# Patient Record
Sex: FEMALE | Race: WHITE | NOT HISPANIC OR LATINO | Employment: UNEMPLOYED | URBAN - METROPOLITAN AREA
[De-identification: names, ages, dates, MRNs, and addresses within clinical notes are randomized per-mention and may not be internally consistent; named-entity substitution may affect disease eponyms.]

---

## 2017-01-13 ENCOUNTER — TELEPHONE (OUTPATIENT)
Dept: FAMILY MEDICINE | Facility: CLINIC | Age: 57
End: 2017-01-13

## 2017-01-13 NOTE — TELEPHONE ENCOUNTER
Patient is due for diabetes visit and labs.    A1C     10.2   9/20/2016  A1C     11.4   8/9/2016  A1C     10.6   4/9/2015    Labs have been ordered.    Call patient with above and assist in scheduling if needed.  MAUREEN Kimbrough

## 2017-01-23 NOTE — TELEPHONE ENCOUNTER
Pt returned call and made appts for lab and to see Paulette. The labs aren't in so we need those put in and she said she only has 3 pills left of her metformin and is wondering if we can get her enough until her appt on Wednesday February 1st.    Delores Fairburn  Clinic Station

## 2017-01-24 ENCOUNTER — ALLIED HEALTH/NURSE VISIT (OUTPATIENT)
Dept: FAMILY MEDICINE | Facility: CLINIC | Age: 57
End: 2017-01-24
Payer: COMMERCIAL

## 2017-01-24 DIAGNOSIS — E11.29 POORLY CONTROLLED TYPE II DIABETES MELLITUS WITH RENAL COMPLICATION (H): ICD-10-CM

## 2017-01-24 DIAGNOSIS — E11.65 POORLY CONTROLLED TYPE 2 DIABETES MELLITUS WITH RENAL COMPLICATION (H): Primary | ICD-10-CM

## 2017-01-24 DIAGNOSIS — E78.5 HYPERLIPIDEMIA LDL GOAL <70: ICD-10-CM

## 2017-01-24 DIAGNOSIS — E11.65 POORLY CONTROLLED TYPE 2 DIABETES MELLITUS WITH RENAL COMPLICATION (H): ICD-10-CM

## 2017-01-24 DIAGNOSIS — E11.65 TYPE 2 DIABETES MELLITUS WITH HYPERGLYCEMIA (H): ICD-10-CM

## 2017-01-24 DIAGNOSIS — E11.29 POORLY CONTROLLED TYPE 2 DIABETES MELLITUS WITH RENAL COMPLICATION (H): ICD-10-CM

## 2017-01-24 DIAGNOSIS — E11.29 POORLY CONTROLLED TYPE 2 DIABETES MELLITUS WITH RENAL COMPLICATION (H): Primary | ICD-10-CM

## 2017-01-24 DIAGNOSIS — E11.65 POORLY CONTROLLED TYPE II DIABETES MELLITUS WITH RENAL COMPLICATION (H): ICD-10-CM

## 2017-01-24 LAB
ALBUMIN SERPL-MCNC: 3.9 G/DL (ref 3.4–5)
ALP SERPL-CCNC: 98 U/L (ref 40–150)
ALT SERPL W P-5'-P-CCNC: 56 U/L (ref 0–50)
ANION GAP SERPL CALCULATED.3IONS-SCNC: 11 MMOL/L (ref 3–14)
AST SERPL W P-5'-P-CCNC: 39 U/L (ref 0–45)
BILIRUB SERPL-MCNC: 0.4 MG/DL (ref 0.2–1.3)
BUN SERPL-MCNC: 14 MG/DL (ref 7–30)
CALCIUM SERPL-MCNC: 8.9 MG/DL (ref 8.5–10.1)
CHLORIDE SERPL-SCNC: 103 MMOL/L (ref 94–109)
CHOLEST SERPL-MCNC: 181 MG/DL
CO2 SERPL-SCNC: 23 MMOL/L (ref 20–32)
CREAT SERPL-MCNC: 0.63 MG/DL (ref 0.52–1.04)
CREAT UR-MCNC: 67 MG/DL
GFR SERPL CREATININE-BSD FRML MDRD: ABNORMAL ML/MIN/1.7M2
GLUCOSE SERPL-MCNC: 104 MG/DL (ref 70–99)
HDLC SERPL-MCNC: 28 MG/DL
LDLC SERPL CALC-MCNC: 118 MG/DL
MICROALBUMIN UR-MCNC: 10 MG/L
MICROALBUMIN/CREAT UR: 14.41 MG/G CR (ref 0–25)
NONHDLC SERPL-MCNC: 153 MG/DL
POTASSIUM SERPL-SCNC: 4.1 MMOL/L (ref 3.4–5.3)
PROT SERPL-MCNC: 7.5 G/DL (ref 6.8–8.8)
SODIUM SERPL-SCNC: 137 MMOL/L (ref 133–144)
TRIGL SERPL-MCNC: 173 MG/DL

## 2017-01-24 PROCEDURE — 82043 UR ALBUMIN QUANTITATIVE: CPT | Performed by: NURSE PRACTITIONER

## 2017-01-24 PROCEDURE — 36415 COLL VENOUS BLD VENIPUNCTURE: CPT | Performed by: NURSE PRACTITIONER

## 2017-01-24 PROCEDURE — 99207 ZZC NO CHARGE NURSE ONLY: CPT

## 2017-01-24 PROCEDURE — 80053 COMPREHEN METABOLIC PANEL: CPT | Performed by: NURSE PRACTITIONER

## 2017-01-24 PROCEDURE — 80061 LIPID PANEL: CPT | Performed by: NURSE PRACTITIONER

## 2017-01-24 RX ORDER — METFORMIN HCL 500 MG
1000 TABLET, EXTENDED RELEASE 24 HR ORAL 2 TIMES DAILY WITH MEALS
Qty: 120 TABLET | Refills: 0 | Status: SHIPPED
Start: 2017-01-24 | End: 2017-02-01

## 2017-01-24 NOTE — NURSING NOTE
Patient here for refill of metformin.    Needs appt with diabetic ed for further refills    Antonella Sierra RN

## 2017-01-25 ENCOUNTER — ALLIED HEALTH/NURSE VISIT (OUTPATIENT)
Dept: FAMILY MEDICINE | Facility: CLINIC | Age: 57
End: 2017-01-25
Payer: COMMERCIAL

## 2017-01-25 ENCOUNTER — TRANSFERRED RECORDS (OUTPATIENT)
Dept: HEALTH INFORMATION MANAGEMENT | Facility: CLINIC | Age: 57
End: 2017-01-25

## 2017-01-25 DIAGNOSIS — E78.5 HYPERLIPIDEMIA LDL GOAL <70: Primary | Chronic | ICD-10-CM

## 2017-01-25 DIAGNOSIS — I10 ESSENTIAL HYPERTENSION WITH GOAL BLOOD PRESSURE LESS THAN 140/90: Chronic | ICD-10-CM

## 2017-01-25 PROCEDURE — 99207 ZZC NO CHARGE NURSE ONLY: CPT

## 2017-02-01 ENCOUNTER — ALLIED HEALTH/NURSE VISIT (OUTPATIENT)
Dept: EDUCATION SERVICES | Facility: CLINIC | Age: 57
End: 2017-02-01
Payer: COMMERCIAL

## 2017-02-01 ENCOUNTER — OFFICE VISIT (OUTPATIENT)
Dept: FAMILY MEDICINE | Facility: CLINIC | Age: 57
End: 2017-02-01
Payer: COMMERCIAL

## 2017-02-01 VITALS
WEIGHT: 126 LBS | HEIGHT: 60 IN | DIASTOLIC BLOOD PRESSURE: 88 MMHG | BODY MASS INDEX: 24.74 KG/M2 | SYSTOLIC BLOOD PRESSURE: 138 MMHG | TEMPERATURE: 97.6 F | HEART RATE: 72 BPM

## 2017-02-01 VITALS — WEIGHT: 126 LBS | BODY MASS INDEX: 25.44 KG/M2

## 2017-02-01 DIAGNOSIS — I10 ESSENTIAL HYPERTENSION WITH GOAL BLOOD PRESSURE LESS THAN 140/90: Chronic | ICD-10-CM

## 2017-02-01 DIAGNOSIS — E11.29 POORLY CONTROLLED TYPE 2 DIABETES MELLITUS WITH RENAL COMPLICATION (H): Primary | Chronic | ICD-10-CM

## 2017-02-01 DIAGNOSIS — E11.65 POORLY CONTROLLED TYPE 2 DIABETES MELLITUS WITH RENAL COMPLICATION (H): Primary | Chronic | ICD-10-CM

## 2017-02-01 DIAGNOSIS — E78.5 HYPERLIPIDEMIA LDL GOAL <70: Chronic | ICD-10-CM

## 2017-02-01 DIAGNOSIS — E11.65 TYPE 2 DIABETES MELLITUS WITH HYPERGLYCEMIA, WITHOUT LONG-TERM CURRENT USE OF INSULIN (H): Primary | ICD-10-CM

## 2017-02-01 DIAGNOSIS — E11.65 TYPE 2 DIABETES MELLITUS WITH HYPERGLYCEMIA, WITHOUT LONG-TERM CURRENT USE OF INSULIN (H): ICD-10-CM

## 2017-02-01 DIAGNOSIS — N20.0 NEPHROLITHIASIS: Chronic | ICD-10-CM

## 2017-02-01 LAB — HBA1C MFR BLD: 7 % (ref 4.3–6)

## 2017-02-01 PROCEDURE — 99214 OFFICE O/P EST MOD 30 MIN: CPT | Performed by: NURSE PRACTITIONER

## 2017-02-01 PROCEDURE — G0108 DIAB MANAGE TRN  PER INDIV: HCPCS

## 2017-02-01 PROCEDURE — 83036 HEMOGLOBIN GLYCOSYLATED A1C: CPT | Performed by: NURSE PRACTITIONER

## 2017-02-01 PROCEDURE — 36415 COLL VENOUS BLD VENIPUNCTURE: CPT | Performed by: NURSE PRACTITIONER

## 2017-02-01 RX ORDER — METFORMIN HCL 500 MG
1000 TABLET, EXTENDED RELEASE 24 HR ORAL 2 TIMES DAILY WITH MEALS
Qty: 360 TABLET | Refills: 3 | Status: SHIPPED | OUTPATIENT
Start: 2017-02-01 | End: 2017-09-06

## 2017-02-01 RX ORDER — PRAVASTATIN SODIUM 20 MG
20 TABLET ORAL DAILY
Qty: 90 TABLET | Refills: 3 | Status: SHIPPED | OUTPATIENT
Start: 2017-02-01 | End: 2017-09-06

## 2017-02-01 RX ORDER — GLIPIZIDE 5 MG/1
5 TABLET, FILM COATED, EXTENDED RELEASE ORAL DAILY
Qty: 90 TABLET | Refills: 3 | Status: SHIPPED | OUTPATIENT
Start: 2017-02-01 | End: 2017-09-06

## 2017-02-01 RX ORDER — PRAVASTATIN SODIUM 10 MG
10 TABLET ORAL DAILY
Qty: 90 TABLET | Refills: 1 | Status: CANCELLED | OUTPATIENT
Start: 2017-02-01

## 2017-02-01 NOTE — PROGRESS NOTES
SUBJECTIVE:                                                    Bradford Prado is a 56 year old female who presents to clinic today for the following health issues:        Diabetes Follow-up    Patient is checking blood sugars: twice daily.    Blood sugar testing frequency justification: Patient modifying lifestyle changes (diet, exercise) with blood sugars  Results are as follows:         am - 76         suppertime - 148    Diabetic concerns: None     Symptoms of hypoglycemia (low blood sugar): shaky, dizzy, weak, lethargy     Paresthesias (numbness or burning in feet) or sores: No     Date of last diabetic eye exam: 1 week ago     Hyperlipidemia Follow-Up      Rate your low fat/cholesterol diet?: poor    Taking statin?  Yes, no muscle aches from statin    Other lipid medications/supplements?:  none     Hypertension Follow-up      Outpatient blood pressures are not being checked.    Low Salt Diet: not monitoring salt         Amount of exercise or physical activity: None    Problems taking medications regularly: No    Medication side effects: none    Diet: diabetic and carbohydrate counting    Eating well and has lost weight.  Stays very active with her job.    Problem list and histories reviewed & adjusted, as indicated.  Additional history: as documented    Patient Active Problem List   Diagnosis     Migraine     Hand arthritis     Stenosing tenosynovitis     Poorly controlled type 2 diabetes mellitus with renal complication (H)     Hyperlipidemia LDL goal <70     Essential hypertension with goal blood pressure less than 140/90     Nephrolithiasis     Type 2 diabetes mellitus with hyperglycemia, without long-term current use of insulin (H)     Past Surgical History   Procedure Laterality Date     Tubal ligation       Laparoscopic evacuation ectopic pregnancy       Hysterectomy total abdominal  1995     due to fibroids       Social History   Substance Use Topics     Smoking status: Former Smoker -- 0.50 packs/day for  20 years     Types: Cigarettes     Quit date: 03/09/2010     Smokeless tobacco: Never Used     Alcohol Use: No     Family History   Problem Relation Age of Onset     Breast Cancer No family hx of      Cancer - colorectal No family hx of      Emphysema Father      Cirrhosis Mother      w/o alcohol history     Hypertension Sister      Heart Defect Niece      [facility]         Current Outpatient Prescriptions   Medication Sig Dispense Refill     metFORMIN (GLUCOPHAGE-XR) 500 MG 24 hr tablet Take 2 tablets (1,000 mg) by mouth 2 times daily (with meals) 360 tablet 3     blood glucose monitoring (ACCU-CHEK MASON PLUS) test strip Use to test blood sugar 2 times daily or as directed.  Ok to substitute alternative if insurance prefers. 100 each 11     glipiZIDE (GLIPIZIDE XL) 5 MG 24 hr tablet Take 1 tablet (5 mg) by mouth daily 90 tablet 3     pravastatin (PRAVACHOL) 20 MG tablet Take 1 tablet (20 mg) by mouth daily 90 tablet 3     blood glucose monitoring (ACCU-CHEK FASTCLIX) lancets Use to test blood sugar 2 times daily or as directed.  Ok to substitute alternative if insurance prefers. 1 Box 11     glucose 4 G CHEW Take 1 tablet by mouth every hour as needed for low blood sugar 10 tablet 1     losartan (COZAAR) 50 MG tablet Take 1 tablet (50 mg) by mouth daily (Patient taking differently: Take 50 mg by mouth every evening ) 90 tablet 3     pravastatin (PRAVACHOL) 10 MG tablet Take 1 tablet (10 mg) by mouth daily (Patient taking differently: Take 10 mg by mouth every evening ) 90 tablet 1     IBUPROFEN PO        naproxen sodium (ALEVE) 220 MG tablet Take 220 mg by mouth once as needed        [DISCONTINUED] metFORMIN (GLUCOPHAGE-XR) 500 MG 24 hr tablet Take 2 tablets (1,000 mg) by mouth 2 times daily (with meals) May start with 1 tablet twice daily with meals, then increase after 3-5 days to 2 tablets twice daily with meals 120 tablet 0     [DISCONTINUED] glipiZIDE (GLIPIZIDE XL) 5 MG 24 hr tablet Take 1 tablet  (5 mg) by mouth daily 90 tablet 1     No Known Allergies  Recent Labs   Lab Test  02/01/17   1046  01/24/17   0937  09/27/16   1110  09/22/16   2215   09/20/16   0630   08/09/16   1217  04/09/15   1051   08/23/12   0837   A1C  7.0*   --    --    --    --   10.2*   --   11.4*  10.6*   < >   --    LDL   --   118*   --    --    --    --    --   127*  137*   --   127   HDL   --   28*   --    --    --    --    --   25*   --    --   32*   TRIG   --   173*   --    --    --    --    --   259*   --    --   241*   ALT   --   56*  56*  95*   --    --    < >  Canceled, Test credited   Unsatisfactory specimen - hemolyzed  uControl OP LAB 8/9/16 AT 1512 AS    61*   --   33   CR   --   0.63   --   0.65   < >   --    < >  Canceled, Test credited   Unsatisfactory specimen - hemolyzed  uControl OP LAB 8/9/16 AT 1512 AS     --    < >  0.62   GFRESTIMATED   --   >90  Non  GFR Calc     --   >90  Non  GFR Calc     < >   --    < >  Canceled, Test credited   Unsatisfactory specimen - hemolyzed  uControl OP LAB 8/9/16 AT 1512 AS     --    < >  >90   GFRESTBLACK   --   >90   GFR Calc     --   >90   GFR Calc     < >   --    < >  Canceled, Test credited   Unsatisfactory specimen - hemolyzed  uControl OP LAB 8/9/16 AT 1512 AS     --    < >  >90   POTASSIUM   --   4.1   --   3.7   < >   --    < >  Canceled, Test credited   Unsatisfactory specimen - hemolyzed  uControl OP LAB 8/9/16 AT 1512 AS     --    < >  3.9   TSH   --    --    --    --    --    --    --    --   2.73   --    --     < > = values in this interval not displayed.      BP Readings from Last 3 Encounters:   02/01/17 138/88   10/03/16 139/89   09/27/16 138/88    Wt Readings from Last 3 Encounters:   02/01/17 126 lb (57.153 kg)   02/01/17 126 lb (57.153 kg)   10/03/16 128 lb (58.06 kg)                  Labs reviewed in EPIC  Problem list, Medication list, Allergies, and Medical/Social/Surgical histories  "reviewed in [facility] and updated as appropriate.    ROS:  Constitutional, HEENT, cardiovascular, pulmonary, GI, , musculoskeletal, neuro, skin, endocrine and psych systems are negative, except as otherwise noted.    OBJECTIVE:                                                    /88 mmHg  Pulse 72  Temp(Src) 97.6  F (36.4  C) (Tympanic)  Ht 4' 11.75\" (1.518 m)  Wt 126 lb (57.153 kg)  BMI 24.80 kg/m2  Body mass index is 24.8 kg/(m^2).  GENERAL: healthy, alert and no distress  NECK: no adenopathy, no asymmetry, masses, or scars and thyroid normal to palpation  RESP: lungs clear to auscultation - no rales, rhonchi or wheezes  CV: regular rate and rhythm, normal S1 S2, no S3 or S4, no murmur, click or rub, no peripheral edema and peripheral pulses strong  ABDOMEN: soft, nontender, no hepatosplenomegaly, no masses and bowel sounds normal  MS: no gross musculoskeletal defects noted, no edema    Diagnostic Test Results:  Results for orders placed or performed in visit on 02/01/17 (from the past 24 hour(s))   Hemoglobin A1c   Result Value Ref Range    Hemoglobin A1C 7.0 (H) 4.3 - 6.0 %        ASSESSMENT/PLAN:                                                      1. Poorly controlled type 2 diabetes mellitus with renal complication (H)     - metFORMIN (GLUCOPHAGE-XR) 500 MG 24 hr tablet; Take 2 tablets (1,000 mg) by mouth 2 times daily (with meals)  Dispense: 360 tablet; Refill: 3  - blood glucose monitoring (ACCU-CHEK MASON PLUS) test strip; Use to test blood sugar 2 times daily or as directed.  Ok to substitute alternative if insurance prefers.  Dispense: 100 each; Refill: 11  - glipiZIDE (GLIPIZIDE XL) 5 MG 24 hr tablet; Take 1 tablet (5 mg) by mouth daily  Dispense: 90 tablet; Refill: 3  - Hemoglobin A1c  - Basic metabolic panel; Future  - Lipid panel reflex to direct LDL; Future  - Hemoglobin A1c; Future  - pravastatin (PRAVACHOL) 20 MG tablet; Take 1 tablet (20 mg) by mouth daily  Dispense: 90 tablet; Refill: " 3    2. Nephrolithiasis     - glipiZIDE (GLIPIZIDE XL) 5 MG 24 hr tablet; Take 1 tablet (5 mg) by mouth daily  Dispense: 90 tablet; Refill: 3  - Hemoglobin A1c  - Basic metabolic panel; Future  - Lipid panel reflex to direct LDL; Future  - Hemoglobin A1c; Future  - pravastatin (PRAVACHOL) 20 MG tablet; Take 1 tablet (20 mg) by mouth daily  Dispense: 90 tablet; Refill: 3    3. Essential hypertension with goal blood pressure less than 140/90     - glipiZIDE (GLIPIZIDE XL) 5 MG 24 hr tablet; Take 1 tablet (5 mg) by mouth daily  Dispense: 90 tablet; Refill: 3  - Hemoglobin A1c  - Basic metabolic panel; Future  - Lipid panel reflex to direct LDL; Future  - Hemoglobin A1c; Future  - pravastatin (PRAVACHOL) 20 MG tablet; Take 1 tablet (20 mg) by mouth daily  Dispense: 90 tablet; Refill: 3    4. Hyperlipidemia LDL goal <70   See AVS.    - glipiZIDE (GLIPIZIDE XL) 5 MG 24 hr tablet; Take 1 tablet (5 mg) by mouth daily  Dispense: 90 tablet; Refill: 3  - Hemoglobin A1c  - Basic metabolic panel; Future  - Lipid panel reflex to direct LDL; Future  - Hemoglobin A1c; Future  - pravastatin (PRAVACHOL) 20 MG tablet; Take 1 tablet (20 mg) by mouth daily  Dispense: 90 tablet; Refill: 3    5. Type 2 diabetes mellitus with hyperglycemia, without long-term current use of insulin (H)         Patient Instructions   Naprosyn (Aleve) 220 mg once daily as needed for arthritis and hand pain.    Diabetes Plan  1. Hemoglobin A1c goal is between 7% and 8%  Your Hemoglobin A1c is not at goal  Plan is:Continue medications at current doses and Next Hemoglobin A1c in TODAY and if at goal (< 8%) then recheck in 6 months.  2. LDL (bad cholesterol) goal is less than 100  Your LDL is not at goal  Plan is: Increase medication - Pravastatin 20 mg once daily, Diet and exercise and Next cholesterol panel in  6 months  3. Blood pressure goal is less than 140/90.  Your blood pressure is at goal.  Plan is: Continue medications at current doses, Diet and  exercise and Low sodium diet. Less than 2 grams of sodium per day  4. Microalbumin checks for protein in your urine which is an indicator of kidney damage.  Your microalbumin is normal.  Plan is: Recheck in one year  5. Other: Daily aspirin is recommended to reduce cardiovascular risk  6. TSH (thyroid test)  is normal  7. Recommend an annual flu shot    MAUREEN Kimbrough                Thank you for choosing [facility].  You may be receiving a survey in the mail from [employer] regarding your visit today.  Please take a few minutes to complete and return the survey to let us know how we are doing.      If you have questions or concerns, please contact us via HelioVolt or you can contact your care team at [phone].    Our Clinic hours are:  Monday 6:40 am  to 7:00 pm  Tuesday -Friday 6:40 am to 5:00 pm    The [place] outpatient lab hours are:  Monday - Friday 6:10 am to 4:45 pm  Saturdays 7:00 am to 11:00 am  Appointments are required, call [phone]    If you have clinical questions after hours or would like to schedule an appointment,  call the clinic at [phone].          Paulette Travis NP  [facility]

## 2017-02-01 NOTE — NURSING NOTE
"Chief Complaint   Patient presents with     Diabetes     Refill Request       Initial /84 mmHg  Pulse 72  Temp(Src) 97.6  F (36.4  C) (Tympanic)  Ht 4' 11.75\" (1.518 m)  Wt 126 lb (57.153 kg)  BMI 24.80 kg/m2 Estimated body mass index is 24.8 kg/(m^2) as calculated from the following:    Height as of this encounter: 4' 11.75\" (1.518 m).    Weight as of this encounter: 126 lb (57.153 kg).  BP completed using cuff size: regular    "

## 2017-02-01 NOTE — PATIENT INSTRUCTIONS
My Diabetes Care Goals:  Healthy Eating: Eat three small meals and up to three snacks per day.  Add at least one carb serving to each meal.  Being Active: Keep walking at work.  When the weather gets nice start walking outside.  Monitoring: You are doing a great job checking your blood sugar.  Keep going!  Problem Solving: Carry glucose tablets with you.  If you have any blood sugars under 70 take 3 glucose tablets.    Follow up:  Follow-up diabetes education appointment scheduled on April 3 at 9:30 a.m.      Bring blood glucose meter and logbook with you to all doctor and follow-up appointments.     [facility] for the [facility]:  For Your Diabetes Education and Nutrition Appointments Call:  [phone]   For Diabetes Education or Nutrition Related Questions:   Phone: [phone]  E-mail: [email or web address]  Fax: [phone]   If you need a medication refill please contact your pharmacy. Please allow 3 business days for your refills to be completed.    Instructions for emailing the Diabetes Educators    If you need to communicate a non-urgent message to a Diabetes Educator via email, please send to [email or web address].    Please follow the following email guidelines:    Subject line: Secure: your clinic name (example: Secure: [place])  In the email please include: First name, middle initial, last name and date of birth.    We will be in touch with you within one (1) business day.

## 2017-02-01 NOTE — MR AVS SNAPSHOT
After Visit Summary   2/1/2017    Bradford Prado    MRN: [id number]           Patient Information     Date Of Birth          1960        Visit Information        Provider Department      2/1/2017 8:30 AM [place] [facility] RESOURCE [facility]        Care Instructions    My Diabetes Care Goals:  Healthy Eating: Eat three small meals and up to three snacks per day.  Add at least one carb serving to each meal.  Being Active: Keep walking at work.  When the weather gets nice start walking outside.  Monitoring: You are doing a great job checking your blood sugar.  Keep going!  Problem Solving: Carry glucose tablets with you.  If you have any blood sugars under 70 take 3 glucose tablets.    Follow up:  Follow-up diabetes education appointment scheduled on April 3 at 9:30 a.m.      Bring blood glucose meter and logbook with you to all doctor and follow-up appointments.     [facility] for the [facility]:  For Your Diabetes Education and Nutrition Appointments Call:  [phone]   For Diabetes Education or Nutrition Related Questions:   Phone: [phone]  E-mail: [email or web address]  Fax: [phone]   If you need a medication refill please contact your pharmacy. Please allow 3 business days for your refills to be completed.    Instructions for emailing the Diabetes Educators    If you need to communicate a non-urgent message to a Diabetes Educator via email, please send to [email or web address].    Please follow the following email guidelines:    Subject line: Secure: your clinic name (example: Secure: [place])  In the email please include: First name, middle initial, last name and date of birth.    We will be in touch with you within one (1) business day.           Follow-ups after your visit        Your next 10 appointments already scheduled     Feb 01, 2017 10:00 AM   SHORT with Paulette Travis NP   [facility]    [address]  [facility] [address]   [phone]            2017  9:30 AM   Diabetic Education with [place] [facility] RESOURCE   [facility] ([facility]    [address]  [facility] [address]   [phone]              Who to contact     If you have questions or need follow up information about today's clinic visit or your schedule please contact [facility] directly at [phone].  Normal or non-critical lab and imaging results will be communicated to you by CapLinkedhart, letter or phone within 4 business days after the clinic has received the results. If you do not hear from us within 7 days, please contact the clinic through Airizut or phone. If you have a critical or abnormal lab result, we will notify you by phone as soon as possible.  Submit refill requests through Envoimoinscher or call your pharmacy and they will forward the refill request to us. Please allow 3 business days for your refill to be completed.          Additional Information About Your Visit        En[employer]cher Information     [employer] lets you send messages to your doctor, view your test results, renew your prescriptions, schedule appointments and more. To sign up, go to [email or web address] . Click on \"Log in\" on the left side of the screen, which will take you to the Welcome page. Then click on \"Sign up Now\" on the right side of the page.     You will be asked to enter the access code listed below, as well as some personal information. Please follow the directions to create your username and password.     Your access code is: [email or web address]  Expires: 2017  9:27 AM     Your access code will  in 90 days. If you need help or a new code, please call your [facility] clinic or [phone].        Care EveryWhere ID     This is your Care EveryWhere ID. This could be used by other organizations to access your [facility] medical records  [id number]         Blood Pressure " from Last 3 Encounters:   10/03/16 139/89   09/27/16 138/88   09/23/16 169/84    Weight from Last 3 Encounters:   02/01/17 57.153 kg (126 lb)   10/03/16 58.06 kg (128 lb)   10/03/16 58.06 kg (128 lb)              Today, you had the following     No orders found for display         Today's Medication Changes          These changes are accurate as of: 2/1/17  9:27 AM.  If you have any questions, ask your nurse or doctor.               These medicines have changed or have updated prescriptions.        Dose/Directions    losartan 50 MG tablet   Commonly known as:  COZAAR   This may have changed:  when to take this   Used for:  Essential hypertension with goal blood pressure less than 140/90        Dose:  50 mg   Take 1 tablet (50 mg) by mouth daily   Quantity:  90 tablet   Refills:  3       pravastatin 10 MG tablet   Commonly known as:  PRAVACHOL   This may have changed:  when to take this   Used for:  Hyperlipidemia LDL goal <70, Poorly controlled type 2 diabetes mellitus with renal complication (H), Type 2 diabetes mellitus with hyperglycemia (H)        Dose:  10 mg   Take 1 tablet (10 mg) by mouth daily   Quantity:  90 tablet   Refills:  1                Primary Care Provider Office Phone # Fax #    Paulette Travis [phone]       [facility] [address] [place] [id number]        Thank you!     Thank you for choosing [facility]  for your care. Our goal is always to provide you with excellent care. Hearing back from our patients is one way we can continue to improve our services. Please take a few minutes to complete the written survey that you may receive in the mail after your visit with us. Thank you!             Your Updated Medication List - Protect others around you: Learn how to safely use, store and throw away your medicines at www.disposemymeds.org.          This list is accurate as of: 2/1/17  9:27 AM.  Always use your most recent med list.                    Brand Name Dispense Instructions for use    ALEVE 220 MG tablet   Generic drug:  naproxen sodium      Take 220 mg by mouth once as needed       blood glucose monitoring lancets     1 Box    Use to test blood sugar 2 times daily or as directed.  Ok to substitute alternative if insurance prefers.       blood glucose monitoring test strip    ACCU-CHEK MASON PLUS    100 each    Use to test blood sugar 2 times daily or as directed.  Ok to substitute alternative if insurance prefers.       glipiZIDE 5 MG 24 hr tablet    glipiZIDE XL    90 tablet    Take 1 tablet (5 mg) by mouth daily       glucose 4 G Chew chewable tablet     10 tablet    Take 1 tablet by mouth every hour as needed for low blood sugar       IBUPROFEN PO          losartan 50 MG tablet    COZAAR    90 tablet    Take 1 tablet (50 mg) by mouth daily       metFORMIN 500 MG 24 hr tablet    GLUCOPHAGE-XR    120 tablet    Take 2 tablets (1,000 mg) by mouth 2 times daily (with meals) May start with 1 tablet twice daily with meals, then increase after 3-5 days to 2 tablets twice daily with meals       oxyCODONE-acetaminophen 5-325 MG per tablet    PERCOCET    20 tablet    Take 1-2 tablets by mouth every 4 hours as needed for pain       pravastatin 10 MG tablet    PRAVACHOL    90 tablet    Take 1 tablet (10 mg) by mouth daily

## 2017-02-01 NOTE — LETTER
[facility]  [address] [address]  [phone]        January 6, 2018    Bradford Prado  [address] [facility] [address]              Dear Bradford Prado    This is to remind you that your Fasting Lipid profile and Diabetes labs are due.    You may call our office at [phone] to schedule an appointment.    Please disregard this notice if you have already had your labs drawn or made an appointment.        Sincerely,        Paulette Travis CNP

## 2017-02-01 NOTE — PROGRESS NOTES
"Diabetes Self Management Training: Follow-up Visit    Bradford Prado presents today for education related to Type 2 diabetes.    She is accompanied by self    Patient's diabetes management related comments/concerns: \"the whole thing is concerning.  I don't want to lose my limbs.\"    Patient would like this visit to be focused around the following diabetes-related behaviors and goals: Diabetes pathophysiology, Assistance with making lifestyle changes and Problem Solving    ASSESSMENT:  Patient Problem List reviewed for relevant medical history and current medical status.    Current Diabetes Management per Patient:  Taking diabetes medications?   yes:     Diabetes Medication(s)     Biguanides Sig    metFORMIN (GLUCOPHAGE-XR) 500 MG 24 hr tablet Take 2 tablets (1,000 mg) by mouth 2 times daily (with meals) May start with 1 tablet twice daily with meals, then increase after 3-5 days to 2 tablets twice daily with meals    Diabetic Other Sig    glucose 4 G CHEW Take 1 tablet by mouth every hour as needed for low blood sugar    Sulfonylureas Sig    glipiZIDE (GLIPIZIDE XL) 5 MG 24 hr tablet Take 1 tablet (5 mg) by mouth daily      , Oral Medications: See above    *Abbreviated insulin dose documentation key: Insulin Trade Name (hxzysexkk-nzmbv-gielgf-bedtime) - i.e. Humalog 5-5-5-0 (Humalog 5 units at breakfast, 5 units at lunch, and 5 units at dinner).    Patient glucose self monitoring as follows: two times daily, three times daily.   BG meter: Accu-Chek Gina Connect meter  BG results: 7 day average is: 138 30 day average is: 147    BG values are: In goal  Patient's most recent A1C     10.2   9/20/2016 is meeting goal of <7.0    Nutrition:  Patient skips lunch regularly but does have a snack, many meals with no carb    Breakfast -(10-11a) 2 scrambled eggs sometimes with veggies, occasional toast with butter coffee with creamer  Lunch - (2p) nuts or piece of fruit or veggies   Dinner - (9-10p) salad or hamburger mallika with " "mushroom and swiss or quesadilla  Snacks -skip    Beverages: Tea  0-2/day, Coffee 2/day and Water 8/day    Cultural/Oriental orthodox diet restrictions: No     Biggest Challenge to Healthy Eating: lack of time    Physical Activity:    Type: walking at work 40 hours a week  Barriers: time, weather  Limitations: none    Diabetes Complications:  Acute Complications: At risk for hypoglycemia? yes  Symptoms of low blood sugar? Feels hungry and shaky  Frequency of hypoglycemia: rare  Patient carries a carbohydrate source with them regularly: Yes   Type of carbohydrate: glucose tablets  Symptoms of hyperglycemia? increased thirst, frequent urination and blurred vision    Vitals:    Estimated body mass index is 25.84 kg/(m^2) as calculated from the following:    Height as of 10/3/16: 1.499 m (4' 11\").    Weight as of 10/3/16: 58.06 kg (128 lb).   Last 3 BP:   BP Readings from Last 3 Encounters:   10/03/16 139/89   09/27/16 138/88   09/23/16 169/84       History   Smoking status     Current Every Day Smoker -- 0.50 packs/day for 20 years     Types: Cigarettes     Last Attempt to Quit: 03/09/2010   Smokeless tobacco     Never Used       Labs:  A1C     10.2   9/20/2016  GLC      104   1/24/2017  LDL      118   1/24/2017  LDL      137   4/9/2015  HDL CHOLESTEROL   Date Value Ref Range Status   01/24/2017 28* >49 mg/dL Final   ]  GFR ESTIMATE   Date Value Ref Range Status   01/24/2017 >90  Non  GFR Calc   >60 mL/min/1.7m2 Final     GFR ESTIMATE IF BLACK   Date Value Ref Range Status   01/24/2017 >90   GFR Calc   >60 mL/min/1.7m2 Final     CR     0.63   1/24/2017  No results found for this basename: microalbumin    Health Beliefs and Attitudes:   Patient Activation Measure Survey Score:  No flowsheet data found.    Stage of Change: ACTION (Actively working towards change)    Progress toward meeting diabetes-related behavioral goals:    GOALS % Met Goal   Healthy Eating  75%   Physical Activity  75% "   Monitoring  100%   Medication Taking  100%   Problem Solving     Healthy Coping     Risk Reduction           Diabetes knowledge and skills assessment:     Patient is knowledgeable in diabetes management concepts related to: Being Active, Monitoring and Taking Medication    Patient needs further education on the following diabetes management concepts: Problem Solving, Reducing Risks and Healthy Coping    Barriers to Learning Assessment: No Barriers identified    Based on learning assessment above, most appropriate setting for further diabetes education would be: Individual setting.    INTERVENTION:  We covered the Standards of Care for people with diabetes today.  Bradford seemed to understand the information.    Education provided today on:  AADE Self-Care Behaviors:  Healthy Eating: consistency in amount, composition, and timing of food intake, heart healthy diet and portion control  Monitoring: log and interpret results, individual blood glucose targets and frequency of monitoring  Problem Solving: high blood glucose - causes, signs/symptoms, treatment and prevention, low blood glucose - causes, signs/symptoms, treatment and prevention, carrying a carbohydrate source at all times, when to call health care provider and medical identification    Opportunities for ongoing education and support in diabetes-self management were discussed.    Pt verbalized understanding of concepts discussed and recommendations provided today.       Education Materials Provided:  BG Log Sheet    PLAN:  See Patient Instructions for co-developed, patient-stated behavior change goals.  AVS printed and provided to patient today.    FOLLOW-UP:  Follow-up yearly for diabetes education review.  Chart routed to referring provider.    Ongoing plan for education and support: Support group(s)      Time Spent: 60 minutes  Encounter Type: Individual    Any diabetes medication dose changes were made via the CDE Protocol and Collaborative Practice  Agreement with the patient's referring provider. A copy of this encounter was shared with the provider.

## 2017-02-01 NOTE — PATIENT INSTRUCTIONS
Naprosyn (Aleve) 220 mg once daily as needed for arthritis and hand pain.    Diabetes Plan  1. Hemoglobin A1c goal is between 7% and 8%  Your Hemoglobin A1c is not at goal  Plan is:Continue medications at current doses and Next Hemoglobin A1c in TODAY and if at goal (< 8%) then recheck in 6 months.  2. LDL (bad cholesterol) goal is less than 100  Your LDL is not at goal  Plan is: Increase medication - Pravastatin 20 mg once daily, Diet and exercise and Next cholesterol panel in  6 months  3. Blood pressure goal is less than 140/90.  Your blood pressure is at goal.  Plan is: Continue medications at current doses, Diet and exercise and Low sodium diet. Less than 2 grams of sodium per day  4. Microalbumin checks for protein in your urine which is an indicator of kidney damage.  Your microalbumin is normal.  Plan is: Recheck in one year  5. Other: Daily aspirin is recommended to reduce cardiovascular risk  6. TSH (thyroid test)  is normal  7. Recommend an annual flu shot    MAUREEN Kimbrough                Thank you for choosing [facility].  You may be receiving a survey in the mail from aCrrie Armas regarding your visit today.  Please take a few minutes to complete and return the survey to let us know how we are doing.      If you have questions or concerns, please contact us via Dynamics Expert or you can contact your care team at [phone].    Our Clinic hours are:  Monday 6:40 am  to 7:00 pm  Tuesday -Friday 6:40 am to 5:00 pm    The [place] outpatient lab hours are:  Monday - Friday 6:10 am to 4:45 pm  Saturdays 7:00 am to 11:00 am  Appointments are required, call [phone]    If you have clinical questions after hours or would like to schedule an appointment,  call the clinic at [phone].

## 2017-02-01 NOTE — MR AVS SNAPSHOT
After Visit Summary   2/1/2017    Bradford Prado    MRN: [id number]           Patient Information     Date Of Birth          1960        Visit Information        Provider Department      2/1/2017 10:00 AM Paulette Travis NP [facility]        Today's Diagnoses     Poorly controlled type 2 diabetes mellitus with renal complication (H)    -  1     Nephrolithiasis         Essential hypertension with goal blood pressure less than 140/90         Hyperlipidemia LDL goal <70         Type 2 diabetes mellitus with hyperglycemia, without long-term current use of insulin (H)           Care Instructions    Naprosyn (Aleve) 220 mg once daily as needed for arthritis and hand pain.    Diabetes Plan  1. Hemoglobin A1c goal is between 7% and 8%  Your Hemoglobin A1c is not at goal  Plan is:Continue medications at current doses and Next Hemoglobin A1c in TODAY and if at goal (< 8%) then recheck in 6 months.  2. LDL (bad cholesterol) goal is less than 100  Your LDL is not at goal  Plan is: Increase medication - Pravastatin 20 mg once daily, Diet and exercise and Next cholesterol panel in  6 months  3. Blood pressure goal is less than 140/90.  Your blood pressure is at goal.  Plan is: Continue medications at current doses, Diet and exercise and Low sodium diet. Less than 2 grams of sodium per day  4. Microalbumin checks for protein in your urine which is an indicator of kidney damage.  Your microalbumin is normal.  Plan is: Recheck in one year  5. Other: Daily aspirin is recommended to reduce cardiovascular risk  6. TSH (thyroid test)  is normal  7. Recommend an annual flu shot    MAUREEN Kimbrough                Thank you for choosing [facility].  You may be receiving a survey in the mail from Carrie Armas regarding your visit today.  Please take a few minutes to complete and return the survey to let us know how we are doing.      If you have questions or concerns, please contact us via  Mobil[employer]c or you can contact your care team at [phone].    Our Clinic hours are:  Monday 6:40 am  to 7:00 pm  Tuesday -Friday 6:40 am to 5:00 pm    The [place] outpatient lab hours are:  Monday - Friday 6:10 am to 4:45 pm  Saturdays 7:00 am to 11:00 am  Appointments are required, call [phone]    If you have clinical questions after hours or would like to schedule an appointment,  call the clinic at [phone].          Follow-ups after your visit        Your next 10 appointments already scheduled     Apr 05, 2017  9:30 AM   Diabetic Education with [place] [facility] ED RESOURCE   [facility] ([facility])    [address] [address] [phone]              Future tests that were ordered for you today     Open Future Orders        Priority Expected Expires Ordered    Basic metabolic panel Routine  2/1/2018 2/1/2017    Lipid panel reflex to direct LDL Routine  2/1/2018 2/1/2017    Hemoglobin A1c Routine  2/1/2018 2/1/2017            Who to contact     If you have questions or need follow up information about today's clinic visit or your schedule please contact [facility] directly at [phone].  Normal or non-critical lab and imaging results will be communicated to you by Mobvoihart, letter or phone within 4 business days after the clinic has received the results. If you do not hear from us within 7 days, please contact the clinic through Mobvoihart or phone. If you have a critical or abnormal lab result, we will notify you by phone as soon as possible.  Submit refill requests through Mobilitec or call your pharmacy and they will forward the refill request to us. Please allow 3 business days for your refill to be completed.          Additional Information About Your Visit        Mobilitec Information     Mobilitec lets you send messages to your doctor, view your test results, renew your prescriptions, schedule appointments and more. To sign up, go to  [email or web address] . Click on \"Log in\" on the left side of the screen, which will take you to the Welcome page. Then click on \"Sign up Now\" on the right side of the page.     You will be asked to enter the access code listed below, as well as some personal information. Please follow the directions to create your username and password.     Your access code is: [email or web address]  Expires: 2017  9:27 AM     Your access code will  in 90 days. If you need help or a new code, please call your [facility] clinic or [phone].        Care EveryWhere ID     This is your Care EveryWhere ID. This could be used by other organizations to access your [facility] medical records  [id number]        Your Vitals Were     Pulse Temperature Height BMI (Body Mass Index)          72 97.6  F (36.4  C) (Tympanic) 4' 11.75\" (1.518 m) 24.80 kg/m2         Blood Pressure from Last 3 Encounters:   17 138/88   10/03/16 139/89   16 138/88    Weight from Last 3 Encounters:   17 126 lb (57.153 kg)   17 126 lb (57.153 kg)   10/03/16 128 lb (58.06 kg)              We Performed the Following     Hemoglobin A1c          Today's Medication Changes          These changes are accurate as of: 17 10:45 AM.  If you have any questions, ask your nurse or doctor.               These medicines have changed or have updated prescriptions.        Dose/Directions    losartan 50 MG tablet   Commonly known as:  COZAAR   This may have changed:  when to take this   Used for:  Essential hypertension with goal blood pressure less than 140/90        Dose:  50 mg   Take 1 tablet (50 mg) by mouth daily   Quantity:  90 tablet   Refills:  3       metFORMIN 500 MG 24 hr tablet   Commonly known as:  GLUCOPHAGE-XR   This may have changed:  additional instructions   Used for:  Poorly controlled type 2 diabetes mellitus with renal complication (H)   Changed by:  Paulette Travis, RUSS        Dose:  1000 mg   Take 2 tablets (1,000 mg) by mouth " 2 times daily (with meals)   Quantity:  360 tablet   Refills:  3       * pravastatin 10 MG tablet   Commonly known as:  PRAVACHOL   This may have changed:  when to take this   Used for:  Hyperlipidemia LDL goal <70, Poorly controlled type 2 diabetes mellitus with renal complication (H), Type 2 diabetes mellitus with hyperglycemia (H)   Changed by:  Paulette Travis NP        Dose:  10 mg   Take 1 tablet (10 mg) by mouth daily   Quantity:  90 tablet   Refills:  1       * pravastatin 20 MG tablet   Commonly known as:  PRAVACHOL   This may have changed:  You were already taking a medication with the same name, and this prescription was added. Make sure you understand how and when to take each.   Used for:  Poorly controlled type 2 diabetes mellitus with renal complication (H), Nephrolithiasis, Essential hypertension with goal blood pressure less than 140/90, Hyperlipidemia LDL goal <70   Changed by:  Paulette Travis NP        Dose:  20 mg   Take 1 tablet (20 mg) by mouth daily   Quantity:  90 tablet   Refills:  3       * Notice:  This list has 2 medication(s) that are the same as other medications prescribed for you. Read the directions carefully, and ask your doctor or other care provider to review them with you.         Where to get your medicines      These medications were sent to [facility] [address]  [address] [place] [id number]     Phone:  [phone]    - blood glucose monitoring test strip  - glipiZIDE 5 MG 24 hr tablet  - metFORMIN 500 MG 24 hr tablet  - pravastatin 20 MG tablet             Primary Care Provider Office Phone # Fax #    Paulette Travis [phone]       [address] [address]        Thank you!     Thank you for choosing [facility]  for your care. Our goal is always to provide you with excellent care. Hearing back from our patients is one way we can continue  to improve our services. Please take a few minutes to complete the written survey that you may receive in the mail after your visit with us. Thank you!             Your Updated Medication List - Protect others around you: Learn how to safely use, store and throw away your medicines at www.disposemymeds.org.          This list is accurate as of: 2/1/17 10:45 AM.  Always use your most recent med list.                   Brand Name Dispense Instructions for use    ALEVE 220 MG tablet   Generic drug:  naproxen sodium      Take 220 mg by mouth once as needed       blood glucose monitoring lancets     1 Box    Use to test blood sugar 2 times daily or as directed.  Ok to substitute alternative if insurance prefers.       blood glucose monitoring test strip    ACCU-CHEK MASON PLUS    100 each    Use to test blood sugar 2 times daily or as directed.  Ok to substitute alternative if insurance prefers.       glipiZIDE 5 MG 24 hr tablet    glipiZIDE XL    90 tablet    Take 1 tablet (5 mg) by mouth daily       glucose 4 G Chew chewable tablet     10 tablet    Take 1 tablet by mouth every hour as needed for low blood sugar       IBUPROFEN PO          losartan 50 MG tablet    COZAAR    90 tablet    Take 1 tablet (50 mg) by mouth daily       metFORMIN 500 MG 24 hr tablet    GLUCOPHAGE-XR    360 tablet    Take 2 tablets (1,000 mg) by mouth 2 times daily (with meals)       * pravastatin 10 MG tablet    PRAVACHOL    90 tablet    Take 1 tablet (10 mg) by mouth daily       * pravastatin 20 MG tablet    PRAVACHOL    90 tablet    Take 1 tablet (20 mg) by mouth daily       * Notice:  This list has 2 medication(s) that are the same as other medications prescribed for you. Read the directions carefully, and ask your doctor or other care provider to review them with you.

## 2017-02-01 NOTE — Clinical Note
[facility]  [address] [address]  Phone: [phone]    February 2, 2017    Bradford Prado  [address] [address]          Dear Ms. Prado,    The results of your recent lab tests were:   Hgb 7.0% - this is GREAT news.    I would recommend rechecking this along with cholesterol in 6 months - given we increased her Pravastatin to 20 mg once daily.    See me and do labs in 6 months.  Component      Latest Ref Rng 2/1/2017   Hemoglobin A1C      4.3 - 6.0 % 7.0 (H)     If you have any further questions or problems, please contact our office.    Sincerely,      MAUREEN Kimbrough / raina

## 2017-05-03 ENCOUNTER — TELEPHONE (OUTPATIENT)
Dept: FAMILY MEDICINE | Facility: CLINIC | Age: 57
End: 2017-05-03

## 2017-05-03 NOTE — TELEPHONE ENCOUNTER
5/3/2017    Call Regarding Preventive Health Screening Colonoscopy and Mammogram    Attempt 1    Message on voicemail     Comments:       Outreach   KV

## 2017-05-09 NOTE — TELEPHONE ENCOUNTER
5/9/2017    Call Regarding Preventive Health Screening Colonoscopy and Mammogram    Attempt 2    Message on voicemail     Comments:         Outreach   LUIS DANIEL

## 2017-06-19 NOTE — TELEPHONE ENCOUNTER
6/19/2017    Call Regarding Preventive Health Screening Colonoscopy and Mammogram    Attempt 3    Message on voicemail     Comments:         Outreach   LUIS DANIEL

## 2017-08-24 ENCOUNTER — TELEPHONE (OUTPATIENT)
Dept: FAMILY MEDICINE | Facility: CLINIC | Age: 57
End: 2017-08-24

## 2017-08-24 NOTE — TELEPHONE ENCOUNTER
Panel Management Review      Patient has the following on her problem list:     Diabetes    ASA: Not Required     Last A1C  Lab Results   Component Value Date    A1C 7.0 02/01/2017    A1C 10.2 09/20/2016    A1C 11.4 08/09/2016    A1C 10.6 04/09/2015     A1C tested: Passed    Last LDL:    Lab Results   Component Value Date    CHOL 181 01/24/2017     Lab Results   Component Value Date    HDL 28 01/24/2017     Lab Results   Component Value Date     01/24/2017     Lab Results   Component Value Date    TRIG 173 01/24/2017     Lab Results   Component Value Date    CHOLHDLRATIO 6.5 08/23/2012     Lab Results   Component Value Date    NHDL 153 01/24/2017       Is the patient on a Statin? YES             Is the patient on Aspirin? NO    Medications     HMG CoA Reductase Inhibitors    pravastatin (PRAVACHOL) 20 MG tablet    pravastatin (PRAVACHOL) 10 MG tablet          Last three blood pressure readings:  BP Readings from Last 3 Encounters:   02/01/17 138/88   10/03/16 139/89   09/27/16 138/88       Date of last diabetes office visit: 2/1/17     Tobacco History:     History   Smoking Status     Former Smoker     Packs/day: 0.50     Years: 20.00     Types: Cigarettes     Quit date: 3/9/2010   Smokeless Tobacco     Never Used             Composite cancer screening  Chart review shows that this patient is due/due soon for the following Mammogram and Colonoscopy  Summary:    Patient is due/failing the following:   A1C, COLONOSCOPY and MAMMOGRAM    Action needed:   Patient needs office visit for a diabetic check with labs. She is also due for a colonoscopy/ FIT test and a mammogram.     Type of outreach:    Sent letter.    Questions for provider review:    None                                                                                                                                    Vivi Engel CMA ([facility])       Chart routed to None

## 2017-08-24 NOTE — LETTER
August 24, 2017      Bradford Prado  [address] [address]        Paulette Travis NP has been reviewing your chart.  It appears that there are aspects of your care that could be improved.  At this time you are due for an office visit regarding your diabetes. You are also due for a mammogram and colon cancer screening. If you have done either of these tests elsewhere it would be helpful to have the results sent to us.     .  If you could plan to do that in the near future it would be very helpful.  We are trying to help our patients achieve their health care goals.      If you have any questions please feel free to contact the clinic at [phone].     Thank you,          Sincerely,        Paulette Travis NP

## 2017-09-01 ENCOUNTER — TELEPHONE (OUTPATIENT)
Dept: FAMILY MEDICINE | Facility: CLINIC | Age: 57
End: 2017-09-01

## 2017-09-01 DIAGNOSIS — E11.65 POORLY CONTROLLED TYPE 2 DIABETES MELLITUS WITH RENAL COMPLICATION (H): Chronic | ICD-10-CM

## 2017-09-01 DIAGNOSIS — E11.29 POORLY CONTROLLED TYPE 2 DIABETES MELLITUS WITH RENAL COMPLICATION (H): Chronic | ICD-10-CM

## 2017-09-01 DIAGNOSIS — E78.5 HYPERLIPIDEMIA LDL GOAL <70: Primary | Chronic | ICD-10-CM

## 2017-09-01 NOTE — TELEPHONE ENCOUNTER
I attempted to call patient. No Answer. Left message for patient to call back.   Vivi Engel CMA ([facility])

## 2017-09-01 NOTE — LETTER
[facility]  [address] [address]  [phone]        February 13, 2018    Bradford Prado  [address]  [place] [address]              Dear Bradford Prado    This is to remind you that your Fasting labs are due.    You may call our office at [phone] to schedule an appointment.    Please disregard this notice if you have already had your labs drawn or made an appointment.        Sincerely,        Paulette Travis CNP

## 2017-09-01 NOTE — TELEPHONE ENCOUNTER
Paulette Angy would like her to come in and have her labs drawn before her appointment. Future labs have been ordered.     Vivi Engel CMA ([facility])

## 2017-09-05 NOTE — TELEPHONE ENCOUNTER
Left detailed message on identified VM that pt should fast prior to appointment tomorrow morning at 10 am.  Too late at this point to have pt come in fasting prior to appointment for labs to be drawn.      Renita BOOGIE RN

## 2017-09-06 ENCOUNTER — OFFICE VISIT (OUTPATIENT)
Dept: FAMILY MEDICINE | Facility: CLINIC | Age: 57
End: 2017-09-06
Payer: COMMERCIAL

## 2017-09-06 VITALS
BODY MASS INDEX: 24.54 KG/M2 | HEIGHT: 60 IN | DIASTOLIC BLOOD PRESSURE: 86 MMHG | SYSTOLIC BLOOD PRESSURE: 134 MMHG | TEMPERATURE: 97.1 F | WEIGHT: 125 LBS | HEART RATE: 74 BPM

## 2017-09-06 DIAGNOSIS — E78.5 HYPERLIPIDEMIA LDL GOAL <70: Chronic | ICD-10-CM

## 2017-09-06 DIAGNOSIS — N20.0 NEPHROLITHIASIS: Chronic | ICD-10-CM

## 2017-09-06 DIAGNOSIS — I10 ESSENTIAL HYPERTENSION WITH GOAL BLOOD PRESSURE LESS THAN 140/90: Chronic | ICD-10-CM

## 2017-09-06 DIAGNOSIS — E11.65 TYPE 2 DIABETES MELLITUS WITH HYPERGLYCEMIA, WITHOUT LONG-TERM CURRENT USE OF INSULIN (H): Primary | ICD-10-CM

## 2017-09-06 DIAGNOSIS — M19.049 HAND ARTHRITIS: Chronic | ICD-10-CM

## 2017-09-06 DIAGNOSIS — E11.29 POORLY CONTROLLED TYPE 2 DIABETES MELLITUS WITH RENAL COMPLICATION (H): Chronic | ICD-10-CM

## 2017-09-06 DIAGNOSIS — E11.65 POORLY CONTROLLED TYPE 2 DIABETES MELLITUS WITH RENAL COMPLICATION (H): Chronic | ICD-10-CM

## 2017-09-06 PROCEDURE — 99214 OFFICE O/P EST MOD 30 MIN: CPT | Performed by: NURSE PRACTITIONER

## 2017-09-06 RX ORDER — LOSARTAN POTASSIUM 50 MG/1
50 TABLET ORAL DAILY
Qty: 90 TABLET | Refills: 3 | Status: SHIPPED | OUTPATIENT
Start: 2017-09-06 | End: 2019-01-18

## 2017-09-06 RX ORDER — PRAVASTATIN SODIUM 20 MG
20 TABLET ORAL DAILY
Qty: 90 TABLET | Refills: 3 | Status: SHIPPED | OUTPATIENT
Start: 2017-09-06 | End: 2019-01-18

## 2017-09-06 RX ORDER — METFORMIN HCL 500 MG
1000 TABLET, EXTENDED RELEASE 24 HR ORAL 2 TIMES DAILY WITH MEALS
Qty: 360 TABLET | Refills: 1 | Status: SHIPPED | OUTPATIENT
Start: 2017-09-06 | End: 2019-01-18

## 2017-09-06 RX ORDER — GLIPIZIDE 5 MG/1
5 TABLET, FILM COATED, EXTENDED RELEASE ORAL DAILY
Qty: 90 TABLET | Refills: 3 | Status: SHIPPED | OUTPATIENT
Start: 2017-09-06 | End: 2019-01-18

## 2017-09-06 NOTE — NURSING NOTE
"Initial BP (!) 151/104  Pulse 74  Temp 97.1  F (36.2  C) (Tympanic)  Ht 4' 11.75\" (1.518 m)  Wt 125 lb (56.7 kg)  BMI 24.62 kg/m2 Estimated body mass index is 24.62 kg/(m^2) as calculated from the following:    Height as of this encounter: 4' 11.75\" (1.518 m).    Weight as of this encounter: 125 lb (56.7 kg). .    Vivi Engel, JOHNATHON ([facility])  "

## 2017-09-06 NOTE — MR AVS SNAPSHOT
After Visit Summary   9/6/2017    Bradford Prado    MRN: [id number]           Patient Information     Date Of Birth          1960        Visit Information        Provider Department      9/6/2017 10:00 AM Paulette Travis, NP [facility]        Today's Diagnoses     Type 2 diabetes mellitus with hyperglycemia, without long-term current use of insulin (H)    -  1    Essential hypertension with goal blood pressure less than 140/90        Hyperlipidemia LDL goal <70        Nephrolithiasis        Hand arthritis        Poorly controlled type 2 diabetes mellitus with renal complication (H)          Care Instructions    Refilled medications for 3 months.    Follow up with me in December - do labs prior to visit - come fasting (no food, pop, juice, only black coffee and water for 12 hours prior to visit).    Restart medications.    Check blood pressure in community bring readings to next clinic visit.  Bring meter to next clinic visit.    MAUREEN Kimbrough      Diet: Diabetes  Food is an important tool that you can use to control diabetes and stay healthy. Eating well-balanced meals in the correct amounts will help you control your blood glucose levels and prevent low blood sugar reactions. It will also help you reduce the health risks of diabetes. There is no one specific diet that is right for everyone with diabetes. But there are general guidelines to follow. A registered dietitian (RD) will create a tailored diet approach that s just right for you. He or she will also help you plan healthy meals and snacks. If you have any questions, call your dietitian for advice.     Guidelines for success  Talk with your healthcare provider before starting a diabetes diet or weight loss program. If you haven't talked with a dietitian yet, ask your provider for a referral. The following guidelines can help you succeed:    Select foods from the 6 food groups below. Your dietitian will help you find  food choices within each group. He or she will also show you serving sizes and how many servings you can have at each meal.    Grains, beans, and starchy vegetables    Vegetables    Fruit    Milk or yogurt    Meat, poultry, fish, or tofu    Healthy fats    Check your blood sugar levels as directed by your provider. Take any medicine as prescribed by your provider.    Learn to read food labels and pick the right portion sizes.    Eat only the amount of food in your meal plan. Eat about the same amount of food at regular times each day. Don t skip meals. Eat meals 4 to 5 hours apart, with snacks in between.    Limit alcohol. It raises blood sugar levels. Drink water or calorie-free diet drinks that use safe sweeteners.    Eat less fat to help lower your risk of heart disease. Use nonfat or low-fat dairy products and lean meats. Avoid fried foods. Use cooking oils that are unsaturated, such as olive, canola, or peanut oil.    Talk with your dietitian about safe sugar substitutes.    Avoid added salt. It can contribute to high blood pressure, which can cause heart disease. People with diabetes already have a risk of high blood pressure and heart disease.    Stay at a healthy weight. If you need to lose weight, cut down on your portion sizes. But don t skip meals. Exercise is an important part of any weight management program. Talk with your provider about an exercise program that s right for you.    For more information about the best diet plan for you, talk with a registered dietitian (RD). To find an RD in your area, contact:    Academy of Nutrition and Dietetics www.eatright.org    The American Diabetes Association [phone] www.diabetes.org  Date Last Reviewed: 8/1/2016 2000-2017 [employer]. [address] [place] [address], [place] [address]. All rights reserved. This information is not intended as a substitute for professional medical care. Always follow your healthcare professional's  "instructions.                Follow-ups after your visit        Who to contact     If you have questions or need follow up information about today's clinic visit or your schedule please contact [facility] directly at [phone].  Normal or non-critical lab and imaging results will be communicated to you by MyChart, letter or phone within 4 business days after the clinic has received the results. If you do not hear from us within 7 days, please contact the clinic through MyChart or phone. If you have a critical or abnormal lab result, we will notify you by phone as soon as possible.  Submit refill requests through [employer] or call your pharmacy and they will forward the refill request to us. Please allow 3 business days for your refill to be completed.          Additional Information About Your Visit        [employer] Information     [employer] lets you send messages to your doctor, view your test results, renew your prescriptions, schedule appointments and more. To sign up, go to [email or web address] . Click on \"Log in\" on the left side of the screen, which will take you to the Welcome page. Then click on \"Sign up Now\" on the right side of the page.     You will be asked to enter the access code listed below, as well as some personal information. Please follow the directions to create your username and password.     Your access code is: [email or web address]  Expires: 2017 10:33 AM     Your access code will  in 90 days. If you need help or a new code, please call your [place] clinic or [phone].        Care EveryWhere ID     This is your Care EveryWhere ID. This could be used by other organizations to access your [place] medical records  [id number]        Your Vitals Were     Pulse Temperature Height BMI (Body Mass Index)          74 97.1  F (36.2  C) (Tympanic) 4' 11.75\" (1.518 m) 24.62 kg/m2         Blood Pressure from Last 3 Encounters:   17 (!) 151/104   17 138/88   10/03/16 " 139/89    Weight from Last 3 Encounters:   09/06/17 125 lb (56.7 kg)   02/01/17 126 lb (57.2 kg)   02/01/17 126 lb (57.2 kg)              Today, you had the following     No orders found for display         Today's Medication Changes          These changes are accurate as of: 9/6/17 10:33 AM.  If you have any questions, ask your nurse or doctor.               These medicines have changed or have updated prescriptions.        Dose/Directions    losartan 50 MG tablet   Commonly known as:  COZAAR   This may have changed:  when to take this   Used for:  Essential hypertension with goal blood pressure less than 140/90        Dose:  50 mg   Take 1 tablet (50 mg) by mouth daily   Quantity:  90 tablet   Refills:  3       pravastatin 20 MG tablet   Commonly known as:  PRAVACHOL   This may have changed:  Another medication with the same name was removed. Continue taking this medication, and follow the directions you see here.   Used for:  Nephrolithiasis, Essential hypertension with goal blood pressure less than 140/90, Hyperlipidemia LDL goal <70   Changed by:  Paulette Travis NP        Dose:  20 mg   Take 1 tablet (20 mg) by mouth daily   Quantity:  90 tablet   Refills:  3            Where to get your medicines      These medications were sent to [facility] [address] [place] [address]  [address] [facility] [address]     Phone:  [phone]     blood glucose monitoring test strip    glipiZIDE 5 MG 24 hr tablet    losartan 50 MG tablet    metFORMIN 500 MG 24 hr tablet    pravastatin 20 MG tablet                Primary Care Provider Office Phone # Fax #    Paulette Travis [phone] [address] [facility] [address]        Equal Access to Services     [facility] RANDA : Julito knight Solaurie, waaxda luqadaha, qaybta kaaljeison torres, raphael sherman. So [facility] [phone].    ATENCIÓN: Si mateo medrano jacobsen  disposición servicios gratuitos de asistencia lingüística. Pineda dorsey [phone].    We comply with applicable federal civil rights laws and [place] laws. We do not discriminate on the basis of race, color, national origin, age, disability sex, sexual orientation or gender identity.            Thank you!     Thank you for choosing [facility]  for your care. Our goal is always to provide you with excellent care. Hearing back from our patients is one way we can continue to improve our services. Please take a few minutes to complete the written survey that you may receive in the mail after your visit with us. Thank you!             Your Updated Medication List - Protect others around you: Learn how to safely use, store and throw away your medicines at www.disposemymeds.org.          This list is accurate as of: 9/6/17 10:33 AM.  Always use your most recent med list.                   Brand Name Dispense Instructions for use Diagnosis    blood glucose monitoring lancets     1 Box    Use to test blood sugar 2 times daily or as directed.  Ok to substitute alternative if insurance prefers.    Poorly controlled type 2 diabetes mellitus with renal complication (H)       blood glucose monitoring test strip    ACCU-CHEK MASON PLUS    100 each    Use to test blood sugar 2 times daily or as directed.  Ok to substitute alternative if insurance prefers.    Poorly controlled type 2 diabetes mellitus with renal complication (H)       glipiZIDE 5 MG 24 hr tablet    glipiZIDE XL    90 tablet    Take 1 tablet (5 mg) by mouth daily    Nephrolithiasis, Essential hypertension with goal blood pressure less than 140/90, Hyperlipidemia LDL goal <70       glucose 4 G Chew chewable tablet     10 tablet    Take 1 tablet by mouth every hour as needed for low blood sugar    Nephrolithiasis, Right flank pain, Poorly controlled type 2 diabetes mellitus with renal complication (H), Essential hypertension with goal blood pressure less  than 140/90, Hyperlipidemia LDL goal <70, Type 2 diabetes mellitus with hyperglycemia (H)       IBUPROFEN PO           losartan 50 MG tablet    COZAAR    90 tablet    Take 1 tablet (50 mg) by mouth daily    Essential hypertension with goal blood pressure less than 140/90       metFORMIN 500 MG 24 hr tablet    GLUCOPHAGE-XR    360 tablet    Take 2 tablets (1,000 mg) by mouth 2 times daily (with meals)    Type 2 diabetes mellitus with hyperglycemia, without long-term current use of insulin (H)       pravastatin 20 MG tablet    PRAVACHOL    90 tablet    Take 1 tablet (20 mg) by mouth daily    Nephrolithiasis, Essential hypertension with goal blood pressure less than 140/90, Hyperlipidemia LDL goal <70

## 2017-09-06 NOTE — PROGRESS NOTES
SUBJECTIVE:   Bradford Prado is a 56 year old female who presents to clinic today for the following health issues:    Diabetes Follow-up      Patient is checking blood sugars: She will check it every once in a great while and it is very high.     Diabetic concerns: blood sugar frequently over 200     Symptoms of hypoglycemia (low blood sugar): none     Paresthesias (numbness or burning in feet) or sores: No     Date of last diabetic eye exam: January 2017  Was in [place] and stopped all prescriptions due to being out of town.    Lab Results   Component Value Date    A1C 7.0 02/01/2017    A1C 10.2 09/20/2016    A1C 11.4 08/09/2016    A1C 10.6 04/09/2015       Hyperlipidemia Follow-Up      Rate your low fat/cholesterol diet?: fair    Taking statin?  No    Other lipid medications/supplements?:  none    Hypertension Follow-up      Outpatient blood pressures are not being checked.    Low Salt Diet: low salt    BP Readings from Last 3 Encounters:   09/06/17 (!) 151/104   02/01/17 138/88   10/03/16 139/89             Amount of exercise or physical activity: None    Problems taking medications regularly: No    Medication side effects: none  Diet: regular (no restrictions)      Problem list and histories reviewed & adjusted, as indicated.  Additional history: as documented    Patient Active Problem List   Diagnosis     Migraine     Hand arthritis     Stenosing tenosynovitis     Poorly controlled type 2 diabetes mellitus with renal complication (H)     Hyperlipidemia LDL goal <70     Essential hypertension with goal blood pressure less than 140/90     Nephrolithiasis     Type 2 diabetes mellitus with hyperglycemia, without long-term current use of insulin (H)     Past Surgical History:   Procedure Laterality Date     HYSTERECTOMY TOTAL ABDOMINAL  1995    due to fibroids     LAPAROSCOPIC EVACUATION ECTOPIC PREGNANCY       TUBAL LIGATION         Social History   Substance Use Topics     Smoking status: Former Smoker      Packs/day: 0.50     Years: 20.00     Types: Cigarettes     Quit date: 3/9/2010     Smokeless tobacco: Never Used     Alcohol use No     Family History   Problem Relation Age of Onset     Cirrhosis Mother      w/o alcohol history     Emphysema Father      Hypertension Sister      Heart Defect Niece      Tetrology of Fallot     Breast Cancer No family hx of      Cancer - colorectal No family hx of          Current Outpatient Prescriptions   Medication Sig Dispense Refill     metFORMIN (GLUCOPHAGE-XR) 500 MG 24 hr tablet Take 2 tablets (1,000 mg) by mouth 2 times daily (with meals) 360 tablet 1     glipiZIDE (GLIPIZIDE XL) 5 MG 24 hr tablet Take 1 tablet (5 mg) by mouth daily 90 tablet 3     pravastatin (PRAVACHOL) 20 MG tablet Take 1 tablet (20 mg) by mouth daily 90 tablet 3     losartan (COZAAR) 50 MG tablet Take 1 tablet (50 mg) by mouth daily 90 tablet 3     blood glucose monitoring (ACCU-CHEK MASON PLUS) test strip Use to test blood sugar 2 times daily or as directed.  Ok to substitute alternative if insurance prefers. 100 each 11     blood glucose monitoring (ACCU-CHEK FASTCLIX) lancets Use to test blood sugar 2 times daily or as directed.  Ok to substitute alternative if insurance prefers. 1 Box 11     glucose 4 G CHEW Take 1 tablet by mouth every hour as needed for low blood sugar 10 tablet 1     [DISCONTINUED] metFORMIN (GLUCOPHAGE-XR) 500 MG 24 hr tablet Take 2 tablets (1,000 mg) by mouth 2 times daily (with meals) 360 tablet 3     [DISCONTINUED] glipiZIDE (GLIPIZIDE XL) 5 MG 24 hr tablet Take 1 tablet (5 mg) by mouth daily 90 tablet 3     [DISCONTINUED] pravastatin (PRAVACHOL) 20 MG tablet Take 1 tablet (20 mg) by mouth daily 90 tablet 3     [DISCONTINUED] losartan (COZAAR) 50 MG tablet Take 1 tablet (50 mg) by mouth daily (Patient taking differently: Take 50 mg by mouth every evening ) 90 tablet 3     [DISCONTINUED] pravastatin (PRAVACHOL) 10 MG tablet Take 1 tablet (10 mg) by mouth daily (Patient taking  differently: Take 10 mg by mouth every evening ) 90 tablet 1     IBUPROFEN PO        No Known Allergies  Recent Labs   Lab Test  02/01/17   1046  01/24/17   0937  09/27/16   1110  09/22/16   2215   09/20/16   0630   08/09/16   1217  04/09/15   1051   08/23/12   0837   A1C  7.0*   --    --    --    --   10.2*   --   11.4*  10.6*   < >   --    LDL   --   118*   --    --    --    --    --   127*  137*   --   127   HDL   --   28*   --    --    --    --    --   25*   --    --   32*   TRIG   --   173*   --    --    --    --    --   259*   --    --   241*   ALT   --   56*  56*  95*   --    --    < >  Canceled, Test credited   Unsatisfactory specimen - hemolyzed  [facility] 8/9/16 AT 1512 AS    61*   --   33   CR   --   0.63   --   0.65   < >   --    < >  Canceled, Test credited   Unsatisfactory specimen - hemolyzed  [facility] 8/9/16 AT 1512 AS     --    < >  0.62   GFRESTIMATED   --   >90  Non  GFR Calc     --   >90  Non  GFR Calc     < >   --    < >  Canceled, Test credited   Unsatisfactory specimen - hemolyzed  [facility] 8/9/16 AT 1512 AS     --    < >  >90   GFRESTBLACK   --   >90   GFR Calc     --   >90   GFR Calc     < >   --    < >  Canceled, Test credited   Unsatisfactory specimen - hemolyzed  [facility] 8/9/16 AT 1512 AS     --    < >  >90   POTASSIUM   --   4.1   --   3.7   < >   --    < >  Canceled, Test credited   Unsatisfactory specimen - hemolyzed  [facility] 8/9/16 AT 1512 AS     --    < >  3.9   TSH   --    --    --    --    --    --    --    --   2.73   --    --     < > = values in this interval not displayed.      BP Readings from Last 3 Encounters:   09/06/17 (!) 151/104   02/01/17 138/88   10/03/16 139/89    Wt Readings from Last 3 Encounters:   09/06/17 125 lb (56.7 kg)   02/01/17 126 lb (57.2 kg)   02/01/17 126 lb (57.2 kg)                  Labs reviewed in EPIC          Reviewed and updated as  "needed this visit by clinical staffTobacco  Allergies  Med Hx  Surg Hx  Fam Hx  Soc Hx      Reviewed and updated as needed this visit by Provider         ROS:  Constitutional, HEENT, cardiovascular, pulmonary, GI, , musculoskeletal, neuro, skin, endocrine and psych systems are negative, except as otherwise noted.      OBJECTIVE:   BP (!) 151/104  Pulse 74  Temp 97.1  F (36.2  C) (Tympanic)  Ht 4' 11.75\" (1.518 m)  Wt 125 lb (56.7 kg)  BMI 24.62 kg/m2  Body mass index is 24.62 kg/(m^2).  GENERAL: healthy, alert and no distress  NECK: no adenopathy, no asymmetry, masses, or scars and thyroid normal to palpation  RESP: lungs clear to auscultation - no rales, rhonchi or wheezes  CV: regular rate and rhythm, normal S1 S2, no S3 or S4, no murmur, click or rub, no peripheral edema and peripheral pulses strong  ABDOMEN: soft, nontender, no hepatosplenomegaly, no masses and bowel sounds normal  MS: no gross musculoskeletal defects noted, no edema  PSYCH: mentation appears normal, affect normal/bright  Diabetic foot exam: normal DP and PT pulses, no trophic changes or ulcerative lesions and normal sensory exam    Diagnostic Test Results:  Results for orders placed or performed in visit on 02/01/17   Hemoglobin A1c   Result Value Ref Range    Hemoglobin A1C 7.0 (H) 4.3 - 6.0 %       ASSESSMENT/PLAN:     1. Type 2 diabetes mellitus with hyperglycemia, without long-term current use of insulin (H)   Stopped all medications as she was out of town for the past 3 months.  Will not check A1C now as it will be abnormal and will not affect treatment plan.  Restart all medications.  Discussed compliance and follow up in Dec.  Given 3 months prescriptions today.    - metFORMIN (GLUCOPHAGE-XR) 500 MG 24 hr tablet; Take 2 tablets (1,000 mg) by mouth 2 times daily (with meals)  Dispense: 360 tablet; Refill: 1    2. Essential hypertension with goal blood pressure less than 140/90     - glipiZIDE (GLIPIZIDE XL) 5 MG 24 hr tablet; " Take 1 tablet (5 mg) by mouth daily  Dispense: 90 tablet; Refill: 3  - pravastatin (PRAVACHOL) 20 MG tablet; Take 1 tablet (20 mg) by mouth daily  Dispense: 90 tablet; Refill: 3  - losartan (COZAAR) 50 MG tablet; Take 1 tablet (50 mg) by mouth daily  Dispense: 90 tablet; Refill: 3    3. Hyperlipidemia LDL goal <70     - glipiZIDE (GLIPIZIDE XL) 5 MG 24 hr tablet; Take 1 tablet (5 mg) by mouth daily  Dispense: 90 tablet; Refill: 3  - pravastatin (PRAVACHOL) 20 MG tablet; Take 1 tablet (20 mg) by mouth daily  Dispense: 90 tablet; Refill: 3    4. Nephrolithiasis     - glipiZIDE (GLIPIZIDE XL) 5 MG 24 hr tablet; Take 1 tablet (5 mg) by mouth daily  Dispense: 90 tablet; Refill: 3  - pravastatin (PRAVACHOL) 20 MG tablet; Take 1 tablet (20 mg) by mouth daily  Dispense: 90 tablet; Refill: 3    5. Hand arthritis   Getting cortisone injections - found to be helpful.  Advised limiting use of NSAIDs given DIABETES and CAD risks.    6. Poorly controlled type 2 diabetes mellitus with renal complication (H)     - blood glucose monitoring (ACCU-CHEK MASON PLUS) test strip; Use to test blood sugar 2 times daily or as directed.  Ok to substitute alternative if insurance prefers.  Dispense: 100 each; Refill: 11    Work on weight loss  Regular exercise  Patient Instructions   Refilled medications for 3 months.    Follow up with me in December - do labs prior to visit - come fasting (no food, pop, juice, only black coffee and water for 12 hours prior to visit).    Restart medications.    Check blood pressure in community bring readings to next clinic visit.  Bring meter to next clinic visit.    MAUREEN Kimbrough      Diet: Diabetes  Food is an important tool that you can use to control diabetes and stay healthy. Eating well-balanced meals in the correct amounts will help you control your blood glucose levels and prevent low blood sugar reactions. It will also help you reduce the health risks of diabetes. There is no one specific diet  that is right for everyone with diabetes. But there are general guidelines to follow. A registered dietitian (RD) will create a tailored diet approach that s just right for you. He or she will also help you plan healthy meals and snacks. If you have any questions, call your dietitian for advice.     Guidelines for success  Talk with your healthcare provider before starting a diabetes diet or weight loss program. If you haven't talked with a dietitian yet, ask your provider for a referral. The following guidelines can help you succeed:    Select foods from the 6 food groups below. Your dietitian will help you find food choices within each group. He or she will also show you serving sizes and how many servings you can have at each meal.    Grains, beans, and starchy vegetables    Vegetables    Fruit    Milk or yogurt    Meat, poultry, fish, or tofu    Healthy fats    Check your blood sugar levels as directed by your provider. Take any medicine as prescribed by your provider.    Learn to read food labels and pick the right portion sizes.    Eat only the amount of food in your meal plan. Eat about the same amount of food at regular times each day. Don t skip meals. Eat meals 4 to 5 hours apart, with snacks in between.    Limit alcohol. It raises blood sugar levels. Drink water or calorie-free diet drinks that use safe sweeteners.    Eat less fat to help lower your risk of heart disease. Use nonfat or low-fat dairy products and lean meats. Avoid fried foods. Use cooking oils that are unsaturated, such as olive, canola, or peanut oil.    Talk with your dietitian about safe sugar substitutes.    Avoid added salt. It can contribute to high blood pressure, which can cause heart disease. People with diabetes already have a risk of high blood pressure and heart disease.    Stay at a healthy weight. If you need to lose weight, cut down on your portion sizes. But don t skip meals. Exercise is an important part of any weight  management program. Talk with your provider about an exercise program that s right for you.    For more information about the best diet plan for you, talk with a registered dietitian (RD). To find an RD in your area, contact:    Academy of Nutrition and Dietetics www.eatright.org    The American Diabetes Association [phone] www.diabetes.org  Date Last Reviewed: 8/1/2016 2000-2017 [employer]. [address] [place] [address], [place] [address]. All rights reserved. This information is not intended as a substitute for professional medical care. Always follow your healthcare professional's instructions.            Paulette Travis NP  [facility]

## 2017-09-06 NOTE — PATIENT INSTRUCTIONS
Refilled medications for 3 months.    Follow up with me in December - do labs prior to visit - come fasting (no food, pop, juice, only black coffee and water for 12 hours prior to visit).    Restart medications.    Check blood pressure in community bring readings to next clinic visit.  Bring meter to next clinic visit.    MAUREEN Kimbrough      Diet: Diabetes  Food is an important tool that you can use to control diabetes and stay healthy. Eating well-balanced meals in the correct amounts will help you control your blood glucose levels and prevent low blood sugar reactions. It will also help you reduce the health risks of diabetes. There is no one specific diet that is right for everyone with diabetes. But there are general guidelines to follow. A registered dietitian (RD) will create a tailored diet approach that s just right for you. He or she will also help you plan healthy meals and snacks. If you have any questions, call your dietitian for advice.     Guidelines for success  Talk with your healthcare provider before starting a diabetes diet or weight loss program. If you haven't talked with a dietitian yet, ask your provider for a referral. The following guidelines can help you succeed:    Select foods from the 6 food groups below. Your dietitian will help you find food choices within each group. He or she will also show you serving sizes and how many servings you can have at each meal.    Grains, beans, and starchy vegetables    Vegetables    Fruit    Milk or yogurt    Meat, poultry, fish, or tofu    Healthy fats    Check your blood sugar levels as directed by your provider. Take any medicine as prescribed by your provider.    Learn to read food labels and pick the right portion sizes.    Eat only the amount of food in your meal plan. Eat about the same amount of food at regular times each day. Don t skip meals. Eat meals 4 to 5 hours apart, with snacks in between.    Limit alcohol. It raises blood sugar  levels. Drink water or calorie-free diet drinks that use safe sweeteners.    Eat less fat to help lower your risk of heart disease. Use nonfat or low-fat dairy products and lean meats. Avoid fried foods. Use cooking oils that are unsaturated, such as olive, canola, or peanut oil.    Talk with your dietitian about safe sugar substitutes.    Avoid added salt. It can contribute to high blood pressure, which can cause heart disease. People with diabetes already have a risk of high blood pressure and heart disease.    Stay at a healthy weight. If you need to lose weight, cut down on your portion sizes. But don t skip meals. Exercise is an important part of any weight management program. Talk with your provider about an exercise program that s right for you.    For more information about the best diet plan for you, talk with a registered dietitian (RD). To find an RD in your area, contact:    Academy of Nutrition and Dietetics www.eatright.org    The American Diabetes Association [phone] www.diabetes.org  Date Last Reviewed: 8/1/2016 2000-2017 [employer]. [address] [place] [address]. All rights reserved. This information is not intended as a substitute for professional medical care. Always follow your healthcare professional's instructions.

## 2017-09-07 ENCOUNTER — TELEPHONE (OUTPATIENT)
Dept: FAMILY MEDICINE | Facility: CLINIC | Age: 57
End: 2017-09-07

## 2017-09-07 DIAGNOSIS — E11.29 POORLY CONTROLLED TYPE 2 DIABETES MELLITUS WITH RENAL COMPLICATION (H): Primary | Chronic | ICD-10-CM

## 2017-09-07 DIAGNOSIS — E11.65 POORLY CONTROLLED TYPE 2 DIABETES MELLITUS WITH RENAL COMPLICATION (H): Primary | Chronic | ICD-10-CM

## 2017-09-07 NOTE — TELEPHONE ENCOUNTER
Insurance covers [employer].  Does pt have meter/lancets?  Please send new rx for meter/lancets, and strips.

## 2017-09-07 NOTE — TELEPHONE ENCOUNTER
I have left a message for the pt to return a call to the clinic. Does she need the meter, lancets and strips? Sent to [facility] in [place]?   I have orders cued up for all of these.   Angi Bautista RN

## 2017-09-08 NOTE — TELEPHONE ENCOUNTER
"# listed as home/cell states \"the access code you have entered is incorrect, please enter your access code.\"  Will send the supplies as patient may need them.  Deepika LENTZ RN    "

## 2017-11-12 ENCOUNTER — HEALTH MAINTENANCE LETTER (OUTPATIENT)
Age: 57
End: 2017-11-12

## 2017-12-05 ENCOUNTER — TELEPHONE (OUTPATIENT)
Dept: FAMILY MEDICINE | Facility: CLINIC | Age: 57
End: 2017-12-05

## 2017-12-05 DIAGNOSIS — E11.65 POORLY CONTROLLED TYPE 2 DIABETES MELLITUS WITH RENAL COMPLICATION (H): Primary | Chronic | ICD-10-CM

## 2017-12-05 DIAGNOSIS — E11.29 POORLY CONTROLLED TYPE 2 DIABETES MELLITUS WITH RENAL COMPLICATION (H): Primary | Chronic | ICD-10-CM

## 2017-12-05 DIAGNOSIS — E11.65 TYPE 2 DIABETES MELLITUS WITH HYPERGLYCEMIA, WITHOUT LONG-TERM CURRENT USE OF INSULIN (H): ICD-10-CM

## 2017-12-06 NOTE — TELEPHONE ENCOUNTER
As of 1/1/18 [facility] will only cover OneTouch products for diabetic supply    OneTouch Verio Flex  OneTouch Verio  OneTouch Verio IQ  One Touch Ultra 2  One Touch Ultra Mini

## 2017-12-12 RX ORDER — BLOOD-GLUCOSE METER
EACH MISCELLANEOUS
Qty: 1 KIT | Refills: 0 | Status: SHIPPED | OUTPATIENT
Start: 2017-12-12 | End: 2021-03-17

## 2018-01-02 ENCOUNTER — TELEPHONE (OUTPATIENT)
Dept: FAMILY MEDICINE | Facility: CLINIC | Age: 58
End: 2018-01-02

## 2018-01-02 NOTE — LETTER
January 16, 2018      Bradford Prado  [address] [address] [address]        Dear Bradford,     Your [facility] Care Team has been reviewing your chart.  It appears that there are aspects of your care that could be improved.  At this time you are due for a office visit to follow up on Diabetes. Also  Per your chart it appears you are due for a  mammogram .  If you could plan to do that in the near future it would be very helpful.  We are trying to help our patients achieve their health care goals.  Diabetes office visit can be scheduled by calling [phone]. Mammograms can be scheduled by calling diagnostic imaging at [phone].    At this time you are due for a Colon Cancer Screening. Here is some information regarding this testing.     Recommended every 5-10 years, depending on your history, in order to prevent and detect colon cancer at its earliest stages.  Colon cancer is now the second leading cause of death in the [place] for both men and women and there are over 130,000 new cases and 50,000 deaths per year from colon cancer.  Colonoscopies can prevent 90-95% of these deaths.  Problem lesions can be removed before they ever become cancer. This test is not only looking for cancer, but also getting rid of precancerious lesions. You are usually given some sedation which makes the test very comfortable for most people.      If you do not wish to do a colonoscopy or cannot afford to do one, at this time, there is another option. It is called a FIT test or Fecal Immunochemical Occult Blood Test (take home stool sample kit).  It does not replace the colonoscopy for colorectal cancer screening, but it can detect hidden bleeding in the lower colon.  It does need to be repeated every year and if a positive result is obtained, you would be referred for a colonoscopy. The FIT test is really easy to do and does not require any  diet or medication restrictions and involves only one collection sample.      If  you have completed either one of these tests or had a flexible sigmoidoscopy in the past five years at another facility, please have the records sent to our clinic so that we can best coordinate your care and update your chart.  Please call us if you have questions or would like arrange either to do a colonoscopy or obtain the necessary test kit for the Fecal Occult Test.    If you have any questions please feel free to contact the clinic   Thank you,  Sincerely, Your [facility] Provider/cb

## 2018-01-02 NOTE — TELEPHONE ENCOUNTER
Pt is due for a colon cancer screening and a mammogram. Left a message for patient to return call.  Rochelle Gagnon, CMA

## 2018-01-09 NOTE — TELEPHONE ENCOUNTER
Left message for patient to return call.    Due for colon cancer screening and mammogram.  Patient is also due for diabetic check, last seen 9/6/17, recheck in 3 months.

## 2018-01-16 NOTE — TELEPHONE ENCOUNTER
Panel Management Review      Patient has the following on her problem list:     Diabetes    ASA: Failed    Last A1C  Lab Results   Component Value Date    A1C 7.0 02/01/2017    A1C 10.2 09/20/2016    A1C 11.4 08/09/2016    A1C 10.6 04/09/2015     A1C tested: FAILED    Last LDL:    Lab Results   Component Value Date    CHOL 181 01/24/2017     Lab Results   Component Value Date    HDL 28 01/24/2017     Lab Results   Component Value Date     01/24/2017     Lab Results   Component Value Date    TRIG 173 01/24/2017     Lab Results   Component Value Date    CHOLHDLRATIO 6.5 08/23/2012     Lab Results   Component Value Date    NHDL 153 01/24/2017       Is the patient on a Statin? YES             Is the patient on Aspirin? No    Medications     HMG CoA Reductase Inhibitors    pravastatin (PRAVACHOL) 20 MG tablet          Last three blood pressure readings:  BP Readings from Last 3 Encounters:   09/06/17 134/86   02/01/17 138/88   10/03/16 139/89       Date of last diabetes office visit: 9/6/17     Tobacco History:     History   Smoking Status     Former Smoker     Packs/day: 0.50     Years: 20.00     Types: Cigarettes     Quit date: 3/9/2010   Smokeless Tobacco     Never Used             Composite cancer screening  Chart review shows that this patient is due/due soon for the following Mammogram and Colonoscopy  Summary:    Patient is due/failing the following:   Diabetes follow up, colon cancer screen, mammogram     Action needed:   Patient needs office visit for diabetes. Due for mammogram/ colon cancer screen .    Type of outreach:    Sent letter.    Questions for provider review:    None                                                                                                                                    Chandu LENTZ CMA

## 2018-03-23 ENCOUNTER — TELEPHONE (OUTPATIENT)
Dept: FAMILY MEDICINE | Facility: CLINIC | Age: 58
End: 2018-03-23

## 2018-03-23 DIAGNOSIS — E11.29 POORLY CONTROLLED TYPE 2 DIABETES MELLITUS WITH RENAL COMPLICATION (H): Primary | Chronic | ICD-10-CM

## 2018-03-23 DIAGNOSIS — E11.65 POORLY CONTROLLED TYPE 2 DIABETES MELLITUS WITH RENAL COMPLICATION (H): Primary | Chronic | ICD-10-CM

## 2018-03-23 NOTE — TELEPHONE ENCOUNTER
We sent this patient an overdue lab letter on 2/13/18. If they need these results please contact the patient.  Thank you  Op lab

## 2018-03-23 NOTE — TELEPHONE ENCOUNTER
Patient due for labs and diabetes visit in clinic.    Do labs prior to visit if able and do fasting.  Notify patient by phone and make appointment (s) for her.  Remind her to send in FIT testing as well.    MAUREEN Kimbrough

## 2018-03-23 NOTE — TELEPHONE ENCOUNTER
Left non detailed message for pt on cell phones.  Needs appt with PCP as well as fasting labs prior to visit (no food, pop, juice, only black coffee and water for 12 hours prior to visit).    Needs reminder to send in her FIT test as well.    CSS please assist pt when call is returned.     Violeta WHITFIELD RN

## 2018-05-16 ENCOUNTER — TELEPHONE (OUTPATIENT)
Dept: FAMILY MEDICINE | Facility: CLINIC | Age: 58
End: 2018-05-16

## 2018-05-16 NOTE — TELEPHONE ENCOUNTER
5/16/2018    Call Regarding Preventive Health Screening Colonoscopy and Mammogram    Attempt 2    Message on voicemail     Comments:           Outreach   AT

## 2018-05-22 NOTE — TELEPHONE ENCOUNTER
5/22/2018      Call Regarding Preventive Health Screening Colonoscopy and Mammogram     Attempt 3     Message on voicemail      Comments:       Outreach ,  Theresa Menchaca

## 2018-06-12 ENCOUNTER — TELEPHONE (OUTPATIENT)
Dept: FAMILY MEDICINE | Facility: CLINIC | Age: 58
End: 2018-06-12

## 2018-06-12 NOTE — TELEPHONE ENCOUNTER
6/12/2018    Attempt 1    Contacted patient in regards to scheduling VIP mammogram  Message on voicemail     Patient is also due for - Preventive Health Screening Colonoscopy    Comments:       Outreach   Theresa VARMA

## 2018-08-08 ENCOUNTER — TELEPHONE (OUTPATIENT)
Dept: FAMILY MEDICINE | Facility: CLINIC | Age: 58
End: 2018-08-08

## 2018-08-08 NOTE — TELEPHONE ENCOUNTER
Panel Management Review      Patient has the following on her problem list:     Diabetes    ASA: Not Required     Last A1C  Lab Results   Component Value Date    A1C 7.0 02/01/2017    A1C 10.2 09/20/2016    A1C 11.4 08/09/2016    A1C 10.6 04/09/2015     A1C tested: MONITOR    Last LDL:    Lab Results   Component Value Date    CHOL 181 01/24/2017     Lab Results   Component Value Date    HDL 28 01/24/2017     Lab Results   Component Value Date     01/24/2017     Lab Results   Component Value Date    TRIG 173 01/24/2017     Lab Results   Component Value Date    CHOLHDLRATIO 6.5 08/23/2012     Lab Results   Component Value Date    NHDL 153 01/24/2017       Is the patient on a Statin? YES             Is the patient on Aspirin? NO    Medications     HMG CoA Reductase Inhibitors    pravastatin (PRAVACHOL) 20 MG tablet          Last three blood pressure readings:  BP Readings from Last 3 Encounters:   09/06/17 134/86   02/01/17 138/88   10/03/16 139/89       Date of last diabetes office visit: 9/6/17     Tobacco History:     History   Smoking Status     Former Smoker     Packs/day: 0.50     Years: 20.00     Types: Cigarettes     Quit date: 3/9/2010   Smokeless Tobacco     Never Used         Hypertension   Last three blood pressure readings:  BP Readings from Last 3 Encounters:   09/06/17 134/86   02/01/17 138/88   10/03/16 139/89     Blood pressure: Passed    HTN Guidelines:  Age 18-59 BP range:  Less than 140/90  Age 60-85 with Diabetes:  Less than 140/90  Age 60-85 without Diabetes:  less than 150/90      Composite cancer screening  Chart review shows that this patient is due/due soon for the following Mammogram and Colonoscopy  Summary:    Patient is due/failing the following:   A1C, COLONOSCOPY and MAMMOGRAM    Action needed:   Patient needs non-fasting lab only appointment    Type of outreach:    Phone, left message for patient to call back.     Questions for provider review:    None                                                                                                                                     Vivi Engel CMA ([facility])       Chart routed to Care Team .

## 2018-08-08 NOTE — LETTER
August 22, 2018      Bradford Prado  [address]  [facility] [address]        Dear Bradford,     We have been unsuccessful at reaching you by phone.  You are due for a non-fasting lab (A1C), colonoscopy and a mammogram.  To schedule these appointments, please call [phone].      Sincerely,          [facility] Care Team          deepak

## 2018-08-15 NOTE — TELEPHONE ENCOUNTER
CSS-ok to deliver message below  Left message for patient to return call to clinic.   Lainey England RN

## 2018-10-24 ENCOUNTER — OFFICE VISIT (OUTPATIENT)
Dept: URGENT CARE | Facility: URGENT CARE | Age: 58
End: 2018-10-24
Payer: COMMERCIAL

## 2018-10-24 VITALS
HEART RATE: 83 BPM | DIASTOLIC BLOOD PRESSURE: 80 MMHG | OXYGEN SATURATION: 98 % | SYSTOLIC BLOOD PRESSURE: 136 MMHG | RESPIRATION RATE: 20 BRPM

## 2018-10-24 DIAGNOSIS — M62.838 SPASM OF MUSCLE: Primary | ICD-10-CM

## 2018-10-24 PROCEDURE — 99214 OFFICE O/P EST MOD 30 MIN: CPT | Performed by: PHYSICIAN ASSISTANT

## 2018-10-24 RX ORDER — PREDNISONE 20 MG/1
40 TABLET ORAL DAILY
Qty: 10 TABLET | Refills: 0 | Status: SHIPPED | OUTPATIENT
Start: 2018-10-24 | End: 2018-10-29

## 2018-10-24 RX ORDER — METHOCARBAMOL 750 MG/1
750 TABLET, FILM COATED ORAL 4 TIMES DAILY PRN
Qty: 40 TABLET | Refills: 0 | Status: SHIPPED | OUTPATIENT
Start: 2018-10-24 | End: 2021-02-23

## 2018-10-24 NOTE — PROGRESS NOTES
SUBJECTIVE:  Chief Complaint   Patient presents with     Urgent Care     Musculoskeletal Problem     Pt states left leg, upper thight injury sxs 1x week      Bradford Prado is a 58 year old female presents with a chief complaint of left posterior thigh and buttock pain since yesterday when she was lowering a heavy soda case to the ground.  She felt a pull and immediate pain in her left buttock and left posterior thigh as the heavy case then fell to the ground, pulling her with it.  She did not fall.     She complains of some shooting pain in her left leg, occasionally into her left lower leg.    She denies any low back pain.     She denies any bowel or bladder dysfunction.  Denies any numbness or tingling in the extremity.       She is otherwise in her usual state of health.   She has taken tylenol, tumeric and ibuprofen, aleve, icy hot, all without relief.       No past medical history on file.  Patient Active Problem List   Diagnosis     Migraine     Hand arthritis     Stenosing tenosynovitis     Poorly controlled type 2 diabetes mellitus with renal complication (H)     Hyperlipidemia LDL goal <70     Essential hypertension with goal blood pressure less than 140/90     Nephrolithiasis     Type 2 diabetes mellitus with hyperglycemia, without long-term current use of insulin (H)     Social History   Substance Use Topics     Smoking status: Former Smoker     Packs/day: 0.50     Years: 20.00     Types: Cigarettes     Quit date: 3/9/2010     Smokeless tobacco: Never Used     Alcohol use No       ROS:  CONSTITUTIONAL:NEGATIVE for fever, chills, change in weight  INTEGUMENTARY/SKIN: NEGATIVE for worrisome rashes, moles or lesions  ENT/MOUTH: NEGATIVE for ear, mouth and throat problems  RESP:NEGATIVE for significant cough or SOB  CV: NEGATIVE for chest pain, palpitations or peripheral edema  GI: NEGATIVE for nausea, abdominal pain, heartburn, or change in bowel habits  MUSCULOSKELETAL: as per HPI  NEURO: NEGATIVE for  weakness, dizziness or paresthesias  Review of systems negative except as stated above.    EXAM:   /80  Pulse 83  Resp 20  SpO2 98%  Gen: healthy, alert and moderate distress secondary to pain in left leg and buttock  Extremity: left gluteus: palpable spasm in lower gluteus and proximal hamstring.  Tender to palpation.  PROM is full, active ROM is limited secondary to pain.    There is not compromise to the distal circulation.  SKIN: no suspicious lesions or rashes  NEURO: Normal strength and tone, sensory exam grossly normal, mentation intact and speech normal    (M62.838) Spasm of muscle  (primary encounter diagnosis)  Comment:   Plan: predniSONE (DELTASONE) 20 MG tablet,         methocarbamol (ROBAXIN) 750 MG tablet,         acetaminophen-codeine (TYLENOL #3) 300-30 MG         per tablet            Gentle moist heat to area for 10-15 minutes, followed by stretches, then ice to the area for 20 minutes, 3-4 times a day    Follow up with primary clinic should symptoms persist or worsen.      Use Tylenol #3 with caution, causes drowsiness, has addiction potential.       Patient expresses understanding and agreement with the assessment and plan as above.

## 2018-10-24 NOTE — MR AVS SNAPSHOT
After Visit Summary   10/24/2018    Bradford Prado    MRN: [id number]           Patient Information     Date Of Birth          1960        Visit Information        Provider Department      10/24/2018 9:20 AM Sinai Flowers PA-C [facility]        Today's Diagnoses     Spasm of muscle    -  1      Care Instructions    (M62.838) Spasm of muscle  (primary encounter diagnosis)  Comment:   Plan: predniSONE (DELTASONE) 20 MG tablet,         methocarbamol (ROBAXIN) 750 MG tablet,         acetaminophen-codeine (TYLENOL #3) 300-30 MG         per tablet            Gentle moist heat to area for 10-15 minutes, followed by stretches, then ice to the area for 20 minutes, 3-4 times a day    Follow up with primary clinic should symptoms persist or worsen.                Follow-ups after your visit        Who to contact     If you have questions or need follow up information about today's clinic visit or your schedule please contact [facility] directly at [phone].  Normal or non-critical lab and imaging results will be communicated to you by Yesmailhart, letter or phone within 4 business days after the clinic has received the results. If you do not hear from us within 7 days, please contact the clinic through Yesmailhart or phone. If you have a critical or abnormal lab result, we will notify you by phone as soon as possible.  Submit refill requests through Rentabilities or call your pharmacy and they will forward the refill request to us. Please allow 3 business days for your refill to be completed.          Additional Information About Your Visit        Yesmailhart Information     [employer]abilities gives you secure access to your electronic health record. If you see a primary care provider, you can also send messages to your care team and make appointments. If you have questions, please call your primary care clinic.  If you do not have a primary care provider, please  call [phone] and they will assist you.        Care EveryWhere ID     This is your Care EveryWhere ID. This could be used by other organizations to access your [place] medical records  [id number]        Your Vitals Were     Pulse Respirations Pulse Oximetry             83 20 98%          Blood Pressure from Last 3 Encounters:   10/24/18 136/80   09/06/17 134/86   02/01/17 138/88    Weight from Last 3 Encounters:   09/06/17 125 lb (56.7 kg)   02/01/17 126 lb (57.2 kg)   02/01/17 126 lb (57.2 kg)              Today, you had the following     No orders found for display         Today's Medication Changes          These changes are accurate as of 10/24/18 10:25 AM.  If you have any questions, ask your nurse or doctor.               Start taking these medicines.        Dose/Directions    acetaminophen-codeine 300-30 MG per tablet   Commonly known as:  TYLENOL #3   Used for:  Spasm of muscle   Started by:  Sinai Flowers PA-C        Dose:  1 tablet   Take 1 tablet by mouth every 6 hours as needed for severe pain   Quantity:  10 tablet   Refills:  0       methocarbamol 750 MG tablet   Commonly known as:  ROBAXIN   Used for:  Spasm of muscle   Started by:  Sinai Flowers PA-C        Dose:  750 mg   Take 1 tablet (750 mg) by mouth 4 times daily as needed for muscle spasms   Quantity:  40 tablet   Refills:  0       predniSONE 20 MG tablet   Commonly known as:  DELTASONE   Used for:  Spasm of muscle   Started by:  Sinai Flowers PA-C        Dose:  40 mg   Take 2 tablets (40 mg) by mouth daily for 5 days   Quantity:  10 tablet   Refills:  0            Where to get your medicines      These medications were sent to [facility] [address] [address]     Phone:  [phone]     methocarbamol 750 MG tablet    predniSONE 20 MG tablet         Some of these will need a paper prescription and others can be bought over the  counter.  Ask your nurse if you have questions.     Bring a paper prescription for each of these medications     acetaminophen-codeine 300-30 MG per tablet               Information about OPIOIDS     PRESCRIPTION OPIOIDS: WHAT YOU NEED TO KNOW   We gave you an opioid (narcotic) pain medicine. It is important to manage your pain, but opioids are not always the best choice. You should first try all the other options your care team gave you. Take this medicine for as short a time (and as few doses) as possible.    Some activities can increase your pain, such as bandage changes or therapy sessions. It may help to take your pain medicine 30 to 60 minutes before these activities. Reduce your stress by getting enough sleep, working on hobbies you enjoy and practicing relaxation or meditation. Talk to your care team about ways to manage your pain beyond prescription opioids.    These medicines have risks:    DO NOT drive when on new or higher doses of pain medicine. These medicines can affect your alertness and reaction times, and you could be arrested for driving under the influence (DUI). If you need to use opioids long-term, talk to your care team about driving.    DO NOT operate heavy machinery    DO NOT do any other dangerous activities while taking these medicines.    DO NOT drink any alcohol while taking these medicines.     If the opioid prescribed includes acetaminophen, DO NOT take with any other medicines that contain acetaminophen. Read all labels carefully. Look for the word  acetaminophen  or  Tylenol.  Ask your pharmacist if you have questions or are unsure.    You can get addicted to pain medicines, especially if you have a history of addiction (chemical, alcohol or substance dependence). Talk to your care team about ways to reduce this risk.    All opioids tend to cause constipation. Drink plenty of water and eat foods that have a lot of fiber, such as fruits, vegetables, prune juice, apple juice and  high-fiber cereal. Take a laxative (Miralax, milk of magnesia, Colace, Senna) if you don t move your bowels at least every other day. Other side effects include upset stomach, sleepiness, dizziness, throwing up, tolerance (needing more of the medicine to have the same effect), physical dependence and slowed breathing.    Store your pills in a secure place, locked if possible. We will not replace any lost or stolen medicine. If you don t finish your medicine, please throw away (dispose) as directed by your pharmacist. The [place] [employer] has more information about safe disposal: [email or web address]://[email or web address]-medications         Primary Care Provider Office Phone # Fax #    Paulette Nithya Travis [phone] [address] [facility] [address]        Equal Access to Services     WILLIAN TOLENTINO : Hadii radha siddiqui hadasho Soomaali, waaxda luqadaha, qaybta kaalmada adeegyada, raphael zepeda . So [facility] [phone].    ATENCIÓN: Si habla español, tiene a jacobsen disposición servicios gratuitos de asistencia lingüística. Pineda al [phone].    We comply with applicable federal civil rights laws and [place] laws. We do not discriminate on the basis of race, color, national origin, age, disability, sex, sexual orientation, or gender identity.            Thank you!     Thank you for choosing [facility]  for your care. Our goal is always to provide you with excellent care. Hearing back from our patients is one way we can continue to improve our services. Please take a few minutes to complete the written survey that you may receive in the mail after your visit with us. Thank you!             Your Updated Medication List - Protect others around you: Learn how to safely use, store and throw away your medicines at www.disposemymeds.org.          This list is accurate as of 10/24/18 10:25 AM.  Always use your most  recent med list.                   Brand Name Dispense Instructions for use Diagnosis    acetaminophen-codeine 300-30 MG per tablet    TYLENOL #3    10 tablet    Take 1 tablet by mouth every 6 hours as needed for severe pain    Spasm of muscle       * blood glucose monitoring lancets     1 Box    Use to test blood sugar 2 times daily or as directed.  Ok to substitute alternative if insurance prefers.    Poorly controlled type 2 diabetes mellitus with renal complication (H)       * blood glucose monitoring lancets     100 each    Use to test blood sugars 2 times daily or as directed.    Poorly controlled type 2 diabetes mellitus with renal complication (H), Type 2 diabetes mellitus with hyperglycemia, without long-term current use of insulin (H)       blood glucose monitoring meter device kit     1 kit    Use to test blood sugars 2 times daily or as directed.    Poorly controlled type 2 diabetes mellitus with renal complication (H), Type 2 diabetes mellitus with hyperglycemia, without long-term current use of insulin (H)       * blood glucose monitoring test strip    ACCU-CHEK MASON PLUS    100 each    Use to test blood sugar 2 times daily or as directed.  Ok to substitute alternative if insurance prefers.    Poorly controlled type 2 diabetes mellitus with renal complication (H)       * blood glucose monitoring test strip    no brand specified    100 strip    Use to test blood sugars 2 times daily or as directed    Poorly controlled type 2 diabetes mellitus with renal complication (H), Type 2 diabetes mellitus with hyperglycemia, without long-term current use of insulin (H)       glipiZIDE 5 MG 24 hr tablet    glipiZIDE XL    90 tablet    Take 1 tablet (5 mg) by mouth daily    Nephrolithiasis, Essential hypertension with goal blood pressure less than 140/90, Hyperlipidemia LDL goal <70       glucose 4 g Chew chewable tablet     10 tablet    Take 1 tablet by mouth every hour as needed for low blood sugar     Nephrolithiasis, Right flank pain, Poorly controlled type 2 diabetes mellitus with renal complication (H), Essential hypertension with goal blood pressure less than 140/90, Hyperlipidemia LDL goal <70, Type 2 diabetes mellitus with hyperglycemia (H)       IBUPROFEN PO           losartan 50 MG tablet    COZAAR    90 tablet    Take 1 tablet (50 mg) by mouth daily    Essential hypertension with goal blood pressure less than 140/90       metFORMIN 500 MG 24 hr tablet    GLUCOPHAGE-XR    360 tablet    Take 2 tablets (1,000 mg) by mouth 2 times daily (with meals)    Type 2 diabetes mellitus with hyperglycemia, without long-term current use of insulin (H)       methocarbamol 750 MG tablet    ROBAXIN    40 tablet    Take 1 tablet (750 mg) by mouth 4 times daily as needed for muscle spasms    Spasm of muscle       pravastatin 20 MG tablet    PRAVACHOL    90 tablet    Take 1 tablet (20 mg) by mouth daily    Nephrolithiasis, Essential hypertension with goal blood pressure less than 140/90, Hyperlipidemia LDL goal <70       predniSONE 20 MG tablet    DELTASONE    10 tablet    Take 2 tablets (40 mg) by mouth daily for 5 days    Spasm of muscle       * Notice:  This list has 4 medication(s) that are the same as other medications prescribed for you. Read the directions carefully, and ask your doctor or other care provider to review them with you.

## 2018-10-24 NOTE — PATIENT INSTRUCTIONS
(M62.838) Spasm of muscle  (primary encounter diagnosis)  Comment:   Plan: predniSONE (DELTASONE) 20 MG tablet,         methocarbamol (ROBAXIN) 750 MG tablet,         acetaminophen-codeine (TYLENOL #3) 300-30 MG         per tablet            Gentle moist heat to area for 10-15 minutes, followed by stretches, then ice to the area for 20 minutes, 3-4 times a day    Follow up with primary clinic should symptoms persist or worsen.

## 2018-11-13 ENCOUNTER — HEALTH MAINTENANCE LETTER (OUTPATIENT)
Age: 58
End: 2018-11-13

## 2019-01-09 ENCOUNTER — TRANSFERRED RECORDS (OUTPATIENT)
Dept: HEALTH INFORMATION MANAGEMENT | Facility: CLINIC | Age: 59
End: 2019-01-09

## 2019-01-18 ENCOUNTER — OFFICE VISIT (OUTPATIENT)
Dept: FAMILY MEDICINE | Facility: CLINIC | Age: 59
End: 2019-01-18
Payer: COMMERCIAL

## 2019-01-18 VITALS
HEART RATE: 91 BPM | SYSTOLIC BLOOD PRESSURE: 158 MMHG | TEMPERATURE: 97.4 F | DIASTOLIC BLOOD PRESSURE: 92 MMHG | RESPIRATION RATE: 16 BRPM | WEIGHT: 124.9 LBS | OXYGEN SATURATION: 98 % | BODY MASS INDEX: 24.52 KG/M2 | HEIGHT: 60 IN

## 2019-01-18 DIAGNOSIS — R11.0 NAUSEA: ICD-10-CM

## 2019-01-18 DIAGNOSIS — E11.65 TYPE 2 DIABETES MELLITUS WITH HYPERGLYCEMIA, WITHOUT LONG-TERM CURRENT USE OF INSULIN (H): ICD-10-CM

## 2019-01-18 DIAGNOSIS — Z12.11 SPECIAL SCREENING FOR MALIGNANT NEOPLASMS, COLON: ICD-10-CM

## 2019-01-18 DIAGNOSIS — N20.0 NEPHROLITHIASIS: Chronic | ICD-10-CM

## 2019-01-18 DIAGNOSIS — E11.29 POORLY CONTROLLED TYPE 2 DIABETES MELLITUS WITH RENAL COMPLICATION (H): Chronic | ICD-10-CM

## 2019-01-18 DIAGNOSIS — E11.29 MICROALBUMINURIA DUE TO TYPE 2 DIABETES MELLITUS (H): ICD-10-CM

## 2019-01-18 DIAGNOSIS — I10 ESSENTIAL HYPERTENSION WITH GOAL BLOOD PRESSURE LESS THAN 140/90: Chronic | ICD-10-CM

## 2019-01-18 DIAGNOSIS — R80.9 MICROALBUMINURIA DUE TO TYPE 2 DIABETES MELLITUS (H): ICD-10-CM

## 2019-01-18 DIAGNOSIS — E11.65 POORLY CONTROLLED TYPE 2 DIABETES MELLITUS WITH RENAL COMPLICATION (H): Chronic | ICD-10-CM

## 2019-01-18 DIAGNOSIS — E78.5 HYPERLIPIDEMIA LDL GOAL <70: Chronic | ICD-10-CM

## 2019-01-18 DIAGNOSIS — Z12.31 ENCOUNTER FOR SCREENING MAMMOGRAM FOR BREAST CANCER: Primary | ICD-10-CM

## 2019-01-18 LAB
ALBUMIN SERPL-MCNC: 4.3 G/DL (ref 3.4–5)
ALBUMIN UR-MCNC: ABNORMAL MG/DL
ALP SERPL-CCNC: 122 U/L (ref 40–150)
ALT SERPL W P-5'-P-CCNC: 80 U/L (ref 0–50)
ANION GAP SERPL CALCULATED.3IONS-SCNC: 5 MMOL/L (ref 3–14)
APPEARANCE UR: CLEAR
AST SERPL W P-5'-P-CCNC: 49 U/L (ref 0–45)
BACTERIA #/AREA URNS HPF: ABNORMAL /HPF
BASOPHILS # BLD AUTO: 0.1 10E9/L (ref 0–0.2)
BASOPHILS NFR BLD AUTO: 0.4 %
BILIRUB SERPL-MCNC: 0.7 MG/DL (ref 0.2–1.3)
BILIRUB UR QL STRIP: NEGATIVE
BUN SERPL-MCNC: 16 MG/DL (ref 7–30)
CALCIUM SERPL-MCNC: 9.1 MG/DL (ref 8.5–10.1)
CHLORIDE SERPL-SCNC: 99 MMOL/L (ref 94–109)
CHOLEST SERPL-MCNC: 187 MG/DL
CO2 SERPL-SCNC: 29 MMOL/L (ref 20–32)
COLOR UR AUTO: YELLOW
CREAT SERPL-MCNC: 0.54 MG/DL (ref 0.52–1.04)
CREAT UR-MCNC: 85 MG/DL
DIFFERENTIAL METHOD BLD: ABNORMAL
EOSINOPHIL # BLD AUTO: 0.3 10E9/L (ref 0–0.7)
EOSINOPHIL NFR BLD AUTO: 2.4 %
ERYTHROCYTE [DISTWIDTH] IN BLOOD BY AUTOMATED COUNT: 11.7 % (ref 10–15)
GFR SERPL CREATININE-BSD FRML MDRD: >90 ML/MIN/{1.73_M2}
GLUCOSE SERPL-MCNC: 208 MG/DL (ref 70–99)
GLUCOSE UR STRIP-MCNC: 100 MG/DL
HBA1C MFR BLD: 10.8 % (ref 0–5.6)
HCT VFR BLD AUTO: 48 % (ref 35–47)
HDLC SERPL-MCNC: 29 MG/DL
HGB BLD-MCNC: 17 G/DL (ref 11.7–15.7)
HGB UR QL STRIP: ABNORMAL
KETONES UR STRIP-MCNC: 40 MG/DL
LDLC SERPL CALC-MCNC: 103 MG/DL
LEUKOCYTE ESTERASE UR QL STRIP: NEGATIVE
LYMPHOCYTES # BLD AUTO: 3.4 10E9/L (ref 0.8–5.3)
LYMPHOCYTES NFR BLD AUTO: 27.8 %
MCH RBC QN AUTO: 33 PG (ref 26.5–33)
MCHC RBC AUTO-ENTMCNC: 35.4 G/DL (ref 31.5–36.5)
MCV RBC AUTO: 93 FL (ref 78–100)
MICROALBUMIN UR-MCNC: 105 MG/L
MICROALBUMIN/CREAT UR: 123.53 MG/G CR (ref 0–25)
MONOCYTES # BLD AUTO: 1.2 10E9/L (ref 0–1.3)
MONOCYTES NFR BLD AUTO: 9.6 %
NEUTROPHILS # BLD AUTO: 7.3 10E9/L (ref 1.6–8.3)
NEUTROPHILS NFR BLD AUTO: 59.8 %
NITRATE UR QL: NEGATIVE
NON-SQ EPI CELLS #/AREA URNS LPF: ABNORMAL /LPF
NONHDLC SERPL-MCNC: 158 MG/DL
PH UR STRIP: 5.5 PH (ref 5–7)
PLATELET # BLD AUTO: 178 10E9/L (ref 150–450)
POTASSIUM SERPL-SCNC: 4.3 MMOL/L (ref 3.4–5.3)
PROT SERPL-MCNC: 8.4 G/DL (ref 6.8–8.8)
RBC # BLD AUTO: 5.15 10E12/L (ref 3.8–5.2)
RBC #/AREA URNS AUTO: ABNORMAL /HPF
SODIUM SERPL-SCNC: 133 MMOL/L (ref 133–144)
SOURCE: ABNORMAL
SP GR UR STRIP: >1.03 (ref 1–1.03)
T4 FREE SERPL-MCNC: 1.26 NG/DL (ref 0.76–1.46)
TRIGL SERPL-MCNC: 277 MG/DL
TSH SERPL DL<=0.005 MIU/L-ACNC: 4.51 MU/L (ref 0.4–4)
UROBILINOGEN UR STRIP-ACNC: 0.2 EU/DL (ref 0.2–1)
WBC # BLD AUTO: 12.2 10E9/L (ref 4–11)
WBC #/AREA URNS AUTO: ABNORMAL /HPF

## 2019-01-18 PROCEDURE — 99207 C FOOT EXAM  NO CHARGE: CPT | Performed by: NURSE PRACTITIONER

## 2019-01-18 PROCEDURE — 83036 HEMOGLOBIN GLYCOSYLATED A1C: CPT | Performed by: NURSE PRACTITIONER

## 2019-01-18 PROCEDURE — 99215 OFFICE O/P EST HI 40 MIN: CPT | Performed by: NURSE PRACTITIONER

## 2019-01-18 PROCEDURE — 84443 ASSAY THYROID STIM HORMONE: CPT | Performed by: NURSE PRACTITIONER

## 2019-01-18 PROCEDURE — 82043 UR ALBUMIN QUANTITATIVE: CPT | Performed by: NURSE PRACTITIONER

## 2019-01-18 PROCEDURE — 81001 URINALYSIS AUTO W/SCOPE: CPT | Performed by: NURSE PRACTITIONER

## 2019-01-18 PROCEDURE — 36415 COLL VENOUS BLD VENIPUNCTURE: CPT | Performed by: NURSE PRACTITIONER

## 2019-01-18 PROCEDURE — 80061 LIPID PANEL: CPT | Performed by: NURSE PRACTITIONER

## 2019-01-18 PROCEDURE — 80053 COMPREHEN METABOLIC PANEL: CPT | Performed by: NURSE PRACTITIONER

## 2019-01-18 PROCEDURE — 84439 ASSAY OF FREE THYROXINE: CPT | Performed by: NURSE PRACTITIONER

## 2019-01-18 PROCEDURE — 85025 COMPLETE CBC W/AUTO DIFF WBC: CPT | Performed by: NURSE PRACTITIONER

## 2019-01-18 RX ORDER — LOSARTAN POTASSIUM 50 MG/1
50 TABLET ORAL DAILY
Qty: 90 TABLET | Refills: 3 | Status: SHIPPED | OUTPATIENT
Start: 2019-01-18 | End: 2021-02-23

## 2019-01-18 RX ORDER — GLIPIZIDE 5 MG/1
10 TABLET, FILM COATED, EXTENDED RELEASE ORAL DAILY
Qty: 60 TABLET | Refills: 4 | Status: SHIPPED | OUTPATIENT
Start: 2019-01-18 | End: 2019-04-30

## 2019-01-18 RX ORDER — PRAVASTATIN SODIUM 20 MG
20 TABLET ORAL DAILY
Qty: 90 TABLET | Refills: 3 | Status: SHIPPED | OUTPATIENT
Start: 2019-01-18 | End: 2021-02-23

## 2019-01-18 RX ORDER — METFORMIN HCL 500 MG
1000 TABLET, EXTENDED RELEASE 24 HR ORAL 2 TIMES DAILY WITH MEALS
Qty: 360 TABLET | Refills: 1 | Status: SHIPPED | OUTPATIENT
Start: 2019-01-18 | End: 2019-04-30

## 2019-01-18 ASSESSMENT — MIFFLIN-ST. JEOR: SCORE: 1074.07

## 2019-01-18 NOTE — NURSING NOTE
"Initial BP (!) 166/100 (BP Location: Right arm, Patient Position: Sitting, Cuff Size: Adult Regular)   Pulse 91   Temp 97.4  F (36.3  C) (Tympanic)   Resp 16   Ht 1.518 m (4' 11.75\")   Wt 56.7 kg (124 lb 14.4 oz)   SpO2 98%   BMI 24.60 kg/m   Estimated body mass index is 24.6 kg/m  as calculated from the following:    Height as of this encounter: 1.518 m (4' 11.75\").    Weight as of this encounter: 56.7 kg (124 lb 14.4 oz). .    Vivi Engel CMA ([facility])  "

## 2019-01-18 NOTE — PROGRESS NOTES
a1c    SUBJECTIVE:   Bradford Prado is a 58 year old female who presents to clinic today for the following health issues:    Diabetes Follow-up    Patient is checking blood sugars: once daily.  Results are as follows:         am - over 200s          suppertime - over 200s.     Diabetic concerns: blood sugar frequently over 200, she is not taking her medications.      Symptoms of hypoglycemia (low blood sugar): none     Paresthesias (numbness or burning in feet) or sores: No     Date of last diabetic eye exam: January 2017, she needs to schedule.                 Diabetes Management Resources    Hyperlipidemia Follow-Up      Rate your low fat/cholesterol diet?: fair    Taking statin?  No    Other lipid medications/supplements?:  none    Hypertension Follow-up      Outpatient blood pressures are not being checked.    Low Salt Diet: low salt    BP Readings from Last 2 Encounters:   10/24/18 136/80   09/06/17 134/86     Hemoglobin A1C (%)   Date Value   02/01/2017 7.0 (H)   09/20/2016 10.2 (H)     LDL Cholesterol Calculated (mg/dL)   Date Value   01/24/2017 118 (H)   08/09/2016 127 (H)       Amount of exercise or physical activity: None    Problems taking medications regularly: No    Medication side effects: none    Diet: regular (no restrictions)        Reports urinary frequency and flank pain -history of kidney stones.  Reports some nausea - no vomiting.  No fever/chills   Reports just overall not feeling well  Not on medications currently - stopped all of them over 6 months ago and BS readings have been on average 300's.    Had a lot of life changes - and was in [place] with her mother caring for her.  Lost her mother recently.    Problem list and histories reviewed & adjusted, as indicated.  Additional history: as documented    Patient Active Problem List   Diagnosis     Migraine     Hand arthritis     Stenosing tenosynovitis     Poorly controlled type 2 diabetes mellitus with renal complication (H)     Hyperlipidemia  LDL goal <70     Essential hypertension with goal blood pressure less than 140/90     Nephrolithiasis     Type 2 diabetes mellitus with hyperglycemia, without long-term current use of insulin (H)     Past Surgical History:   Procedure Laterality Date     HYSTERECTOMY TOTAL ABDOMINAL      due to fibroids     LAPAROSCOPIC EVACUATION ECTOPIC PREGNANCY       TUBAL LIGATION         Social History     Tobacco Use     Smoking status: Former Smoker     Packs/day: 0.50     Years: 20.00     Pack years: 10.00     Types: Cigarettes     Last attempt to quit: 3/9/2010     Years since quittin.8     Smokeless tobacco: Never Used   Substance Use Topics     Alcohol use: No     Alcohol/week: 0.0 oz     Family History   Problem Relation Age of Onset     Cirrhosis Mother         w/o alcohol history     Emphysema Father      Hypertension Sister      Heart Defect Niece         Tetrology of Fallot     Breast Cancer No family hx of      Cancer - colorectal No family hx of          Current Outpatient Medications   Medication Sig Dispense Refill     blood glucose monitoring (ACCU-CHEK MASON PLUS) test strip Use to test blood sugar 2 times daily or as directed.  Ok to substitute alternative if insurance prefers. 100 each 11     blood glucose monitoring (ACCU-CHEK FASTCLIX) lancets Use to test blood sugar 2 times daily or as directed.  Ok to substitute alternative if insurance prefers. 1 Box 11     blood glucose monitoring (NO BRAND SPECIFIED) test strip Use to test blood sugars 2 times daily or as directed 100 strip 1     blood glucose monitoring (ONE TOUCH DELICA) lancets Use to test blood sugars 2 times daily or as directed. 100 each 11     blood glucose monitoring (ONETOUCH VERIO) meter device kit Use to test blood sugars 2 times daily or as directed. 1 kit 0     metFORMIN (GLUCOPHAGE-XR) 500 MG 24 hr tablet Take 2 tablets (1,000 mg) by mouth 2 times daily (with meals) 360 tablet 1     pravastatin (PRAVACHOL) 20 MG tablet Take 1  tablet (20 mg) by mouth daily 90 tablet 3     acetaminophen-codeine (TYLENOL #3) 300-30 MG per tablet Take 1 tablet by mouth every 6 hours as needed for severe pain 10 tablet 0     glipiZIDE (GLIPIZIDE XL) 5 MG 24 hr tablet Take 1 tablet (5 mg) by mouth daily 90 tablet 3     glucose 4 G CHEW Take 1 tablet by mouth every hour as needed for low blood sugar 10 tablet 1     IBUPROFEN PO        methocarbamol (ROBAXIN) 750 MG tablet Take 1 tablet (750 mg) by mouth 4 times daily as needed for muscle spasms 40 tablet 0     No Known Allergies  Recent Labs   Lab Test 02/01/17  1046 01/24/17  0937 09/27/16  1110 09/22/16  2215  09/20/16  0630  08/09/16  1217 04/09/15  1051  08/23/12  0837   A1C 7.0*  --   --   --   --  10.2*  --  11.4* 10.6*   < >  --    LDL  --  118*  --   --   --   --   --  127* 137*  --  127   HDL  --  28*  --   --   --   --   --  25*  --   --  32*   TRIG  --  173*  --   --   --   --   --  259*  --   --  241*   ALT  --  56* 56* 95*  --   --    < > Canceled, Test credited   Unsatisfactory specimen - hemolyzed  [facility] 8/9/16 AT 1512 AS   61*  --  33   CR  --  0.63  --  0.65   < >  --    < > Canceled, Test credited   Unsatisfactory specimen - hemolyzed  [facility] 8/9/16 AT 1512 AS    --    < > 0.62   GFRESTIMATED  --  >90  Non  GFR Calc    --  >90  Non  GFR Calc     < >  --    < > Canceled, Test credited   Unsatisfactory specimen - hemolyzed  [facility] 8/9/16 AT 1512 AS    --    < > >90   GFRESTBLACK  --  >90   GFR Calc    --  >90   GFR Calc     < >  --    < > Canceled, Test credited   Unsatisfactory specimen - hemolyzed  [facility] 8/9/16 AT 1512 AS    --    < > >90   POTASSIUM  --  4.1  --  3.7   < >  --    < > Canceled, Test credited   Unsatisfactory specimen - hemolyzed  [facility] 8/9/16 AT 1512 AS    --    < > 3.9   TSH  --   --   --   --   --   --   --   --  2.73  --   --     < > = values in  "this interval not displayed.      BP Readings from Last 3 Encounters:   01/18/19 (!) 166/100   10/24/18 136/80   09/06/17 134/86    Wt Readings from Last 3 Encounters:   01/18/19 56.7 kg (124 lb 14.4 oz)   09/06/17 56.7 kg (125 lb)   02/01/17 57.2 kg (126 lb)                  Labs reviewed in EPIC    Reviewed and updated as needed this visit by clinical staff       Reviewed and updated as needed this visit by Provider         ROS:  Constitutional, HEENT, cardiovascular, pulmonary, GI, , musculoskeletal, neuro, skin, endocrine and psych systems are negative, except as otherwise noted.    OBJECTIVE:     BP (!) 166/100 (BP Location: Right arm, Patient Position: Sitting, Cuff Size: Adult Regular)   Pulse 91   Temp 97.4  F (36.3  C) (Tympanic)   Resp 16   Ht 1.518 m (4' 11.75\")   Wt 56.7 kg (124 lb 14.4 oz)   SpO2 98%   BMI 24.60 kg/m    Body mass index is 24.6 kg/m .  GENERAL: healthy, alert and no distress  NECK: no adenopathy, no asymmetry, masses, or scars and thyroid normal to palpation  RESP: lungs clear to auscultation - no rales, rhonchi or wheezes  CV: regular rate and rhythm, normal S1 S2, no S3 or S4, no murmur, click or rub, no peripheral edema and peripheral pulses strong  ABDOMEN: soft, nontender, no hepatosplenomegaly, no masses and bowel sounds normal  MS: no gross musculoskeletal defects noted, no edema  SKIN: no suspicious lesions or rashes  NEURO: Normal strength and tone, mentation intact and speech normal  Diabetic foot exam: normal DP and PT pulses, no trophic changes or ulcerative lesions and normal sensory exam    Diagnostic Test Results:  Results for orders placed or performed in visit on 01/18/19   Comprehensive metabolic panel   Result Value Ref Range    Sodium 133 133 - 144 mmol/L    Potassium 4.3 3.4 - 5.3 mmol/L    Chloride 99 94 - 109 mmol/L    Carbon Dioxide 29 20 - 32 mmol/L    Anion Gap 5 3 - 14 mmol/L    Glucose 208 (H) 70 - 99 mg/dL    Urea Nitrogen 16 7 - 30 mg/dL    " Creatinine 0.54 0.52 - 1.04 mg/dL    GFR Estimate >90 >60 mL/min/[1.73_m2]    GFR Estimate If Black >90 >60 mL/min/[1.73_m2]    Calcium 9.1 8.5 - 10.1 mg/dL    Bilirubin Total 0.7 0.2 - 1.3 mg/dL    Albumin 4.3 3.4 - 5.0 g/dL    Protein Total 8.4 6.8 - 8.8 g/dL    Alkaline Phosphatase 122 40 - 150 U/L    ALT 80 (H) 0 - 50 U/L    AST 49 (H) 0 - 45 U/L   T4 FREE   Result Value Ref Range    T4 Free 1.26 0.76 - 1.46 ng/dL   Albumin Random Urine Quantitative with Creat Ratio   Result Value Ref Range    Creatinine Urine 85 mg/dL    Albumin Urine mg/L 105 mg/L    Albumin Urine mg/g Cr 123.53 (H) 0 - 25 mg/g Cr   Lipid panel reflex to direct LDL Fasting   Result Value Ref Range    Cholesterol 187 <200 mg/dL    Triglycerides 277 (H) <150 mg/dL    HDL Cholesterol 29 (L) >49 mg/dL    LDL Cholesterol Calculated 103 (H) <100 mg/dL    Non HDL Cholesterol 158 (H) <130 mg/dL   TSH   Result Value Ref Range    TSH 4.51 (H) 0.40 - 4.00 mU/L   *UA reflex to Microscopic and Culture ([facility] (except [place] and [place])   Result Value Ref Range    Color Urine Yellow     Appearance Urine Clear     Glucose Urine 100 (A) NEG^Negative mg/dL    Bilirubin Urine Negative NEG^Negative    Ketones Urine 40 (A) NEG^Negative mg/dL    Specific Gravity Urine >1.030 1.003 - 1.035    Blood Urine Trace (A) NEG^Negative    pH Urine 5.5 5.0 - 7.0 pH    Protein Albumin Urine Trace (A) NEG^Negative mg/dL    Urobilinogen Urine 0.2 0.2 - 1.0 EU/dL    Nitrite Urine Negative NEG^Negative    Leukocyte Esterase Urine Negative NEG^Negative    Source Midstream Urine    CBC with platelets differential   Result Value Ref Range    WBC 12.2 (H) 4.0 - 11.0 10e9/L    RBC Count 5.15 3.8 - 5.2 10e12/L    Hemoglobin 17.0 (H) 11.7 - 15.7 g/dL    Hematocrit 48.0 (H) 35.0 - 47.0 %    MCV 93 78 - 100 fl    MCH 33.0 26.5 - 33.0 pg    MCHC 35.4 31.5 - 36.5 g/dL    RDW 11.7 10.0 - 15.0 %    Platelet Count 178 150 - 450 10e9/L    % Neutrophils 59.8 %    %  Lymphocytes 27.8 %    % Monocytes 9.6 %    % Eosinophils 2.4 %    % Basophils 0.4 %    Absolute Neutrophil 7.3 1.6 - 8.3 10e9/L    Absolute Lymphocytes 3.4 0.8 - 5.3 10e9/L    Absolute Monocytes 1.2 0.0 - 1.3 10e9/L    Absolute Eosinophils 0.3 0.0 - 0.7 10e9/L    Absolute Basophils 0.1 0.0 - 0.2 10e9/L    Diff Method Automated Method    Urine Microscopic   Result Value Ref Range    WBC Urine 0 - 5 OTO5^0 - 5 /HPF    RBC Urine O - 2 OTO2^O - 2 /HPF    Squamous Epithelial /LPF Urine Few FEW^Few /LPF    Bacteria Urine Few (A) NEG^Negative /HPF       ASSESSMENT/PLAN:     1. Type 2 diabetes mellitus with hyperglycemia, without long-term current use of insulin (H)   Not controlled.  Stopped all medications - home readings have been variable but high.  Discussed compliance, diet and exercise.  May benefit from continuous meter     - metFORMIN (GLUCOPHAGE-XR) 500 MG 24 hr tablet; Take 2 tablets (1,000 mg) by mouth 2 times daily (with meals)  Dispense: 360 tablet; Refill: 1  - blood glucose (NO BRAND SPECIFIED) test strip; Use to test blood sugars 2 times daily or as directed  Dispense: 100 strip; Refill: 1  - losartan (COZAAR) 50 MG tablet; Take 1 tablet (50 mg) by mouth daily  Dispense: 90 tablet; Refill: 3  - *UA reflex to Microscopic and Culture ([facility] (except [place] and [place])  - FOOT EXAM  - CBC with platelets differential  - DIABETES EDUCATOR REFERRAL  - Hemoglobin A1c; Future    2. Nephrolithiasis   UA today   - glipiZIDE (GLIPIZIDE XL) 5 MG 24 hr tablet; Take 2 tablets (10 mg) by mouth daily  Dispense: 60 tablet; Refill: 4  - pravastatin (PRAVACHOL) 20 MG tablet; Take 1 tablet (20 mg) by mouth daily  Dispense: 90 tablet; Refill: 3  - *UA reflex to Microscopic and Culture ([facility] (except [place] and [place])  - CBC with platelets differential    3. Essential hypertension with goal blood pressure less than 140/90   Not controlled - off medications for 6 months.    -  glipiZIDE (GLIPIZIDE XL) 5 MG 24 hr tablet; Take 2 tablets (10 mg) by mouth daily  Dispense: 60 tablet; Refill: 4  - pravastatin (PRAVACHOL) 20 MG tablet; Take 1 tablet (20 mg) by mouth daily  Dispense: 90 tablet; Refill: 3  - losartan (COZAAR) 50 MG tablet; Take 1 tablet (50 mg) by mouth daily  Dispense: 90 tablet; Refill: 3  - CBC with platelets differential    4. Hyperlipidemia LDL goal <70  NOt on statin - ran out of medications.    - glipiZIDE (GLIPIZIDE XL) 5 MG 24 hr tablet; Take 2 tablets (10 mg) by mouth daily  Dispense: 60 tablet; Refill: 4  - pravastatin (PRAVACHOL) 20 MG tablet; Take 1 tablet (20 mg) by mouth daily  Dispense: 90 tablet; Refill: 3    5. Microalbuminuria due to type 2 diabetes mellitus (H)       6. Poorly controlled type 2 diabetes mellitus with renal complication (H)     - Comprehensive metabolic panel  - T4 FREE  - Albumin Random Urine Quantitative with Creat Ratio  - Lipid panel reflex to direct LDL Fasting  - TSH  - blood glucose (NO BRAND SPECIFIED) test strip; Use to test blood sugars 2 times daily or as directed  Dispense: 100 strip; Refill: 1  - *UA reflex to Microscopic and Culture ([facility] (except [place] and [place])  - CBC with platelets differential  - DIABETES EDUCATOR REFERRAL    7. Encounter for screening mammogram for breast cancer     - *MA Screening Digital Bilateral; Future  - Urine Microscopic    8. Special screening for malignant neoplasms, colon   FIT discussed - declined colonoscopy.    - Fecal colorectal cancer screen (FIT); Future    9. Nausea   Uncertain etiology - but most likely due to poor health and hyperglycemia.    - Comprehensive metabolic panel  - CBC with platelets differential    Work on weight loss  Regular exercise    Patient Instructions                    Diabetes: Importance of Exercise  Why is exercise important?  Exercise helps keep your child's blood sugar under control. You should make a special effort to plan daily  exercise for your child. Young people with diabetes can participate in almost every sport. Many professional athletes have diabetes. Exercise helps people with diabetes (both type 1 and type 2) in many ways:  Exercise helps the body burn more sugar. Insulin is more effective during exercise. More sugar and insulin flows in the blood to the muscles during exercise. This causes more sugar to be burned. Exercise usually helps lower the blood sugar.   Exercise makes you feel better. Children who exercise tend not to tire as easily and feel happier and healthier.   Exercise helps keep the body in good shape. Lack of activity leads to health problems such as obesity and heart trouble. Exercise helps burn extra calories and helps your child keep a normal weight.   Exercise helps keep the heart rate and blood pressure lower. People who exercise have healthy hearts and the heart doesn't have to pump as hard. Low blood pressure helps prevent heart problems as well as other complications of diabetes such as eye and kidney problems.   Exercise helps keep blood fat levels normal. Many children with diabetes have high levels of the blood fats (cholesterol and triglycerides). High blood fat levels can lead to early aging of blood vessels. Exercise and good blood sugar control are the best ways to reduce blood fat levels.   Exercise helps the body become more sensitive to insulin. Research has shown that after 1 hour of afternoon exercise, blood sugars will stay lower until the next morning. Exercise makes the body more sensitive to insulin, the insulin can work more efficiently, and usually a lower daily dose is needed.   Exercise helps normal blood circulation to the feet. Exercise can help blood circulation to the feet and prevent foot problems.  Exercise is very important for children who have type 2 diabetes or are at risk for type 2 diabetes. If your child is overweight, losing weight can reduce the risk for developing type 2  diabetes by more than half. This is best done by eating less and exercising more.  Which kinds of exercise are best?  The best exercise is one your child likes. It is easier to make exercise a habit if your child enjoys the activity. Your child needs to choose an aerobic exercise. Only aerobic exercises help the heart. Aerobic exercises include jogging, walking, swimming, or bicycling. They should be done for 30 minutes or longer. Activities such as weight lifting that are done in short bursts with rests in between are strength-building exercises, not aerobic exercise.  Boxing is not a good exercise for children with diabetes. Eye injuries are common in boxing and eye problems are a possible complication of diabetes. Also, the high risk for brain damage makes boxing dangerous for people with or without diabetes.  Preventing low blood sugar levels is important in all sports. It is very important for sports such as scuba diving that could be dangerous. Fortunately, dangerous activities are not generally used for daily aerobic activity.  How do I help my child get started?  When starting a new exercise program, it is always best to start slowly. Gradually extend the time and amount of exercise. This will result in fewer sore muscles and a better chance to continue the program. The best way to make exercise a part of everyday living is to start early in life. Older children may not be as willing to start a regular exercise program. Make exercise part of the normal routine. You need to be a good example by exercising regularly. Many children prefer TV or computer games to exercise. You may have to encourage a change in attitude. Make exercise activities such as skating and swimming a reward for your child. It helps if you can have fun with your child in the activity. Jogging, walking, or jumping rope is good for parents too! A child of any age will soon  your good attitude toward exercise.   When should my child  exercise?  The best time to exercise will vary with your schedule. Children like to play after school, and most organized sports activities take place then. This is the time that intermediate-acting insulin (such as NPH) has its main effect. Exercise often causes the body's blood sugar level to drop. Extra care to prevent low blood sugar is important. When possible, pick an exercise time, preferably the same time each day, and adjust the snacks and insulin dose to fit the exercise. Remember, you can adjust your child's diabetes management to suit your child's lifestyle. Your child's lifestyle does not have to be adjusted to fit diabetes.  When should my child not exercise?  If your child's urine ketone level is large or moderate, exercise can raise the sugar or ketone level even higher. So, do not let your child exercise when urine ketones are moderate or large. Remember to check urine ketones before exercising if your child is not feeling well.  How often should my child exercise?  To improve the health of the heart, your child should have at least 30 minutes of aerobic exercise 5 or more times per week. The more exercise a person gets, the more fat is burned. Some people burn more calories with their exercise than others. This is partly related to how hard the person exercises. If weight loss is one of the goals for your child, it may be necessary to exercise harder or for a longer period to reach the desired goals.  How can I help my child prevent low blood sugar (hypoglycemic) reactions during exercise?   You can plan the exercise after a meal, reduce the insulin dosage, or take extra snacks to help prevent low blood sugar during exercise. This will take some experimenting with dosages and record keeping. Your child should always carry a source of sugar at all times and have a longer lasting snack nearby. Remember, it is wise to THINK AHEAD about the day's schedule and plan accordingly.    Published by  "[employer].  This content is reviewed periodically and is subject to change as new health information becomes available. The information is intended to inform and educate and is not a replacement for medical evaluation, advice, diagnosis or treatment by a healthcare professional.  Abstracted from the book, \"Understanding Diabetes,\" 11th Edition, by KIARA Saenz MD (available by calling 1-979.779.9523).    2011 [employer] and/or its affiliates. All rights reserved.                   Paulette Travis NP  [facility]    Total times spent with patient 45 minutes of which > 50% of the time was spent counseling and coordination of care discussion of above chronic health conditions, medications, compliance, side effects, diabetes, diet, treatment plan, follow up and recommendations.      "

## 2019-01-18 NOTE — PATIENT INSTRUCTIONS
Diabetes: Importance of Exercise  Why is exercise important?  Exercise helps keep your child's blood sugar under control. You should make a special effort to plan daily exercise for your child. Young people with diabetes can participate in almost every sport. Many professional athletes have diabetes. Exercise helps people with diabetes (both type 1 and type 2) in many ways:  Exercise helps the body burn more sugar. Insulin is more effective during exercise. More sugar and insulin flows in the blood to the muscles during exercise. This causes more sugar to be burned. Exercise usually helps lower the blood sugar.   Exercise makes you feel better. Children who exercise tend not to tire as easily and feel happier and healthier.   Exercise helps keep the body in good shape. Lack of activity leads to health problems such as obesity and heart trouble. Exercise helps burn extra calories and helps your child keep a normal weight.   Exercise helps keep the heart rate and blood pressure lower. People who exercise have healthy hearts and the heart doesn't have to pump as hard. Low blood pressure helps prevent heart problems as well as other complications of diabetes such as eye and kidney problems.   Exercise helps keep blood fat levels normal. Many children with diabetes have high levels of the blood fats (cholesterol and triglycerides). High blood fat levels can lead to early aging of blood vessels. Exercise and good blood sugar control are the best ways to reduce blood fat levels.   Exercise helps the body become more sensitive to insulin. Research has shown that after 1 hour of afternoon exercise, blood sugars will stay lower until the next morning. Exercise makes the body more sensitive to insulin, the insulin can work more efficiently, and usually a lower daily dose is needed.   Exercise helps normal blood circulation to the feet. Exercise can help blood circulation to the feet and prevent foot problems.   Exercise is very important for children who have type 2 diabetes or are at risk for type 2 diabetes. If your child is overweight, losing weight can reduce the risk for developing type 2 diabetes by more than half. This is best done by eating less and exercising more.  Which kinds of exercise are best?  The best exercise is one your child likes. It is easier to make exercise a habit if your child enjoys the activity. Your child needs to choose an aerobic exercise. Only aerobic exercises help the heart. Aerobic exercises include jogging, walking, swimming, or bicycling. They should be done for 30 minutes or longer. Activities such as weight lifting that are done in short bursts with rests in between are strength-building exercises, not aerobic exercise.  Boxing is not a good exercise for children with diabetes. Eye injuries are common in boxing and eye problems are a possible complication of diabetes. Also, the high risk for brain damage makes boxing dangerous for people with or without diabetes.  Preventing low blood sugar levels is important in all sports. It is very important for sports such as scuba diving that could be dangerous. Fortunately, dangerous activities are not generally used for daily aerobic activity.  How do I help my child get started?  When starting a new exercise program, it is always best to start slowly. Gradually extend the time and amount of exercise. This will result in fewer sore muscles and a better chance to continue the program. The best way to make exercise a part of everyday living is to start early in life. Older children may not be as willing to start a regular exercise program. Make exercise part of the normal routine. You need to be a good example by exercising regularly. Many children prefer TV or computer games to exercise. You may have to encourage a change in attitude. Make exercise activities such as skating and swimming a reward for your child. It helps if you can have fun  with your child in the activity. Jogging, walking, or jumping rope is good for parents too! A child of any age will soon  your good attitude toward exercise.   When should my child exercise?  The best time to exercise will vary with your schedule. Children like to play after school, and most organized sports activities take place then. This is the time that intermediate-acting insulin (such as NPH) has its main effect. Exercise often causes the body's blood sugar level to drop. Extra care to prevent low blood sugar is important. When possible, pick an exercise time, preferably the same time each day, and adjust the snacks and insulin dose to fit the exercise. Remember, you can adjust your child's diabetes management to suit your child's lifestyle. Your child's lifestyle does not have to be adjusted to fit diabetes.  When should my child not exercise?  If your child's urine ketone level is large or moderate, exercise can raise the sugar or ketone level even higher. So, do not let your child exercise when urine ketones are moderate or large. Remember to check urine ketones before exercising if your child is not feeling well.  How often should my child exercise?  To improve the health of the heart, your child should have at least 30 minutes of aerobic exercise 5 or more times per week. The more exercise a person gets, the more fat is burned. Some people burn more calories with their exercise than others. This is partly related to how hard the person exercises. If weight loss is one of the goals for your child, it may be necessary to exercise harder or for a longer period to reach the desired goals.  How can I help my child prevent low blood sugar (hypoglycemic) reactions during exercise?   You can plan the exercise after a meal, reduce the insulin dosage, or take extra snacks to help prevent low blood sugar during exercise. This will take some experimenting with dosages and record keeping. Your child should  "always carry a source of sugar at all times and have a longer lasting snack nearby. Remember, it is wise to THINK AHEAD about the day's schedule and plan accordingly.    Published by [facility].  This content is reviewed periodically and is subject to change as new health information becomes available. The information is intended to inform and educate and is not a replacement for medical evaluation, advice, diagnosis or treatment by a healthcare professional.  Abstracted from the book, \"Understanding Diabetes,\" 11th Edition, by KIARA Saenz MD (available by calling [phone]).    2011 [facility] and/or its affiliates. All rights reserved.               "

## 2019-04-30 ENCOUNTER — APPOINTMENT (OUTPATIENT)
Dept: LAB | Facility: CLINIC | Age: 59
End: 2019-04-30
Payer: COMMERCIAL

## 2019-04-30 ENCOUNTER — OFFICE VISIT (OUTPATIENT)
Dept: FAMILY MEDICINE | Facility: CLINIC | Age: 59
End: 2019-04-30
Payer: COMMERCIAL

## 2019-04-30 VITALS
HEIGHT: 60 IN | BODY MASS INDEX: 24.54 KG/M2 | DIASTOLIC BLOOD PRESSURE: 86 MMHG | HEART RATE: 72 BPM | OXYGEN SATURATION: 99 % | TEMPERATURE: 96.5 F | WEIGHT: 125 LBS | RESPIRATION RATE: 14 BRPM | SYSTOLIC BLOOD PRESSURE: 136 MMHG

## 2019-04-30 DIAGNOSIS — E78.5 HYPERLIPIDEMIA LDL GOAL <70: Chronic | ICD-10-CM

## 2019-04-30 DIAGNOSIS — N20.0 NEPHROLITHIASIS: Chronic | ICD-10-CM

## 2019-04-30 DIAGNOSIS — I10 ESSENTIAL HYPERTENSION WITH GOAL BLOOD PRESSURE LESS THAN 140/90: Chronic | ICD-10-CM

## 2019-04-30 DIAGNOSIS — E11.65 TYPE 2 DIABETES MELLITUS WITH HYPERGLYCEMIA, WITHOUT LONG-TERM CURRENT USE OF INSULIN (H): Primary | ICD-10-CM

## 2019-04-30 DIAGNOSIS — G43.909 MIGRAINE WITHOUT STATUS MIGRAINOSUS, NOT INTRACTABLE, UNSPECIFIED MIGRAINE TYPE: ICD-10-CM

## 2019-04-30 LAB
ANION GAP SERPL CALCULATED.3IONS-SCNC: 5 MMOL/L (ref 3–14)
BUN SERPL-MCNC: 8 MG/DL (ref 7–30)
CALCIUM SERPL-MCNC: 8.7 MG/DL (ref 8.5–10.1)
CHLORIDE SERPL-SCNC: 103 MMOL/L (ref 94–109)
CHOLEST SERPL-MCNC: 145 MG/DL
CO2 SERPL-SCNC: 26 MMOL/L (ref 20–32)
CREAT SERPL-MCNC: 0.48 MG/DL (ref 0.52–1.04)
GFR SERPL CREATININE-BSD FRML MDRD: >90 ML/MIN/{1.73_M2}
GLUCOSE SERPL-MCNC: 174 MG/DL (ref 70–99)
HBA1C MFR BLD: 7.1 % (ref 0–5.6)
HDLC SERPL-MCNC: 25 MG/DL
LDLC SERPL CALC-MCNC: 76 MG/DL
NONHDLC SERPL-MCNC: 120 MG/DL
POTASSIUM SERPL-SCNC: 3.8 MMOL/L (ref 3.4–5.3)
SODIUM SERPL-SCNC: 134 MMOL/L (ref 133–144)
TRIGL SERPL-MCNC: 218 MG/DL

## 2019-04-30 PROCEDURE — 80061 LIPID PANEL: CPT | Performed by: NURSE PRACTITIONER

## 2019-04-30 PROCEDURE — 99214 OFFICE O/P EST MOD 30 MIN: CPT | Performed by: NURSE PRACTITIONER

## 2019-04-30 PROCEDURE — 83036 HEMOGLOBIN GLYCOSYLATED A1C: CPT | Performed by: NURSE PRACTITIONER

## 2019-04-30 PROCEDURE — 80048 BASIC METABOLIC PNL TOTAL CA: CPT | Performed by: NURSE PRACTITIONER

## 2019-04-30 PROCEDURE — 36415 COLL VENOUS BLD VENIPUNCTURE: CPT | Performed by: NURSE PRACTITIONER

## 2019-04-30 RX ORDER — GLIPIZIDE 5 MG/1
10 TABLET, FILM COATED, EXTENDED RELEASE ORAL DAILY
Qty: 180 TABLET | Refills: 1 | Status: SHIPPED | OUTPATIENT
Start: 2019-04-30 | End: 2021-02-23

## 2019-04-30 RX ORDER — METFORMIN HCL 500 MG
1000 TABLET, EXTENDED RELEASE 24 HR ORAL 2 TIMES DAILY WITH MEALS
Qty: 360 TABLET | Refills: 1 | Status: SHIPPED | OUTPATIENT
Start: 2019-04-30 | End: 2021-02-23

## 2019-04-30 ASSESSMENT — MIFFLIN-ST. JEOR: SCORE: 1074.53

## 2019-04-30 ASSESSMENT — PAIN SCALES - GENERAL: PAINLEVEL: MILD PAIN (2)

## 2019-04-30 NOTE — PROGRESS NOTES
SUBJECTIVE:   Bradford Prado is a 58 year old female who presents to clinic today for the following   health issues:    Chief Complaint   Patient presents with     Diabetes     recheck      Diabetes Follow-up  Patient is checking blood sugars: twice daily.    Blood sugar testing frequency justification: uncontrolled blood sugars -wide ranges  Results are as follows:         am - 200s         suppertime - 100s-200s    Diabetic concerns: None and blood sugar frequently over 200     Symptoms of hypoglycemia (low blood sugar): nauseated      Paresthesias (numbness or burning in feet) or sores: No     Date of last diabetic eye exam: last year -no new concerns     Diabetes Management Resources    Lab Results   Component Value Date    A1C 7.1 04/30/2019    A1C 10.8 01/18/2019    A1C 7.0 02/01/2017    A1C 10.2 09/20/2016    A1C 11.4 08/09/2016     Taking Metformin and Glipizide    Hyperlipidemia Follow-Up    Rate your low fat/cholesterol diet?: fair    Taking statin?  Yes, no muscle aches from statin    Other lipid medications/supplements?:  None    Hypertension Follow-up    Outpatient blood pressures are not being checked.    Low Salt Diet: no added salt    BP Readings from Last 2 Encounters:   04/30/19 136/86   01/18/19 (!) 158/92     Hemoglobin A1C (%)   Date Value   04/30/2019 7.1 (H)   01/18/2019 10.8 (H)     LDL Cholesterol Calculated (mg/dL)   Date Value   01/18/2019 103 (H)   01/24/2017 118 (H)       Amount of exercise or physical activity: 2-3 days/week for an average of 15-30 minutes    Problems taking medications regularly: No    Medication side effects: none    Diet: regular (no restrictions)          Additional history: as documented    Reviewed  and updated as needed this visit by clinical staff  Tobacco  Allergies  Meds  Problems  Med Hx  Surg Hx  Fam Hx  Soc Hx          Reviewed and updated as needed this visit by Provider  Tobacco  Allergies  Meds  Problems  Med Hx  Surg Hx  Fam Hx          Patient Active Problem List   Diagnosis     Migraine     Hand arthritis     Stenosing tenosynovitis     Poorly controlled type 2 diabetes mellitus with renal complication (H)     Hyperlipidemia LDL goal <70     Essential hypertension with goal blood pressure less than 140/90     Nephrolithiasis     Type 2 diabetes mellitus with hyperglycemia, without long-term current use of insulin (H)     Past Surgical History:   Procedure Laterality Date     HYSTERECTOMY TOTAL ABDOMINAL      due to fibroids     LAPAROSCOPIC EVACUATION ECTOPIC PREGNANCY       TUBAL LIGATION         Social History     Tobacco Use     Smoking status: Former Smoker     Packs/day: 0.50     Years: 20.00     Pack years: 10.00     Types: Cigarettes     Last attempt to quit: 3/9/2010     Years since quittin.1     Smokeless tobacco: Never Used   Substance Use Topics     Alcohol use: No     Alcohol/week: 0.0 oz     Family History   Problem Relation Age of Onset     Cirrhosis Mother         w/o alcohol history     Emphysema Father      Hypertension Sister      Heart Defect Niece         Tetrology of Fallot     Breast Cancer No family hx of      Cancer - colorectal No family hx of          Current Outpatient Medications   Medication Sig Dispense Refill     acetaminophen-codeine (TYLENOL #3) 300-30 MG per tablet Take 1 tablet by mouth every 6 hours as needed for severe pain 10 tablet 0     blood glucose (NO BRAND SPECIFIED) test strip Use to test blood sugars 2 times daily or as directed 100 strip 1     blood glucose monitoring (ACCU-CHEK MASON PLUS) test strip Use to test blood sugar 2 times daily or as directed.  Ok to substitute alternative if insurance prefers. 100 each 11     blood glucose monitoring (ACCU-CHEK FASTCLIX) lancets Use to test blood sugar 2 times daily or as directed.  Ok to substitute alternative if insurance prefers. 1 Box 11     blood glucose monitoring (ONE TOUCH DELICA) lancets Use to test blood sugars 2 times daily or as  directed. 100 each 11     blood glucose monitoring (ONETOUCH VERIO) meter device kit Use to test blood sugars 2 times daily or as directed. 1 kit 0     glipiZIDE (GLIPIZIDE XL) 5 MG 24 hr tablet Take 2 tablets (10 mg) by mouth daily 60 tablet 4     glucose 4 G CHEW Take 1 tablet by mouth every hour as needed for low blood sugar 10 tablet 1     IBUPROFEN PO        losartan (COZAAR) 50 MG tablet Take 1 tablet (50 mg) by mouth daily 90 tablet 3     metFORMIN (GLUCOPHAGE-XR) 500 MG 24 hr tablet Take 2 tablets (1,000 mg) by mouth 2 times daily (with meals) 360 tablet 1     methocarbamol (ROBAXIN) 750 MG tablet Take 1 tablet (750 mg) by mouth 4 times daily as needed for muscle spasms 40 tablet 0     pravastatin (PRAVACHOL) 20 MG tablet Take 1 tablet (20 mg) by mouth daily 90 tablet 3     No Known Allergies  Recent Labs   Lab Test 04/30/19  0910 01/18/19  0900 02/01/17  1046 01/24/17  0937 09/27/16  1110  08/09/16  1217 04/09/15  1051   A1C 7.1* 10.8* 7.0*  --   --    < > 11.4* 10.6*   LDL  --  103*  --  118*  --   --  127* 137*   HDL  --  29*  --  28*  --   --  25*  --    TRIG  --  277*  --  173*  --   --  259*  --    ALT  --  80*  --  56* 56*   < > Canceled, Test credited   Unsatisfactory specimen - hemolyzed  [facility] 8/9/16 AT 1512 AS   61*   CR  --  0.54  --  0.63  --    < > Canceled, Test credited   Unsatisfactory specimen - hemolyzed  [facility] 8/9/16 AT 1512 AS    --    GFRESTIMATED  --  >90  --  >90  Non  GFR Calc    --    < > Canceled, Test credited   Unsatisfactory specimen - hemolyzed  [facility] 8/9/16 AT 1512 AS    --    GFRESTBLACK  --  >90  --  >90  African American GFR Calc    --    < > Canceled, Test credited   Unsatisfactory specimen - hemolyzed  [facility] 8/9/16 AT 1512 AS    --    POTASSIUM  --  4.3  --  4.1  --    < > Canceled, Test credited   Unsatisfactory specimen - hemolyzed  [facility] 8/9/16 AT 1512 AS    --    TSH  --  4.51*  --   --    "--   --   --  2.73    < > = values in this interval not displayed.      BP Readings from Last 3 Encounters:   04/30/19 136/86   01/18/19 (!) 158/92   10/24/18 136/80    Wt Readings from Last 3 Encounters:   04/30/19 56.7 kg (125 lb)   01/18/19 56.7 kg (124 lb 14.4 oz)   09/06/17 56.7 kg (125 lb)                  Labs reviewed in EPIC    ROS:  Constitutional, HEENT, cardiovascular, pulmonary, GI, , musculoskeletal, neuro, skin, endocrine and psych systems are negative, except as otherwise noted.    OBJECTIVE:     /86   Pulse 72   Temp 96.5  F (35.8  C) (Tympanic)   Resp 14   Ht 1.518 m (4' 11.75\")   Wt 56.7 kg (125 lb)   SpO2 99%   BMI 24.62 kg/m    Body mass index is 24.62 kg/m .  GENERAL: healthy, alert and no distress  NECK: no adenopathy, no asymmetry, masses, or scars and thyroid normal to palpation  RESP: lungs clear to auscultation - no rales, rhonchi or wheezes  CV: regular rate and rhythm, normal S1 S2, no S3 or S4, no murmur, click or rub, no peripheral edema and peripheral pulses strong  ABDOMEN: soft, nontender, no hepatosplenomegaly, no masses and bowel sounds normal  MS: no gross musculoskeletal defects noted, no edema  SKIN: no suspicious lesions or rashes  NEURO: Normal strength and tone, mentation intact and speech normal  PSYCH: mentation appears normal, affect normal/bright  Diabetic foot exam: normal DP and PT pulses, no trophic changes or ulcerative lesions and normal sensory exam    Diagnostic Test Results:  Results for orders placed or performed in visit on 04/30/19 (from the past 24 hour(s))   Hemoglobin A1c   Result Value Ref Range    Hemoglobin A1C 7.1 (H) 0 - 5.6 %       ASSESSMENT/PLAN:     1. Type 2 diabetes mellitus with hyperglycemia, without long-term current use of insulin (H)   Improved - Hgb A1C at goal.    - Lipid panel reflex to direct LDL Fasting  - metFORMIN (GLUCOPHAGE-XR) 500 MG 24 hr tablet; Take 2 tablets (1,000 mg) by mouth 2 times daily (with meals)  " Dispense: 360 tablet; Refill: 1  - glipiZIDE (GLIPIZIDE XL) 5 MG 24 hr tablet; Take 2 tablets (10 mg) by mouth daily  Dispense: 180 tablet; Refill: 1  - Hemoglobin A1c; Future    2. Hyperlipidemia LDL goal <70   Await Lipids - await results.  Continue statin.    - Hemoglobin A1c  - Basic metabolic panel  (Ca, Cl, CO2, Creat, Gluc, K, Na, BUN)  - metFORMIN (GLUCOPHAGE-XR) 500 MG 24 hr tablet; Take 2 tablets (1,000 mg) by mouth 2 times daily (with meals)  Dispense: 360 tablet; Refill: 1  - glipiZIDE (GLIPIZIDE XL) 5 MG 24 hr tablet; Take 2 tablets (10 mg) by mouth daily  Dispense: 180 tablet; Refill: 1    3. Essential hypertension with goal blood pressure less than 140/90  Continue medications.  Controlled.    - Basic metabolic panel; Future    4. Migraine without status migrainosus, not intractable, unspecified migraine type   Stable.    5. Nephrolithiasis         Work on weight loss  Regular exercise  Patient Instructions         Patient Education   Understanding Carbohydrates, Fats, and Protein  Food is a source of fuel and nourishment for your body. It s also a source of pleasure. Having diabetes doesn t mean you have to eat special foods or give up desserts. Instead, your dietitian can show you how to plan meals to suit your body. To start, learn how different foods affect blood sugar.  Carbohydrates  Carbohydrates are the main source of fuel for the body. Carbohydrates raise blood sugar. Many people think carbohydrates are only found in pasta or bread. But carbohydrates are actually in many kinds of foods:    Sugars occur naturally in foods such as fruit, milk, honey, and molasses. Sugars can also be added to many foods, from cereals and yogurt to candy and desserts. Sugars raise blood sugar.    Starches are found in bread, cereals, pasta, and dried beans. They re also found in corn, peas, potatoes, yam, acorn squash, and butternut squash. Starches also raise blood sugar.     Fiber is found in foods such as  vegetables, fruits, beans, and whole grains. Unlike other carbs, fiber isn t digested or absorbed. So it doesn t raise blood sugar. In fact, fiber can help keep blood sugar from rising too fast. It also helps keep blood cholesterol at a healthy level.  Did you know?  Even though carbohydrates raise blood sugar, it s best to have some in every meal. They are an important part of a healthy diet.   Fat  Fat is an energy source that can be stored until needed. Fat does not raise blood sugar. However, it can raise blood cholesterol, increasing the risk of heart disease. Fat is also high in calories, which can cause weight gain. Not all types of fat are the same.  More Healthy:    Monounsaturated fats are mostly found in vegetable oils, such as olive, canola, and peanut oils. They are also found in avocados and some nuts. Monounsaturated fats are healthy for your heart. That s because they lower LDL (unhealthy) cholesterol.    Polyunsaturated fats are mostly found in vegetable oils, such as corn, safflower, and soybean oils. They are also found in some seeds, nuts, and fish. Polyunsaturated fats lower LDL (unhealthy) cholesterol. So, choosing them instead of saturated fats is healthy for your heart. Certain unsaturated fats can help lower triglycerides.   Less Healthy:    Saturated fats are found in animal products, such as meat, poultry, whole milk, lard, and butter. Saturated fats raise LDL cholesterol and are not healthy for your heart.    Hydrogenated oils and trans fats are formed when vegetable oils are processed into solid fats. They are found in many processed foods. Hydrogenated oils and trans fats raise LDL cholesterol and lower HDL (healthy) cholesterol. They are not healthy for your heart.  Protein  Protein helps the body build and repair muscle and other tissue. Protein has little or no effect on blood sugar. However, many foods that contain protein also contain saturated fat. By choosing low-fat protein  sources, you can get the benefits of protein without the extra fat:    Plant protein is found in dry beans and peas, nuts, and soy products, such as tofu and soymilk. These sources tend to be cholesterol-free and low in saturated fat.    Animal protein is found in fish, poultry, meat, cheese, milk, and eggs. These contain cholesterol and can be high in saturated fat. Aim for lean, lower-fat choices.  Date Last Reviewed: 3/1/2016    [id number] [employer]. [address] [place] [address], [place] [id number]. All rights reserved. This information is not intended as a substitute for professional medical care. Always follow your healthcare professional's instructions.               Paulette Travis NP  [facility] - FAMILY PRACT[facility]

## 2019-04-30 NOTE — LETTER
May 2, 2019      Bradford Prado  [address]  [place] [address]        Dear ,    We are writing to inform you of your test results.    Your A1C is so much better and now at goal.   This is great news.     Your cholesterol has improved a ton too!   Continue medications as discussed in clinic.   Follow-up with me in 6 months.   Place follow up reminder message for patient to see RN and download pump readings in 3 months.     Resulted Orders   Hemoglobin A1c   Result Value Ref Range    Hemoglobin A1C 7.1 (H) 0 - 5.6 %      Comment:      Normal <5.7% Prediabetes 5.7-6.4%  Diabetes 6.5% or higher - adopted from ADA   consensus guidelines.     Basic metabolic panel  (Ca, Cl, CO2, Creat, Gluc, K, Na, BUN)   Result Value Ref Range    Sodium 134 133 - 144 mmol/L    Potassium 3.8 3.4 - 5.3 mmol/L    Chloride 103 94 - 109 mmol/L    Carbon Dioxide 26 20 - 32 mmol/L    Anion Gap 5 3 - 14 mmol/L    Glucose 174 (H) 70 - 99 mg/dL      Comment:      Fasting specimen    Urea Nitrogen 8 7 - 30 mg/dL    Creatinine 0.48 (L) 0.52 - 1.04 mg/dL    GFR Estimate >90 >60 mL/min/[1.73_m2]      Comment:      Non  GFR Calc  Starting 12/18/2018, serum creatinine based estimated GFR (eGFR) will be   calculated using the Chronic Kidney Disease Epidemiology Collaboration   (CKD-EPI) equation.      GFR Estimate If Black >90 >60 mL/min/[1.73_m2]      Comment:       GFR Calc  Starting 12/18/2018, serum creatinine based estimated GFR (eGFR) will be   calculated using the Chronic Kidney Disease Epidemiology Collaboration   (CKD-EPI) equation.      Calcium 8.7 8.5 - 10.1 mg/dL   Lipid panel reflex to direct LDL Fasting   Result Value Ref Range    Cholesterol 145 <200 mg/dL    Triglycerides 218 (H) <150 mg/dL      Comment:      Borderline high:  150-199 mg/dl  High:             200-499 mg/dl  Very high:       >499 mg/dl  Fasting specimen      HDL Cholesterol 25 (L) >49 mg/dL    LDL Cholesterol Calculated 76 <100  mg/dL      Comment:      Desirable:       <100 mg/dl    Non HDL Cholesterol 120 <130 mg/dL       If you have any questions or concerns, please call the clinic at the number listed above.       Sincerely,        Paulette Travis NP

## 2019-04-30 NOTE — PATIENT INSTRUCTIONS
Patient Education   Understanding Carbohydrates, Fats, and Protein  Food is a source of fuel and nourishment for your body. It s also a source of pleasure. Having diabetes doesn t mean you have to eat special foods or give up desserts. Instead, your dietitian can show you how to plan meals to suit your body. To start, learn how different foods affect blood sugar.  Carbohydrates  Carbohydrates are the main source of fuel for the body. Carbohydrates raise blood sugar. Many people think carbohydrates are only found in pasta or bread. But carbohydrates are actually in many kinds of foods:    Sugars occur naturally in foods such as fruit, milk, honey, and molasses. Sugars can also be added to many foods, from cereals and yogurt to candy and desserts. Sugars raise blood sugar.    Starches are found in bread, cereals, pasta, and dried beans. They re also found in corn, peas, potatoes, yam, acorn squash, and butternut squash. Starches also raise blood sugar.     Fiber is found in foods such as vegetables, fruits, beans, and whole grains. Unlike other carbs, fiber isn t digested or absorbed. So it doesn t raise blood sugar. In fact, fiber can help keep blood sugar from rising too fast. It also helps keep blood cholesterol at a healthy level.  Did you know?  Even though carbohydrates raise blood sugar, it s best to have some in every meal. They are an important part of a healthy diet.   Fat  Fat is an energy source that can be stored until needed. Fat does not raise blood sugar. However, it can raise blood cholesterol, increasing the risk of heart disease. Fat is also high in calories, which can cause weight gain. Not all types of fat are the same.  More Healthy:    Monounsaturated fats are mostly found in vegetable oils, such as olive, canola, and peanut oils. They are also found in avocados and some nuts. Monounsaturated fats are healthy for your heart. That s because they lower LDL (unhealthy) cholesterol.     Polyunsaturated fats are mostly found in vegetable oils, such as corn, safflower, and soybean oils. They are also found in some seeds, nuts, and fish. Polyunsaturated fats lower LDL (unhealthy) cholesterol. So, choosing them instead of saturated fats is healthy for your heart. Certain unsaturated fats can help lower triglycerides.   Less Healthy:    Saturated fats are found in animal products, such as meat, poultry, whole milk, lard, and butter. Saturated fats raise LDL cholesterol and are not healthy for your heart.    Hydrogenated oils and trans fats are formed when vegetable oils are processed into solid fats. They are found in many processed foods. Hydrogenated oils and trans fats raise LDL cholesterol and lower HDL (healthy) cholesterol. They are not healthy for your heart.  Protein  Protein helps the body build and repair muscle and other tissue. Protein has little or no effect on blood sugar. However, many foods that contain protein also contain saturated fat. By choosing low-fat protein sources, you can get the benefits of protein without the extra fat:    Plant protein is found in dry beans and peas, nuts, and soy products, such as tofu and soymilk. These sources tend to be cholesterol-free and low in saturated fat.    Animal protein is found in fish, poultry, meat, cheese, milk, and eggs. These contain cholesterol and can be high in saturated fat. Aim for lean, lower-fat choices.  Date Last Reviewed: 3/1/2016    [id number] [employer]. [address] [place] [address], [place] [address]. All rights reserved. This information is not intended as a substitute for professional medical care. Always follow your healthcare professional's instructions.

## 2019-04-30 NOTE — NURSING NOTE
"Chief Complaint   Patient presents with     Diabetes     recheck        Initial BP (!) 142/94 (BP Location: Right arm, Patient Position: Chair, Cuff Size: Adult Regular)   Pulse 72   Temp 96.5  F (35.8  C) (Tympanic)   Resp 14   Ht 1.518 m (4' 11.75\")   Wt 56.7 kg (125 lb)   SpO2 99%   BMI 24.62 kg/m   Estimated body mass index is 24.62 kg/m  as calculated from the following:    Height as of this encounter: 1.518 m (4' 11.75\").    Weight as of this encounter: 56.7 kg (125 lb).    Medication Reconciliation: complete    Cristal Pope MA  "

## 2019-06-10 ENCOUNTER — TELEPHONE (OUTPATIENT)
Dept: FAMILY MEDICINE | Facility: CLINIC | Age: 59
End: 2019-06-10

## 2019-06-10 DIAGNOSIS — E11.65 TYPE 2 DIABETES MELLITUS WITH HYPERGLYCEMIA, WITHOUT LONG-TERM CURRENT USE OF INSULIN (H): ICD-10-CM

## 2019-06-10 DIAGNOSIS — E11.65 POORLY CONTROLLED TYPE 2 DIABETES MELLITUS WITH RENAL COMPLICATION (H): Chronic | ICD-10-CM

## 2019-06-10 DIAGNOSIS — E11.29 POORLY CONTROLLED TYPE 2 DIABETES MELLITUS WITH RENAL COMPLICATION (H): Chronic | ICD-10-CM

## 2019-06-10 NOTE — TELEPHONE ENCOUNTER
One touch test strips      Last Written Prescription Date:  1/18/19  Last Fill Quantity: 100,   # refills: 1  Last Office Visit: 4/30/19  Future Office visit:       Routing refill request to provider for review/approval because:

## 2019-07-24 ENCOUNTER — TELEPHONE (OUTPATIENT)
Dept: FAMILY MEDICINE | Facility: CLINIC | Age: 59
End: 2019-07-24

## 2019-07-24 DIAGNOSIS — E11.65 TYPE 2 DIABETES MELLITUS WITH HYPERGLYCEMIA, WITHOUT LONG-TERM CURRENT USE OF INSULIN (H): Primary | ICD-10-CM

## 2019-07-24 NOTE — TELEPHONE ENCOUNTER
Prescription approved per [facility] Refill Protocol.    Nithya SHARPE RN

## 2019-07-24 NOTE — TELEPHONE ENCOUNTER
Due to a change in formulary, One Touch Ultra is no longer preferred for testing. Can you please send new RX orders for Accu-Chek Gina plus? Patient will need new meter,strips,lancets,soln, etc.      Thank you,  Sb Pires, [facility]

## 2019-07-30 ENCOUNTER — TELEPHONE (OUTPATIENT)
Dept: FAMILY MEDICINE | Facility: CLINIC | Age: 59
End: 2019-07-30

## 2019-07-30 NOTE — TELEPHONE ENCOUNTER
"CSS: Please let patient know PCP sent message to [facility] which said \" Please call patient and remind her to bring glucometer in for CMA visit to have glucose readings  downloaded. Per Paulette Travis NP \"    Left message for patient to return call to clinic.   NICOLE BergN, RN    "

## 2019-07-30 NOTE — TELEPHONE ENCOUNTER
Chandu Elaine, [facility]  P Wy Fp/Im Provider Careteam Pool             Please call patient and remind her to bring glucometer in for CMA visit to have glucose readings  downloaded. Per Paulette TravisNP

## 2019-07-31 ENCOUNTER — MYC MEDICAL ADVICE (OUTPATIENT)
Dept: FAMILY MEDICINE | Facility: CLINIC | Age: 59
End: 2019-07-31

## 2019-08-01 ENCOUNTER — TELEPHONE (OUTPATIENT)
Dept: FAMILY MEDICINE | Facility: CLINIC | Age: 59
End: 2019-08-01

## 2019-08-01 NOTE — TELEPHONE ENCOUNTER
Panel Management Review      Patient has the following on her problem list:   Patient Active Problem List   Diagnosis     Migraine     Hand arthritis     Stenosing tenosynovitis     Poorly controlled type 2 diabetes mellitus with renal complication (H)     Hyperlipidemia LDL goal <70     Essential hypertension with goal blood pressure less than 140/90     Nephrolithiasis     Type 2 diabetes mellitus with hyperglycemia, without long-term current use of insulin (H)         Composite cancer screening  Chart review shows that this patient is due/due soon for the following Mammogram and Colonoscopy  Summary:    Patient is due/failing the following:   COLONOSCOPY and MAMMOGRAM    Action needed:   Patient needs office visit for Mammogram and Colonoscopy.    Type of outreach:    Sent letter.    Questions for provider review:    None                                                                                                                                    Velma Roldan on 8/1/2019 at 1:13 PM

## 2019-08-01 NOTE — LETTER
August 1, 2019      Bradford Prado  [address]  [facility] [address]      August 1, 2019      Bradford Prado  [address]  [facility] [address]          Dear Bradford Prado, [id number]    At [facility] we care about your health and are committed to providing quality patient care, which includes staying current on preventative cancer screenings.  You can increase your chances of finding and treating cancers through regular screenings.      Our records show that you are due for the following screening(s):      Colonoscopy for colon cancer - Call [phone] to schedule   Recommended every ten years for everyone age 50 and older  Please take a moment to read over the enclosed information packet about colon cancer screening.   We strongly urge our patient's to consider having a colonoscopy done, which is the best screening test available and only needs to be done every 10 years if normal.      Mammogram for breast cancer - [phone] to schedule  Recommended every 1-2 years for women age 50 and older  Mammograms help detect breast cancer, which is the most common cancer among women in the [place].  You may need to start having mammograms earlier and more often if you have had breast cancer, breast problems, or a family history of breast cancer.     If you have a My-Chart Account, you also can schedule this appointment through there.    If you have already had one or all of the above screening tests at another facility, please call us so that we may update your chart.      Your partners in health,      Quality Committee   [facility]            Sincerely,        Paulette Travis NP

## 2019-09-07 ENCOUNTER — OFFICE VISIT (OUTPATIENT)
Dept: URGENT CARE | Facility: URGENT CARE | Age: 59
End: 2019-09-07
Payer: COMMERCIAL

## 2019-09-07 VITALS
BODY MASS INDEX: 25.21 KG/M2 | HEART RATE: 85 BPM | TEMPERATURE: 98.7 F | SYSTOLIC BLOOD PRESSURE: 140 MMHG | DIASTOLIC BLOOD PRESSURE: 80 MMHG | RESPIRATION RATE: 20 BRPM | WEIGHT: 128 LBS

## 2019-09-07 DIAGNOSIS — K04.7 DENTAL ABSCESS: Primary | ICD-10-CM

## 2019-09-07 PROCEDURE — 99214 OFFICE O/P EST MOD 30 MIN: CPT | Performed by: FAMILY MEDICINE

## 2019-09-07 RX ORDER — HYDROCODONE BITARTRATE AND ACETAMINOPHEN 5; 325 MG/1; MG/1
1 TABLET ORAL EVERY 6 HOURS PRN
Qty: 8 TABLET | Refills: 0 | Status: SHIPPED | OUTPATIENT
Start: 2019-09-07 | End: 2019-09-09

## 2019-09-07 NOTE — PROGRESS NOTES
SUBJECTIVE: Bradford Prado is a 58 year old female presenting with a chief complaint of rt lower dental pain.  Onset of symptoms was day(s) ago.  Course of illness is worsening.    Severity moderate  Current and Associated symptoms: none  Treatment measures tried include OTC.  Predisposing factors include dental carries.    No past medical history on file.  No Known Allergies  Social History     Tobacco Use     Smoking status: Former Smoker     Packs/day: 0.50     Years: 20.00     Pack years: 10.00     Types: Cigarettes     Last attempt to quit: 3/9/2010     Years since quittin.5     Smokeless tobacco: Never Used   Substance Use Topics     Alcohol use: No     Alcohol/week: 0.0 oz       ROS:  SKIN: no rash  GI: no vomiting    OBJECTIVE:  BP (!) 140/80 (Cuff Size: Adult Regular)   Pulse 85   Temp 98.7  F (37.1  C) (Oral)   Resp 20   Wt 58.1 kg (128 lb)   BMI 25.21 kg/m  GENERAL APPEARANCE: healthy, alert and no distress  EYES: EOMI,  PERRL, conjunctiva clear  HENT: throat clear, rt lower dental carries and tooth pain with gum swelling  NECK: supple, nontender, no lymphadenopathy  SKIN: no suspicious lesions or rashes      ICD-10-CM    1. Dental abscess K04.7 amoxicillin-clavulanate (AUGMENTIN) 875-125 MG tablet     HYDROcodone-acetaminophen (NORCO) 5-325 MG tablet     Keep f/u with Dentist Monday  Fluids/Rest, f/u if worse/not any better

## 2019-09-07 NOTE — PATIENT INSTRUCTIONS
"  Patient Education     Dental Abscess    An abscess is a pocket of pus at the tip of a tooth root in your jaw bone. It is caused by an infection at the root of the tooth. It can cause pain and swelling of the gum, cheek, or jaw. Pain may spread from the tooth to your ear or the area of your jaw on the same side. If the abscess isn t treated, it appears as a bubble or swelling on the gum near the tooth. The pressure that builds in this swelling is the source of the pain. More serious infections cause your face to swell.  An abscess can be caused by a crack in the tooth, a cavity, a gum infection, or a combination of these. Once the pulp of the tooth is exposed, bacteria can spread down the roots to the tip. If the bacteria are not stopped, they can damage the bone and soft tissue, and an abscess can form.  Home care  Follow these guidelines when caring for yourself at home:    Don't have hot and cold foods and drinks. Your tooth may be sensitive to changes in temperature. Don t chew on the side of the infected tooth.    If your tooth is chipped or cracked, or if there is a large open cavity, put oil of cloves directly on the tooth to relieve pain. You can buy oil of cloves at drugstores. Some pharmacies carry an over-the-counter \"toothache kit.\" This contains a paste that you can put on the exposed tooth to make it less sensitive.    Put a cold pack on your jaw over the sore area to help reduce pain.    You may use over-the-counter medicine to ease pain, unless another medicine was prescribed. If you have chronic liver or kidney disease, talk with your healthcare provider before using acetaminophen or ibuprofen. Also talk with your provider if you ve had a stomach ulcer or GI bleeding.    An antibiotic will be prescribed. Take it until finished, even if you are feeling better after a few days.  Follow-up care  Follow up with your dentist or an oral surgeon, or as advised. Once an infection occurs in a tooth, it will " continue to be a problem until the infection is drained. This is done through surgery or a root canal. Or you may need to have your tooth pulled.  Call 911  Call 911 if any of these occur:    Unusual drowsiness    Headache or stiff neck    Weakness or fainting    Difficulty swallowing, breathing, or opening your mouth    Swollen eyelids  When to seek medical advice  Call your healthcare provider right away if any of these occur:    Your face becomes more swollen or red    Pain gets worse or spreads to your neck    Fever of 100.4  F (38.0  C) or higher, or as directed by your healthcare provider    Pus drains from the tooth  Date Last Reviewed: 10/1/2016    5550-3004 [employer]. [address] [place] [address], [place] [address]. All rights reserved. This information is not intended as a substitute for professional medical care. Always follow your healthcare professional's instructions.

## 2019-12-12 ENCOUNTER — TELEPHONE (OUTPATIENT)
Dept: FAMILY MEDICINE | Facility: CLINIC | Age: 59
End: 2019-12-12

## 2019-12-12 DIAGNOSIS — E11.65 POORLY CONTROLLED TYPE 2 DIABETES MELLITUS WITH RENAL COMPLICATION (H): Primary | Chronic | ICD-10-CM

## 2019-12-12 DIAGNOSIS — E11.29 POORLY CONTROLLED TYPE 2 DIABETES MELLITUS WITH RENAL COMPLICATION (H): Primary | Chronic | ICD-10-CM

## 2019-12-12 DIAGNOSIS — I10 ESSENTIAL HYPERTENSION WITH GOAL BLOOD PRESSURE LESS THAN 140/90: Chronic | ICD-10-CM

## 2019-12-12 NOTE — LETTER
December 26, 2019      Bradford Prado  [address]  [facility] [address]        Dear Bradford,     In order to ensure we are providing the best quality care, we have reviewed your chart and see that you are due for:  1.  A 6 month diabetic and blood pressure office visit with fasting lab work done prior.      2.  Health maintenance including a mammogram and colonoscopy.  These can be discussed and ordered at your clinic visit.    Please call the clinic at your earliest convenience to schedule a lab and clinic appointment.  [phone]    Thank you for trusting us with your health care.  Sincerely,    Your [facility] Team/lw

## 2019-12-12 NOTE — TELEPHONE ENCOUNTER
Patient due for labs, 6 month diabetes check and blood pressure recheck.    Labs have been ordered.  MAUREEN Kimbrough

## 2019-12-12 NOTE — TELEPHONE ENCOUNTER
Panel Management Review      Patient has the following on her problem list:     Diabetes        Composite cancer screening  Chart review shows that this patient is due/due soon for the following Mammogram and Colonoscopy  Summary:    Patient is due/failing the following:   Mammogram,Colonoscopy and Patient due for labs, 6 month diabetes check and blood pressure recheck.     Labs have been ordered.  MAUREEN Kimbrough    Type of outreach:    Phone, left message for patient to call back.       Chandu LENTZ CMA

## 2020-03-02 ENCOUNTER — HEALTH MAINTENANCE LETTER (OUTPATIENT)
Age: 60
End: 2020-03-02

## 2020-03-04 ENCOUNTER — TRANSFERRED RECORDS (OUTPATIENT)
Dept: HEALTH INFORMATION MANAGEMENT | Facility: CLINIC | Age: 60
End: 2020-03-04

## 2020-08-31 ENCOUNTER — TRANSFERRED RECORDS (OUTPATIENT)
Dept: HEALTH INFORMATION MANAGEMENT | Facility: CLINIC | Age: 60
End: 2020-08-31

## 2021-01-11 ENCOUNTER — TRANSFERRED RECORDS (OUTPATIENT)
Dept: HEALTH INFORMATION MANAGEMENT | Facility: CLINIC | Age: 61
End: 2021-01-11

## 2021-02-23 ENCOUNTER — APPOINTMENT (OUTPATIENT)
Dept: GENERAL RADIOLOGY | Facility: CLINIC | Age: 61
End: 2021-02-23
Attending: NURSE PRACTITIONER
Payer: COMMERCIAL

## 2021-02-23 ENCOUNTER — HOSPITAL ENCOUNTER (INPATIENT)
Facility: CLINIC | Age: 61
LOS: 4 days | Discharge: HOME | End: 2021-02-27
Attending: NURSE PRACTITIONER | Admitting: INTERNAL MEDICINE
Payer: COMMERCIAL

## 2021-02-23 ENCOUNTER — OFFICE VISIT (OUTPATIENT)
Dept: URGENT CARE | Facility: URGENT CARE | Age: 61
End: 2021-02-23
Payer: COMMERCIAL

## 2021-02-23 VITALS
OXYGEN SATURATION: 98 % | TEMPERATURE: 96.6 F | WEIGHT: 120 LBS | HEART RATE: 102 BPM | RESPIRATION RATE: 16 BRPM | SYSTOLIC BLOOD PRESSURE: 209 MMHG | DIASTOLIC BLOOD PRESSURE: 108 MMHG | BODY MASS INDEX: 23.63 KG/M2

## 2021-02-23 DIAGNOSIS — R73.9 HYPERGLYCEMIA: ICD-10-CM

## 2021-02-23 DIAGNOSIS — E78.5 HYPERLIPIDEMIA LDL GOAL <70: ICD-10-CM

## 2021-02-23 DIAGNOSIS — F43.22 ADJUSTMENT DISORDER WITH ANXIOUS MOOD: ICD-10-CM

## 2021-02-23 DIAGNOSIS — R74.01 TRANSAMINITIS: ICD-10-CM

## 2021-02-23 DIAGNOSIS — I16.0 HYPERTENSIVE URGENCY: ICD-10-CM

## 2021-02-23 DIAGNOSIS — E11.65 POORLY CONTROLLED TYPE 2 DIABETES MELLITUS WITH RENAL COMPLICATION (H): Primary | Chronic | ICD-10-CM

## 2021-02-23 DIAGNOSIS — E11.65 TYPE 2 DIABETES MELLITUS WITH HYPERGLYCEMIA, WITHOUT LONG-TERM CURRENT USE OF INSULIN (H): ICD-10-CM

## 2021-02-23 DIAGNOSIS — R07.9 CHEST PAIN: ICD-10-CM

## 2021-02-23 DIAGNOSIS — E11.29 POORLY CONTROLLED TYPE 2 DIABETES MELLITUS WITH RENAL COMPLICATION (H): Primary | Chronic | ICD-10-CM

## 2021-02-23 DIAGNOSIS — R07.9 CHEST PAIN, UNSPECIFIED TYPE: Primary | ICD-10-CM

## 2021-02-23 DIAGNOSIS — I10 ESSENTIAL HYPERTENSION WITH GOAL BLOOD PRESSURE LESS THAN 140/90: ICD-10-CM

## 2021-02-23 LAB
ALBUMIN SERPL-MCNC: 4.5 G/DL (ref 3.4–5)
ALP SERPL-CCNC: 139 U/L (ref 40–150)
ALT SERPL W P-5'-P-CCNC: 103 U/L (ref 0–50)
ANION GAP SERPL CALCULATED.3IONS-SCNC: 8 MMOL/L (ref 3–14)
AST SERPL W P-5'-P-CCNC: 72 U/L (ref 0–45)
BASOPHILS # BLD AUTO: 0 10E9/L (ref 0–0.2)
BASOPHILS NFR BLD AUTO: 0.3 %
BILIRUB DIRECT SERPL-MCNC: 0.2 MG/DL (ref 0–0.2)
BILIRUB SERPL-MCNC: 0.6 MG/DL (ref 0.2–1.3)
BUN SERPL-MCNC: 9 MG/DL (ref 7–30)
CALCIUM SERPL-MCNC: 10.1 MG/DL (ref 8.5–10.1)
CHLORIDE SERPL-SCNC: 99 MMOL/L (ref 94–109)
CO2 SERPL-SCNC: 26 MMOL/L (ref 20–32)
CREAT SERPL-MCNC: 0.5 MG/DL (ref 0.52–1.04)
DIFFERENTIAL METHOD BLD: ABNORMAL
EOSINOPHIL # BLD AUTO: 0.2 10E9/L (ref 0–0.7)
EOSINOPHIL NFR BLD AUTO: 1.8 %
ERYTHROCYTE [DISTWIDTH] IN BLOOD BY AUTOMATED COUNT: 11.9 % (ref 10–15)
GFR SERPL CREATININE-BSD FRML MDRD: >90 ML/MIN/{1.73_M2}
GLUCOSE BLDC GLUCOMTR-MCNC: 268 MG/DL (ref 70–99)
GLUCOSE BLDC GLUCOMTR-MCNC: 283 MG/DL (ref 70–99)
GLUCOSE BLDC GLUCOMTR-MCNC: 350 MG/DL (ref 70–99)
GLUCOSE SERPL-MCNC: 385 MG/DL (ref 70–99)
HBA1C MFR BLD: 11.1 % (ref 0–5.6)
HCT VFR BLD AUTO: 48.9 % (ref 35–47)
HGB BLD-MCNC: 17.5 G/DL (ref 11.7–15.7)
INTERPRETATION ECG - MUSE: NORMAL
LABORATORY COMMENT REPORT: NORMAL
LYMPHOCYTES # BLD AUTO: 3.1 10E9/L (ref 0.8–5.3)
LYMPHOCYTES NFR BLD AUTO: 33.3 %
MCH RBC QN AUTO: 32.5 PG (ref 26.5–33)
MCHC RBC AUTO-ENTMCNC: 35.8 G/DL (ref 31.5–36.5)
MCV RBC AUTO: 91 FL (ref 78–100)
MONOCYTES # BLD AUTO: 0.7 10E9/L (ref 0–1.3)
MONOCYTES NFR BLD AUTO: 7.7 %
NEUTROPHILS # BLD AUTO: 5.2 10E9/L (ref 1.6–8.3)
NEUTROPHILS NFR BLD AUTO: 56.9 %
PLATELET # BLD AUTO: 165 10E9/L (ref 150–450)
POTASSIUM SERPL-SCNC: 3.7 MMOL/L (ref 3.4–5.3)
PROT SERPL-MCNC: 8.5 G/DL (ref 6.8–8.8)
RBC # BLD AUTO: 5.38 10E12/L (ref 3.8–5.2)
SARS-COV-2 RNA RESP QL NAA+PROBE: NEGATIVE
SODIUM SERPL-SCNC: 133 MMOL/L (ref 133–144)
SPECIMEN SOURCE: NORMAL
TROPONIN I SERPL-MCNC: <0.015 UG/L (ref 0–0.04)
TROPONIN I SERPL-MCNC: <0.015 UG/L (ref 0–0.04)
WBC # BLD AUTO: 9.2 10E9/L (ref 4–11)

## 2021-02-23 PROCEDURE — C9803 HOPD COVID-19 SPEC COLLECT: HCPCS

## 2021-02-23 PROCEDURE — 84484 ASSAY OF TROPONIN QUANT: CPT | Performed by: INTERNAL MEDICINE

## 2021-02-23 PROCEDURE — 250N000011 HC RX IP 250 OP 636: Performed by: NURSE PRACTITIONER

## 2021-02-23 PROCEDURE — 71046 X-RAY EXAM CHEST 2 VIEWS: CPT

## 2021-02-23 PROCEDURE — 250N000011 HC RX IP 250 OP 636

## 2021-02-23 PROCEDURE — 93005 ELECTROCARDIOGRAM TRACING: CPT

## 2021-02-23 PROCEDURE — 258N000003 HC RX IP 258 OP 636: Performed by: NURSE PRACTITIONER

## 2021-02-23 PROCEDURE — 99207 PR INPT ADMISSION FROM CLINIC: CPT | Performed by: FAMILY MEDICINE

## 2021-02-23 PROCEDURE — 999N001017 HC STATISTIC GLUCOSE BY METER IP

## 2021-02-23 PROCEDURE — 99285 EMERGENCY DEPT VISIT HI MDM: CPT | Mod: 25

## 2021-02-23 PROCEDURE — 250N000013 HC RX MED GY IP 250 OP 250 PS 637: Performed by: NURSE PRACTITIONER

## 2021-02-23 PROCEDURE — 99223 1ST HOSP IP/OBS HIGH 75: CPT | Mod: AI | Performed by: INTERNAL MEDICINE

## 2021-02-23 PROCEDURE — 85025 COMPLETE CBC W/AUTO DIFF WBC: CPT | Performed by: FAMILY MEDICINE

## 2021-02-23 PROCEDURE — 93000 ELECTROCARDIOGRAM COMPLETE: CPT | Performed by: FAMILY MEDICINE

## 2021-02-23 PROCEDURE — 96374 THER/PROPH/DIAG INJ IV PUSH: CPT

## 2021-02-23 PROCEDURE — 80048 BASIC METABOLIC PNL TOTAL CA: CPT | Performed by: FAMILY MEDICINE

## 2021-02-23 PROCEDURE — 120N000001 HC R&B MED SURG/OB

## 2021-02-23 PROCEDURE — 83036 HEMOGLOBIN GLYCOSYLATED A1C: CPT | Performed by: NURSE PRACTITIONER

## 2021-02-23 PROCEDURE — 36415 COLL VENOUS BLD VENIPUNCTURE: CPT | Performed by: INTERNAL MEDICINE

## 2021-02-23 PROCEDURE — 96375 TX/PRO/DX INJ NEW DRUG ADDON: CPT

## 2021-02-23 PROCEDURE — 36415 COLL VENOUS BLD VENIPUNCTURE: CPT | Performed by: FAMILY MEDICINE

## 2021-02-23 PROCEDURE — 250N000012 HC RX MED GY IP 250 OP 636 PS 637: Performed by: NURSE PRACTITIONER

## 2021-02-23 PROCEDURE — 84484 ASSAY OF TROPONIN QUANT: CPT | Performed by: FAMILY MEDICINE

## 2021-02-23 PROCEDURE — 80076 HEPATIC FUNCTION PANEL: CPT | Performed by: NURSE PRACTITIONER

## 2021-02-23 PROCEDURE — 87635 SARS-COV-2 COVID-19 AMP PRB: CPT | Performed by: NURSE PRACTITIONER

## 2021-02-23 RX ORDER — HYDRALAZINE HYDROCHLORIDE 20 MG/ML
10 INJECTION INTRAMUSCULAR; INTRAVENOUS EVERY 4 HOURS PRN
Status: DISCONTINUED | OUTPATIENT
Start: 2021-02-23 | End: 2021-02-27 | Stop reason: HOSPADM

## 2021-02-23 RX ORDER — ASPIRIN 325 MG
325 TABLET ORAL ONCE
Status: DISCONTINUED | OUTPATIENT
Start: 2021-02-23 | End: 2021-02-23

## 2021-02-23 RX ORDER — NICOTINE POLACRILEX 4 MG
15-30 LOZENGE BUCCAL
Status: DISCONTINUED | OUTPATIENT
Start: 2021-02-23 | End: 2021-02-27 | Stop reason: HOSPADM

## 2021-02-23 RX ORDER — METFORMIN HCL 500 MG
1000 TABLET, EXTENDED RELEASE 24 HR ORAL 2 TIMES DAILY WITH MEALS
Status: ON HOLD | COMMUNITY
End: 2021-02-25

## 2021-02-23 RX ORDER — LABETALOL HYDROCHLORIDE 5 MG/ML
10 INJECTION, SOLUTION INTRAVENOUS ONCE
Status: DISCONTINUED | OUTPATIENT
Start: 2021-02-23 | End: 2021-02-23

## 2021-02-23 RX ORDER — LOSARTAN POTASSIUM 25 MG/1
25 TABLET ORAL ONCE
Status: COMPLETED | OUTPATIENT
Start: 2021-02-23 | End: 2021-02-23

## 2021-02-23 RX ORDER — ONDANSETRON 2 MG/ML
4 INJECTION INTRAMUSCULAR; INTRAVENOUS
Status: COMPLETED | OUTPATIENT
Start: 2021-02-23 | End: 2021-02-23

## 2021-02-23 RX ORDER — METFORMIN HCL 500 MG
1000 TABLET, EXTENDED RELEASE 24 HR ORAL 2 TIMES DAILY WITH MEALS
Status: DISCONTINUED | OUTPATIENT
Start: 2021-02-24 | End: 2021-02-24

## 2021-02-23 RX ORDER — PRAVASTATIN SODIUM 10 MG
10 TABLET ORAL ONCE
Status: COMPLETED | OUTPATIENT
Start: 2021-02-23 | End: 2021-02-23

## 2021-02-23 RX ORDER — ACETAMINOPHEN 325 MG/1
650 TABLET ORAL EVERY 4 HOURS PRN
Status: DISCONTINUED | OUTPATIENT
Start: 2021-02-23 | End: 2021-02-27 | Stop reason: HOSPADM

## 2021-02-23 RX ORDER — ONDANSETRON 4 MG/1
4 TABLET, ORALLY DISINTEGRATING ORAL EVERY 6 HOURS PRN
Status: DISCONTINUED | OUTPATIENT
Start: 2021-02-23 | End: 2021-02-27 | Stop reason: HOSPADM

## 2021-02-23 RX ORDER — ACETAMINOPHEN 650 MG/1
650 SUPPOSITORY RECTAL EVERY 4 HOURS PRN
Status: DISCONTINUED | OUTPATIENT
Start: 2021-02-23 | End: 2021-02-27 | Stop reason: HOSPADM

## 2021-02-23 RX ORDER — GLIPIZIDE 10 MG/1
10 TABLET, FILM COATED, EXTENDED RELEASE ORAL DAILY
Status: DISCONTINUED | OUTPATIENT
Start: 2021-02-24 | End: 2021-02-24

## 2021-02-23 RX ORDER — ACETAMINOPHEN 325 MG/1
325-650 TABLET ORAL EVERY 6 HOURS PRN
COMMUNITY
End: 2023-12-28 | Stop reason: SINTOL

## 2021-02-23 RX ORDER — LOSARTAN POTASSIUM 50 MG/1
50 TABLET ORAL DAILY
Status: DISCONTINUED | OUTPATIENT
Start: 2021-02-24 | End: 2021-02-27 | Stop reason: HOSPADM

## 2021-02-23 RX ORDER — ONDANSETRON 2 MG/ML
INJECTION INTRAMUSCULAR; INTRAVENOUS
Status: COMPLETED
Start: 2021-02-23 | End: 2021-02-23

## 2021-02-23 RX ORDER — GLIPIZIDE 5 MG/1
10 TABLET, FILM COATED, EXTENDED RELEASE ORAL DAILY
Status: ON HOLD | COMMUNITY
End: 2021-02-25

## 2021-02-23 RX ORDER — PRAVASTATIN SODIUM 20 MG
20 TABLET ORAL DAILY
COMMUNITY
End: 2021-03-02

## 2021-02-23 RX ORDER — ONDANSETRON 2 MG/ML
4 INJECTION INTRAMUSCULAR; INTRAVENOUS EVERY 6 HOURS PRN
Status: DISCONTINUED | OUTPATIENT
Start: 2021-02-23 | End: 2021-02-27 | Stop reason: HOSPADM

## 2021-02-23 RX ORDER — PRAVASTATIN SODIUM 20 MG
20 TABLET ORAL DAILY
Status: DISCONTINUED | OUTPATIENT
Start: 2021-02-24 | End: 2021-02-26

## 2021-02-23 RX ORDER — ACETAMINOPHEN 325 MG/1
325-650 TABLET ORAL EVERY 6 HOURS PRN
Status: DISCONTINUED | OUTPATIENT
Start: 2021-02-23 | End: 2021-02-23

## 2021-02-23 RX ORDER — DEXTROSE MONOHYDRATE 25 G/50ML
25-50 INJECTION, SOLUTION INTRAVENOUS
Status: DISCONTINUED | OUTPATIENT
Start: 2021-02-23 | End: 2021-02-27 | Stop reason: HOSPADM

## 2021-02-23 RX ORDER — HYDRALAZINE HYDROCHLORIDE 20 MG/ML
2 INJECTION INTRAMUSCULAR; INTRAVENOUS ONCE
Status: COMPLETED | OUTPATIENT
Start: 2021-02-23 | End: 2021-02-23

## 2021-02-23 RX ORDER — LOSARTAN POTASSIUM 50 MG/1
50 TABLET ORAL DAILY
COMMUNITY
End: 2021-03-02

## 2021-02-23 RX ADMIN — Medication 325 MG: at 14:59

## 2021-02-23 RX ADMIN — ONDANSETRON 4 MG: 2 INJECTION INTRAMUSCULAR; INTRAVENOUS at 21:08

## 2021-02-23 RX ADMIN — PRAVASTATIN SODIUM 10 MG: 10 TABLET ORAL at 19:49

## 2021-02-23 RX ADMIN — SODIUM CHLORIDE 5 UNITS: 9 INJECTION, SOLUTION INTRAVENOUS at 21:12

## 2021-02-23 RX ADMIN — HYDRALAZINE HYDROCHLORIDE 2 MG: 20 INJECTION INTRAMUSCULAR; INTRAVENOUS at 21:15

## 2021-02-23 RX ADMIN — LOSARTAN POTASSIUM 25 MG: 25 TABLET, FILM COATED ORAL at 19:49

## 2021-02-23 RX ADMIN — METFORMIN HYDROCHLORIDE 500 MG: 500 TABLET, FILM COATED ORAL at 19:49

## 2021-02-23 NOTE — PROGRESS NOTES
SUBJECTIVE: Bradford Prado is a 60 year old female presenting with a chief complaint of Chest Pain.  Onset of symptoms was 1 month(s) ago.  Course of illness is waxing and waning.    Severity moderate  Current and Associated symptoms: none  Treatment measures tried include None tried.  Predisposing factors include Diabetes. HTN and hypercholesterolemia that she is not treating bc she has been afraid to go into clinic for the last year bc of COVID.    History reviewed. No pertinent past medical history.  No Known Allergies  Social History     Tobacco Use     Smoking status: Former Smoker     Packs/day: 0.50     Years: 20.00     Pack years: 10.00     Types: Cigarettes     Quit date: 3/9/2010     Years since quitting: 10.9     Smokeless tobacco: Never Used   Substance Use Topics     Alcohol use: No     Alcohol/week: 0.0 standard drinks       ROS:  SKIN: no rash  GI: no vomiting    OBJECTIVE:  BP (!) 209/108   Pulse 102   Temp 96.6  F (35.9  C) (Temporal)   Resp 16   Wt 54.4 kg (120 lb)   SpO2 98%   BMI 23.63 kg/m  GENERAL APPEARANCE: healthy, alert and no distress  EYES: EOMI,  PERRL, conjunctiva clear  RESP: lungs clear to auscultation - no rales, rhonchi or wheezes  CV: regular rates and rhythm, normal S1 S2, no murmur noted  NEURO: Normal strength and tone, sensory exam grossly normal,  normal speech and mentation  SKIN: no suspicious lesions or rashes    EKG  Sinus  Rhythm  -Short [place] syndrome   Aracelis = 118  -Nonspecific ST depression   +   Nonspecific T-abnormality  -Nondiagnostic.     ABNORMAL       ICD-10-CM    1. Chest pain, unspecified type  R07.9 CBC with platelets differential     Basic metabolic panel     Troponin I     aspirin (ASA) tablet 325 mg   2. Type 2 diabetes mellitus with hyperglycemia, without long-term current use of insulin (H)  E11.65 CBC with platelets differential     Basic metabolic panel     Troponin I     aspirin (ASA) tablet 325 mg   3. Hyperlipidemia LDL goal <70  E78.5 CBC with  platelets differential     Basic metabolic panel     Troponin I     aspirin (ASA) tablet 325 mg   4. Essential hypertension with goal blood pressure less than 140/90  I10 CBC with platelets differential     Basic metabolic panel     Troponin I     aspirin (ASA) tablet 325 mg     Pt will go through ED for cont w/u, declines Paramedic transport

## 2021-02-23 NOTE — ED TRIAGE NOTES
Chest pain on and off for a month. She had blood drawn at urgent care, results in Epic. She has been feeling anxious lately as she has not been able to exercise. She has not taken her medications in over a year.

## 2021-02-24 ENCOUNTER — APPOINTMENT (OUTPATIENT)
Dept: CARDIOLOGY | Facility: CLINIC | Age: 61
End: 2021-02-24
Attending: INTERNAL MEDICINE
Payer: COMMERCIAL

## 2021-02-24 LAB
GLUCOSE BLDC GLUCOMTR-MCNC: 224 MG/DL (ref 70–99)
GLUCOSE BLDC GLUCOMTR-MCNC: 229 MG/DL (ref 70–99)
GLUCOSE BLDC GLUCOMTR-MCNC: 364 MG/DL (ref 70–99)
GLUCOSE BLDC GLUCOMTR-MCNC: 380 MG/DL (ref 70–99)
GLUCOSE BLDC GLUCOMTR-MCNC: 381 MG/DL (ref 70–99)
GLUCOSE BLDC GLUCOMTR-MCNC: 98 MG/DL (ref 70–99)
OSMOLALITY SERPL: 304 MMOL/KG (ref 275–295)
TROPONIN I SERPL-MCNC: <0.015 UG/L (ref 0–0.04)
TROPONIN I SERPL-MCNC: <0.015 UG/L (ref 0–0.04)

## 2021-02-24 PROCEDURE — 250N000013 HC RX MED GY IP 250 OP 250 PS 637: Performed by: INTERNAL MEDICINE

## 2021-02-24 PROCEDURE — 250N000011 HC RX IP 250 OP 636: Performed by: INTERNAL MEDICINE

## 2021-02-24 PROCEDURE — 999N001017 HC STATISTIC GLUCOSE BY METER IP

## 2021-02-24 PROCEDURE — 258N000003 HC RX IP 258 OP 636: Performed by: INTERNAL MEDICINE

## 2021-02-24 PROCEDURE — 75574 CT ANGIO HRT W/3D IMAGE: CPT | Mod: 26 | Performed by: INTERNAL MEDICINE

## 2021-02-24 PROCEDURE — 75574 CT ANGIO HRT W/3D IMAGE: CPT

## 2021-02-24 PROCEDURE — 36415 COLL VENOUS BLD VENIPUNCTURE: CPT | Performed by: INTERNAL MEDICINE

## 2021-02-24 PROCEDURE — 83930 ASSAY OF BLOOD OSMOLALITY: CPT | Performed by: INTERNAL MEDICINE

## 2021-02-24 PROCEDURE — 250N000012 HC RX MED GY IP 250 OP 636 PS 637: Performed by: INTERNAL MEDICINE

## 2021-02-24 PROCEDURE — 99233 SBSQ HOSP IP/OBS HIGH 50: CPT | Performed by: INTERNAL MEDICINE

## 2021-02-24 PROCEDURE — 84484 ASSAY OF TROPONIN QUANT: CPT | Performed by: INTERNAL MEDICINE

## 2021-02-24 PROCEDURE — 250N000009 HC RX 250: Performed by: INTERNAL MEDICINE

## 2021-02-24 PROCEDURE — 120N000001 HC R&B MED SURG/OB

## 2021-02-24 RX ORDER — KETOROLAC TROMETHAMINE 30 MG/ML
30 INJECTION, SOLUTION INTRAMUSCULAR; INTRAVENOUS EVERY 6 HOURS PRN
Status: DISCONTINUED | OUTPATIENT
Start: 2021-02-24 | End: 2021-02-24

## 2021-02-24 RX ORDER — LORAZEPAM 2 MG/ML
0.5 INJECTION INTRAMUSCULAR EVERY 4 HOURS PRN
Status: DISCONTINUED | OUTPATIENT
Start: 2021-02-24 | End: 2021-02-27 | Stop reason: HOSPADM

## 2021-02-24 RX ORDER — IVABRADINE 5 MG/1
5-15 TABLET, FILM COATED ORAL
Status: DISCONTINUED | OUTPATIENT
Start: 2021-02-24 | End: 2021-02-24

## 2021-02-24 RX ORDER — NALOXONE HYDROCHLORIDE 0.4 MG/ML
0.2 INJECTION, SOLUTION INTRAMUSCULAR; INTRAVENOUS; SUBCUTANEOUS
Status: DISCONTINUED | OUTPATIENT
Start: 2021-02-24 | End: 2021-02-27 | Stop reason: HOSPADM

## 2021-02-24 RX ORDER — METOPROLOL TARTRATE 50 MG
50-100 TABLET ORAL
Status: CANCELLED | OUTPATIENT
Start: 2021-02-24

## 2021-02-24 RX ORDER — METOPROLOL TARTRATE 1 MG/ML
5-15 INJECTION, SOLUTION INTRAVENOUS
Status: DISCONTINUED | OUTPATIENT
Start: 2021-02-24 | End: 2021-02-24

## 2021-02-24 RX ORDER — PROCHLORPERAZINE MALEATE 5 MG
10 TABLET ORAL EVERY 6 HOURS PRN
Status: DISCONTINUED | OUTPATIENT
Start: 2021-02-24 | End: 2021-02-27 | Stop reason: HOSPADM

## 2021-02-24 RX ORDER — METOPROLOL TARTRATE 25 MG/1
25-100 TABLET, FILM COATED ORAL
Status: DISCONTINUED | OUTPATIENT
Start: 2021-02-24 | End: 2021-02-24

## 2021-02-24 RX ORDER — METOPROLOL TARTRATE 25 MG/1
25-100 TABLET, FILM COATED ORAL
Status: COMPLETED | OUTPATIENT
Start: 2021-02-24 | End: 2021-02-24

## 2021-02-24 RX ORDER — METHYLPREDNISOLONE SODIUM SUCCINATE 125 MG/2ML
125 INJECTION, POWDER, LYOPHILIZED, FOR SOLUTION INTRAMUSCULAR; INTRAVENOUS
Status: DISCONTINUED | OUTPATIENT
Start: 2021-02-24 | End: 2021-02-24

## 2021-02-24 RX ORDER — AMLODIPINE BESYLATE 2.5 MG/1
2.5 TABLET ORAL DAILY
Status: DISCONTINUED | OUTPATIENT
Start: 2021-02-24 | End: 2021-02-24

## 2021-02-24 RX ORDER — NALOXONE HYDROCHLORIDE 0.4 MG/ML
0.4 INJECTION, SOLUTION INTRAMUSCULAR; INTRAVENOUS; SUBCUTANEOUS
Status: DISCONTINUED | OUTPATIENT
Start: 2021-02-24 | End: 2021-02-27 | Stop reason: HOSPADM

## 2021-02-24 RX ORDER — NITROGLYCERIN 0.4 MG/1
0.4 TABLET SUBLINGUAL
Status: DISCONTINUED | OUTPATIENT
Start: 2021-02-24 | End: 2021-02-24

## 2021-02-24 RX ORDER — ONDANSETRON 2 MG/ML
4 INJECTION INTRAMUSCULAR; INTRAVENOUS
Status: DISCONTINUED | OUTPATIENT
Start: 2021-02-24 | End: 2021-02-24

## 2021-02-24 RX ORDER — DILTIAZEM HYDROCHLORIDE 5 MG/ML
10-15 INJECTION INTRAVENOUS
Status: DISCONTINUED | OUTPATIENT
Start: 2021-02-24 | End: 2021-02-24

## 2021-02-24 RX ORDER — PROCHLORPERAZINE 25 MG
25 SUPPOSITORY, RECTAL RECTAL EVERY 12 HOURS PRN
Status: DISCONTINUED | OUTPATIENT
Start: 2021-02-24 | End: 2021-02-27 | Stop reason: HOSPADM

## 2021-02-24 RX ORDER — PROCHLORPERAZINE 25 MG
25 SUPPOSITORY, RECTAL RECTAL EVERY 12 HOURS PRN
Status: DISCONTINUED | OUTPATIENT
Start: 2021-02-24 | End: 2021-02-24

## 2021-02-24 RX ORDER — IOPAMIDOL 755 MG/ML
200 INJECTION, SOLUTION INTRAVASCULAR ONCE
Status: COMPLETED | OUTPATIENT
Start: 2021-02-24 | End: 2021-02-24

## 2021-02-24 RX ORDER — DILTIAZEM HYDROCHLORIDE 120 MG/1
120 TABLET, FILM COATED ORAL
Status: DISCONTINUED | OUTPATIENT
Start: 2021-02-24 | End: 2021-02-24

## 2021-02-24 RX ORDER — HYDROMORPHONE HYDROCHLORIDE 1 MG/ML
0.2 INJECTION, SOLUTION INTRAMUSCULAR; INTRAVENOUS; SUBCUTANEOUS
Status: DISCONTINUED | OUTPATIENT
Start: 2021-02-24 | End: 2021-02-24

## 2021-02-24 RX ORDER — DIPHENHYDRAMINE HCL 25 MG
25 CAPSULE ORAL
Status: DISCONTINUED | OUTPATIENT
Start: 2021-02-24 | End: 2021-02-24

## 2021-02-24 RX ORDER — PROCHLORPERAZINE MALEATE 5 MG
10 TABLET ORAL EVERY 6 HOURS PRN
Status: DISCONTINUED | OUTPATIENT
Start: 2021-02-24 | End: 2021-02-24

## 2021-02-24 RX ORDER — DIPHENHYDRAMINE HYDROCHLORIDE 50 MG/ML
25-50 INJECTION INTRAMUSCULAR; INTRAVENOUS
Status: DISCONTINUED | OUTPATIENT
Start: 2021-02-24 | End: 2021-02-24

## 2021-02-24 RX ORDER — HYDROCODONE BITARTRATE AND ACETAMINOPHEN 5; 325 MG/1; MG/1
1 TABLET ORAL EVERY 6 HOURS PRN
Status: DISCONTINUED | OUTPATIENT
Start: 2021-02-24 | End: 2021-02-27 | Stop reason: HOSPADM

## 2021-02-24 RX ORDER — SODIUM CHLORIDE 9 MG/ML
INJECTION, SOLUTION INTRAVENOUS CONTINUOUS
Status: DISCONTINUED | OUTPATIENT
Start: 2021-02-24 | End: 2021-02-25

## 2021-02-24 RX ORDER — ACYCLOVIR 200 MG/1
0-1 CAPSULE ORAL
Status: DISCONTINUED | OUTPATIENT
Start: 2021-02-24 | End: 2021-02-24

## 2021-02-24 RX ORDER — AMLODIPINE BESYLATE 2.5 MG/1
2.5 TABLET ORAL DAILY
Status: DISCONTINUED | OUTPATIENT
Start: 2021-02-25 | End: 2021-02-25

## 2021-02-24 RX ADMIN — PROCHLORPERAZINE EDISYLATE 10 MG: 5 INJECTION INTRAMUSCULAR; INTRAVENOUS at 00:12

## 2021-02-24 RX ADMIN — SODIUM CHLORIDE: 9 INJECTION, SOLUTION INTRAVENOUS at 16:26

## 2021-02-24 RX ADMIN — GLIPIZIDE 10 MG: 10 TABLET, FILM COATED, EXTENDED RELEASE ORAL at 09:52

## 2021-02-24 RX ADMIN — METOPROLOL TARTRATE 10 MG: 1 INJECTION, SOLUTION INTRAVENOUS at 14:00

## 2021-02-24 RX ADMIN — IOPAMIDOL 120 ML: 755 INJECTION, SOLUTION INTRAVENOUS at 14:31

## 2021-02-24 RX ADMIN — PRAVASTATIN SODIUM 20 MG: 20 TABLET ORAL at 09:52

## 2021-02-24 RX ADMIN — SODIUM CHLORIDE 100 ML: 9 INJECTION, SOLUTION INTRAVENOUS at 14:31

## 2021-02-24 RX ADMIN — HYDROMORPHONE HYDROCHLORIDE 0.2 MG: 1 INJECTION, SOLUTION INTRAMUSCULAR; INTRAVENOUS; SUBCUTANEOUS at 00:12

## 2021-02-24 RX ADMIN — LOSARTAN POTASSIUM 50 MG: 50 TABLET, FILM COATED ORAL at 09:52

## 2021-02-24 RX ADMIN — SODIUM CHLORIDE 500 ML: 9 INJECTION, SOLUTION INTRAVENOUS at 10:41

## 2021-02-24 RX ADMIN — NITROGLYCERIN 0.4 MG: 0.4 TABLET SUBLINGUAL at 14:20

## 2021-02-24 RX ADMIN — HYDRALAZINE HYDROCHLORIDE 10 MG: 20 INJECTION INTRAMUSCULAR; INTRAVENOUS at 02:31

## 2021-02-24 RX ADMIN — LORAZEPAM 0.5 MG: 2 INJECTION INTRAMUSCULAR; INTRAVENOUS at 02:31

## 2021-02-24 RX ADMIN — INSULIN GLARGINE 15 UNITS: 100 INJECTION, SOLUTION SUBCUTANEOUS at 12:06

## 2021-02-24 RX ADMIN — INSULIN ASPART 4 UNITS: 100 INJECTION, SOLUTION INTRAVENOUS; SUBCUTANEOUS at 00:16

## 2021-02-24 RX ADMIN — METOPROLOL TARTRATE 10 MG: 1 INJECTION, SOLUTION INTRAVENOUS at 14:05

## 2021-02-24 RX ADMIN — SODIUM CHLORIDE: 9 INJECTION, SOLUTION INTRAVENOUS at 10:42

## 2021-02-24 RX ADMIN — METOPROLOL TARTRATE 10 MG: 1 INJECTION, SOLUTION INTRAVENOUS at 14:10

## 2021-02-24 RX ADMIN — METOPROLOL TARTRATE 50 MG: 25 TABLET, FILM COATED ORAL at 12:05

## 2021-02-24 RX ADMIN — AMLODIPINE BESYLATE 2.5 MG: 2.5 TABLET ORAL at 10:32

## 2021-02-24 RX ADMIN — METOPROLOL TARTRATE 50 MG: 25 TABLET, FILM COATED ORAL at 12:25

## 2021-02-24 RX ADMIN — METOPROLOL TARTRATE 10 MG: 1 INJECTION, SOLUTION INTRAVENOUS at 14:25

## 2021-02-24 RX ADMIN — METFORMIN HYDROCHLORIDE 1000 MG: 500 TABLET, EXTENDED RELEASE ORAL at 09:51

## 2021-02-24 ASSESSMENT — ACTIVITIES OF DAILY LIVING (ADL)
ADLS_ACUITY_SCORE: 15

## 2021-02-24 ASSESSMENT — ENCOUNTER SYMPTOMS: NERVOUS/ANXIOUS: 1

## 2021-02-24 NOTE — PROGRESS NOTES
[facility]    Hospitalist Progress Note    Date of Service (when I saw the patient): 02/24/2021    Assessment & Plan   Bradford Prado is a 60 year old female who was admitted on 2/23/2021.  ASSESSMENT:  Bradford Prado is 60, with history of hypertension, diabetes, hypercholesterolemia, medically noncompliant, off all her medications in the last year due to COVID concerns about her going into clinic, now admitted hypertensive with elevated blood glucose and is being admitted for hypertensive emergency with chest pain significantly hypertensive urgency.  And her blood sugars have been running high in the 380s with high osmolality      Hyperosmolar hyperglycemia with type 2 diabetes mellitus uncontrolled with hemoglobin A1c of 11.1% on admission;  Blood sugars running in the 380s  Her osmolality is high at 304  Patient is at high risk of dehydration with these high blood sugars  Given IV normal saline 500cc bolus and started on IV fluids at 100 mL/h  Start her on Lantus 15 units every morning and NovoLog 6 units 3 times daily with meals  Follow blood sugars closely and adjust insulin as needed  She is also at high risk of DKA with uncontrolled blood sugars so needs close monitoring    Hypertensive emergency with blood blood pressure as high as 227/125;  Chest pain in the setting of hypertensive emergency;  Patient was continued on losartan 50 mg p.o. daily PTA medication on admission  As needed IV hydralazine added  Patient needed multiple doses of IV hydralazine and 1 dose of IV Ativan to bring the blood pressure down overnight  Start her on Norvasc 2.5 mg p.o. daily today for better blood pressure control  Monitor blood pressure closely and adjust medications as needed  Chest pain in the setting of hypertensive emergency;  Patient is at high risk for ischemic heart disease in the setting of uncontrolled hypertension and diabetes with hemoglobin A1c of 11.1 over the last 1 year  EKG on admission  showed showed nonspecific ST-T wave changes  Serial troponins remain negative  CTA coronary angiogram ordered for evaluation of ischemic heart disease today to be done this afternoon    Erythrocytosis;  With hemoglobin of 17.5 most likely secondary to dehydration in the setting of hyperglycemia contributing  Hydrate her IV fluids and follow hemoglobin closely    3.  Hyperlipidemia:  We will resume the patient's Pravachol.  Her AST and ALT are slightly elevated.  These will need to be monitored on an outpatient basis as well.  She has no abdominal pain or discomfort.   4.  History of kidney stones:  Currently, no hematuria or symptoms suggestive of kidney stones.      DEEP VENOUS THROMBOSIS PROPHYLAXIS:  The patient will receive compression boots.      CODE STATUS:  FULL.     Disposition: Expected discharge in the next 2 to 3 days once blood sugars are better controlled and hydrated well with IV fluids, and blood pressure control and completion of chest pain evaluation    Discussed with bedside RN, patient today    Greater than 30 minutes were spent in reviewing the chart, counseling the patient in collaboration of care today    Swati Rodarte MD  [phone] (P)      Interval History   Patient denied any chest pain this morning.  Complains of chest pains on and off for the last 1 month.  Blood pressure was running really high overnight needing multiple doses of IV hydralazine and IV Ativan.  She was nauseated overnight and was throwing up and needing IV Compazine and Ativan as well.  Blood sugars are running in the high in the 380s.  Started her on Lantus and NovoLog insulin and IV fluids to treat dehydration today    -Data reviewed today: I reviewed all new labs and imaging results over the last 24 hours. I personally reviewed no images or EKG's today.    Physical Exam   Temp: 99.4  F (37.4  C) Temp src: Axillary BP: (!) 142/80 Pulse: 88   Resp: 17 SpO2: 95 % O2 Device: None (Room air)    Vitals:    02/23/21 1646    Weight: 54.4 kg (120 lb)     Vital Signs with Ranges  Temp:  [96.6  F (35.9  C)-99.4  F (37.4  C)] 99.4  F (37.4  C)  Pulse:  [] 88  Resp:  [9-25] 17  BP: (115-227)/() 142/80  SpO2:  [95 %-98 %] 95 %  No intake/output data recorded.    Constitutional: Awake, alert, cooperative, no apparent distress  Respiratory: Clear to auscultation bilaterally, no crackles or wheezing  Cardiovascular: Regular rate and rhythm, normal S1 and S2, and no murmur noted  GI: Normal bowel sounds, soft, non-distended, non-tender  Skin/Integumen: No rashes, no cyanosis, no edema  Other:     Medications     sodium chloride 100 mL/hr at 02/24/21 1042       [START ON 2/25/2021] amLODIPine  2.5 mg Oral Daily     insulin aspart  6 Units Subcutaneous TID w/meals     insulin aspart  1-7 Units Subcutaneous TID AC     insulin aspart  1-5 Units Subcutaneous At Bedtime     insulin glargine  15 Units Subcutaneous QAM AC     losartan  50 mg Oral Daily     pravastatin  20 mg Oral Daily       Data   Recent Labs   Lab 02/24/21  0544 02/24/21  0258 02/23/21  2312 02/23/21  1812 02/23/21  1454   WBC  --   --   --   --  9.2   HGB  --   --   --   --  17.5*   MCV  --   --   --   --  91   PLT  --   --   --   --  165   NA  --   --   --   --  133   POTASSIUM  --   --   --   --  3.7   CHLORIDE  --   --   --   --  99   CO2  --   --   --   --  26   BUN  --   --   --   --  9   CR  --   --   --   --  0.50*   ANIONGAP  --   --   --   --  8   ESTELLE  --   --   --   --  10.1   GLC  --   --   --   --  385*   ALBUMIN  --   --   --  4.5  --    PROTTOTAL  --   --   --  8.5  --    BILITOTAL  --   --   --  0.6  --    ALKPHOS  --   --   --  139  --    ALT  --   --   --  103*  --    AST  --   --   --  72*  --    TROPI <0.015 <0.015 <0.015  --  <0.015       Recent Results (from the past 24 hour(s))   XR Chest 2 Views    Narrative    CHEST TWO VIEW   2/23/2021 6:25 PM     HISTORY: Chest pain.    COMPARISON: None.      Impression    IMPRESSION: PA and lateral views of the  chest. Lungs are clear. Heart  is normal in size. No effusions are evident. No pneumothorax.    SVETA BAPTISTE MD

## 2021-02-24 NOTE — PROGRESS NOTES
RECEIVING UNIT ED HANDOFF REVIEW    ED Nurse Handoff Report was reviewed by: Adamaris Moore RN on February 23, 2021 at 10:09 PM

## 2021-02-24 NOTE — PROGRESS NOTES
MD Notification    Notified Person: MD    Notified Person Name: Aster    Notification Date/Time: 2/24/21 1234    Notification Interaction: Paged    Purpose of Notification: Fyi  prior to lunch.     Orders Received: Recheck in 2 hours. Give Novolog.    Comments:

## 2021-02-24 NOTE — PROVIDER NOTIFICATION
MD Notification    Notified Person: MD    Notified Person Name: Dr. Boothe    Notification Date/Time: 2/24/21 0215    Notification Interaction: Paged    Purpose of Notification: Patient continues having emesis, can we try Ativan? Also  at 0200 check, additional insulin?    Orders Received: Ativan and insulin    Comments:

## 2021-02-24 NOTE — PLAN OF CARE
Summary:   Chest pain, N/V  DATE & TIME: 02/24/21, 2300-4833     Cognitive Concerns/ Orientation : A&Ox4  BEHAVIOR & AGGRESSION TOOL COLOR: Green  CIWA SCORE: N/A  ABNL VS/O2: BP elevated, Tachycardic. On RA.  MOBILITY: Independent  PAIN MANAGMENT: Denied  DIET: Mod CHO, good appetite   BOWEL/BLADDER: Continent  ABNL LAB/BG: A1c 11.1; elevated LFT's; , 381. Trops negative.   DRAIN/DEVICES: PIV running NS  TELEMETRY RHYTHM: NSR  SKIN: Bruised  TESTS/PROCEDURES: Angiogram completed this afternoon.  D/C DAY/GOALS/PLACE: Discharge pending, likely tomorrow.  OTHER IMPORTANT INFO: Denied nausea.

## 2021-02-24 NOTE — H&P
Admitted:     02/23/2021      CHIEF COMPLAINT:  Chest discomfort.      HISTORY OF PRESENT ILLNESS:  Bradford Prado is 60 with history of diabetes, hypertension, who, due to COVID, has not seen her primary care provider in the last year and has been off all of her medications as well as a result of this.  She has history of type 2 diabetes, hypertension and hyperlipidemia.  She presents with chest pain that has been going on and off for the last 1 month.  This pain waxes and wanes and has gotten more significant over the course of the last month and much more severe in the last 2 days and has been much more persistent.  She went to urgent care, where she was given aspirin with some resolution of her pain.  ECG shows some nonspecific ST segment changes.  She is also anxious as well.  She was sent from urgent care to [facility] Emergency Department for further assessment.      In the Emergency Department, the patient was seen by nurse practitioner, Christa Power.  Urgent care lab results showed a white count 9.2, hemoglobin 17.5, platelets of 165, glucose 385.  Troponin was negative.  Creatinine 0.5.  In the Emergency Department, the patient was afebrile.  Her blood pressures initially were 227/125.  She was afebrile, borderline tachycardic, normal oxygen saturations.  Blood work revealed a borderline low sodium of 133, potassium 3.7, normal kidney function.  CBC showed white count 9.2, hemoglobin 17.5, platelets 165,000.  Troponin negative.  Glucose of 85.  A1c is 11.1.  LFTs are significant for ALT of 103 and AST of 72.  Other LFTs are within normal limits.  Chest x-ray showed no pneumothorax, no effusions.  Lungs are clear.  Heart normal size.  A 12-lead ECG shows a borderline sinus tachycardia with nonspecific ST segment T-wave abnormalities.  Here in the Emergency Department, the patient is chest pain-free.  COVID test was negative.  The patient did receive some hydralazine and 5 units of insulin with  improvement, and she is being admitted under observation status.      PAST MEDICAL HISTORY:   1.  Type 2 diabetes.   2.  History of kidney stones.   3.  Hyperlipidemia.   4.  Hypertension.   5.  Stenosing tenosynovitis and arthritis.   6.  Migraines.      PAST SURGICAL HISTORY:  Hysterectomy, laparoscopic evacuation of ectopic pregnancy and tubal ligation.      FAMILY HISTORY:  Positive for cirrhosis, emphysema, hypertension.      SOCIAL HISTORY:  She is single, quit tobacco in 2010.  No alcohol.  She is currently not employed.      ALLERGIES:  NO KNOWN DRUG ALLERGIES.      CURRENT MEDICATIONS:  Outpatient medication list that she has not been taking at all includes:   1.  Glipizide 10 mg daily.   2.  Losartan 50 mg daily.   3.  Metformin 1000 mg twice a day.   4.  Pravachol 20 mg once a day.   5.  Tylenol every 6 hours.      REVIEW OF SYSTEMS:  Ten points reviewed and as dictated in history of present illness.  Positive for chest pain, anxiety.  Chest pain is intermittent.  No shortness of breath.  No fevers or chills.  No headache.  No syncope or presyncope, no tachycardia.  No GI or  complaints.  Denies any black or bloody stools.  Otherwise, 10-point systems reviewed and otherwise negative.      PHYSICAL EXAMINATION:   VITAL SIGNS:  Temperature 98.2, heart rate 98, respirations 16, blood pressure 227/125, currently 166/105, and saturations are 98% on room air.   GENERAL:  The patient is a 60-year-old female who is resting comfortably, in no distress.   HEENT:  Unremarkable.   NECK:  JVP is not elevated.   CHEST:  Lungs clear to auscultation.   CARDIOVASCULAR:  S1, S2, regular rate and rhythm.   GASTROINTESTINAL:  Soft, nontender.  Normoactive bowel sounds.   MUSCULOSKELETAL:  No edema.   NEUROLOGIC:  She is grossly nonfocal.  Cranial nerves grossly intact.   SKIN:  Warm, dry, well perfused.   PSYCHIATRIC:  Mood and affect normal and stable.      LABORATORY DATA:  As dictated in the history of present illness.       ASSESSMENT:  Bradford Prado is 60, with history of hypertension, diabetes, hypercholesterolemia, medically noncompliant, off all her medications in the last year due to COVID concerns about her going into clinic, now admitted hypertensive with elevated blood glucose and is being admitted for hypertensive urgency.      PLAN:   1.  Hypertensive urgency with chest pain and elevated blood pressure:  The patient is on Cozaar 50 mg a day, which she has not been taking for the last year.  ECG shows no acute ischemic changes.  Troponin is negative.  We will do serial troponins x 3.  The patient has been started on Cozaar in the Emergency Department.  We will make IV hydralazine available.  The goal is to get her systolic blood pressure less than 160 and diastolic blood pressure less than 90 for the next week or so.  We will make IV hydralazine available to get her blood pressure under better control.  We will restart the patient on Cozaar 50 mg in the morning.  The patient should follow-up with her primary care physician in the next 1 week to reestablish care.  The patient had intermittent chest pain.  ECG did not show any acute ischemic changes.  We will do serial troponins x 3.  If these are negative, then we can consider stress testing as an outpatient in the future once her blood pressure is under better control.  The patient will be monitored on telemetry bed.   2.  Diabetes:  The patient's A1c is 11.1.  She has not been on her metformin or glipizide for the last year.  The patient did receive insulin in the Emergency Department.  We will restart her on the glipizide, metformin and have her follow-up with her primary and to have further dosing adjustment as an outpatient.   3.  Hyperlipidemia:  We will resume the patient's Pravachol.  Her AST and ALT are slightly elevated.  These will need to be monitored on an outpatient basis as well.  She has no abdominal pain or discomfort.   4.  History of kidney stones:   Currently, no hematuria or symptoms suggestive of kidney stones.      DEEP VENOUS THROMBOSIS PROPHYLAXIS:  The patient will receive compression boots.      CODE STATUS:  FULL.      Observation status.         JORGE LUIS BECKETT MD             D: 2021   T: 2021   MT: CARYL      Name:     ERICK OTERO   MRN:      [id number]        Account:      [id number]   :      1960        Admitted:     2021                   Document: [id number]       cc: Paulette JOSEPH, CNP

## 2021-02-24 NOTE — ED PROVIDER NOTES
History   Chief Complaint:  Chest Pain       HPI   Bradford Prado is a 60 year old female with history of type II diabetes, hypertension, and hyperlipidemia who presents with chest pain. The patient's pain began about one month ago and waxes and wanes in severity, though it is generally moderately painful.  She notes that her chest pain has worsened over the last 2 days and is now persistent.  She was seen at Urgent Care prior to coming to the emergency department and had labs done while there (results below).  She was given 325 mg aspirin and had resolution of her pain.  She was referred to the ED with concern for ST changes on her EKG.  Patient notes that she has been feeling anxious lately due to lack of exercise and that she has not taken her medications in over a year due to fear of going to a clinic during the COVID-19 pandemic.  She denies headache, fever, change from her chronic shortness of breath, GI symptoms.    Laboratory Results, Urgent Care 2/23/2021  CBC: HGB 17.5 (H), WNL (WBC 9.2, )  BMP: Glucose 385 (H), Creatinine 0.50 (L), o/w WNL    (Collected 1454) Troponin I: <0.015     Review of Systems   Cardiovascular: Positive for chest pain.   Psychiatric/Behavioral: The patient is nervous/anxious.    All other systems reviewed and are negative.    Allergies:  The patient has no known allergies.     Medications:  Glipizide XL  Losartan  Metformin  Robaxin  Pravastatin    Past Medical History:    Type II diabetes  Nephrolithiasis  Hyperlipidemia  Hypertension  Stenosing Tenosynovitis  Arthritis  Migraine     Past Surgical History:    Hysterectomy  Laparoscopic evacuation ectopic pregnancy  Tubal ligation     Family History:    Cirrhosis  Emphysema  Hypertension    Social History:  The patient is not a smoker  The patient presents to the ED alone    Physical Exam     Patient Vitals for the past 24 hrs:  02/23/21 2115 (!) 170/106 -- -- 95 9 97 % -- --   02/23/21 2030 (!) 210/113 -- -- 96 11 96 % -- --  "  02/23/21 2000 (!) 192/108 -- -- 90 9 97 % -- --   02/23/21 1930 (!) 190/113 -- -- 96 25 96 % -- --   02/23/21 1900 (!) 197/107 -- -- 96 25 95 % 1.518 m (4' 11.75\") --   02/23/21 1830 (!) 197/108 -- -- 95 -- 96 % -- --   02/23/21 1800 (!) 227/125 -- -- 106 11 97 % -- --   02/23/21 1646 (!) 225/103 98.2  F (36.8  C) Oral 107 18 97 % -- 54.4 kg (120 lb)       Physical Exam  Nursing notes reviewed. Vitals reviewed.  General: Alert. Well kept.  Eyes:  Conjunctiva non-injected, non-icteric.  Neck/Throat: Moist mucous membranes.  Normal voice.  Cardiac: Regular rhythm. Normal heart sounds with no murmur/rubs/click. No peripheral edema  Pulmonary: Clear and equal breath sounds bilaterally. No crackles/rales. No wheezing  Abdomen: Soft. Non-distended. Non-tender to palpation. No masses. No guarding or rebound.  Musculoskeletal: Normal gross range of motion of all 4 extremities.    Neurological: Alert and oriented x4.   Skin: Warm and dry without rashes or petechiae. Normal appearance of visualized exposed skin.  Psych: Affect normal. Good eye contact.     Emergency Department Course   ECG  ECG taken at 1651, ECG read at 1812  Sinus tachycardia. Nonspecific ST and T wave abnormality.   Rate 102 bpm. NC interval 120 ms. QRS duration 66 ms. QT/QTc 332/432 ms. P-R-T axes 60 31 108.     Imaging:  XR Chest 2 Views  IMPRESSION: PA and lateral views of the chest. Lungs are clear. Heart is normal in size. No effusions are evident. No pneumothorax.  As read by Radiology.    Laboratory:  Hepatic Panel:  (H), AST 72 (H), o/w WNL    Hemoglobin A1c: 11.1    Asymptomatic COVID-19 Virus PCR: pending        Emergency Department Course:  Reviewed:  I reviewed the patient's nursing notes, vitals, past medical records, Care Everywhere.     Assessments:  1758 I performed an exam of the patient and obtained history, as documented above.     Consults:   1950 I consulted with Dr. Grey, of hospitalist service, regarding " admission.    Interventions:  Medications   insulin regular 1 unit/mL injection 5 Units (has no administration in time range)   hydrALAZINE (APRESOLINE) injection 2 mg (has no administration in time range)   losartan (COZAAR) tablet 25 mg (25 mg Oral Given 2/23/21 1949)   metFORMIN (GLUCOPHAGE) tablet 500 mg (500 mg Oral Given 2/23/21 1949)   pravastatin (PRAVACHOL) tablet 10 mg (10 mg Oral Given 2/23/21 1949)     Disposition:  The patient was admitted to the hospital under the care of Dr. Grey.     Impression & Plan   Covid-19  Bradford Prado was evaluated during a global COVID-19 pandemic, which necessitated consideration that the patient might be at risk for infection with the SARS-CoV-2 virus that causes COVID-19.   Applicable protocols for evaluation were followed during the patient's care.   COVID-19 was considered as part of the patient's evaluation. The plan for testing is: a test was obtained during this visit.    Medical Decision Making:  Bradford Prado is a 60 year old female with history of type II diabetes, hypertension, and hyperlipidemia who presents with chest pain.  On exam she has had no peripheral edema and normal heart sounds.  Her EKG shows nonspecific ST changes with flattening of the T waves in the precordial leads.  She is hypertensive at 225/125.  Troponin does return negative.  Her glucose is 385 with a normal anion gap and bicarb.  She has had no previous cardiac work-up.  She has been off her medications for greater than a year.  The patient is not a smoker.  Her heart score is 5.  She will also need management of her blood pressure and her hyperglycemia.  The patient was treated with hydralazine and IV insulin as requested by the hospitalist with improvement in blood pressure to 170/106 and blood glucose to 268.  The patient is in agreement this plan.  I spoke with , Hospitalist, who graciously excepted for admission.      HEART Score  Background  Calculates the overall risk of  adverse event in patient's presenting with chest pain.  Based on 5 criteria (each assigned 0-2 points) including suspiciousness of history, EKG, age, risk factors and troponin.    Data  60 year old female  has Migraine; Hand arthritis; Stenosing tenosynovitis; Poorly controlled type 2 diabetes mellitus with renal complication (H); Hyperlipidemia LDL goal <70; Essential hypertension with goal blood pressure less than 140/90; Nephrolithiasis; and Type 2 diabetes mellitus with hyperglycemia, without long-term current use of insulin (H) on their problem list.   reports that she quit smoking about 10 years ago. Her smoking use included cigarettes. She has a 10.00 pack-year smoking history. She has never used smokeless tobacco.  family history includes Cirrhosis in her mother; Emphysema in her father; Heart Defect in her niece; Hypertension in her sister.  Lab Results   Component Value Date    TROPI <0.015 02/23/2021     Criteria   0-2 points for each of 5 items (maximum of 10 points):  Score 1- History moderately suspicious for coronary syndrome  Score 1- EKG with Non-specific repolarization disturbance  Score 1- Age 45 to 65 years old  Score 2- Three or more risk factors for or history of atherosclerotic disease  Score 0- Within normal limits for troponin levels  Interpretation  Risk of adverse outcome  Heart Score: 5  Total Score 4-6- Adverse Outcome Risk 20.3% - Supports admission with standard rule-out management -serial troponins and stress testing    Diagnosis:    ICD-10-CM    1. Chest pain  R07.9    2. HTN (hypertension)  I10    3. Hyperglycemia  R73.9        Scribe Disclosure:  I, Rajni Sher, am serving as a scribe at 6:04 PM on 2/23/2021 to document services personally performed by Christa Power DNP based on my observations and the provider's statements to me.     February 23, 2021   [facility] Emergency Department         Christa Power, CNP  02/24/21 0159

## 2021-02-24 NOTE — PLAN OF CARE
Admission    Patient arrives to room 613 via cart from ED.    Summary:   Chest pain, N/V  DATE & TIME: 2/24/2021 New admission-0730  Cognitive Concerns/ Orientation : A&Ox4  BEHAVIOR & AGGRESSION TOOL COLOR: Green  CIWA SCORE: N/A  ABNL VS/O2: BP elevated 166/105, 164/89, 177/96 PRN Hydralazine given x1, all other VSS.  MOBILITY: Independent  PAIN MANAGMENT: C/o headache 10/10, PRN IV dilaudid x1.  DIET: Mod CHO  BOWEL/BLADDER: Continent  ABNL LAB/BG: A1c 11.1; elevated LFT's;  and 364, additional 6 units given. Trops negative. Hgb 17.5  DRAIN/DEVICES: PIV S.L.  TELEMETRY RHYTHM: NSR  SKIN: Bruised  TESTS/PROCEDURES: N/A  D/C DAY/GOALS/PLACE: Discharge pending  OTHER IMPORTANT INFO: PRN Compazine given x1 and Ativan given x1 for N/V.  Multiple emesis this shift.    Inpatient nursing criteria listed below were met:    PCD's Documented: NA  Skin issues/needs documented :NA  Isolation education started/completed NA  Patient allergies verified with patient: Yes  Verified completion of [place] Risk Assessment Tool:  Yes  Verified completion of Guardianship screening tool: Yes  Fall Prevention: Care plan updated, Education given and documented Yes  Care Plan initiated: Yes  Home medications documented in belongings flowsheet: Yes  Patient belongings documented in belongings flowsheet: NA  Reminder note (belongings/ medications) placed in discharge instructions:NA  Admission profile/ required documentation complete: Yes  Bedside Report Letter given and explained to patient NA  Visitor Designated? No  If patient is a 72 hour hold/Commitment are belongings removed from room and locked up? NA

## 2021-02-24 NOTE — PHARMACY-ADMISSION MEDICATION HISTORY
Pharmacy Medication History  Admission medication history interview status for the 2/23/2021  admission is complete. See EPIC admission navigator for prior to admission medications     Location of Interview: Patient room  Medication history sources: Patient     Significant changes made to the medication list:      Patient has not taken any medications for months. I left her previous medications on pta med list      In the past week, patient estimated taking medication this percent of the time: less than 50% due to other    Additional medication history information:       Medication reconciliation completed by provider prior to medication history? No    Time spent in this activity: 15min     Prior to Admission medications    Medication Sig Last Dose Taking? Auth Provider   acetaminophen (TYLENOL) 325 MG tablet Take 325-650 mg by mouth every 6 hours as needed for mild pain Past Month at Unknown time Yes Unknown, Entered By History   blood glucose (NO BRAND SPECIFIED) lancets standard Use to test blood sugar twice times daily or as directed.  Patient not taking: Reported on 2/23/2021   Paulette Travis, NP   blood glucose (NO BRAND SPECIFIED) test strip Use to test blood sugar twice times daily or as directed.  Patient not taking: Reported on 2/23/2021   Paulette Travis, NP   blood glucose (NO BRAND SPECIFIED) test strip Use to test blood sugars 2 times daily or as directed  Patient not taking: Reported on 2/23/2021   Paulette Travis NP   blood glucose monitoring (ACCU-CHEK FASTCLIX) lancets Use to test blood sugar 2 times daily or as directed.  Ok to substitute alternative if insurance prefers.  Patient not taking: Reported on 2/23/2021   Paulette Travis NP   blood glucose monitoring (ONE TOUCH DELICA) lancets Use to test blood sugars 2 times daily or as directed.  Patient not taking: Reported on 2/23/2021   Paulette Travis NP   blood glucose monitoring (ONETOUCH VERIO) meter device kit  Use to test blood sugars 2 times daily or as directed.  Patient not taking: Reported on 2/23/2021   Paulette Travis, RUSS   glipiZIDE (GLUCOTROL XL) 5 MG 24 hr tablet Take 10 mg by mouth daily More than a month at Unknown time  Unknown, Entered By History   losartan (COZAAR) 50 MG tablet Take 50 mg by mouth daily More than a month at Unknown time  Unknown, Entered By History   metFORMIN (GLUCOPHAGE-XR) 500 MG 24 hr tablet Take 1,000 mg by mouth 2 times daily (with meals) More than a month at Unknown time  Unknown, Entered By History   pravastatin (PRAVACHOL) 20 MG tablet Take 20 mg by mouth daily More than a month at Unknown time  Unknown, Entered By History       The information provided in this note is only as accurate as the sources available at the time of update(s)   Bhavana CruzD

## 2021-02-24 NOTE — PROVIDER NOTIFICATION
MD Notification    Notified Person: MD    Notified Person Name: Dr. Grey     Notification Date/Time: 2/23/21 3813    Notification Interaction: [employer] paging    Purpose of Notification: New ED admit. No orders. Pt actively vomiting; need meds please.     Orders Received:    Comments: Into new room. Dry heaving and then started to have emesis. No PRN's noted. Writer and Primary RN at bedside.

## 2021-02-24 NOTE — PROVIDER NOTIFICATION
MD Notification    Notified Person: MD    Notified Person Name: Dr. Boothe and Dr. Grey    Notification Date/Time: 2/23/21 1176    Notification Interaction: Web paged- talked on the phone.    Purpose of Notification: Patient actively vomiting, need something other then Tylenol for pain and something more for nausea.    Orders Received: Compazine and Dilaudid ordered.    Comments:

## 2021-02-24 NOTE — ED NOTES
[facility]  ED Nurse Handoff Report    ED Chief complaint: Chest Pain      ED Diagnosis:   Final diagnoses:   None       Code Status: Confirm with hospitalist    Allergies: No Known Allergies    Patient Story: Pt from home where she reports chest pain off and on for the past month. Pt was seen at , who sent here d/t EKG changes.  administered aspirin prior to sending to ED.    Focused Assessment:  Pt DM type II, and hypertensive and reports not taking her medication for over a year. Pt reports eating avocado and taking cinnamon to try to treat it naturally.     Treatments and/or interventions provided: Labs, CXR, monitoring    Patient's response to treatments and/or interventions: Tolerated well     To be done/followed up on inpatient unit:  Continue plan of care    Does this patient have any cognitive concerns?: none noted    Activity level - Baseline/Home:  Independent  Activity Level - Current:   Stand with Assist    Patient's Preferred language: English   Needed?: No    Isolation: None  Infection: Not Applicable  Patient tested for COVID 19 prior to admission: YES  Bariatric?: No    Vital Signs:   Vitals:    02/23/21 1646 02/23/21 1800   BP: (!) 225/103 (!) 227/125   Pulse: 107 106   Resp: 18 11   Temp: 98.2  F (36.8  C)    TempSrc: Oral    SpO2: 97% 97%   Weight: 54.4 kg (120 lb)        Cardiac Rhythm:Cardiac Rhythm: Normal sinus rhythm(ST changes on EKG)    Was the PSS-3 completed:   Yes  What interventions are required if any?               Family Comments: Not present  OBS brochure/video discussed/provided to patient/family: No              Name of person given brochure if not patient: n/a              Relationship to patient: n/a    For the majority of the shift this patient's behavior was Green.   Behavioral interventions performed were Rounding.    ED NURSE PHONE NUMBER: [phone]

## 2021-02-25 LAB
ANION GAP SERPL CALCULATED.3IONS-SCNC: 6 MMOL/L (ref 3–14)
BUN SERPL-MCNC: 13 MG/DL (ref 7–30)
CALCIUM SERPL-MCNC: 8.2 MG/DL (ref 8.5–10.1)
CHLORIDE SERPL-SCNC: 105 MMOL/L (ref 94–109)
CO2 SERPL-SCNC: 25 MMOL/L (ref 20–32)
CREAT SERPL-MCNC: 0.44 MG/DL (ref 0.52–1.04)
ERYTHROCYTE [DISTWIDTH] IN BLOOD BY AUTOMATED COUNT: 11.8 % (ref 10–15)
GFR SERPL CREATININE-BSD FRML MDRD: >90 ML/MIN/{1.73_M2}
GLUCOSE BLDC GLUCOMTR-MCNC: 140 MG/DL (ref 70–99)
GLUCOSE BLDC GLUCOMTR-MCNC: 162 MG/DL (ref 70–99)
GLUCOSE BLDC GLUCOMTR-MCNC: 207 MG/DL (ref 70–99)
GLUCOSE BLDC GLUCOMTR-MCNC: 222 MG/DL (ref 70–99)
GLUCOSE BLDC GLUCOMTR-MCNC: 249 MG/DL (ref 70–99)
GLUCOSE SERPL-MCNC: 357 MG/DL (ref 70–99)
HCT VFR BLD AUTO: 42.8 % (ref 35–47)
HGB BLD-MCNC: 15 G/DL (ref 11.7–15.7)
MCH RBC QN AUTO: 31.7 PG (ref 26.5–33)
MCHC RBC AUTO-ENTMCNC: 35 G/DL (ref 31.5–36.5)
MCV RBC AUTO: 91 FL (ref 78–100)
PLATELET # BLD AUTO: 162 10E9/L (ref 150–450)
POTASSIUM SERPL-SCNC: 3.7 MMOL/L (ref 3.4–5.3)
RBC # BLD AUTO: 4.73 10E12/L (ref 3.8–5.2)
SODIUM SERPL-SCNC: 136 MMOL/L (ref 133–144)
WBC # BLD AUTO: 13.1 10E9/L (ref 4–11)

## 2021-02-25 PROCEDURE — 99233 SBSQ HOSP IP/OBS HIGH 50: CPT | Performed by: INTERNAL MEDICINE

## 2021-02-25 PROCEDURE — 250N000011 HC RX IP 250 OP 636: Performed by: INTERNAL MEDICINE

## 2021-02-25 PROCEDURE — 85027 COMPLETE CBC AUTOMATED: CPT | Performed by: INTERNAL MEDICINE

## 2021-02-25 PROCEDURE — 36415 COLL VENOUS BLD VENIPUNCTURE: CPT | Performed by: INTERNAL MEDICINE

## 2021-02-25 PROCEDURE — 80048 BASIC METABOLIC PNL TOTAL CA: CPT | Performed by: INTERNAL MEDICINE

## 2021-02-25 PROCEDURE — 250N000013 HC RX MED GY IP 250 OP 250 PS 637: Performed by: INTERNAL MEDICINE

## 2021-02-25 PROCEDURE — 999N001017 HC STATISTIC GLUCOSE BY METER IP

## 2021-02-25 PROCEDURE — 99221 1ST HOSP IP/OBS SF/LOW 40: CPT | Performed by: INTERNAL MEDICINE

## 2021-02-25 PROCEDURE — 250N000012 HC RX MED GY IP 250 OP 636 PS 637: Performed by: INTERNAL MEDICINE

## 2021-02-25 PROCEDURE — 210N000002 HC R&B HEART CARE

## 2021-02-25 PROCEDURE — 258N000003 HC RX IP 258 OP 636: Performed by: INTERNAL MEDICINE

## 2021-02-25 RX ORDER — AMLODIPINE BESYLATE 5 MG/1
5 TABLET ORAL 2 TIMES DAILY
Status: DISCONTINUED | OUTPATIENT
Start: 2021-02-25 | End: 2021-02-27 | Stop reason: HOSPADM

## 2021-02-25 RX ORDER — AMLODIPINE BESYLATE 5 MG/1
5 TABLET ORAL 2 TIMES DAILY
Qty: 60 TABLET | Refills: 0 | Status: SHIPPED | OUTPATIENT
Start: 2021-02-25 | End: 2021-02-27

## 2021-02-25 RX ORDER — SERTRALINE HYDROCHLORIDE 25 MG/1
25 TABLET, FILM COATED ORAL DAILY
Status: DISCONTINUED | OUTPATIENT
Start: 2021-02-25 | End: 2021-02-27 | Stop reason: HOSPADM

## 2021-02-25 RX ORDER — AMLODIPINE BESYLATE 2.5 MG/1
2.5 TABLET ORAL ONCE
Status: COMPLETED | OUTPATIENT
Start: 2021-02-25 | End: 2021-02-25

## 2021-02-25 RX ORDER — INSULIN ASPART 100 [IU]/ML
INJECTION, SOLUTION INTRAVENOUS; SUBCUTANEOUS
Qty: 15 ML | Refills: 0 | Status: SHIPPED | OUTPATIENT
Start: 2021-02-25 | End: 2021-06-17

## 2021-02-25 RX ORDER — CARVEDILOL 6.25 MG/1
6.25 TABLET ORAL 2 TIMES DAILY WITH MEALS
Status: DISCONTINUED | OUTPATIENT
Start: 2021-02-25 | End: 2021-02-26

## 2021-02-25 RX ORDER — NITROGLYCERIN 20 MG/100ML
10-200 INJECTION INTRAVENOUS CONTINUOUS
Status: DISCONTINUED | OUTPATIENT
Start: 2021-02-25 | End: 2021-02-26

## 2021-02-25 RX ORDER — AMLODIPINE BESYLATE 2.5 MG/1
2.5 TABLET ORAL DAILY
Qty: 30 TABLET | Refills: 0 | Status: SHIPPED | OUTPATIENT
Start: 2021-02-26 | End: 2021-02-25

## 2021-02-25 RX ADMIN — PRAVASTATIN SODIUM 20 MG: 20 TABLET ORAL at 08:29

## 2021-02-25 RX ADMIN — ACETAMINOPHEN 650 MG: 325 TABLET, FILM COATED ORAL at 23:37

## 2021-02-25 RX ADMIN — INSULIN GLARGINE 15 UNITS: 100 INJECTION, SOLUTION SUBCUTANEOUS at 08:29

## 2021-02-25 RX ADMIN — SERTRALINE HYDROCHLORIDE 25 MG: 25 TABLET ORAL at 11:19

## 2021-02-25 RX ADMIN — LOSARTAN POTASSIUM 50 MG: 50 TABLET, FILM COATED ORAL at 08:29

## 2021-02-25 RX ADMIN — CARVEDILOL 6.25 MG: 6.25 TABLET, FILM COATED ORAL at 17:38

## 2021-02-25 RX ADMIN — HYDRALAZINE HYDROCHLORIDE 10 MG: 20 INJECTION INTRAMUSCULAR; INTRAVENOUS at 16:41

## 2021-02-25 RX ADMIN — AMLODIPINE BESYLATE 2.5 MG: 2.5 TABLET ORAL at 08:29

## 2021-02-25 RX ADMIN — INSULIN ASPART 1 UNITS: 100 INJECTION, SOLUTION INTRAVENOUS; SUBCUTANEOUS at 21:16

## 2021-02-25 RX ADMIN — AMLODIPINE BESYLATE 2.5 MG: 2.5 TABLET ORAL at 15:37

## 2021-02-25 RX ADMIN — SODIUM CHLORIDE: 9 INJECTION, SOLUTION INTRAVENOUS at 05:35

## 2021-02-25 RX ADMIN — AMLODIPINE BESYLATE 5 MG: 5 TABLET ORAL at 21:03

## 2021-02-25 ASSESSMENT — ACTIVITIES OF DAILY LIVING (ADL)
ADLS_ACUITY_SCORE: 15

## 2021-02-25 NOTE — PLAN OF CARE
Summary:   Chest pain, N/V  DATE & TIME: 02/24/21, PM shift   Cognitive Concerns/ Orientation : A&Ox4  BEHAVIOR & AGGRESSION TOOL COLOR: Green  CIWA SCORE: N/A  ABNL VS/O2: VSS on RA  MOBILITY: Independent  PAIN MANAGMENT: Denied  DIET: Mod CHO, good appetite   BOWEL/BLADDER: Continent  ABNL LAB/BG: A1c 11.1; elevated LFT's; last BG of 98,Trops negative.   DRAIN/DEVICES: PIV running NS at 100mL/hr  TELEMETRY RHYTHM: SR  SKIN: Bruised  TESTS/PROCEDURES: CT coronary Angiogram without significant stenosis.   D/C DAY/GOALS/PLACE: Discharge likely tomorrow.  OTHER IMPORTANT INFO: Continue to monitor.

## 2021-02-25 NOTE — DISCHARGE SUMMARY
[facility]  Discharge Summary        Bradford Prado MRN# [id number]   YOB: 1960 Age: 60 year old     Date of Admission:  2/23/2021  Date of Discharge:  2/27/2021  Admitting Physician:  Clyde Grey MD  Discharge Physician: Swati Rodarte MD  Discharging Service: Hospitalist     Primary Provider: Paulette Travis  Primary Care Physician Phone Number: [phone]         Discharge Diagnoses/Problem Oriented Hospital Course (Providers):    Bradford Prado was admitted on 2/23/2021 by Clyde Grey MD and I would refer you to their history and physical.  The following problems were addressed during her hospitalization:  Assessment & Plan     Bradford Prado is a 60 year old female who was admitted on 2/23/2021.  ASSESSMENT:  Bradford Prado is 60, with history of hypertension, diabetes, hypercholesterolemia, medically noncompliant, off all her medications in the last year due to COVID concerns about her going into clinic, now admitted hypertensive with elevated blood glucose and is being admitted for hypertensive emergency with chest pain significantly hypertensive urgency.  And her blood sugars have been running high in the 380s with high osmolality      Hyperosmolar hyperglycemia with type 2 diabetes mellitus uncontrolled with hemoglobin A1c of 11.1% on admission;  Blood sugars running in the 380s on admission  Her osmolality is high at 304  She was noncompliant with medications due to none follow-up with her primary care provider due to COVID-19  Patient is at high risk of dehydration with these high blood sugars  Given IV normal saline 500cc bolus and started on IV fluids at 100 mL/h  Started her on Lantus 20 units every morning and NovoLog 8 units 3 times daily with meals and sliding scale insulin medium dose with NovoLog 3 times a day with meals and at bedtime  Stopped IV fluids now  We will discharge her with insulin regimen as noted above on medium dose sliding scale insulin  with NovoLog  Patient might be able to go off of insulin once her hemoglobin A1c is controlled in the long-term  Blood sugars in the 250s today will increase Lantus to 40 units every morning continue NovoLog 10 units 3 times a day  Monitor blood sugars closely and adjust insulin as needed     Hypertensive emergency with blood blood pressure as high as 227/125;  Chest pain in the setting of hypertensive emergency;  Patient was continued on losartan 50 mg p.o. daily PTA medication on admission  As needed IV hydralazine added  Patient needed multiple doses of IV hydralazine and 1 dose of IV Ativan to bring the blood pressure down overnight  Start her on Norvasc 5 mg p.o. BID  today for better blood pressure control  Monitor blood pressure closely and adjust medications as needed  Blood sugars are well controlled with the current regimen of Norvasc and losartan which will be prescribed for her to discharge  Blood pressure went up to 200s over 100s on 2/25 afternoon  Transfer to CICU for possible nitroglycerin drip  After as needed IV hydralazine blood pressure came down and nitroglycerin drip was not needed  Added Coreg 6.25 mg twice a day with that blood pressure is in the 150s today  increased Coreg to 12.5 mg twice daily.  Continue PTA losartan 50 mg p.o. daily  Continue Norvasc 5 mg p.o. twice daily  Blood pressure is better controlled today with systolics in the 130s     Chest pain in the setting of hypertensive emergency and anxiety;  EKG on admission showed showed nonspecific ST-T wave changes  Serial troponins remain negative  CTA coronary angiogram done showed mild nonobstructive coronary artery disease     Cardiology consulted.  Reviewed the CT coronary angiogram that was done the day before and it looked good no need for coronary angiogram  .Chest pains are secondary to hypertensive urgency.  Recommended good blood pressure control  Echo is ordered results showed   There is concentric remodeling present.    Left ventricular size, global systolic function, and wall motion are normal,   estimated LVEF 60-65%.   Left ventricular diastolic function is indeterminate.   Right ventricular global function is normal.   No significant valvular abnormalities.      There was 1.1 cm nonspecific enhancing focus in the central liver consider outpatient MRI for further evaluation  Lungs are clear of infiltrate or effusions     On further discussion with the patient today she feels anxious when she thinks anxiety might be contributing to her symptoms of chest fluttering sensation and chest pain so she was started on Zoloft  50 mg p.o. daily     Erythrocytosis;  With hemoglobin of 17.5 most likely secondary to dehydration in the setting of hyperglycemia contributing  With IV fluids hydration hemoglobin improved to 15 today     3.  Hyperlipidemia:  We  Resumed  the patient's Pravachol.  Her AST and ALT are slightly elevated.  These will need to be monitored on an outpatient basis as well.  She has no abdominal pain or discomfort.         4.  History of kidney stones:  Currently, no hematuria or symptoms suggestive of kidney stones.      DEEP VENOUS THROMBOSIS PROPHYLAXIS:  The patient will receive compression boots.      CODE STATUS:  FULL.      Disposition: home today    Discussed with bedside RN, patient today     Greater than 30 minutes were spent in reviewing the chart, counseling the patient in collaboration of care today     Swati Rodarte MD  [phone] (P)                   Code Status:      Full Code        Brief Hospital Stay Summary Sent Home With Patient in AVS:        Reason for your hospital stay      Hypertensive emergency with chest pain with negative CT coronary   angiogram and Diabetes uncontrolled started on insulin                 Important Results:      See below        Pending Results:        Unresulted Labs Ordered in the Past 30 Days of this Admission     No orders found from 1/24/2021 to 2/24/2021.             Discharge Instructions and Follow-Up:      Follow-up Appointments     Follow-up and recommended labs and tests       Follow up with primary care provider, Paulette Travis, within 7 days   for hospital follow- up.  The following labs/tests are recommended:   recheck BMP in 1week .               Discharge Disposition:      Discharged to home        Discharge Medications:        Current Discharge Medication List      START taking these medications    Details   amLODIPine (NORVASC) 2.5 MG tablet Take 1 tablet (5 mg) by mouth twice daily   Qty: 60 tablet, Refills: 0    Associated Diagnoses: Hypertensive urgency      !! insulin aspart (NOVOLOG FLEXPEN) 100 UNIT/ML pen Novolog Flexpen  Give before meals and before bed:  For Pre-Meal Glucose:  140-189 give 1 unit   190-239 give 2 units   240-289 give 3 units   290-339 give 4 units   = or >340 give 5 units     For Bedtime Glucose  200 - 239 give 1 units   240 - 289 give 2 units  290 - 339 give 3 units  = or >340 give 4 units  Qty: 15 mL, Refills: 0    Associated Diagnoses: Poorly controlled type 2 diabetes mellitus with renal complication (H)      !! insulin aspart (NOVOLOG PEN) 100 UNIT/ML pen Inject 10 Units Subcutaneous 3 times daily (with meals)  Qty: 30 mL, Refills: 0    Associated Diagnoses: Poorly controlled type 2 diabetes mellitus with renal complication (H)      insulin glargine (LANTUS PEN) 100 UNIT/ML pen Inject 40 Units Subcutaneous every morning (before breakfast)  Qty: 30 mL, Refills: 0    Comments: If Lantus is not covered by insurance, may substitute Basaglar at same dose and frequency.    Associated Diagnoses: Poorly controlled type 2 diabetes mellitus with renal complication (H)      sertraline (ZOLOFT) 50 MG tablet Take 1 tablet (50 mg) by mouth dailyQty: 35 tablet, Refills: 0    Associated Diagnoses: Adjustment disorder with anxious mood       !! - Potential duplicate medications found. Please discuss with provider.      CONTINUE these medications  which have NOT CHANGED    Details   acetaminophen (TYLENOL) 325 MG tablet Take 325-650 mg by mouth every 6 hours as needed for mild pain      blood glucose (NO BRAND SPECIFIED) lancets standard Use to test blood sugar twice times daily or as directed.  Qty: 100 each, Refills: 1    Associated Diagnoses: Type 2 diabetes mellitus with hyperglycemia, without long-term current use of insulin (H)      !! blood glucose (NO BRAND SPECIFIED) test strip Use to test blood sugar twice times daily or as directed.  Qty: 100 each, Refills: 1    Associated Diagnoses: Type 2 diabetes mellitus with hyperglycemia, without long-term current use of insulin (H)      !! blood glucose (NO BRAND SPECIFIED) test strip Use to test blood sugars 2 times daily or as directed  Qty: 100 strip, Refills: 11    Associated Diagnoses: Poorly controlled type 2 diabetes mellitus with renal complication (H); Type 2 diabetes mellitus with hyperglycemia, without long-term current use of insulin (H)      !! blood glucose monitoring (ACCU-CHEK FASTCLIX) lancets Use to test blood sugar 2 times daily or as directed.  Ok to substitute alternative if insurance prefers.  Qty: 1 Box, Refills: 11    Associated Diagnoses: Poorly controlled type 2 diabetes mellitus with renal complication (H)      !! blood glucose monitoring (ONE TOUCH DELICA) lancets Use to test blood sugars 2 times daily or as directed.  Qty: 100 each, Refills: 11    Comments: verio  Associated Diagnoses: Poorly controlled type 2 diabetes mellitus with renal complication (H); Type 2 diabetes mellitus with hyperglycemia, without long-term current use of insulin (H)      blood glucose monitoring (ONETOUCH VERIO) meter device kit Use to test blood sugars 2 times daily or as directed.  Qty: 1 kit, Refills: 0    Associated Diagnoses: Poorly controlled type 2 diabetes mellitus with renal complication (H); Type 2 diabetes mellitus with hyperglycemia, without long-term current use of insulin (H)     "  losartan (COZAAR) 50 MG tablet Take 50 mg by mouth daily      pravastatin (PRAVACHOL) 20 MG tablet Take 20 mg by mouth daily       !! - Potential duplicate medications found. Please discuss with provider.      STOP taking these medications       glipiZIDE (GLUCOTROL XL) 5 MG 24 hr tablet Comments:   Reason for Stopping:         metFORMIN (GLUCOPHAGE-XR) 500 MG 24 hr tablet Comments:   Reason for Stopping:                 Allergies:       No Known Allergies        Consultations This Hospital Stay:      No consultations were requested during this admission        Condition and Physical on Discharge:      Discharge condition: Stable   Vitals: Blood pressure (!) 167/84, pulse 93, temperature 97.9  F (36.6  C), temperature source Oral, resp. rate 16, height 1.518 m (4' 11.75\"), weight 54.4 kg (120 lb), SpO2 99 %, not currently breastfeeding.     Constitutional:  Alert awake, not in acute distress   Lungs:  Clear to auscultation, no rales rhonchi or wheezing, good air entry no respiratory distress   Cardiovascular:  Normal rate rhythm regular, no murmurs rubs or gallops   Abdomen:  Soft, nontender, nondistended, no concern for Pilar   Skin:  Warm and dry   Other:          Discharge Time:      Greater than 30 minutes.        Image Results From This Hospital Stay (For Non-EPIC Providers):        Results for orders placed or performed during the hospital encounter of 02/23/21   XR Chest 2 Views    Narrative    CHEST TWO VIEW   2/23/2021 6:25 PM     HISTORY: Chest pain.    COMPARISON: None.      Impression    IMPRESSION: PA and lateral views of the chest. Lungs are clear. Heart  is normal in size. No effusions are evident. No pneumothorax.    SVETA BAPTISTE MD   CT Angiogram coronary artery    Narrative    Procedure: CT ANGIO CORONARY ARTERY   Examination Date:     Indication:  Chest pain.     Clinical Information: chest pain, abnormal stress echo, 105cc optiray  350 ,(wasted  45cc)   Ordering Physician: Dr Aster Souza " quality of the study: Borderline adequate.     PROCEDURE:The patient was positioned in the scanner gantry and an IV  was started using an 18 gauge IV in the right antecubital fossa.   Utilizing 120 cc  Isovue 370, wasted 0 cc, multi-slice computed  tomography was performed with a Siemens Dual Source Flash scanner  without incident. Beta-blockers were required to optimize heart rate,  patient was given Metoprolol 100 mg Oral, Metoprolol 40 mg  IV. The  patient was given pre-medication of sublingual Nitrostat 0.4 mg prior  to scanning. Coronary artery calcium score was performed using the  Flash scanner protocol. CTA was performed in the spiral mode at a  heart rate of 78 bpm with 100 kVp. Images were reconstructed and  analyzed on a [employer] workstation. Scan protocol was optimized to  minimize radiation exposure. The total radiation exposure including  calcium score was calculated to be 180 DLP, and 2.9 mSv.      Impression    IMPRESSION:    1. Total Agatston score 0, placing the patient in the lowest  percentile when compared to age and gender matched control group.    2. Mild nonobstructive coronary artery disease.     3.  Please review Radiology report for incidental noncardiac findings  that  will follow separately.        FINDINGS:    CORONARY CALCIUM SCORE: The total Agatston calcium score is 0, Left  main: 0, left anterior descendin,  circumflex: 0, right coronary  artery: 0. This places the patient in the lowest percentile when  compared to age and gender matched control group.    CORONARY CT ANGIOGRAPHY    DOMINANCE: Right dominant system.     LEFT MAIN: The left main arises normally from the left coronary cusp  and is widely patent without any stenosis or plaque.    LEFT ANTERIOR DESCENDING: The left anterior descending and its major  diagonal branches are widely patent without any detectable stenosis or  plaque.      CIRCUMFLEX: Circumflex proper appears patent with mild luminal  irregularities.  There is a large first obtuse marginal artery with a  mild stenosis in its midportion. This is due to the presence of a soft  plaque. However the accuracy of this finding is reduced as there is a  step artifact.    RIGHT CORONARY ARTERY: The right coronary artery and its major  branches are widely patent without any detectable stenosis or plaque.    ADDITIONAL FINDINGS:     The proximal ascending aorta is normal in size.     Normal pulmonary venous anatomy with pulmonary veins draining into the  left atrium.      There is no left ventricular mass or thrombus.     Normal pericardial thickness. There is no pericardial effusion.    The proximal pulmonary arteries are well opacified.    Please review Radiology report for incidental noncardiac findings that  will follow separately.    IVY BENITES MD   Radiologist Consult For Cardiology    Narrative    RADIOLOGIST CONSULT FOR CARDIOLOGY  2/24/2021 2:50 PM     HISTORY: Chest pain, hypertensive emergency.    COMPARISON: None.    TECHNIQUE: Volumetric helical acquisition of CT images of the chest  from the clavicles to the kidneys were acquired without IV contrast.  Radiation dose for this scan was reduced using automated exposure  control, adjustment of the mA and/or kV according to patient size, or  iterative reconstruction technique.      Impression    IMPRESSION: Nonspecific enhancing focus in the central liver measuring  1.1 cm, image 54 series 11. Consider liver MRI for further evaluation.  Lungs are clear of infiltrate or effusions. Please see cardiology  report for cardiac and vascular findings.             Most Recent Lab Results In EPIC (For Non-EPIC Providers):    Most Recent 3 CBC's:  Recent Labs   Lab Test 02/25/21  0821 02/23/21  1454 01/18/19  0900   WBC 13.1* 9.2 12.2*   HGB 15.0 17.5* 17.0*   MCV 91 91 93    165 178      Most Recent 3 BMP's:  Recent Labs   Lab Test 02/25/21  0821 02/23/21  1454 04/30/19  0910    133 134   POTASSIUM 3.7 3.7 3.8    CHLORIDE 105 99 103   CO2 25 26 26   BUN 13 9 8   CR 0.44* 0.50* 0.48*   ANIONGAP 6 8 5   ESTELLE 8.2* 10.1 8.7   * 385* 174*     Most Recent 3 Troponin's:  Recent Labs   Lab Test 02/24/21  0544 02/24/21  0258 02/23/21  2312   TROPI <0.015 <0.015 <0.015     Most Recent 3 INR's:No lab results found.  Most Recent 2 LFT's:  Recent Labs   Lab Test 02/23/21  1812 01/18/19  0900   AST 72* 49*   * 80*   ALKPHOS 139 122   BILITOTAL 0.6 0.7     Most Recent Cholesterol Panel:  Recent Labs   Lab Test 04/30/19  0910   CHOL 145   LDL 76   HDL 25*   TRIG 218*     Most Recent 6 Bacteria Isolates From Any Culture (See EPIC Reports for Culture Details):  Recent Labs   Lab Test 09/22/16  2322 09/19/16  1130   CULT Canceled, Test credited Specimen improperly labeled  CALLED ER ASKED FOR RECOLLECT   >100,000 colonies/mL Klebsiella pneumoniae ssp pneumoniae*     Most Recent TSH, T4 and HgbA1c:   Recent Labs   Lab Test 02/23/21  1812 01/18/19  0900 01/18/19  0900   TSH  --   --  4.51*   T4  --   --  1.26   A1C 11.1*   < > 10.8*    < > = values in this interval not displayed.

## 2021-02-25 NOTE — PLAN OF CARE
DATE & TIME: 2/25/21 (0918-4016)          Cognitive Concerns/ Orientation : A&O x 4   BEHAVIOR & AGGRESSION TOOL COLOR: Green     ABNL VS/O2: VSS on room air (BP elevated at times, scheduled BP medications given, pt anxious about going home and with new medications and insulin, reassured pt and lengthy education provided with understanding.  MOBILITY: Independent, ambulating in room   PAIN MANAGMENT: Denies   DIET: Mod CHO (Tolerating diet), Instructed pt on insulin injections with understanding.  Pt demonstrated how to give insulin shots with out any problems and with understanding.   BOWEL/BLADDER: Continent  ABNL LAB/BG:  & 249  DRAIN/DEVICES: PIV discontinued at discharge   TELEMETRY RHYTHM: NSR  SKIN: Bruises  TESTS/PROCEDURES: N/a  D/C DAY/GOALS/PLACE: Discharge home today.  Reviewed discharge instructions with pt with understanding.  Prescriptions to be given prior to discharge.  Iv discontinued.  Follow up appt scheduled for next week with primary MD.  OTHER IMPORTANT INFO: Denied chest pain

## 2021-02-25 NOTE — CONSULTS
[facility]    Cardiology Consultation     Date of Admission:  2/23/2021    Assessment & Plan     1.  Hypertensive urgency  2.  Chest pain with normal coronary anatomy by CTA  3.  Calcium score coronary is 0  4.  Hyperosmolar hyperglycemia with type 2 diabetes mellitus, uncontrolled  5.  Hyperlipidemia    Recommendations    1.  We have reviewed her recent cardiac testing as well as her clinical presentation.  I do not feel this represents an ACS and would focus her care toward blood pressure control.  As she is on a multidrug regimen with still elevated blood pressure I would recommend transferring to CICU with initiation of intravenous nitroglycerin with up titration.  Hopefully over the next 12 to 24 hours we can get better blood pressure control    2.  We will order echocardiogram for tomorrow    3.  Diabetes mellitus: Defer to our hospitalist colleagues    Thank you kindly for consult            Guerline Ballesteros MD          HPI:    Patient is a 60-year-old female who the cardiology service is consulted on regarding her chest pain and difficulty control blood pressure.  Patient has been off of all her medications for 1 year due to financial reasons.  She also due to Covid has not seen her primary care physician during this timeframe.  She has a notable history of diabetes mellitus, hypertension and hyperlipidemia.  She also had noted some chest discomfort which is difficult for her to explain but she mentions a sharp ache substernal.  She eventually sought medical attention at an urgent care was given aspirin with some resolution of her pain nonspecific findings on her EKG was noted and she also had significant anxiety.  She was sent from urgent care to [facility] over further assessment.  She was found to have a significantly elevated glucose and she had treatment directed toward this.  She also had mentioned chest pain upon her evaluation and work-up included a coronary  CTA which demonstrates a calcium score of 0 and no significant obstructive coronary artery disease.  The ascending aorta appear to be normal in size.  She has been reintroduced on a regimen however despite this her blood pressure is inadequately controlled.  Intermittently she also reports of her chest pain that brought her into the hospital.  Fortunately her troponins have been negative.  Due to this cardiology services subsequently consulted      CTA:     FINDINGS:     CORONARY CALCIUM SCORE: The total Agatston calcium score is 0, Left  main: 0, left anterior descendin,  circumflex: 0, right coronary  artery: 0. This places the patient in the lowest percentile when  compared to age and gender matched control group.     CORONARY CT ANGIOGRAPHY     DOMINANCE: Right dominant system.      LEFT MAIN: The left main arises normally from the left coronary cusp  and is widely patent without any stenosis or plaque.     LEFT ANTERIOR DESCENDING: The left anterior descending and its major  diagonal branches are widely patent without any detectable stenosis or  plaque.      CIRCUMFLEX: Circumflex proper appears patent with mild luminal  irregularities. There is a large first obtuse marginal artery with a  mild stenosis in its midportion. This is due to the presence of a soft  plaque. However the accuracy of this finding is reduced as there is a  step artifact.     RIGHT CORONARY ARTERY: The right coronary artery and its major  branches are widely patent without any detectable stenosis or plaque.     ADDITIONAL FINDINGS:      The proximal ascending aorta is normal in size.      Normal pulmonary venous anatomy with pulmonary veins draining into the  left atrium.       There is no left ventricular mass or thrombus        Primary Care Physician   Paulette Travis      Patient Active Problem List   Diagnosis     Migraine     Hand arthritis     Stenosing tenosynovitis     Poorly controlled type 2 diabetes mellitus with renal  complication (H)     Hyperlipidemia LDL goal <70     Essential hypertension with goal blood pressure less than 140/90     Nephrolithiasis     Type 2 diabetes mellitus with hyperglycemia, without long-term current use of insulin (H)     Chest pain     Transaminitis     Hyperglycemia     Hypertensive urgency       Past Medical History   I have reviewed this patient's medical history and updated it with pertinent information if needed.   History reviewed. No pertinent past medical history.    Past Surgical History   I have reviewed this patient's surgical history and updated it with pertinent information if needed.  Past Surgical History:   Procedure Laterality Date     HYSTERECTOMY TOTAL ABDOMINAL  1995    due to fibroids     LAPAROSCOPIC EVACUATION ECTOPIC PREGNANCY       TUBAL LIGATION         Prior to Admission Medications   Prior to Admission Medications   Prescriptions Last Dose Informant Patient Reported? Taking?   acetaminophen (TYLENOL) 325 MG tablet Past Month at Unknown time Self Yes Yes   Sig: Take 325-650 mg by mouth every 6 hours as needed for mild pain   blood glucose (NO BRAND SPECIFIED) lancets standard  Self No No   Sig: Use to test blood sugar twice times daily or as directed.   Patient not taking: Reported on 2/23/2021   blood glucose (NO BRAND SPECIFIED) test strip  Self No No   Sig: Use to test blood sugars 2 times daily or as directed   Patient not taking: Reported on 2/23/2021   blood glucose (NO BRAND SPECIFIED) test strip  Self No No   Sig: Use to test blood sugar twice times daily or as directed.   Patient not taking: Reported on 2/23/2021   blood glucose monitoring (ACCU-CHEK FASTCLIX) lancets  Self No No   Sig: Use to test blood sugar 2 times daily or as directed.  Ok to substitute alternative if insurance prefers.   Patient not taking: Reported on 2/23/2021   blood glucose monitoring (ONE TOUCH DELICA) lancets  Self No No   Sig: Use to test blood sugars 2 times daily or as directed.    Patient not taking: Reported on 2/23/2021   blood glucose monitoring (ONETOUCH VERIO) meter device kit  Self No No   Sig: Use to test blood sugars 2 times daily or as directed.   Patient not taking: Reported on 2/23/2021   glipiZIDE (GLUCOTROL XL) 5 MG 24 hr tablet More than a month at Unknown time Self Yes No   Sig: Take 10 mg by mouth daily   losartan (COZAAR) 50 MG tablet More than a month at Unknown time Self Yes No   Sig: Take 50 mg by mouth daily   metFORMIN (GLUCOPHAGE-XR) 500 MG 24 hr tablet More than a month at Unknown time Self Yes No   Sig: Take 1,000 mg by mouth 2 times daily (with meals)   pravastatin (PRAVACHOL) 20 MG tablet More than a month at Unknown time Self Yes No   Sig: Take 20 mg by mouth daily      Facility-Administered Medications: None     Current Facility-Administered Medications   Medication Dose Route Frequency     amLODIPine  5 mg Oral BID     carvedilol  6.25 mg Oral BID w/meals     insulin aspart  10 Units Subcutaneous TID w/meals     insulin aspart  1-7 Units Subcutaneous TID AC     insulin aspart  1-5 Units Subcutaneous At Bedtime     [START ON 2/26/2021] insulin glargine  20 Units Subcutaneous QAM AC     losartan  50 mg Oral Daily     pravastatin  20 mg Oral Daily     sertraline  25 mg Oral Daily     Current Facility-Administered Medications   Medication Last Rate     Allergies   No Known Allergies    Social History    reports that she quit smoking about 10 years ago. Her smoking use included cigarettes. She has a 10.00 pack-year smoking history. She has never used smokeless tobacco. She reports that she does not drink alcohol or use drugs.    Family History   Family History   Problem Relation Age of Onset     Cirrhosis Mother         w/o alcohol history     Emphysema Father      Hypertension Sister      Heart Defect Niece         Tetrology of Fallot     Breast Cancer No family hx of      Cancer - colorectal No family hx of        Review of Systems   The comprehensive 10 point  "Review of Systems is negative other than noted in the HPI or here.     Physical Exam   Vital Signs with Ranges  Temp:  [97.9  F (36.6  C)-98.7  F (37.1  C)] 98.5  F (36.9  C)  Pulse:  [83-97] 97  Resp:  [16-18] 16  BP: (123-201)/() 201/100  SpO2:  [97 %-99 %] 98 %  Wt Readings from Last 4 Encounters:   02/23/21 54.4 kg (120 lb)   02/23/21 54.4 kg (120 lb)   09/07/19 58.1 kg (128 lb)   04/30/19 56.7 kg (125 lb)     I/O last 3 completed shifts:  In: 2181 [P.O.:240; I.V.:1941]  Out: -       Vitals: BP (!) 201/100 (BP Location: Right arm)   Pulse 97   Temp 98.5  F (36.9  C) (Oral)   Resp 16   Ht 1.518 m (4' 11.75\")   Wt 54.4 kg (120 lb)   SpO2 98%   BMI 23.63 kg/m      GENERAL: Healthy, alert and no distress  EYES: Eyes grossly normal to inspection.  No discharge or erythema, or obvious scleral/conjunctival abnormalities.  RESP: No audible wheeze, cough, or visible cyanosis.  No visible retractions or increased work of breathing.    SKIN: Visible skin clear. No significant rash, abnormal pigmentation or lesions.  NEURO: Cranial nerves grossly intact.  Mentation and speech appropriate for age.  PSYCH: Mentation appears normal, affect normal/bright, judgement and insight intact, normal speech and appearance well-groomed.    Recent Labs   Lab 02/24/21  0544 02/24/21  0258 02/23/21  2312 02/23/21  1454   TROPI <0.015 <0.015 <0.015 <0.015       Recent Labs   Lab 02/25/21  0821 02/24/21  0544 02/24/21  0258 02/23/21  2312 02/23/21  1812 02/23/21  1454   WBC 13.1*  --   --   --   --  9.2   HGB 15.0  --   --   --   --  17.5*   MCV 91  --   --   --   --  91     --   --   --   --  165     --   --   --   --  133   POTASSIUM 3.7  --   --   --   --  3.7   CHLORIDE 105  --   --   --   --  99   CO2 25  --   --   --   --  26   BUN 13  --   --   --   --  9   CR 0.44*  --   --   --   --  0.50*   GFRESTIMATED >90  --   --   --   --  >90   GFRESTBLACK >90  --   --   --   --  >90   ANIONGAP 6  --   --   --   --  8 "   ESTELLE 8.2*  --   --   --   --  10.1   *  --   --   --   --  385*   ALBUMIN  --   --   --   --  4.5  --    PROTTOTAL  --   --   --   --  8.5  --    BILITOTAL  --   --   --   --  0.6  --    ALKPHOS  --   --   --   --  139  --    ALT  --   --   --   --  103*  --    AST  --   --   --   --  72*  --    TROPI  --  <0.015 <0.015 <0.015  --  <0.015     Recent Labs   Lab Test 04/30/19  0910 01/18/19  0900   CHOL 145 187   HDL 25* 29*   LDL 76 103*   TRIG 218* 277*     Recent Labs   Lab 02/25/21  0821 02/23/21  1454   WBC 13.1* 9.2   HGB 15.0 17.5*   HCT 42.8 48.9*   MCV 91 91    165     No results for input(s): PH, PHV, PO2, PO2V, SAT, PCO2, PCO2V, HCO3, HCO3V in the last 168 hours.  No results for input(s): NTBNPI, NTBNP in the last 168 hours.  No results for input(s): DD in the last 168 hours.  No results for input(s): SED, CRP in the last 168 hours.  Recent Labs   Lab 02/25/21  0821 02/23/21  1454    165     No results for input(s): TSH in the last 168 hours.  No results for input(s): COLOR, APPEARANCE, URINEGLC, URINEBILI, URINEKETONE, SG, UBLD, URINEPH, PROTEIN, UROBILINOGEN, NITRITE, LEUKEST, RBCU, WBCU in the last 168 hours.    Imaging:  Recent Results (from the past 48 hour(s))   XR Chest 2 Views    Narrative    CHEST TWO VIEW   2/23/2021 6:25 PM     HISTORY: Chest pain.    COMPARISON: None.      Impression    IMPRESSION: PA and lateral views of the chest. Lungs are clear. Heart  is normal in size. No effusions are evident. No pneumothorax.    SVETA BAPTISTE MD   CT Angiogram coronary artery    Narrative    Procedure: CT ANGIO CORONARY ARTERY   Examination Date:     Indication:  Chest pain.     Clinical Information: chest pain, abnormal stress echo, 105cc optiray  350 ,(wasted  45cc)   Ordering Physician: Dr Rodarte     Overall quality of the study: Borderline adequate.     PROCEDURE:The patient was positioned in the scanner gantry and an IV  was started using an 18 gauge IV in the right antecubital  fossa.   Utilizing 120 cc  Isovue 370, wasted 0 cc, multi-slice computed  tomography was performed with a Siemens Dual Source Flash scanner  without incident. Beta-blockers were required to optimize heart rate,  patient was given Metoprolol 100 mg Oral, Metoprolol 40 mg  IV. The  patient was given pre-medication of sublingual Nitrostat 0.4 mg prior  to scanning. Coronary artery calcium score was performed using the  Flash scanner protocol. CTA was performed in the spiral mode at a  heart rate of 78 bpm with 100 kVp. Images were reconstructed and  analyzed on a [employer] workstation. Scan protocol was optimized to  minimize radiation exposure. The total radiation exposure including  calcium score was calculated to be 180 DLP, and 2.9 mSv.      Impression    IMPRESSION:    1. Total Agatston score 0, placing the patient in the lowest  percentile when compared to age and gender matched control group.    2. Mild nonobstructive coronary artery disease.     3.  Please review Radiology report for incidental noncardiac findings  that  will follow separately.        FINDINGS:    CORONARY CALCIUM SCORE: The total Agatston calcium score is 0, Left  main: 0, left anterior descendin,  circumflex: 0, right coronary  artery: 0. This places the patient in the lowest percentile when  compared to age and gender matched control group.    CORONARY CT ANGIOGRAPHY    DOMINANCE: Right dominant system.     LEFT MAIN: The left main arises normally from the left coronary cusp  and is widely patent without any stenosis or plaque.    LEFT ANTERIOR DESCENDING: The left anterior descending and its major  diagonal branches are widely patent without any detectable stenosis or  plaque.      CIRCUMFLEX: Circumflex proper appears patent with mild luminal  irregularities. There is a large first obtuse marginal artery with a  mild stenosis in its midportion. This is due to the presence of a soft  plaque. However the accuracy of this finding is  reduced as there is a  step artifact.    RIGHT CORONARY ARTERY: The right coronary artery and its major  branches are widely patent without any detectable stenosis or plaque.    ADDITIONAL FINDINGS:     The proximal ascending aorta is normal in size.     Normal pulmonary venous anatomy with pulmonary veins draining into the  left atrium.      There is no left ventricular mass or thrombus.     Normal pericardial thickness. There is no pericardial effusion.    The proximal pulmonary arteries are well opacified.    Please review Radiology report for incidental noncardiac findings that  will follow separately.    IVY BENITES MD   Radiologist Consult For Cardiology    Narrative    RADIOLOGIST CONSULT FOR CARDIOLOGY  2/24/2021 2:50 PM     HISTORY: Chest pain, hypertensive emergency.    COMPARISON: None.    TECHNIQUE: Volumetric helical acquisition of CT images of the chest  from the clavicles to the kidneys were acquired without IV contrast.  Radiation dose for this scan was reduced using automated exposure  control, adjustment of the mA and/or kV according to patient size, or  iterative reconstruction technique.      Impression    IMPRESSION: Nonspecific enhancing focus in the central liver measuring  1.1 cm, image 54 series 11. Consider liver MRI for further evaluation.  Lungs are clear of infiltrate or effusions. Please see cardiology  report for cardiac and vascular findings.       Echo:  No results found for this or any previous visit (from the past 4320 hour(s)).

## 2021-02-25 NOTE — PLAN OF CARE
DATE & TIME: 2/24/21, 5210 - 9219   Cognitive Concerns/ Orientation : A&O x 4   BEHAVIOR & AGGRESSION TOOL COLOR: Green   ABNL VS/O2: VSS on room air  MOBILITY: Independent  PAIN MANAGMENT: Mild headache rating 3/10. Declined interventions  DIET: Mod CHO  BOWEL/BLADDER: Continent  ABNL LAB/BG:   DRAIN/DEVICES: PIV infusing at 100 ml/hr  TELEMETRY RHYTHM: SR  SKIN: Bruises  TESTS/PROCEDURES: N/a  D/C DAY/GOALS/PLACE: Possible discharge today  OTHER IMPORTANT INFO: Denied chest pain

## 2021-02-25 NOTE — PROGRESS NOTES
Went into pt's room to give extra dose Norvasc that was ordered.  BP at that time was 187/111, extra dose Norvasc given.  Paged MD in regards to BP and pt C/o intermittent chest pains.  MD cancelled discharge and will order additional BP medications and cardiology consult.  Continue to monitor.

## 2021-02-25 NOTE — PROGRESS NOTES
[facility]    Hospitalist Progress Note    Date of Service (when I saw the patient): 02/25/2021    Assessment & Plan   Bradford Prado is a 60 year old female who was admitted on 2/23/2021.  Bradford Prado was admitted on 2/23/2021 by Clyde Grey MD and I would refer you to their history and physical.  The following problems were addressed during her hospitalization:  Assessment & Plan     Bradford Prado is a 60 year old female who was admitted on 2/23/2021.  ASSESSMENT:  Bradford Prado is 60, with history of hypertension, diabetes, hypercholesterolemia, medically noncompliant, off all her medications in the last year due to COVID concerns about her going into clinic, now admitted hypertensive with elevated blood glucose and is being admitted for hypertensive emergency with chest pain significantly hypertensive urgency.  And her blood sugars have been running high in the 380s with high osmolality        Hyperosmolar hyperglycemia with type 2 diabetes mellitus uncontrolled with hemoglobin A1c of 11.1% on admission;  Blood sugars running in the 380s  Her osmolality is high at 304  She was noncompliant with medications due to none follow-up with her primary care provider due to COVID-19  Patient is at high risk of dehydration with these high blood sugars  Given IV normal saline 500cc bolus and started on IV fluids at 100 mL/h  Started her on Lantus 20 units every morning and NovoLog 8 units 3 times daily with meals and sliding scale insulin medium dose with NovoLog 3 times a day with meals and at bedtime  Follow blood sugars closely and adjust insulin as needed  Blood sugars are well controlled with the current regimen  Stopped IV fluids today  We will discharge her with insulin regimen as noted above on medium dose sliding scale insulin with NovoLog  Patient might be able to go off of insulin once her hemoglobin A1c is controlled in the long-term     Hypertensive emergency with blood blood  pressure as high as 227/125;  Chest pain in the setting of hypertensive emergency;  Patient was continued on losartan 50 mg p.o. daily PTA medication on admission  As needed IV hydralazine added  Patient needed multiple doses of IV hydralazine and 1 dose of IV Ativan to bring the blood pressure down overnight  Start her on Norvasc 5 mg p.o. BID  today for better blood pressure control  Monitor blood pressure closely and adjust medications as needed  Blood sugars are well controlled with the current regimen of Norvasc and losartan which will be prescribed for her to discharge  Blood pressure still running high with systolics 180s over 110s so will add Coreg 6.25 mg p.o. twice daily today    Chest pain in the setting of hypertensive emergency and anxiety;  EKG on admission showed showed nonspecific ST-T wave changes  Serial troponins remain negative  CTA coronary angiogram done showed mild nonobstructive coronary artery disease    Patient complains of intermittent chest pains with the hypertension with a negative CT coronary angiogram will request a cardiology consultation for possible angiogram with ongoing intermittent chest pains    There was 1.1 cm nonspecific enhancing focus in the central liver consider outpatient MRI for further evaluation  Lungs are clear of infiltrate or effusions     On further discussion with the patient today she feels anxious when she thinks anxiety might be contributing to her symptoms of chest fluttering sensation and chest pain so she was started on Zoloft 25 mg p.o. daily for 3 days and increase to 50 mg p.o. daily     Erythrocytosis;  With hemoglobin of 17.5 most likely secondary to dehydration in the setting of hyperglycemia contributing  With IV fluids hydration hemoglobin improved to 15 today     3.  Hyperlipidemia:  We  Resumed  the patient's Pravachol.  Her AST and ALT are slightly elevated.  These will need to be monitored on an outpatient basis as well.  She has no abdominal  pain or discomfort.         4.  History of kidney stones:  Currently, no hematuria or symptoms suggestive of kidney stones.      DEEP VENOUS THROMBOSIS PROPHYLAXIS:  The patient will receive compression boots.      CODE STATUS:  FULL.      Disposition:  Home in the next 1-2 after blood pressure well controlled and chest pain evaluation with cardiology.  with close follow-up with her primary care provider within a week     Discussed with bedside RN, patient today     Greater than 30 minutes were spent in reviewing the chart, counseling the patient in collaboration of care today     Swati Rodarte MD  [phone] (P)        Swati Rodarte MD  [phone] (P)      Interval History   Patient's blood pressure is still running high.  Blood pressure is better controlled today.  Complains of ongoing intermittent chest pains in the middle of the chest.  Denies any shortness of breath.  No nausea vomiting    -Data reviewed today: I reviewed all new labs and imaging results over the last 24 hours. I personally reviewed no images or EKG's today.    Physical Exam   Temp: 98.4  F (36.9  C) Temp src: Oral BP: (!) 187/111 Pulse: 87   Resp: 18 SpO2: 97 % O2 Device: None (Room air)    Vitals:    02/23/21 1646   Weight: 54.4 kg (120 lb)     Vital Signs with Ranges  Temp:  [97.9  F (36.6  C)-98.7  F (37.1  C)] 98.4  F (36.9  C)  Pulse:  [83-93] 87  Resp:  [16-18] 18  BP: (123-187)/() 187/111  SpO2:  [97 %-99 %] 97 %  I/O last 3 completed shifts:  In: 2181 [P.O.:240; I.V.:1941]  Out: -     Constitutional: Awake, alert, cooperative, no apparent distress  Respiratory: Clear to auscultation bilaterally, no crackles or wheezing  Cardiovascular: Regular rate and rhythm, normal S1 and S2, and no murmur noted  GI: Normal bowel sounds, soft, non-distended, non-tender  Skin/Integumen: No rashes, no cyanosis, no edema  Other:     Medications       amLODIPine  5 mg Oral BID     carvedilol  6.25 mg Oral BID w/meals     insulin aspart  10  Units Subcutaneous TID w/meals     insulin aspart  1-7 Units Subcutaneous TID AC     insulin aspart  1-5 Units Subcutaneous At Bedtime     [START ON 2/26/2021] insulin glargine  20 Units Subcutaneous QAM AC     losartan  50 mg Oral Daily     pravastatin  20 mg Oral Daily     sertraline  25 mg Oral Daily       Data   Recent Labs   Lab 02/25/21  0821 02/24/21  0544 02/24/21  0258 02/23/21  2312 02/23/21  1812 02/23/21  1454   WBC 13.1*  --   --   --   --  9.2   HGB 15.0  --   --   --   --  17.5*   MCV 91  --   --   --   --  91     --   --   --   --  165     --   --   --   --  133   POTASSIUM 3.7  --   --   --   --  3.7   CHLORIDE 105  --   --   --   --  99   CO2 25  --   --   --   --  26   BUN 13  --   --   --   --  9   CR 0.44*  --   --   --   --  0.50*   ANIONGAP 6  --   --   --   --  8   ESTELLE 8.2*  --   --   --   --  10.1   *  --   --   --   --  385*   ALBUMIN  --   --   --   --  4.5  --    PROTTOTAL  --   --   --   --  8.5  --    BILITOTAL  --   --   --   --  0.6  --    ALKPHOS  --   --   --   --  139  --    ALT  --   --   --   --  103*  --    AST  --   --   --   --  72*  --    TROPI  --  <0.015 <0.015 <0.015  --  <0.015       No results found for this or any previous visit (from the past 24 hour(s)).

## 2021-02-26 ENCOUNTER — APPOINTMENT (OUTPATIENT)
Dept: CARDIOLOGY | Facility: CLINIC | Age: 61
End: 2021-02-26
Attending: INTERNAL MEDICINE
Payer: COMMERCIAL

## 2021-02-26 LAB
GLUCOSE BLDC GLUCOMTR-MCNC: 185 MG/DL (ref 70–99)
GLUCOSE BLDC GLUCOMTR-MCNC: 228 MG/DL (ref 70–99)
GLUCOSE BLDC GLUCOMTR-MCNC: 233 MG/DL (ref 70–99)
GLUCOSE BLDC GLUCOMTR-MCNC: 256 MG/DL (ref 70–99)
GLUCOSE BLDC GLUCOMTR-MCNC: 256 MG/DL (ref 70–99)

## 2021-02-26 PROCEDURE — 210N000002 HC R&B HEART CARE

## 2021-02-26 PROCEDURE — 999N000208 ECHOCARDIOGRAM COMPLETE

## 2021-02-26 PROCEDURE — 250N000012 HC RX MED GY IP 250 OP 636 PS 637: Performed by: INTERNAL MEDICINE

## 2021-02-26 PROCEDURE — 255N000002 HC RX 255 OP 636: Performed by: INTERNAL MEDICINE

## 2021-02-26 PROCEDURE — 99232 SBSQ HOSP IP/OBS MODERATE 35: CPT | Mod: 25 | Performed by: INTERNAL MEDICINE

## 2021-02-26 PROCEDURE — 250N000013 HC RX MED GY IP 250 OP 250 PS 637: Performed by: INTERNAL MEDICINE

## 2021-02-26 PROCEDURE — 99233 SBSQ HOSP IP/OBS HIGH 50: CPT | Performed by: INTERNAL MEDICINE

## 2021-02-26 PROCEDURE — 999N001017 HC STATISTIC GLUCOSE BY METER IP

## 2021-02-26 PROCEDURE — 93306 TTE W/DOPPLER COMPLETE: CPT | Mod: 26 | Performed by: INTERNAL MEDICINE

## 2021-02-26 RX ORDER — CARVEDILOL 12.5 MG/1
12.5 TABLET ORAL 2 TIMES DAILY WITH MEALS
Status: DISCONTINUED | OUTPATIENT
Start: 2021-02-26 | End: 2021-02-27 | Stop reason: HOSPADM

## 2021-02-26 RX ORDER — PRAVASTATIN SODIUM 20 MG
20 TABLET ORAL AT BEDTIME
Status: DISCONTINUED | OUTPATIENT
Start: 2021-02-27 | End: 2021-02-26

## 2021-02-26 RX ORDER — CARVEDILOL 6.25 MG/1
6.25 TABLET ORAL ONCE
Status: COMPLETED | OUTPATIENT
Start: 2021-02-26 | End: 2021-02-26

## 2021-02-26 RX ORDER — CARVEDILOL 12.5 MG/1
12.5 TABLET ORAL 2 TIMES DAILY WITH MEALS
Status: DISCONTINUED | OUTPATIENT
Start: 2021-02-26 | End: 2021-02-26

## 2021-02-26 RX ADMIN — AMLODIPINE BESYLATE 5 MG: 5 TABLET ORAL at 21:10

## 2021-02-26 RX ADMIN — SERTRALINE HYDROCHLORIDE 25 MG: 25 TABLET ORAL at 08:18

## 2021-02-26 RX ADMIN — INSULIN GLARGINE 20 UNITS: 100 INJECTION, SOLUTION SUBCUTANEOUS at 09:11

## 2021-02-26 RX ADMIN — PRAVASTATIN SODIUM 20 MG: 20 TABLET ORAL at 08:18

## 2021-02-26 RX ADMIN — CARVEDILOL 6.25 MG: 6.25 TABLET, FILM COATED ORAL at 08:26

## 2021-02-26 RX ADMIN — CARVEDILOL 6.25 MG: 6.25 TABLET, FILM COATED ORAL at 08:18

## 2021-02-26 RX ADMIN — CARVEDILOL 12.5 MG: 12.5 TABLET, FILM COATED ORAL at 17:57

## 2021-02-26 RX ADMIN — HUMAN ALBUMIN MICROSPHERES AND PERFLUTREN 9 ML: 10; .22 INJECTION, SOLUTION INTRAVENOUS at 11:47

## 2021-02-26 RX ADMIN — INSULIN ASPART 2 UNITS: 100 INJECTION, SOLUTION INTRAVENOUS; SUBCUTANEOUS at 21:10

## 2021-02-26 RX ADMIN — LOSARTAN POTASSIUM 50 MG: 50 TABLET, FILM COATED ORAL at 08:18

## 2021-02-26 RX ADMIN — AMLODIPINE BESYLATE 5 MG: 5 TABLET ORAL at 08:18

## 2021-02-26 RX ADMIN — INSULIN GLARGINE 10 UNITS: 100 INJECTION, SOLUTION SUBCUTANEOUS at 13:48

## 2021-02-26 ASSESSMENT — MIFFLIN-ST. JEOR: SCORE: 1021.43

## 2021-02-26 NOTE — PROGRESS NOTES
[facility]  Cardiology Progress Note    Date of Service (when I saw the patient): 02/26/2021     Assessment & Plan   Bradford Prado is a 60 year old female who was admitted on 2/23/2021.     1.  : Hypertensive urgency   - Well controlled with addition of Carvedilol and amlodipine, up titrated today  - PTA losartan   - Strict low sodium diet.  - Educated on medication compliance.  - Anticipate discharge today    2.  : Chest pain   - Now resolved  - Negative troponin x 4  - CTA showed a total calcium score of 0, mild nonobstructive CAD.   - Echocardiogram demonstrates normal EF 60-65%, normal RV size and function, and no significant valvular disease.     3.  : Diabetes  - A1C 11.1  - on Insulin   - Managed per general medicine    OPAL Willingham CNP  Text Page  (M-F, 7:30 am - 4:00 pm)    Interval History   No cardiac complaint, denies chest pain, shortness of breath, palpitations, PND, orthopnea, presyncope or LE edema. Blood pressures controlled on up titrated doses of carvedilol, amlodipine, and losartan. CTA neg. Echo results pending. Educated on medication compliance and risk modification. Anticipate discharge today.    Physical Exam   Temp: 98.3  F (36.8  C) Temp src: Oral BP: 137/85 Pulse: 87   Resp: 16 SpO2: 98 % O2 Device: None (Room air)    Vitals:    02/23/21 1646 02/26/21 0615   Weight: 54.4 kg (120 lb) 52.4 kg (115 lb 8 oz)     Vital Signs with Ranges  Temp:  [98  F (36.7  C)-98.5  F (36.9  C)] 98.3  F (36.8  C)  Pulse:  [] 87  Resp:  [10-25] 16  BP: (128-201)/() 137/85  SpO2:  [97 %-98 %] 98 %  I/O last 3 completed shifts:  In: 220 [P.O.:220]  Out: 1300 [Urine:1300]    GEN:  In general, this is a well nourished female in no acute distress. Patient ambulatory.  HEENT:  Pupils equal, round. Sclerae nonicteric. Clear oropharynx. Mucous membranes moist.  NECK: Supple, no masses appreciated. Trachea midline.   C/V:  Regular rate and rhythm, no murmur, rub or gallop.  No S3 or RV heave.   RESP: Respirations are unlabored. No use of accessory muscles. Clear to auscultation bilaterally without wheezing, rales, or rhonchi.  GI: Abdomen soft, nontender, nondistended. No HSM appreciated.   EXTREM: No LE edema. No cyanosis or clubbing.  NEURO: Alert and oriented, cooperative.  No obvious focal deficits.   PSYCH: Normal affect.  SKIN: Warm and dry. No rashes or petechiae appreciated.       Medications     nitroGLYcerin Stopped (02/25/21 1929)       amLODIPine  5 mg Oral BID     carvedilol  12.5 mg Oral BID w/meals     insulin aspart  10 Units Subcutaneous TID w/meals     insulin aspart  1-7 Units Subcutaneous TID AC     insulin aspart  1-5 Units Subcutaneous At Bedtime     insulin glargine  20 Units Subcutaneous QAM AC     losartan  50 mg Oral Daily     [START ON 2/27/2021] pravastatin  20 mg Oral At Bedtime     sertraline  25 mg Oral Daily       Data   Reviewed       Kavita Chery, APRN CNP 2/26/2021

## 2021-02-26 NOTE — PROGRESS NOTES
"SPIRITUAL HEALTH SERVICES Progress Note  [facility]    Length-of-Stay visit.    Ptnt Bradford shared her story of the past year being one of challenges and \"heartbreak,\" including: Covid and its social limitations; politics and society; broken family relationships because of political and moral differences; various health issues. She views these stresses and tensions as contributing to her current health issues.    Bradford told of some moments of despair but realizing she is now feeling renewed, as if she's \"starting a whole new life,\" committed to self-care and maintaining her sense of purpose in being strong and present for her family.    I offered reflective conversation and emotional support, validated experiences, encouraged self-care, and said a blessing.    SH continues to be available.    Yvonne Gregg  Chaplain Resident  "

## 2021-02-26 NOTE — PROGRESS NOTES
[facility]    Hospitalist Progress Note    Date of Service (when I saw the patient): 02/26/2021    Assessment & Plan   Bradford Prado is a 60 year old female who was admitted on 2/23/2021.  Bradford Prado was admitted on 2/23/2021 by Clyde Grey MD and I would refer you to their history and physical.  The following problems were addressed during her hospitalization:  Assessment & Plan     Bradford Prado is a 60 year old female who was admitted on 2/23/2021.  ASSESSMENT:  Bradford Prado is 60, with history of hypertension, diabetes, hypercholesterolemia, medically noncompliant, off all her medications in the last year due to COVID concerns about her going into clinic, now admitted hypertensive with elevated blood glucose and is being admitted for hypertensive emergency with chest pain significantly hypertensive urgency.  And her blood sugars have been running high in the 380s with high osmolality        Hyperosmolar hyperglycemia with type 2 diabetes mellitus uncontrolled with hemoglobin A1c of 11.1% on admission;  Blood sugars running in the 380s on admission  Her osmolality is high at 304  She was noncompliant with medications due to none follow-up with her primary care provider due to COVID-19  Patient is at high risk of dehydration with these high blood sugars  Given IV normal saline 500cc bolus and started on IV fluids at 100 mL/h  Started her on Lantus 20 units every morning and NovoLog 8 units 3 times daily with meals and sliding scale insulin medium dose with NovoLog 3 times a day with meals and at bedtime  Stopped IV fluids now  We will discharge her with insulin regimen as noted above on medium dose sliding scale insulin with NovoLog  Patient might be able to go off of insulin once her hemoglobin A1c is controlled in the long-term  Blood sugars in the 250s today will increase Lantus to 30 units every morning continue NovoLog 10 units 3 times a day  Monitor blood sugars closely and  adjust insulin as needed     Hypertensive emergency with blood blood pressure as high as 227/125;  Chest pain in the setting of hypertensive emergency;  Patient was continued on losartan 50 mg p.o. daily PTA medication on admission  As needed IV hydralazine added  Patient needed multiple doses of IV hydralazine and 1 dose of IV Ativan to bring the blood pressure down overnight  Start her on Norvasc 5 mg p.o. BID  today for better blood pressure control  Monitor blood pressure closely and adjust medications as needed  Blood sugars are well controlled with the current regimen of Norvasc and losartan which will be prescribed for her to discharge  Blood pressure went up to 200s over 100s on 2/25 afternoon  Transfer to CICU for possible nitroglycerin drip  After as needed IV hydralazine blood pressure came down and nitroglycerin drip was not needed  Added Coreg 6.25 mg twice a day with that blood pressure is in the 150s today  We will increase Coreg to 12.5 mg twice daily.  Continue PTA losartan 50 mg p.o. daily  Continue Norvasc 5 mg p.o. twice daily  Blood pressure is better controlled today with systolics in the 130s    Chest pain in the setting of hypertensive emergency and anxiety;  EKG on admission showed showed nonspecific ST-T wave changes  Serial troponins remain negative  CTA coronary angiogram done showed mild nonobstructive coronary artery disease    Cardiology consulted.  Reviewed the CT coronary angiogram that was done the day before and it looked good no need for coronary angiogram  .Chest pains are secondary to hypertensive urgency.  Recommended good blood pressure control  Echo is ordered results are currently pending    There was 1.1 cm nonspecific enhancing focus in the central liver consider outpatient MRI for further evaluation  Lungs are clear of infiltrate or effusions     On further discussion with the patient today she feels anxious when she thinks anxiety might be contributing to her symptoms of  chest fluttering sensation and chest pain so she was started on Zoloft 25 mg p.o. daily for 3 days and increase to 50 mg p.o. daily     Erythrocytosis;  With hemoglobin of 17.5 most likely secondary to dehydration in the setting of hyperglycemia contributing  With IV fluids hydration hemoglobin improved to 15 today     3.  Hyperlipidemia:  We  Resumed  the patient's Pravachol.  Her AST and ALT are slightly elevated.  These will need to be monitored on an outpatient basis as well.  She has no abdominal pain or discomfort.         4.  History of kidney stones:  Currently, no hematuria or symptoms suggestive of kidney stones.      DEEP VENOUS THROMBOSIS PROPHYLAXIS:  The patient will receive compression boots.      CODE STATUS:  FULL.      Disposition:  Home in the next 1-2 after blood pressure well controlled and blood sugars are well controlled.  with close follow-up with her primary care provider within a week     Discussed with bedside RN, patient today     Greater than 30 minutes were spent in reviewing the chart, counseling the patient in collaboration of care today     Swati Rodarte MD  [phone] (P)        Swati Rodarte MD  [phone] (P)      Interval History     Blood pressure is better controlled today.  Complains of occasional ongoing intermittent chest pains in the middle of the chest.  Denies any shortness of breath.  No nausea vomiting.  Blood sugars are running in the 250s today    -Data reviewed today: I reviewed all new labs and imaging results over the last 24 hours. I personally reviewed no images or EKG's today.    Physical Exam   Temp: 97.8  F (36.6  C) Temp src: Oral BP: 134/87 Pulse: 86   Resp: 16 SpO2: 97 % O2 Device: None (Room air)    Vitals:    02/23/21 1646 02/26/21 0615   Weight: 54.4 kg (120 lb) 52.4 kg (115 lb 8 oz)     Vital Signs with Ranges  Temp:  [97.8  F (36.6  C)-98.5  F (36.9  C)] 97.8  F (36.6  C)  Pulse:  [] 86  Resp:  [10-25] 16  BP: (128-201)/() 134/87  SpO2:   [97 %-98 %] 97 %  I/O last 3 completed shifts:  In: 220 [P.O.:220]  Out: 1300 [Urine:1300]    Constitutional: Awake, alert, cooperative, no apparent distress  Respiratory: Clear to auscultation bilaterally, no crackles or wheezing  Cardiovascular: Regular rate and rhythm, normal S1 and S2, and no murmur noted  GI: Normal bowel sounds, soft, non-distended, non-tender  Skin/Integumen: No rashes, no cyanosis, no edema  Other:     Medications       amLODIPine  5 mg Oral BID     carvedilol  12.5 mg Oral BID w/meals     insulin aspart  10 Units Subcutaneous TID w/meals     insulin aspart  1-7 Units Subcutaneous TID AC     insulin aspart  1-5 Units Subcutaneous At Bedtime     insulin glargine  10 Units Subcutaneous Once     insulin glargine  20 Units Subcutaneous QAM AC     losartan  50 mg Oral Daily     [START ON 2/27/2021] pravastatin  30 mg Oral At Bedtime     sertraline  25 mg Oral Daily       Data   Recent Labs   Lab 02/25/21  0821 02/24/21  0544 02/24/21  0258 02/23/21  2312 02/23/21  1812 02/23/21  1454   WBC 13.1*  --   --   --   --  9.2   HGB 15.0  --   --   --   --  17.5*   MCV 91  --   --   --   --  91     --   --   --   --  165     --   --   --   --  133   POTASSIUM 3.7  --   --   --   --  3.7   CHLORIDE 105  --   --   --   --  99   CO2 25  --   --   --   --  26   BUN 13  --   --   --   --  9   CR 0.44*  --   --   --   --  0.50*   ANIONGAP 6  --   --   --   --  8   ESTELLE 8.2*  --   --   --   --  10.1   *  --   --   --   --  385*   ALBUMIN  --   --   --   --  4.5  --    PROTTOTAL  --   --   --   --  8.5  --    BILITOTAL  --   --   --   --  0.6  --    ALKPHOS  --   --   --   --  139  --    ALT  --   --   --   --  103*  --    AST  --   --   --   --  72*  --    TROPI  --  <0.015 <0.015 <0.015  --  <0.015       No results found for this or any previous visit (from the past 24 hour(s)).

## 2021-02-26 NOTE — PROVIDER NOTIFICATION
MD Notification    Notified Person: MD    Notified Person Name:     Notification Date/Time: 2-, 1300    Notification Interaction: Text paged    Purpose of Notification: Pt. Is OK to discharge per Cardiology    Orders Received:    Comments:

## 2021-02-26 NOTE — PLAN OF CARE
A/ox4, anxious. Up with SBA. Mod CHO diet. Tele ST. Discharge cancelled this afternoon d/t hypertension and chest pain. Cardiology consulted, ECHO ordered. BP up to 201/100, PRN hydralazine administered, BP down to 170/90 after hydralazine. Dr. Rodarte paged, wondering about additional PRNs for BP prior to transfer to heart center, no new orders per MD. Pt denies chest pain this afternoon. Pt transferred to heart center for nitroglycerine gtt. Report called to RN, pt sent with belongings.

## 2021-02-26 NOTE — PLAN OF CARE
Monitor remains Sinus rhythm. Pt. Had episode of reported left chest pain -states approximately 5 minutes which resolved with rest. Continue to monitor. Plan for discharge tomorrow.

## 2021-02-26 NOTE — PLAN OF CARE
[id number]: A&Ox4. VSS on RA. Tele SR. Denies CP or shortness of breath. LS clear. BP improved without starting ntg gtt. BG checks. Up SBA/indep. Plan for cards consult and echo 2-26.

## 2021-02-27 VITALS
BODY MASS INDEX: 22.68 KG/M2 | HEART RATE: 72 BPM | DIASTOLIC BLOOD PRESSURE: 90 MMHG | SYSTOLIC BLOOD PRESSURE: 154 MMHG | RESPIRATION RATE: 18 BRPM | WEIGHT: 115.5 LBS | TEMPERATURE: 98 F | HEIGHT: 60 IN | OXYGEN SATURATION: 100 %

## 2021-02-27 LAB
GLUCOSE BLDC GLUCOMTR-MCNC: 170 MG/DL (ref 70–99)
GLUCOSE BLDC GLUCOMTR-MCNC: 218 MG/DL (ref 70–99)
GLUCOSE BLDC GLUCOMTR-MCNC: 300 MG/DL (ref 70–99)
GLUCOSE BLDC GLUCOMTR-MCNC: 357 MG/DL (ref 70–99)
GLUCOSE BLDC GLUCOMTR-MCNC: 367 MG/DL (ref 70–99)

## 2021-02-27 PROCEDURE — 250N000013 HC RX MED GY IP 250 OP 250 PS 637: Performed by: INTERNAL MEDICINE

## 2021-02-27 PROCEDURE — 999N001017 HC STATISTIC GLUCOSE BY METER IP

## 2021-02-27 PROCEDURE — 250N000012 HC RX MED GY IP 250 OP 636 PS 637: Performed by: INTERNAL MEDICINE

## 2021-02-27 PROCEDURE — 99239 HOSP IP/OBS DSCHRG MGMT >30: CPT | Performed by: INTERNAL MEDICINE

## 2021-02-27 RX ORDER — AMLODIPINE BESYLATE 5 MG/1
5 TABLET ORAL 2 TIMES DAILY
Qty: 60 TABLET | Refills: 0 | Status: SHIPPED | OUTPATIENT
Start: 2021-02-27 | End: 2021-03-02

## 2021-02-27 RX ORDER — CARVEDILOL 12.5 MG/1
12.5 TABLET ORAL 2 TIMES DAILY WITH MEALS
Qty: 60 TABLET | Refills: 1 | Status: SHIPPED | OUTPATIENT
Start: 2021-02-27 | End: 2021-03-02

## 2021-02-27 RX ADMIN — INSULIN GLARGINE 38 UNITS: 100 INJECTION, SOLUTION SUBCUTANEOUS at 08:59

## 2021-02-27 RX ADMIN — AMLODIPINE BESYLATE 5 MG: 5 TABLET ORAL at 08:59

## 2021-02-27 RX ADMIN — CARVEDILOL 12.5 MG: 12.5 TABLET, FILM COATED ORAL at 08:59

## 2021-02-27 RX ADMIN — INSULIN GLARGINE 10 UNITS: 100 INJECTION, SOLUTION SUBCUTANEOUS at 16:39

## 2021-02-27 RX ADMIN — SERTRALINE HYDROCHLORIDE 25 MG: 25 TABLET ORAL at 08:59

## 2021-02-27 RX ADMIN — LOSARTAN POTASSIUM 50 MG: 50 TABLET, FILM COATED ORAL at 08:59

## 2021-02-27 ASSESSMENT — MIFFLIN-ST. JEOR: SCORE: 1021.43

## 2021-02-28 NOTE — PLAN OF CARE
Alert and oriented x 4. VS stable, on RA and no complaints of pain.  Her blood glucose before discharge was 170. Patient was discharged this evening and drove herself home. 5 new medications were filled here and sent home with the patient. Patient was able to demonstrate back insulin administration and the instruction about how much to take. I reviewed all DC paperwork, new medications, orders and appointments with the patient and family, all were able to verbalize understanding of teaching. All questions answered. All IVs were removed and patient had all belongings at the time of DC.

## 2021-03-02 ENCOUNTER — OFFICE VISIT (OUTPATIENT)
Dept: FAMILY MEDICINE | Facility: CLINIC | Age: 61
End: 2021-03-02
Payer: COMMERCIAL

## 2021-03-02 VITALS
OXYGEN SATURATION: 98 % | TEMPERATURE: 96.2 F | WEIGHT: 120.9 LBS | DIASTOLIC BLOOD PRESSURE: 76 MMHG | BODY MASS INDEX: 24.37 KG/M2 | HEART RATE: 72 BPM | RESPIRATION RATE: 16 BRPM | HEIGHT: 59 IN | SYSTOLIC BLOOD PRESSURE: 122 MMHG

## 2021-03-02 DIAGNOSIS — F43.22 ADJUSTMENT DISORDER WITH ANXIOUS MOOD: ICD-10-CM

## 2021-03-02 DIAGNOSIS — I16.0 HYPERTENSIVE URGENCY: ICD-10-CM

## 2021-03-02 DIAGNOSIS — E11.65 TYPE 2 DIABETES MELLITUS WITH HYPERGLYCEMIA, WITHOUT LONG-TERM CURRENT USE OF INSULIN (H): ICD-10-CM

## 2021-03-02 DIAGNOSIS — R07.89 OTHER CHEST PAIN: ICD-10-CM

## 2021-03-02 DIAGNOSIS — E78.5 HYPERLIPIDEMIA LDL GOAL <70: Chronic | ICD-10-CM

## 2021-03-02 DIAGNOSIS — E11.65 POORLY CONTROLLED TYPE 2 DIABETES MELLITUS WITH RENAL COMPLICATION (H): Primary | Chronic | ICD-10-CM

## 2021-03-02 DIAGNOSIS — K76.9 LESION OF LIVER GREATER THAN 1 CM IN DIAMETER: ICD-10-CM

## 2021-03-02 DIAGNOSIS — E11.29 POORLY CONTROLLED TYPE 2 DIABETES MELLITUS WITH RENAL COMPLICATION (H): Primary | Chronic | ICD-10-CM

## 2021-03-02 DIAGNOSIS — I10 ESSENTIAL HYPERTENSION WITH GOAL BLOOD PRESSURE LESS THAN 140/90: Chronic | ICD-10-CM

## 2021-03-02 DIAGNOSIS — Z12.11 SPECIAL SCREENING FOR MALIGNANT NEOPLASMS, COLON: ICD-10-CM

## 2021-03-02 LAB
ANION GAP SERPL CALCULATED.3IONS-SCNC: 6 MMOL/L (ref 3–14)
BUN SERPL-MCNC: 14 MG/DL (ref 7–30)
CALCIUM SERPL-MCNC: 9.1 MG/DL (ref 8.5–10.1)
CHLORIDE SERPL-SCNC: 103 MMOL/L (ref 94–109)
CO2 SERPL-SCNC: 27 MMOL/L (ref 20–32)
CREAT SERPL-MCNC: 0.48 MG/DL (ref 0.52–1.04)
GFR SERPL CREATININE-BSD FRML MDRD: >90 ML/MIN/{1.73_M2}
GLUCOSE SERPL-MCNC: 255 MG/DL (ref 70–99)
POTASSIUM SERPL-SCNC: 4.1 MMOL/L (ref 3.4–5.3)
SODIUM SERPL-SCNC: 136 MMOL/L (ref 133–144)

## 2021-03-02 PROCEDURE — 80048 BASIC METABOLIC PNL TOTAL CA: CPT | Performed by: NURSE PRACTITIONER

## 2021-03-02 PROCEDURE — 99214 OFFICE O/P EST MOD 30 MIN: CPT | Performed by: NURSE PRACTITIONER

## 2021-03-02 PROCEDURE — 36415 COLL VENOUS BLD VENIPUNCTURE: CPT | Performed by: NURSE PRACTITIONER

## 2021-03-02 RX ORDER — LOSARTAN POTASSIUM 50 MG/1
50 TABLET ORAL DAILY
Qty: 90 TABLET | Refills: 1 | Status: SHIPPED | OUTPATIENT
Start: 2021-03-02 | End: 2021-09-10

## 2021-03-02 RX ORDER — PRAVASTATIN SODIUM 20 MG
20 TABLET ORAL DAILY
Qty: 90 TABLET | Refills: 1 | Status: SHIPPED | OUTPATIENT
Start: 2021-03-02 | End: 2021-09-10

## 2021-03-02 RX ORDER — CARVEDILOL 12.5 MG/1
12.5 TABLET ORAL 2 TIMES DAILY WITH MEALS
Qty: 180 TABLET | Refills: 1 | Status: SHIPPED | OUTPATIENT
Start: 2021-03-02 | End: 2021-09-10

## 2021-03-02 RX ORDER — AMLODIPINE BESYLATE 5 MG/1
5 TABLET ORAL 2 TIMES DAILY
Qty: 180 TABLET | Refills: 1 | Status: SHIPPED | OUTPATIENT
Start: 2021-03-02 | End: 2021-10-05

## 2021-03-02 ASSESSMENT — MIFFLIN-ST. JEOR: SCORE: 1034.03

## 2021-03-02 NOTE — LETTER
March 2, 2021      Bradford Prado  [address]  [place] [facility] [place] [address]        Dear ,    We are writing to inform you of your test results.  All of your lab results are normal.     Resulted Orders   Basic metabolic panel  (Ca, Cl, CO2, Creat, Gluc, K, Na, BUN)   Result Value Ref Range    Sodium 136 133 - 144 mmol/L    Potassium 4.1 3.4 - 5.3 mmol/L    Chloride 103 94 - 109 mmol/L    Carbon Dioxide 27 20 - 32 mmol/L    Anion Gap 6 3 - 14 mmol/L    Glucose 255 (H) 70 - 99 mg/dL    Urea Nitrogen 14 7 - 30 mg/dL    Creatinine 0.48 (L) 0.52 - 1.04 mg/dL    GFR Estimate >90 >60 mL/min/[1.73_m2]      Comment:      Non  GFR Calc  Starting 12/18/2018, serum creatinine based estimated GFR (eGFR) will be   calculated using the Chronic Kidney Disease Epidemiology Collaboration   (CKD-EPI) equation.      GFR Estimate If Black >90 >60 mL/min/[1.73_m2]      Comment:       GFR Calc  Starting 12/18/2018, serum creatinine based estimated GFR (eGFR) will be   calculated using the Chronic Kidney Disease Epidemiology Collaboration   (CKD-EPI) equation.      Calcium 9.1 8.5 - 10.1 mg/dL       If you have any questions or concerns, please call the clinic at the number listed above.       Sincerely,      Paulette Travis NP

## 2021-03-02 NOTE — PATIENT INSTRUCTIONS
Continue Lantus 40 units once daily in the morning.    Testing pre meal 3 X daily and then at bedtime (4 x daily).  Novolog 10 units with meals.    Novolog Flexpen  Give before meals and before bed:  For Pre-Meal Glucose:  140-189 give 1 unit   190-239 give 2 units   240-289 give 3 units   290-339 give 4 units   = or >340 give 5 units     For Bedtime Glucose  200 - 239 give 1 units   240 - 289 give 2 units  290 - 339 give 3 units  = or >340 give 4 units    Referral to Diabetic Educator - follow up in the next 3-4 weeks.    Hypoglycemia Treatment/Prevention:    Avoid refined foods heavy in sugars or refined (white) flour.  Examples would be baked goods, breads, white pastas, white rice, sugar beverages, alcohol,etc.    If you do eat meals with these ingredients, try to eat smaller amounts and combine them with lean meats or other proteins.  Eating some nuts like almonds or a small amount of non-fat cottage cheese between meals is often helpful as well.      Spread your calories out throughout the day more.  Instead of 2 or 3 meals, eat the same amount of calories spread out over 5 or 6 meals.      If you become symptomatic, have something to eat like an apple or milk.  This should help resolve the symptoms.    As a general rule, the healthier your lifestyle is and the closer you are to your ideal weight, the less likely you will be to have problems with this.  In some cases, these symptoms can indicate a predisposition to diabetes in the future so it is important to have a fasting blood sugar checked yearly.    Continue blood pressure medications as prescribed.  Recheck labs today.

## 2021-03-02 NOTE — PROGRESS NOTES
Assessment & Plan     Poorly controlled type 2 diabetes mellitus with renal complication (H)  Discussed BS and referral to CDE - for CGM.  Continue insulin   See AVS.    - insulin aspart (NOVOLOG PEN) 100 UNIT/ML pen; Inject 10 Units Subcutaneous 3 times daily (with meals)  - losartan (COZAAR) 50 MG tablet; Take 1 tablet (50 mg) by mouth daily  - pravastatin (PRAVACHOL) 20 MG tablet; Take 1 tablet (20 mg) by mouth daily  - AMBULATORY ADULT DIABETES EDUCATOR REFERRAL; Future    Type 2 diabetes mellitus with hyperglycemia, without long-term current use of insulin (H)     - Basic metabolic panel  (Ca, Cl, CO2, Creat, Gluc, K, Na, BUN)  - insulin glargine (LANTUS PEN) 100 UNIT/ML pen; Inject 40 Units Subcutaneous every morning (before breakfast)  - pravastatin (PRAVACHOL) 20 MG tablet; Take 1 tablet (20 mg) by mouth daily  - AMBULATORY ADULT DIABETES EDUCATOR REFERRAL; Future    Hypertensive urgency   Improved. Continue monitoring.    - amLODIPine (NORVASC) 5 MG tablet; Take 1 tablet (5 mg) by mouth 2 times daily  - carvedilol (COREG) 12.5 MG tablet; Take 1 tablet (12.5 mg) by mouth 2 times daily (with meals)    Adjustment disorder with anxious mood   Improved.    - sertraline (ZOLOFT) 50 MG tablet; Take 1 tablet (50 mg) by mouth daily Take 50mg PO daily    Hyperlipidemia LDL goal <70     - pravastatin (PRAVACHOL) 20 MG tablet; Take 1 tablet (20 mg) by mouth daily    Essential hypertension with goal blood pressure less than 140/90     - Basic metabolic panel  (Ca, Cl, CO2, Creat, Gluc, K, Na, BUN)  - losartan (COZAAR) 50 MG tablet; Take 1 tablet (50 mg) by mouth daily    Other chest pain   Resolved.    Special screening for malignant neoplasms, colon     - COLOGUARD(EXACT SCIENCES)    Lesion of liver greater than 1 cm in diameter  Incidental finding - most likely benign.  MRI ordered.    - MR Abdomen w/o & w Contrast; Future        Work on weight loss  Regular exercise  Patient Instructions   Continue Lantus 40  units once daily in the morning.    Testing pre meal 3 X daily and then at bedtime (4 x daily).  Novolog 10 units with meals.    Novolog Flexpen  Give before meals and before bed:  For Pre-Meal Glucose:  140-189 give 1 unit   190-239 give 2 units   240-289 give 3 units   290-339 give 4 units   = or >340 give 5 units     For Bedtime Glucose  200 - 239 give 1 units   240 - 289 give 2 units  290 - 339 give 3 units  = or >340 give 4 units    Referral to Diabetic Educator - follow up in the next 3-4 weeks.    Hypoglycemia Treatment/Prevention:    Avoid refined foods heavy in sugars or refined (white) flour.  Examples would be baked goods, breads, white pastas, white rice, sugar beverages, alcohol,etc.    If you do eat meals with these ingredients, try to eat smaller amounts and combine them with lean meats or other proteins.  Eating some nuts like almonds or a small amount of non-fat cottage cheese between meals is often helpful as well.      Spread your calories out throughout the day more.  Instead of 2 or 3 meals, eat the same amount of calories spread out over 5 or 6 meals.      If you become symptomatic, have something to eat like an apple or milk.  This should help resolve the symptoms.    As a general rule, the healthier your lifestyle is and the closer you are to your ideal weight, the less likely you will be to have problems with this.  In some cases, these symptoms can indicate a predisposition to diabetes in the future so it is important to have a fasting blood sugar checked yearly.    Continue blood pressure medications as prescribed.  Recheck labs today.      Return in about 4 weeks (around 3/30/2021) for Medication Follow up, Recheck symptoms.    Paulette Travis NP  [facility] HospitalRODRIGUEZ Felder is a 60 year old who presents for the following health issues     HPI   .rffv      Diabetes Follow-up    How often are you checking your blood sugar? Four or more times daily  Blood  sugar testing frequency justification:  Uncontrolled diabetes  What time of day are you checking your blood sugars (select all that apply)?  Before and after meals  Have you had any blood sugars above 200?  Yes : highest reading today 321 just before coming was 185 before breakfast this morning   Have you had any blood sugars below 70?  No    What symptoms do you notice when your blood sugar is low?  Dizzy and Lethargy    What concerns do you have today about your diabetes? Blood sugar is often over 200     Do you have any of these symptoms? (Select all that apply)  Excessive thirst and Weight loss    Have you had a diabetic eye exam in the last 12 months? No        Hyperlipidemia Follow-Up      Are you regularly taking any medication or supplement to lower your cholesterol?   No    Are you having muscle aches or other side effects that you think could be caused by your cholesterol lowering medication?  Yes- pravastin     Hypertension Follow-up      Do you check your blood pressure regularly outside of the clinic? No     Are you following a low salt diet? No    Are your blood pressures ever more than 140 on the top number (systolic) OR more   than 90 on the bottom number (diastolic), for example 140/90? No    BP Readings from Last 2 Encounters:   03/02/21 122/76   02/27/21 (!) 154/90     Hemoglobin A1C (%)   Date Value   02/23/2021 11.1 (H)   04/30/2019 7.1 (H)     LDL Cholesterol Calculated (mg/dL)   Date Value   04/30/2019 76   01/18/2019 103 (H)         How many servings of fruits and vegetables do you eat daily?  2-3    On average, how many sweetened beverages do you drink each day (Examples: soda, juice, sweet tea, etc.  Do NOT count diet or artificially sweetened beverages)?   0    How many days per week do you exercise enough to make your heart beat faster? 7 -3-5 mile walk    How many minutes a day do you exercise enough to make your heart beat faster? 60 or more    How many days per week do you miss taking  "your medication? None since hospital discharge       Hospital Follow-up Visit:    Hospital/Nursing Home/IP Rehab Facility[facility]  Date of Admission: 02/23/21  Date of Discharge: 02/27/21  Reason(s) for Admission: Uncontrolled Diabetes       Was your hospitalization related to COVID-19? No   Problems taking medications regularly:  None  Medication changes since discharge: None  Problems adhering to non-medication therapy:  None    Summary of hospitalization:  [facility] discharge summary reviewed  Diagnostic Tests/Treatments reviewed.  Follow up needed: with PCP for BMP and review insulin  Other Healthcare Providers Involved in Patient s Care:         None  Update since discharge: fluctuating course.   Post Discharge Medication Reconciliation: discharge medications reconciled and changed, per note/orders.  Plan of care communicated with patient              2/24/2021 - Cardiology imaging.    IMPRESSION: Nonspecific enhancing focus in the central liver measuring  1.1 cm, image 54 series 11. Consider liver MRI for further evaluation.  Lungs are clear of infiltrate or effusions. Please see cardiology  report for cardiac and vascular findings.    Review of Systems   Constitutional, HEENT, cardiovascular, pulmonary, GI, , musculoskeletal, neuro, skin, endocrine and psych systems are negative, except as otherwise noted.      Objective    /76 (BP Location: Right arm, Patient Position: Chair, Cuff Size: Adult Regular)   Pulse 72   Temp 96.2  F (35.7  C) (Tympanic)   Resp 16   Ht 1.499 m (4' 11\")   Wt 54.8 kg (120 lb 14.4 oz)   SpO2 98%   Breastfeeding No   BMI 24.42 kg/m    Body mass index is 24.42 kg/m .  Physical Exam   GENERAL: healthy, alert and no distress  NECK: no adenopathy, no asymmetry, masses, or scars and thyroid normal to palpation  RESP: lungs clear to auscultation - no rales, rhonchi or wheezes  CV: regular rate and rhythm, normal S1 S2, no S3 or S4, no murmur, " click or rub, no peripheral edema and peripheral pulses strong  ABDOMEN: soft, nontender, no hepatosplenomegaly, no masses and bowel sounds normal  MS: no gross musculoskeletal defects noted, no edema    Admission on 02/23/2021, Discharged on 02/27/2021   Component Date Value Ref Range Status     Interpretation ECG 02/23/2021 Click View Image link to view waveform and result   Final     Bilirubin Direct 02/23/2021 0.2  0.0 - 0.2 mg/dL Final     Bilirubin Total 02/23/2021 0.6  0.2 - 1.3 mg/dL Final     Albumin 02/23/2021 4.5  3.4 - 5.0 g/dL Final     Protein Total 02/23/2021 8.5  6.8 - 8.8 g/dL Final     Alkaline Phosphatase 02/23/2021 139  40 - 150 U/L Final     ALT 02/23/2021 103* 0 - 50 U/L Final     AST 02/23/2021 72* 0 - 45 U/L Final     SARS-CoV-2 Virus Specimen Source 02/23/2021 Nasopharyngeal   Final     SARS-CoV-2 PCR Result 02/23/2021 NEGATIVE   Final    SARS-CoV2 (COVID-19) RNA not detected, presumed negative.     SARS-CoV-2 PCR Comment 02/23/2021 (Note)   Final    Comment: Testing was performed using the clifford SARS-CoV-2 & Influenza A/B Assay on the   clifford Stacia System.  This test should be ordered for the detection of SARS-COV-2 in individuals who   meet SARS-CoV-2 clinical and/or epidemiological criteria. Test performance is   unknown in asymptomatic patients.  This test is for in vitro diagnostic use under the FDA EUA for laboratories   certified under CLIA to perform moderate and/or high complexity testing. This   test has not been FDA cleared or approved.  A negative test does not rule out the presence of PCR inhibitors in the   specimen or target RNA in concentration below the limit of detection for the   assay. The possibility of a false negative should be considered if the   patient's recent exposure or clinical presentation suggests COVID-19.  [facility] are certified under the Clinical Laboratory   Improvement Amendments of 1988 (CLIA-88) as qualified to perform moderate    and/or high complexity laboratory testing.       Hemoglobin A1C 02/23/2021 11.1* 0 - 5.6 % Final    Comment: Normal <5.7% Prediabetes 5.7-6.4%  Diabetes 6.5% or higher - adopted from ADA   consensus guidelines.       Glucose 02/23/2021 283* 70 - 99 mg/dL Final     Glucose 02/23/2021 268* 70 - 99 mg/dL Final     Troponin I ES 02/23/2021 <0.015  0.000 - 0.045 ug/L Final    Comment: The 99th percentile for upper reference range is 0.045 ug/L.  Troponin values   in the range of 0.045 - 0.120 ug/L may be associated with risks of adverse   clinical events.       Troponin I ES 02/24/2021 <0.015  0.000 - 0.045 ug/L Final    Comment: The 99th percentile for upper reference range is 0.045 ug/L.  Troponin values   in the range of 0.045 - 0.120 ug/L may be associated with risks of adverse   clinical events.       Glucose 02/23/2021 350* 70 - 99 mg/dL Final     Troponin I ES 02/24/2021 <0.015  0.000 - 0.045 ug/L Final    Comment: The 99th percentile for upper reference range is 0.045 ug/L.  Troponin values   in the range of 0.045 - 0.120 ug/L may be associated with risks of adverse   clinical events.       Glucose 02/24/2021 364* 70 - 99 mg/dL Final     Osmolality 02/24/2021 304* 275 - 295 mmol/kg Final     Glucose 02/24/2021 380* 70 - 99 mg/dL Final     Glucose 02/24/2021 381* 70 - 99 mg/dL Final     Glucose 02/24/2021 229* 70 - 99 mg/dL Final     Glucose 02/24/2021 224* 70 - 99 mg/dL Final     Glucose 02/24/2021 98  70 - 99 mg/dL Final     WBC 02/25/2021 13.1* 4.0 - 11.0 10e9/L Final     RBC Count 02/25/2021 4.73  3.8 - 5.2 10e12/L Final     Hemoglobin 02/25/2021 15.0  11.7 - 15.7 g/dL Final     Hematocrit 02/25/2021 42.8  35.0 - 47.0 % Final     MCV 02/25/2021 91  78 - 100 fl Final     MCH 02/25/2021 31.7  26.5 - 33.0 pg Final     MCHC 02/25/2021 35.0  31.5 - 36.5 g/dL Final     RDW 02/25/2021 11.8  10.0 - 15.0 % Final     Platelet Count 02/25/2021 162  150 - 450 10e9/L Final     Sodium 02/25/2021 136  133 - 144 mmol/L  Final     Potassium 02/25/2021 3.7  3.4 - 5.3 mmol/L Final     Chloride 02/25/2021 105  94 - 109 mmol/L Final     Carbon Dioxide 02/25/2021 25  20 - 32 mmol/L Final     Anion Gap 02/25/2021 6  3 - 14 mmol/L Final     Glucose 02/25/2021 357* 70 - 99 mg/dL Final     Urea Nitrogen 02/25/2021 13  7 - 30 mg/dL Final     Creatinine 02/25/2021 0.44* 0.52 - 1.04 mg/dL Final     GFR Estimate 02/25/2021 >90  >60 mL/min/[1.73_m2] Final    Comment: Non  GFR Calc  Starting 12/18/2018, serum creatinine based estimated GFR (eGFR) will be   calculated using the Chronic Kidney Disease Epidemiology Collaboration   (CKD-EPI) equation.       GFR Estimate If Black 02/25/2021 >90  >60 mL/min/[1.73_m2] Final    Comment:  GFR Calc  Starting 12/18/2018, serum creatinine based estimated GFR (eGFR) will be   calculated using the Chronic Kidney Disease Epidemiology Collaboration   (CKD-EPI) equation.       Calcium 02/25/2021 8.2* 8.5 - 10.1 mg/dL Final     Glucose 02/25/2021 140* 70 - 99 mg/dL Final     Glucose 02/25/2021 162* 70 - 99 mg/dL Final     Glucose 02/25/2021 249* 70 - 99 mg/dL Final     Glucose 02/25/2021 222* 70 - 99 mg/dL Final     Glucose 02/25/2021 207* 70 - 99 mg/dL Final     Glucose 02/26/2021 233* 70 - 99 mg/dL Final     Glucose 02/26/2021 256* 70 - 99 mg/dL Final     Glucose 02/26/2021 228* 70 - 99 mg/dL Final     Glucose 02/26/2021 185* 70 - 99 mg/dL Final     Glucose 02/26/2021 256* 70 - 99 mg/dL Final     Glucose 02/27/2021 218* 70 - 99 mg/dL Final     Glucose 02/27/2021 367* 70 - 99 mg/dL Final     Glucose 02/27/2021 357* 70 - 99 mg/dL Final     Glucose 02/27/2021 300* 70 - 99 mg/dL Final     Glucose 02/27/2021 170* 70 - 99 mg/dL Final

## 2021-03-03 ENCOUNTER — TELEPHONE (OUTPATIENT)
Dept: FAMILY MEDICINE | Facility: CLINIC | Age: 61
End: 2021-03-03

## 2021-03-03 NOTE — TELEPHONE ENCOUNTER
Diabetes Education Scheduling Outreach #1:    Call to patient to schedule. Left message with phone number to call to schedule.    Plan for 2nd outreach attempt within 1 week.    Erin Girard  [facility] [facility]  Diabetes and Nutrition Scheduling

## 2021-03-10 LAB — COLOGUARD-ABSTRACT: NEGATIVE

## 2021-03-17 ENCOUNTER — PATIENT OUTREACH (OUTPATIENT)
Dept: EDUCATION SERVICES | Facility: CLINIC | Age: 61
End: 2021-03-17
Payer: COMMERCIAL

## 2021-03-17 DIAGNOSIS — E11.29 POORLY CONTROLLED TYPE 2 DIABETES MELLITUS WITH RENAL COMPLICATION (H): Chronic | ICD-10-CM

## 2021-03-17 DIAGNOSIS — E11.29 POORLY CONTROLLED TYPE 2 DIABETES MELLITUS WITH RENAL COMPLICATION (H): Primary | Chronic | ICD-10-CM

## 2021-03-17 DIAGNOSIS — E11.65 POORLY CONTROLLED TYPE 2 DIABETES MELLITUS WITH RENAL COMPLICATION (H): Primary | Chronic | ICD-10-CM

## 2021-03-17 DIAGNOSIS — E11.65 POORLY CONTROLLED TYPE 2 DIABETES MELLITUS WITH RENAL COMPLICATION (H): Chronic | ICD-10-CM

## 2021-03-17 PROCEDURE — G0108 DIAB MANAGE TRN  PER INDIV: HCPCS | Mod: 95

## 2021-03-17 RX ORDER — FLASH GLUCOSE SENSOR
1 KIT MISCELLANEOUS
Qty: 2 EACH | Refills: 11 | Status: SHIPPED | OUTPATIENT
Start: 2021-03-17 | End: 2021-11-26 | Stop reason: ALTCHOICE

## 2021-03-17 RX ORDER — BLOOD SUGAR DIAGNOSTIC
STRIP MISCELLANEOUS
Qty: 200 STRIP | Refills: 11 | Status: SHIPPED | OUTPATIENT
Start: 2021-03-17

## 2021-03-17 RX ORDER — PEN NEEDLE, DIABETIC 30 GX3/16"
1 NEEDLE, DISPOSABLE MISCELLANEOUS 4 TIMES DAILY
Qty: 200 EACH | Refills: 11 | Status: SHIPPED | OUTPATIENT
Start: 2021-03-17 | End: 2022-07-29

## 2021-03-17 RX ORDER — FLASH GLUCOSE SCANNING READER
1 EACH MISCELLANEOUS ONCE
Qty: 1 EACH | Refills: 0 | Status: SHIPPED | OUTPATIENT
Start: 2021-03-17 | End: 2021-03-17

## 2021-03-17 RX ORDER — BLOOD-GLUCOSE METER
1 EACH MISCELLANEOUS
Qty: 1 KIT | Refills: 0 | Status: SHIPPED | OUTPATIENT
Start: 2021-03-17

## 2021-03-17 RX ORDER — LANCETS
EACH MISCELLANEOUS
Qty: 200 EACH | Refills: 11 | Status: SHIPPED | OUTPATIENT
Start: 2021-03-17

## 2021-03-17 NOTE — TELEPHONE ENCOUNTER
NOTE TO PROVIDER REQUESTING MEDICATION ORDERS:    [place] [facility] glucose are elevated    Current diabetes medications:  yes:     Diabetes Medication(s)     Insulin       insulin aspart (NOVOLOG FLEXPEN) 100 UNIT/ML pen    Novolog Flexpen  Give before meals and before bed:  For Pre-Meal Glucose:  140-189 give 1 unit   190-239 give 2 units   240-289 give 3 units   290-339 give 4 units   = or >340 give 5 units     For Bedtime Glucose  200 - 239 give 1 units   240 - 289 give 2 units  290 - 339 give 3 units  = or >340 give 4 units     insulin aspart (NOVOLOG PEN) 100 UNIT/ML pen    Inject 10 Units Subcutaneous 3 times daily (with meals)     insulin glargine (LANTUS PEN) 100 UNIT/ML pen    Inject 40 Units Subcutaneous every morning (before breakfast)      .       Recommend a continuous glucose monitor to test blood sugar.  Recommend freestyle magdy cgm 14 day system.      Patient follow up plan cde to call patient on 3/23/21 to confirm.      Orders pended. If in agreement, please note approval and sign pended orders, otherwise please indicate an alternate plan. Route back to writer to notify the patient.       Megan Hess MS, RD, LD, CDE

## 2021-03-17 NOTE — PROGRESS NOTES
"Diabetes Self-Management Education & Support  Type of Service: Telephone Visit    How would patient like to obtain AVS? Sue      Presents for: Individual review    SUBJECTIVE/OBJECTIVE:  Presents for: Individual review  Accompanied by: Self  Diabetes education in the past 24mo: No  Focus of Visit: Monitoring, Taking Medication, Healthy Eating  Diabetes type: Type 2  Disease course: Improving  Diabetes management related comments/concerns: I think my blood sugars are still high  Transportation concerns: No  Difficulty affording diabetes medication?: No  Difficulty affording diabetes testing supplies?: No  Other concerns:: None  Cultural Influences/Ethnic Background:  American    Diabetes Symptoms & Complications:  Patient Problem List and Family Medical History reviewed for relevant medical history, current medical status, and diabetes risk factors.    Vitals:  There were no vitals taken for this visit.  Estimated body mass index is 24.42 kg/m  as calculated from the following:    Height as of 3/2/21: 1.499 m (4' 11\").    Weight as of 3/2/21: 54.8 kg (120 lb 14.4 oz).   Last 3 BP:   BP Readings from Last 3 Encounters:   03/02/21 122/76   02/27/21 (!) 154/90   02/23/21 (!) 209/108       History   Smoking Status     Former Smoker     Packs/day: 0.50     Years: 20.00     Types: Cigarettes     Quit date: 3/9/2010   Smokeless Tobacco     Never Used       Labs:  Lab Results   Component Value Date    A1C 11.1 02/23/2021     Lab Results   Component Value Date     03/02/2021     Lab Results   Component Value Date    LDL 76 04/30/2019     HDL Cholesterol   Date Value Ref Range Status   04/30/2019 25 (L) >49 mg/dL Final   ]  GFR Estimate   Date Value Ref Range Status   03/02/2021 >90 >60 mL/min/[1.73_m2] Final     Comment:     Non  GFR Calc  Starting 12/18/2018, serum creatinine based estimated GFR (eGFR) will be   calculated using the Chronic Kidney Disease Epidemiology Collaboration   (CKD-EPI) " equation.       GFR Estimate If Black   Date Value Ref Range Status   03/02/2021 >90 >60 mL/min/[1.73_m2] Final     Comment:      GFR Calc  Starting 12/18/2018, serum creatinine based estimated GFR (eGFR) will be   calculated using the Chronic Kidney Disease Epidemiology Collaboration   (CKD-EPI) equation.       Lab Results   Component Value Date    CR 0.48 03/02/2021     No results found for: MICROALBUMIN    Healthy Eating:  Healthy Eating Assessed Today: Yes  Cultural/Latter day diet restrictions?: No  Meal planning/habits: Carb counting  How many times a week on average do you eat food made away from home (restaurant/take-out)?: 0  Meals include: Breakfast, Lunch, Dinner  Beverages: Water  Has patient met with a dietitian in the past?: Yes    Being Active:  Being Active Assessed Today: Yes  Exercise:: Yes  Days per week of moderate to strenuous exercise (like a brisk walk): 5  On average, minutes per day of exercise at this level: 40  Exercise Minutes per Week: 200  Barrier to exercise: None    Monitoring:  Monitoring Assessed Today: Yes  Did patient bring glucose meter to appointment? : Yes  Blood Glucose Meter: Other(using [facility] meter she does not have prescribed test strips)  Times checking blood sugar at home (number): 4  Times checking blood sugar at home (per): Day  Blood glucose trend: Fluctuating        Date Breakfast Lunch Dinner Bedtime    Before Before Before    3/11 248  2 hb eggs, 1 pc rye toast, orange 201  Tuna sandwich, black berries 211  Chicken and dumpling  218   3/12 231  2 pc rye toast, peanuit bitter 212  Ham and cheese roll up baked potato 229  Salad, hard boiled egg, ham 223   3/13 228  2 hb eggs, 1 pc rue toast 205  Tuna sandwich, 1/2 grape fruit 260  Chicken, cottage cheese 220   3/14 241  h b egg, rye toast, 1/2 grapefruit 226  Fish mallika, cottage cheese 244  2 taco, whole avocado 237   3/15 273  Oatmeal, hard boiled egg, 1/2 grapefruit Went for a walk  115- felt  "dizzy  Drank a boost and rye toast with peanut butter  282 259  Salad with ham and cheese Went to bed early   3/16 217  Hard boiled egg, 2 sausage links, rye toast 213  Salad, ham and cheese, nuts 177  hamburger mallika, blueberries, cottage cheese 223   3/17 267          Taking Medications:  Diabetes Medication(s)     Insulin       insulin aspart (NOVOLOG FLEXPEN) 100 UNIT/ML pen    Novolog Flexpen  Give before meals and before bed:  For Pre-Meal Glucose:  140-189 give 1 unit   190-239 give 2 units   240-289 give 3 units   290-339 give 4 units   = or >340 give 5 units     For Bedtime Glucose  200 - 239 give 1 units   240 - 289 give 2 units  290 - 339 give 3 units  = or >340 give 4 units     insulin aspart (NOVOLOG PEN) 100 UNIT/ML pen    Inject 10 Units Subcutaneous 3 times daily (with meals)     insulin glargine (LANTUS PEN) 100 UNIT/ML pen    Inject 40 Units Subcutaneous every morning (before breakfast)        Has been taking 4-0-0-0 Lantus because she thought that the u100 pen meant it was equal to 40 units at th \"4\" brittany.    10-13 units Novolog per meal- taking as instructed.    Taking Medication Assessed Today: Yes  Current Treatments: Insulin Injections  Dose schedule: Pre-breakfast, Pre-lunch, Pre-dinner  Given by: Patient  Injection/Infusion sites: Abdomen  Problems taking diabetes medications regularly?: No  Diabetes medication side effects?: No    Problem Solving:  Problem Solving Assessed Today: Yes  Is the patient at risk for hypoglycemia?: Yes  Hypoglycemia Frequency: Weekly    Hypoglycemia symptoms  Confusion: Yes  Dizziness or Light-Headedness: Yes    Reducing Risks:  Reducing Risks Assessed Today: No    Healthy Coping:  Stage of change: ACTION (Actively working towards change)  Support resources: None  Patient Activation Measure Survey Score:  No flowsheet data found.    Diabetes knowledge and skills assessment:   Patient is knowledgeable in diabetes management concepts related to: Healthy " Eating    Patient needs further education on the following diabetes management concepts: Monitoring and Taking Medication    Based on learning assessment above, most appropriate setting for further diabetes education would be: Individual setting.      INTERVENTIONS:    Education provided today on:  AADE Self-Care Behaviors:  Healthy Eating: Reviewed current intake and ultimate goal of eating more carbohydrates at meals, likely about 45g plus snacks.    Exercise: DIscussed low with walking, would recommend 1 pc fruit of 15-30g carbohydrate snack prior to walks.  Encouraged patient to keep a quick carbohydrate source in pockets.      Monitoring: log and interpret results and individual blood glucose targets.  Informed patient she needs to renew her testing supplies every year for them to be covered by insurance.  Resent meter, strips, lancets to the pharmacy for testing 6 times daily.  Discussed freestyle magdy cgm and routed orders to provider.     Taking Medication: action of prescribed medication, drawing up, administering and storing injectable diabetes medications, proper site selection and rotation for injections, side effects of prescribed medications and confirmed dose.  After repeated questions confirmed patient has been taking 4-0-0-0 units of Lantus and not 40-0-0-0.  Reviewed hospital discharge notes with starting dose of 20-0-0-0.  Recommended starting back at 20 units for 1 week then review and titrate from there.  Patient did verbalize she did inject [facility] RNs when admitted.  Informed patient if she has any after meal lows to decrease Novolog to 8 units with meals and will also re-assess next week.    Opportunities for ongoing education and support in diabetes-self management were discussed.    Pt verbalized understanding of concepts discussed and recommendations provided today.       Education Materials Provided:  No new materials provided today      ASSESSMENT:  Patient has not been  taking Lantus insulin correctly leading to elevated blood sugars.  Recommend patient go back to hospital discharge dose of 20 units then CDE to call patient within 1 week.  She would benefit from freestyle magdy, orders to be sent to PCP.        Patient's most recent   Lab Results   Component Value Date    A1C 11.1 02/23/2021    is not meeting goal of <7.0    PLAN  See Patient Instructions for co-developed, patient-stated behavior change goals.  AVS printed and provided to patient today. See Follow-Up section for recommended follow-up.    Freestyle magdy orders routed to PCP.    Instructed patient to take Lantus 20-0-0-0, if any post meal lows decrease Novolog 10-10-10-0  Plus correction to --> 8-8-8-0 plus correction.    CDE phone call 3/23/21    Megan Hess MS, RD, LD, CDE  Time Spent: 44 minutes  Encounter Type: Individual    Any diabetes medication dose changes were made via the CDE Protocol and Collaborative Practice Agreement with the patient's primary care provider. A copy of this encounter was shared with the provider.

## 2021-03-17 NOTE — TELEPHONE ENCOUNTER
Covering for primary/ordering provider:  Agree with orders, pended orders signed.  Josselyn Barrientos, CNP

## 2021-03-23 ENCOUNTER — VIRTUAL VISIT (OUTPATIENT)
Dept: EDUCATION SERVICES | Facility: CLINIC | Age: 61
End: 2021-03-23
Payer: COMMERCIAL

## 2021-03-23 DIAGNOSIS — E11.29 POORLY CONTROLLED TYPE 2 DIABETES MELLITUS WITH RENAL COMPLICATION (H): Primary | ICD-10-CM

## 2021-03-23 DIAGNOSIS — E11.65 POORLY CONTROLLED TYPE 2 DIABETES MELLITUS WITH RENAL COMPLICATION (H): Primary | ICD-10-CM

## 2021-03-23 NOTE — PROGRESS NOTES
Diabetes Follow-up  Type of Service: Telephone Visit    How would patient like to obtain AVS? Sue    Subjective/Objective:    Bradford Prado sent in blood glucose log for review. Last date of communication was: 3/17/21.    Diabetes is being managed with   Diabetes Medications   Diabetes Medication(s)     Insulin       insulin aspart (NOVOLOG FLEXPEN) 100 UNIT/ML pen    Novolog Flexpen  Give before meals and before bed:  For Pre-Meal Glucose:  140-189 give 1 unit   190-239 give 2 units   240-289 give 3 units   290-339 give 4 units   = or >340 give 5 units     For Bedtime Glucose  200 - 239 give 1 units   240 - 289 give 2 units  290 - 339 give 3 units  = or >340 give 4 units     insulin aspart (NOVOLOG PEN) 100 UNIT/ML pen    Inject 10 Units Subcutaneous 3 times daily (with meals)     insulin glargine (LANTUS PEN) 100 UNIT/ML pen    Inject 40 Units Subcutaneous every morning (before breakfast)          BG/Food Log:   Date Breakfast  Lunch  Dinner  Bedtime    Before After Before After Before After    3/18 238  311    257   3/19 231  291   Before walk 231  After 139 85  Hand full of sweet tarts    Then dinner  220   3/20 158  253  After walk 178  275  260 207    3/21 219  280    103 200 169   3/22 200  298  196 before walk  128 202 134   3/23 165  215-11am             Assessment:  Switched to half calf coffee. Now eats a banana before the walk to prevent a low.  Walks at 9-1cm-bjgjoaot (after lunch) and 12pm-weekends before lunch.  Recommend increasing Lantus dose further, but lowering her lunch base dose to prevent lows and needing to treat a low.  Patient is considering adding a walk after breakfast as well.  She was able to  the freestyle magdy and has self started it successfully.    Fasting blood glucose: 0% in target.  Before lunch glucose: 0% in target.  After lunch glucose: patient drops after lunch due to walk.  Before dinner glucose: 25% in target.  After dinner glucose: 0% in target.  Bedtime  glucose: 50% in target.    Plan/Response:  Further increase Lantus to 20-0-0-0 --> 30-0-0-0 if after 5 days that isn't work ok to going up to full prescribed dose of 40 units, consider a base dose 5 units Novolog at lunch, continue 10 units with other meals.     Patient will continue writing down pre and post meals, as well as before and after her daily walks.     CDE follow up call 3/30/21    Megan Hess MS, RD, LD, CDE  Total time: 17 minutes    Any diabetes medication dose changes were made via the CDE Protocol and Collaborative Practice Agreement with the patient's primary care provider. A copy of this encounter was shared with the provider.
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
## 2021-03-30 ENCOUNTER — OFFICE VISIT (OUTPATIENT)
Dept: FAMILY MEDICINE | Facility: CLINIC | Age: 61
End: 2021-03-30
Payer: COMMERCIAL

## 2021-03-30 ENCOUNTER — VIRTUAL VISIT (OUTPATIENT)
Dept: EDUCATION SERVICES | Facility: CLINIC | Age: 61
End: 2021-03-30
Payer: COMMERCIAL

## 2021-03-30 VITALS
WEIGHT: 125.3 LBS | DIASTOLIC BLOOD PRESSURE: 80 MMHG | BODY MASS INDEX: 25.31 KG/M2 | HEART RATE: 80 BPM | TEMPERATURE: 98.1 F | SYSTOLIC BLOOD PRESSURE: 126 MMHG | RESPIRATION RATE: 12 BRPM | OXYGEN SATURATION: 98 %

## 2021-03-30 DIAGNOSIS — E11.65 TYPE 2 DIABETES MELLITUS WITH HYPERGLYCEMIA, WITHOUT LONG-TERM CURRENT USE OF INSULIN (H): ICD-10-CM

## 2021-03-30 DIAGNOSIS — Z48.02 ENCOUNTER FOR STAPLE REMOVAL: Primary | ICD-10-CM

## 2021-03-30 DIAGNOSIS — E11.65 POORLY CONTROLLED TYPE 2 DIABETES MELLITUS WITH RENAL COMPLICATION (H): Chronic | ICD-10-CM

## 2021-03-30 DIAGNOSIS — F43.22 ADJUSTMENT DISORDER WITH ANXIOUS MOOD: ICD-10-CM

## 2021-03-30 DIAGNOSIS — R07.89 ATYPICAL CHEST PAIN: ICD-10-CM

## 2021-03-30 DIAGNOSIS — E11.29 POORLY CONTROLLED TYPE 2 DIABETES MELLITUS WITH RENAL COMPLICATION (H): Chronic | ICD-10-CM

## 2021-03-30 PROCEDURE — 99214 OFFICE O/P EST MOD 30 MIN: CPT | Performed by: NURSE PRACTITIONER

## 2021-03-30 PROCEDURE — 98966 PH1 ASSMT&MGMT NQHP 5-10: CPT | Mod: 95

## 2021-03-30 NOTE — NURSING NOTE
"Initial /80   Pulse 80   Temp 98.1  F (36.7  C) (Tympanic)   Resp 12   Wt 56.8 kg (125 lb 4.8 oz)   SpO2 98%   BMI 25.31 kg/m   Estimated body mass index is 25.31 kg/m  as calculated from the following:    Height as of 3/2/21: 1.499 m (4' 11\").    Weight as of this encounter: 56.8 kg (125 lb 4.8 oz). .      "

## 2021-03-30 NOTE — PROGRESS NOTES
Diabetes Follow-up  Type of Service: Telephone Visit    How would patient like to obtain AVS? Sue    Subjective/Objective:    Bradford Prado sent in blood glucose log for review. Last date of communication was: 3/23/21.    Diabetes is being managed with   Diabetes Medications   Diabetes Medication(s)     Insulin       insulin aspart (NOVOLOG FLEXPEN) 100 UNIT/ML pen    Novolog Flexpen  Give before meals and before bed:  For Pre-Meal Glucose:  140-189 give 1 unit   190-239 give 2 units   240-289 give 3 units   290-339 give 4 units   = or >340 give 5 units     For Bedtime Glucose  200 - 239 give 1 units   240 - 289 give 2 units  290 - 339 give 3 units  = or >340 give 4 units     insulin aspart (NOVOLOG PEN) 100 UNIT/ML pen    Inject 10 Units Subcutaneous 3 times daily (with meals)     insulin glargine (LANTUS PEN) 100 UNIT/ML pen    Inject 40 Units Subcutaneous every morning (before breakfast)          BG/Food Log:   Date Breakfast  Lunch  Dinner     Before After Before After Before After   3/25 155 Tuna sandwih  On rye  302 179 159    5 units  88 after walk 124 125   3/26 160 sausage, rye toast , 2 eggs    242 172 278    Walk- after 158 111 105   3/27 165 246 155 260  No walk 156 172   3/28 246 256 190 185    After walk 111 117 125   3/29 165 226 168 154 184 197   3/30 168 291  oatmeal 2 pc sausage             Assessment:  Patient has increase to Lantus 0-0-0-30, and adjusted Novolog to 10-5-10-0,  Blood sugars are improved with less of a drop after her afternoon walk.  Recommend fully increasing to the 40 units of Lantus as recommended, and increase Breakfast Novolog dose.     Fasting blood glucose: 0% in target.  After breakfast glucose: 0% in target.  Before lunch glucose: 0% in target.  After lunch glucose: 40% in target.  Before dinner glucose: 60% in target.  After dinner glucose: 80% in target.      Plan/Response:  Recommend increase to Lantus: 0-0-0-30 --> 0-0-0-40, Novolog 10-5-10-0 --> 12-5-10-0  CDE  follow up call 4/12/10    Megan Hess MS, RD, LD, CDE  Total Time: 7 minutes    Any diabetes medication dose changes were made via the CDE Protocol and Collaborative Practice Agreement with the patient's primary care provider. A copy of this encounter was shared with the provider.

## 2021-03-31 NOTE — PATIENT INSTRUCTIONS
Patient Education     Uncertain Causes of Chest Pain    Chest pain can happen for a number of reasons. Sometimes the cause can't be determined. If your condition does not seem serious, and your pain does not appear to be coming from your heart, your healthcare provider may recommend watching it closely. Sometimes the signs of a serious problem take more time to appear. Many problems not related to your heart can cause chest pain. These include:    Musculoskeletal. Costochondritis is an inflammation of the tissues around the ribs that can occur from trauma or overuse injuries, or a strain of the muscles of the chest wall    Respiratory. Pneumonia, collapsed lung (pneumothorax), or inflammation of the lining of the chest and lungs (pleurisy)    Gastrointestinal. Esophageal reflux, heartburn, ulcers, or gallbladder disease    Anxiety and panic disorders    Nerve compression and inflammation    Rare miscellaneous problems such as aortic aneurysm (a swelling of the large artery coming out of the heart) or pulmonary embolism (a blood clot in the lungs)  Home care  After your visit, follow these recommendations:    Rest today and avoid strenuous activity.    Take any prescribed medicine as directed.    Be aware of any recurrent chest pain and notice any changes  Follow-up care  Follow up with your healthcare provider if you do not start to feel better within 24 hours, or as advised.  Call 911  Call 911 if any of these occur:    A change in the type of pain: if it feels different, becomes more severe, lasts longer, or begins to spread into your shoulder, arm, neck, jaw or back    Shortness of breath or increased pain with breathing    Weakness, dizziness, or fainting    Rapid heart beat    Crushing sensation in your chest  When to seek medical advice  Call your healthcare provider right away if any of the following occur:    Cough with dark colored sputum (phlegm) or blood    Fever of 100.4 F (38 C) or higher, or as  directed by your healthcare provider    Swelling, pain or redness in one leg  Chanel last reviewed this educational content on 5/1/2018 2000-2020 [employer]. All rights reserved. This information is not intended as a substitute for professional medical care. Always follow your healthcare professional's instructions.

## 2021-04-02 ENCOUNTER — HOSPITAL ENCOUNTER (OUTPATIENT)
Dept: MRI IMAGING | Facility: CLINIC | Age: 61
Discharge: HOME | End: 2021-04-02
Attending: NURSE PRACTITIONER | Admitting: NURSE PRACTITIONER
Payer: COMMERCIAL

## 2021-04-02 PROCEDURE — A9585 GADOBUTROL INJECTION: HCPCS | Performed by: NURSE PRACTITIONER

## 2021-04-02 PROCEDURE — 255N000002 HC RX 255 OP 636: Performed by: NURSE PRACTITIONER

## 2021-04-02 PROCEDURE — 258N000003 HC RX IP 258 OP 636: Performed by: NURSE PRACTITIONER

## 2021-04-02 PROCEDURE — 74183 MRI ABD W/O CNTR FLWD CNTR: CPT

## 2021-04-02 RX ORDER — GADOBUTROL 604.72 MG/ML
6 INJECTION INTRAVENOUS ONCE
Status: COMPLETED | OUTPATIENT
Start: 2021-04-02 | End: 2021-04-02

## 2021-04-02 RX ORDER — SODIUM CHLORIDE 9 MG/ML
60 INJECTION, SOLUTION INTRAVENOUS ONCE
Status: COMPLETED | OUTPATIENT
Start: 2021-04-02 | End: 2021-04-02

## 2021-04-02 RX ADMIN — GADOBUTROL 6 ML: 604.72 INJECTION INTRAVENOUS at 15:25

## 2021-04-02 RX ADMIN — SODIUM CHLORIDE 50 ML: 9 INJECTION, SOLUTION INTRAVENOUS at 15:25

## 2021-04-12 ENCOUNTER — VIRTUAL VISIT (OUTPATIENT)
Dept: EDUCATION SERVICES | Facility: CLINIC | Age: 61
End: 2021-04-12
Payer: COMMERCIAL

## 2021-04-12 DIAGNOSIS — E11.29 POORLY CONTROLLED TYPE 2 DIABETES MELLITUS WITH RENAL COMPLICATION (H): Primary | ICD-10-CM

## 2021-04-12 DIAGNOSIS — E11.65 POORLY CONTROLLED TYPE 2 DIABETES MELLITUS WITH RENAL COMPLICATION (H): Primary | ICD-10-CM

## 2021-04-12 PROCEDURE — G0108 DIAB MANAGE TRN  PER INDIV: HCPCS | Mod: 95

## 2021-04-12 NOTE — PROGRESS NOTES
Diabetes Follow-up  Type of Service: Telephone Visit    How would patient like to obtain AVS? Sue    Subjective/Objective:    Bradford Prado sent in blood glucose log for review. Last date of communication was: 3/30/21.    Diabetes is being managed with   Diabetes Medications   Diabetes Medication(s)     Insulin       insulin aspart (NOVOLOG FLEXPEN) 100 UNIT/ML pen    Novolog Flexpen  Give before meals and before bed:  For Pre-Meal Glucose:  140-189 give 1 unit   190-239 give 2 units   240-289 give 3 units   290-339 give 4 units   = or >340 give 5 units     For Bedtime Glucose  200 - 239 give 1 units   240 - 289 give 2 units  290 - 339 give 3 units  = or >340 give 4 units     insulin aspart (NOVOLOG PEN) 100 UNIT/ML pen    Inject 10 Units Subcutaneous 3 times daily (with meals)     insulin glargine (LANTUS PEN) 100 UNIT/ML pen    Inject 40 Units Subcutaneous every morning (before breakfast)          BG/Food Log:   Date Breakfast  Lunch  Dinner  Bedtime    Before After Before After Before After    4/5 139 184 126  Ate walked 101 193 144      Date Breakfast  Lunch  Dinner     Before After Before After Before After   4/6 159 167 155  1/2 tuna sandwich, banana  Ate lunch, took insulin  Had 63 on walk   Between meal snack- raspberries  171 221   4/7 158 232 170  No walk 191 162 179   4/8 167 173 140 170 94 137   4/9 189 265 158 205 183 143   4/10 128 202 125 168 86 238   4/11 167 235 148 207 180 270  wondered if she needed to take a correction   4/12 201 301  Hard boiled egg, buttered rye toast, 2 sausage links       Novolog 12-5-10-0  Lantus 40 units at breakfast     Had another low on a walk-63, ate jelly beans and came into 83    Assessment:  Breakfast same daily OR oatmeal and sausage.  Blood sugars continue to be highly variable.  Fasting blood sugar typically elevated, recommend switching Lantus injection to bedtime. Patient ate an estimate 45g carbohydrate before walk which is appropriate and should be enough  to prevent a low blood sugar.       Fasting blood glucose: 13% in target.  After breakfast glucose: 25% in target.  Before lunch glucose: 29% in target.  After lunch glucose: 50% in target.  Before dinner glucose: 25% in target.  After dinner glucose: 57% in target.    Plan/Response:  Discussed causes of highs and lows.  Discussed adjusting novolog based on walk or no walk and benefits of moving Lantus insulin.     Novolog: no changes to bases doses but if not walking take 2 additional units at lunch.  IF having a salad at lunch take 3 units as a base dose.      Lantus dose adjustment- Tomorrow take 20-0-0-20 for one night, 4/14 switch to 0-0-0-40 if fasting blood sugar continues to be above 130 increase to 0-0-0-45    Follow up 4/26/21    Megan Hess MS, RD, LD, CDE  Total Time: 31    Any diabetes medication dose changes were made via the CDE Protocol and Collaborative Practice Agreement with the patient's primary care provider. A copy of this encounter was shared with the provider.

## 2021-04-14 ENCOUNTER — OFFICE VISIT (OUTPATIENT)
Dept: NURSING | Facility: CLINIC | Age: 61
End: 2021-04-14
Payer: COMMERCIAL

## 2021-04-14 PROCEDURE — 91300 PR COVID VAC PFIZER DIL RECON 30 MCG/0.3 ML IM: CPT

## 2021-04-14 PROCEDURE — 0001A PR COVID VAC PFIZER DIL RECON 30 MCG/0.3 ML IM: CPT

## 2021-04-15 DIAGNOSIS — R74.01 TRANSAMINITIS: Primary | ICD-10-CM

## 2021-04-15 DIAGNOSIS — K74.60 CIRRHOSIS OF LIVER WITHOUT ASCITES, UNSPECIFIED HEPATIC CIRRHOSIS TYPE (H): ICD-10-CM

## 2021-04-19 NOTE — TELEPHONE ENCOUNTER
RECORDS RECEIVED FROM: Internal   Appt Date: 04.26.2021   NOTES STATUS DETAILS   OFFICE NOTE from referring provider Internal 04.15.2021 Paulette Travis NP   OFFICE NOTES from other specialists N/A    DISCHARGE SUMMARY from hospital N/A    MEDICATION LIST Internal    LIVER BIOSPY (IF APPLICABLE)      PATHOLOGY REPORTS  N/A    IMAGING     ENDOSCOPY (IF AVAILABLE) N/A    COLONOSCOPY (IF AVAILABLE) N/A    ULTRASOUND LIVER N/A    CT OF ABDOMEN N/A    MRI OF LIVER N/A    FIBROSCAN, US ELASTOGRAPHY, FIBROSIS SCAN, MR ELASTOGRAPHY N/A    LABS     HEPATIC PANEL (LIVER PANEL) Internal 02.23.2021   BASIC METABOLIC PANEL Internal 03.02.2021   COMPLETE METABOLIC PANEL Internal 02.25.2021   COMPLETE BLOOD COUNT (CBC) Internal 02.25.2021   INTERNATIONAL NORMALIZED RATIO (INR) N/A    HEPATITIS C ANTIBODY Internal 09.27.2016   HEPATITIS C VIRAL LOAD/PCR N/A    HEPATITIS C GENOTYPE N/A    HEPATITIS B SURFACE ANTIGEN Internal 09.27.20216   HEPATITIS B SURFACE ANTIBODY Internal 09.27.2016   HEPATITIS B DNA QUANT LEVEL N/A    HEPATITIS B CORE ANTIBODY N/A

## 2021-04-26 ENCOUNTER — VIRTUAL VISIT (OUTPATIENT)
Dept: EDUCATION SERVICES | Facility: CLINIC | Age: 61
End: 2021-04-26
Payer: COMMERCIAL

## 2021-04-26 ENCOUNTER — PRE VISIT (OUTPATIENT)
Dept: GASTROENTEROLOGY | Facility: CLINIC | Age: 61
End: 2021-04-26

## 2021-04-26 ENCOUNTER — VIRTUAL VISIT (OUTPATIENT)
Dept: GASTROENTEROLOGY | Facility: CLINIC | Age: 61
End: 2021-04-26
Attending: NURSE PRACTITIONER
Payer: COMMERCIAL

## 2021-04-26 DIAGNOSIS — E78.5 DYSLIPIDEMIA: ICD-10-CM

## 2021-04-26 DIAGNOSIS — E11.65 POORLY CONTROLLED TYPE 2 DIABETES MELLITUS WITH RENAL COMPLICATION (H): Primary | Chronic | ICD-10-CM

## 2021-04-26 DIAGNOSIS — E11.29 POORLY CONTROLLED TYPE 2 DIABETES MELLITUS WITH RENAL COMPLICATION (H): Primary | Chronic | ICD-10-CM

## 2021-04-26 DIAGNOSIS — R79.0 ABNORMAL RESULT OF IRON PROFILE TESTING: ICD-10-CM

## 2021-04-26 DIAGNOSIS — R93.2 ABNORMAL MRI, LIVER: ICD-10-CM

## 2021-04-26 DIAGNOSIS — R79.89 ABNORMAL LIVER FUNCTION TESTS: Primary | ICD-10-CM

## 2021-04-26 DIAGNOSIS — E11.69 TYPE 2 DIABETES MELLITUS WITH OTHER SPECIFIED COMPLICATION, WITHOUT LONG-TERM CURRENT USE OF INSULIN (H): ICD-10-CM

## 2021-04-26 DIAGNOSIS — E11.65 TYPE 2 DIABETES MELLITUS WITH HYPERGLYCEMIA, WITHOUT LONG-TERM CURRENT USE OF INSULIN (H): ICD-10-CM

## 2021-04-26 DIAGNOSIS — R74.01 TRANSAMINITIS: ICD-10-CM

## 2021-04-26 PROCEDURE — 98967 PH1 ASSMT&MGMT NQHP 11-20: CPT | Mod: 95

## 2021-04-26 PROCEDURE — 99204 OFFICE O/P NEW MOD 45 MIN: CPT | Mod: 95 | Performed by: INTERNAL MEDICINE

## 2021-04-26 ASSESSMENT — PAIN SCALES - GENERAL: PAINLEVEL: NO PAIN (0)

## 2021-04-26 NOTE — LETTER
4/26/2021         RE: Bradford Prado  [address]  [facility] [address]        Dear Colleague,    Thank you for referring your patient, Bradford Prado, to the [facility] [place]. Please see a copy of my visit note below.    Bradford is a 60 year old who is being evaluated via a billable telephone visit.      What phone number would you like to be contacted at? [phone]  How would you like to obtain your AVS? Kikeharlinda  Phone call duration: 23 minutes    Date of Service: 4/26/2021     Referring Provider: Paulette Travis NP    Subjective:            Bradford Prado is a 60 year old female presenting for evaluation of abnormal liver tests    History of Present Illness   Bradford Prado is a 60 year old female with past medical history of mild overweight, hypertension, dyslipidemia, poorly controlled diabetes recently started on insulin therapy who presents with concerns of abnormal liver tests and historically abnormal liver imaging    Reports that she was first told that she had some sort of issue with her liver approximately 4 years ago.  At that time she was having significant pain and dysuria related to kidney stones.  An abdominal imaging study suggested a nodular appearing liver that raise concerns for chronic liver disease.  She denies ever having follow-up for this condition at that time.  Based on review of historical labs it was noted that around the time of this imaging she did have iron studies checked which demonstrated a serum ferritin of 1569 in September 2016, but again there has been no follow-up on this lab today.  Noted that she has had progressive worsening of her overall management of her diabetes, and reports that she started on insulin during a recent hospitalization.  Most recent hemoglobin A1c was 11.1%    She underwent an MRI of the abdomen on April 2 which demonstrated nodular appearing liver, diffusely decreased signal throughout the liver raising concerns for iron  deposition, and multiple arterial enhancing lesions throughout the liver.    Past Medical History:  Past Medical History:   Diagnosis Date     Diabetes mellitus (H)      Dyslipidemia      Hypertension        Surgical History:  Past Surgical History:   Procedure Laterality Date     HYSTERECTOMY TOTAL ABDOMINAL      due to fibroids     LAPAROSCOPIC EVACUATION ECTOPIC PREGNANCY       TUBAL LIGATION         Social History:  Social History     Tobacco Use     Smoking status: Former Smoker     Packs/day: 0.50     Years: 20.00     Pack years: 10.00     Types: Cigarettes     Quit date: 3/9/2010     Years since quittin.1     Smokeless tobacco: Never Used   Substance Use Topics     Alcohol use: No     Alcohol/week: 0.0 standard drinks     Drug use: No        Family History:  Family History   Problem Relation Age of Onset     Cirrhosis Mother         w/o alcohol history     Diabetes Mother      Emphysema Father      Hypertension Sister      Heart Defect Niece         Tetrology of Fallot     Breast Cancer No family hx of      Cancer - colorectal No family hx of        Medications:  Current Outpatient Medications   Medication     ACCU-CHEK GUIDE test strip     acetaminophen (TYLENOL) 325 MG tablet     amLODIPine (NORVASC) 5 MG tablet     blood glucose (NO BRAND SPECIFIED) lancets standard     blood glucose (NO BRAND SPECIFIED) test strip     blood glucose (NO BRAND SPECIFIED) test strip     blood glucose monitoring (SOFTCLIX) lancets     Blood Glucose Monitoring Suppl (ACCU-CHEK GUIDE ME) w/Device KIT     carvedilol (COREG) 12.5 MG tablet     Continuous Blood Gluc Sensor (FREESTYLE MEENA 14 DAY SENSOR) MISC     insulin aspart (NOVOLOG FLEXPEN) 100 UNIT/ML pen     insulin aspart (NOVOLOG PEN) 100 UNIT/ML pen     insulin glargine (LANTUS PEN) 100 UNIT/ML pen     Insulin Pen Needle (PEN NEEDLES) 32G X 4 MM MISC     losartan (COZAAR) 50 MG tablet     pravastatin (PRAVACHOL) 20 MG tablet     sertraline (ZOLOFT) 50 MG tablet      No current facility-administered medications for this visit.        Review of Systems  A complete 10 point review of systems was asked and answered in the negative unless specifically commented upon in the HPI    Objective:         There were no vitals filed for this visit.  There is no height or weight on file to calculate BMI.     Physical Exam    Labs and Diagnostic tests:  Lab Results   Component Value Date    BILITOTAL 0.6 02/23/2021     02/23/2021    AST 72 02/23/2021    ALKPHOS 139 02/23/2021     Lab Results   Component Value Date    ALBUMIN 4.5 02/23/2021    PROTTOTAL 8.5 02/23/2021          Imaging:  MRI Abdomen w/wo contrast 4/2/2021  FINDINGS:   LOWER CHEST: Unremarkable.     HEPATOBILIARY: Nodularity of the liver contour raises the possibility  of cirrhosis. Mild diffuse loss of signal on the in-phase images can  be seen in the setting of iron deposition. There are multiple  scattered small arterially enhancing lesions throughout the liver,  with the largest in the posterior segment of the right hepatic lobe  inferiorly (series 10 image 51) measuring 1.8 cm. This largest  arterially enhancing hepatic lesion demonstrates T1 isointensity, mild  T2 hyperintensity, and isoenhancement on the more delayed phase  postcontrast images. This lesion also demonstrates mild associated  restricted diffusion. There are innumerable tiny T2 and T1 hypointense  nonenhancing nodules throughout the liver. Small gallstone is noted  within the gallbladder. The gallbladder is mildly distended. No intra  or extrahepatic biliary dilatation.     PANCREAS: Unremarkable. No pancreatic ductal dilatation.     SPLEEN: Normal.     ADRENAL GLANDS: Normal.     KIDNEYS: Unremarkable. No hydronephrosis.     BOWEL: Visualized loops of small bowel and colon are of normal  caliber. A few small duodenal diverticula are noted.     LYMPH NODES: No enlarged lymph nodes are identified in the abdomen.     VASCULATURE: Atherosclerotic  aortoiliac changes are noted.     ADDITIONAL FINDINGS: None.     MUSCULOSKELETAL: Unremarkable.    Assessment and Plan:    Abnormal Liver Tests:    -Review of the medical record demonstrates that she was had abnormal liver tests since at least 2016.  Differential would include nonalcoholic fatty liver disease, iron overload syndrome, or some other unspecified deposition disease.  -The progression of her abnormal liver test certainly fits with worsening control of her insulin resistance and diabetes.  As insulin resistance is the hallmark for fatty liver disease, this certainly would correlate  -Would also raises concern is historical significant elevation in serum ferritin and percent iron saturation in the context of MRI suggesting significant iron deposition.  Given the clinical context of worsening diabetes, it is possible that the patient has an iron overload syndrome, like hemochromatosis, that could be causing many of these issues  -We will repeat basic meld labs  -We will evaluate for common autoimmune causes of liver disease  -We will check iron studies and HFE gene testing based on her history and clinical syndrome  -We will plan to repeat liver tests and iron studies in 3 months    Non-Alcoholic Fatty Liver Disease  - I had a long discussion with the patient about nonalcoholic fatty liver disease (NAFLD).  We discussed how fat deposits in the liver, how this leads to inflammation, and how chronic inflammation (CERDA) can ultimately lead to scarring and cirrhosis.  The long-term complications of fatty liver disease were discussed with the patient, including the increased risk of cardiovascular disease complications,the risk of developing diabetes (if not already), and variable progression to cirrhosis and end stage liver disease  Management of NAFLD/CERDA  - We spent time discussing an appropriate diet, exercise and weight loss plan.      - Recommend exercise regularly: 4+ times per week, with an average of  "about 45 minutes per day.      - It has shown that patients who exercise regularly can have improvement of insulin resistance and resolution of fatty liver disease, even if they are not able to lose weight.   - Recommend aggressive diabetes management: ideal goal Hgb A1c goal of =/< 7%. If possible addition of insulin sensitizing agents like metformin or liraglutide will be helpful.    - Management of cholesterol is also very important.    - The use of \"statins\" (HMG-CoA reductase medications) are an effective means of therapy and are not contra-indicated in those with abnormal liver tests OR those with cirrhosis.  The value of these medications in this population far outweigh the minor risks of abnormal liver tests.   - Goal LDL in those with CERDA are < 100 mg/dL      Abnormal liver imaging:  -She is noted to have a nodular appearing liver on CT scan from 2016 and again on MRI from April 2021.  -This is a nonspecific finding, and there is no other overt changes on her abdominal imaging to suggest significant portal hypertension  -We will review the imaging in our tumor board given the findings to assess for further plan    Follow Up:   3 months     Thank you very much for the opportunity to participate in the care of this patient.  If you have any further questions, please don't hesitate to contact our office.    Thomas M. Leventhal, M.D.   of Medicine  Advanced & Transplant Hepatology  [facility]    "

## 2021-04-26 NOTE — PROGRESS NOTES
Bradford is a 60 year old who is being evaluated via a billable telephone visit.      What phone number would you like to be contacted at? [phone]  How would you like to obtain your AVS? Jeffreylinda  Phone call duration: 23 minutes    Date of Service: 4/26/2021     Referring Provider: Paulette Travis NP    Subjective:            Bradford Prado is a 60 year old female presenting for evaluation of abnormal liver tests    History of Present Illness   Bradford Pardo is a 60 year old female with past medical history of mild overweight, hypertension, dyslipidemia, poorly controlled diabetes recently started on insulin therapy who presents with concerns of abnormal liver tests and historically abnormal liver imaging    Reports that she was first told that she had some sort of issue with her liver approximately 4 years ago.  At that time she was having significant pain and dysuria related to kidney stones.  An abdominal imaging study suggested a nodular appearing liver that raise concerns for chronic liver disease.  She denies ever having follow-up for this condition at that time.  Based on review of historical labs it was noted that around the time of this imaging she did have iron studies checked which demonstrated a serum ferritin of 1569 in September 2016, but again there has been no follow-up on this lab today.  Noted that she has had progressive worsening of her overall management of her diabetes, and reports that she started on insulin during a recent hospitalization.  Most recent hemoglobin A1c was 11.1%    She underwent an MRI of the abdomen on April 2 which demonstrated nodular appearing liver, diffusely decreased signal throughout the liver raising concerns for iron deposition, and multiple arterial enhancing lesions throughout the liver.    Past Medical History:  Past Medical History:   Diagnosis Date     Diabetes mellitus (H)      Dyslipidemia      Hypertension        Surgical History:  Past Surgical History:    Procedure Laterality Date     HYSTERECTOMY TOTAL ABDOMINAL      due to fibroids     LAPAROSCOPIC EVACUATION ECTOPIC PREGNANCY       TUBAL LIGATION         Social History:  Social History     Tobacco Use     Smoking status: Former Smoker     Packs/day: 0.50     Years: 20.00     Pack years: 10.00     Types: Cigarettes     Quit date: 3/9/2010     Years since quittin.1     Smokeless tobacco: Never Used   Substance Use Topics     Alcohol use: No     Alcohol/week: 0.0 standard drinks     Drug use: No        Family History:  Family History   Problem Relation Age of Onset     Cirrhosis Mother         w/o alcohol history     Diabetes Mother      Emphysema Father      Hypertension Sister      Heart Defect Niece         Tetrology of Fallot     Breast Cancer No family hx of      Cancer - colorectal No family hx of        Medications:  Current Outpatient Medications   Medication     ACCU-CHEK GUIDE test strip     acetaminophen (TYLENOL) 325 MG tablet     amLODIPine (NORVASC) 5 MG tablet     blood glucose (NO BRAND SPECIFIED) lancets standard     blood glucose (NO BRAND SPECIFIED) test strip     blood glucose (NO BRAND SPECIFIED) test strip     blood glucose monitoring (SOFTCLIX) lancets     Blood Glucose Monitoring Suppl (ACCU-CHEK GUIDE ME) w/Device KIT     carvedilol (COREG) 12.5 MG tablet     Continuous Blood Gluc Sensor (FREESTYLE MEENA 14 DAY SENSOR) MISC     insulin aspart (NOVOLOG FLEXPEN) 100 UNIT/ML pen     insulin aspart (NOVOLOG PEN) 100 UNIT/ML pen     insulin glargine (LANTUS PEN) 100 UNIT/ML pen     Insulin Pen Needle (PEN NEEDLES) 32G X 4 MM MISC     losartan (COZAAR) 50 MG tablet     pravastatin (PRAVACHOL) 20 MG tablet     sertraline (ZOLOFT) 50 MG tablet     No current facility-administered medications for this visit.        Review of Systems  A complete 10 point review of systems was asked and answered in the negative unless specifically commented upon in the HPI    Objective:         There were no  vitals filed for this visit.  There is no height or weight on file to calculate BMI.     Physical Exam    Labs and Diagnostic tests:  Lab Results   Component Value Date    BILITOTAL 0.6 02/23/2021     02/23/2021    AST 72 02/23/2021    ALKPHOS 139 02/23/2021     Lab Results   Component Value Date    ALBUMIN 4.5 02/23/2021    PROTTOTAL 8.5 02/23/2021          Imaging:  MRI Abdomen w/wo contrast 4/2/2021  FINDINGS:   LOWER CHEST: Unremarkable.     HEPATOBILIARY: Nodularity of the liver contour raises the possibility  of cirrhosis. Mild diffuse loss of signal on the in-phase images can  be seen in the setting of iron deposition. There are multiple  scattered small arterially enhancing lesions throughout the liver,  with the largest in the posterior segment of the right hepatic lobe  inferiorly (series 10 image 51) measuring 1.8 cm. This largest  arterially enhancing hepatic lesion demonstrates T1 isointensity, mild  T2 hyperintensity, and isoenhancement on the more delayed phase  postcontrast images. This lesion also demonstrates mild associated  restricted diffusion. There are innumerable tiny T2 and T1 hypointense  nonenhancing nodules throughout the liver. Small gallstone is noted  within the gallbladder. The gallbladder is mildly distended. No intra  or extrahepatic biliary dilatation.     PANCREAS: Unremarkable. No pancreatic ductal dilatation.     SPLEEN: Normal.     ADRENAL GLANDS: Normal.     KIDNEYS: Unremarkable. No hydronephrosis.     BOWEL: Visualized loops of small bowel and colon are of normal  caliber. A few small duodenal diverticula are noted.     LYMPH NODES: No enlarged lymph nodes are identified in the abdomen.     VASCULATURE: Atherosclerotic aortoiliac changes are noted.     ADDITIONAL FINDINGS: None.     MUSCULOSKELETAL: Unremarkable.    Assessment and Plan:    Abnormal Liver Tests:    -Review of the medical record demonstrates that she was had abnormal liver tests since at least 2016.   Differential would include nonalcoholic fatty liver disease, iron overload syndrome, or some other unspecified deposition disease.  -The progression of her abnormal liver test certainly fits with worsening control of her insulin resistance and diabetes.  As insulin resistance is the hallmark for fatty liver disease, this certainly would correlate  -Would also raises concern is historical significant elevation in serum ferritin and percent iron saturation in the context of MRI suggesting significant iron deposition.  Given the clinical context of worsening diabetes, it is possible that the patient has an iron overload syndrome, like hemochromatosis, that could be causing many of these issues  -We will repeat basic meld labs  -We will evaluate for common autoimmune causes of liver disease  -We will check iron studies and HFE gene testing based on her history and clinical syndrome  -We will plan to repeat liver tests and iron studies in 3 months    Non-Alcoholic Fatty Liver Disease  - I had a long discussion with the patient about nonalcoholic fatty liver disease (NAFLD).  We discussed how fat deposits in the liver, how this leads to inflammation, and how chronic inflammation (CERDA) can ultimately lead to scarring and cirrhosis.  The long-term complications of fatty liver disease were discussed with the patient, including the increased risk of cardiovascular disease complications,the risk of developing diabetes (if not already), and variable progression to cirrhosis and end stage liver disease  Management of NAFLD/CERDA  - We spent time discussing an appropriate diet, exercise and weight loss plan.      - Recommend exercise regularly: 4+ times per week, with an average of about 45 minutes per day.      - It has shown that patients who exercise regularly can have improvement of insulin resistance and resolution of fatty liver disease, even if they are not able to lose weight.   - Recommend aggressive diabetes management:  "ideal goal Hgb A1c goal of =/< 7%. If possible addition of insulin sensitizing agents like metformin or liraglutide will be helpful.    - Management of cholesterol is also very important.    - The use of \"statins\" (HMG-CoA reductase medications) are an effective means of therapy and are not contra-indicated in those with abnormal liver tests OR those with cirrhosis.  The value of these medications in this population far outweigh the minor risks of abnormal liver tests.   - Goal LDL in those with CERDA are < 100 mg/dL      Abnormal liver imaging:  -She is noted to have a nodular appearing liver on CT scan from 2016 and again on MRI from April 2021.  -This is a nonspecific finding, and there is no other overt changes on her abdominal imaging to suggest significant portal hypertension  -We will review the imaging in our tumor board given the findings to assess for further plan    Follow Up:   3 months     Thank you very much for the opportunity to participate in the care of this patient.  If you have any further questions, please don't hesitate to contact our office.    Thomas M. Leventhal, M.D.   of Medicine  Advanced & Transplant Hepatology  [facility]      "

## 2021-04-26 NOTE — PATIENT INSTRUCTIONS
- please get labs now    - I will be in touch with you regarding results    - Please get repeat labs in 3 month - late July 2021

## 2021-04-26 NOTE — PROGRESS NOTES
Diabetes Follow-up  Type of Service: Telephone Visit    How would patient like to obtain AVS? Sue    Subjective/Objective:    Bradford Prado sent in blood glucose log for review. Last date of communication was: 4/12/21.    Diabetes is being managed with   Diabetes Medications   Diabetes Medication(s)     Insulin       insulin aspart (NOVOLOG FLEXPEN) 100 UNIT/ML pen    Novolog Flexpen  Give before meals and before bed:  For Pre-Meal Glucose:  140-189 give 1 unit   190-239 give 2 units   240-289 give 3 units   290-339 give 4 units   = or >340 give 5 units     For Bedtime Glucose  200 - 239 give 1 units   240 - 289 give 2 units  290 - 339 give 3 units  = or >340 give 4 units     insulin aspart (NOVOLOG PEN) 100 UNIT/ML pen    Inject 10 Units Subcutaneous 3 times daily (with meals)     insulin glargine (LANTUS PEN) 100 UNIT/ML pen    Inject 40 Units Subcutaneous every morning (before breakfast)          BG/Food Log:     Date Breakfast  Lunch  Dinner  Bedtime    Before After Before After Before After    4/20 157 260  1 hr later 207 165    Walk 137 after   123 142    4/21 132 169 176  Walk  202  113 129    4/22 127 122 156  walked 157 144 161    4/23 126 152 111  Walked and no low 137 144 158    4/24 152 167 141  No walk 152 132 156    4/25 131 153 128 157 140 158    4/26 158               Last call moved Lantus to evening could be at 0-0-0-45    Assessment:  Low glucose events:  0 events in past 7 days    Past 7 days  Above-7%  Time in target-93%  Below: 0%    Blood sugars much improved since moving Lantus to the evening.  Blood sugars slightly elevated, but overall significantly improved.  Recommend slight increase to Lantus.    Fasting blood glucose: 23% in target.  After breakfast glucose: 83% in target.  Before lunch glucose: 33% in target.  After lunch glucose: 25% in target.  Before dinner glucose: 50% in target.  After dinner glucose: 100% in target.    Plan/Response:  See Patient Instructions for co-developed,  patient-stated behavior change goals.  Recommend increase to insulin - Lantus 0-0-0-40 --> 0-0-0-42, no change to Novolog.    Provided education on A1c and to expect it will not be fully lowered by 7/2021.    Patient to call team IF she has lows.    Megan Hess MS, RD, LD, CDE  Total Time: 12 minutes    Any diabetes medication dose changes were made via the CDE Protocol and Collaborative Practice Agreement with the patient's primary care provider. A copy of this encounter was shared with the provider.

## 2021-04-29 ENCOUNTER — TELEPHONE (OUTPATIENT)
Dept: GASTROENTEROLOGY | Facility: CLINIC | Age: 61
End: 2021-04-29

## 2021-05-05 ENCOUNTER — IMMUNIZATION (OUTPATIENT)
Dept: NURSING | Facility: CLINIC | Age: 61
End: 2021-05-05
Attending: INTERNAL MEDICINE
Payer: COMMERCIAL

## 2021-05-05 PROCEDURE — 91300 PR COVID VAC PFIZER DIL RECON 30 MCG/0.3 ML IM: CPT

## 2021-05-05 PROCEDURE — 0002A PR COVID VAC PFIZER DIL RECON 30 MCG/0.3 ML IM: CPT

## 2021-05-10 DIAGNOSIS — R79.0 ABNORMAL RESULT OF IRON PROFILE TESTING: ICD-10-CM

## 2021-05-10 DIAGNOSIS — R79.89 ABNORMAL LIVER FUNCTION TESTS: ICD-10-CM

## 2021-05-10 LAB
AFP SERPL-MCNC: 5.5 UG/L (ref 0–8)
ALBUMIN SERPL-MCNC: 3.7 G/DL (ref 3.4–5)
ALP SERPL-CCNC: 90 U/L (ref 40–150)
ALT SERPL W P-5'-P-CCNC: 45 U/L (ref 0–50)
ANION GAP SERPL CALCULATED.3IONS-SCNC: 7 MMOL/L (ref 3–14)
AST SERPL W P-5'-P-CCNC: 29 U/L (ref 0–45)
BILIRUB DIRECT SERPL-MCNC: 0.1 MG/DL (ref 0–0.2)
BILIRUB SERPL-MCNC: 0.6 MG/DL (ref 0.2–1.3)
BUN SERPL-MCNC: 17 MG/DL (ref 7–30)
CALCIUM SERPL-MCNC: 9.1 MG/DL (ref 8.5–10.1)
CHLORIDE SERPL-SCNC: 105 MMOL/L (ref 94–109)
CO2 SERPL-SCNC: 26 MMOL/L (ref 20–32)
CREAT SERPL-MCNC: 0.57 MG/DL (ref 0.52–1.04)
FERRITIN SERPL-MCNC: 1126 NG/ML (ref 8–252)
GFR SERPL CREATININE-BSD FRML MDRD: >90 ML/MIN/{1.73_M2}
GLUCOSE SERPL-MCNC: 207 MG/DL (ref 70–99)
INR PPP: 1.24 (ref 0.86–1.14)
IRON SATN MFR SERPL: 77 % (ref 15–46)
IRON SERPL-MCNC: 235 UG/DL (ref 35–180)
POTASSIUM SERPL-SCNC: 4.2 MMOL/L (ref 3.4–5.3)
PROT SERPL-MCNC: 7.6 G/DL (ref 6.8–8.8)
SODIUM SERPL-SCNC: 138 MMOL/L (ref 133–144)
TIBC SERPL-MCNC: 306 UG/DL (ref 240–430)

## 2021-05-10 PROCEDURE — 83540 ASSAY OF IRON: CPT | Performed by: PATHOLOGY

## 2021-05-10 PROCEDURE — 80048 BASIC METABOLIC PNL TOTAL CA: CPT | Performed by: PATHOLOGY

## 2021-05-10 PROCEDURE — 85610 PROTHROMBIN TIME: CPT | Performed by: PATHOLOGY

## 2021-05-10 PROCEDURE — 99000 SPECIMEN HANDLING OFFICE-LAB: CPT | Performed by: PATHOLOGY

## 2021-05-10 PROCEDURE — 82105 ALPHA-FETOPROTEIN SERUM: CPT | Mod: 90 | Performed by: PATHOLOGY

## 2021-05-10 PROCEDURE — 83550 IRON BINDING TEST: CPT | Performed by: PATHOLOGY

## 2021-05-10 PROCEDURE — 83516 IMMUNOASSAY NONANTIBODY: CPT | Mod: 90 | Performed by: PATHOLOGY

## 2021-05-10 PROCEDURE — G0452 MOLECULAR PATHOLOGY INTERPR: HCPCS | Mod: 59 | Performed by: PATHOLOGY

## 2021-05-10 PROCEDURE — 81256 HFE GENE: CPT | Mod: 90 | Performed by: PATHOLOGY

## 2021-05-10 PROCEDURE — 86038 ANTINUCLEAR ANTIBODIES: CPT | Mod: 90 | Performed by: PATHOLOGY

## 2021-05-10 PROCEDURE — 82103 ALPHA-1-ANTITRYPSIN TOTAL: CPT | Mod: 90 | Performed by: PATHOLOGY

## 2021-05-10 PROCEDURE — 82728 ASSAY OF FERRITIN: CPT | Performed by: PATHOLOGY

## 2021-05-10 PROCEDURE — 36415 COLL VENOUS BLD VENIPUNCTURE: CPT | Performed by: PATHOLOGY

## 2021-05-10 PROCEDURE — 80076 HEPATIC FUNCTION PANEL: CPT | Performed by: PATHOLOGY

## 2021-05-10 PROCEDURE — 81332 SERPINA1 GENE: CPT | Performed by: PATHOLOGY

## 2021-05-10 PROCEDURE — 82784 ASSAY IGA/IGD/IGG/IGM EACH: CPT | Mod: 90 | Performed by: PATHOLOGY

## 2021-05-11 LAB
ANA SER QL IF: NEGATIVE
TTG IGA SER-ACNC: 1 U/ML

## 2021-05-12 LAB — IGG SERPL-MCNC: 1076 MG/DL (ref 610–1616)

## 2021-05-13 LAB — COPATH REPORT: NORMAL

## 2021-05-17 LAB
A1AT PHENOTYP SERPL-IMP: NORMAL
A1AT SERPL-MCNC: 157 MG/DL (ref 90–200)
A1AT SS SERPL-MCNC: NEGATIVE G/L
A1AT SZ SERPL-MCNC: NORMAL G/L
A1AT ZZ SERPL-MCNC: NEGATIVE G/L
SPECIMEN SOURCE: NORMAL

## 2021-05-18 DIAGNOSIS — E83.110 HEREDITARY HEMOCHROMATOSIS (H): Primary | ICD-10-CM

## 2021-05-18 DIAGNOSIS — R16.0 LIVER MASS: ICD-10-CM

## 2021-05-18 NOTE — PROGRESS NOTES
Contacted the patient this morning to discuss review of MRI with the [facility] liver tumor board as well as her recent labs    Per review of the MRI, etiology of these liver masses is unclear, and recommendation was to repeat MRI in 3 months.  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  -We will refer the patient to hematology for further assessment and initiation of phlebotomy therapy    She already has a scheduled follow-up visit with this clinic in July 2021    Thomas M. Leventhal, M.D.   of Medicine  Advanced/Transplant Hepatology & Critical Care Medicine  [facility]

## 2021-05-26 NOTE — TELEPHONE ENCOUNTER
RECORDS STATUS - ALL OTHER DIAGNOSIS      RECORDS RECEIVED FROM: [facility]   DATE RECEIVED: 6/10/2021   NOTES STATUS DETAILS   OFFICE NOTE from referring provider     OFFICE NOTE from medical oncologist N/A    DISCHARGE SUMMARY from hospital N/A    DISCHARGE REPORT from the ER     OPERATIVE REPORT N/A    MEDICATION LIST Complete [facility]   CLINICAL TRIAL TREATMENTS TO DATE     LABS     PATHOLOGY REPORTS     ANYTHING RELATED TO DIAGNOSIS Complete Labs last updated on 5/10/2021   GENONOMIC TESTING     TYPE:     IMAGING (NEED IMAGES & REPORT)     CT SCANS     MRI Complete MRI Abdomen 4/2/2021   MAMMO     ULTRASOUND     PET

## 2021-06-07 ENCOUNTER — TELEPHONE (OUTPATIENT)
Dept: GASTROENTEROLOGY | Facility: CLINIC | Age: 61
End: 2021-06-07

## 2021-06-09 NOTE — PROGRESS NOTES
Heme/onc visit note  June 9, 2021  Oncology team:    Reason for visit: HFE gene C282Y homozygosity consistent with hereditary hemochromatosis.    Cancer history: Bradford Prado is a 60 year old female with past medical history of mild overweight, hypertension, dyslipidemia, poorly controlled diabetes recently started on insulin therapy who presents with concerns of abnormal liver tests and historically abnormal liver imaging. Reports that she was first told that she had some sort of issue with her liver approximately 4 years ago.  At that time she was having significant pain and dysuria related to kidney stones.  An abdominal imaging study suggested a nodular appearing liver that raise concerns for chronic liver disease.  She denies ever having follow-up for this condition at that time.  Based on review of historical labs it was noted that around the time of this imaging she did have iron studies checked which demonstrated a serum ferritin of 1569 in September 2016, but again there has been no follow-up on this lab today. Noted that she has had progressive worsening of her overall management of her diabetes, and reports that she started on insulin during a recent hospitalization.  Most recent hemoglobin A1c was 11.1%.She underwent an MRI of the abdomen on April 2 which demonstrated nodular appearing liver, diffusely decreased signal throughout the liver raising concerns for iron deposition, and multiple arterial enhancing lesions throughout the liver. Per review of the MRI, etiology of these liver masses is unclear, and recommendation was to repeat MRI in 3 months.  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  Therefore, patient was referred to hematology for further assessment and initiation of phlebotomy therapy      Interval history:  6/10/21  Reports that her chest pain still persistent. Otherwise, ROS as below. Patient stated that she is afraid of the diagnosis.    Past medical history:  Patient Active Problem List   Diagnosis     Hand arthritis     Stenosing tenosynovitis     Poorly controlled type 2 diabetes mellitus with renal complication (H)     Hyperlipidemia LDL goal <70     Essential hypertension with goal blood pressure less than 140/90     Nephrolithiasis     Type 2 diabetes mellitus with hyperglycemia, without long-term current use of insulin (H)     Chest pain     Transaminitis     Hyperglycemia     Hypertensive urgency     Diabetes mellitus (H)     Dyslipidemia       Medications:  ACCU-CHEK GUIDE test strip, Use to test blood sugar 4 times daily or as directed.  acetaminophen (TYLENOL) 325 MG tablet, Take 325-650 mg by mouth every 6 hours as needed for mild pain  amLODIPine (NORVASC) 5 MG tablet, Take 1 tablet (5 mg) by mouth 2 times daily  blood glucose (NO BRAND SPECIFIED) lancets standard, Use to test blood sugar twice times daily or as directed.  blood glucose (NO BRAND SPECIFIED) test strip, Use to test blood sugar twice times daily or as directed.  blood glucose (NO BRAND SPECIFIED) test strip, Use to test blood sugars 2 times daily or as directed  blood glucose monitoring (SOFTCLIX) lancets, Use to test blood sugar 6 times daily.  Blood Glucose Monitoring Suppl (ACCU-CHEK GUIDE ME) w/Device KIT, 1 each 6 times daily  carvedilol (COREG) 12.5 MG tablet, Take 1 tablet (12.5 mg) by mouth 2 times daily (with meals)  Continuous Blood Gluc Sensor (FREESTYLE MEENA 14 DAY SENSOR) MIS, 1 each every 14 days Change every 14 days.  insulin aspart (NOVOLOG FLEXPEN) 100 UNIT/ML pen, Novolog Flexpen  Give before meals and before bed:  For Pre-Meal Glucose:  140-189 give 1 unit   190-239 give 2 units   240-289 give 3 units   290-339 give 4 units   = or >340 give 5 units     For Bedtime Glucose  200 - 239 give 1 units   240 - 289 give 2  "units  290 - 339 give 3 units  = or >340 give 4 units  insulin aspart (NOVOLOG PEN) 100 UNIT/ML pen, Inject 10 Units Subcutaneous 3 times daily (with meals)  insulin glargine (LANTUS PEN) 100 UNIT/ML pen, Inject 42 Units Subcutaneous every morning (before breakfast)  Insulin Pen Needle (PEN NEEDLES) 32G X 4 MM MISC, 1 each 4 times daily  losartan (COZAAR) 50 MG tablet, Take 1 tablet (50 mg) by mouth daily  pravastatin (PRAVACHOL) 20 MG tablet, Take 1 tablet (20 mg) by mouth daily  sertraline (ZOLOFT) 50 MG tablet, Take 1 tablet (50 mg) by mouth daily Take 50mg PO daily    No current facility-administered medications on file prior to visit.       Allergy:   No Known Allergies    Social History:  Occupation:    Smoking cigarette; none  Alcohol use: none  Living arrangement: lives with daughter      Review of systems:    - CONSTITUTIONAL: Denies weight loss, fever and chills.    - HEENT: Denies changes in vision and hearing.    - ?RESPIRATORY: Denies SOB and cough.?    - CV: Denies palpitations and CP. ? persistent chest pain ++     - GI: Denies abdominal pain, nausea, vomiting and diarrhea.?    - : Denies dysuria and urinary frequency.?    - MSK: Denies myalgia and joint pain.?    - SKIN: Denies rash and pruritus.    - ?NEUROLOGICAL: Denies headache and syncope.?    - PSYCHIATRIC: Denies recent changes in mood. Denies anxiety and depression.    - LYMPHATIC: no palpable lumps in the neck, axilla or groin areas.       Physical exam  There were no vitals taken for this visit.  Wt Readings from Last 4 Encounters:   03/30/21 56.8 kg (125 lb 4.8 oz)   03/02/21 54.8 kg (120 lb 14.4 oz)   02/27/21 52.4 kg (115 lb 8 oz)   02/23/21 54.4 kg (120 lb)     /72 (BP Location: Left arm, Patient Position: Sitting, Cuff Size: Adult Regular)   Pulse 77   Resp 16   Ht 1.505 m (4' 11.25\")   Wt 61.1 kg (134 lb 12.8 oz)   SpO2 91%   BMI 27.00 kg/m      Constitutional:       General: He is not in acute distress.     " Appearance: Normal appearance. He is not toxic-appearing.   HENT:      Head: Normocephalic and atraumatic.      Right Ear: External ear normal.      Left Ear: External ear normal.   Eyes:      Extraocular Movements: Extraocular movements intact.      Conjunctiva/sclera: Conjunctivae normal.      Pupils: Pupils are equal, round, and reactive to light.   Neck:      Musculoskeletal: Normal range of motion and neck supple.   Cardiovascular:      Rate and Rhythm: Normal rate and regular rhythm.      Heart sounds: Normal heart sounds. No murmur. No gallop.    Pulmonary:      Effort: Pulmonary effort is normal.      Breath sounds: Normal breath sounds.   Abdominal:      General: Abdomen is flat. There is no distension.      Palpations: Abdomen is soft. There is no mass.      Tenderness: There is no abdominal tenderness. There is no guarding or rebound.      Hernia: No hernia is present.   Musculoskeletal: Normal range of motion.         General: No swelling or deformity.      Right lower leg: No edema.      Left lower leg: No edema.   Lymphadenopathy:      Cervical: No cervical adenopathy.   Skin:     General: Skin is warm and dry.         Labs:  Orders Only on 05/10/2021   Component Date Value Ref Range Status     A1A RONNIE 05/10/2021 Whole Blood     Final     Alpha-1-Antitrypsin 05/10/2021 157  90 - 200 mg/dL Final    Comment: (Note)  To convert to umol/L, multiply mg/dL by 0.185       V7Qgvxywut S [place]le 05/10/2021 Negative     Final     K1XdeqiouaX Allele 05/10/2021 Negative     Final     E4Zwzzbgbocha SZ Int 05/10/2021 SEE NOTE     Final    Comment: (Note)  Indication for testing: Carrier screening or diagnostic   testing for alpha-1-antitrypsin (AAT) deficiency.  Negative: This sample has a serum AAT protein concentration   in the normal range and is negative for the S and Z   deficiency alleles by genotyping. This individual is not   predicted to be affected with AAT deficiency; however, rare   deficiency alleles are  not detected by this genotyping   assay.   This result has been reviewed and approved by Ana Paula Yarbrough M.D., Ph.D.  BACKGROUND INFORMATION: A1A (SERPINA1) Enzyme Concentration   and 2                               Mutations with Reflex   to A1A Phenotype  CHARACTERISTICS of Alpha-1-Antitrypsin (AAT) Deficiency:   Coughing, wheezing, bronchiectasis, chronic obstructive   pulmonary disease, emphysema, and cirrhosis.  INCIDENCE: 1 in 3000 to 5000 [place] individuals.  INHERITANCE: Autosomal recessive.  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  CLINICAL SENSITIVI                           TY: 95 percent.  MUTATIONS TESTED: S allele (c.791A>T) and Z allele   (c.1024G>A).  METHODS: Genotyping performed by PCR followed by   fluorescent probe melting analysis; AAT protein   concentration measured using immunoturbidmetric assay;   phenotyping performed by isoelectric focusing   electrophoresis. Genotyping and AAT serum protein   concentration determination are performed on all specimens.   Protein phenotyping is only performed on specimens that   have AAT protein concentrations of less than 90 mg/dL and   are not homozygous or compound heterozygous for the S or Z   deficiency alleles by genotyping.  ANALYTICAL SENSITIVITY AND SPECIFICITY: 99 percent.  LIMITATIONS: SERPINA1 mutations, other than the S   (c.791A>T) and  Z (c.1024G>A) alleles, will not be detected. Diagnostic   errors can  occur due to rare sequence variations.  This test was developed and its performance characteristics   determined by [facility]. It has not been cleared or   approved by the [place] Food and                            Drug Administration. This test   was performed in a CLIA certified laboratory and is   intended for clinical purposes.   Counseling and informed consent are recommended for genetic   testing. Consent forms are available online.  Performed by [facility],  [address]  [place] [address] [phone]  [email or web address], Nora Trammell MD, Lab. Director       A2Fsuqxxvkbks Pheno 05/10/2021 Not Applicable     Final     Alpha Fetoprotein 05/10/2021 5.5  0 - 8 ug/L Final    Comment: Reference ranges apply to non-pregnant females only.  Assay Method:  Chemiluminescence using Siemens Centaur XP       INR 05/10/2021 1.24* 0.86 - 1.14 Final     Bilirubin Direct 05/10/2021 0.1  0.0 - 0.2 mg/dL Final     Bilirubin Total 05/10/2021 0.6  0.2 - 1.3 mg/dL Final     Albumin 05/10/2021 3.7  3.4 - 5.0 g/dL Final     Protein Total 05/10/2021 7.6  6.8 - 8.8 g/dL Final     Alkaline Phosphatase 05/10/2021 90  40 - 150 U/L Final     ALT 05/10/2021 45  0 - 50 U/L Final     AST 05/10/2021 29  0 - 45 U/L Final     Sodium 05/10/2021 138  133 - 144 mmol/L Final     Potassium 05/10/2021 4.2  3.4 - 5.3 mmol/L Final     Chloride 05/10/2021 105  94 - 109 mmol/L Final     Carbon Dioxide 05/10/2021 26  20 - 32 mmol/L Final     Anion Gap 05/10/2021 7  3 - 14 mmol/L Final     Glucose 05/10/2021 207* 70 - 99 mg/dL Final     Urea Nitrogen 05/10/2021 17  7 - 30 mg/dL Final     Creatinine 05/10/2021 0.57  0.52 - 1.04 mg/dL Final     GFR Estimate 05/10/2021 >90  >60 mL/min/[1.73_m2] Final    Comment: Non  GFR Calc  Starting 12/18/2018, serum creatinine based estimated GFR (eGFR) will be   calculated using the Chronic Kidney Disease Epidemiology Collaboration   (CKD-EPI) equation.       GFR Estimate If Black 05/10/2021 >90  >60 mL/min/[1.73_m2] Final    Comment:  GFR Calc  Starting 12/18/2018, serum creatinine based estimated GFR (eGFR) will be   calculated using the Chronic Kidney Disease Epidemiology Collaboration   (CKD-EPI) equation.       Calcium 05/10/2021 9.1  8.5 - 10.1 mg/dL Final     Copath Report 05/10/2021    Final                    Value:Patient Name: ERICK OTERO  MR#: [id number]  Specimen #: [id number]  Collected: 5/10/2021 10:55  Received: 5/11/2021 08:33  Reported:  5/13/2021 19:54  Ordering Phy(s): THOMAS MICHAEL LEVENTHAL  Additional Phy(s): NIALL WILSON    For improved result formatting, select 'View Enhanced Report Format' under   Linked Documents section.  _________________________________________    TEST(S) REQUESTED:  Hemochromatosis Mutation Analysis by PCR    SPECIMEN DESCRIPTION:  Blood    HEMOCHROMATOSIS RESULTS    HFE Gene C282Y (G845A) RESULTS:    C282Y Mutation Interpretation: HOMOZYGOTE    HFE Gene H63D (C187G) RESULTS:    H63D Mutation Interpretation: NORMAL    HFE Gene S65C (A193T) RESULTS:    S65C Mutation Interpretation: NORMAL    INTERPRETATION:  This patient does not carry the H63D or the S65C mutations, but is   homozygous for the C282Y mutation in the  HFE gene. Homozygosity of the C282Y mutation significantly predisposes   individuals to iron overload. However,  the penetrance of the d                          isease is uncertain, and thus all homozygotes for   the C282Y mutation may not develop  symptomatic hemochromatosis. Genetic counseling services are available   upon request.    COMMENTS:  If a patient is the recipient of an allogeneic bone marrow transplant,   this test must be done on a  pre-transplant sample or buccal swab.  A previous allogeneic bone marrow   transplant will interfere with test  results.  Call the [facility]([phone]) for   instructions on sample collection for these  patients.    METHODOLOGY: The regions of genomic DNA containing c.845 G>A(C282Y)   mutation, the c.187 C>G (H63D), and the  c.193 A>T(S65C) mutation in the HFE gene were simultaneously amplified   using the polymerase chain reaction.  The amplified products were digested with restriction endonuclease BbrPI   and Hinf1 respectively and products  were analyzed by gel electrophoresis.    This test was developed and its performance characteristics determined by   [facility].  It has not been cleared or approved by the FDA.   The laboratory is regulated under CLIA  as qualified to perform high-complexity testing. This test is used for   clinical purposes. It should not be  regarded as investigational or for research.    A resident/fellow in an accredited training program was involved in the   selection of testing, review of  laboratory data, and/or interpretation of this case.  I, as the senior   physician, attest that I: (i) confirmed  appropriate testing, (ii) examined the relevant raw data for the   specimen(s); and (iii) rendered or confirmed  the interpretation(s).    Electronically Signed Out By:  Ashish Barnes M.D., PhD  [facility]    CPT Codes:  A: 33641-BXEJV, -SMAJTS    TESTING LAB LOCATION:  [address] [id number] [phone]    COLLECTION SITE:  Client:  [facility]  Location:  [facility] (B                          )       Ferritin 05/10/2021 1,126* 8 - 252 ng/mL Final     Iron 05/10/2021 235* 35 - 180 ug/dL Final     Iron Binding Cap 05/10/2021 306  240 - 430 ug/dL Final     Iron Saturation Index 05/10/2021 77* 15 - 46 % Final     Tissue Transglutaminase Antibody I* 05/10/2021 1  <7 U/mL Final    Comment: Negative  The tTG-IgA assay has limited utility for patients with decreased levels of   IgA. Screening for celiac disease should include IgA testing to rule out   selective IgA deficiency and to guide selection and interpretation of   serological testing. tTG-IgG testing may be positive in celiac disease   patients with IgA deficiency.       IGG 05/10/2021 1,076  610 - 1,616 mg/dL Final     MANSI interpretation 05/10/2021 Negative  NEG^Negative Final    Comment:                                    Reference range:  <1:40  NEGATIVE  1:40 - 1:80  BORDERLINE POSITIVE  >1:80 POSITIVE             Imaging:    MR Abdomen w/o & w Contrast [[id number]] (Order  [id number])  Exam Information    Exam Date Exam Time Accession # Performing Department Results    4/2/21  3:51 PM [id number] [facility]    PACS Images     Show images for MR Abdomen w/o & w Contrast   Study Result    MR ABDOMEN WITHOUT AND WITH CONTRAST  4/2/2021 3:51 PM      HISTORY: Indeterminate liver lesion noted on coronary CT angiogram.     TECHNIQUE: Multisequence, multiplanar imaging of the abdomen was  performed without IV gadolinium contrast. A total of 6 mL Gadavist  gadolinium contrast was then administered intravenously. Additional  dynamic postcontrast T1 fat-sat sequences were performed.      COMPARISON: CT angiogram performed 2/24/2021.     FINDINGS:   LOWER CHEST: Unremarkable.     HEPATOBILIARY: Nodularity of the liver contour raises the possibility  of cirrhosis. Mild diffuse loss of signal on the in-phase images can  be seen in the setting of iron deposition. There are multiple  scattered small arterially enhancing lesions throughout the liver,  with the largest in the posterior segment of the right hepatic lobe  inferiorly (series 10 image 51) measuring 1.8 cm. This largest  arterially enhancing hepatic lesion demonstrates T1 isointensity, mild  T2 hyperintensity, and isoenhancement on the more delayed phase  postcontrast images. This lesion also demonstrates mild associated  restricted diffusion. There are innumerable tiny T2 and T1 hypointense  nonenhancing nodules throughout the liver. Small gallstone is noted  within the gallbladder. The gallbladder is mildly distended. No intra  or extrahepatic biliary dilatation.     PANCREAS: Unremarkable. No pancreatic ductal dilatation.     SPLEEN: Normal.     ADRENAL GLANDS: Normal.     KIDNEYS: Unremarkable. No hydronephrosis.     BOWEL: Visualized loops of small bowel and colon are of normal  caliber. A few small duodenal diverticula are noted.     LYMPH NODES: No enlarged lymph nodes are identified in the  abdomen.     VASCULATURE: Atherosclerotic aortoiliac changes are noted.     ADDITIONAL FINDINGS: None.     MUSCULOSKELETAL: Unremarkable.                                                                      IMPRESSION:   1. Nodularity of the liver contour raises the possibility of  cirrhosis.  2. Mild diffuse loss of signal on the in-phase images can be seen in  the setting of iron deposition.  3. Multiple small arterially enhancing hepatic lesions are  indeterminate, and malignancy such as hepatocellular carcinoma is  possible. The largest of these lesions is in the posterior segment of  the right hepatic lobe inferiorly, measuring 1.8 cm.  4. There are innumerable T1 and T2 hypointense nonenhancing nodules  throughout the liver. These findings are nonspecific, but could also  be related to regenerative nodules in the setting of cirrhosis.  5. Cholelithiasis.           Assessment and plan:  Bradford Prado is a 60 year old female with past medical history of mild overweight, hypertension, dyslipidemia, poorly controlled diabetes recently started on insulin therapy who presents with concerns of abnormal liver tests and historically abnormal liver imaging. Reports that she was first told that she had some sort of issue with her liver approximately 4 years ago.  At that time she was having significant pain and dysuria related to kidney stones.  An abdominal imaging study suggested a nodular appearing liver that raise concerns for chronic liver disease.  She denies ever having follow-up for this condition at that time.  Based on review of historical labs it was noted that around the time of this imaging she did have iron studies checked which demonstrated a serum ferritin of 1569 in September 2016, but again there has been no follow-up on this lab today. Noted that she has had progressive worsening of her overall management of her diabetes, and reports that she started on insulin during a recent hospitalization.  Most  recent hemoglobin A1c was 11.1%   She underwent an MRI of the abdomen on April 2 which demonstrated nodular appearing liver, diffusely decreased signal throughout the liver raising concerns for iron deposition, and multiple arterial enhancing lesions throughout the liver. Per review of the MRI, etiology of these liver masses is unclear, and recommendation was to repeat MRI in 3 months.  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  Therefore, patient was referred to hematology for further assessment and initiation of phlebotomy therapy    Patient instructions/Plan:    - Follow up with primary care for management of liver cirrhosis.    - Recommend referral to interventional for IR guided biopsy of the largest of these lesions is in the posterior segment of the right hepatic lobe.    - Patient requests conscious sedation for the IR procedure    - Follow up with primary care in regard to Cholelithiasis (gallstones)    - Recommend weekly phlebotomy ( blood removal) of one unit of blood until Ferritin < 100 to avoid organ damage due to iron deposition.    - Phlebotomy twice weekly until Ferritin is < 100 and to hold if hemoglobin is < 11    - After achieving Ferritin < 100, schedule lab check visit every 2 weeks with CBC and Ferritin level.    - Recommend 2 D echo to assess if there is cardiac organ damage    - counseled to avoid heavy alcohol use or acetaminophen use due to liver cirrhosis.    - Persistent chest pain, recommend to follow up with primary care for referral to cardiology    - recommend follow up with primary care for screening program for mammogram and need for colonoscopy      The total time of this encounter amounted to 55 minutes.This time included face to face time spent with the patient, prep work, ordering tests, and performing post  visit documentation.  The patient is ready to learn, no apparent learning barriers were identified.  Diagnosis and treatment plans were explained to the patient. The patient expressed understanding of the content. The patient asked appropriate questions. The patient questions were answered to his satisfaction.        WILD LOPEZ MD

## 2021-06-10 ENCOUNTER — ONCOLOGY VISIT (OUTPATIENT)
Dept: ONCOLOGY | Facility: CLINIC | Age: 61
End: 2021-06-10
Attending: INTERNAL MEDICINE
Payer: COMMERCIAL

## 2021-06-10 ENCOUNTER — PRE VISIT (OUTPATIENT)
Dept: ONCOLOGY | Facility: CLINIC | Age: 61
End: 2021-06-10

## 2021-06-10 VITALS
RESPIRATION RATE: 16 BRPM | OXYGEN SATURATION: 91 % | HEART RATE: 77 BPM | DIASTOLIC BLOOD PRESSURE: 72 MMHG | HEIGHT: 59 IN | BODY MASS INDEX: 27.17 KG/M2 | WEIGHT: 134.8 LBS | SYSTOLIC BLOOD PRESSURE: 117 MMHG

## 2021-06-10 DIAGNOSIS — E83.110 HEREDITARY HEMOCHROMATOSIS (H): ICD-10-CM

## 2021-06-10 DIAGNOSIS — K76.89 NODULE ON LIVER: Primary | ICD-10-CM

## 2021-06-10 PROCEDURE — 99204 OFFICE O/P NEW MOD 45 MIN: CPT | Performed by: INTERNAL MEDICINE

## 2021-06-10 PROCEDURE — G0463 HOSPITAL OUTPT CLINIC VISIT: HCPCS

## 2021-06-10 RX ORDER — BIOTIN 10000 MCG
1 CAPSULE ORAL DAILY
COMMUNITY
End: 2023-03-16

## 2021-06-10 ASSESSMENT — PAIN SCALES - GENERAL: PAINLEVEL: SEVERE PAIN (7)

## 2021-06-10 ASSESSMENT — MIFFLIN-ST. JEOR: SCORE: 1101.04

## 2021-06-10 NOTE — LETTER
6/10/2021         RE: Bradford Prado  [address]  [facility] [address]        Dear Colleague,    Thank you for referring your patient, Bradford Prado, to the [facility]. Please see a copy of my visit note below.      Heme/onc visit note  June 9, 2021  Oncology team:    Reason for visit: HFE gene C282Y homozygosity consistent with hereditary hemochromatosis.    Cancer history: Bradford Prado is a 60 year old female with past medical history of mild overweight, hypertension, dyslipidemia, poorly controlled diabetes recently started on insulin therapy who presents with concerns of abnormal liver tests and historically abnormal liver imaging. Reports that she was first told that she had some sort of issue with her liver approximately 4 years ago.  At that time she was having significant pain and dysuria related to kidney stones.  An abdominal imaging study suggested a nodular appearing liver that raise concerns for chronic liver disease.  She denies ever having follow-up for this condition at that time.  Based on review of historical labs it was noted that around the time of this imaging she did have iron studies checked which demonstrated a serum ferritin of 1569 in September 2016, but again there has been no follow-up on this lab today. Noted that she has had progressive worsening of her overall management of her diabetes, and reports that she started on insulin during a recent hospitalization.  Most recent hemoglobin A1c was 11.1%.She underwent an MRI of the abdomen on April 2 which demonstrated nodular appearing liver, diffusely decreased signal throughout the liver raising concerns for iron deposition, and multiple arterial enhancing lesions throughout the liver. Per review of the MRI, etiology of these liver masses is unclear, and recommendation was to repeat MRI in 3 months.  This has been ordered Reviewed labs and again demonstrated significantly elevated ferritin and percent  iron saturation, in the setting of noted HFE gene C282Y homozygosity consistent with hereditary hemochromatosis.  Therefore, patient was referred to hematology for further assessment and initiation of phlebotomy therapy      Interval history: 6/10/21  Reports that her chest pain still persistent. Otherwise, ROS as below. Patient stated that she is afraid of the diagnosis.    Past medical history:  Patient Active Problem List   Diagnosis     Hand arthritis     Stenosing tenosynovitis     Poorly controlled type 2 diabetes mellitus with renal complication (H)     Hyperlipidemia LDL goal <70     Essential hypertension with goal blood pressure less than 140/90     Nephrolithiasis     Type 2 diabetes mellitus with hyperglycemia, without long-term current use of insulin (H)     Chest pain     Transaminitis     Hyperglycemia     Hypertensive urgency     Diabetes mellitus (H)     Dyslipidemia       Medications:  ACCU-CHEK GUIDE test strip, Use to test blood sugar 4 times daily or as directed.  acetaminophen (TYLENOL) 325 MG tablet, Take 325-650 mg by mouth every 6 hours as needed for mild pain  amLODIPine (NORVASC) 5 MG tablet, Take 1 tablet (5 mg) by mouth 2 times daily  blood glucose (NO BRAND SPECIFIED) lancets standard, Use to test blood sugar twice times daily or as directed.  blood glucose (NO BRAND SPECIFIED) test strip, Use to test blood sugar twice times daily or as directed.  blood glucose (NO BRAND SPECIFIED) test strip, Use to test blood sugars 2 times daily or as directed  blood glucose monitoring (SOFTCLIX) lancets, Use to test blood sugar 6 times daily.  Blood Glucose Monitoring Suppl (ACCU-CHEK GUIDE ME) w/Device KIT, 1 each 6 times daily  carvedilol (COREG) 12.5 MG tablet, Take 1 tablet (12.5 mg) by mouth 2 times daily (with meals)  Continuous Blood Gluc Sensor (FREESTYLE MEENA 14 DAY SENSOR) [facility], 1 each every 14 days Change every 14 days.  insulin aspart (NOVOLOG FLEXPEN) 100 UNIT/ML pen, Novolog  Flexpen  Give before meals and before bed:  For Pre-Meal Glucose:  140-189 give 1 unit   190-239 give 2 units   240-289 give 3 units   290-339 give 4 units   = or >340 give 5 units     For Bedtime Glucose  200 - 239 give 1 units   240 - 289 give 2 units  290 - 339 give 3 units  = or >340 give 4 units  insulin aspart (NOVOLOG PEN) 100 UNIT/ML pen, Inject 10 Units Subcutaneous 3 times daily (with meals)  insulin glargine (LANTUS PEN) 100 UNIT/ML pen, Inject 42 Units Subcutaneous every morning (before breakfast)  Insulin Pen Needle (PEN NEEDLES) 32G X 4 MM MISC, 1 each 4 times daily  losartan (COZAAR) 50 MG tablet, Take 1 tablet (50 mg) by mouth daily  pravastatin (PRAVACHOL) 20 MG tablet, Take 1 tablet (20 mg) by mouth daily  sertraline (ZOLOFT) 50 MG tablet, Take 1 tablet (50 mg) by mouth daily Take 50mg PO daily    No current facility-administered medications on file prior to visit.       Allergy:   No Known Allergies    Social History:  Occupation:    Smoking cigarette; none  Alcohol use: none  Living arrangement: lives with daughter      Review of systems:    - CONSTITUTIONAL: Denies weight loss, fever and chills.    - HEENT: Denies changes in vision and hearing.    - ?RESPIRATORY: Denies SOB and cough.?    - CV: Denies palpitations and CP. ? persistent chest pain ++     - GI: Denies abdominal pain, nausea, vomiting and diarrhea.?    - : Denies dysuria and urinary frequency.?    - MSK: Denies myalgia and joint pain.?    - SKIN: Denies rash and pruritus.    - ?NEUROLOGICAL: Denies headache and syncope.?    - PSYCHIATRIC: Denies recent changes in mood. Denies anxiety and depression.    - LYMPHATIC: no palpable lumps in the neck, axilla or groin areas.       Physical exam  There were no vitals taken for this visit.  Wt Readings from Last 4 Encounters:   03/30/21 56.8 kg (125 lb 4.8 oz)   03/02/21 54.8 kg (120 lb 14.4 oz)   02/27/21 52.4 kg (115 lb 8 oz)   02/23/21 54.4 kg (120 lb)     /72 (BP  "Location: Left arm, Patient Position: Sitting, Cuff Size: Adult Regular)   Pulse 77   Resp 16   Ht 1.505 m (4' 11.25\")   Wt 61.1 kg (134 lb 12.8 oz)   SpO2 91%   BMI 27.00 kg/m      Constitutional:       General: He is not in acute distress.     Appearance: Normal appearance. He is not toxic-appearing.   HENT:      Head: Normocephalic and atraumatic.      Right Ear: External ear normal.      Left Ear: External ear normal.   Eyes:      Extraocular Movements: Extraocular movements intact.      Conjunctiva/sclera: Conjunctivae normal.      Pupils: Pupils are equal, round, and reactive to light.   Neck:      Musculoskeletal: Normal range of motion and neck supple.   Cardiovascular:      Rate and Rhythm: Normal rate and regular rhythm.      Heart sounds: Normal heart sounds. No murmur. No gallop.    Pulmonary:      Effort: Pulmonary effort is normal.      Breath sounds: Normal breath sounds.   Abdominal:      General: Abdomen is flat. There is no distension.      Palpations: Abdomen is soft. There is no mass.      Tenderness: There is no abdominal tenderness. There is no guarding or rebound.      Hernia: No hernia is present.   Musculoskeletal: Normal range of motion.         General: No swelling or deformity.      Right lower leg: No edema.      Left lower leg: No edema.   Lymphadenopathy:      Cervical: No cervical adenopathy.   Skin:     General: Skin is warm and dry.         Labs:  Orders Only on 05/10/2021   Component Date Value Ref Range Status     A1A RONNIE 05/10/2021 Whole Blood     Final     Alpha-1-Antitrypsin 05/10/2021 157  90 - 200 mg/dL Final    Comment: (Note)  To convert to umol/L, multiply mg/dL by 0.185       H0Bzzbwuav S Allele 05/10/2021 Negative     Final     B8SekbtdjjV Allele 05/10/2021 Negative     Final     G9Nkrkykdnfry SZ Int 05/10/2021 SEE NOTE     Final    Comment: (Note)  Indication for testing: Carrier screening or diagnostic   testing for alpha-1-antitrypsin (AAT) " deficiency.  Negative: This sample has a serum AAT protein concentration   in the normal range and is negative for the S and Z   deficiency alleles by genotyping. This individual is not   predicted to be affected with AAT deficiency; however, rare   deficiency alleles are not detected by this genotyping   assay.   This result has been reviewed and approved by Ana Paula Yarbrough M.D., Ph.D.  BACKGROUND INFORMATION: A1A (SERPINA1) Enzyme Concentration   and 2                               Mutations with Reflex   to A1A Phenotype  CHARACTERISTICS of Alpha-1-Antitrypsin (AAT) Deficiency:   Coughing, wheezing, bronchiectasis, chronic obstructive   pulmonary disease, emphysema, and cirrhosis.  INCIDENCE: 1 in 3000 to 5000 [place] individuals.  INHERITANCE: Autosomal recessive.  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  CLINICAL SENSITIVI                           TY: 95 percent.  MUTATIONS TESTED: S allele (c.791A>T) and Z allele   (c.1024G>A).  METHODS: Genotyping performed by PCR followed by   fluorescent probe melting analysis; AAT protein   concentration measured using immunoturbidmetric assay;   phenotyping performed by isoelectric focusing   electrophoresis. Genotyping and AAT serum protein   concentration determination are performed on all specimens.   Protein phenotyping is only performed on specimens that   have AAT protein concentrations of less than 90 mg/dL and   are not homozygous or compound heterozygous for the S or Z   deficiency alleles by genotyping.  ANALYTICAL SENSITIVITY AND SPECIFICITY: 99 percent.  LIMITATIONS: SERPINA1 mutations, other than the S   (c.791A>T) and  Z (c.1024G>A) alleles, will not be detected. Diagnostic   errors can  occur due to rare sequence variations.  This test was developed and its performance characteristics   determined by [facility]. It has not been cleared or   approved by the [place] Food and                             Drug Administration. This test   was performed in a CLIA certified laboratory and is   intended for clinical purposes.   Counseling and informed consent are recommended for genetic   testing. Consent forms are available online.  Performed by [facility],  [facility] [place] [address] [phone]  [email or web address], Nora Trammell MD, Lab. Director       K8Nxoznfynokh Pheno 05/10/2021 Not Applicable     Final     Alpha Fetoprotein 05/10/2021 5.5  0 - 8 ug/L Final    Comment: Reference ranges apply to non-pregnant females only.  Assay Method:  Chemiluminescence using Siemens Centaur XP       INR 05/10/2021 1.24* 0.86 - 1.14 Final     Bilirubin Direct 05/10/2021 0.1  0.0 - 0.2 mg/dL Final     Bilirubin Total 05/10/2021 0.6  0.2 - 1.3 mg/dL Final     Albumin 05/10/2021 3.7  3.4 - 5.0 g/dL Final     Protein Total 05/10/2021 7.6  6.8 - 8.8 g/dL Final     Alkaline Phosphatase 05/10/2021 90  40 - 150 U/L Final     ALT 05/10/2021 45  0 - 50 U/L Final     AST 05/10/2021 29  0 - 45 U/L Final     Sodium 05/10/2021 138  133 - 144 mmol/L Final     Potassium 05/10/2021 4.2  3.4 - 5.3 mmol/L Final     Chloride 05/10/2021 105  94 - 109 mmol/L Final     Carbon Dioxide 05/10/2021 26  20 - 32 mmol/L Final     Anion Gap 05/10/2021 7  3 - 14 mmol/L Final     Glucose 05/10/2021 207* 70 - 99 mg/dL Final     Urea Nitrogen 05/10/2021 17  7 - 30 mg/dL Final     Creatinine 05/10/2021 0.57  0.52 - 1.04 mg/dL Final     GFR Estimate 05/10/2021 >90  >60 mL/min/[1.73_m2] Final    Comment: Non  GFR Calc  Starting 12/18/2018, serum creatinine based estimated GFR (eGFR) will be   calculated using the Chronic Kidney Disease Epidemiology Collaboration   (CKD-EPI) equation.       GFR Estimate If Black 05/10/2021 >90  >60 mL/min/[1.73_m2] Final    Comment:  GFR Calc  Starting 12/18/2018, serum creatinine based estimated GFR (eGFR) will be   calculated using the Chronic Kidney Disease Epidemiology Collaboration    (CKD-EPI) equation.       Calcium 05/10/2021 9.1  8.5 - 10.1 mg/dL Final     Copath Report 05/10/2021    Final                    Value:Patient Name: ERICK OTERO  MR#: [id number]  Specimen #: [id number]  Collected: 5/10/2021 10:55  Received: 5/11/2021 08:33  Reported: 5/13/2021 19:54  Ordering Phy(s): THOMAS MICHAEL LEVENTHAL  Additional Phy(s): NIALL WILSON    For improved result formatting, select 'View Enhanced Report Format' under   Linked Documents section.  _________________________________________    TEST(S) REQUESTED:  Hemochromatosis Mutation Analysis by PCR    SPECIMEN DESCRIPTION:  Blood    HEMOCHROMATOSIS RESULTS    HFE Gene C282Y (G845A) RESULTS:    C282Y Mutation Interpretation: HOMOZYGOTE    HFE Gene H63D (C187G) RESULTS:    H63D Mutation Interpretation: NORMAL    HFE Gene S65C (A193T) RESULTS:    S65C Mutation Interpretation: NORMAL    INTERPRETATION:  This patient does not carry the H63D or the S65C mutations, but is   homozygous for the C282Y mutation in the  HFE gene. Homozygosity of the C282Y mutation significantly predisposes   individuals to iron overload. However,  the penetrance of the d                          isease is uncertain, and thus all homozygotes for   the C282Y mutation may not develop  symptomatic hemochromatosis. Genetic counseling services are available   upon request.    COMMENTS:  If a patient is the recipient of an allogeneic bone marrow transplant,   this test must be done on a  pre-transplant sample or buccal swab.  A previous allogeneic bone marrow   transplant will interfere with test  results.  Call the [facility]([phone]) for   instructions on sample collection for these  patients.    METHODOLOGY: The regions of genomic DNA containing c.845 G>A(C282Y)   mutation, the c.187 C>G (H63D), and the  c.193 A>T(S65C) mutation in the HFE gene were simultaneously amplified   using the polymerase chain reaction.  The amplified products were digested  with restriction endonuclease BbrPI   and Hinf1 respectively and products  were analyzed by gel electrophoresis.    This test was developed and its performance characteristics determined by   [facility]. It has not been cleared or approved by the FDA.   The laboratory is regulated under CLIA  as qualified to perform high-complexity testing. This test is used for   clinical purposes. It should not be  regarded as investigational or for research.    A resident/fellow in an accredited training program was involved in the   selection of testing, review of  laboratory data, and/or interpretation of this case.  I, as the senior   physician, attest that I: (i) confirmed  appropriate testing, (ii) examined the relevant raw data for the   specimen(s); and (iii) rendered or confirmed  the interpretation(s).    Electronically Signed Out By:  Ashish Barnes M.D., PhD  [facility]    CPT Codes:  A: 33080-NYNPU, -YSEEZP    TESTING LAB LOCATION:  [address] [address] [phone]    COLLECTION SITE:  Client:  General acute hospital  Location:  [facility] (B                          )       Ferritin 05/10/2021 1,126* 8 - 252 ng/mL Final     Iron 05/10/2021 235* 35 - 180 ug/dL Final     Iron Binding Cap 05/10/2021 306  240 - 430 ug/dL Final     Iron Saturation Index 05/10/2021 77* 15 - 46 % Final     Tissue Transglutaminase Antibody I* 05/10/2021 1  <7 U/mL Final    Comment: Negative  The tTG-IgA assay has limited utility for patients with decreased levels of   IgA. Screening for celiac disease should include IgA testing to rule out   selective IgA deficiency and to guide selection and interpretation of   serological testing. tTG-IgG testing may be positive in celiac disease   patients with IgA deficiency.       IGG 05/10/2021 1,076  610 - 1,616 mg/dL  Final     MANSI interpretation 05/10/2021 Negative  NEG^Negative Final    Comment:                                    Reference range:  <1:40  NEGATIVE  1:40 - 1:80  BORDERLINE POSITIVE  >1:80 POSITIVE             Imaging:    MR Abdomen w/o & w Contrast [[id number]] (Order [id number])  Exam Information    Exam Date Exam Time Accession # Performing Department Results    4/2/21  3:51 PM [id number] [facility]    PACS Images     Show images for MR Abdomen w/o & w Contrast   Study Result    MR ABDOMEN WITHOUT AND WITH CONTRAST  4/2/2021 3:51 PM      HISTORY: Indeterminate liver lesion noted on coronary CT angiogram.     TECHNIQUE: Multisequence, multiplanar imaging of the abdomen was  performed without IV gadolinium contrast. A total of 6 mL Gadavist  gadolinium contrast was then administered intravenously. Additional  dynamic postcontrast T1 fat-sat sequences were performed.      COMPARISON: CT angiogram performed 2/24/2021.     FINDINGS:   LOWER CHEST: Unremarkable.     HEPATOBILIARY: Nodularity of the liver contour raises the possibility  of cirrhosis. Mild diffuse loss of signal on the in-phase images can  be seen in the setting of iron deposition. There are multiple  scattered small arterially enhancing lesions throughout the liver,  with the largest in the posterior segment of the right hepatic lobe  inferiorly (series 10 image 51) measuring 1.8 cm. This largest  arterially enhancing hepatic lesion demonstrates T1 isointensity, mild  T2 hyperintensity, and isoenhancement on the more delayed phase  postcontrast images. This lesion also demonstrates mild associated  restricted diffusion. There are innumerable tiny T2 and T1 hypointense  nonenhancing nodules throughout the liver. Small gallstone is noted  within the gallbladder. The gallbladder is mildly distended. No intra  or extrahepatic biliary dilatation.     PANCREAS: Unremarkable. No pancreatic ductal dilatation.     SPLEEN:  Normal.     ADRENAL GLANDS: Normal.     KIDNEYS: Unremarkable. No hydronephrosis.     BOWEL: Visualized loops of small bowel and colon are of normal  caliber. A few small duodenal diverticula are noted.     LYMPH NODES: No enlarged lymph nodes are identified in the abdomen.     VASCULATURE: Atherosclerotic aortoiliac changes are noted.     ADDITIONAL FINDINGS: None.     MUSCULOSKELETAL: Unremarkable.                                                                      IMPRESSION:   1. Nodularity of the liver contour raises the possibility of  cirrhosis.  2. Mild diffuse loss of signal on the in-phase images can be seen in  the setting of iron deposition.  3. Multiple small arterially enhancing hepatic lesions are  indeterminate, and malignancy such as hepatocellular carcinoma is  possible. The largest of these lesions is in the posterior segment of  the right hepatic lobe inferiorly, measuring 1.8 cm.  4. There are innumerable T1 and T2 hypointense nonenhancing nodules  throughout the liver. These findings are nonspecific, but could also  be related to regenerative nodules in the setting of cirrhosis.  5. Cholelithiasis.           Assessment and plan:  Bradford Prado is a 60 year old female with past medical history of mild overweight, hypertension, dyslipidemia, poorly controlled diabetes recently started on insulin therapy who presents with concerns of abnormal liver tests and historically abnormal liver imaging. Reports that she was first told that she had some sort of issue with her liver approximately 4 years ago.  At that time she was having significant pain and dysuria related to kidney stones.  An abdominal imaging study suggested a nodular appearing liver that raise concerns for chronic liver disease.  She denies ever having follow-up for this condition at that time.  Based on review of historical labs it was noted that around the time of this imaging she did have iron studies checked which demonstrated a  serum ferritin of 1569 in September 2016, but again there has been no follow-up on this lab today. Noted that she has had progressive worsening of her overall management of her diabetes, and reports that she started on insulin during a recent hospitalization.  Most recent hemoglobin A1c was 11.1%   She underwent an MRI of the abdomen on April 2 which demonstrated nodular appearing liver, diffusely decreased signal throughout the liver raising concerns for iron deposition, and multiple arterial enhancing lesions throughout the liver. Per review of the MRI, etiology of these liver masses is unclear, and recommendation was to repeat MRI in 3 months.  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  Therefore, patient was referred to hematology for further assessment and initiation of phlebotomy therapy    Patient instructions/Plan:    - Follow up with primary care for management of liver cirrhosis.    - Recommend referral to interventional for IR guided biopsy of the largest of these lesions is in the posterior segment of the right hepatic lobe.    - Patient requests conscious sedation for the IR procedure    - Follow up with primary care in regard to Cholelithiasis (gallstones)    - Recommend weekly phlebotomy ( blood removal) of one unit of blood until Ferritin < 100 to avoid organ damage due to iron deposition.    - Phlebotomy twice weekly until Ferritin is < 100 and to hold if hemoglobin is < 11    - After achieving Ferritin < 100, schedule lab check visit every 2 weeks with CBC and Ferritin level.    - Recommend 2 D echo to assess if there is cardiac organ damage    - counseled to avoid heavy alcohol use or acetaminophen use due to liver cirrhosis.    - Persistent chest pain, recommend to follow up with primary care for referral to cardiology    -  "recommend follow up with primary care for screening program for mammogram and need for colonoscopy      The total time of this encounter amounted to 55 minutes.This time included face to face time spent with the patient, prep work, ordering tests, and performing post visit documentation.  The patient is ready to learn, no apparent learning barriers were identified.  Diagnosis and treatment plans were explained to the patient. The patient expressed understanding of the content. The patient asked appropriate questions. The patient questions were answered to his satisfaction.        WILD LOPEZ MD      Oncology Rooming Note    Rosie 10, 2021 1:30 PM   Bradford Prado is a 60 year old female who presents for:    Chief Complaint   Patient presents with     Hematology     New Patient - Hereditary hemochromatosis, Liver mass     Initial Vitals: /72 (BP Location: Left arm, Patient Position: Sitting, Cuff Size: Adult Regular)   Pulse 77   Resp 16   Ht 1.505 m (4' 11.25\")   Wt 61.1 kg (134 lb 12.8 oz)   SpO2 91%   BMI 27.00 kg/m   Estimated body mass index is 27 kg/m  as calculated from the following:    Height as of this encounter: 1.505 m (4' 11.25\").    Weight as of this encounter: 61.1 kg (134 lb 12.8 oz). Body surface area is 1.6 meters squared.  Severe Pain (7) Comment: Data Unavailable   No LMP recorded. Patient has had a hysterectomy.  Allergies reviewed: Yes  Medications reviewed: Yes    Medications: Medication refills not needed today.  Pharmacy name entered into [facility]:      [address]    Clinical concerns:New Patient - Hereditary hemochromatosis, Liver mass.       Antonella Carrillo CMA                Per Dr. Lopez, patient should follow up with Onc after biopsy results are back (week of June 28).     Kaur Cano RN on 6/11/2021 at 10:50 AM        Again, thank you for allowing me to participate in the care of your patient.        Sincerely,        Jp" MD Mercedes

## 2021-06-10 NOTE — PROGRESS NOTES
"Oncology Rooming Note    Rosie 10, 2021 1:30 PM   Bradford Prado is a 60 year old female who presents for:    Chief Complaint   Patient presents with     Hematology     New Patient - Hereditary hemochromatosis, Liver mass     Initial Vitals: /72 (BP Location: Left arm, Patient Position: Sitting, Cuff Size: Adult Regular)   Pulse 77   Resp 16   Ht 1.505 m (4' 11.25\")   Wt 61.1 kg (134 lb 12.8 oz)   SpO2 91%   BMI 27.00 kg/m   Estimated body mass index is 27 kg/m  as calculated from the following:    Height as of this encounter: 1.505 m (4' 11.25\").    Weight as of this encounter: 61.1 kg (134 lb 12.8 oz). Body surface area is 1.6 meters squared.  Severe Pain (7) Comment: Data Unavailable   No LMP recorded. Patient has had a hysterectomy.  Allergies reviewed: Yes  Medications reviewed: Yes    Medications: Medication refills not needed today.  Pharmacy name entered into [facility]:      [facility] [place], [place] - [address] [place] [address]    Clinical concerns:New Patient - Hereditary hemochromatosis, Liver mass.       Antonella Carrillo CMA              "

## 2021-06-10 NOTE — PATIENT INSTRUCTIONS
- Follow up with primary care Dr. Travis for management of liver cirrhosis.    - Recommend referral to interventional for IR guided biopsy of the largest of these lesions is in the posterior segment of the right hepatic lobe.    - Patient requests conscious sedation for the IR procedure    - Follow up with primary care in regard to Cholelithiasis (gallstones)    - Recommend phlebotomy ( blood removal) of one unit of blood until Ferritin < 100 to avoid organ damage due to iron deposition.    - Phlebotomy twice weekly until Ferritin is < 100 and to hold if hemoglobin is < 11    - After achieving Ferritin < 100, schedule lab check visit every 2 weeks with CBC and Ferritin level.    - Recommend 2 D echo to assess if there is cardiac organ damage    - counseled to avoid heavy alcohol use or acetaminophen use due to liver cirrhosis.    - Persistent chest pain, recommend to follow up with primary care for referral to cardiology    - recommend follow up with primary care for screening program for mammogram and need for colonoscopy

## 2021-06-11 ENCOUNTER — DOCUMENTATION ONLY (OUTPATIENT)
Dept: INTERVENTIONAL RADIOLOGY/VASCULAR | Facility: CLINIC | Age: 61
End: 2021-06-11

## 2021-06-11 ENCOUNTER — TELEPHONE (OUTPATIENT)
Dept: ONCOLOGY | Facility: CLINIC | Age: 61
End: 2021-06-11

## 2021-06-11 DIAGNOSIS — E83.110 HEREDITARY HEMOCHROMATOSIS (H): ICD-10-CM

## 2021-06-11 DIAGNOSIS — Z11.59 ENCOUNTER FOR SCREENING FOR OTHER VIRAL DISEASES: ICD-10-CM

## 2021-06-11 DIAGNOSIS — K76.9 LESION OF LIVER GREATER THAN 1 CM IN DIAMETER: Primary | ICD-10-CM

## 2021-06-11 NOTE — PROGRESS NOTES
"  Outpatient IR Biopsy Referral  21     IR has been asked to biopsy right hepatic lobe liver lesion.     This is a 60-year-old female with past medical history of mild overweight, hypertension, dyslipidemia, poorly controlled diabetes recently started on insulin therapy, recently diagnosed hereditary hemochromatosis now presents with MRI 2021 demonstrating nodular appearing liver, diffusely decreased signal throughout the liver raising concerns for iron deposition and multiple arterial enhancing lesions throughout the liver with a liver lesion seen in the right hepatic lobe on series 10 image 51 with mild restricted diffusion that is concerning for malignancy.     Pertinent Imagin2021:  MR abdomen:       IMPRESSION:   1. Nodularity of the liver contour raises the possibility of  cirrhosis.  2. Mild diffuse loss of signal on the in-phase images can be seen in the setting of iron deposition.  3. Multiple small arterially enhancing hepatic lesions are  indeterminate, and malignancy such as hepatocellular carcinoma is possible. The largest of these lesions is in the posterior segment of the right hepatic lobe inferiorly, measuring 1.8 cm.  4. There are innumerable T1 and T2 hypointense nonenhancing nodules throughout the liver. These findings are nonspecific, but could also be related to regenerative nodules in the setting of cirrhosis.  5. Cholelithiasis.        Case and imaging was reviewed with Dr. Dm England from IR and biopsy of liver lesion series 10 image 51  is recommended (Protocol: schedule with CT and US in the room, pt supine physician standing on the right, with IVCS. Ensure either Dr. England or Dr. Carrillo available on same day of procedure).  Difficult biopsy and possibility of false negative results.          Procedure order and surgical pathology order placed.    If requesting team would like sample sent for anything else please use the IR order set \"IR RAD Biopsy or Fluid " "Aspirate Specimens\" to select your necessary diagnostic labs and pend for admission.     If there are labs you desire that are not found in this order set or you have questions regarding specific diagnostic labs please call the associated lab personnel. IR will not enter diagnostic labs on the day of the procedure.     Dr. Jp Long made aware of IR recommendations.    Clinic visit with IR provider will be coordinated to discuss biopsy procedure, risks and benefits.     Gisela Lindo PA-C   Interventional Radiology   IR on-call pager: [phone]    "

## 2021-06-11 NOTE — PROGRESS NOTES
Per Dr. Long, patient should follow up with Onc after biopsy results are back (week of June 28).     Kaur Cano RN on 6/11/2021 at 10:50 AM

## 2021-06-12 ENCOUNTER — HEALTH MAINTENANCE LETTER (OUTPATIENT)
Age: 61
End: 2021-06-12

## 2021-06-15 DIAGNOSIS — E11.65 POORLY CONTROLLED TYPE 2 DIABETES MELLITUS WITH RENAL COMPLICATION (H): Chronic | ICD-10-CM

## 2021-06-15 DIAGNOSIS — E11.29 POORLY CONTROLLED TYPE 2 DIABETES MELLITUS WITH RENAL COMPLICATION (H): Chronic | ICD-10-CM

## 2021-06-15 DIAGNOSIS — E83.110 HEREDITARY HEMOCHROMATOSIS (H): ICD-10-CM

## 2021-06-15 LAB
BASOPHILS # BLD AUTO: 0 10E9/L (ref 0–0.2)
BASOPHILS NFR BLD AUTO: 0.4 %
DIFFERENTIAL METHOD BLD: ABNORMAL
EOSINOPHIL # BLD AUTO: 0.2 10E9/L (ref 0–0.7)
EOSINOPHIL NFR BLD AUTO: 2.9 %
ERYTHROCYTE [DISTWIDTH] IN BLOOD BY AUTOMATED COUNT: 11.7 % (ref 10–15)
FERRITIN SERPL-MCNC: 1054 NG/ML (ref 8–252)
HCT VFR BLD AUTO: 42.3 % (ref 35–47)
HGB BLD-MCNC: 14.9 G/DL (ref 11.7–15.7)
LYMPHOCYTES # BLD AUTO: 2.4 10E9/L (ref 0.8–5.3)
LYMPHOCYTES NFR BLD AUTO: 28.8 %
MCH RBC QN AUTO: 33.6 PG (ref 26.5–33)
MCHC RBC AUTO-ENTMCNC: 35.2 G/DL (ref 31.5–36.5)
MCV RBC AUTO: 95 FL (ref 78–100)
MONOCYTES # BLD AUTO: 1 10E9/L (ref 0–1.3)
MONOCYTES NFR BLD AUTO: 12.5 %
NEUTROPHILS # BLD AUTO: 4.5 10E9/L (ref 1.6–8.3)
NEUTROPHILS NFR BLD AUTO: 55.4 %
PLATELET # BLD AUTO: 132 10E9/L (ref 150–450)
RBC # BLD AUTO: 4.44 10E12/L (ref 3.8–5.2)
WBC # BLD AUTO: 8.2 10E9/L (ref 4–11)

## 2021-06-15 PROCEDURE — 36415 COLL VENOUS BLD VENIPUNCTURE: CPT | Performed by: INTERNAL MEDICINE

## 2021-06-15 PROCEDURE — 82728 ASSAY OF FERRITIN: CPT | Performed by: INTERNAL MEDICINE

## 2021-06-15 PROCEDURE — 85025 COMPLETE CBC W/AUTO DIFF WBC: CPT | Performed by: INTERNAL MEDICINE

## 2021-06-16 ENCOUNTER — INFUSION THERAPY VISIT (OUTPATIENT)
Dept: INFUSION THERAPY | Facility: CLINIC | Age: 61
End: 2021-06-16
Attending: INTERNAL MEDICINE
Payer: COMMERCIAL

## 2021-06-16 VITALS
TEMPERATURE: 98.7 F | SYSTOLIC BLOOD PRESSURE: 112 MMHG | RESPIRATION RATE: 16 BRPM | OXYGEN SATURATION: 96 % | HEART RATE: 77 BPM | DIASTOLIC BLOOD PRESSURE: 75 MMHG

## 2021-06-16 DIAGNOSIS — E83.110 HEREDITARY HEMOCHROMATOSIS (H): Primary | ICD-10-CM

## 2021-06-16 PROCEDURE — 99195 PHLEBOTOMY: CPT

## 2021-06-16 ASSESSMENT — PAIN SCALES - GENERAL: PAINLEVEL: NO PAIN (0)

## 2021-06-16 NOTE — PROGRESS NOTES
Infusion Nursing Note:  Bradford Prado presents today for phlebotomy.    Patient seen by provider today: No   present during visit today: Not Applicable.    Note: educated patient regarding lab results and phlebotomy. Patient verbalized understanding to the above.  500 ml of dark red blood removed per MD order. Patient tolerated phlebotomy without any difficulty. Gauze and coban applied to site.    Intravenous Access:  Lab draw site right ac, Needle type Apheresis needle, Gauge 17.    Treatment Conditions:  Ferritin level 1,054.      Post Infusion Assessment:  Patient observed for 30 minutes post phlebotomy per protocol.  Site patent and intact, free from redness, edema or discomfort.  Access discontinued per protocol.       Discharge Plan:   Discharge instructions reviewed with: Patient.  Patient and/or family verbalized understanding of discharge instructions and all questions answered.  AVS to patient via [employer].  Patient will return 6/21/21 for next appointment.   Patient discharged in stable condition accompanied by: self.  Departure Mode: Ambulatory.      Ania Akers RN

## 2021-06-16 NOTE — TELEPHONE ENCOUNTER
"Requested Prescriptions   Pending Prescriptions Disp Refills     insulin aspart (NOVOLOG FLEXPEN) 100 UNIT/ML pen [Pharmacy Med Name: NOVOLOG FLEXPEN 100UNIT/ML SOPN]  0     Sig: INJECT 10 UNITS SUBCUTANEOUS 3 TIMES DAILY (WITH MEALS)       Short Acting Insulin Protocol Failed - 6/15/2021 12:57 PM        Failed - HgbA1C in past 3 or 6 months     If HgbA1C is 8 or greater, it needs to be on file within the past 3 months.  If less than 8, must be on file within the past 6 months.     Recent Labs   Lab Test 02/23/21  1812   A1C 11.1*             Passed - Serum creatinine on file in past 12 months     Recent Labs   Lab Test 05/10/21  1055   CR 0.57       Ok to refill medication if creatinine is low          Passed - Medication is active on med list        Passed - Patient is age 18 or older        Passed - Recent (6 mo) or future (30 days) visit within the authorizing provider's specialty     Patient had office visit in the last 6 months or has a visit in the next 30 days with authorizing provider or within the authorizing provider's specialty.  See \"Patient Info\" tab in inbasket, or \"Choose Columns\" in Meds & Orders section of the refill encounter.                 "

## 2021-06-17 ENCOUNTER — TELEPHONE (OUTPATIENT)
Dept: FAMILY MEDICINE | Facility: CLINIC | Age: 61
End: 2021-06-17

## 2021-06-17 DIAGNOSIS — E11.65 TYPE 2 DIABETES MELLITUS WITH HYPERGLYCEMIA, WITHOUT LONG-TERM CURRENT USE OF INSULIN (H): Primary | ICD-10-CM

## 2021-06-17 RX ORDER — INSULIN ASPART 100 [IU]/ML
INJECTION, SOLUTION INTRAVENOUS; SUBCUTANEOUS
Qty: 30 ML | Refills: 0 | Status: SHIPPED | OUTPATIENT
Start: 2021-06-17 | End: 2021-10-05

## 2021-06-17 NOTE — LETTER
June 18, 2021      Bradford Prado  [address]  [facility] [address]        Our records indicate that you are due for a diabetic management visit. This can be completed either virtually by telephone or video visit or a face to face visit with your provider. Please call [phone] to schedule. I have ordered your labs so that you may call and schedule/complete these prior to your visit with me.         Sincerely,        Paulette Travis NP

## 2021-06-17 NOTE — TELEPHONE ENCOUNTER
Patient due for follow up visit for diabetes.    Labs ordered.  Send letter.  MAUREEN Kimbrough

## 2021-06-18 NOTE — TELEPHONE ENCOUNTER
Letter created and mailed to patient regarding diabetic follow up and labs.     Velma Roldan on 6/18/2021 at 2:55 PM

## 2021-06-19 DIAGNOSIS — Z11.59 ENCOUNTER FOR SCREENING FOR OTHER VIRAL DISEASES: ICD-10-CM

## 2021-06-19 DIAGNOSIS — E83.110 HEREDITARY HEMOCHROMATOSIS (H): ICD-10-CM

## 2021-06-19 DIAGNOSIS — E11.65 TYPE 2 DIABETES MELLITUS WITH HYPERGLYCEMIA, WITHOUT LONG-TERM CURRENT USE OF INSULIN (H): ICD-10-CM

## 2021-06-19 LAB
BASOPHILS # BLD AUTO: 0 10E9/L (ref 0–0.2)
BASOPHILS NFR BLD AUTO: 0.3 %
CHOLEST SERPL-MCNC: 169 MG/DL
DIFFERENTIAL METHOD BLD: ABNORMAL
EOSINOPHIL # BLD AUTO: 0.2 10E9/L (ref 0–0.7)
EOSINOPHIL NFR BLD AUTO: 2.4 %
ERYTHROCYTE [DISTWIDTH] IN BLOOD BY AUTOMATED COUNT: 11.6 % (ref 10–15)
FERRITIN SERPL-MCNC: 994 NG/ML (ref 8–252)
HBA1C MFR BLD: 6.5 % (ref 0–5.6)
HCT VFR BLD AUTO: 38 % (ref 35–47)
HDLC SERPL-MCNC: 31 MG/DL
HGB BLD-MCNC: 13.2 G/DL (ref 11.7–15.7)
LABORATORY COMMENT REPORT: NORMAL
LDLC SERPL CALC-MCNC: 89 MG/DL
LYMPHOCYTES # BLD AUTO: 2.2 10E9/L (ref 0.8–5.3)
LYMPHOCYTES NFR BLD AUTO: 29.1 %
MCH RBC QN AUTO: 33.5 PG (ref 26.5–33)
MCHC RBC AUTO-ENTMCNC: 34.7 G/DL (ref 31.5–36.5)
MCV RBC AUTO: 96 FL (ref 78–100)
MONOCYTES # BLD AUTO: 0.8 10E9/L (ref 0–1.3)
MONOCYTES NFR BLD AUTO: 10.7 %
NEUTROPHILS # BLD AUTO: 4.4 10E9/L (ref 1.6–8.3)
NEUTROPHILS NFR BLD AUTO: 57.5 %
NONHDLC SERPL-MCNC: 138 MG/DL
PLATELET # BLD AUTO: 136 10E9/L (ref 150–450)
RBC # BLD AUTO: 3.94 10E12/L (ref 3.8–5.2)
SARS-COV-2 RNA RESP QL NAA+PROBE: NEGATIVE
SARS-COV-2 RNA RESP QL NAA+PROBE: NORMAL
SPECIMEN SOURCE: NORMAL
SPECIMEN SOURCE: NORMAL
TRIGL SERPL-MCNC: 247 MG/DL
WBC # BLD AUTO: 7.6 10E9/L (ref 4–11)

## 2021-06-19 PROCEDURE — 85025 COMPLETE CBC W/AUTO DIFF WBC: CPT | Performed by: NURSE PRACTITIONER

## 2021-06-19 PROCEDURE — 82728 ASSAY OF FERRITIN: CPT | Performed by: NURSE PRACTITIONER

## 2021-06-19 PROCEDURE — U0005 INFEC AGEN DETEC AMPLI PROBE: HCPCS | Performed by: PHYSICIAN ASSISTANT

## 2021-06-19 PROCEDURE — 36415 COLL VENOUS BLD VENIPUNCTURE: CPT | Performed by: NURSE PRACTITIONER

## 2021-06-19 PROCEDURE — U0003 INFECTIOUS AGENT DETECTION BY NUCLEIC ACID (DNA OR RNA); SEVERE ACUTE RESPIRATORY SYNDROME CORONAVIRUS 2 (SARS-COV-2) (CORONAVIRUS DISEASE [COVID-19]), AMPLIFIED PROBE TECHNIQUE, MAKING USE OF HIGH THROUGHPUT TECHNOLOGIES AS DESCRIBED BY CMS-2020-01-R: HCPCS | Performed by: PHYSICIAN ASSISTANT

## 2021-06-19 PROCEDURE — 80061 LIPID PANEL: CPT | Performed by: NURSE PRACTITIONER

## 2021-06-19 PROCEDURE — 83036 HEMOGLOBIN GLYCOSYLATED A1C: CPT | Performed by: NURSE PRACTITIONER

## 2021-06-20 ENCOUNTER — TELEPHONE (OUTPATIENT)
Dept: ONCOLOGY | Facility: CLINIC | Age: 61
End: 2021-06-20

## 2021-06-21 ENCOUNTER — APPOINTMENT (OUTPATIENT)
Dept: CT IMAGING | Facility: CLINIC | Age: 61
End: 2021-06-21
Attending: INTERNAL MEDICINE
Payer: COMMERCIAL

## 2021-06-21 ENCOUNTER — APPOINTMENT (OUTPATIENT)
Dept: MEDSURG UNIT | Facility: CLINIC | Age: 61
End: 2021-06-21
Attending: INTERNAL MEDICINE
Payer: COMMERCIAL

## 2021-06-21 ENCOUNTER — HOSPITAL ENCOUNTER (OUTPATIENT)
Facility: CLINIC | Age: 61
Discharge: HOME | End: 2021-06-21
Attending: INTERNAL MEDICINE | Admitting: RADIOLOGY
Payer: COMMERCIAL

## 2021-06-21 ENCOUNTER — APPOINTMENT (OUTPATIENT)
Dept: INTERVENTIONAL RADIOLOGY/VASCULAR | Facility: CLINIC | Age: 61
End: 2021-06-21
Attending: PHYSICIAN ASSISTANT
Payer: COMMERCIAL

## 2021-06-21 VITALS
HEART RATE: 79 BPM | SYSTOLIC BLOOD PRESSURE: 159 MMHG | RESPIRATION RATE: 16 BRPM | OXYGEN SATURATION: 97 % | TEMPERATURE: 98.4 F | DIASTOLIC BLOOD PRESSURE: 81 MMHG

## 2021-06-21 DIAGNOSIS — K76.9 LESION OF LIVER GREATER THAN 1 CM IN DIAMETER: Primary | ICD-10-CM

## 2021-06-21 DIAGNOSIS — E83.110 HEREDITARY HEMOCHROMATOSIS (H): ICD-10-CM

## 2021-06-21 LAB
ERYTHROCYTE [DISTWIDTH] IN BLOOD BY AUTOMATED COUNT: 11.9 % (ref 10–15)
GLUCOSE BLDC GLUCOMTR-MCNC: 175 MG/DL (ref 70–99)
HCT VFR BLD AUTO: 40.8 % (ref 35–47)
HGB BLD-MCNC: 14.3 G/DL (ref 11.7–15.7)
INR PPP: 1.13 (ref 0.86–1.14)
MCH RBC QN AUTO: 33.5 PG (ref 26.5–33)
MCHC RBC AUTO-ENTMCNC: 35 G/DL (ref 31.5–36.5)
MCV RBC AUTO: 96 FL (ref 78–100)
PLATELET # BLD AUTO: 166 10E9/L (ref 150–450)
RBC # BLD AUTO: 4.27 10E12/L (ref 3.8–5.2)
WBC # BLD AUTO: 8.7 10E9/L (ref 4–11)

## 2021-06-21 PROCEDURE — 77012 CT SCAN FOR NEEDLE BIOPSY: CPT | Mod: 26 | Performed by: RADIOLOGY

## 2021-06-21 PROCEDURE — 96375 TX/PRO/DX INJ NEW DRUG ADDON: CPT

## 2021-06-21 PROCEDURE — 88307 TISSUE EXAM BY PATHOLOGIST: CPT | Mod: 26 | Performed by: PATHOLOGY

## 2021-06-21 PROCEDURE — 88313 SPECIAL STAINS GROUP 2: CPT | Mod: TC | Performed by: PHYSICIAN ASSISTANT

## 2021-06-21 PROCEDURE — 250N000011 HC RX IP 250 OP 636: Performed by: STUDENT IN AN ORGANIZED HEALTH CARE EDUCATION/TRAINING PROGRAM

## 2021-06-21 PROCEDURE — 82962 GLUCOSE BLOOD TEST: CPT

## 2021-06-21 PROCEDURE — 99153 MOD SED SAME PHYS/QHP EA: CPT

## 2021-06-21 PROCEDURE — 272N000504 HC NEEDLE CR4

## 2021-06-21 PROCEDURE — 258N000003 HC RX IP 258 OP 636: Performed by: NURSE PRACTITIONER

## 2021-06-21 PROCEDURE — 88313 SPECIAL STAINS GROUP 2: CPT | Mod: 26 | Performed by: PATHOLOGY

## 2021-06-21 PROCEDURE — 999N000134 HC STATISTIC PP CARE STAGE 3

## 2021-06-21 PROCEDURE — 47000 NEEDLE BIOPSY OF LIVER PERQ: CPT | Mod: GC | Performed by: RADIOLOGY

## 2021-06-21 PROCEDURE — 99152 MOD SED SAME PHYS/QHP 5/>YRS: CPT | Mod: GC | Performed by: RADIOLOGY

## 2021-06-21 PROCEDURE — 250N000009 HC RX 250: Performed by: STUDENT IN AN ORGANIZED HEALTH CARE EDUCATION/TRAINING PROGRAM

## 2021-06-21 PROCEDURE — 74176 CT ABD & PELVIS W/O CONTRAST: CPT | Mod: 26 | Performed by: STUDENT IN AN ORGANIZED HEALTH CARE EDUCATION/TRAINING PROGRAM

## 2021-06-21 PROCEDURE — 74176 CT ABD & PELVIS W/O CONTRAST: CPT

## 2021-06-21 PROCEDURE — 272N000506 HC NEEDLE CR6

## 2021-06-21 PROCEDURE — 77012 CT SCAN FOR NEEDLE BIOPSY: CPT

## 2021-06-21 PROCEDURE — 96374 THER/PROPH/DIAG INJ IV PUSH: CPT

## 2021-06-21 PROCEDURE — 272N000653 HC COIL/EMBOLIC DEVICE CR8

## 2021-06-21 PROCEDURE — 85027 COMPLETE CBC AUTOMATED: CPT | Performed by: NURSE PRACTITIONER

## 2021-06-21 PROCEDURE — 88333 PATH CONSLTJ SURG CYTO XM 1: CPT | Mod: TC | Performed by: PHYSICIAN ASSISTANT

## 2021-06-21 PROCEDURE — 250N000013 HC RX MED GY IP 250 OP 250 PS 637: Performed by: STUDENT IN AN ORGANIZED HEALTH CARE EDUCATION/TRAINING PROGRAM

## 2021-06-21 PROCEDURE — 85610 PROTHROMBIN TIME: CPT | Performed by: NURSE PRACTITIONER

## 2021-06-21 PROCEDURE — 88307 TISSUE EXAM BY PATHOLOGIST: CPT | Mod: TC | Performed by: PHYSICIAN ASSISTANT

## 2021-06-21 PROCEDURE — 88333 PATH CONSLTJ SURG CYTO XM 1: CPT | Mod: 26 | Performed by: PATHOLOGY

## 2021-06-21 PROCEDURE — 99152 MOD SED SAME PHYS/QHP 5/>YRS: CPT

## 2021-06-21 PROCEDURE — 250N000011 HC RX IP 250 OP 636: Performed by: INTERNAL MEDICINE

## 2021-06-21 PROCEDURE — 272N000505 HC NEEDLE CR5

## 2021-06-21 RX ORDER — OXYCODONE HYDROCHLORIDE 5 MG/1
5 TABLET ORAL EVERY 4 HOURS PRN
Status: DISCONTINUED | OUTPATIENT
Start: 2021-06-21 | End: 2021-06-21 | Stop reason: HOSPADM

## 2021-06-21 RX ORDER — NALOXONE HYDROCHLORIDE 0.4 MG/ML
0.4 INJECTION, SOLUTION INTRAMUSCULAR; INTRAVENOUS; SUBCUTANEOUS
Status: DISCONTINUED | OUTPATIENT
Start: 2021-06-21 | End: 2021-06-21 | Stop reason: HOSPADM

## 2021-06-21 RX ORDER — FLUMAZENIL 0.1 MG/ML
0.2 INJECTION, SOLUTION INTRAVENOUS
Status: DISCONTINUED | OUTPATIENT
Start: 2021-06-21 | End: 2021-06-21 | Stop reason: HOSPADM

## 2021-06-21 RX ORDER — IOPAMIDOL 755 MG/ML
100 INJECTION, SOLUTION INTRAVASCULAR ONCE
Status: COMPLETED | OUTPATIENT
Start: 2021-06-21 | End: 2021-06-21

## 2021-06-21 RX ORDER — NALOXONE HYDROCHLORIDE 0.4 MG/ML
0.2 INJECTION, SOLUTION INTRAMUSCULAR; INTRAVENOUS; SUBCUTANEOUS
Status: DISCONTINUED | OUTPATIENT
Start: 2021-06-21 | End: 2021-06-21 | Stop reason: HOSPADM

## 2021-06-21 RX ORDER — ONDANSETRON 2 MG/ML
4 INJECTION INTRAMUSCULAR; INTRAVENOUS EVERY 6 HOURS PRN
Status: DISCONTINUED | OUTPATIENT
Start: 2021-06-21 | End: 2021-06-21 | Stop reason: HOSPADM

## 2021-06-21 RX ORDER — FENTANYL CITRATE 50 UG/ML
25-50 INJECTION, SOLUTION INTRAMUSCULAR; INTRAVENOUS EVERY 5 MIN PRN
Status: DISCONTINUED | OUTPATIENT
Start: 2021-06-21 | End: 2021-06-21 | Stop reason: HOSPADM

## 2021-06-21 RX ORDER — NICOTINE POLACRILEX 4 MG
15-30 LOZENGE BUCCAL
Status: DISCONTINUED | OUTPATIENT
Start: 2021-06-21 | End: 2021-06-21 | Stop reason: HOSPADM

## 2021-06-21 RX ORDER — HYDROMORPHONE HYDROCHLORIDE 1 MG/ML
0.5 INJECTION, SOLUTION INTRAMUSCULAR; INTRAVENOUS; SUBCUTANEOUS
Status: DISCONTINUED | OUTPATIENT
Start: 2021-06-21 | End: 2021-06-21 | Stop reason: HOSPADM

## 2021-06-21 RX ORDER — ONDANSETRON 4 MG/1
4 TABLET, FILM COATED ORAL EVERY 6 HOURS PRN
Qty: 15 TABLET | Refills: 0 | Status: SHIPPED | OUTPATIENT
Start: 2021-06-21 | End: 2021-10-19

## 2021-06-21 RX ORDER — SODIUM CHLORIDE 9 MG/ML
INJECTION, SOLUTION INTRAVENOUS CONTINUOUS
Status: DISCONTINUED | OUTPATIENT
Start: 2021-06-21 | End: 2021-06-21 | Stop reason: HOSPADM

## 2021-06-21 RX ORDER — DEXTROSE MONOHYDRATE 25 G/50ML
25-50 INJECTION, SOLUTION INTRAVENOUS
Status: DISCONTINUED | OUTPATIENT
Start: 2021-06-21 | End: 2021-06-21 | Stop reason: HOSPADM

## 2021-06-21 RX ORDER — LIDOCAINE 40 MG/G
CREAM TOPICAL
Status: DISCONTINUED | OUTPATIENT
Start: 2021-06-21 | End: 2021-06-21 | Stop reason: HOSPADM

## 2021-06-21 RX ORDER — OXYCODONE HYDROCHLORIDE 5 MG/1
5 TABLET ORAL EVERY 6 HOURS PRN
Qty: 6 TABLET | Refills: 0 | Status: SHIPPED | OUTPATIENT
Start: 2021-06-21 | End: 2021-06-24

## 2021-06-21 RX ADMIN — MIDAZOLAM 0.5 MG: 1 INJECTION INTRAMUSCULAR; INTRAVENOUS at 11:02

## 2021-06-21 RX ADMIN — FENTANYL CITRATE 50 MCG: 50 INJECTION INTRAMUSCULAR; INTRAVENOUS at 10:27

## 2021-06-21 RX ADMIN — IOPAMIDOL 100 ML: 755 INJECTION, SOLUTION INTRAVENOUS at 11:48

## 2021-06-21 RX ADMIN — ONDANSETRON 4 MG: 2 INJECTION INTRAMUSCULAR; INTRAVENOUS at 14:48

## 2021-06-21 RX ADMIN — FENTANYL CITRATE 50 MCG: 50 INJECTION INTRAMUSCULAR; INTRAVENOUS at 10:35

## 2021-06-21 RX ADMIN — FENTANYL CITRATE 25 MCG: 50 INJECTION INTRAMUSCULAR; INTRAVENOUS at 11:02

## 2021-06-21 RX ADMIN — LIDOCAINE HYDROCHLORIDE 10 ML: 10 INJECTION, SOLUTION EPIDURAL; INFILTRATION; INTRACAUDAL; PERINEURAL at 10:43

## 2021-06-21 RX ADMIN — OXYCODONE HYDROCHLORIDE 5 MG: 5 TABLET ORAL at 14:48

## 2021-06-21 RX ADMIN — FENTANYL CITRATE 50 MCG: 50 INJECTION INTRAMUSCULAR; INTRAVENOUS at 11:24

## 2021-06-21 RX ADMIN — HYDROMORPHONE HYDROCHLORIDE 0.5 MG: 1 INJECTION, SOLUTION INTRAMUSCULAR; INTRAVENOUS; SUBCUTANEOUS at 15:50

## 2021-06-21 RX ADMIN — SODIUM CHLORIDE: 9 INJECTION, SOLUTION INTRAVENOUS at 09:05

## 2021-06-21 RX ADMIN — MIDAZOLAM 1 MG: 1 INJECTION INTRAMUSCULAR; INTRAVENOUS at 10:26

## 2021-06-21 RX ADMIN — HYDROMORPHONE HYDROCHLORIDE 0.5 MG: 1 INJECTION, SOLUTION INTRAMUSCULAR; INTRAVENOUS; SUBCUTANEOUS at 17:30

## 2021-06-21 RX ADMIN — MIDAZOLAM 1 MG: 1 INJECTION INTRAMUSCULAR; INTRAVENOUS at 10:43

## 2021-06-21 NOTE — IP AVS SNAPSHOT
After Visit Summary Template Not Found    This [employer] is only intended to be used in the After Visit Summary and can only be used in a report that uses a released After Visit Summary Template.                       MRN:[id number]                      After Visit Summary   6/21/2021    Bradford Prado    MRN: [id number]           Visit Information        Department      6/21/2021  7:50 AM [facility] [address] [place]          Review of your medicines      UNREVIEWED medicines. Ask your doctor about these medicines       Dose / Directions   acetaminophen 325 MG tablet  Commonly known as: TYLENOL      Dose: 325-650 mg  Take 325-650 mg by mouth every 6 hours as needed for mild pain  Refills: 0     amLODIPine 5 MG tablet  Commonly known as: NORVASC  Used for: Hypertensive urgency      Dose: 5 mg  Take 1 tablet (5 mg) by mouth 2 times daily  Quantity: 180 tablet  Refills: 1     Biotin 10 MG Caps      Dose: 1 capsule  Take 1 capsule by mouth daily  Refills: 0     carvedilol 12.5 MG tablet  Commonly known as: COREG  Used for: Hypertensive urgency      Dose: 12.5 mg  Take 1 tablet (12.5 mg) by mouth 2 times daily (with meals)  Quantity: 180 tablet  Refills: 1     * insulin aspart 100 UNIT/ML pen  Commonly known as: NovoLOG PEN  Used for: Poorly controlled type 2 diabetes mellitus with renal complication (H)      Dose: 10 Units  Inject 10 Units Subcutaneous 3 times daily (with meals)  Quantity: 30 mL  Refills: 0     * NovoLOG FLEXPEN 100 UNIT/ML pen  Used for: Poorly controlled type 2 diabetes mellitus with renal complication (H)  Generic drug: insulin aspart      INJECT 10 UNITS SUBCUTANEOUS 3 TIMES DAILY (WITH MEALS)  Quantity: 30 mL  Refills: 0     insulin glargine 100 UNIT/ML pen  Commonly known as: LANTUS PEN  Used for: Type 2 diabetes mellitus with hyperglycemia, without long-term current use of insulin (H)      Dose: 42 Units  Inject 42 Units Subcutaneous every morning (before breakfast)  Quantity: 30  mL  Refills: 0     losartan 50 MG tablet  Commonly known as: COZAAR  Used for: Poorly controlled type 2 diabetes mellitus with renal complication (H), Essential hypertension with goal blood pressure less than 140/90      Dose: 50 mg  Take 1 tablet (50 mg) by mouth daily  Quantity: 90 tablet  Refills: 1     pravastatin 20 MG tablet  Commonly known as: PRAVACHOL  Used for: Poorly controlled type 2 diabetes mellitus with renal complication (H), Type 2 diabetes mellitus with hyperglycemia, without long-term current use of insulin (H), Hyperlipidemia LDL goal <70      Dose: 20 mg  Take 1 tablet (20 mg) by mouth daily  Quantity: 90 tablet  Refills: 1     sertraline 50 MG tablet  Commonly known as: ZOLOFT  Used for: Adjustment disorder with anxious mood      Dose: 50 mg  Take 1 tablet (50 mg) by mouth daily Take 50mg PO daily  Quantity: 90 tablet  Refills: 1         * This list has 2 medication(s) that are the same as other medications prescribed for you. Read the directions carefully, and ask your doctor or other care provider to review them with you.            START taking       Dose / Directions   ondansetron 4 MG tablet  Commonly known as: ZOFRAN  Used for: Lesion of liver greater than 1 cm in diameter      Dose: 4 mg  Take 1 tablet (4 mg) by mouth every 6 hours as needed for nausea or vomiting  Quantity: 15 tablet  Refills: 0     oxyCODONE 5 MG tablet  Commonly known as: ROXICODONE  Used for: Lesion of liver greater than 1 cm in diameter      Dose: 5 mg  Take 1 tablet (5 mg) by mouth every 6 hours as needed for severe pain  Quantity: 6 tablet  Refills: 0        CONTINUE these medicines which have NOT CHANGED       Dose / Directions   Accu-Chek Guide Me w/Device Kit  Used for: Poorly controlled type 2 diabetes mellitus with renal complication (H)      Dose: 1 each  1 each 6 times daily  Quantity: 1 kit  Refills: 0     blood glucose lancets standard  Commonly known as: NO BRAND SPECIFIED  Used for: Type 2 diabetes  mellitus with hyperglycemia, without long-term current use of insulin (H)      Use to test blood sugar twice times daily or as directed.  Quantity: 100 each  Refills: 1     blood glucose monitoring lancets  Used for: Poorly controlled type 2 diabetes mellitus with renal complication (H)      Use to test blood sugar 6 times daily.  Quantity: 200 each  Refills: 11     * blood glucose test strip  Commonly known as: NO BRAND SPECIFIED  Used for: Poorly controlled type 2 diabetes mellitus with renal complication (H), Type 2 diabetes mellitus with hyperglycemia, without long-term current use of insulin (H)      Use to test blood sugars 2 times daily or as directed  Quantity: 100 strip  Refills: 11     * blood glucose test strip  Commonly known as: NO BRAND SPECIFIED  Used for: Type 2 diabetes mellitus with hyperglycemia, without long-term current use of insulin (H)      Use to test blood sugar twice times daily or as directed.  Quantity: 100 each  Refills: 1     * Accu-Chek Guide test strip  Used for: Poorly controlled type 2 diabetes mellitus with renal complication (H)  Generic drug: blood glucose      Use to test blood sugar 4 times daily or as directed.  Quantity: 200 strip  Refills: 11     FreeStyle Jammie 14 Day Sensor Misc  Used for: Poorly controlled type 2 diabetes mellitus with renal complication (H)      Dose: 1 each  1 each every 14 days Change every 14 days.  Quantity: 2 each  Refills: 11     Pen Needles 32G X 4 MM Misc  Used for: Poorly controlled type 2 diabetes mellitus with renal complication (H)      Dose: 1 each  1 each 4 times daily  Quantity: 200 each  Refills: 11         * This list has 3 medication(s) that are the same as other medications prescribed for you. Read the directions carefully, and ask your doctor or other care provider to review them with you.               Where to get your medicines      Some of these will need a paper prescription and others can be bought over the counter. Ask your  nurse if you have questions.    Bring a paper prescription for each of these medications  ondansetron 4 MG tablet  oxyCODONE 5 MG tablet           Prescriptions were sent or printed at these locations (2 Prescriptions)            Other Prescriptions                Printed at Department/Unit printer (2 of 2)         ondansetron (ZOFRAN) 4 MG tablet               oxyCODONE (ROXICODONE) 5 MG tablet                Protect others around you: Learn how to safely use, store and throw away your medicines at www.disposemymeds.org.       Follow-ups after your visit       Your next 10 appointments already scheduled    Jun 23, 2021 10:45 AM  LAB with [facility]  [facility] ([facility] ) [address] [id number] [phone]   Please do not eat 10-12 hours before your appointment if you are coming in fasting for labs on lipids, cholesterol, or glucose (sugar). Does not apply to pregnant women. Water, tea and black coffee (with nothing added) is okay. Do not drink other fluids, diet soda or gum. If you have concerns about taking your medications, please send a message by clicking on Secure Messaging, Message Your Care Team.     Jun 23, 2021  2:00 PM  Infusion Visit with  CANCER INFUSION NURSE,  INFUSION CHAIR 12  [facility] ([facility] ) [facility]  [address]  [facility] [address]  [phone]      Jun 28, 2021 11:15 AM  LAB with [facility]  [facility] ([facility] ) [address] [id number] [phone]   Please do not eat 10-12 hours before your appointment if you are coming in fasting for labs on lipids, cholesterol, or glucose (sugar). Does not apply to pregnant women. Water, tea and black coffee (with nothing added) is okay. Do not  drink other fluids, diet soda or gum. If you have concerns about taking your medications, please send a message by clicking on Secure Messaging, Message Your Care Team.     Jun 28, 2021  2:00 PM  Infusion Visit with  CANCER INFUSION NURSE,  INFUSION CHAIR 14  [facility] ([facility] ) [facility] [place]  [address]  [facility] [address]  [phone]      Jul 01, 2021 11:15 AM  LAB [facility]  [facility] ([facility] ) [address] [address] [phone]   Please do not eat 10-12 hours before your appointment if you are coming in fasting for labs on lipids, cholesterol, or glucose (sugar). Does not apply to pregnant women. Water, tea and black coffee (with nothing added) is okay. Do not drink other fluids, diet soda or gum. If you have concerns about taking your medications, please send a message by clicking on Secure Messaging, Message Your Care Team.     Jul 01, 2021  2:00 PM  Infusion Visit with  CANCER INFUSION NURSE,  INFUSION CHAIR 8  [facility] ([facility] ) [facility] [place]  [address]  [facility] [address]  [phone]      Jul 08, 2021  8:00 AM  Video Visit with Jp Long MD  [facility] ([facility] ) [address]  [facility] [address]  [phone]   Please Note: This is a virtual visit; there is no need to come to the facility.          Care Instructions       Further instructions from your care team       [facility]    Interventional Radiology  Patient Instructions Following Biopsy    AFTER YOU GO HOME  ? If you were given sedation DO NOT drive or operate machinery at home or at work for at least 24  hours  ? DO relax and take it easy for 48 hours, no strenuous activity for 24 hours  ? DO drink plenty of fluids  ? DO resume your regular diet, unless otherwise instructed by your Primary Physician  ? Keep the dressing dry and in place for 24 hours.  ? DO NOT SMOKE FOR AT LEAST 24 HOURS, if you have been given any medications that were to help you relax or sedate you during your procedure  ? DO NOT drink alcoholic beverages the day of your procedure  ? DO NOT do any strenuous exercise or lifting (> 10 lbs) for at least 7 days following your procedure  ? DO NOT take a bath or shower for at least 12 hours following your procedure  ? Remove dressing after shower the next day. Replace with Band aid for 2 days.  Never leave a wet dressing in place.  ? DO NOT make any important or legal decisions for 24 hours following your procedure  ? There should be minimum drainage from the biopsy site    CALL THE PHYSICIAN IF:  ? You start bleeding from the procedure site.  If you do start to bleed from that site, lie down flat and hold pressure on the site for a minimum of 10 minutes.  Your physician will tell you if you need to return to the hospital  ? You develop nausea or vomiting  ? You have excessive swelling, redness, or tenderness at the site  ? You have drainage that looks like it is infected.  ? You experience severe pain  ? You develop hives or a rash or unexplained itching  ? You develop shortness of breath  ? You develop a temperature of 101 degrees F or greater  ? You develop bloody clots or red urine after you are discharged   ? You develop chest pain or cough up blood, lightheadedness or fainting      [facility] INTERVENTIONAL RADIOLOGY DEPARTMENT  Procedure Physician:    Dr. Acevedo   Date of procedure: June 21, 2021  Telephone Numbers: [phone] Monday-Friday 8:00 am to 4:30 pm  [phone] After 4:30 pm Monday-Friday, Weekends & Holidays.   Ask for the Interventional Radiologist on call.  Someone is on call 24  hrs/day  [facility] toll free number: [phone] Monday-Friday 8:00 am to 4:30 pm  [facility] Emergency Dept: [phone]          Information about OPIOIDS    PRESCRIPTION OPIOIDS: WHAT YOU NEED TO KNOW   We gave you an opioid (narcotic) pain medicine. It is important to manage your pain, but opioids are not always the best choice. You should first try all the other options your care team gave you. Take this medicine for as short a time (and as few doses) as possible.    Some activities can increase your pain, such as bandage changes or therapy sessions. It may help to take your pain medicine 30 to 60 minutes before these activities. Reduce your stress by getting enough sleep, working on hobbies you enjoy and practicing relaxation or meditation. Talk to your care team about ways to manage your pain beyond prescription opioids.    These medicines have risks:    DO NOT drive when on new or higher doses of pain medicine. These medicines can affect your alertness and reaction times, and you could be arrested for driving under the influence (DUI). If you need to use opioids long-term, talk to your care team about driving.    DO NOT operate heavy machinery    DO NOT do any other dangerous activities while taking these medicines.    DO NOT drink any alcohol while taking these medicines.     If the opioid prescribed includes acetaminophen, DO NOT take with any other medicines that contain acetaminophen. Read all labels carefully. Look for the word  acetaminophen  or  Tylenol.  Ask your pharmacist if you have questions or are unsure.    You can get addicted to pain medicines, especially if you have a history of addiction (chemical, alcohol or substance dependence). Talk to your care team about ways to reduce this risk.    All opioids tend to cause constipation. Drink plenty of water and eat foods that have a lot of fiber, such as fruits, vegetables, prune juice, apple juice and high-fiber cereal. Take a laxative (Miralax, milk of  magnesia, Colace, Senna) if you don t move your bowels at least every other day. Other side effects include upset stomach, sleepiness, dizziness, throwing up, tolerance (needing more of the medicine to have the same effect), physical dependence and slowed breathing.    Store your pills in a secure place, locked if possible. We will not replace any lost or stolen medicine. If you don t finish your medicine, please throw away (dispose) as directed by your pharmacist. Medication waste collection kiosks are conveniently located at all [facility] retail pharmacy locations.  The [place] Pollution Control Agency has more information about safe disposal: [email or web address]-medications       Additional Information About Your Visit       MyChart Information    [employer] gives you secure access to your electronic health record. If you see a primary care provider, you can also send messages to your care team and make appointments. If you have questions, please call your primary care clinic.  If you do not have a primary care provider, please call [phone] and they will assist you.       Care EveryWhere ID    This is your Care EveryWhere ID. This could be used by other organizations to access your [facility] records  [id number]       Your Vitals Were  Most recent update: 6/21/2021  4:39 PM    Blood Pressure   159/81      Pulse   79    Temperature   98.4  F (36.9  C) (Oral)    Respirations   16    Pulse Oximetry   97%          Primary Care Provider Office Phone # Fax #    Paulette Nithya Travis [phone]      Equal Access to Services    [facility] : Hadii aad ku hadasho Soomaali, waaxda luqadaha, qaybta kaalmada adeegyada, waxay palma zepeda . So [facility] [phone].    ATENCIÓN: Si habla español, tiene a jacobsen disposición servicios gratuitos de asistencia lingüística. Llame al [phone].    We comply with applicable  federal and state civil rights laws, including the [place] Human Rights Act. We do not discriminate on the basis of race, color, creed, Adventism, national origin, marital status, age, disability, sex, sexual orientation, or gender identity.    If you would like an itemization of your charges they will now be available in [employer] 30 days after discharge. To access the itemized statements in [employer] go to billing/billing summary. From there select view account. There will be multiple tabs showing an overview of your account, detail, payments, and communications. From the communications tab you can see your monthly statements, your itemized statements, and any billing letters generated for your account. If you do not have a [employer] account and need help getting access please contact [employer] support at [phone].  If you would prefer to have your itemized statements mailed please contact our automated itemized bill request line at [phone] option  2.       Thank you!    Thank you for choosing [facility] for your care. Our goal is always to provide you with excellent care. Hearing back from our patients is one way we can continue to improve our services. Please take a few minutes to complete the written survey that you may receive in the mail after you visit with us. Thank you!            Medication List      Medications          Morning Afternoon Evening Bedtime As Needed    Accu-Chek Guide Me w/Device Kit  INSTRUCTIONS: 1 each 6 times daily                     blood glucose lancets standard  Also known as: NO BRAND SPECIFIED  INSTRUCTIONS: Use to test blood sugar twice times daily or as directed.                     blood glucose monitoring lancets  INSTRUCTIONS: Use to test blood sugar 6 times daily.                     * blood glucose test strip  Also known as: NO BRAND SPECIFIED  INSTRUCTIONS: Use to test blood sugars 2 times daily or as directed                     * blood glucose test strip  Also known as:  NO BRAND SPECIFIED  INSTRUCTIONS: Use to test blood sugar twice times daily or as directed.                     * Accu-Chek Guide test strip  INSTRUCTIONS: Use to test blood sugar 4 times daily or as directed.  Doctor's comments: Wants 1 time  then wants to go to [facility]  Generic drug: blood glucose                     FreeStyle Jammie 14 Day Sensor Misc  INSTRUCTIONS: 1 each every 14 days Change every 14 days.                     ondansetron 4 MG tablet  Also known as: ZOFRAN  INSTRUCTIONS: Take 1 tablet (4 mg) by mouth every 6 hours as needed for nausea or vomiting  LAST TAKEN: Ask your nurse or doctor                     oxyCODONE 5 MG tablet  Also known as: ROXICODONE  INSTRUCTIONS: Take 1 tablet (5 mg) by mouth every 6 hours as needed for severe pain  LAST TAKEN: 5 mg on June 21, 2021  2:48 PM                     Pen Needles 32G X 4 MM Misc  INSTRUCTIONS: 1 each 4 times daily  Doctor's comments: Wants to do one time  at [place] then transfer to [facility] pharmacy                        * This list has 3 medication(s) that are the same as other medications prescribed for you. Read the directions carefully, and ask your doctor or other care provider to review them with you.            ASK your doctor about these medications          Morning Afternoon Evening Bedtime As Needed    acetaminophen 325 MG tablet  Also known as: TYLENOL  INSTRUCTIONS: Take 325-650 mg by mouth every 6 hours as needed for mild pain                     amLODIPine 5 MG tablet  Also known as: NORVASC  INSTRUCTIONS: Take 1 tablet (5 mg) by mouth 2 times daily                     Biotin 10 MG Caps  INSTRUCTIONS: Take 1 capsule by mouth daily                     carvedilol 12.5 MG tablet  Also known as: COREG  INSTRUCTIONS: Take 1 tablet (12.5 mg) by mouth 2 times daily (with meals)                     * insulin aspart 100 UNIT/ML pen  Also known as: NovoLOG PEN  INSTRUCTIONS: Inject 10 Units Subcutaneous 3 times daily (with meals)                      * NovoLOG FLEXPEN 100 UNIT/ML pen  INSTRUCTIONS: INJECT 10 UNITS SUBCUTANEOUS 3 TIMES DAILY (WITH MEALS)  Generic drug: insulin aspart                     insulin glargine 100 UNIT/ML pen  Also known as: LANTUS PEN  INSTRUCTIONS: Inject 42 Units Subcutaneous every morning (before breakfast)  Doctor's comments: If Lantus is not covered by insurance, may substitute Basaglar at same dose and frequency.                       losartan 50 MG tablet  Also known as: COZAAR  INSTRUCTIONS: Take 1 tablet (50 mg) by mouth daily                     pravastatin 20 MG tablet  Also known as: PRAVACHOL  INSTRUCTIONS: Take 1 tablet (20 mg) by mouth daily                     sertraline 50 MG tablet  Also known as: ZOLOFT  INSTRUCTIONS: Take 1 tablet (50 mg) by mouth daily Take 50mg PO daily  Doctor's comments: ### DO NOT FILL NOW. Please update patient's profile to reflect additional refills. ####                        * This list has 2 medication(s) that are the same as other medications prescribed for you. Read the directions carefully, and ask your doctor or other care provider to review them with you.

## 2021-06-21 NOTE — SEDATION DOCUMENTATION
[facility] Procedure Note        Sedation:      Performed by: Akash Acevedo DO  Authorized by: Akash Acevedo DO    Pre-Procedure Assessment done at 0830.    Expected Level:  Moderate Sedation    Indication:  Sedation is required to allow for Liver biopsy    Consent obtained from patient after discussing the risks, benefits and alternatives.    PO Intake:  Appropriately NPO for procedure    ASA Class:  Class 2 - MILD SYSTEMIC DISEASE, NO ACUTE PROBLEMS, NO FUNCTIONAL LIMITATIONS.    Mallampati:  Grade 2:  Soft palate, base of uvula, tonsillar pillars, and portion of posterior pharyngeal wall visible    Lungs: Lungs Clear with good breath sounds bilaterally.     Heart: Normal heart sounds and rate    History and physical reviewed and no updates needed. I have reviewed the lab findings, diagnostic data, medications, and the plan for sedation. I have determined this patient to be an appropriate candidate for the planned sedation and procedure and have reassessed the patient IMMEDIATELY PRIOR to sedation and procedure.

## 2021-06-21 NOTE — PROCEDURES
[facility]    Procedure: IR Procedure Note    Date/Time: 6/21/2021 11:50 AM  Performed by: Akash Acevedo DO  Authorized by: Akash Acevedo DO   IR Fellow Physician: Curtis    UNIVERSAL PROTOCOL   Site Marked: NA  Prior Images Obtained and Reviewed:  Yes  Required items: Required blood products, implants, devices and special equipment available    Patient identity confirmed:  Verbally with patient, arm band, provided demographic data and hospital-assigned identification number  Patient was reevaluated immediately before administering moderate or deep sedation or anesthesia  Confirmation Checklist:  Patient's identity using two indicators, relevant allergies, procedure was appropriate and matched the consent or emergent situation and correct equipment/implants were available  Time out: Immediately prior to the procedure a time out was called    Universal Protocol: the Joint Commission Universal Protocol was followed    Preparation: Patient was prepped and draped in usual sterile fashion    ESBL (mL):  5         ANESTHESIA    Anesthesia: Local infiltration  Local Anesthetic:  Lidocaine 1% without epinephrine      SEDATION    Patient Sedated: Yes    Sedation Type:  Moderate (conscious) sedation  Sedation:  Fentanyl and midazolam  Vital signs: Vital signs monitored during sedation    See dictated procedure note for full details.  Findings: Technically challenging liver lesion biopsy in the right lobe of the liver.  A total of 5 18-gauge core biopsies obtained.  Submitted to pathology who was present for the procedure.    Specimens: core needle biopsy specimens sent for pathological analysis    Complications: None    Condition: Stable    Plan: Bedrest for 3 hours.  Pathology pending.    PROCEDURE   Patient Tolerance:  Patient tolerated the procedure well with no immediate complications    Length of time physician/provider present for 1:1 monitoring during  sedation: 75

## 2021-06-21 NOTE — DISCHARGE INSTRUCTIONS
[facility]    Interventional Radiology  Patient Instructions Following Biopsy    AFTER YOU GO HOME  ? If you were given sedation DO NOT drive or operate machinery at home or at work for at least 24 hours  ? DO relax and take it easy for 48 hours, no strenuous activity for 24 hours  ? DO drink plenty of fluids  ? DO resume your regular diet, unless otherwise instructed by your Primary Physician  ? Keep the dressing dry and in place for 24 hours.  ? DO NOT SMOKE FOR AT LEAST 24 HOURS, if you have been given any medications that were to help you relax or sedate you during your procedure  ? DO NOT drink alcoholic beverages the day of your procedure  ? DO NOT do any strenuous exercise or lifting (> 10 lbs) for at least 7 days following your procedure  ? DO NOT take a bath or shower for at least 12 hours following your procedure  ? Remove dressing after shower the next day. Replace with Band aid for 2 days.  Never leave a wet dressing in place.  ? DO NOT make any important or legal decisions for 24 hours following your procedure  ? There should be minimum drainage from the biopsy site    CALL THE PHYSICIAN IF:  ? You start bleeding from the procedure site.  If you do start to bleed from that site, lie down flat and hold pressure on the site for a minimum of 10 minutes.  Your physician will tell you if you need to return to the hospital  ? You develop nausea or vomiting  ? You have excessive swelling, redness, or tenderness at the site  ? You have drainage that looks like it is infected.  ? You experience severe pain  ? You develop hives or a rash or unexplained itching  ? You develop shortness of breath  ? You develop a temperature of 101 degrees F or greater  ? You develop bloody clots or red urine after you are discharged   ? You develop chest pain or cough up blood, lightheadedness or fainting      [facility] INTERVENTIONAL RADIOLOGY DEPARTMENT  Procedure Physician:    Dr. Acevedo   Date of procedure:  June 21, 2021  Telephone Numbers: [phone] Monday-Friday 8:00 am to 4:30 pm  [phone] After 4:30 pm Monday-Friday, Weekends & Holidays.   Ask for the Interventional Radiologist on call.  Someone is on call 24 hrs/day  [facility] toll free number: [phone] Monday-Friday 8:00 am to 4:30 pm  [facility] Emergency Dept: [phone]

## 2021-06-21 NOTE — PROGRESS NOTES
Pt arrived on 2a post liver biopsy. VSS RA. Dressing c/d/i. No pain. Pt sipping on pop and water, declines food at this time.

## 2021-06-21 NOTE — PROGRESS NOTES
2A prep for Liver Biopsy. Pt alert, oriented and aware of planned procedure. VSS. Denies pain. Approprietly NPO. Right PIV placed and infusing. LABS sent. Consent signed. Prep complete.

## 2021-06-21 NOTE — PROGRESS NOTES
Interventional Radiology Intra-procedural Nursing Note    Patient Name: Bradford Prado  Medical Record Number: [id number]  Today's Date: June 21, 2021    Start Time: 1030  End of procedure time: 1145  Procedure: Image guided liver lesion biopsy  Fire Safety Score: 1    Consent Review/Timeout Performed by: Dr. Delvin Mcqueen  Procedure Performed By: Dr. Akash Acevedo    Fentanyl administered: 175 mcg  Versed administered: 2.5 mg    Start of Sedation Time: 1030  End of Sedation Time: 1145  Total Sedation Time: 75 minutes    Procedure start time: 1030  Puncture time: 1035  Procedure end time: 1145    Report given to: Ruchi MORROW  Time pt departs:  1200  : NA    Other Notes:  Alert female transported via cart from 2A to IR Procedure Room CT2 for planned intervention.  ID band confirmed and patient acknowledges understanding of planned procedure. Patient repositioned to procedure table via hover-mat and positioned supine.  Patient prepped and draped per policy see VS flowsheet, MAR for further information.       Pathology contacted with request for presence at 1105.  Pathology present for sample evaluation at 1122.    Right upper abdomen site accessed.  A total of 3 x core specimen obtained under sterile procedure.  Post procedure, site cleansed and dressed per protocol.    Patient returned to 2A for post-procedure monitoring.    Jessica Maurer RN

## 2021-06-21 NOTE — PROGRESS NOTES
discharge  discharge to home. Pt reports pain in right abd. Very discomfortable but would like to still go home. CT has been cleared. Oxycodone prescriptions filled.  Nausea continues and increases with narcotics. Zofran prescriptions filled at discharge pharm. Reviewed discharge instructions with pt. Pt signed discharge paperwork. PIVs removed. Talked further with pt and MD about admission to control pain/nausea but pt preferred to go Home. Pushed pt to front door where daughter is waiting. Pt son- in -law will stay with pt tonight.

## 2021-06-21 NOTE — IP AVS SNAPSHOT
[facility] [address] [address]                                    After Visit Summary   6/21/2021    Bradford Prado    MRN: [id number]           After Visit Summary Signature Page    I have received my discharge instructions, and my questions have been answered. I have discussed any challenges I see with this plan with the nurse or doctor.    ..........................................................................................................................................  Patient/Patient Representative Signature      ..........................................................................................................................................  Patient Representative Print Name and Relationship to Patient    ..................................................               ................................................  Date                                   Time    ..........................................................................................................................................  Reviewed by Signature/Title    ...................................................              ..............................................  Date                                               Time          22EPIC Rev 08/18

## 2021-06-21 NOTE — PROGRESS NOTES
Paged  On-Call notified of pt continue to have nausea after narcotic. MD instructs that pt only take Narcotic if nesses felisha. Okay for pt to be discharged.

## 2021-06-22 LAB — COPATH REPORT: NORMAL

## 2021-06-23 ENCOUNTER — INFUSION THERAPY VISIT (OUTPATIENT)
Dept: INFUSION THERAPY | Facility: CLINIC | Age: 61
End: 2021-06-23
Attending: INTERNAL MEDICINE
Payer: COMMERCIAL

## 2021-06-23 VITALS
OXYGEN SATURATION: 97 % | DIASTOLIC BLOOD PRESSURE: 62 MMHG | RESPIRATION RATE: 20 BRPM | TEMPERATURE: 98.5 F | HEART RATE: 79 BPM | SYSTOLIC BLOOD PRESSURE: 123 MMHG

## 2021-06-23 DIAGNOSIS — E83.110 HEREDITARY HEMOCHROMATOSIS (H): Primary | ICD-10-CM

## 2021-06-23 DIAGNOSIS — E11.9 DIABETES MELLITUS (H): ICD-10-CM

## 2021-06-23 DIAGNOSIS — Z11.4 SCREENING FOR HIV (HUMAN IMMUNODEFICIENCY VIRUS): ICD-10-CM

## 2021-06-23 DIAGNOSIS — E83.110 HEREDITARY HEMOCHROMATOSIS (H): ICD-10-CM

## 2021-06-23 LAB
BASOPHILS # BLD AUTO: 0 10E9/L (ref 0–0.2)
BASOPHILS NFR BLD AUTO: 0.3 %
DIFFERENTIAL METHOD BLD: ABNORMAL
EOSINOPHIL # BLD AUTO: 0.2 10E9/L (ref 0–0.7)
EOSINOPHIL NFR BLD AUTO: 2.5 %
ERYTHROCYTE [DISTWIDTH] IN BLOOD BY AUTOMATED COUNT: 11.8 % (ref 10–15)
FERRITIN SERPL-MCNC: 1003 NG/ML (ref 8–252)
HCT VFR BLD AUTO: 38.6 % (ref 35–47)
HGB BLD-MCNC: 13.2 G/DL (ref 11.7–15.7)
HIV 1+2 AB+HIV1 P24 AG SERPL QL IA: NONREACTIVE
LYMPHOCYTES # BLD AUTO: 1.8 10E9/L (ref 0.8–5.3)
LYMPHOCYTES NFR BLD AUTO: 25.4 %
MCH RBC QN AUTO: 33.3 PG (ref 26.5–33)
MCHC RBC AUTO-ENTMCNC: 34.2 G/DL (ref 31.5–36.5)
MCV RBC AUTO: 98 FL (ref 78–100)
MONOCYTES # BLD AUTO: 0.7 10E9/L (ref 0–1.3)
MONOCYTES NFR BLD AUTO: 9.7 %
NEUTROPHILS # BLD AUTO: 4.5 10E9/L (ref 1.6–8.3)
NEUTROPHILS NFR BLD AUTO: 62.1 %
PLATELET # BLD AUTO: 164 10E9/L (ref 150–450)
RBC # BLD AUTO: 3.96 10E12/L (ref 3.8–5.2)
WBC # BLD AUTO: 7.2 10E9/L (ref 4–11)

## 2021-06-23 PROCEDURE — 87389 HIV-1 AG W/HIV-1&-2 AB AG IA: CPT | Performed by: NURSE PRACTITIONER

## 2021-06-23 PROCEDURE — 36415 COLL VENOUS BLD VENIPUNCTURE: CPT | Performed by: NURSE PRACTITIONER

## 2021-06-23 PROCEDURE — 85025 COMPLETE CBC W/AUTO DIFF WBC: CPT | Performed by: NURSE PRACTITIONER

## 2021-06-23 PROCEDURE — 99195 PHLEBOTOMY: CPT

## 2021-06-23 PROCEDURE — 82728 ASSAY OF FERRITIN: CPT | Performed by: NURSE PRACTITIONER

## 2021-06-23 ASSESSMENT — PAIN SCALES - GENERAL: PAINLEVEL: MODERATE PAIN (4)

## 2021-06-23 NOTE — PROGRESS NOTES
Infusion Nursing Note:  Bradford Prado presents today for phlebotomy  Patient seen by provider today: No   present during visit today: Not Applicable.    Note: N/A.  Patient did not meet criteria for an asymptomatic covid-19 PCR test in infusion today. Last test 6/19/21.    Intravenous Access:  Phlebotomy site right lower arm, Needle type apheresis, Gauge 17.    Treatment Conditions:  Lab Results   Component Value Date    HGB 13.2 06/23/2021     Lab Results   Component Value Date    WBC 7.2 06/23/2021      Lab Results   Component Value Date    ANEU 4.5 06/23/2021     Lab Results   Component Value Date     06/23/2021          Post Infusion Assessment:  Patient tolerated phlebotomy without incident.  Patient observed for 20 minutes post phlebotomy per protocol.  Site patent and intact, free from redness, edema or discomfort.       Discharge Plan:   Copy of AVS reviewed with patient and/or family.  Patient will return as prev mauricio for next appointment.  Patient discharged in stable condition accompanied by: self.  Departure Mode: Ambulatory.      Jose Eduardo Davenport RN

## 2021-06-28 ENCOUNTER — INFUSION THERAPY VISIT (OUTPATIENT)
Dept: INFUSION THERAPY | Facility: CLINIC | Age: 61
End: 2021-06-28
Attending: INTERNAL MEDICINE
Payer: COMMERCIAL

## 2021-06-28 VITALS
RESPIRATION RATE: 16 BRPM | TEMPERATURE: 98.4 F | DIASTOLIC BLOOD PRESSURE: 57 MMHG | HEART RATE: 78 BPM | SYSTOLIC BLOOD PRESSURE: 87 MMHG

## 2021-06-28 DIAGNOSIS — E83.110 HEREDITARY HEMOCHROMATOSIS (H): ICD-10-CM

## 2021-06-28 DIAGNOSIS — E83.110 HEREDITARY HEMOCHROMATOSIS (H): Primary | ICD-10-CM

## 2021-06-28 LAB
BASOPHILS # BLD AUTO: 0 10E9/L (ref 0–0.2)
BASOPHILS NFR BLD AUTO: 0.3 %
DIFFERENTIAL METHOD BLD: ABNORMAL
EOSINOPHIL # BLD AUTO: 0.3 10E9/L (ref 0–0.7)
EOSINOPHIL NFR BLD AUTO: 3.3 %
ERYTHROCYTE [DISTWIDTH] IN BLOOD BY AUTOMATED COUNT: 12.3 % (ref 10–15)
FERRITIN SERPL-MCNC: 698 NG/ML (ref 8–252)
HCT VFR BLD AUTO: 32.6 % (ref 35–47)
HGB BLD-MCNC: 11.2 G/DL (ref 11.7–15.7)
LYMPHOCYTES # BLD AUTO: 1.9 10E9/L (ref 0.8–5.3)
LYMPHOCYTES NFR BLD AUTO: 24.2 %
MCH RBC QN AUTO: 34 PG (ref 26.5–33)
MCHC RBC AUTO-ENTMCNC: 34.4 G/DL (ref 31.5–36.5)
MCV RBC AUTO: 99 FL (ref 78–100)
MONOCYTES # BLD AUTO: 0.7 10E9/L (ref 0–1.3)
MONOCYTES NFR BLD AUTO: 9.1 %
NEUTROPHILS # BLD AUTO: 5 10E9/L (ref 1.6–8.3)
NEUTROPHILS NFR BLD AUTO: 63.1 %
PLATELET # BLD AUTO: 170 10E9/L (ref 150–450)
RBC # BLD AUTO: 3.29 10E12/L (ref 3.8–5.2)
WBC # BLD AUTO: 7.9 10E9/L (ref 4–11)

## 2021-06-28 PROCEDURE — 85025 COMPLETE CBC W/AUTO DIFF WBC: CPT | Performed by: INTERNAL MEDICINE

## 2021-06-28 PROCEDURE — 99195 PHLEBOTOMY: CPT

## 2021-06-28 PROCEDURE — 82728 ASSAY OF FERRITIN: CPT | Performed by: INTERNAL MEDICINE

## 2021-06-28 PROCEDURE — 36415 COLL VENOUS BLD VENIPUNCTURE: CPT | Performed by: INTERNAL MEDICINE

## 2021-06-28 NOTE — PROGRESS NOTES
Infusion Nursing Note:  Bradford Prado presents today for therapeutic phlebotomy.    Patient seen by provider today: No   present during visit today: Not Applicable.    Note: N/A.    Intravenous Access:  Lab draw site right AC, Needle type straight, Gauge 17. 100cc removed before flow stopped.  Lab draw site left forearm, Needle type straight, Gauge 17. 430cc blood removed.     Treatment Conditions:  Lab Results   Component Value Date    HGB 11.2 06/28/2021     Lab Results   Component Value Date    WBC 7.9 06/28/2021      Lab Results   Component Value Date    ANEU 5.0 06/28/2021     Lab Results   Component Value Date     06/28/2021     Ferritin 698    Results reviewed, labs MET treatment parameters, ok to proceed with treatment.      Post Infusion Assessment:  Patient tolerated infusion without incident.  Site patent and intact, free from redness, edema or discomfort.       Discharge Plan:   Patient and/or family verbalized understanding of discharge instructions and all questions answered.  AVS to patient via [employer].  Patient will return 7/1 for next appointment.   Patient discharged in stable condition accompanied by: self.  Departure Mode: Ambulatory.      Ruchi Ribeiro RN

## 2021-07-01 ENCOUNTER — INFUSION THERAPY VISIT (OUTPATIENT)
Dept: INFUSION THERAPY | Facility: CLINIC | Age: 61
End: 2021-07-01
Attending: INTERNAL MEDICINE
Payer: COMMERCIAL

## 2021-07-01 ENCOUNTER — HOSPITAL ENCOUNTER (OUTPATIENT)
Dept: CARDIOLOGY | Facility: CLINIC | Age: 61
Discharge: HOME | End: 2021-07-01
Attending: INTERNAL MEDICINE | Admitting: INTERNAL MEDICINE
Payer: COMMERCIAL

## 2021-07-01 VITALS
RESPIRATION RATE: 16 BRPM | SYSTOLIC BLOOD PRESSURE: 94 MMHG | HEART RATE: 80 BPM | TEMPERATURE: 98.3 F | DIASTOLIC BLOOD PRESSURE: 56 MMHG | OXYGEN SATURATION: 98 %

## 2021-07-01 DIAGNOSIS — E83.110 HEREDITARY HEMOCHROMATOSIS (H): ICD-10-CM

## 2021-07-01 DIAGNOSIS — E83.110 HEREDITARY HEMOCHROMATOSIS (H): Primary | ICD-10-CM

## 2021-07-01 LAB
BASOPHILS # BLD AUTO: 0 10E9/L (ref 0–0.2)
BASOPHILS NFR BLD AUTO: 0.5 %
DIFFERENTIAL METHOD BLD: ABNORMAL
EOSINOPHIL # BLD AUTO: 0.2 10E9/L (ref 0–0.7)
EOSINOPHIL NFR BLD AUTO: 2.4 %
ERYTHROCYTE [DISTWIDTH] IN BLOOD BY AUTOMATED COUNT: 13.1 % (ref 10–15)
FERRITIN SERPL-MCNC: 688 NG/ML (ref 8–252)
HCT VFR BLD AUTO: 30.4 % (ref 35–47)
HGB BLD-MCNC: 10.3 G/DL (ref 11.7–15.7)
LYMPHOCYTES # BLD AUTO: 2.1 10E9/L (ref 0.8–5.3)
LYMPHOCYTES NFR BLD AUTO: 24.6 %
MCH RBC QN AUTO: 33.9 PG (ref 26.5–33)
MCHC RBC AUTO-ENTMCNC: 33.9 G/DL (ref 31.5–36.5)
MCV RBC AUTO: 100 FL (ref 78–100)
MONOCYTES # BLD AUTO: 0.8 10E9/L (ref 0–1.3)
MONOCYTES NFR BLD AUTO: 9.8 %
NEUTROPHILS # BLD AUTO: 5.3 10E9/L (ref 1.6–8.3)
NEUTROPHILS NFR BLD AUTO: 62.7 %
PLATELET # BLD AUTO: 176 10E9/L (ref 150–450)
RBC # BLD AUTO: 3.04 10E12/L (ref 3.8–5.2)
WBC # BLD AUTO: 8.5 10E9/L (ref 4–11)

## 2021-07-01 PROCEDURE — 93306 TTE W/DOPPLER COMPLETE: CPT

## 2021-07-01 PROCEDURE — 36415 COLL VENOUS BLD VENIPUNCTURE: CPT | Performed by: INTERNAL MEDICINE

## 2021-07-01 PROCEDURE — 93306 TTE W/DOPPLER COMPLETE: CPT | Mod: 26 | Performed by: INTERNAL MEDICINE

## 2021-07-01 PROCEDURE — 82728 ASSAY OF FERRITIN: CPT | Performed by: INTERNAL MEDICINE

## 2021-07-01 PROCEDURE — 99195 PHLEBOTOMY: CPT

## 2021-07-01 PROCEDURE — 85025 COMPLETE CBC W/AUTO DIFF WBC: CPT | Performed by: INTERNAL MEDICINE

## 2021-07-01 ASSESSMENT — PAIN SCALES - GENERAL: PAINLEVEL: NO PAIN (0)

## 2021-07-01 NOTE — PROGRESS NOTES
Infusion Nursing Note:  Bradford Prado presents today for phlebotomy.    Patient seen by provider today: No   present during visit today: Not Applicable.    Note: Needs to be scheduled for more phlebotomies; message sent to . Patient will watch [employer].    Intravenous Access:  Phlebotomy site LAC, Needle type aphersis, Gauge 17.    Treatment Conditions:  Lab Results   Component Value Date    HGB 10.3 07/01/2021     Lab Results   Component Value Date    WBC 8.5 07/01/2021      Lab Results   Component Value Date    ANEU 5.3 07/01/2021     Lab Results   Component Value Date     07/01/2021          Post Infusion Assessment:  Patient tolerated infusion without incident.  Patient observed for 15 minutes post phlebotomy per protocol.  Site patent and intact, free from redness, edema or discomfort.  No evidence of extravasations.  Access discontinued per protocol.       Discharge Plan:   [facility] patient via [facility].  Patient will return as scheduled - message sent to  for next appointment.   Patient discharged in stable condition accompanied by: self.  Departure Mode: Ambulatory.      Jose Eduardo Davenport RN

## 2021-07-02 ENCOUNTER — TELEPHONE (OUTPATIENT)
Dept: ONCOLOGY | Facility: CLINIC | Age: 61
End: 2021-07-02

## 2021-07-05 DIAGNOSIS — E83.110 HEREDITARY HEMOCHROMATOSIS (H): ICD-10-CM

## 2021-07-05 LAB
BASOPHILS # BLD AUTO: 0 10E9/L (ref 0–0.2)
BASOPHILS NFR BLD AUTO: 0.2 %
DIFFERENTIAL METHOD BLD: ABNORMAL
EOSINOPHIL # BLD AUTO: 0.2 10E9/L (ref 0–0.7)
EOSINOPHIL NFR BLD AUTO: 2.3 %
ERYTHROCYTE [DISTWIDTH] IN BLOOD BY AUTOMATED COUNT: 14.2 % (ref 10–15)
FERRITIN SERPL-MCNC: 605 NG/ML (ref 8–252)
HCT VFR BLD AUTO: 31.9 % (ref 35–47)
HGB BLD-MCNC: 10.7 G/DL (ref 11.7–15.7)
LYMPHOCYTES # BLD AUTO: 2.3 10E9/L (ref 0.8–5.3)
LYMPHOCYTES NFR BLD AUTO: 27.3 %
MCH RBC QN AUTO: 34.7 PG (ref 26.5–33)
MCHC RBC AUTO-ENTMCNC: 33.5 G/DL (ref 31.5–36.5)
MCV RBC AUTO: 104 FL (ref 78–100)
MONOCYTES # BLD AUTO: 0.8 10E9/L (ref 0–1.3)
MONOCYTES NFR BLD AUTO: 9.4 %
NEUTROPHILS # BLD AUTO: 5.1 10E9/L (ref 1.6–8.3)
NEUTROPHILS NFR BLD AUTO: 60.8 %
PLATELET # BLD AUTO: 181 10E9/L (ref 150–450)
RBC # BLD AUTO: 3.08 10E12/L (ref 3.8–5.2)
WBC # BLD AUTO: 8.3 10E9/L (ref 4–11)

## 2021-07-05 PROCEDURE — 36415 COLL VENOUS BLD VENIPUNCTURE: CPT | Performed by: INTERNAL MEDICINE

## 2021-07-05 PROCEDURE — 82728 ASSAY OF FERRITIN: CPT | Performed by: INTERNAL MEDICINE

## 2021-07-05 PROCEDURE — 85025 COMPLETE CBC W/AUTO DIFF WBC: CPT | Performed by: INTERNAL MEDICINE

## 2021-07-07 DIAGNOSIS — E83.110 HEREDITARY HEMOCHROMATOSIS (H): ICD-10-CM

## 2021-07-07 LAB
BASOPHILS # BLD AUTO: 0 10E9/L (ref 0–0.2)
BASOPHILS NFR BLD AUTO: 0.4 %
DIFFERENTIAL METHOD BLD: ABNORMAL
EOSINOPHIL # BLD AUTO: 0.2 10E9/L (ref 0–0.7)
EOSINOPHIL NFR BLD AUTO: 2.4 %
ERYTHROCYTE [DISTWIDTH] IN BLOOD BY AUTOMATED COUNT: 14.5 % (ref 10–15)
FERRITIN SERPL-MCNC: 513 NG/ML (ref 8–252)
HCT VFR BLD AUTO: 34.6 % (ref 35–47)
HGB BLD-MCNC: 11.5 G/DL (ref 11.7–15.7)
LYMPHOCYTES # BLD AUTO: 1.8 10E9/L (ref 0.8–5.3)
LYMPHOCYTES NFR BLD AUTO: 24.8 %
MCH RBC QN AUTO: 34.7 PG (ref 26.5–33)
MCHC RBC AUTO-ENTMCNC: 33.2 G/DL (ref 31.5–36.5)
MCV RBC AUTO: 105 FL (ref 78–100)
MONOCYTES # BLD AUTO: 0.7 10E9/L (ref 0–1.3)
MONOCYTES NFR BLD AUTO: 9.7 %
NEUTROPHILS # BLD AUTO: 4.5 10E9/L (ref 1.6–8.3)
NEUTROPHILS NFR BLD AUTO: 62.7 %
PLATELET # BLD AUTO: 181 10E9/L (ref 150–450)
RBC # BLD AUTO: 3.31 10E12/L (ref 3.8–5.2)
WBC # BLD AUTO: 7.2 10E9/L (ref 4–11)

## 2021-07-07 PROCEDURE — 36415 COLL VENOUS BLD VENIPUNCTURE: CPT | Performed by: INTERNAL MEDICINE

## 2021-07-07 PROCEDURE — 82728 ASSAY OF FERRITIN: CPT | Performed by: INTERNAL MEDICINE

## 2021-07-07 PROCEDURE — 85025 COMPLETE CBC W/AUTO DIFF WBC: CPT | Performed by: INTERNAL MEDICINE

## 2021-07-07 NOTE — PROGRESS NOTES
"Bradford is a 60 year old who is being evaluated via a billable video visit.      How would you like to obtain your AVS? Assisterahart  If the video visit is dropped, the invitation should be resent by: Text to cell phone: [phone].  Will anyone else be joining your video visit? No      Video Start Time: 7:47 AM  Video-Visit Details    Type of service:  Video Visit    Video End Time:7:49 AM    Originating Location (pt. Location): Home    Distant Location (provider location):  [facility]     Platform used for Video Visit: Other: [employer]   Vitals - Patient Reported  Weight (Patient Reported): 60.8 kg (134 lb)  Height (Patient Reported): 149.9 cm (4' 11\")  BMI (Based on Pt Reported Ht/Wt): 27.06  Pain Score: No Pain (0)      Sherly Richard, JOHNATHON on 7/8/2021 at 7:49 AM      Heme/onc visit virtual note  July 8, 2021  Oncology team:    Reason for visit: HFE gene C282Y homozygosity consistent with hereditary hemochromatosis.     Cancer history: Bradford Prado is a 60 year old female with past medical history of mild overweight, hypertension, dyslipidemia, poorly controlled diabetes recently started on insulin therapy who presents with concerns of abnormal liver tests and historically abnormal liver imaging. Reports that she was first told that she had some sort of issue with her liver approximately 4 years ago.  At that time she was having significant pain and dysuria related to kidney stones.  An abdominal imaging study suggested a nodular appearing liver that raise concerns for chronic liver disease.  She denies ever having follow-up for this condition at that time.  Based on review of historical labs it was noted that around the time of this imaging she did have iron studies checked which demonstrated a serum ferritin of 1569 in September 2016, but again there has been no follow-up on this lab today. Noted that she has had progressive worsening of her overall management of her diabetes, and " reports that she started on insulin during a recent hospitalization.  Most recent hemoglobin A1c was 11.1%.She underwent an MRI of the abdomen on April 2 which demonstrated nodular appearing liver, diffusely decreased signal throughout the liver raising concerns for iron deposition, and multiple arterial enhancing lesions throughout the liver. Per review of the MRI, etiology of these liver masses is unclear, and recommendation was to repeat MRI in 3 months.  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  Therefore, patient was referred to hematology for further assessment and initiation of phlebotomy therapy        Interval history: 7/8/21  Reports that her chest pain still persistent. Otherwise, ROS as below. Patient stated that she is afraid of the diagnosis.reports intermittent chest pain. And patient to follow up with PCP for referral to cardiology.      Past medical history:  Patient Active Problem List   Diagnosis     Hand arthritis     Stenosing tenosynovitis     Poorly controlled type 2 diabetes mellitus with renal complication (H)     Hyperlipidemia LDL goal <70     Essential hypertension with goal blood pressure less than 140/90     Nephrolithiasis     Type 2 diabetes mellitus with hyperglycemia, without long-term current use of insulin (H)     Chest pain     Transaminitis     Hyperglycemia     Hypertensive urgency     Diabetes mellitus (H)     Dyslipidemia     Hereditary hemochromatosis (H)       Medications:  ACCU-CHEK GUIDE test strip, Use to test blood sugar 4 times daily or as directed.  acetaminophen (TYLENOL) 325 MG tablet, Take 325-650 mg by mouth every 6 hours as needed for mild pain  amLODIPine (NORVASC) 5 MG tablet, Take 1 tablet (5 mg) by mouth 2 times daily  Biotin 10 MG CAPS, Take 1 capsule by mouth daily  blood glucose (NO  BRAND SPECIFIED) lancets standard, Use to test blood sugar twice times daily or as directed.  blood glucose (NO BRAND SPECIFIED) test strip, Use to test blood sugar twice times daily or as directed.  blood glucose (NO BRAND SPECIFIED) test strip, Use to test blood sugars 2 times daily or as directed  blood glucose monitoring (SOFTCLIX) lancets, Use to test blood sugar 6 times daily.  Blood Glucose Monitoring Suppl (ACCU-CHEK GUIDE ME) w/Device KIT, 1 each 6 times daily  carvedilol (COREG) 12.5 MG tablet, Take 1 tablet (12.5 mg) by mouth 2 times daily (with meals)  Continuous Blood Gluc Sensor (FREESTYLE MEENA 14 DAY SENSOR) MISC, 1 each every 14 days Change every 14 days.  insulin aspart (NOVOLOG FLEXPEN) 100 UNIT/ML pen, INJECT 10 UNITS SUBCUTANEOUS 3 TIMES DAILY (WITH MEALS)  insulin aspart (NOVOLOG PEN) 100 UNIT/ML pen, Inject 10 Units Subcutaneous 3 times daily (with meals)  insulin glargine (LANTUS PEN) 100 UNIT/ML pen, Inject 42 Units Subcutaneous every morning (before breakfast)  Insulin Pen Needle (PEN NEEDLES) 32G X 4 MM MISC, 1 each 4 times daily  losartan (COZAAR) 50 MG tablet, Take 1 tablet (50 mg) by mouth daily  ondansetron (ZOFRAN) 4 MG tablet, Take 1 tablet (4 mg) by mouth every 6 hours as needed for nausea or vomiting  pravastatin (PRAVACHOL) 20 MG tablet, Take 1 tablet (20 mg) by mouth daily  sertraline (ZOLOFT) 50 MG tablet, Take 1 tablet (50 mg) by mouth daily Take 50mg PO daily    No current facility-administered medications on file prior to visit.       Allergy:   No Known Allergies      Review of systems:    - CONSTITUTIONAL: Denies weight loss, fever and chills. Fatigue + after blood draw.    - HEENT: Denies changes in vision and hearing.    - ?RESPIRATORY: Denies SOB and cough.?    - CV: Denies palpitations and +++ CP.     - GI: Denies abdominal pain, nausea, vomiting and diarrhea.?    - : Denies dysuria and urinary frequency.?    - MSK: Denies myalgia and joint pain.?    - SKIN: Denies  rash and pruritus.    - ?NEUROLOGICAL: Denies headache and syncope.?    - PSYCHIATRIC: Denies recent changes in mood. Denies anxiety and depression.    - LYMPHATIC: no palpable lumps in the neck, axilla or groin areas.       Physical exam  There were no vitals taken for this visit.  Wt Readings from Last 4 Encounters:   06/10/21 61.1 kg (134 lb 12.8 oz)   03/30/21 56.8 kg (125 lb 4.8 oz)   03/02/21 54.8 kg (120 lb 14.4 oz)   02/27/21 52.4 kg (115 lb 8 oz)       There were no vitals taken for this visit.    Constitutional:       General: He is not in acute distress.     Appearance: Normal appearance. He is not toxic-appearing.   HENT:      Head: Normocephalic and atraumatic.     Eyes:      Extraocular Movements: Extraocular movements intact.      Neck:      Musculoskeletal: Normal range of motion-Cardiovascular:     effort, normsl    Pulmonary:      Effort: Pulmonary effort is normal.         Labs:  Orders Only on 07/05/2021   Component Date Value Ref Range Status     Ferritin 07/05/2021 605* 8 - 252 ng/mL Final     WBC 07/05/2021 8.3  4.0 - 11.0 10e9/L Final     RBC Count 07/05/2021 3.08* 3.8 - 5.2 10e12/L Final     Hemoglobin 07/05/2021 10.7* 11.7 - 15.7 g/dL Final     Hematocrit 07/05/2021 31.9* 35.0 - 47.0 % Final     MCV 07/05/2021 104* 78 - 100 fl Final     MCH 07/05/2021 34.7* 26.5 - 33.0 pg Final     MCHC 07/05/2021 33.5  31.5 - 36.5 g/dL Final     RDW 07/05/2021 14.2  10.0 - 15.0 % Final     Platelet Count 07/05/2021 181  150 - 450 10e9/L Final     % Neutrophils 07/05/2021 60.8  % Final     % Lymphocytes 07/05/2021 27.3  % Final     % Monocytes 07/05/2021 9.4  % Final     % Eosinophils 07/05/2021 2.3  % Final     % Basophils 07/05/2021 0.2  % Final     Absolute Neutrophil 07/05/2021 5.1  1.6 - 8.3 10e9/L Final     Absolute Lymphocytes 07/05/2021 2.3  0.8 - 5.3 10e9/L Final     Absolute Monocytes 07/05/2021 0.8  0.0 - 1.3 10e9/L Final     Absolute Eosinophils 07/05/2021 0.2  0.0 - 0.7 10e9/L Final      Absolute Basophils 07/05/2021 0.0  0.0 - 0.2 10e9/L Final     Diff Method 07/05/2021 Automated Method   Final           Imaging:    CT Abdomen Pelvis w/o Contrast [[id number]] (Order [id number])  Exam Information    Exam Date Exam Time Accession # Performing Department Results    6/21/21  5:09 PM [id number] [facility] Imaging    PACS Images     Show images for CT Abdomen Pelvis w/o Contrast   Study Result    Exam Type: CT ABDOMEN PELVIS W/O CONTRAST  Date and Time: 6/21/2021 5:09 PM  History: GI bleed; Abdominal pain post liver biopsy. Please perform  non-contrast (IR fellow to check) prior to IV contrast (late arterial,  and delay venous phase if needed).  Comparison: 6/21/2021, 9/19/2016 CT; MRI 4/2/2021     Procedure:   Helical  CT images were obtained of the abdomen and pelvis without IV  contrast.  Oral contrast was not administered.      Radiation Dose (DLP): 775 mGy*cm     FINDINGS:     LOWER CHEST::  Bibasilar atelectasis. No suspicious pulmonary nodules or masses.     ABDOMEN:     Limited evaluation of abdominal parenchymal organs due to noncontrast  technique.     LIVER: Cirrhotic configuration of liver. Postprocedural changes of  biopsy of hepatic segment 4 with trace intraparenchymal air, no  significant intra-abdominal hemorrhage.  BILE DUCTS: Normal caliber.  GALLBLADDER: Fluid distended gallbladder, measures up to 5.6 cm in  width. Cholelithiasis. No gallbladder wall thickening or  pericholecystic fluid/inflammatory change.  PANCREAS: Within normal limits.  SPLEEN: Within normal limits.  ADRENALS: Within normal limits.  KIDNEYS/URETERS/BLADDER: Punctate nonobstructing nephrolithiasis  (series 2, image 38). No worrisome lesions. Dense urinary collecting  system, consistent with recent administration of IV contrast.     REPRODUCTIVE ORGANS: No pelvic masses.     BOWEL: No bowel dilatation or wall thickening. Mild mural fatty  infiltration of the right colon. Duodenal diverticulum in the  region  of the pancreas measuring 2.9 x 2.0 cm (series 2 image 36).  PERITONEUM/RETROPERITONEUM: No ascites or free air, no fluid  collection.  VESSELS: No abdominal aortic aneurysm. Scattered atherosclerotic  calcification of the abdominal aorta.  LYMPH NODES: No enlarged lymph nodes.     BONES AND SOFT TISSUES:  No suspicious lesions. Small fat-containing ventral hernia (series 2,  image 42).                                                                      IMPRESSION:     1.  Postbiopsy changes of the liver without evidence of  intra-abdominal hemorrhage on noncontrast scan.  2.  Cirrhotic liver morphology.  3.  Distended gallbladder with cholelithiasis; may consider ultrasound  abdomen for further evaluation if clinically indicated.  4.  Nonobstructing punctate nephrolithiasis.     I have personally reviewed the examination and initial interpretation  and I agree with the findings.     ANIL HILTON MD       CT Liver Biopsy [[id number]] (Order [id number])  Exam Information    Exam Date Exam Time Accession # Performing Department Results    6/21/21 11:48 AM [id number] [facility] Interventional Radiology    PACS Images     Show images for CT Liver Biopsy   Study Result    PROCEDURES :  1. CT guided percutaneous localization needle placement.  2. CT guided percutaneous liver lesion  biopsy (separate access).   3. Limited abdominal ultrasound.     Clinical History: 60 years with history of hemachromatosis. Multiple  arterially enhancing observations. Biopsy requested.     Comparisons:  4/2/2021     Attending: Delvin Mcqueen MD, who was present for the key and  critical portions of the procedure.     Fellow: Akash Acevedo DO     Monitoring: The patient was placed on continuous monitoring.  Intravenous sedation was administered. Vital signs and sedation  monitored by nursing staff under Interventional Radiologists  supervision. The patient remained stable throughout the  procedure.     Medications:   1. 175mcg Fentanyl  2. 2.5 mg Versed     Sedation time: 75 minutes face-to-face     Contrast: 100 cc Isovue-370 intravenous     PROCEDURE: The patient understood the limitations, alternatives, and  risks of the procedure and requested the procedure be performed. Both  written and oral consent were obtained.     Patient was placed supine. Preprocedure limited abdominal ultrasound  ultrasound was not able to identify any of the lesions. Preprocedural  CT scan was not able to identify any lesions either. Based off of  adjacent landmarks, appropriate window for localization Chiba needle  placement. The right upper quadrant  was prepped and draped in the  usual sterile fashion. 1% lidocaine was used for local anesthesia.     Under CT guidance, a 22-gauge Chiba needle was placed in the region of  the arterial enhancing observation in hepatic segment 6 on previous  MRI. Multiphasic CT demonstrated minimal enhancement in this region,  although the outline of the lesion was identified. The localizing  Chiba needle was just lateral to the beginning of this lesion. A 17  gauge coaxial introducer needle was advanced into the lesion. 5 total  18 gauge core biopsies were obtained with a Temno ACT biopsy gun and  submitted to Pathology who indicated that adequate specimen was  obtained.     Good hemostasis was achieved with Gelfoam plugs that were deployed   and  the needle was removed. Sterile dressing applied.     Post-procedural scan demonstrated no immediate complication.     DLP: 1178 mGy*cm                                                                      IMPRESSION:   Technically challenging biopsy of hepatic segment 6 previously  arterially enhancing observation on MRI dated 4/2/2021.  5 18-gauge  core samples obtained. This lesion was very difficult to visualize as  well as liver respiratory migration complicating needle placement.  Separate puncture for localizing Chiba needle was  "required to obtain  the biopsy.     PLAN:  1. Bed rest for 3 hours.  2. Pathology pending.     Procedure performed by Dr. Acevedo under my supervision.  I, Dr. Delvin Feliz, was present for the entire procedure.     I have personally reviewed the examination and initial interpretation  and I agree with the findings.     DELVIN FELIZ MD         Surgical pathology exam - Liver Lesion  Order: [id number]  Status:  Final result   Visible to patient:  Yes (MyChart)  Component 2wk ago   Copath Report Patient Name: ERICK OTERO   MR#: [id number]   Specimen #: [id number]   Collected: 6/21/2021   Received: 6/21/2021   Reported: 6/22/2021 10:58   Ordering Phy(s): KARTHIK KNIGHT     For improved result formatting, select 'View Enhanced Report Format' under    Linked Documents section.     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 It shows no hepatocellular or other mass   lesion. The cirrhotic nodules are   sometimes arterialized but show no dysplastic or other malignant features.    There is the possibility the target   lesion may not have been represented but also the likelihood of these   \"lesions\" being arterialized   regenerating nodules in this context of severe cirrhosis. Correlation with    the appropriate imaging is   recommended to decide whether a repeat biopsy attempt of these concerning   nodules might be necessary.     The parenchyma shows marked iron overload in support of genetic   hemochromatosis, but also mild steatosis, with   ballooning and occasional Shamika hyaline. These features " "are also in   support of co-existing steatohepatitis,   probably non-alcoholic given the existing risk factors (high BMI, DM and   hyperlipidemia). The trichrome stain   highlights broad fibrous sepate with complex bridges.     I have personally reviewed all specimens and/or slides, including the   listed special stains, and used them   with my medical judgement to determine or confirm the final diagnosis.     Electronically signed out by:     Jade Oliver M.D., [facility], [facility]     CLINICAL HISTORY:   Patient is a 60-year-old woman with a history of mildly high BMI, diabetes    mellitus and hyperlipidemia, as   well as homozygous C282Y HFE gene mutation.     GROSS:   ATP: Right lobe liver lesion:   \"Adequate for site\" (Tunde Dover MD and Gilberto COSTELLO ASCP via   telepathology)     The specimen is received in formalin with proper patient identification,   labeled \"right lobe liver lesion\".   The specimen consists of five cores of orange- red soft tissue ranging   from 1.5-2.1 cm in length and each 0.1   cm in diameter.  The specimen is wrapped and entirely submitted in   cassettes A1 - A2. (Dictated by: Randal Bowles 6/21/2021 05:46 PM)     MICROSCOPIC:   Microscopic examination has been performed.     The technical component of this testing was completed at the [facility], with the professional component performed    at the [facility], [address] [place] [phone] ([phone])     CPT Codes:   A: 25194-TN5, 45340-MB/TC, 88570-WFIQ, 00963-FRDU     COLLECTION SITE:   Client: [facility]   Location: [facility] (B)            Echocardiogram CompletePerformed 7/1/2021  Edited Result - FINAL   Study Result  [id number]  [id number]  [id number]  [id number]^JESSICA^[facility]  [facility]  Echocardiography " Laboratory  [address] [place] [address] [address]  [place] [address]  Phone [phone]  Fax [phone]    Name: ERICK OTERO  MRN: [id number]  : 1960  Study Date: 2021 07:42 AM  Age: 60 yrs  Gender: Female  Patient Location: [facility]  Reason For Study: Hereditary hemochromatosis (H)  Ordering Physician: ALONDRA LOPEZ  Referring Physician: ALONDRA LOPEZ  Performed By: ANDREW Valle    BSA: 1.6 m2  Height: 59 in  Weight: 134 lb  HR: 78  BP: 99/66 mmHg  ______________________________________________________________________________  Procedure  Complete Echo Adult.    ______________________________________________________________________________  Interpretation Summary    Left ventricular systolic function is normal.  The visual ejection fraction is 60-65%.  No regional wall motion abnormalities noted.  Compared to the prior study dated , there have been no changes.  ______________________________________________________________________________  Left Ventricle  The left ventricle is normal in size. A sigmoid septum is present. There is  mild concentric left ventricular hypertrophy. Left ventricular systolic  function is normal. The visual ejection fraction is 60-65%. Left ventricular  diastolic function is indeterminate. No regional wall motion abnormalities  noted.    Right Ventricle  The right ventricle is normal size. The right ventricular systolic function is  normal.    Atria  Normal left atrial size. Right atrial size is normal.    Mitral Valve  There is mild mitral annular calcification. There is trace mitral  regurgitation.    Tricuspid Valve  There is trace to mild tricuspid regurgitation. The right ventricular systolic  pressure is approximated at 29.5 mmHg plus the right atrial pressure. IVC  diameter <2.1 cm collapsing >50% with sniff suggests a normal RA pressure of 3  mmHg.    Aortic Valve  There is mild trileaflet aortic sclerosis. No aortic regurgitation is present.  No  aortic stenosis is present.    Pulmonic Valve  The pulmonic valve is not well visualized.    Vessels  The aortic root is normal size.    Pericardium  There is no pericardial effusion.    Rhythm  Sinus rhythm was noted.    ______________________________________________________________________________  MMode/2D Measurements & Calculations  IVSd: 0.88 cm  LVIDd: 4.8 cm  LVIDs: 3.1 cm  LVPWd: 1.1 cm  FS: 35.3 %  LV mass(C)d: 168.1 grams  LV mass(C)dI: 108.1 grams/m2  Ao root diam: 3.0 cm    asc Aorta Diam: 2.8 cm  LVOT diam: 1.8 cm  LVOT area: 2.7 cm2  LA Volume (BP): 49.0 ml  LA Volume Indexed (AL/bp): 32.4 ml/m2  RWT: 0.45    Doppler Measurements & Calculations  MV E max jason: 97.7 cm/sec  MV A max jason: 110.5 cm/sec  MV E/A: 0.88  MV dec time: 0.21 sec  PA acc time: 0.19 sec    TR max jason: 271.5 cm/sec  TR max P.5 mmHg  Pulm Sys Jason: 92.5 cm/sec  Pulm Bello Jason: 68.1 cm/sec  Pulm A Revs Jason: 37.7 cm/sec  Pulm S/D: 1.4  E/E' av.6  Lateral E/e': 11.3  Medial E/e': 13.9    ______________________________________________________________________________  Report approved by: Walter Neri 2021 08:47 AM           Assessment and plan:  Bradford Prado is a 60 year old female with past medical history of mild overweight, hypertension, dyslipidemia, poorly controlled diabetes recently started on insulin therapy who presents with concerns of abnormal liver tests and historically abnormal liver imaging. Reports that she was first told that she had some sort of issue with her liver approximately 4 years ago.  At that time she was having significant pain and dysuria related to kidney stones.  An abdominal imaging study suggested a nodular appearing liver that raise concerns for chronic liver disease.  She denies ever having follow-up for this condition at that time.  Based on review of historical labs it was noted that around the time of this imaging she did have iron studies checked which demonstrated a serum  ferritin of 1569 in September 2016, but again there has been no follow-up on this lab today. Noted that she has had progressive worsening of her overall management of her diabetes, and reports that she started on insulin during a recent hospitalization.  Most recent hemoglobin A1c was 11.1%   She underwent an MRI of the abdomen on April 2 which demonstrated nodular appearing liver, diffusely decreased signal throughout the liver raising concerns for iron deposition, and multiple arterial enhancing lesions throughout the liver. Per review of the MRI, etiology of these liver masses is unclear, and recommendation was to repeat MRI in 3 months.  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  Therefore, patient was referred to hematology for further assessment and initiation of phlebotomy therapy     Patient instructions/Plan:    - Diagnosis: Liver cirrhosis consistent with a genetic hematochromatosis with no evidence of hepatocellular carcinoma.  Liver biopsy done on 6/21/2021.     - Follow up with primary care for management of liver cirrhosis.     - Follow up with primary care in regard to Cholelithiasis (gallstones)     - Recommend weekly phlebotomy ( blood removal) of one unit of blood until Ferritin < 100 to avoid organ damage due to iron deposition.     - Phlebotomy twice weekly until Ferritin is < 100 and to hold if hemoglobin is < 11     - After achieving Ferritin < 100, schedule lab check visit every 2 weeks with CBC and Ferritin level.     - 2 D echo to assess if there is cardiac organ damage: done on 7/1/21.     - counseled to avoid heavy alcohol use or acetaminophen use due to liver cirrhosis.     - Persistent chest pain, recommend to follow up with primary care for referral to cardiology     - recommend follow up with primary care for  screening program for mammogram and need for colonoscopy     - to follow up with PCP     The total time of this encounter amounted to 45 minutes.This time included face to face time spent via camera with the patient, prep work, ordering tests, and performing post visit documentation.  The patient is ready to learn, no apparent learning barriers were identified.  Diagnosis and treatment plans were explained to the patient. The patient expressed understanding of the content. The patient asked appropriate questions. The patient questions were answered to his satisfaction.         WILD LOPEZ MD

## 2021-07-08 ENCOUNTER — VIRTUAL VISIT (OUTPATIENT)
Dept: ONCOLOGY | Facility: CLINIC | Age: 61
End: 2021-07-08
Attending: INTERNAL MEDICINE
Payer: COMMERCIAL

## 2021-07-08 DIAGNOSIS — E83.110 HEREDITARY HEMOCHROMATOSIS (H): Primary | ICD-10-CM

## 2021-07-08 PROCEDURE — 99214 OFFICE O/P EST MOD 30 MIN: CPT | Mod: 95 | Performed by: INTERNAL MEDICINE

## 2021-07-08 NOTE — LETTER
"    7/8/2021         RE: Bradford Prado  [address]  [facility][1] [id number]        Dear Colleague,    Thank you for referring your patient, Bradford Prado, to the [facility]. Please see a copy of my visit note below.    Bradford is a 60 year old who is being evaluated via a billable video visit.      How would you like to obtain your AVS? MyChart  If the video visit is dropped, the invitation should be resent by: Text to cell phone: [phone].  Will anyone else be joining your video visit? No      Video Start Time: 7:47 AM  Video-Visit Details    Type of service:  Video Visit    Video End Time:7:49 AM    Originating Location (pt. Location): Home    Distant Location (provider location):  [facility]     Platform used for Video Visit: Other: Mychart   Vitals - Patient Reported  Weight (Patient Reported): 60.8 kg (134 lb)  Height (Patient Reported): 149.9 cm (4' 11\")  BMI (Based on Pt Reported Ht/Wt): 27.06  Pain Score: No Pain (0)      Sherly Richard CMA on 7/8/2021 at 7:49 AM      Heme/onc visit virtual note  July 8, 2021  Oncology team:    Reason for visit: HFE gene C282Y homozygosity consistent with hereditary hemochromatosis.     Cancer history: Bradford Prado is a 60 year old female with past medical history of mild overweight, hypertension, dyslipidemia, poorly controlled diabetes recently started on insulin therapy who presents with concerns of abnormal liver tests and historically abnormal liver imaging. Reports that she was first told that she had some sort of issue with her liver approximately 4 years ago.  At that time she was having significant pain and dysuria related to kidney stones.  An abdominal imaging study suggested a nodular appearing liver that raise concerns for chronic liver disease.  She denies ever having follow-up for this condition at that time.  Based on review of historical labs it was noted that around the time of this imaging " she did have iron studies checked which demonstrated a serum ferritin of 1569 in September 2016, but again there has been no follow-up on this lab today. Noted that she has had progressive worsening of her overall management of her diabetes, and reports that she started on insulin during a recent hospitalization.  Most recent hemoglobin A1c was 11.1%.She underwent an MRI of the abdomen on April 2 which demonstrated nodular appearing liver, diffusely decreased signal throughout the liver raising concerns for iron deposition, and multiple arterial enhancing lesions throughout the liver. Per review of the MRI, etiology of these liver masses is unclear, and recommendation was to repeat MRI in 3 months.  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  Therefore, patient was referred to hematology for further assessment and initiation of phlebotomy therapy        Interval history: 7/8/21  Reports that her chest pain still persistent. Otherwise, ROS as below. Patient stated that she is afraid of the diagnosis.reports intermittent chest pain. And patient to follow up with PCP for referral to cardiology.      Past medical history:  Patient Active Problem List   Diagnosis     Hand arthritis     Stenosing tenosynovitis     Poorly controlled type 2 diabetes mellitus with renal complication (H)     Hyperlipidemia LDL goal <70     Essential hypertension with goal blood pressure less than 140/90     Nephrolithiasis     Type 2 diabetes mellitus with hyperglycemia, without long-term current use of insulin (H)     Chest pain     Transaminitis     Hyperglycemia     Hypertensive urgency     Diabetes mellitus (H)     Dyslipidemia     Hereditary hemochromatosis (H)       Medications:  ACCU-CHEK GUIDE test strip, Use to test blood sugar 4 times daily or as  directed.  acetaminophen (TYLENOL) 325 MG tablet, Take 325-650 mg by mouth every 6 hours as needed for mild pain  amLODIPine (NORVASC) 5 MG tablet, Take 1 tablet (5 mg) by mouth 2 times daily  Biotin 10 MG CAPS, Take 1 capsule by mouth daily  blood glucose (NO BRAND SPECIFIED) lancets standard, Use to test blood sugar twice times daily or as directed.  blood glucose (NO BRAND SPECIFIED) test strip, Use to test blood sugar twice times daily or as directed.  blood glucose (NO BRAND SPECIFIED) test strip, Use to test blood sugars 2 times daily or as directed  blood glucose monitoring (SOFTCLIX) lancets, Use to test blood sugar 6 times daily.  Blood Glucose Monitoring Suppl (ACCU-CHEK GUIDE ME) w/Device KIT, 1 each 6 times daily  carvedilol (COREG) 12.5 MG tablet, Take 1 tablet (12.5 mg) by mouth 2 times daily (with meals)  Continuous Blood Gluc Sensor (FREESTYLE MEENA 14 DAY SENSOR) MIS, 1 each every 14 days Change every 14 days.  insulin aspart (NOVOLOG FLEXPEN) 100 UNIT/ML pen, INJECT 10 UNITS SUBCUTANEOUS 3 TIMES DAILY (WITH MEALS)  insulin aspart (NOVOLOG PEN) 100 UNIT/ML pen, Inject 10 Units Subcutaneous 3 times daily (with meals)  insulin glargine (LANTUS PEN) 100 UNIT/ML pen, Inject 42 Units Subcutaneous every morning (before breakfast)  Insulin Pen Needle (PEN NEEDLES) 32G X 4 MM MISC, 1 each 4 times daily  losartan (COZAAR) 50 MG tablet, Take 1 tablet (50 mg) by mouth daily  ondansetron (ZOFRAN) 4 MG tablet, Take 1 tablet (4 mg) by mouth every 6 hours as needed for nausea or vomiting  pravastatin (PRAVACHOL) 20 MG tablet, Take 1 tablet (20 mg) by mouth daily  sertraline (ZOLOFT) 50 MG tablet, Take 1 tablet (50 mg) by mouth daily Take 50mg PO daily    No current facility-administered medications on file prior to visit.       Allergy:   No Known Allergies      Review of systems:    - CONSTITUTIONAL: Denies weight loss, fever and chills. Fatigue + after blood draw.    - HEENT: Denies changes in vision and  hearing.    - ?RESPIRATORY: Denies SOB and cough.?    - CV: Denies palpitations and +++ CP.     - GI: Denies abdominal pain, nausea, vomiting and diarrhea.?    - : Denies dysuria and urinary frequency.?    - MSK: Denies myalgia and joint pain.?    - SKIN: Denies rash and pruritus.    - ?NEUROLOGICAL: Denies headache and syncope.?    - PSYCHIATRIC: Denies recent changes in mood. Denies anxiety and depression.    - LYMPHATIC: no palpable lumps in the neck, axilla or groin areas.       Physical exam  There were no vitals taken for this visit.  Wt Readings from Last 4 Encounters:   06/10/21 61.1 kg (134 lb 12.8 oz)   03/30/21 56.8 kg (125 lb 4.8 oz)   03/02/21 54.8 kg (120 lb 14.4 oz)   02/27/21 52.4 kg (115 lb 8 oz)       There were no vitals taken for this visit.    Constitutional:       General: He is not in acute distress.     Appearance: Normal appearance. He is not toxic-appearing.   HENT:      Head: Normocephalic and atraumatic.     Eyes:      Extraocular Movements: Extraocular movements intact.      Neck:      Musculoskeletal: Normal range of motion-Cardiovascular:     effort, normsl    Pulmonary:      Effort: Pulmonary effort is normal.         Labs:  Orders Only on 07/05/2021   Component Date Value Ref Range Status     Ferritin 07/05/2021 605* 8 - 252 ng/mL Final     WBC 07/05/2021 8.3  4.0 - 11.0 10e9/L Final     RBC Count 07/05/2021 3.08* 3.8 - 5.2 10e12/L Final     Hemoglobin 07/05/2021 10.7* 11.7 - 15.7 g/dL Final     Hematocrit 07/05/2021 31.9* 35.0 - 47.0 % Final     MCV 07/05/2021 104* 78 - 100 fl Final     MCH 07/05/2021 34.7* 26.5 - 33.0 pg Final     MCHC 07/05/2021 33.5  31.5 - 36.5 g/dL Final     RDW 07/05/2021 14.2  10.0 - 15.0 % Final     Platelet Count 07/05/2021 181  150 - 450 10e9/L Final     % Neutrophils 07/05/2021 60.8  % Final     % Lymphocytes 07/05/2021 27.3  % Final     % Monocytes 07/05/2021 9.4  % Final     % Eosinophils 07/05/2021 2.3  % Final     % Basophils 07/05/2021 0.2  % Final      Absolute Neutrophil 07/05/2021 5.1  1.6 - 8.3 10e9/L Final     Absolute Lymphocytes 07/05/2021 2.3  0.8 - 5.3 10e9/L Final     Absolute Monocytes 07/05/2021 0.8  0.0 - 1.3 10e9/L Final     Absolute Eosinophils 07/05/2021 0.2  0.0 - 0.7 10e9/L Final     Absolute Basophils 07/05/2021 0.0  0.0 - 0.2 10e9/L Final     Diff Method 07/05/2021 Automated Method   Final           Imaging:    CT Abdomen Pelvis w/o Contrast [[id number]] (Order [id number])  Exam Information    Exam Date Exam Time Accession # Performing Department Results    6/21/21  5:09 PM [id number] [facility]    PACS Images     Show images for CT Abdomen Pelvis w/o Contrast   Study Result    Exam Type: CT ABDOMEN PELVIS W/O CONTRAST  Date and Time: 6/21/2021 5:09 PM  History: GI bleed; Abdominal pain post liver biopsy. Please perform  non-contrast (IR fellow to check) prior to IV contrast (late arterial,  and delay venous phase if needed).  Comparison: 6/21/2021, 9/19/2016 CT; MRI 4/2/2021     Procedure:   Helical  CT images were obtained of the abdomen and pelvis without IV  contrast.  Oral contrast was not administered.      Radiation Dose (DLP): 775 mGy*cm     FINDINGS:     LOWER CHEST::  Bibasilar atelectasis. No suspicious pulmonary nodules or masses.     ABDOMEN:     Limited evaluation of abdominal parenchymal organs due to noncontrast  technique.     LIVER: Cirrhotic configuration of liver. Postprocedural changes of  biopsy of hepatic segment 4 with trace intraparenchymal air, no  significant intra-abdominal hemorrhage.  BILE DUCTS: Normal caliber.  GALLBLADDER: Fluid distended gallbladder, measures up to 5.6 cm in  width. Cholelithiasis. No gallbladder wall thickening or  pericholecystic fluid/inflammatory change.  PANCREAS: Within normal limits.  SPLEEN: Within normal limits.  ADRENALS: Within normal limits.  KIDNEYS/URETERS/BLADDER: Punctate nonobstructing nephrolithiasis  (series 2, image 38). No worrisome lesions.  Dense urinary collecting  system, consistent with recent administration of IV contrast.     REPRODUCTIVE ORGANS: No pelvic masses.     BOWEL: No bowel dilatation or wall thickening. Mild mural fatty  infiltration of the right colon. Duodenal diverticulum in the region  of the pancreas measuring 2.9 x 2.0 cm (series 2 image 36).  PERITONEUM/RETROPERITONEUM: No ascites or free air, no fluid  collection.  VESSELS: No abdominal aortic aneurysm. Scattered atherosclerotic  calcification of the abdominal aorta.  LYMPH NODES: No enlarged lymph nodes.     BONES AND SOFT TISSUES:  No suspicious lesions. Small fat-containing ventral hernia (series 2,  image 42).                                                                      IMPRESSION:     1.  Postbiopsy changes of the liver without evidence of  intra-abdominal hemorrhage on noncontrast scan.  2.  Cirrhotic liver morphology.  3.  Distended gallbladder with cholelithiasis; may consider ultrasound  abdomen for further evaluation if clinically indicated.  4.  Nonobstructing punctate nephrolithiasis.     I have personally reviewed the examination and initial interpretation  and I agree with the findings.     ANIL HILTON MD       CT Liver Biopsy [[id number]] (Order [id number])  Exam Information    Exam Date Exam Time Accession # Performing Department Results    6/21/21 11:48 AM [id number] [facility]    PACS Images     Show images for CT Liver Biopsy   Study Result    PROCEDURES :  1. CT guided percutaneous localization needle placement.  2. CT guided percutaneous liver lesion  biopsy (separate access).   3. Limited abdominal ultrasound.     Clinical History: 60 years with history of hemachromatosis. Multiple  arterially enhancing observations. Biopsy requested.     Comparisons: MR 4/2/2021     Attending: Delvin Mcqueen MD, who was present for the key and  critical portions of the procedure.     Fellow: Akash Acevedo  DO     Monitoring: The patient was placed on continuous monitoring.  Intravenous sedation was administered. Vital signs and sedation  monitored by nursing staff under Interventional Radiologists  supervision. The patient remained stable throughout the procedure.     Medications:   1. 175mcg Fentanyl  2. 2.5 mg Versed     Sedation time: 75 minutes face-to-face     Contrast: 100 cc Isovue-370 intravenous     PROCEDURE: The patient understood the limitations, alternatives, and  risks of the procedure and requested the procedure be performed. Both  written and oral consent were obtained.     Patient was placed supine. Preprocedure limited abdominal ultrasound  ultrasound was not able to identify any of the lesions. Preprocedural  CT scan was not able to identify any lesions either. Based off of  adjacent landmarks, appropriate window for localization Chiba needle  placement. The right upper quadrant  was prepped and draped in the  usual sterile fashion. 1% lidocaine was used for local anesthesia.     Under CT guidance, a 22-gauge Chiba needle was placed in the region of  the arterial enhancing observation in hepatic segment 6 on previous  MRI. Multiphasic CT demonstrated minimal enhancement in this region,  although the outline of the lesion was identified. The localizing  Chiba needle was just lateral to the beginning of this lesion. A 17  gauge coaxial introducer needle was advanced into the lesion. 5 total  18 gauge core biopsies were obtained with a Temno ACT biopsy gun and  submitted to Pathology who indicated that adequate specimen was  obtained.     Good hemostasis was achieved with Gelfoam plugs that were deployed   and  the needle was removed. Sterile dressing applied.     Post-procedural scan demonstrated no immediate complication.     DLP: 1178 mGy*cm                                                                      IMPRESSION:   Technically challenging biopsy of hepatic segment 6 previously  arterially  "enhancing observation on MRI dated 4/2/2021.  5 18-gauge  core samples obtained. This lesion was very difficult to visualize as  well as liver respiratory migration complicating needle placement.  Separate puncture for localizing Chiba needle was required to obtain  the biopsy.     PLAN:  1. Bed rest for 3 hours.  2. Pathology pending.     Procedure performed by Dr. Acevedo under my supervision.  I, Dr. Arun Feliz, was present for the entire procedure.     I have personally reviewed the examination and initial interpretation  and I agree with the findings.     ARUN FELIZ MD         Surgical pathology exam - Liver Lesion  Order: [id number]  Status:  Final result   Visible to patient:  Yes (MyChart)  Component 2wk ago   Copath Report Patient Name: ERICK OTERO   MR#: [id number]   Specimen #: [id number]   Collected: 6/21/2021   Received: 6/21/2021   Reported: 6/22/2021 10:58   Ordering Phy(s): KARTHIK KNIGHT     For improved result formatting, select 'View Enhanced Report Format' under    Linked Documents section.     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 It shows no hepatocellular or other mass   lesion. The cirrhotic nodules are   sometimes arterialized but show no dysplastic or other malignant features.    There is the possibility the target   lesion may not have been represented but also the likelihood of these   \"lesions\" being arterialized   regenerating nodules in this context of severe cirrhosis. Correlation with    the appropriate imaging is   recommended to decide whether a " "repeat biopsy attempt of these concerning   nodules might be necessary.     The parenchyma shows marked iron overload in support of genetic   hemochromatosis, but also mild steatosis, with   ballooning and occasional Shamika hyaline. These features are also in   support of co-existing steatohepatitis,   probably non-alcoholic given the existing risk factors (high BMI, DM and   hyperlipidemia). The trichrome stain   highlights broad fibrous sepate with complex bridges.     I have personally reviewed all specimens and/or slides, including the   listed special stains, and used them   with my medical judgement to determine or confirm the final diagnosis.     Electronically signed out by:     Jade Oliver M.D., [facility], [facility]     CLINICAL HISTORY:   Patient is a 60-year-old woman with a history of mildly high BMI, diabetes    mellitus and hyperlipidemia, as   well as homozygous C282Y HFE gene mutation.     GROSS:   ATP: Right lobe liver lesion:   \"Adequate for site\" (Tunde Dover MD and Gilberto COSTELLO ASCP via   telepathology)     The specimen is received in formalin with proper patient identification,   labeled \"right lobe liver lesion\".   The specimen consists of five cores of orange- red soft tissue ranging   from 1.5-2.1 cm in length and each 0.1   cm in diameter.  The specimen is wrapped and entirely submitted in   cassettes A1 - A2. (Dictated by: Randal Bowles 6/21/2021 05:46 PM)     MICROSCOPIC:   Microscopic examination has been performed.     The technical component of this testing was completed at the [facility], with the professional component performed    at the [facility], [address] [place] [phone] ([phone])     CPT Codes:   A: 06358-EM5, 85223-VG/TC, 57038-QNOT, 04044-AWZG     COLLECTION SITE:   Client: [facility] "   Location: [facility] (B)            Echocardiogram CompletePerformed 2021  Edited Result - FINAL   Study Result  [id number]  [id number]  [id number]  [id number]^JESSICA^ALONDRA    [facility]  [facility]  Echocardiography Laboratory  [address]  [place] [address]  Phone [phone]  Fax [phone]    Name: ERICK OTERO  MRN: [id number]  : 1960  Study Date: 2021 07:42 AM  Age: 60 yrs  Gender: Female  Patient Location: [facility]  Reason For Study: Hereditary hemochromatosis (H)  Ordering Physician: ALONDRA LOPEZ  Referring Physician: ALONDRA LOPEZ  Performed By: ANDREW Valle    BSA: 1.6 m2  Height: 59 in  Weight: 134 lb  HR: 78  BP: 99/66 mmHg  ______________________________________________________________________________  Procedure  Complete Echo Adult.    ______________________________________________________________________________  Interpretation Summary    Left ventricular systolic function is normal.  The visual ejection fraction is 60-65%.  No regional wall motion abnormalities noted.  Compared to the prior study dated , there have been no changes.  ______________________________________________________________________________  Left Ventricle  The left ventricle is normal in size. A sigmoid septum is present. There is  mild concentric left ventricular hypertrophy. Left ventricular systolic  function is normal. The visual ejection fraction is 60-65%. Left ventricular  diastolic function is indeterminate. No regional wall motion abnormalities  noted.    Right Ventricle  The right ventricle is normal size. The right ventricular systolic function is  normal.    Atria  Normal left atrial size. Right atrial size is normal.    Mitral Valve  There is mild mitral annular calcification. There is trace mitral  regurgitation.    Tricuspid Valve  There is trace to mild tricuspid regurgitation. The right ventricular systolic  pressure is approximated  at 29.5 mmHg plus the right atrial pressure. IVC  diameter <2.1 cm collapsing >50% with sniff suggests a normal RA pressure of 3  mmHg.    Aortic Valve  There is mild trileaflet aortic sclerosis. No aortic regurgitation is present.  No aortic stenosis is present.    Pulmonic Valve  The pulmonic valve is not well visualized.    Vessels  The aortic root is normal size.    Pericardium  There is no pericardial effusion.    Rhythm  Sinus rhythm was noted.    ______________________________________________________________________________  MMode/2D Measurements & Calculations  IVSd: 0.88 cm  LVIDd: 4.8 cm  LVIDs: 3.1 cm  LVPWd: 1.1 cm  FS: 35.3 %  LV mass(C)d: 168.1 grams  LV mass(C)dI: 108.1 grams/m2  Ao root diam: 3.0 cm    asc Aorta Diam: 2.8 cm  LVOT diam: 1.8 cm  LVOT area: 2.7 cm2  LA Volume (BP): 49.0 ml  LA Volume Indexed (AL/bp): 32.4 ml/m2  RWT: 0.45    Doppler Measurements & Calculations  MV E max jason: 97.7 cm/sec  MV A max jason: 110.5 cm/sec  MV E/A: 0.88  MV dec time: 0.21 sec  PA acc time: 0.19 sec    TR max jason: 271.5 cm/sec  TR max P.5 mmHg  Pulm Sys Jason: 92.5 cm/sec  Pulm Bello Jason: 68.1 cm/sec  Pulm A Revs Jason: 37.7 cm/sec  Pulm S/D: 1.4  E/E' av.6  Lateral E/e': 11.3  Medial E/e': 13.9    ______________________________________________________________________________  Report approved by: Walter Neri 2021 08:47 AM           Assessment and plan:  Bradford Prado is a 60 year old female with past medical history of mild overweight, hypertension, dyslipidemia, poorly controlled diabetes recently started on insulin therapy who presents with concerns of abnormal liver tests and historically abnormal liver imaging. Reports that she was first told that she had some sort of issue with her liver approximately 4 years ago.  At that time she was having significant pain and dysuria related to kidney stones.  An abdominal imaging study suggested a nodular appearing liver that raise concerns for chronic  liver disease.  She denies ever having follow-up for this condition at that time.  Based on review of historical labs it was noted that around the time of this imaging she did have iron studies checked which demonstrated a serum ferritin of 1569 in September 2016, but again there has been no follow-up on this lab today. Noted that she has had progressive worsening of her overall management of her diabetes, and reports that she started on insulin during a recent hospitalization.  Most recent hemoglobin A1c was 11.1%   She underwent an MRI of the abdomen on April 2 which demonstrated nodular appearing liver, diffusely decreased signal throughout the liver raising concerns for iron deposition, and multiple arterial enhancing lesions throughout the liver. Per review of the MRI, etiology of these liver masses is unclear, and recommendation was to repeat MRI in 3 months.  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  Therefore, patient was referred to hematology for further assessment and initiation of phlebotomy therapy     Patient instructions/Plan:    - Diagnosis: Liver cirrhosis consistent with a genetic hematochromatosis with no evidence of hepatocellular carcinoma.  Liver biopsy done on 6/21/2021.     - Follow up with primary care for management of liver cirrhosis.     - Follow up with primary care in regard to Cholelithiasis (gallstones)     - Recommend weekly phlebotomy ( blood removal) of one unit of blood until Ferritin < 100 to avoid organ damage due to iron deposition.     - Phlebotomy twice weekly until Ferritin is < 100 and to hold if hemoglobin is < 11     - After achieving Ferritin < 100, schedule lab check visit every 2 weeks with CBC and Ferritin level.     - 2 D echo to assess if there is cardiac organ damage: done on 7/1/21.     -  counseled to avoid heavy alcohol use or acetaminophen use due to liver cirrhosis.     - Persistent chest pain, recommend to follow up with primary care for referral to cardiology     - recommend follow up with primary care for screening program for mammogram and need for colonoscopy     - to follow up with PCP     The total time of this encounter amounted to 45 minutes.This time included face to face time spent via camera with the patient, prep work, ordering tests, and performing post visit documentation.  The patient is ready to learn, no apparent learning barriers were identified.  Diagnosis and treatment plans were explained to the patient. The patient expressed understanding of the content. The patient asked appropriate questions. The patient questions were answered to his satisfaction.         WILD LONG MD        Again, thank you for allowing me to participate in the care [id number] your [email or web address]        Sincerely,        Jp Long MD

## 2021-07-08 NOTE — LETTER
"    7/8/2021         RE: Bradford Prado  [address]  [facility] [address]        Dear Colleague,    Thank you for referring your patient, Bradford Prado, to the [facility]. Please see a copy of my visit note below.    Bradford is a 60 year old who is being evaluated via a billable video visit.      How would you like to obtain your AVS? MyChart  If the video visit is dropped, the invitation should be resent by: Text to cell phone: [phone].  Will anyone else be joining your video visit? No      Video Start Time: 7:47 AM  Video-Visit Details    Type of service:  Video Visit    Video End Time:7:49 AM    Originating Location (pt. Location): Home    Distant Location (provider location):  [facility]     Platform used for Video Visit: Other: Mychart   Vitals - Patient Reported  Weight (Patient Reported): 60.8 kg (134 lb)  Height (Patient Reported): 149.9 cm (4' 11\")  BMI (Based on Pt Reported Ht/Wt): 27.06  Pain Score: No Pain (0)      Sherly Richard CMA on 7/8/2021 at 7:49 AM      Heme/onc visit virtual note  July 8, 2021  Oncology team:    Reason for visit: HFE gene C282Y homozygosity consistent with hereditary hemochromatosis.     Cancer history: Bradford Prado is a 60 year old female with past medical history of mild overweight, hypertension, dyslipidemia, poorly controlled diabetes recently started on insulin therapy who presents with concerns of abnormal liver tests and historically abnormal liver imaging. Reports that she was first told that she had some sort of issue with her liver approximately 4 years ago.  At that time she was having significant pain and dysuria related to kidney stones.  An abdominal imaging study suggested a nodular appearing liver that raise concerns for chronic liver disease.  She denies ever having follow-up for this condition at that time.  Based on review of historical labs it was noted that around the time of this imaging " she did have iron studies checked which demonstrated a serum ferritin of 1569 in September 2016, but again there has been no follow-up on this lab today. Noted that she has had progressive worsening of her overall management of her diabetes, and reports that she started on insulin during a recent hospitalization.  Most recent hemoglobin A1c was 11.1%.She underwent an MRI of the abdomen on April 2 which demonstrated nodular appearing liver, diffusely decreased signal throughout the liver raising concerns for iron deposition, and multiple arterial enhancing lesions throughout the liver. Per review of the MRI, etiology of these liver masses is unclear, and recommendation was to repeat MRI in 3 months.  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  Therefore, patient was referred to hematology for further assessment and initiation of phlebotomy therapy        Interval history: 7/8/21  Reports that her chest pain still persistent. Otherwise, ROS as below. Patient stated that she is afraid of the diagnosis.reports intermittent chest pain. And patient to follow up with PCP for referral to cardiology.      Past medical history:  Patient Active Problem List   Diagnosis     Hand arthritis     Stenosing tenosynovitis     Poorly controlled type 2 diabetes mellitus with renal complication (H)     Hyperlipidemia LDL goal <70     Essential hypertension with goal blood pressure less than 140/90     Nephrolithiasis     Type 2 diabetes mellitus with hyperglycemia, without long-term current use of insulin (H)     Chest pain     Transaminitis     Hyperglycemia     Hypertensive urgency     Diabetes mellitus (H)     Dyslipidemia     Hereditary hemochromatosis (H)       Medications:  ACCU-CHEK GUIDE test strip, Use to test blood sugar 4 times daily or as  directed.  acetaminophen (TYLENOL) 325 MG tablet, Take 325-650 mg by mouth every 6 hours as needed for mild pain  amLODIPine (NORVASC) 5 MG tablet, Take 1 tablet (5 mg) by mouth 2 times daily  Biotin 10 MG CAPS, Take 1 capsule by mouth daily  blood glucose (NO BRAND SPECIFIED) lancets standard, Use to test blood sugar twice times daily or as directed.  blood glucose (NO BRAND SPECIFIED) test strip, Use to test blood sugar twice times daily or as directed.  blood glucose (NO BRAND SPECIFIED) test strip, Use to test blood sugars 2 times daily or as directed  blood glucose monitoring (SOFTCLIX) lancets, Use to test blood sugar 6 times daily.  Blood Glucose Monitoring Suppl (ACCU-CHEK GUIDE ME) w/Device KIT, 1 each 6 times daily  carvedilol (COREG) 12.5 MG tablet, Take 1 tablet (12.5 mg) by mouth 2 times daily (with meals)  Continuous Blood Gluc Sensor (FREESTYLE MEENA 14 DAY SENSOR) MIS, 1 each every 14 days Change every 14 days.  insulin aspart (NOVOLOG FLEXPEN) 100 UNIT/ML pen, INJECT 10 UNITS SUBCUTANEOUS 3 TIMES DAILY (WITH MEALS)  insulin aspart (NOVOLOG PEN) 100 UNIT/ML pen, Inject 10 Units Subcutaneous 3 times daily (with meals)  insulin glargine (LANTUS PEN) 100 UNIT/ML pen, Inject 42 Units Subcutaneous every morning (before breakfast)  Insulin Pen Needle (PEN NEEDLES) 32G X 4 MM MISC, 1 each 4 times daily  losartan (COZAAR) 50 MG tablet, Take 1 tablet (50 mg) by mouth daily  ondansetron (ZOFRAN) 4 MG tablet, Take 1 tablet (4 mg) by mouth every 6 hours as needed for nausea or vomiting  pravastatin (PRAVACHOL) 20 MG tablet, Take 1 tablet (20 mg) by mouth daily  sertraline (ZOLOFT) 50 MG tablet, Take 1 tablet (50 mg) by mouth daily Take 50mg PO daily    No current facility-administered medications on file prior to visit.       Allergy:   No Known Allergies      Review of systems:    - CONSTITUTIONAL: Denies weight loss, fever and chills. Fatigue + after blood draw.    - HEENT: Denies changes in vision and  hearing.    - ?RESPIRATORY: Denies SOB and cough.?    - CV: Denies palpitations and +++ CP.     - GI: Denies abdominal pain, nausea, vomiting and diarrhea.?    - : Denies dysuria and urinary frequency.?    - MSK: Denies myalgia and joint pain.?    - SKIN: Denies rash and pruritus.    - ?NEUROLOGICAL: Denies headache and syncope.?    - PSYCHIATRIC: Denies recent changes in mood. Denies anxiety and depression.    - LYMPHATIC: no palpable lumps in the neck, axilla or groin areas.       Physical exam  There were no vitals taken for this visit.  Wt Readings from Last 4 Encounters:   06/10/21 61.1 kg (134 lb 12.8 oz)   03/30/21 56.8 kg (125 lb 4.8 oz)   03/02/21 54.8 kg (120 lb 14.4 oz)   02/27/21 52.4 kg (115 lb 8 oz)       There were no vitals taken for this visit.    Constitutional:       General: He is not in acute distress.     Appearance: Normal appearance. He is not toxic-appearing.   HENT:      Head: Normocephalic and atraumatic.     Eyes:      Extraocular Movements: Extraocular movements intact.      Neck:      Musculoskeletal: Normal range of motion-Cardiovascular:     effort, normsl    Pulmonary:      Effort: Pulmonary effort is normal.         Labs:  Orders Only on 07/05/2021   Component Date Value Ref Range Status     Ferritin 07/05/2021 605* 8 - 252 ng/mL Final     WBC 07/05/2021 8.3  4.0 - 11.0 10e9/L Final     RBC Count 07/05/2021 3.08* 3.8 - 5.2 10e12/L Final     Hemoglobin 07/05/2021 10.7* 11.7 - 15.7 g/dL Final     Hematocrit 07/05/2021 31.9* 35.0 - 47.0 % Final     MCV 07/05/2021 104* 78 - 100 fl Final     MCH 07/05/2021 34.7* 26.5 - 33.0 pg Final     MCHC 07/05/2021 33.5  31.5 - 36.5 g/dL Final     RDW 07/05/2021 14.2  10.0 - 15.0 % Final     Platelet Count 07/05/2021 181  150 - 450 10e9/L Final     % Neutrophils 07/05/2021 60.8  % Final     % Lymphocytes 07/05/2021 27.3  % Final     % Monocytes 07/05/2021 9.4  % Final     % Eosinophils 07/05/2021 2.3  % Final     % Basophils 07/05/2021 0.2  % Final      Absolute Neutrophil 07/05/2021 5.1  1.6 - 8.3 10e9/L Final     Absolute Lymphocytes 07/05/2021 2.3  0.8 - 5.3 10e9/L Final     Absolute Monocytes 07/05/2021 0.8  0.0 - 1.3 10e9/L Final     Absolute Eosinophils 07/05/2021 0.2  0.0 - 0.7 10e9/L Final     Absolute Basophils 07/05/2021 0.0  0.0 - 0.2 10e9/L Final     Diff Method 07/05/2021 Automated Method   Final           Imaging:    CT Abdomen Pelvis w/o Contrast [[id number]] (Order [id number])  Exam Information    Exam Date Exam Time Accession # Performing Department Results    6/21/21  5:09 PM [id number] [facility] Imaging    PACS Images     Show images for CT Abdomen Pelvis w/o Contrast   Study Result    Exam Type: CT ABDOMEN PELVIS W/O CONTRAST  Date and Time: 6/21/2021 5:09 PM  History: GI bleed; Abdominal pain post liver biopsy. Please perform  non-contrast (IR fellow to check) prior to IV contrast (late arterial,  and delay venous phase if needed).  Comparison: 6/21/2021, 9/19/2016 CT; MRI 4/2/2021     Procedure:   Helical  CT images were obtained of the abdomen and pelvis without IV  contrast.  Oral contrast was not administered.      Radiation Dose (DLP): 775 mGy*cm     FINDINGS:     LOWER CHEST::  Bibasilar atelectasis. No suspicious pulmonary nodules or masses.     ABDOMEN:     Limited evaluation of abdominal parenchymal organs due to noncontrast  technique.     LIVER: Cirrhotic configuration of liver. Postprocedural changes of  biopsy of hepatic segment 4 with trace intraparenchymal air, no  significant intra-abdominal hemorrhage.  BILE DUCTS: Normal caliber.  GALLBLADDER: Fluid distended gallbladder, measures up to 5.6 cm in  width. Cholelithiasis. No gallbladder wall thickening or  pericholecystic fluid/inflammatory change.  PANCREAS: Within normal limits.  SPLEEN: Within normal limits.  ADRENALS: Within normal limits.  KIDNEYS/URETERS/BLADDER: Punctate nonobstructing nephrolithiasis  (series 2, image 38). No worrisome lesions.  Dense urinary collecting  system, consistent with recent administration of IV contrast.     REPRODUCTIVE ORGANS: No pelvic masses.     BOWEL: No bowel dilatation or wall thickening. Mild mural fatty  infiltration of the right colon. Duodenal diverticulum in the region  of the pancreas measuring 2.9 x 2.0 cm (series 2 image 36).  PERITONEUM/RETROPERITONEUM: No ascites or free air, no fluid  collection.  VESSELS: No abdominal aortic aneurysm. Scattered atherosclerotic  calcification of the abdominal aorta.  LYMPH NODES: No enlarged lymph nodes.     BONES AND SOFT TISSUES:  No suspicious lesions. Small fat-containing ventral hernia (series 2,  image 42).                                                                      IMPRESSION:     1.  Postbiopsy changes of the liver without evidence of  intra-abdominal hemorrhage on noncontrast scan.  2.  Cirrhotic liver morphology.  3.  Distended gallbladder with cholelithiasis; may consider ultrasound  abdomen for further evaluation if clinically indicated.  4.  Nonobstructing punctate nephrolithiasis.     I have personally reviewed the examination and initial interpretation  and I agree with the findings.     ANIL HILTON MD       CT Liver Biopsy [[id number]] (Order [id number])  Exam Information    Exam Date Exam Time Accession # Performing Department Results    6/21/21 11:48 AM [id number] [facility] Interventional Radiology    PACS Images     Show images for CT Liver Biopsy   Study Result    PROCEDURES :  1. CT guided percutaneous localization needle placement.  2. CT guided percutaneous liver lesion  biopsy (separate access).   3. Limited abdominal ultrasound.     Clinical History: 60 years with history of hemachromatosis. Multiple  arterially enhancing observations. Biopsy requested.     Comparisons: MR 4/2/2021     Attending: Delvin Mcqueen MD, who was present for the key and  critical portions of the procedure.     Fellow: Akash Acevedo  DO     Monitoring: The patient was placed on continuous monitoring.  Intravenous sedation was administered. Vital signs and sedation  monitored by nursing staff under Interventional Radiologists  supervision. The patient remained stable throughout the procedure.     Medications:   1. 175mcg Fentanyl  2. 2.5 mg Versed     Sedation time: 75 minutes face-to-face     Contrast: 100 cc Isovue-370 intravenous     PROCEDURE: The patient understood the limitations, alternatives, and  risks of the procedure and requested the procedure be performed. Both  written and oral consent were obtained.     Patient was placed supine. Preprocedure limited abdominal ultrasound  ultrasound was not able to identify any of the lesions. Preprocedural  CT scan was not able to identify any lesions either. Based off of  adjacent landmarks, appropriate window for localization Chiba needle  placement. The right upper quadrant  was prepped and draped in the  usual sterile fashion. 1% lidocaine was used for local anesthesia.     Under CT guidance, a 22-gauge Chiba needle was placed in the region of  the arterial enhancing observation in hepatic segment 6 on previous  MRI. Multiphasic CT demonstrated minimal enhancement in this region,  although the outline of the lesion was identified. The localizing  Chiba needle was just lateral to the beginning of this lesion. A 17  gauge coaxial introducer needle was advanced into the lesion. 5 total  18 gauge core biopsies were obtained with a Temno ACT biopsy gun and  submitted to Pathology who indicated that adequate specimen was  obtained.     Good hemostasis was achieved with Gelfoam plugs that were deployed   and  the needle was removed. Sterile dressing applied.     Post-procedural scan demonstrated no immediate complication.     DLP: 1178 mGy*cm                                                                      IMPRESSION:   Technically challenging biopsy of hepatic segment 6 previously  arterially  "enhancing observation on MRI dated 4/2/2021.  5 18-gauge  core samples obtained. This lesion was very difficult to visualize as  well as liver respiratory migration complicating needle placement.  Separate puncture for localizing Chiba needle was required to obtain  the biopsy.     PLAN:  1. Bed rest for 3 hours.  2. Pathology pending.     Procedure performed by Dr. Acevedo under my supervision.  I, Dr. Arun Feliz, was present for the entire procedure.     I have personally reviewed the examination and initial interpretation  and I agree with the findings.     ARUN FELIZ MD         Surgical pathology exam - Liver Lesion  Order: [id number]  Status:  Final result   Visible to patient:  Yes (MyChart)  Component 2wk ago   Copath Report Patient Name: ERICK OTERO   MR#: [id number]   Specimen #: [id number]   Collected: 6/21/2021   Received: 6/21/2021   Reported: 6/22/2021 10:58   Ordering Phy(s): KARTHIK KNIGHT     For improved result formatting, select 'View Enhanced Report Format' under    Linked Documents section.     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 It shows no hepatocellular or other mass   lesion. The cirrhotic nodules are   sometimes arterialized but show no dysplastic or other malignant features.    There is the possibility the target   lesion may not have been represented but also the likelihood of these   \"lesions\" being arterialized   regenerating nodules in this context of severe cirrhosis. Correlation with    the appropriate imaging is   recommended to decide whether a " "repeat biopsy attempt of these concerning   nodules might be necessary.     The parenchyma shows marked iron overload in support of genetic   hemochromatosis, but also mild steatosis, with   ballooning and occasional Shamika hyaline. These features are also in   support of co-existing steatohepatitis,   probably non-alcoholic given the existing risk factors (high BMI, DM and   hyperlipidemia). The trichrome stain   highlights broad fibrous sepate with complex bridges.     I have personally reviewed all specimens and/or slides, including the   listed special stains, and used them   with my medical judgement to determine or confirm the final diagnosis.     Electronically signed out by:     Jade Oilver M.D., [facility], [facility]     CLINICAL HISTORY:   Patient is a 60-year-old woman with a history of mildly high BMI, diabetes    mellitus and hyperlipidemia, as   well as homozygous C282Y HFE gene mutation.     GROSS:   ATP: Right lobe liver lesion:   \"Adequate for site\" (Tunde Dover MD and Gilberto COSTELLO ASCP via   telepathology)     The specimen is received in formalin with proper patient identification,   labeled \"right lobe liver lesion\".   The specimen consists of five cores of orange- red soft tissue ranging   from 1.5-2.1 cm in length and each 0.1   cm in diameter.  The specimen is wrapped and entirely submitted in   cassettes A1 - A2. (Dictated by: Randal Bowles 6/21/2021 05:46 PM)     MICROSCOPIC:   Microscopic examination has been performed.     The technical component of this testing was completed at the [facility], with the professional component performed    at the [facility], [address] [place] [phone] ([phone])     CPT Codes:   A: 49248-DL9, 06300-EO/TC, 58953-CLOI, 48943-ZMPI     COLLECTION SITE:   Client: [facility] "   Location: [facility] (B)            Echocardiogram CompletePerformed 2021  Edited Result - FINAL   Study Result  [id number]  [id number]  [id number]  [id number]^JESSICA^ALONDRA    [facility]  [facility]  Echocardiography Laboratory  [address]  [place] [address]  Phone [phone]  Fax [phone]    Name: ERICK OTERO  MRN: [id number]  : 1960  Study Date: 2021 07:42 AM  Age: 60 yrs  Gender: Female  Patient Location: [facility]  Reason For Study: Hereditary hemochromatosis (H)  Ordering Physician: ALONDRA LOPEZ  Referring Physician: ALONDRA LOPEZ  Performed By: ANDREW Valle    BSA: 1.6 m2  Height: 59 in  Weight: 134 lb  HR: 78  BP: 99/66 mmHg  ______________________________________________________________________________  Procedure  Complete Echo Adult.    ______________________________________________________________________________  Interpretation Summary    Left ventricular systolic function is normal.  The visual ejection fraction is 60-65%.  No regional wall motion abnormalities noted.  Compared to the prior study dated , there have been no changes.  ______________________________________________________________________________  Left Ventricle  The left ventricle is normal in size. A sigmoid septum is present. There is  mild concentric left ventricular hypertrophy. Left ventricular systolic  function is normal. The visual ejection fraction is 60-65%. Left ventricular  diastolic function is indeterminate. No regional wall motion abnormalities  noted.    Right Ventricle  The right ventricle is normal size. The right ventricular systolic function is  normal.    Atria  Normal left atrial size. Right atrial size is normal.    Mitral Valve  There is mild mitral annular calcification. There is trace mitral  regurgitation.    Tricuspid Valve  There is trace to mild tricuspid regurgitation. The right ventricular systolic  pressure is approximated  at 29.5 mmHg plus the right atrial pressure. IVC  diameter <2.1 cm collapsing >50% with sniff suggests a normal RA pressure of 3  mmHg.    Aortic Valve  There is mild trileaflet aortic sclerosis. No aortic regurgitation is present.  No aortic stenosis is present.    Pulmonic Valve  The pulmonic valve is not well visualized.    Vessels  The aortic root is normal size.    Pericardium  There is no pericardial effusion.    Rhythm  Sinus rhythm was noted.    ______________________________________________________________________________  MMode/2D Measurements & Calculations  IVSd: 0.88 cm  LVIDd: 4.8 cm  LVIDs: 3.1 cm  LVPWd: 1.1 cm  FS: 35.3 %  LV mass(C)d: 168.1 grams  LV mass(C)dI: 108.1 grams/m2  Ao root diam: 3.0 cm    asc Aorta Diam: 2.8 cm  LVOT diam: 1.8 cm  LVOT area: 2.7 cm2  LA Volume (BP): 49.0 ml  LA Volume Indexed (AL/bp): 32.4 ml/m2  RWT: 0.45    Doppler Measurements & Calculations  MV E max jason: 97.7 cm/sec  MV A max jason: 110.5 cm/sec  MV E/A: 0.88  MV dec time: 0.21 sec  PA acc time: 0.19 sec    TR max jason: 271.5 cm/sec  TR max P.5 mmHg  Pulm Sys Jason: 92.5 cm/sec  Pulm Bello Jason: 68.1 cm/sec  Pulm A Revs Jason: 37.7 cm/sec  Pulm S/D: 1.4  E/E' av.6  Lateral E/e': 11.3  Medial E/e': 13.9    ______________________________________________________________________________  Report approved by: Walter Neri 2021 08:47 AM           Assessment and plan:  Bradford Prado is a 60 year old female with past medical history of mild overweight, hypertension, dyslipidemia, poorly controlled diabetes recently started on insulin therapy who presents with concerns of abnormal liver tests and historically abnormal liver imaging. Reports that she was first told that she had some sort of issue with her liver approximately 4 years ago.  At that time she was having significant pain and dysuria related to kidney stones.  An abdominal imaging study suggested a nodular appearing liver that raise concerns for chronic  liver disease.  She denies ever having follow-up for this condition at that time.  Based on review of historical labs it was noted that around the time of this imaging she did have iron studies checked which demonstrated a serum ferritin of 1569 in September 2016, but again there has been no follow-up on this lab today. Noted that she has had progressive worsening of her overall management of her diabetes, and reports that she started on insulin during a recent hospitalization.  Most recent hemoglobin A1c was 11.1%   She underwent an MRI of the abdomen on April 2 which demonstrated nodular appearing liver, diffusely decreased signal throughout the liver raising concerns for iron deposition, and multiple arterial enhancing lesions throughout the liver. Per review of the MRI, etiology of these liver masses is unclear, and recommendation was to repeat MRI in 3 months.  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  Therefore, patient was referred to hematology for further assessment and initiation of phlebotomy therapy     Patient instructions/Plan:    - Diagnosis: Liver cirrhosis consistent with a genetic hematochromatosis with no evidence of hepatocellular carcinoma.  Liver biopsy done on 6/21/2021.     - Follow up with primary care for management of liver cirrhosis.     - Follow up with primary care in regard to Cholelithiasis (gallstones)     - Recommend weekly phlebotomy ( blood removal) of one unit of blood until Ferritin < 100 to avoid organ damage due to iron deposition.     - Phlebotomy twice weekly until Ferritin is < 100 and to hold if hemoglobin is < 11     - After achieving Ferritin < 100, schedule lab check visit every 2 weeks with CBC and Ferritin level.     - 2 D echo to assess if there is cardiac organ damage: done on 7/1/21.     -  counseled to avoid heavy alcohol use or acetaminophen use due to liver cirrhosis.     - Persistent chest pain, recommend to follow up with primary care for referral to cardiology     - recommend follow up with primary care for screening program for mammogram and need for colonoscopy     - to follow up with PCP     The total time of this encounter amounted to 45 minutes.This time included face to face time spent via camera with the patient, prep work, ordering tests, and performing post visit documentation.  The patient is ready to learn, no apparent learning barriers were identified.  Diagnosis and treatment plans were explained to the patient. The patient expressed understanding of the content. The patient asked appropriate questions. The patient questions were answered to his satisfaction.         WILD LONG MD        Again, thank you for allowing me to participate in the care of your patient.        Sincerely,        Jp Long MD

## 2021-07-08 NOTE — PATIENT INSTRUCTIONS
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  Liver biopsy done on 6/21/2021.     - Follow up with primary care for management of liver cirrhosis.     - Follow up with primary care in regard to Cholelithiasis (gallstones)     - Recommend weekly phlebotomy ( blood removal) of one unit of blood until Ferritin < 100 to avoid organ damage due to iron deposition.     - Phlebotomy twice weekly until Ferritin is < 100 and to hold if hemoglobin is < 11     - After achieving Ferritin < 100, schedule lab check visit every 2 weeks with CBC and Ferritin level.     - 2 D echo to assess if there is cardiac organ damage: done on 7/1/21.     - counseled to avoid heavy alcohol use or acetaminophen use due to liver cirrhosis.     - Persistent chest pain, recommend to follow up with primary care for referral to cardiology     - recommend follow up with primary care for screening program for mammogram and need for colonoscopy     - to follow up with PCP

## 2021-07-09 ENCOUNTER — INFUSION THERAPY VISIT (OUTPATIENT)
Dept: INFUSION THERAPY | Facility: CLINIC | Age: 61
End: 2021-07-09
Attending: INTERNAL MEDICINE
Payer: COMMERCIAL

## 2021-07-09 VITALS
SYSTOLIC BLOOD PRESSURE: 102 MMHG | HEART RATE: 70 BPM | TEMPERATURE: 98.7 F | RESPIRATION RATE: 16 BRPM | DIASTOLIC BLOOD PRESSURE: 62 MMHG

## 2021-07-09 DIAGNOSIS — E83.110 HEREDITARY HEMOCHROMATOSIS (H): Primary | ICD-10-CM

## 2021-07-09 PROCEDURE — 99195 PHLEBOTOMY: CPT

## 2021-07-09 NOTE — PROGRESS NOTES
Infusion Nursing Note:  Bradford Prado presents today for phlebotomy.    Patient seen by provider today: No   present during visit today: Not Applicable.    Note: N/A.    Intravenous Access:   Patient tolerated removal of 452 ml of whole blood via phlebotomy. Line clotted at 120mL and patient was repoked and phleb was restarted. Line clotted again at 452 mL and phlebotomy was stopped per patient and RN discretion. Patient drank 240 ml of oral fluids post phlebotomy.  Vitals stable pre and post procedure.  Patient discharged in stable condition after 10 minutes of observation.         Treatment Conditions:  Lab Results   Component Value Date    HGB 11.5 07/07/2021     Lab Results   Component Value Date    WBC 7.2 07/07/2021      Lab Results   Component Value Date    ANEU 4.5 07/07/2021     Lab Results   Component Value Date     07/07/2021      Results reviewed, labs MET treatment parameters, ok to proceed with treatment.      Discharge Plan:   Discharge instructions reviewed with: Patient.  Patient and/or family verbalized understanding of discharge instructions and all questions answered.  Patient discharged in stable condition accompanied by: self.  Departure Mode: Ambulatory.      Heena Mendez RN

## 2021-07-12 ENCOUNTER — LAB (OUTPATIENT)
Dept: LAB | Facility: CLINIC | Age: 61
End: 2021-07-12
Payer: COMMERCIAL

## 2021-07-12 DIAGNOSIS — E83.110 HEREDITARY HEMOCHROMATOSIS (H): ICD-10-CM

## 2021-07-12 LAB
ERYTHROCYTE [DISTWIDTH] IN BLOOD BY AUTOMATED COUNT: 14.3 % (ref 10–15)
HCT VFR BLD AUTO: 32.6 % (ref 35–47)
HGB BLD-MCNC: 11.5 G/DL (ref 11.7–15.7)
MCH RBC QN AUTO: 36.1 PG (ref 26.5–33)
MCHC RBC AUTO-ENTMCNC: 35.3 G/DL (ref 31.5–36.5)
MCV RBC AUTO: 102 FL (ref 78–100)
PLATELET # BLD AUTO: 171 10E3/UL (ref 150–450)
RBC # BLD AUTO: 3.19 10E6/UL (ref 3.8–5.2)
WBC # BLD AUTO: 7.2 10E3/UL (ref 4–11)

## 2021-07-12 PROCEDURE — 36415 COLL VENOUS BLD VENIPUNCTURE: CPT

## 2021-07-12 PROCEDURE — 82728 ASSAY OF FERRITIN: CPT

## 2021-07-12 PROCEDURE — 85027 COMPLETE CBC AUTOMATED: CPT

## 2021-07-13 ENCOUNTER — INFUSION THERAPY VISIT (OUTPATIENT)
Dept: INFUSION THERAPY | Facility: CLINIC | Age: 61
End: 2021-07-13
Attending: INTERNAL MEDICINE
Payer: COMMERCIAL

## 2021-07-13 VITALS
OXYGEN SATURATION: 98 % | SYSTOLIC BLOOD PRESSURE: 91 MMHG | HEART RATE: 77 BPM | TEMPERATURE: 98.5 F | RESPIRATION RATE: 16 BRPM | DIASTOLIC BLOOD PRESSURE: 61 MMHG

## 2021-07-13 DIAGNOSIS — E83.110 HEREDITARY HEMOCHROMATOSIS (H): Primary | ICD-10-CM

## 2021-07-13 LAB — FERRITIN SERPL-MCNC: 521 NG/ML

## 2021-07-13 PROCEDURE — 99195 PHLEBOTOMY: CPT

## 2021-07-13 NOTE — PROGRESS NOTES
Infusion Nursing Note:  Bradford Prado presents today for phlebotomy.    Patient seen by provider today: No   present during visit today: Not Applicable.    Note:  Patient tolerated removal of 500 ml of whole blood via phlebotomy. Patient drank about 300 ml of oral fluids post phlebotomy.  Vitals stable pre and post procedure.  Patient's blood pressure dropped post phlebotomy. Patient denied feeling dizzy or lightheaded with the BP drop. Patient reminded to drink water and eat and refrain from strenuous activity today post her phlebotomy.    Intravenous Access:   17 gauge Phlebotomy needle used    Treatment Conditions:  Lab Results   Component Value Date    HGB 11.5 07/12/2021    HGB 11.5 07/07/2021     Lab Results   Component Value Date    WBC 7.2 07/12/2021    WBC 7.2 07/07/2021      Lab Results   Component Value Date    ANEU 4.5 07/07/2021     Lab Results   Component Value Date     07/12/2021     07/07/2021      Lab Results   Component Value Date     05/10/2021                   Lab Results   Component Value Date    POTASSIUM 4.2 05/10/2021           No results found for: MAG         Lab Results   Component Value Date    CR 0.57 05/10/2021                   Lab Results   Component Value Date    ESTELLE 9.1 05/10/2021                Lab Results   Component Value Date    BILITOTAL 0.6 05/10/2021           Lab Results   Component Value Date    ALBUMIN 3.7 05/10/2021                    Lab Results   Component Value Date    ALT 45 05/10/2021           Lab Results   Component Value Date    AST 29 05/10/2021       Results reviewed, labs MET treatment parameters, ok to proceed with treatment.      Post Infusion Assessment:  Site patent and intact, free from redness, edema or discomfort.  No evidence of extravasations.       Discharge Plan:   Patient declined prescription refills.  Discharge instructions reviewed with: Patient.  Patient and/or family verbalized understanding of discharge  instructions and all questions answered.  AVS to patient via rumr: turn off the lights.  Patient will return 7/20/21 for next appointment.   Patient discharged in stable condition accompanied by: self.  Departure Mode: Ambulatory.      Lida Llanos RN

## 2021-07-19 ENCOUNTER — LAB (OUTPATIENT)
Dept: LAB | Facility: CLINIC | Age: 61
End: 2021-07-19
Payer: COMMERCIAL

## 2021-07-19 DIAGNOSIS — R79.89 ABNORMAL LIVER FUNCTION TESTS: ICD-10-CM

## 2021-07-19 DIAGNOSIS — R79.0 ABNORMAL RESULT OF IRON PROFILE TESTING: ICD-10-CM

## 2021-07-19 LAB
ALBUMIN SERPL-MCNC: 3.5 G/DL (ref 3.4–5)
ALP SERPL-CCNC: 81 U/L (ref 40–150)
ALT SERPL W P-5'-P-CCNC: 35 U/L (ref 0–50)
AST SERPL W P-5'-P-CCNC: 24 U/L (ref 0–45)
BILIRUB DIRECT SERPL-MCNC: <0.1 MG/DL (ref 0–0.2)
BILIRUB SERPL-MCNC: 0.3 MG/DL (ref 0.2–1.3)
FERRITIN SERPL-MCNC: 273 NG/ML (ref 8–252)
IRON SATN MFR SERPL: 36 % (ref 15–46)
IRON SERPL-MCNC: 113 UG/DL (ref 35–180)
PROT SERPL-MCNC: 7.3 G/DL (ref 6.8–8.8)
TIBC SERPL-MCNC: 312 UG/DL (ref 240–430)

## 2021-07-19 PROCEDURE — 83550 IRON BINDING TEST: CPT | Performed by: PATHOLOGY

## 2021-07-19 PROCEDURE — 36415 COLL VENOUS BLD VENIPUNCTURE: CPT | Performed by: PATHOLOGY

## 2021-07-19 PROCEDURE — 82728 ASSAY OF FERRITIN: CPT | Performed by: PATHOLOGY

## 2021-07-19 PROCEDURE — 80076 HEPATIC FUNCTION PANEL: CPT | Performed by: PATHOLOGY

## 2021-07-20 ENCOUNTER — INFUSION THERAPY VISIT (OUTPATIENT)
Dept: INFUSION THERAPY | Facility: CLINIC | Age: 61
End: 2021-07-20
Attending: INTERNAL MEDICINE
Payer: COMMERCIAL

## 2021-07-20 ENCOUNTER — HOSPITAL ENCOUNTER (OUTPATIENT)
Facility: CLINIC | Age: 61
Discharge: HOME | End: 2021-07-20
Attending: INTERNAL MEDICINE | Admitting: INTERNAL MEDICINE
Payer: COMMERCIAL

## 2021-07-20 VITALS
HEART RATE: 67 BPM | RESPIRATION RATE: 20 BRPM | OXYGEN SATURATION: 99 % | TEMPERATURE: 98.7 F | SYSTOLIC BLOOD PRESSURE: 98 MMHG | DIASTOLIC BLOOD PRESSURE: 64 MMHG

## 2021-07-20 DIAGNOSIS — E83.110 HEREDITARY HEMOCHROMATOSIS (H): Primary | ICD-10-CM

## 2021-07-20 LAB — HGB BLD-MCNC: 11.8 G/DL (ref 11.7–15.7)

## 2021-07-20 PROCEDURE — 99195 PHLEBOTOMY: CPT

## 2021-07-20 PROCEDURE — 85018 HEMOGLOBIN: CPT | Performed by: INTERNAL MEDICINE

## 2021-07-20 PROCEDURE — 36415 COLL VENOUS BLD VENIPUNCTURE: CPT | Performed by: INTERNAL MEDICINE

## 2021-07-20 ASSESSMENT — PAIN SCALES - GENERAL: PAINLEVEL: NO PAIN (0)

## 2021-07-20 NOTE — PROGRESS NOTES
Infusion Nursing Note:  Bradford Prado presents today for phlebotomy.    Patient seen by provider today: No   present during visit today: Not Applicable.    Note: N/A.    Intravenous Access:  Phlebotomy site LAC, Needle type Butterfly, Gauge 17.    Treatment Conditions:  Ferritin: 273  HGB: 11.8.      Post Infusion Assessment:  Patient tolerated phlebotomy without incident.  Patient denied feeling short of breath, dizzy or light headed during and after procedure. Patient drank about 300mls while in infusion. VS stable after 10 minutes of observation.  Access discontinued per protocol.       Discharge Plan:   Patient declined prescription refills.  Discharge instructions reviewed with: Patient.  Patient verbalized understanding of discharge instructions and all questions answered.  AVS to patient via [employer].  Patient will return as scheduled for next appointment.   Patient discharged in stable condition accompanied by: self.  Departure Mode: Ambulatory.      Genia Pacheco RN

## 2021-07-22 DIAGNOSIS — E11.65 TYPE 2 DIABETES MELLITUS WITH HYPERGLYCEMIA, WITHOUT LONG-TERM CURRENT USE OF INSULIN (H): ICD-10-CM

## 2021-07-22 RX ORDER — INSULIN GLARGINE 100 [IU]/ML
INJECTION, SOLUTION SUBCUTANEOUS
Qty: 45 ML | Refills: 0 | Status: SHIPPED | OUTPATIENT
Start: 2021-07-22 | End: 2021-10-19

## 2021-07-26 ENCOUNTER — TELEPHONE (OUTPATIENT)
Dept: GASTROENTEROLOGY | Facility: CLINIC | Age: 61
End: 2021-07-26

## 2021-07-26 ENCOUNTER — VIRTUAL VISIT (OUTPATIENT)
Dept: GASTROENTEROLOGY | Facility: CLINIC | Age: 61
End: 2021-07-26
Attending: INTERNAL MEDICINE
Payer: COMMERCIAL

## 2021-07-26 DIAGNOSIS — K74.60 LIVER CIRRHOSIS SECONDARY TO NONALCOHOLIC STEATOHEPATITIS (NASH) (H): Primary | ICD-10-CM

## 2021-07-26 DIAGNOSIS — K75.81 LIVER CIRRHOSIS SECONDARY TO NONALCOHOLIC STEATOHEPATITIS (NASH) (H): Primary | ICD-10-CM

## 2021-07-26 DIAGNOSIS — I10 ESSENTIAL HYPERTENSION WITH GOAL BLOOD PRESSURE LESS THAN 140/90: Chronic | ICD-10-CM

## 2021-07-26 DIAGNOSIS — I85.10 SECONDARY ESOPHAGEAL VARICES WITHOUT BLEEDING (H): ICD-10-CM

## 2021-07-26 DIAGNOSIS — E78.5 HYPERLIPIDEMIA LDL GOAL <70: Chronic | ICD-10-CM

## 2021-07-26 DIAGNOSIS — E83.110 HEREDITARY HEMOCHROMATOSIS (H): ICD-10-CM

## 2021-07-26 DIAGNOSIS — E11.65 TYPE 2 DIABETES MELLITUS WITH HYPERGLYCEMIA, WITHOUT LONG-TERM CURRENT USE OF INSULIN (H): ICD-10-CM

## 2021-07-26 PROBLEM — R74.01 TRANSAMINITIS: Status: RESOLVED | Noted: 2021-02-23 | Resolved: 2021-07-26

## 2021-07-26 PROCEDURE — 99215 OFFICE O/P EST HI 40 MIN: CPT | Mod: 95 | Performed by: INTERNAL MEDICINE

## 2021-07-26 ASSESSMENT — PAIN SCALES - GENERAL: PAINLEVEL: NO PAIN (0)

## 2021-07-26 NOTE — LETTER
7/26/2021         RE: Bradford Prado  [address]  [facility] [address]        Dear Colleague,    Thank you for referring your patient, Bradford Prado, to the [facility] [place]. Please see a copy of my visit note below.    Bradford is a 60 year old who is being evaluated via a billable video visit.      How would you like to obtain your AVS? MyChart  If the video visit is dropped, the invitation should be resent by: Send to e-mail at: [email or web address]  Will anyone else be joining your video visit? No    Video Start Time: 0800  Video-Visit Details    Type of service:  Video Visit    Video End Time:8:16 AM    Originating Location (pt. 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    Since last seen she is established care with hematology regarding her hemochromatosis.  She was initiated on phlebotomy, initially twice per week, now down to once weekly and has already had a significant improvement in her serum ferritin level to down below 300.  She is tolerating the phlebotomy well.  She also notes that she informed her family of her new diagnosis and her older sister also has hereditary hemochromatosis and has initiated treatment.    She reports she is trying to stay physically active and walks approximately 3 miles daily.  She is interested in making modifications to her diet to ensure healthy diet and a low iron  intake.    She underwent an MRI of the abdomen on  which demonstrated nodular appearing liver, diffusely decreased signal throughout the liver raising concerns for iron deposition, and multiple arterial enhancing lesions throughout the liver.    Past Medical History:  Past Medical History:   Diagnosis Date     Diabetes mellitus (H)      Dyslipidemia      Hypertension    - CERDA cirrhosis  - HH    Surgical History:  Past Surgical History:   Procedure Laterality Date     HYSTERECTOMY TOTAL ABDOMINAL      due to fibroids     LAPAROSCOPIC EVACUATION ECTOPIC PREGNANCY       TUBAL LIGATION         Social History:  Social History     Tobacco Use     Smoking status: Former Smoker     Packs/day: 0.50     Years: 20.00     Pack years: 10.00     Types: Cigarettes     Quit date: 3/9/2010     Years since quittin.3     Smokeless tobacco: Never Used   Substance Use Topics     Alcohol use: No     Alcohol/week: 0.0 standard drinks     Drug use: No        Family History:  Family History   Problem Relation Age of Onset     Cirrhosis Mother         w/o alcohol history     Diabetes Mother      Emphysema Father      Hypertension Sister      Heart Defect Niece         Tetrology of Fallot     Breast Cancer No family hx of      Cancer - colorectal No family hx of    - Sister with HH    Medications:  Current Outpatient Medications   Medication     ACCU-CHEK GUIDE test strip     amLODIPine (NORVASC) 5 MG tablet     Biotin 10 MG CAPS     blood glucose (NO BRAND SPECIFIED) lancets standard     blood glucose (NO BRAND SPECIFIED) test strip     blood glucose (NO BRAND SPECIFIED) test strip     blood glucose monitoring (SOFTCLIX) lancets     Blood Glucose Monitoring Suppl (ACCU-CHEK GUIDE ME) w/Device KIT     carvedilol (COREG) 12.5 MG tablet     Continuous Blood Gluc Sensor (FREESTYLE MEENA 14 DAY SENSOR) MISC     insulin aspart (NOVOLOG FLEXPEN) 100 UNIT/ML pen     insulin aspart (NOVOLOG PEN) 100 UNIT/ML pen     Insulin Pen Needle  (PEN NEEDLES) 32G X 4 MM MISC     LANTUS SOLOSTAR 100 UNIT/ML soln     losartan (COZAAR) 50 MG tablet     pravastatin (PRAVACHOL) 20 MG tablet     sertraline (ZOLOFT) 50 MG tablet     acetaminophen (TYLENOL) 325 MG tablet     ondansetron (ZOFRAN) 4 MG tablet     No current facility-administered medications for this visit.       Review of Systems  A complete 10 point review of systems was asked and answered in the negative unless specifically commented upon in the HPI    Objective:         There were no vitals filed for this visit.  There is no height or weight on file to calculate BMI.     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:    -With mild steatohepatitis and advanced cirrhosis (Laennec fibrosis stage    4C)    -Consistent with genetic hemochromatosis overlapping with steatohepatitis    -No neoplastic or other mass lesions identified      Assessment and Plan:    Abnormal Liver Tests:    - Secondary to a combination of CERDA and hereditary hemochromatosis  - Now established care with phlebotomy and Heme  - Family members have been screened  - Discussed lifestyle modifications as per below    Non-Alcoholic Fatty Liver Disease  Management of NAFLD/CERDA  - We spent time discussing an appropriate diet, exercise and weight loss plan.      - Recommend exercise regularly: 4+ times per week, with an average of about 45 minutes per day.      - It has shown that patients who exercise regularly can have improvement of insulin resistance and resolution of fatty liver disease, even if they are not able to lose weight.   - Recommend aggressive diabetes management: ideal goal Hgb A1c goal of =/< 7%.  "If possible addition of insulin sensitizing agents like metformin or liraglutide will be helpful.    - Management of cholesterol is also very important.    - The use of \"statins\" (HMG-CoA reductase medications) are an effective means of therapy and are not contra-indicated in those with abnormal liver tests OR those with cirrhosis.  The value of these medications in this population far outweigh the minor risks of abnormal liver tests.   - Goal LDL in those with CERDA are < 100 mg/dL    Screening for Varices:  - Ordered EGD    HCC screening:  - as per below    Volume overload: no issues    Abnormal liver imaging:  -She is noted to have a nodular appearing liver on CT scan from 2016 and again on MRI from April 2021.  -This is a nonspecific finding, and there is no other overt changes on her abdominal imaging to suggest significant portal hypertension  - Plan for repeat MRI in next month or so    Follow Up:  6 months     Thank you very much for the opportunity to participate in the care of this patient.  If you have any further questions, please don't hesitate to contact our office.    Thomas M. Leventhal, M.D.   of Medicine  Advanced & Transplant Hepatology  [facility]        Again, thank you for allowing me to participate in the care of your patient.        Sincerely,        Thomas M. Leventhal, MD    "

## 2021-07-26 NOTE — PROGRESS NOTES
Bradford is a 60 year old who is being evaluated via a billable video visit.      How would you like to obtain your AVS? MyChart  If the video visit is dropped, the invitation should be resent by: Send to e-mail at: [email or web address]  Will anyone else be joining your video visit? No    Video Start Time: 0800  Video-Visit Details    Type of service:  Video Visit    Video End Time:8:16 AM    Originating Location (pt. 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    Since last seen she is established care with hematology regarding her hemochromatosis.  She was initiated on phlebotomy, initially twice per week, now down to once weekly and has already had a significant improvement in her serum ferritin level to down below 300.  She is tolerating the phlebotomy well.  She also notes that she informed her family of her new diagnosis and her older sister also has hereditary hemochromatosis and has initiated treatment.    She reports she is trying to stay physically active and walks approximately 3 miles daily.  She is interested in making modifications to her diet to ensure healthy diet and a low iron intake.    She underwent an MRI of the abdomen on April 2 which demonstrated nodular appearing liver, diffusely decreased signal throughout the liver raising concerns for iron deposition, and multiple arterial enhancing lesions throughout the liver.    Past Medical  History:  Past Medical History:   Diagnosis Date     Diabetes mellitus (H)      Dyslipidemia      Hypertension    - CERDA cirrhosis  - HH    Surgical History:  Past Surgical History:   Procedure Laterality Date     HYSTERECTOMY TOTAL ABDOMINAL      due to fibroids     LAPAROSCOPIC EVACUATION ECTOPIC PREGNANCY       TUBAL LIGATION         Social History:  Social History     Tobacco Use     Smoking status: Former Smoker     Packs/day: 0.50     Years: 20.00     Pack years: 10.00     Types: Cigarettes     Quit date: 3/9/2010     Years since quittin.3     Smokeless tobacco: Never Used   Substance Use Topics     Alcohol use: No     Alcohol/week: 0.0 standard drinks     Drug use: No        Family History:  Family History   Problem Relation Age of Onset     Cirrhosis Mother         w/o alcohol history     Diabetes Mother      Emphysema Father      Hypertension Sister      Heart Defect Niece         Tetrology of Fallot     Breast Cancer No family hx of      Cancer - colorectal No family hx of    - Sister with HH    Medications:  Current Outpatient Medications   Medication     ACCU-CHEK GUIDE test strip     amLODIPine (NORVASC) 5 MG tablet     Biotin 10 MG CAPS     blood glucose (NO BRAND SPECIFIED) lancets standard     blood glucose (NO BRAND SPECIFIED) test strip     blood glucose (NO BRAND SPECIFIED) test strip     blood glucose monitoring (SOFTCLIX) lancets     Blood Glucose Monitoring Suppl (ACCU-CHEK GUIDE ME) w/Device KIT     carvedilol (COREG) 12.5 MG tablet     Continuous Blood Gluc Sensor (FREESTYLE MEENA 14 DAY SENSOR) MISC     insulin aspart (NOVOLOG FLEXPEN) 100 UNIT/ML pen     insulin aspart (NOVOLOG PEN) 100 UNIT/ML pen     Insulin Pen Needle (PEN NEEDLES) 32G X 4 MM MISC     LANTUS SOLOSTAR 100 UNIT/ML soln     losartan (COZAAR) 50 MG tablet     pravastatin (PRAVACHOL) 20 MG tablet     sertraline (ZOLOFT) 50 MG tablet     acetaminophen (TYLENOL) 325 MG tablet     ondansetron (ZOFRAN) 4 MG tablet     No  "current facility-administered medications for this visit.       Review of Systems  A complete 10 point review of systems was asked and answered in the negative unless specifically commented upon in the HPI    Objective:         There were no vitals filed for this visit.  There is no height or weight on file to calculate BMI.     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:    -With mild steatohepatitis and advanced cirrhosis (Laennec fibrosis stage    4C)    -Consistent with genetic hemochromatosis overlapping with steatohepatitis    -No neoplastic or other mass lesions identified      Assessment and Plan:    Abnormal Liver Tests:    - Secondary to a combination of CERDA and hereditary hemochromatosis  - Now established care with phlebotomy and Heme  - Family members have been screened  - Discussed lifestyle modifications as per below    Non-Alcoholic Fatty Liver Disease  Management of NAFLD/CERDA  - We spent time discussing an appropriate diet, exercise and weight loss plan.      - Recommend exercise regularly: 4+ times per week, with an average of about 45 minutes per day.      - It has shown that patients who exercise regularly can have improvement of insulin resistance and resolution of fatty liver disease, even if they are not able to lose weight.   - Recommend aggressive diabetes management: ideal goal Hgb A1c goal of =/< 7%. If possible addition of insulin sensitizing agents like metformin or liraglutide will be helpful.    - Management of cholesterol is also very important.    - The use of \"statins\" (HMG-CoA reductase medications) are an effective means of therapy and are not " contra-indicated in those with abnormal liver tests OR those with cirrhosis.  The value of these medications in this population far outweigh the minor risks of abnormal liver tests.   - Goal LDL in those with CERDA are < 100 mg/dL    Screening for Varices:  - Ordered EGD    HCC screening:  - as per below    Volume overload: no issues    Abnormal liver imaging:  -She is noted to have a nodular appearing liver on CT scan from 2016 and again on MRI from April 2021.  -This is a nonspecific finding, and there is no other overt changes on her abdominal imaging to suggest significant portal hypertension  - Plan for repeat MRI in next month or so    Follow Up:  6 months     Thank you very much for the opportunity to participate in the care of this patient.  If you have any further questions, please don't hesitate to contact our office.    Thomas M. Leventhal, M.D.   of Medicine  Advanced & Transplant Hepatology  [facility]

## 2021-07-26 NOTE — LETTER
7/26/2021         RE: Bradford Prado  [address]  [facility] [address]      Bradford is a 60 year old who is being evaluated via a billable video visit.      How would you like to obtain your AVS? MyChart  If the video visit is dropped, the invitation should be resent by: Send to e-mail at: [email or web address]  Will anyone else be joining your video visit? No    Video Start Time: 0800  Video-Visit Details    Type of service:  Video Visit    Video End Time:8:16 AM    Originating Location (pt. 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    Since last seen she is established care with hematology regarding her hemochromatosis.  She was initiated on phlebotomy, initially twice per week, now down to once weekly and has already had a significant improvement in her serum ferritin level to down below 300.  She is tolerating the phlebotomy well.  She also notes that she informed her family of her new diagnosis and her older sister also has hereditary hemochromatosis and has initiated treatment.    She reports she is trying to stay physically active and walks approximately 3 miles daily.  She is interested in making modifications to her diet to ensure healthy diet and a low iron intake.    She underwent an MRI of the abdomen on April 2 which demonstrated nodular appearing liver, diffusely decreased signal throughout the liver raising concerns for iron  deposition, and multiple arterial enhancing lesions throughout the liver.    Past Medical History:  Past Medical History:   Diagnosis Date     Diabetes mellitus (H)      Dyslipidemia      Hypertension    - CERDA cirrhosis  - HH    Surgical History:  Past Surgical History:   Procedure Laterality Date     HYSTERECTOMY TOTAL ABDOMINAL      due to fibroids     LAPAROSCOPIC EVACUATION ECTOPIC PREGNANCY       TUBAL LIGATION         Social History:  Social History     Tobacco Use     Smoking status: Former Smoker     Packs/day: 0.50     Years: 20.00     Pack years: 10.00     Types: Cigarettes     Quit date: 3/9/2010     Years since quittin.3     Smokeless tobacco: Never Used   Substance Use Topics     Alcohol use: No     Alcohol/week: 0.0 standard drinks     Drug use: No        Family History:  Family History   Problem Relation Age of Onset     Cirrhosis Mother         w/o alcohol history     Diabetes Mother      Emphysema Father      Hypertension Sister      Heart Defect Niece         Tetrology of Fallot     Breast Cancer No family hx of      Cancer - colorectal No family hx of    - Sister with HH    Medications:  Current Outpatient Medications   Medication     ACCU-CHEK GUIDE test strip     amLODIPine (NORVASC) 5 MG tablet     Biotin 10 MG CAPS     blood glucose (NO BRAND SPECIFIED) lancets standard     blood glucose (NO BRAND SPECIFIED) test strip     blood glucose (NO BRAND SPECIFIED) test strip     blood glucose monitoring (SOFTCLIX) lancets     Blood Glucose Monitoring Suppl (ACCU-CHEK GUIDE ME) w/Device KIT     carvedilol (COREG) 12.5 MG tablet     Continuous Blood Gluc Sensor (FREESTYLE MEENA 14 DAY SENSOR) MISC     insulin aspart (NOVOLOG FLEXPEN) 100 UNIT/ML pen     insulin aspart (NOVOLOG PEN) 100 UNIT/ML pen     Insulin Pen Needle (PEN NEEDLES) 32G X 4 MM MISC     LANTUS SOLOSTAR 100 UNIT/ML soln     losartan (COZAAR) 50 MG tablet     pravastatin (PRAVACHOL) 20 MG tablet     sertraline (ZOLOFT) 50 MG  tablet     acetaminophen (TYLENOL) 325 MG tablet     ondansetron (ZOFRAN) 4 MG tablet     No current facility-administered medications for this visit.       Review of Systems  A complete 10 point review of systems was asked and answered in the negative unless specifically commented upon in the HPI    Objective:         There were no vitals filed for this visit.  There is no height or weight on file to calculate BMI.     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:    -With mild steatohepatitis and advanced cirrhosis (Laennec fibrosis stage    4C)    -Consistent with genetic hemochromatosis overlapping with steatohepatitis    -No neoplastic or other mass lesions identified      Assessment and Plan:    Abnormal Liver Tests:    - Secondary to a combination of CERDA and hereditary hemochromatosis  - Now established care with phlebotomy and Heme  - Family members have been screened  - Discussed lifestyle modifications as per below    Non-Alcoholic Fatty Liver Disease  Management of NAFLD/CERDA  - We spent time discussing an appropriate diet, exercise and weight loss plan.      - Recommend exercise regularly: 4+ times per week, with an average of about 45 minutes per day.      - It has shown that patients who exercise regularly can have improvement of insulin resistance and resolution of fatty liver disease, even if they are not able to lose weight.   - Recommend aggressive diabetes management: ideal goal Hgb A1c goal of =/< 7%. If possible addition of insulin sensitizing agents like metformin or liraglutide will be helpful.    - Management of cholesterol is also very important.    - The use of  "\"statins\" (HMG-CoA reductase medications) are an effective means of therapy and are not contra-indicated in those with abnormal liver tests OR those with cirrhosis.  The value of these medications in this population far outweigh the minor risks of abnormal liver tests.   - Goal LDL in those with CERDA are < 100 mg/dL    Screening for Varices:  - Ordered EGD    HCC screening:  - as per below    Volume overload: no issues    Abnormal liver imaging:  -She is noted to have a nodular appearing liver on CT scan from 2016 and again on MRI from April 2021.  -This is a nonspecific finding, and there is no other overt changes on her abdominal imaging to suggest significant portal hypertension  - Plan for repeat MRI in next month or so    Follow Up:  6 months     Thank you very much for the opportunity to participate in the care of this patient.  If you have any further questions, please don't hesitate to contact our office.    Thomas M. Leventhal, M.D.   of Medicine  Advanced & Transplant Hepatology  [facility]          Thomas M. Leventhal, MD  "

## 2021-07-26 NOTE — PATIENT INSTRUCTIONS
Cirrhosis Education  Nutrition  - For patients with cirrhosis, it is very important to eat the right types and amounts of foods.  We recommend a diet low in carbohydrates/sugars and high in fresh fruits/vegetables, with the right amount of protein.  We typically recommend 1.5gm/kg/day of protein, or  grams of protein every day.  - In regard to protein intake, you can focus on: All meats, cooked fish and seafood, vegetable-based protein meat products, tofu, eggs, legumes, dairy products (including milk and yogurt and cheese), unsalted nuts.  - You should eat at least three meals a day and three to four snacks between meals  - Bedtime snacks are especially important (preferrably something with some protein)    - Patients with malnutrition and/or loss of muscular mass can improve their nutrition and muscular mass by drinking two cans of dietary supplements daily, particularly at bedtime.  These would include: Ensure, Boost, Prosperity Instant Milk, Glucerna, (or similar supplements)  - Please avoid eating raw seafood, especially shellfish, because of risk of serious illness  - We recommend all patients with cirrhosis limit their daily sodium intake to less than 2,000mg      General Liver Health  - Continue to avoid the use of all non-steroid anti-inflammatory medicines (ibuprofen, Aleve, naproxen, aspirin, etc.) as this can cause serious injury to your kidneys in the setting of liver disease  - If you see a doctor and they prescribe you a new medication, please contact our clinic to let us know what changes are being made  - If you become acutely ill and present to an ER or are admitted to a hospital, please let us know as soon as you are able.  - We recommend that all patients with chronic liver disease be vaccinated against hepatitis A and B.  Please discuss with your primary provider the need for these vaccines  - As patients with liver disease are at an increased risk of developing osteoporosis, we recommend  that you have a DEXA scan with appropriate follow up and treatment.   - We recommend screening for Vitamin D deficiency (at least yearly) and aggressive replacement/supplementation as warranted.    Sleep Troubles:  - Sleep issues are very common in patients with chronic liver disease  - Recommend strict avoidance of medications such as narcotics, sedatives and sleep aids.    - Low dose benadryl or melatonin can be used for insomnia, if needed.

## 2021-07-28 ENCOUNTER — LAB (OUTPATIENT)
Dept: LAB | Facility: CLINIC | Age: 61
End: 2021-07-28
Payer: COMMERCIAL

## 2021-07-28 ENCOUNTER — HOSPITAL ENCOUNTER (OUTPATIENT)
Facility: AMBULATORY SURGERY CENTER | Age: 61
End: 2021-07-28
Attending: INTERNAL MEDICINE
Payer: COMMERCIAL

## 2021-07-28 ENCOUNTER — TELEPHONE (OUTPATIENT)
Dept: GASTROENTEROLOGY | Facility: CLINIC | Age: 61
End: 2021-07-28

## 2021-07-28 DIAGNOSIS — E83.110 HEREDITARY HEMOCHROMATOSIS (H): ICD-10-CM

## 2021-07-28 LAB
BASOPHILS # BLD AUTO: 0 10E3/UL (ref 0–0.2)
BASOPHILS NFR BLD AUTO: 0 %
EOSINOPHIL # BLD AUTO: 0.2 10E3/UL (ref 0–0.7)
EOSINOPHIL NFR BLD AUTO: 3 %
ERYTHROCYTE [DISTWIDTH] IN BLOOD BY AUTOMATED COUNT: 12.1 % (ref 10–15)
FERRITIN SERPL-MCNC: 217 NG/ML (ref 8–252)
HCT VFR BLD AUTO: 39.8 % (ref 35–47)
HGB BLD-MCNC: 13.5 G/DL (ref 11.7–15.7)
LYMPHOCYTES # BLD AUTO: 1.9 10E3/UL (ref 0.8–5.3)
LYMPHOCYTES NFR BLD AUTO: 27 %
MCH RBC QN AUTO: 34.9 PG (ref 26.5–33)
MCHC RBC AUTO-ENTMCNC: 33.9 G/DL (ref 31.5–36.5)
MCV RBC AUTO: 103 FL (ref 78–100)
MONOCYTES # BLD AUTO: 0.8 10E3/UL (ref 0–1.3)
MONOCYTES NFR BLD AUTO: 12 %
NEUTROPHILS # BLD AUTO: 4 10E3/UL (ref 1.6–8.3)
NEUTROPHILS NFR BLD AUTO: 58 %
PLATELET # BLD AUTO: 218 10E3/UL (ref 150–450)
RBC # BLD AUTO: 3.87 10E6/UL (ref 3.8–5.2)
WBC # BLD AUTO: 6.9 10E3/UL (ref 4–11)

## 2021-07-28 PROCEDURE — 82728 ASSAY OF FERRITIN: CPT

## 2021-07-28 PROCEDURE — 85025 COMPLETE CBC W/AUTO DIFF WBC: CPT

## 2021-07-28 PROCEDURE — 36415 COLL VENOUS BLD VENIPUNCTURE: CPT

## 2021-07-28 NOTE — TELEPHONE ENCOUNTER
Caller: Bradford    Procedure: EGD    Date of Procedure Cancelled: 8/18    Ordering Provider:Leventhal    Reason for cancel: Pt still out of town for son wedding    Rescheduled: Y     If rescheduled:    Date: 09/29   [place] [facility] [place] any change or update to original order/sedation:

## 2021-07-28 NOTE — TELEPHONE ENCOUNTER
Screening Questions  1. Are you active on mychart? Yes    2. What insurance is in the chart? [employer] on chart    2.  Ordering/Referring Provider: Leventhal, Thomas Michael, MD in  HEPATOLOGY      3. BMI 25.9    4. Are you on daily home oxygen? no    5. Do you have a history of difficult airway? no    6. Have you had a heart, lung, or liver transplant? no    7. Are you currently on dialysis? no    8. Have you had a stroke or Transient ischemic atttack (TIA) within 6 months? no    9. In the past 6 months, have you had any heart related issues including cardiomyopathy or heart attack?         If yes, did it require cardiac stenting or other implantable device?no    10. Do you have any implantable devices in your body (pacemaker, defib, LVAD)? no    11. Do you take nitroglycerin? If yes, how often? no    12. Are you currently taking any blood thinners?no    13. Are you a diabetic? yes    14. (Females) Are you currently pregnant? n/a  If yes, how many weeks?    15. Have you had a procedure in the past that was difficult to tolerate with conscious sedation? Any allergies to Fentanyl or Versed no    16. Are you taking any scheduled prescription narcotics more than once daily? no    17. Do you have any chemical dependencies such as alcohol, street drugs, or methadone? no    18. Do you have any history of post-traumatic stress syndrome or mental health issues? no    19. Do you transfer independently? yes    20.  Do you have any issues with constipation? no    21. Preferred Pharmacy for Pre Prescription on chart    Scheduling Details    Colonoscopy Prep Sent?: N/A  Procedure Scheduled: EGD  Provider/Surgeon: Leventhal  Date of Procedure: 8/18/21  [place] [facility] [place]er (Please ask for phone number if not scheduled by patient): Bradford      Sedation Type: CS  Conscious Sedation- Needs  for 6 hours after the procedure  MAC/General-Needs  for 24 hours after procedure    Pre-op Required at [facility], [place],  [place] and OR for MAC sedation:   (if yes advise patient they will need a pre-op prior to procedure)      Is patient on blood thinners? -no (If yes- inform patient to follow up with PCP or provider for follow up instructions)     Informed patient they will need an adult  yes  Cannot take any type of public or medical transportation alone    Informed Patient of COVID Test Requirement yes    Confirmed Nurse will call to complete assessment yes    Additional comments: n/a

## 2021-07-29 ENCOUNTER — INFUSION THERAPY VISIT (OUTPATIENT)
Dept: INFUSION THERAPY | Facility: CLINIC | Age: 61
End: 2021-07-29
Attending: NURSE PRACTITIONER
Payer: COMMERCIAL

## 2021-07-29 VITALS
DIASTOLIC BLOOD PRESSURE: 65 MMHG | RESPIRATION RATE: 16 BRPM | SYSTOLIC BLOOD PRESSURE: 108 MMHG | OXYGEN SATURATION: 100 % | HEART RATE: 76 BPM | TEMPERATURE: 98.3 F

## 2021-07-29 DIAGNOSIS — E83.110 HEREDITARY HEMOCHROMATOSIS (H): Primary | ICD-10-CM

## 2021-07-29 PROCEDURE — 99195 PHLEBOTOMY: CPT

## 2021-07-29 NOTE — PROGRESS NOTES
Infusion Nursing Note:  Bradford Prado presents today for phlebotomy.    Patient seen by provider today: No   present during visit today: Not Applicable.    Note: Patient reports feeling well with no new concerns.    Some difficulty getting the blood to flow through 3 seperate phlebotomy needles. Was able to get a total of 500 ml of whole blood out with patient tolerating well.  Patient took in about 200 ml of p.o. fluid post phlebotomy.  Vital signs stable throughout.    Intravenous Access:   17 g phlebomoty needle    Treatment Conditions:  Lab Results   Component Value Date    HGB 13.5 07/28/2021    HGB 11.5 07/07/2021     Lab Results   Component Value Date    WBC 6.9 07/28/2021    WBC 7.2 07/07/2021      Lab Results   Component Value Date    ANEU 4.5 07/07/2021     Lab Results   Component Value Date     07/28/2021     07/07/2021      Lab Results   Component Value Date     05/10/2021                   Lab Results   Component Value Date    POTASSIUM 4.2 05/10/2021           No results found for: MAG         Lab Results   Component Value Date    CR 0.57 05/10/2021                   Lab Results   Component Value Date    ESTELLE 9.1 05/10/2021                Lab Results   Component Value Date    BILITOTAL 0.3 07/19/2021    BILITOTAL 0.6 05/10/2021           Lab Results   Component Value Date    ALBUMIN 3.5 07/19/2021    ALBUMIN 3.7 05/10/2021                    Lab Results   Component Value Date    ALT 35 07/19/2021    ALT 45 05/10/2021           Lab Results   Component Value Date    AST 24 07/19/2021    AST 29 05/10/2021       Results reviewed, labs MET treatment parameters, ok to proceed with treatment.      Discharge Plan:   Patient declined prescription refills.  Discharge instructions reviewed with: Patient.  Patient and/or family verbalized understanding of discharge instructions and all questions answered.  AVS to patient via [employer].  Patient will call scheduling to make a return  appointment.  Patient discharged in stable condition accompanied by: self.  Departure Mode: Ambulatory.      Lida Llanos RN

## 2021-08-01 DIAGNOSIS — Z11.59 ENCOUNTER FOR SCREENING FOR OTHER VIRAL DISEASES: ICD-10-CM

## 2021-08-03 ENCOUNTER — LAB (OUTPATIENT)
Dept: LAB | Facility: CLINIC | Age: 61
End: 2021-08-03
Payer: COMMERCIAL

## 2021-08-03 DIAGNOSIS — E83.110 HEREDITARY HEMOCHROMATOSIS (H): ICD-10-CM

## 2021-08-03 LAB
BASOPHILS # BLD AUTO: 0 10E3/UL (ref 0–0.2)
BASOPHILS NFR BLD AUTO: 0 %
EOSINOPHIL # BLD AUTO: 0.2 10E3/UL (ref 0–0.7)
EOSINOPHIL NFR BLD AUTO: 2 %
ERYTHROCYTE [DISTWIDTH] IN BLOOD BY AUTOMATED COUNT: 12 % (ref 10–15)
FERRITIN SERPL-MCNC: 222 NG/ML (ref 8–252)
HCT VFR BLD AUTO: 37.1 % (ref 35–47)
HGB BLD-MCNC: 12.4 G/DL (ref 11.7–15.7)
LYMPHOCYTES # BLD AUTO: 1.8 10E3/UL (ref 0.8–5.3)
LYMPHOCYTES NFR BLD AUTO: 25 %
MCH RBC QN AUTO: 34.3 PG (ref 26.5–33)
MCHC RBC AUTO-ENTMCNC: 33.4 G/DL (ref 31.5–36.5)
MCV RBC AUTO: 103 FL (ref 78–100)
MONOCYTES # BLD AUTO: 0.7 10E3/UL (ref 0–1.3)
MONOCYTES NFR BLD AUTO: 11 %
NEUTROPHILS # BLD AUTO: 4.3 10E3/UL (ref 1.6–8.3)
NEUTROPHILS NFR BLD AUTO: 62 %
PLATELET # BLD AUTO: 182 10E3/UL (ref 150–450)
RBC # BLD AUTO: 3.62 10E6/UL (ref 3.8–5.2)
WBC # BLD AUTO: 7 10E3/UL (ref 4–11)

## 2021-08-03 PROCEDURE — 36415 COLL VENOUS BLD VENIPUNCTURE: CPT

## 2021-08-03 PROCEDURE — 85025 COMPLETE CBC W/AUTO DIFF WBC: CPT

## 2021-08-03 PROCEDURE — 82728 ASSAY OF FERRITIN: CPT

## 2021-08-04 ENCOUNTER — APPOINTMENT (OUTPATIENT)
Dept: LAB | Facility: CLINIC | Age: 61
End: 2021-08-04
Payer: COMMERCIAL

## 2021-08-10 ENCOUNTER — LAB (OUTPATIENT)
Dept: LAB | Facility: CLINIC | Age: 61
End: 2021-08-10
Payer: COMMERCIAL

## 2021-08-10 DIAGNOSIS — E83.110 HEREDITARY HEMOCHROMATOSIS (H): ICD-10-CM

## 2021-08-10 LAB
BASOPHILS # BLD AUTO: 0 10E3/UL (ref 0–0.2)
BASOPHILS NFR BLD AUTO: 0 %
EOSINOPHIL # BLD AUTO: 0.2 10E3/UL (ref 0–0.7)
EOSINOPHIL NFR BLD AUTO: 3 %
ERYTHROCYTE [DISTWIDTH] IN BLOOD BY AUTOMATED COUNT: 11.4 % (ref 10–15)
FERRITIN SERPL-MCNC: 172 NG/ML (ref 8–252)
HCT VFR BLD AUTO: 39.8 % (ref 35–47)
HGB BLD-MCNC: 13.4 G/DL (ref 11.7–15.7)
LYMPHOCYTES # BLD AUTO: 1.7 10E3/UL (ref 0.8–5.3)
LYMPHOCYTES NFR BLD AUTO: 25 %
MCH RBC QN AUTO: 34.2 PG (ref 26.5–33)
MCHC RBC AUTO-ENTMCNC: 33.7 G/DL (ref 31.5–36.5)
MCV RBC AUTO: 102 FL (ref 78–100)
MONOCYTES # BLD AUTO: 0.8 10E3/UL (ref 0–1.3)
MONOCYTES NFR BLD AUTO: 11 %
NEUTROPHILS # BLD AUTO: 4.2 10E3/UL (ref 1.6–8.3)
NEUTROPHILS NFR BLD AUTO: 61 %
PLATELET # BLD AUTO: 171 10E3/UL (ref 150–450)
RBC # BLD AUTO: 3.92 10E6/UL (ref 3.8–5.2)
WBC # BLD AUTO: 6.9 10E3/UL (ref 4–11)

## 2021-08-10 PROCEDURE — 82728 ASSAY OF FERRITIN: CPT

## 2021-08-10 PROCEDURE — 85025 COMPLETE CBC W/AUTO DIFF WBC: CPT

## 2021-08-10 PROCEDURE — 36415 COLL VENOUS BLD VENIPUNCTURE: CPT

## 2021-08-11 ENCOUNTER — INFUSION THERAPY VISIT (OUTPATIENT)
Dept: INFUSION THERAPY | Facility: CLINIC | Age: 61
End: 2021-08-11
Attending: INTERNAL MEDICINE
Payer: COMMERCIAL

## 2021-08-11 VITALS
SYSTOLIC BLOOD PRESSURE: 101 MMHG | HEART RATE: 74 BPM | DIASTOLIC BLOOD PRESSURE: 66 MMHG | TEMPERATURE: 98.4 F | RESPIRATION RATE: 16 BRPM

## 2021-08-11 DIAGNOSIS — E83.110 HEREDITARY HEMOCHROMATOSIS (H): Primary | ICD-10-CM

## 2021-08-11 PROCEDURE — 99195 PHLEBOTOMY: CPT

## 2021-08-11 NOTE — PROGRESS NOTES
Infusion Nursing Note:  Bradford Prado presents today for phlebotomy.    Patient seen by provider today: No   present during visit today: Not Applicable.    Note: N/A.      Intravenous Access:   Patient tolerated removal of 500 ml of whole blood via phlebotomy. Patient drank 240 ml of oral fluids post phlebotomy.  Vitals stable pre and post procedure.  Patient discharged in stable condition after 10 minutes of observation.       Treatment Conditions:  Lab Results   Component Value Date    HGB 13.4 08/10/2021    HGB 11.5 07/07/2021     Lab Results   Component Value Date    WBC 6.9 08/10/2021    WBC 7.2 07/07/2021      Lab Results   Component Value Date    ANEU 4.5 07/07/2021     Lab Results   Component Value Date     08/10/2021     07/07/2021      Results reviewed, labs MET treatment parameters, ok to proceed with treatment.      Post Infusion Assessment:  Site patent and intact, free from redness, edema or discomfort.  No evidence of extravasations.  Access discontinued per protocol.       Discharge Plan:   Discharge instructions reviewed with: Patient.  Patient and/or family verbalized understanding of discharge instructions and all questions answered.  AVS to patient via [employer].  Patient will return when scheduled for next appointment.   Patient discharged in stable condition accompanied by: self.  Departure Mode: Ambulatory.      Heena Mendez RN

## 2021-08-24 ENCOUNTER — HOSPITAL ENCOUNTER (OUTPATIENT)
Dept: MRI IMAGING | Facility: CLINIC | Age: 61
Discharge: HOME | End: 2021-08-24
Attending: INTERNAL MEDICINE | Admitting: INTERNAL MEDICINE
Payer: COMMERCIAL

## 2021-08-24 DIAGNOSIS — E83.110 HEREDITARY HEMOCHROMATOSIS (H): ICD-10-CM

## 2021-08-24 DIAGNOSIS — R16.0 LIVER MASS: ICD-10-CM

## 2021-08-24 PROCEDURE — 255N000002 HC RX 255 OP 636: Performed by: INTERNAL MEDICINE

## 2021-08-24 PROCEDURE — A9585 GADOBUTROL INJECTION: HCPCS | Performed by: INTERNAL MEDICINE

## 2021-08-24 PROCEDURE — 74183 MRI ABD W/O CNTR FLWD CNTR: CPT

## 2021-08-24 RX ORDER — GADOBUTROL 604.72 MG/ML
6 INJECTION INTRAVENOUS ONCE
Status: COMPLETED | OUTPATIENT
Start: 2021-08-24 | End: 2021-08-24

## 2021-08-24 RX ADMIN — GADOBUTROL 6 ML: 604.72 INJECTION INTRAVENOUS at 13:35

## 2021-08-25 ENCOUNTER — LAB (OUTPATIENT)
Dept: LAB | Facility: CLINIC | Age: 61
End: 2021-08-25
Payer: COMMERCIAL

## 2021-08-25 DIAGNOSIS — E83.110 HEREDITARY HEMOCHROMATOSIS (H): ICD-10-CM

## 2021-08-25 LAB
BASOPHILS # BLD AUTO: 0 10E3/UL (ref 0–0.2)
BASOPHILS NFR BLD AUTO: 1 %
EOSINOPHIL # BLD AUTO: 0.2 10E3/UL (ref 0–0.7)
EOSINOPHIL NFR BLD AUTO: 3 %
ERYTHROCYTE [DISTWIDTH] IN BLOOD BY AUTOMATED COUNT: 11 % (ref 10–15)
HCT VFR BLD AUTO: 39.5 % (ref 35–47)
HGB BLD-MCNC: 13.4 G/DL (ref 11.7–15.7)
LYMPHOCYTES # BLD AUTO: 1.3 10E3/UL (ref 0.8–5.3)
LYMPHOCYTES NFR BLD AUTO: 25 %
MCH RBC QN AUTO: 34.1 PG (ref 26.5–33)
MCHC RBC AUTO-ENTMCNC: 33.9 G/DL (ref 31.5–36.5)
MCV RBC AUTO: 101 FL (ref 78–100)
MONOCYTES # BLD AUTO: 0.6 10E3/UL (ref 0–1.3)
MONOCYTES NFR BLD AUTO: 11 %
NEUTROPHILS # BLD AUTO: 3.1 10E3/UL (ref 1.6–8.3)
NEUTROPHILS NFR BLD AUTO: 60 %
PLATELET # BLD AUTO: 157 10E3/UL (ref 150–450)
RBC # BLD AUTO: 3.93 10E6/UL (ref 3.8–5.2)
WBC # BLD AUTO: 5.1 10E3/UL (ref 4–11)

## 2021-08-25 PROCEDURE — 82728 ASSAY OF FERRITIN: CPT

## 2021-08-25 PROCEDURE — 85025 COMPLETE CBC W/AUTO DIFF WBC: CPT

## 2021-08-25 PROCEDURE — 36415 COLL VENOUS BLD VENIPUNCTURE: CPT

## 2021-08-26 ENCOUNTER — LAB (OUTPATIENT)
Dept: INFUSION THERAPY | Facility: CLINIC | Age: 61
End: 2021-08-26
Attending: NURSE PRACTITIONER
Payer: COMMERCIAL

## 2021-08-26 ENCOUNTER — INFUSION THERAPY VISIT (OUTPATIENT)
Dept: INFUSION THERAPY | Facility: CLINIC | Age: 61
End: 2021-08-26
Attending: INTERNAL MEDICINE
Payer: COMMERCIAL

## 2021-08-26 ENCOUNTER — HOSPITAL ENCOUNTER (OUTPATIENT)
Facility: CLINIC | Age: 61
End: 2021-08-26
Attending: NURSE PRACTITIONER
Payer: COMMERCIAL

## 2021-08-26 VITALS
TEMPERATURE: 98.1 F | DIASTOLIC BLOOD PRESSURE: 56 MMHG | RESPIRATION RATE: 16 BRPM | OXYGEN SATURATION: 99 % | SYSTOLIC BLOOD PRESSURE: 98 MMHG | HEART RATE: 70 BPM

## 2021-08-26 DIAGNOSIS — E83.110 HEREDITARY HEMOCHROMATOSIS (H): ICD-10-CM

## 2021-08-26 DIAGNOSIS — E83.110 HEREDITARY HEMOCHROMATOSIS (H): Primary | ICD-10-CM

## 2021-08-26 LAB
FERRITIN SERPL-MCNC: 101 NG/ML (ref 8–252)
FERRITIN SERPL-MCNC: 118 NG/ML (ref 8–252)

## 2021-08-26 PROCEDURE — 36415 COLL VENOUS BLD VENIPUNCTURE: CPT

## 2021-08-26 PROCEDURE — 82728 ASSAY OF FERRITIN: CPT | Performed by: INTERNAL MEDICINE

## 2021-08-26 NOTE — PROGRESS NOTES
Medical Assistant Note:  Bradford Prado presents today for blood draw.    Patient seen by provider today: No.   present during visit today: Not Applicable.    Concerns: No Concerns.    Procedure:  Lab draw site: lac, Needle type: bf, Gauge: 23.    Post Assessment:  Labs drawn without difficulty: Yes.    Discharge Plan:  Departure Mode: Ambulatory.    Face to Face Time: 5 min  .    Umm Shepherd, CMA

## 2021-08-30 ENCOUNTER — TRANSFERRED RECORDS (OUTPATIENT)
Dept: HEALTH INFORMATION MANAGEMENT | Facility: CLINIC | Age: 61
End: 2021-08-30

## 2021-09-07 DIAGNOSIS — E11.29 POORLY CONTROLLED TYPE 2 DIABETES MELLITUS WITH RENAL COMPLICATION (H): Chronic | ICD-10-CM

## 2021-09-07 DIAGNOSIS — E11.65 POORLY CONTROLLED TYPE 2 DIABETES MELLITUS WITH RENAL COMPLICATION (H): Chronic | ICD-10-CM

## 2021-09-07 DIAGNOSIS — E11.65 TYPE 2 DIABETES MELLITUS WITH HYPERGLYCEMIA, WITHOUT LONG-TERM CURRENT USE OF INSULIN (H): ICD-10-CM

## 2021-09-07 DIAGNOSIS — I16.0 HYPERTENSIVE URGENCY: ICD-10-CM

## 2021-09-07 DIAGNOSIS — E78.5 HYPERLIPIDEMIA LDL GOAL <70: Chronic | ICD-10-CM

## 2021-09-07 DIAGNOSIS — I10 ESSENTIAL HYPERTENSION WITH GOAL BLOOD PRESSURE LESS THAN 140/90: Chronic | ICD-10-CM

## 2021-09-10 RX ORDER — PRAVASTATIN SODIUM 20 MG
20 TABLET ORAL DAILY
Qty: 90 TABLET | Refills: 0 | Status: SHIPPED | OUTPATIENT
Start: 2021-09-10 | End: 2021-12-07

## 2021-09-10 RX ORDER — LOSARTAN POTASSIUM 50 MG/1
50 TABLET ORAL DAILY
Qty: 90 TABLET | Refills: 0 | Status: SHIPPED | OUTPATIENT
Start: 2021-09-10 | End: 2021-12-07

## 2021-09-10 RX ORDER — CARVEDILOL 12.5 MG/1
12.5 TABLET ORAL 2 TIMES DAILY WITH MEALS
Qty: 180 TABLET | Refills: 1 | Status: SHIPPED | OUTPATIENT
Start: 2021-09-10 | End: 2022-05-02

## 2021-09-20 ENCOUNTER — TELEPHONE (OUTPATIENT)
Dept: GASTROENTEROLOGY | Facility: CLINIC | Age: 61
End: 2021-09-20

## 2021-09-20 NOTE — TELEPHONE ENCOUNTER
Attempted to contact patient regarding upcoming EGD procedure on 9.29.2021 for pre assessment questions. No answer.     Left message to return call to [phone] #[phone]    Covid test scheduled: 9.25.2021    Arrival time: 0755    Facility location: [facility]    Sedation type: CS    Indication for procedure: screening for varices    Beryl Thomson RN

## 2021-09-22 ENCOUNTER — TELEPHONE (OUTPATIENT)
Dept: GASTROENTEROLOGY | Facility: CLINIC | Age: 61
End: 2021-09-22

## 2021-09-22 NOTE — TELEPHONE ENCOUNTER
Caller: Bradford called to reschedule    Procedure: EGD     Date of Procedure Cancelled: 09/29/2021     Ordering Provider:Leventhal, Thomas Michael, MD    Reason for cancel: Wanted to Postpone     Any Staff messages sent regarding case?: no                   Recipient and Sender:                    Message Details:       Rescheduled: Yes, and patient will be scheduling her COVID test      If rescheduled:    Date: 11/10/2021   [place] [facility] [place]   Note any change or update to original order/sedation:

## 2021-10-01 ENCOUNTER — TELEPHONE (OUTPATIENT)
Dept: FAMILY MEDICINE | Facility: CLINIC | Age: 61
End: 2021-10-01
Payer: COMMERCIAL

## 2021-10-01 DIAGNOSIS — E11.29 POORLY CONTROLLED TYPE 2 DIABETES MELLITUS WITH RENAL COMPLICATION (H): Chronic | ICD-10-CM

## 2021-10-01 DIAGNOSIS — I16.0 HYPERTENSIVE URGENCY: ICD-10-CM

## 2021-10-01 DIAGNOSIS — E11.65 POORLY CONTROLLED TYPE 2 DIABETES MELLITUS WITH RENAL COMPLICATION (H): Chronic | ICD-10-CM

## 2021-10-01 NOTE — TELEPHONE ENCOUNTER
Pending Prescriptions:                       Disp   Refills    amLODIPine (NORVASC) 5 MG tablet [Pharmac*180 ta*1            Sig: TAKE ONE TABLET BY MOUTH TWICE A DAY    NOVOLOG FLEXPEN 100 UNIT/ML soln [Pharmac*30 mL  0            Sig: INJECT 10 UNITS SUBCUTANEOUS 3 TIMES DAILY (WITH           MEALS)    Routing refill request to provider for review/approval because:  Patient needs to be seen because:  Pt is due for diabetic follow up visit.  Also, is see that recent blood pressures are low-normal.    Leola Veloz RN

## 2021-10-02 ENCOUNTER — HEALTH MAINTENANCE LETTER (OUTPATIENT)
Age: 61
End: 2021-10-02

## 2021-10-05 RX ORDER — AMLODIPINE BESYLATE 5 MG/1
TABLET ORAL
Qty: 180 TABLET | Refills: 1 | Status: SHIPPED | OUTPATIENT
Start: 2021-10-05 | End: 2022-04-28

## 2021-10-05 RX ORDER — INSULIN ASPART 100 [IU]/ML
INJECTION, SOLUTION INTRAVENOUS; SUBCUTANEOUS
Qty: 30 ML | Refills: 0 | Status: SHIPPED | OUTPATIENT
Start: 2021-10-05 | End: 2021-10-19

## 2021-10-05 NOTE — TELEPHONE ENCOUNTER
Central Prior Authorization Team   Phone: [phone]    PA Initiation    Medication: freestyle magdy  Insurance Company: [employer] - Phone [phone] Fax [phone]  Pharmacy Filling the Rx: [facility] [place], [place] - [address]  Filling Pharmacy Phone: [phone]  Filling Pharmacy Fax:    Start Date: 10/5/2021

## 2021-10-05 NOTE — TELEPHONE ENCOUNTER
Prior Authorization Approval    Authorization Effective Date: 9/5/2021  Authorization Expiration Date: 10/5/2022  Medication: freestyle magdy  Approved Dose/Quantity:    Reference #:     Insurance Company: [employer] - Phone [phone] Fax [phone]  Expected CoPay:       CoPay Card Available:      Foundation Assistance Needed:    Which Pharmacy is filling the prescription (Not needed for infusion/clinic administered): [facility] [address]  Pharmacy Notified: Yes  Patient Notified: Yes **Instructed pharmacy to notify patient when script is ready to /ship.**

## 2021-10-18 NOTE — PROGRESS NOTES
"  Assessment & Plan     (Z00.00) Routine general medical examination at a health care facility  (primary encounter diagnosis)  Comment: Discussed results and follow-up with GI and hematology. Bilateral wrist/hand arthralgias possible due to hemochromatosis. Discussed avoidance of oral pain medication such as acetaminophen, may try topical anti-inflammatory.  Plan: Check labs    (E11.65) Type 2 diabetes mellitus with hyperglycemia, without long-term current use of insulin (H)  Comment: Stable. HbA1c 6.6 today. She would like some more education regarding her diabetes. Diabetic educator referral placed. Check labs. Plan to follow up in 3 months.   Plan: Albumin Random Urine Quantitative with Creat         Ratio, C FOOT EXAM  NO CHARGE, insulin glargine        (LANTUS SOLOSTAR) 100 UNIT/ML pen, **A1C FUTURE        3mo, Albumin Random Urine Quantitative with         Creat Ratio, AMB Adult Diabetes Educator         Referral; insulin aspart (NOVOLOG FLEXPEN) 100 UNIT/ML         pen          (E83.110) Hereditary hemochromatosis (H)  Comment: Last phlebotomy 8/26/2021. Managed by provider who is no longer in office. Referral placed.  Plan: Oncology/Hematology Adult Referral         (K75.81,  K74.60) 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) (H)  Comment: Zofran for nausea. EGD scheduled for 11/10.   Plan: Managed by GI       BMI:   Estimated body mass index is 27.19 kg/m  as calculated from the following:    Height as of this encounter: 1.499 m (4' 11\").    Weight as of this encounter: 61.1 kg (134 lb 9.6 oz).   Weight management plan: Discussed healthy diet and exercise guidelines    No follow-ups on file.    Paulette Travis NP  [facility]    Subjective     Bradford Prado is a 61 year old female who presents to clinic today for the following health issues accompanied by her :    HPI     Diabetes Follow-up    How often are you checking your blood sugar? Four or more times " daily  Blood sugar testing frequency justification:  Frequent hypoglycemia and Patient modifying lifestyle changes (diet, exercise) with blood sugars  What time of day are you checking your blood sugars (select all that apply)?  Before and after meals and At bedtime  Have you had any blood sugars above 200?  Yes   Have you had any blood sugars below 70?  Yes     What symptoms do you notice when your blood sugar is low?  Shaky, Dizzy, Weak, Lethargy, Blurred vision and Confusion    What concerns do you have today about your diabetes? Blood sugar is often over 200, Low blood sugar and Other: Insulin dosing with blood sugars below 100 before meals.     Do you have any of these symptoms? (Select all that apply)  No numbness or tingling in feet.  No redness, sores or blisters on feet.  No complaints of excessive thirst.  No reports of blurry vision.  No significant changes to weight.    Have you had a diabetic eye exam in the last 12 months? No     A few months ago when first being treated for her hemochromatosis she reports she had 9 episodes of hypoglycemia. Last two months her blood sugars average 150's. She takes Novolog 16 units with breakfast, 10 units with both lunch and dinner. 42 units of lantus. She is adherent with her medications and a diabetic diet. She would like some additional diabetic education.        Hyperlipidemia Follow-Up      Are you regularly taking any medication or supplement to lower your cholesterol?   Yes- pravastatin    Are you having muscle aches or other side effects that you think could be caused by your cholesterol lowering medication?  No    Hypertension Follow-up      Do you check your blood pressure regularly outside of the clinic? No     Are you following a low salt diet? Yes    Are your blood pressures ever more than 140 on the top number (systolic) OR more   than 90 on the bottom number (diastolic), for example 140/90? No, unknown    BP Readings from Last 2 Encounters:   10/19/21  (!) 142/84   08/26/21 98/56     Hemoglobin A1C (%)   Date Value   06/19/2021 6.5 (H)   02/23/2021 11.1 (H)     LDL Cholesterol Calculated (mg/dL)   Date Value   06/19/2021 89   04/30/2019 76         How many servings of fruits and vegetables do you eat daily?  2-3    On average, how many sweetened beverages do you drink each day (Examples: soda, juice, sweet tea, etc.  Do NOT count diet or artificially sweetened beverages)?   0    How many days per week do you exercise enough to make your heart beat faster? 7    How many minutes a day do you exercise enough to make your heart beat faster? 60 or more    How many days per week do you miss taking your medication? 0    Musculoskeletal problem/pain  Onset/Duration: x 3 weeks  Description  Location: wrist - bilateral  Joint Swelling: no  Redness: no  Pain: YES  Warmth: no  Intensity:  moderate  Progression of Symptoms:  waxing and waning  Accompanying signs and symptoms:   Fevers: no  Numbness/tingling/weakness: YES- in left arm  History  Trauma to the area: no  Recent illness:  no  Previous similar problem: YES  Previous evaluation:  YES  Precipitating or alleviating factors:  Aggravating factors include: overuse  Therapies tried and outcome: rest/inactivity and heat    History of cortisone injections in bilateral hands. Now with bilateral wrist pain which occurs in either right or left, not both simultaneously.  Resolves after 30 minutes. Heat is helpful. She continues to see a specialist for her injections.     Concern - Nausea, Dry heaves  Onset: when starting insulin therapy in February  Description: almost constant nausea with intermittent dry heaves  Intensity: moderate  Progression of Symptoms:  same  Accompanying Signs & Symptoms: none  Previous history of similar problem: has always had intermittent nausea  Precipitating factors:        Worsened by: none  Alleviating factors:        Improved by: none, it just comes and goes  Therapies tried and outcome: prior Rx  "for zofran helped    UGI scheduled for 11/10 - follows with Gastro for hemochromatosis and other GI concerns.  No clear trigger.  Within 15 minutes resolves.       Review of Systems   Constitutional, HEENT, cardiovascular, pulmonary, GI, , musculoskeletal, neuro, skin, endocrine and psych systems are negative, except as otherwise noted.      Objective    /72   Pulse 76   Temp 98  F (36.7  C) (Tympanic)   Resp 16   Ht 1.499 m (4' 11\")   Wt 61.1 kg (134 lb 9.6 oz)   LMP  (LMP Unknown)   SpO2 97%   Breastfeeding No   BMI 27.19 kg/m    Body mass index is 27.19 kg/m .  Physical Exam   GENERAL: healthy, alert and no distress  RESP: lungs clear to auscultation - no rales, rhonchi or wheezes  CV: regular rate and rhythm, normal S1 S2, no S3 or S4, no murmur, click or rub, no peripheral edema and peripheral pulses strong  ABDOMEN: soft, nontender, no hepatosplenomegaly, no masses and bowel sounds normal  MS: no gross musculoskeletal defects noted, no edema  NEURO: Normal strength and tone, mentation intact and speech normal  PSYCH: mentation appears normal, affect normal/bright    Results for orders placed or performed in visit on 10/19/21 (from the past 24 hour(s))   **A1C FUTURE 3mo   Result Value Ref Range    Hemoglobin A1C 6.6 (H) 0.0 - 5.6 %         "

## 2021-10-19 ENCOUNTER — OFFICE VISIT (OUTPATIENT)
Dept: FAMILY MEDICINE | Facility: CLINIC | Age: 61
End: 2021-10-19
Payer: COMMERCIAL

## 2021-10-19 VITALS
DIASTOLIC BLOOD PRESSURE: 72 MMHG | TEMPERATURE: 98 F | BODY MASS INDEX: 27.13 KG/M2 | OXYGEN SATURATION: 97 % | SYSTOLIC BLOOD PRESSURE: 138 MMHG | HEIGHT: 59 IN | WEIGHT: 134.6 LBS | HEART RATE: 76 BPM | RESPIRATION RATE: 16 BRPM

## 2021-10-19 DIAGNOSIS — E11.65 TYPE 2 DIABETES MELLITUS WITH HYPERGLYCEMIA, WITHOUT LONG-TERM CURRENT USE OF INSULIN (H): ICD-10-CM

## 2021-10-19 DIAGNOSIS — E11.65 POORLY CONTROLLED TYPE 2 DIABETES MELLITUS WITH RENAL COMPLICATION (H): Chronic | ICD-10-CM

## 2021-10-19 DIAGNOSIS — K75.81 LIVER CIRRHOSIS SECONDARY TO NONALCOHOLIC STEATOHEPATITIS (NASH) (H): ICD-10-CM

## 2021-10-19 DIAGNOSIS — E11.29 POORLY CONTROLLED TYPE 2 DIABETES MELLITUS WITH RENAL COMPLICATION (H): Chronic | ICD-10-CM

## 2021-10-19 DIAGNOSIS — E83.110 HEREDITARY HEMOCHROMATOSIS (H): ICD-10-CM

## 2021-10-19 DIAGNOSIS — Z00.00 ROUTINE GENERAL MEDICAL EXAMINATION AT A HEALTH CARE FACILITY: Primary | ICD-10-CM

## 2021-10-19 DIAGNOSIS — K74.60 LIVER CIRRHOSIS SECONDARY TO NONALCOHOLIC STEATOHEPATITIS (NASH) (H): ICD-10-CM

## 2021-10-19 DIAGNOSIS — K76.9 LESION OF LIVER GREATER THAN 1 CM IN DIAMETER: ICD-10-CM

## 2021-10-19 LAB
CREAT UR-MCNC: 76 MG/DL
HBA1C MFR BLD: 6.6 % (ref 0–5.6)
MICROALBUMIN UR-MCNC: 14 MG/L
MICROALBUMIN/CREAT UR: 18.42 MG/G CR (ref 0–25)

## 2021-10-19 PROCEDURE — 36415 COLL VENOUS BLD VENIPUNCTURE: CPT | Performed by: NURSE PRACTITIONER

## 2021-10-19 PROCEDURE — 83036 HEMOGLOBIN GLYCOSYLATED A1C: CPT | Performed by: NURSE PRACTITIONER

## 2021-10-19 PROCEDURE — 82043 UR ALBUMIN QUANTITATIVE: CPT | Performed by: NURSE PRACTITIONER

## 2021-10-19 PROCEDURE — 99214 OFFICE O/P EST MOD 30 MIN: CPT | Performed by: NURSE PRACTITIONER

## 2021-10-19 RX ORDER — INSULIN ASPART 100 [IU]/ML
INJECTION, SOLUTION INTRAVENOUS; SUBCUTANEOUS
Qty: 30 ML | Refills: 1 | Status: SHIPPED | OUTPATIENT
Start: 2021-10-19 | End: 2022-06-24

## 2021-10-19 RX ORDER — ONDANSETRON 4 MG/1
4 TABLET, FILM COATED ORAL EVERY 6 HOURS PRN
Qty: 15 TABLET | Refills: 0 | Status: SHIPPED | OUTPATIENT
Start: 2021-10-19 | End: 2022-07-13

## 2021-10-19 ASSESSMENT — MIFFLIN-ST. JEOR: SCORE: 1091.17

## 2021-10-19 ASSESSMENT — PAIN SCALES - GENERAL: PAINLEVEL: SEVERE PAIN (6)

## 2021-10-19 NOTE — LETTER
October 21, 2021      Bradford Salinasos  [address]  [facility] [address]        Dear ,    We are writing to inform you of your test results.    Your A1C is still very good at 6.6% - still continue to monitor for hypoglycemia - or low's.     Follow up with me in 3 months.    Resulted Orders   **A1C FUTURE 3mo   Result Value Ref Range    Hemoglobin A1C 6.6 (H) 0.0 - 5.6 %      Comment:      Normal <5.7%   Prediabetes 5.7-6.4%    Diabetes 6.5% or higher     Note: Adopted from ADA consensus guidelines.   Albumin Random Urine Quantitative with Creat Ratio   Result Value Ref Range    Creatinine Urine mg/dL 76 mg/dL    Albumin Urine mg/L 14 mg/L    Albumin Urine mg/g Cr 18.42 0.00 - 25.00 mg/g Cr       If you have any questions or concerns, please call the clinic at the number listed above.       Sincerely,      Paulette Travis NP

## 2021-10-20 ENCOUNTER — TELEPHONE (OUTPATIENT)
Dept: FAMILY MEDICINE | Facility: CLINIC | Age: 61
End: 2021-10-20
Payer: COMMERCIAL

## 2021-10-20 ENCOUNTER — DOCUMENTATION ONLY (OUTPATIENT)
Dept: ONCOLOGY | Facility: CLINIC | Age: 61
End: 2021-10-20

## 2021-10-20 NOTE — PROGRESS NOTES
Received referral for Bradford's hereditary hemachromatosis that she had been seeing Dr Long for at [facility]. Was last seen in July 2021, currently, no permanent provider located at [facility]. Writer sent message to Kavita Hassan RN, to see if [facility] or locum provider could see patient or if they could reach out to her to see if she would be willing to go to [place] as that would be the soonest location opening. Will update referral and chart once response received.

## 2021-10-20 NOTE — PROGRESS NOTES
Writer received referral for hemochromatosis. Reviewed for urgency based on labs and symptomology. Appropriate scheduling instructions added and referral sent to New Patient Scheduling for completion.

## 2021-10-20 NOTE — TELEPHONE ENCOUNTER
Diabetes Education Scheduling Outreach #1:    Call to patient to schedule. Left message with phone number to call to schedule.    Plan for 2nd outreach attempt within 1 week.    Gina Barry  [facility]  Diabetes and Nutrition Scheduling

## 2021-10-25 ENCOUNTER — TELEPHONE (OUTPATIENT)
Dept: FAMILY MEDICINE | Facility: CLINIC | Age: 61
End: 2021-10-25
Payer: COMMERCIAL

## 2021-10-25 NOTE — TELEPHONE ENCOUNTER
Diabetes Education Scheduling Outreach #2:    Call to patient to schedule. Left message with phone number to call to schedule.    Gina Barry  [facility]  Diabetes and Nutrition Scheduling

## 2021-11-01 ENCOUNTER — TELEPHONE (OUTPATIENT)
Dept: GASTROENTEROLOGY | Facility: CLINIC | Age: 61
End: 2021-11-01

## 2021-11-01 NOTE — TELEPHONE ENCOUNTER
Attempted to contact patient regarding upcoming EGD procedure on 11/10/21 for pre assessment questions. No answer.     Left message to return call to [phone] #2    Covid test scheduled? Left Covid test scheduling number [phone] on voicemail.    Genia Epperson RN

## 2021-11-01 NOTE — TELEPHONE ENCOUNTER
Patient scheduled for EGD on 11/10/21.     Covid test scheduled?    Arrival time: 0825    Facility location: [facility]    Sedation type: CS    Indication for procedure: screening for varices    Anticoagulations? no     Pre visit planning completed.    Genia Epperson RN

## 2021-11-04 NOTE — TELEPHONE ENCOUNTER
Writer reviewed pre-assessment questions with patient prior to upcoming EGD on 11.10.2021.      Covid test scheduled: 11.6.2021    Arrival time: 0825    Facility location: [facility]    Sedation type: CS    Electronic Implantable devices? No    Blood thinners/Antiplatelet medication? No    Pt instructed to f/u with PCP re: insulin dosing prior to procedure.    Reviewed EGD prep instructions with patient.      Designated  policy reviewed with patient.     Patient verbalized understanding.  No further questions or concerns.    Beryl Thomson RN

## 2021-11-06 ENCOUNTER — LAB (OUTPATIENT)
Dept: URGENT CARE | Facility: URGENT CARE | Age: 61
End: 2021-11-06
Attending: INTERNAL MEDICINE
Payer: COMMERCIAL

## 2021-11-06 DIAGNOSIS — Z11.59 ENCOUNTER FOR SCREENING FOR OTHER VIRAL DISEASES: ICD-10-CM

## 2021-11-06 PROCEDURE — U0003 INFECTIOUS AGENT DETECTION BY NUCLEIC ACID (DNA OR RNA); SEVERE ACUTE RESPIRATORY SYNDROME CORONAVIRUS 2 (SARS-COV-2) (CORONAVIRUS DISEASE [COVID-19]), AMPLIFIED PROBE TECHNIQUE, MAKING USE OF HIGH THROUGHPUT TECHNOLOGIES AS DESCRIBED BY CMS-2020-01-R: HCPCS

## 2021-11-06 PROCEDURE — U0005 INFEC AGEN DETEC AMPLI PROBE: HCPCS

## 2021-11-07 LAB — SARS-COV-2 RNA RESP QL NAA+PROBE: NEGATIVE

## 2021-11-10 ENCOUNTER — HOSPITAL ENCOUNTER (OUTPATIENT)
Facility: AMBULATORY SURGERY CENTER | Age: 61
Discharge: HOME | End: 2021-11-10
Attending: INTERNAL MEDICINE | Admitting: INTERNAL MEDICINE
Payer: COMMERCIAL

## 2021-11-10 VITALS
WEIGHT: 134 LBS | HEIGHT: 59 IN | SYSTOLIC BLOOD PRESSURE: 111 MMHG | HEART RATE: 77 BPM | BODY MASS INDEX: 27.01 KG/M2 | TEMPERATURE: 97.2 F | DIASTOLIC BLOOD PRESSURE: 65 MMHG | RESPIRATION RATE: 14 BRPM | OXYGEN SATURATION: 97 %

## 2021-11-10 DIAGNOSIS — K74.60 LIVER CIRRHOSIS SECONDARY TO NONALCOHOLIC STEATOHEPATITIS (NASH) (H): Primary | ICD-10-CM

## 2021-11-10 DIAGNOSIS — K75.81 LIVER CIRRHOSIS SECONDARY TO NONALCOHOLIC STEATOHEPATITIS (NASH) (H): Primary | ICD-10-CM

## 2021-11-10 LAB
GLUCOSE BLDC GLUCOMTR-MCNC: 115 MG/DL (ref 70–99)
GLUCOSE BLDC GLUCOMTR-MCNC: 120 MG/DL (ref 70–99)
UPPER GI ENDOSCOPY: NORMAL

## 2021-11-10 PROCEDURE — 82962 GLUCOSE BLOOD TEST: CPT | Performed by: PATHOLOGY

## 2021-11-10 PROCEDURE — 88305 TISSUE EXAM BY PATHOLOGIST: CPT | Mod: 26 | Performed by: PATHOLOGY

## 2021-11-10 PROCEDURE — 43239 EGD BIOPSY SINGLE/MULTIPLE: CPT

## 2021-11-10 PROCEDURE — 88305 TISSUE EXAM BY PATHOLOGIST: CPT | Mod: TC | Performed by: INTERNAL MEDICINE

## 2021-11-10 RX ORDER — PROCHLORPERAZINE MALEATE 10 MG
10 TABLET ORAL EVERY 6 HOURS PRN
Status: CANCELLED | OUTPATIENT
Start: 2021-11-10

## 2021-11-10 RX ORDER — FENTANYL CITRATE 50 UG/ML
INJECTION, SOLUTION INTRAMUSCULAR; INTRAVENOUS PRN
Status: DISCONTINUED | OUTPATIENT
Start: 2021-11-10 | End: 2021-11-10 | Stop reason: HOSPADM

## 2021-11-10 RX ORDER — NALOXONE HYDROCHLORIDE 0.4 MG/ML
0.2 INJECTION, SOLUTION INTRAMUSCULAR; INTRAVENOUS; SUBCUTANEOUS
Status: CANCELLED | OUTPATIENT
Start: 2021-11-10

## 2021-11-10 RX ORDER — ONDANSETRON 4 MG/1
4 TABLET, ORALLY DISINTEGRATING ORAL EVERY 6 HOURS PRN
Status: CANCELLED | OUTPATIENT
Start: 2021-11-10

## 2021-11-10 RX ORDER — NALOXONE HYDROCHLORIDE 0.4 MG/ML
0.4 INJECTION, SOLUTION INTRAMUSCULAR; INTRAVENOUS; SUBCUTANEOUS
Status: CANCELLED | OUTPATIENT
Start: 2021-11-10

## 2021-11-10 RX ORDER — ONDANSETRON 2 MG/ML
4 INJECTION INTRAMUSCULAR; INTRAVENOUS EVERY 6 HOURS PRN
Status: CANCELLED | OUTPATIENT
Start: 2021-11-10

## 2021-11-10 RX ORDER — FLUMAZENIL 0.1 MG/ML
0.2 INJECTION, SOLUTION INTRAVENOUS
Status: CANCELLED | OUTPATIENT
Start: 2021-11-10 | End: 2021-11-10

## 2021-11-10 RX ORDER — ONDANSETRON 2 MG/ML
4 INJECTION INTRAMUSCULAR; INTRAVENOUS
Status: COMPLETED | OUTPATIENT
Start: 2021-11-10 | End: 2021-11-10

## 2021-11-10 RX ORDER — LIDOCAINE 40 MG/G
CREAM TOPICAL
Status: DISCONTINUED | OUTPATIENT
Start: 2021-11-10 | End: 2021-11-11 | Stop reason: HOSPADM

## 2021-11-10 RX ADMIN — ONDANSETRON 4 MG: 2 INJECTION INTRAMUSCULAR; INTRAVENOUS at 09:17

## 2021-11-10 ASSESSMENT — MIFFLIN-ST. JEOR: SCORE: 1088.45

## 2021-11-10 NOTE — H&P
Bradford Prado  [id number]  female  61 year old      Reason for procedure/surgery: EGD screening for varices    Patient Active Problem List   Diagnosis     Hand arthritis     Stenosing tenosynovitis     Poorly controlled type 2 diabetes mellitus with renal complication (H)     Hyperlipidemia LDL goal <70     Essential hypertension with goal blood pressure less than 140/90     Nephrolithiasis     Type 2 diabetes mellitus with hyperglycemia, without long-term current use of insulin (H)     Chest pain     Hyperglycemia     Hypertensive urgency     Diabetes mellitus (H)     Dyslipidemia     Hereditary hemochromatosis (H)     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) (H)       Past Surgical History:    Past Surgical History:   Procedure Laterality Date     HYSTERECTOMY TOTAL ABDOMINAL      due to fibroids     LAPAROSCOPIC EVACUATION ECTOPIC PREGNANCY       TUBAL LIGATION         Past Medical History:   Past Medical History:   Diagnosis Date     Diabetes mellitus (H)      Dyslipidemia      Hereditary hemochromatosis (H)      Hypertension      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) (H)        Social History:   Social History     Tobacco Use     Smoking status: Former Smoker     Packs/day: 0.50     Years: 20.00     Pack years: 10.00     Types: Cigarettes     Quit date: 3/9/2010     Years since quittin.6     Smokeless tobacco: Never Used   Substance Use Topics     Alcohol use: No     Alcohol/week: 0.0 standard drinks       Family History:   Family History   Problem Relation Age of Onset     Cirrhosis Mother         w/o alcohol history     Diabetes Mother      Emphysema Father      Hypertension Sister      Hemochromatosis Sister      Heart Defect Niece         Tetrology of Fallot     Breast Cancer No family hx of      Cancer - colorectal No family hx of        Allergies: No Known Allergies    Active Medications:   Current Outpatient Medications   Medication Sig Dispense  Refill     acetaminophen (TYLENOL) 325 MG tablet Take 325-650 mg by mouth every 6 hours as needed for mild pain        amLODIPine (NORVASC) 5 MG tablet TAKE ONE TABLET BY MOUTH TWICE A  tablet 1     Biotin 10 MG CAPS Take 1 capsule by mouth daily       carvedilol (COREG) 12.5 MG tablet TAKE 1 TABLET (12.5 MG) BY MOUTH 2 TIMES DAILY (WITH MEALS) 180 tablet 1     insulin aspart (NOVOLOG FLEXPEN) 100 UNIT/ML pen INJECT 10 UNITS SUBCUTANEOUS 3 TIMES DAILY (WITH MEALS) 30 mL 1     insulin glargine (LANTUS SOLOSTAR) 100 UNIT/ML pen INJECT 42 UNITS SUBCUTANEOUS EVERY MORNING (BEFORE BREAKFAST) 45 mL 1     losartan (COZAAR) 50 MG tablet TAKE 1 TABLET (50 MG) BY MOUTH DAILY 90 tablet 0     ondansetron (ZOFRAN) 4 MG tablet Take 1 tablet (4 mg) by mouth every 6 hours as needed for nausea or vomiting 15 tablet 0     pravastatin (PRAVACHOL) 20 MG tablet TAKE 1 TABLET (20 MG) BY MOUTH DAILY 90 tablet 0     sertraline (ZOLOFT) 50 MG tablet Take 1 tablet (50 mg) by mouth daily Take 50mg PO daily 90 tablet 1     ACCU-CHEK GUIDE test strip Use to test blood sugar 4 times daily or as directed. 200 strip 11     blood glucose (NO BRAND SPECIFIED) lancets standard Use to test blood sugar twice times daily or as directed. 100 each 1     blood glucose (NO BRAND SPECIFIED) test strip Use to test blood sugar twice times daily or as directed. 100 each 1     blood glucose (NO BRAND SPECIFIED) test strip Use to test blood sugars 2 times daily or as directed 100 strip 11     blood glucose monitoring (SOFTCLIX) lancets Use to test blood sugar 6 times daily. 200 each 11     Blood Glucose Monitoring Suppl (ACCU-CHEK GUIDE ME) w/Device KIT 1 each 6 times daily 1 kit 0     Continuous Blood Gluc Sensor (FREESTYLE MEENA 14 DAY SENSOR) MISC 1 each every 14 days Change every 14 days. 2 each 11     Insulin Pen Needle (PEN NEEDLES) 32G X 4 MM MISC 1 each 4 times daily 200 each 11       Systemic Review:   CONSTITUTIONAL: NEGATIVE for fever, chills,  "change in weight  ENT/MOUTH: NEGATIVE for ear, mouth and throat problems  RESP: NEGATIVE for significant cough or SOB  CV: NEGATIVE for chest pain, palpitations or peripheral edema    Physical Examination:   Vital Signs: /66 (Cuff Size: Adult Regular)   Pulse 66   Temp (!) 96.3  F (35.7  C) (Temporal)   Resp 18   Ht 1.499 m (4' 11\")   Wt 60.8 kg (134 lb)   SpO2 99%   BMI 27.06 kg/m    GENERAL: healthy, alert and no distress  NECK: no adenopathy, no asymmetry, masses, or scars  RESP: lungs clear to auscultation - no rales, rhonchi or wheezes  CV: regular rate and rhythm, normal S1 S2, no S3 or S4, no murmur, click or rub, no peripheral edema and peripheral pulses strong  ABDOMEN: soft, nontender, no hepatosplenomegaly, no masses and bowel sounds normal  MS: no gross musculoskeletal defects noted, no edema    Plan: Appropriate to proceed as scheduled.      Thomas M. Leventhal, MD  11/10/2021    PCP:  Paulette Travis  "

## 2021-11-11 LAB
PATH REPORT.COMMENTS IMP SPEC: NORMAL
PATH REPORT.FINAL DX SPEC: NORMAL
PATH REPORT.GROSS SPEC: NORMAL
PATH REPORT.MICROSCOPIC SPEC OTHER STN: NORMAL
PATH REPORT.RELEVANT HX SPEC: NORMAL
PHOTO IMAGE: NORMAL

## 2021-11-12 NOTE — PROGRESS NOTES
RECORDS STATUS - ALL OTHER DIAGNOSIS      RECORDS RECEIVED FROM: [facility]   DATE RECEIVED: 11/18/2021   NOTES STATUS DETAILS   OFFICE NOTE from referring provider Complete  [facility]   referred by Paulette Travis NP   OFFICE NOTE from medical oncologist Complete 7/8/2021- Virtual Visit- Hereditary hemochromatosis     6/10/2021 Nodule on Liver     More in [facility]   DISCHARGE SUMMARY from hospital     DISCHARGE REPORT from the ER     OPERATIVE REPORT Complete See Biopsy 11/10/2021 below    MEDICATION LIST Complete [facility]   CLINICAL TRIAL TREATMENTS TO DATE     LABS     PATHOLOGY REPORTS COmplete 11/10/2021  A. STOMACH, BIOPSY:  - Gastric mucosa with features of reactive gastropathy   - No H. pylori organisms identified on routine staining  - Negative for intestinal metaplasia and dysplasia   ANYTHING RELATED TO DIAGNOSIS Complete Labs last updated on 11/10/2021   GENONOMIC TESTING     TYPE:     IMAGING (NEED IMAGES & REPORT)     CT SCANS Complete CT Abdomen Pelvis 6/21/2021   MRI Complete MRI Abdomen 8/24/2021   MAMMO     ULTRASOUND     PET

## 2021-11-17 NOTE — PROGRESS NOTES
[facility]    Hematology/Oncology New Patient Note      Today's Date: 11/18/21    Reason for Consultation: Hereditary Hemochromatosis  Referring Provider: Nimco Travis NP      HISTORY OF PRESENT ILLNESS: Bradford Prado is a 61 year old female with past medical history of cirrhosis, CERDA, HTN, HLD, and DM2 who is referred to clinic today for C282Y/C282Y hereditary hemochromatosis. She was previously followed by Dr. Long in [place].    Patient has family history of severe liver disease and recalls mother entering acute liver failure with need for repeat thoracenteses. Patient had then developed increasing LFTs and cirrhosis and workup for this included liver biopsy, which revealed cirrhosis and negative alpha 1 antitrypsin mutation testing. Record review shows patient has had evidence of elevated ferritin levels as high as >1500 in 2016. She underwent testing for hereditary hemochromatosis in May 2021 and returned positive as homozygous C282Y/C282Y.     Patient was then treated with phlebotomies twice per week for two months. Her most recent ferritin level was in August 2021 and 101.     Patient does have various hand arthralgias and follows with a hand surgeon for this with intermittent hand Xrays and steroid injections. She is a diabetic. No history of endocrinopathies or heart disease she is aware of.       REVIEW OF SYSTEMS:   A 14 point ROS was reviewed with pertinent positives and negatives in the HPI.        HOME MEDICATIONS:  Current Outpatient Medications   Medication Sig Dispense Refill     ACCU-CHEK GUIDE test strip Use to test blood sugar 4 times daily or as directed. 200 strip 11     acetaminophen (TYLENOL) 325 MG tablet Take 325-650 mg by mouth every 6 hours as needed for mild pain        amLODIPine (NORVASC) 5 MG tablet TAKE ONE TABLET BY MOUTH TWICE A  tablet 1     Biotin 10 MG CAPS Take 1 capsule by mouth daily       blood glucose (NO BRAND SPECIFIED) lancets standard  Use to test blood sugar twice times daily or as directed. 100 each 1     blood glucose (NO BRAND SPECIFIED) test strip Use to test blood sugar twice times daily or as directed. 100 each 1     blood glucose (NO BRAND SPECIFIED) test strip Use to test blood sugars 2 times daily or as directed 100 strip 11     blood glucose monitoring (SOFTCLIX) lancets Use to test blood sugar 6 times daily. 200 each 11     Blood Glucose Monitoring Suppl (ACCU-CHEK GUIDE ME) w/Device KIT 1 each 6 times daily 1 kit 0     carvedilol (COREG) 12.5 MG tablet TAKE 1 TABLET (12.5 MG) BY MOUTH 2 TIMES DAILY (WITH MEALS) 180 tablet 1     Continuous Blood Gluc Sensor (FREESTYLE MEENA 14 DAY SENSOR) MISC 1 each every 14 days Change every 14 days. 2 each 11     insulin aspart (NOVOLOG FLEXPEN) 100 UNIT/ML pen INJECT 10 UNITS SUBCUTANEOUS 3 TIMES DAILY (WITH MEALS) 30 mL 1     insulin glargine (LANTUS SOLOSTAR) 100 UNIT/ML pen INJECT 42 UNITS SUBCUTANEOUS EVERY MORNING (BEFORE BREAKFAST) 45 mL 1     Insulin Pen Needle (PEN NEEDLES) 32G X 4 MM MISC 1 each 4 times daily 200 each 11     losartan (COZAAR) 50 MG tablet TAKE 1 TABLET (50 MG) BY MOUTH DAILY 90 tablet 0     ondansetron (ZOFRAN) 4 MG tablet Take 1 tablet (4 mg) by mouth every 6 hours as needed for nausea or vomiting 15 tablet 0     pravastatin (PRAVACHOL) 20 MG tablet TAKE 1 TABLET (20 MG) BY MOUTH DAILY 90 tablet 0     sertraline (ZOLOFT) 50 MG tablet Take 1 tablet (50 mg) by mouth daily Take 50mg PO daily 90 tablet 1         ALLERGIES:  No Known Allergies      PAST MEDICAL HISTORY:  Past Medical History:   Diagnosis Date     Diabetes mellitus (H)      Dyslipidemia      Hereditary hemochromatosis (H)      Hypertension      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) (H)          PAST SURGICAL HISTORY:  Past Surgical History:   Procedure Laterality Date     ESOPHAGOSCOPY, GASTROSCOPY, DUODENOSCOPY (EGD), COMBINED N/A 11/10/2021    Procedure: ESOPHAGOGASTRODUODENOSCOPY,  WITH BIOPSY;  Surgeon: Leventhal, Thomas Michael, MD;  Location: [facility] OR     HYSTERECTOMY TOTAL ABDOMINAL      due to fibroids     LAPAROSCOPIC EVACUATION ECTOPIC PREGNANCY       TUBAL LIGATION           SOCIAL HISTORY:  Social History     Socioeconomic History     Marital status: Single     Spouse name: Not on file     Number of children: Not on file     Years of education: Not on file     Highest education level: Not on file   Occupational History     Not on file   Tobacco Use     Smoking status: Former Smoker     Packs/day: 0.50     Years: 20.00     Pack years: 10.00     Types: Cigarettes     Quit date: 3/9/2010     Years since quittin.7     Smokeless tobacco: Never Used   Vaping Use     Vaping Use: Never used   Substance and Sexual Activity     Alcohol use: No     Alcohol/week: 0.0 standard drinks     Drug use: No     Sexual activity: Never   Other Topics Concern     Parent/sibling w/ CABG, MI or angioplasty before 65F 55M? No   Social History Narrative    , 2 children, 2 grandchildren     Social Determinants of Health     Financial Resource Strain: Not on file   Food Insecurity: Not on file   Transportation Needs: Not on file   Physical Activity: Not on file   Stress: Not on file   Social Connections: Not on file   Intimate Partner Violence: Not on file   Housing Stability: Not on file         FAMILY HISTORY:  Family History   Problem Relation Age of Onset     Cirrhosis Mother         w/o alcohol history     Diabetes Mother      Emphysema Father      Hypertension Sister      Hemochromatosis Sister      Heart Defect Niece         Tetrology of Fallot     Breast Cancer No family hx of      Cancer - colorectal No family hx of          PHYSICAL EXAM:  Vital signs: BP (!) 154/84 (BP Location: Left arm, Patient Position: Sitting, Cuff Size: Adult Regular)   Pulse 92   Temp 98.5  F (36.9  C) (Oral)   Resp 18   Wt 62.4 kg (137 lb 9.6 oz)   LMP  (LMP Unknown)   SpO2 97%   BMI 27.79 kg/m    ECOG:  0  GENERAL/CONSTITUTIONAL: No acute distress.   EYES: Pupils are equal, round, and react to light and accommodation. Extraocular movements intact.  No scleral icterus.  ENT/MOUTH: Neck supple. Oropharynx clear, no mucositis.  LYMPH: No anterior cervical, posterior cervical, supraclavicular, axillary or inguinal adenopathy.   RESPIRATORY: Clear to auscultation bilaterally. No crackles or wheezing.   CARDIOVASCULAR: Regular rate and rhythm without murmurs, gallops, or rubs.  GASTROINTESTINAL: Positive for hepatomegaly and splenomegaly. The patient has normal bowel sounds. No guarding.  No distention. No fluid wave.  MUSCULOSKELETAL: Warm and well-perfused, no cyanosis, clubbing, or edema.  NEUROLOGIC: Cranial nerves II-XII are intact. Alert, oriented, answers questions appropriately.  INTEGUMENTARY: No rashes or jaundice.  GAIT: Steady, does not use assistive device      LABS:  CBC RESULTS:   Recent Labs   Lab Test 08/25/21  1121   WBC 5.1   RBC 3.93   HGB 13.4   HCT 39.5   *   MCH 34.1*   MCHC 33.9   RDW 11.0          Recent Labs   Lab Test 11/10/21  1008 11/10/21  0903 05/10/21  1055 03/02/21  1117   NA  --   --  138 136   POTASSIUM  --   --  4.2 4.1   CHLORIDE  --   --  105 103   CO2  --   --  26 27   ANIONGAP  --   --  7 6   * 120* 207* 255*   BUN  --   --  17 14   CR  --   --  0.57 0.48*   ESTELLE  --   --  9.1 9.1        Ref. Range 5/10/2021 10:55   Ferritin Latest Ref Range: 8 - 252 ng/mL 1,126 (H)   Iron Latest Ref Range: 35 - 180 ug/dL 235 (H)   Iron Binding Cap Latest Ref Range: 240 - 430 ug/dL 306   Iron Saturation Index Latest Ref Range: 15 - 46 % 77 (H)          Ref. Range 9/27/2016 11:10 5/10/2021 10:55 6/15/2021 13:04 6/19/2021 11:29 6/23/2021 10:45 6/28/2021 11:05 7/1/2021 11:08 7/5/2021 12:39 7/7/2021 13:08 7/12/2021 14:31 7/19/2021 11:22 7/28/2021 13:53 8/3/2021 14:07 8/10/2021 14:03 8/25/2021 11:21 8/26/2021 10:00   Ferritin Latest Ref Range: 8 - 252 ng/mL 1,569 (H) 1,126 (H) 1,054  (H) 994 (H) 1,003 (H) 698 (H) 688 (H) 605 (H) 513 (H) 521 273 (H) 217 222 172 118 101        Ref. Range 9/27/2016 11:10 6/23/2021 10:45   Hep B Surface Agn Latest Ref Range: NR  Nonreactive    Hepatitis B Surface Antibody Latest Ref Range: <8.00 m[IU]/mL 0.22    Hepatitis C Antibody Latest Ref Range: NR  Nonreactive...    HIV Antigen Antibody Combo Latest Ref Range: NR^Nonreactive      Nonreactive       PATHOLOGY:  5/10/21: TEST(S) REQUESTED:   Hemochromatosis Mutation Analysis by PCR     SPECIMEN DESCRIPTION:   Blood     HEMOCHROMATOSIS RESULTS     HFE Gene C282Y (G845A) RESULTS:     C282Y Mutation Interpretation: HOMOZYGOTE     HFE Gene H63D (C187G) RESULTS:     H63D Mutation Interpretation: NORMAL     HFE Gene S65C (A193T) RESULTS:     S65C Mutation Interpretation: NORMAL     Indication for testing: Carrier screening or diagnostic   testing for alpha-1-antitrypsin (AAT) deficiency.   Negative: This sample has a serum AAT protein concentration   in the normal range and is negative for the S and Z   deficiency alleles by genotyping. 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                          - Small (< 5 mm) esophageal varices.                          - Gastritis. Biopsied.                          - Normal examined duodenum.     Final Diagnosis   A. STOMACH, BIOPSY:  - Gastric mucosa with features of reactive gastropathy   - No H. pylori organisms identified on routine staining  - Negative for intestinal metaplasia and dysplasia        ASSESSMENT/PLAN:  Bradford Prado is a 61 year old female with past medical history of cirrhosis, CERDA, HTN, HLD, and DM2 who is referred to clinic today for C282Y/C282Y hereditary hemochromatosis. She was previously followed by Dr. Long in [place].    1) Homozygous C282Y/C282Y hereditary hemochromatosis. Patient diagnosed just in May 2021. Her last phlebotomy was in August 2021. Her last ferritin was 101.     We discussed today the importance of follow up as hemochromatosis, especially with evidence of cirrhosis, has increased risk of development of HCC. Patient will require yearly ultrasounds and AFP.     -Updated CBC, CMP, iron studies (transferrin saturation).  -She may require phlebotomy in the near future.   -Check liver ultrasound, AFP.     2) History of cirrhosis/CERDA  -See plan above.     3) HTN, HLD, DM2    4) Cancer screening: Patient reports she submits yearly FIT testing. Her only colonoscopy was over 10 years ago. She cannot recall her last mammogram. I discussed the importance of cancer screening.    5) Follow up in 2 weeks to review the results of the above and if need for phlebotomy.    Thank you for referring Bradford Prado to clinic today. Adequate time was dedicated to patient questions and answered to the expressed satisfaction of the patient.       Rochelle Girard,   Hematology/Oncology  [facility]

## 2021-11-18 ENCOUNTER — ONCOLOGY VISIT (OUTPATIENT)
Dept: ONCOLOGY | Facility: CLINIC | Age: 61
End: 2021-11-18
Attending: NURSE PRACTITIONER
Payer: COMMERCIAL

## 2021-11-18 ENCOUNTER — PRE VISIT (OUTPATIENT)
Dept: ONCOLOGY | Facility: CLINIC | Age: 61
End: 2021-11-18

## 2021-11-18 VITALS
WEIGHT: 137.6 LBS | TEMPERATURE: 98.5 F | HEART RATE: 92 BPM | RESPIRATION RATE: 18 BRPM | SYSTOLIC BLOOD PRESSURE: 154 MMHG | OXYGEN SATURATION: 97 % | BODY MASS INDEX: 27.79 KG/M2 | DIASTOLIC BLOOD PRESSURE: 84 MMHG

## 2021-11-18 DIAGNOSIS — E83.110 HEREDITARY HEMOCHROMATOSIS (H): ICD-10-CM

## 2021-11-18 LAB
AFP SERPL-MCNC: 4.7 UG/L (ref 0–8)
ALBUMIN SERPL-MCNC: 4 G/DL (ref 3.4–5)
ALP SERPL-CCNC: 84 U/L (ref 40–150)
ALT SERPL W P-5'-P-CCNC: 44 U/L (ref 0–50)
ANION GAP SERPL CALCULATED.3IONS-SCNC: 7 MMOL/L (ref 3–14)
AST SERPL W P-5'-P-CCNC: 41 U/L (ref 0–45)
BASOPHILS # BLD AUTO: 0 10E3/UL (ref 0–0.2)
BASOPHILS NFR BLD AUTO: 0 %
BILIRUB SERPL-MCNC: 0.4 MG/DL (ref 0.2–1.3)
BUN SERPL-MCNC: 10 MG/DL (ref 7–30)
CALCIUM SERPL-MCNC: 9.1 MG/DL (ref 8.5–10.1)
CHLORIDE BLD-SCNC: 108 MMOL/L (ref 94–109)
CO2 SERPL-SCNC: 24 MMOL/L (ref 20–32)
CREAT SERPL-MCNC: 0.51 MG/DL (ref 0.52–1.04)
CREAT UR-MCNC: 27 MG/DL
EOSINOPHIL # BLD AUTO: 0.3 10E3/UL (ref 0–0.7)
EOSINOPHIL NFR BLD AUTO: 3 %
ERYTHROCYTE [DISTWIDTH] IN BLOOD BY AUTOMATED COUNT: 12.9 % (ref 10–15)
FERRITIN SERPL-MCNC: 149 NG/ML (ref 8–252)
GFR SERPL CREATININE-BSD FRML MDRD: >90 ML/MIN/1.73M2
GLUCOSE BLD-MCNC: 70 MG/DL (ref 70–99)
HCT VFR BLD AUTO: 46.9 % (ref 35–47)
HGB BLD-MCNC: 16 G/DL (ref 11.7–15.7)
HOLD SPECIMEN: NORMAL
IMM GRANULOCYTES # BLD: 0.1 10E3/UL
IMM GRANULOCYTES NFR BLD: 1 %
IRON SATN MFR SERPL: 54 % (ref 15–46)
IRON SERPL-MCNC: 183 UG/DL (ref 35–180)
LYMPHOCYTES # BLD AUTO: 2.5 10E3/UL (ref 0.8–5.3)
LYMPHOCYTES NFR BLD AUTO: 24 %
MCH RBC QN AUTO: 30.2 PG (ref 26.5–33)
MCHC RBC AUTO-ENTMCNC: 34.1 G/DL (ref 31.5–36.5)
MCV RBC AUTO: 89 FL (ref 78–100)
MICROALBUMIN UR-MCNC: 29 MG/L
MICROALBUMIN/CREAT UR: 107.41 MG/G CR (ref 0–25)
MONOCYTES # BLD AUTO: 1 10E3/UL (ref 0–1.3)
MONOCYTES NFR BLD AUTO: 9 %
NEUTROPHILS # BLD AUTO: 6.6 10E3/UL (ref 1.6–8.3)
NEUTROPHILS NFR BLD AUTO: 63 %
NRBC # BLD AUTO: 0 10E3/UL
NRBC BLD AUTO-RTO: 0 /100
PLATELET # BLD AUTO: 189 10E3/UL (ref 150–450)
POTASSIUM BLD-SCNC: 3.9 MMOL/L (ref 3.4–5.3)
PROT SERPL-MCNC: 8.4 G/DL (ref 6.8–8.8)
RBC # BLD AUTO: 5.29 10E6/UL (ref 3.8–5.2)
SODIUM SERPL-SCNC: 139 MMOL/L (ref 133–144)
TIBC SERPL-MCNC: 339 UG/DL (ref 240–430)
WBC # BLD AUTO: 10.5 10E3/UL (ref 4–11)

## 2021-11-18 PROCEDURE — 83550 IRON BINDING TEST: CPT | Performed by: INTERNAL MEDICINE

## 2021-11-18 PROCEDURE — 99214 OFFICE O/P EST MOD 30 MIN: CPT | Performed by: INTERNAL MEDICINE

## 2021-11-18 PROCEDURE — 82040 ASSAY OF SERUM ALBUMIN: CPT | Performed by: INTERNAL MEDICINE

## 2021-11-18 PROCEDURE — 36415 COLL VENOUS BLD VENIPUNCTURE: CPT | Performed by: INTERNAL MEDICINE

## 2021-11-18 PROCEDURE — G0463 HOSPITAL OUTPT CLINIC VISIT: HCPCS

## 2021-11-18 PROCEDURE — 82043 UR ALBUMIN QUANTITATIVE: CPT | Performed by: NURSE PRACTITIONER

## 2021-11-18 PROCEDURE — 85025 COMPLETE CBC W/AUTO DIFF WBC: CPT | Performed by: INTERNAL MEDICINE

## 2021-11-18 PROCEDURE — 82105 ALPHA-FETOPROTEIN SERUM: CPT | Performed by: INTERNAL MEDICINE

## 2021-11-18 PROCEDURE — 82728 ASSAY OF FERRITIN: CPT | Performed by: INTERNAL MEDICINE

## 2021-11-18 ASSESSMENT — PAIN SCALES - GENERAL: PAINLEVEL: SEVERE PAIN (7)

## 2021-11-18 NOTE — LETTER
11/18/2021         RE: Bradford Prado  [address]  [place] [address]        Dear Colleague,    Thank you for referring your patient, Bradford Prado, to the [facility]. Please see a copy of my visit note below.    [facility]    Hematology/Oncology New Patient Note      Today's Date: 11/18/21    Reason for Consultation: Hereditary Hemochromatosis  Referring Provider: Nimco Travis NP      HISTORY OF PRESENT ILLNESS: Bradford Prado is a 61 year old female with past medical history of cirrhosis, CERDA, HTN, HLD, and DM2 who is referred to clinic today for C282Y/C282Y hereditary hemochromatosis. She was previously followed by Dr. Long in [place].    Patient has family history of severe liver disease and recalls mother entering acute liver failure with need for repeat thoracenteses. Patient had then developed increasing LFTs and cirrhosis and workup for this included liver biopsy, which revealed cirrhosis and negative alpha 1 antitrypsin mutation testing. Record review shows patient has had evidence of elevated ferritin levels as high as >1500 in 2016. She underwent testing for hereditary hemochromatosis in May 2021 and returned positive as homozygous C282Y/C282Y.     Patient was then treated with phlebotomies twice per week for two months. Her most recent ferritin level was in August 2021 and 101.     Patient does have various hand arthralgias and follows with a hand surgeon for this with intermittent hand Xrays and steroid injections. She is a diabetic. No history of endocrinopathies or heart disease she is aware of.       REVIEW OF SYSTEMS:   A 14 point ROS was reviewed with pertinent positives and negatives in the HPI.        HOME MEDICATIONS:  Current Outpatient Medications   Medication Sig Dispense Refill     ACCU-CHEK GUIDE test strip Use to test blood sugar 4 times daily or as directed. 200 strip 11     acetaminophen (TYLENOL) 325 MG tablet Take 325-650  mg by mouth every 6 hours as needed for mild pain        amLODIPine (NORVASC) 5 MG tablet TAKE ONE TABLET BY MOUTH TWICE A  tablet 1     Biotin 10 MG CAPS Take 1 capsule by mouth daily       blood glucose (NO BRAND SPECIFIED) lancets standard Use to test blood sugar twice times daily or as directed. 100 each 1     blood glucose (NO BRAND SPECIFIED) test strip Use to test blood sugar twice times daily or as directed. 100 each 1     blood glucose (NO BRAND SPECIFIED) test strip Use to test blood sugars 2 times daily or as directed 100 strip 11     blood glucose monitoring (SOFTCLIX) lancets Use to test blood sugar 6 times daily. 200 each 11     Blood Glucose Monitoring Suppl (ACCU-CHEK GUIDE ME) w/Device KIT 1 each 6 times daily 1 kit 0     carvedilol (COREG) 12.5 MG tablet TAKE 1 TABLET (12.5 MG) BY MOUTH 2 TIMES DAILY (WITH MEALS) 180 tablet 1     Continuous Blood Gluc Sensor (FREESTYLE MEENA 14 DAY SENSOR) MISC 1 each every 14 days Change every 14 days. 2 each 11     insulin aspart (NOVOLOG FLEXPEN) 100 UNIT/ML pen INJECT 10 UNITS SUBCUTANEOUS 3 TIMES DAILY (WITH MEALS) 30 mL 1     insulin glargine (LANTUS SOLOSTAR) 100 UNIT/ML pen INJECT 42 UNITS SUBCUTANEOUS EVERY MORNING (BEFORE BREAKFAST) 45 mL 1     Insulin Pen Needle (PEN NEEDLES) 32G X 4 MM MISC 1 each 4 times daily 200 each 11     losartan (COZAAR) 50 MG tablet TAKE 1 TABLET (50 MG) BY MOUTH DAILY 90 tablet 0     ondansetron (ZOFRAN) 4 MG tablet Take 1 tablet (4 mg) by mouth every 6 hours as needed for nausea or vomiting 15 tablet 0     pravastatin (PRAVACHOL) 20 MG tablet TAKE 1 TABLET (20 MG) BY MOUTH DAILY 90 tablet 0     sertraline (ZOLOFT) 50 MG tablet Take 1 tablet (50 mg) by mouth daily Take 50mg PO daily 90 tablet 1         ALLERGIES:  No Known Allergies      PAST MEDICAL HISTORY:  Past Medical History:   Diagnosis Date     Diabetes mellitus (H)      Dyslipidemia      Hereditary hemochromatosis (H)      Hypertension      Liver cirrhosis  secondary to nonalcoholic steatohepatitis (CERDA) (H)          PAST SURGICAL HISTORY:  Past Surgical History:   Procedure Laterality Date     ESOPHAGOSCOPY, GASTROSCOPY, DUODENOSCOPY (EGD), COMBINED N/A 11/10/2021    Procedure: ESOPHAGOGASTRODUODENOSCOPY, WITH BIOPSY;  Surgeon: Leventhal, Thomas Michael, MD;  Location: [facility] OR     HYSTERECTOMY TOTAL ABDOMINAL      due to fibroids     LAPAROSCOPIC EVACUATION ECTOPIC PREGNANCY       TUBAL LIGATION           SOCIAL HISTORY:  Social History     Socioeconomic History     Marital status: Single     Spouse name: Not on file     Number of children: Not on file     Years of education: Not on file     Highest education level: Not on file   Occupational History     Not on file   Tobacco Use     Smoking status: Former Smoker     Packs/day: 0.50     Years: 20.00     Pack years: 10.00     Types: Cigarettes     Quit date: 3/9/2010     Years since quittin.7     Smokeless tobacco: Never Used   Vaping Use     Vaping Use: Never used   Substance and Sexual Activity     Alcohol use: No     Alcohol/week: 0.0 standard drinks     Drug use: No     Sexual activity: Never   Other Topics Concern     Parent/sibling w/ CABG, MI or angioplasty before 65F 55M? No   Social History Narrative    , 2 children, 2 grandchildren     Social Determinants of Health     Financial Resource Strain: Not on file   Food Insecurity: Not on file   Transportation Needs: Not on file   Physical Activity: Not on file   Stress: Not on file   Social Connections: Not on file   Intimate Partner Violence: Not on file   Housing Stability: Not on file         FAMILY HISTORY:  Family History   Problem Relation Age of Onset     Cirrhosis Mother         w/o alcohol history     Diabetes Mother      Emphysema Father      Hypertension Sister      Hemochromatosis Sister      Heart Defect Niece         Tetrology of Fallot     Breast Cancer No family hx of      Cancer - colorectal No family hx of          PHYSICAL  EXAM:  Vital signs: BP (!) 154/84 (BP Location: Left arm, Patient Position: Sitting, Cuff Size: Adult Regular)   Pulse 92   Temp 98.5  F (36.9  C) (Oral)   Resp 18   Wt 62.4 kg (137 lb 9.6 oz)   LMP  (LMP Unknown)   SpO2 97%   BMI 27.79 kg/m    ECO  GENERAL/CONSTITUTIONAL: No acute distress.   EYES: Pupils are equal, round, and react to light and accommodation. Extraocular movements intact.  No scleral icterus.  ENT/MOUTH: Neck supple. Oropharynx clear, no mucositis.  LYMPH: No anterior cervical, posterior cervical, supraclavicular, axillary or inguinal adenopathy.   RESPIRATORY: Clear to auscultation bilaterally. No crackles or wheezing.   CARDIOVASCULAR: Regular rate and rhythm without murmurs, gallops, or rubs.  GASTROINTESTINAL: Positive for hepatomegaly and splenomegaly. The patient has normal bowel sounds. No guarding.  No distention. No fluid wave.  MUSCULOSKELETAL: Warm and well-perfused, no cyanosis, clubbing, or edema.  NEUROLOGIC: Cranial nerves II-XII are intact. Alert, oriented, answers questions appropriately.  INTEGUMENTARY: No rashes or jaundice.  GAIT: Steady, does not use assistive device      LABS:  CBC RESULTS:   Recent Labs   Lab Test 21  1121   WBC 5.1   RBC 3.93   HGB 13.4   HCT 39.5   *   MCH 34.1*   MCHC 33.9   RDW 11.0          Recent Labs   Lab Test 11/10/21  1008 11/10/21  0903 05/10/21  1055 21  1117   NA  --   --  138 136   POTASSIUM  --   --  4.2 4.1   CHLORIDE  --   --  105 103   CO2  --   --  26 27   ANIONGAP  --   --  7 6   * 120* 207* 255*   BUN  --   --  17 14   CR  --   --  0.57 0.48*   ESTELLE  --   --  9.1 9.1        Ref. Range 5/10/2021 10:55   Ferritin Latest Ref Range: 8 - 252 ng/mL 1,126 (H)   Iron Latest Ref Range: 35 - 180 ug/dL 235 (H)   Iron Binding Cap Latest Ref Range: 240 - 430 ug/dL 306   Iron Saturation Index Latest Ref Range: 15 - 46 % 77 (H)          Ref. Range 2016 11:10 5/10/2021 10:55 6/15/2021 13:04 2021 11:29  6/23/2021 10:45 6/28/2021 11:05 7/1/2021 11:08 7/5/2021 12:39 7/7/2021 13:08 7/12/2021 14:31 7/19/2021 11:22 7/28/2021 13:53 8/3/2021 14:07 8/10/2021 14:03 8/25/2021 11:21 8/26/2021 10:00   Ferritin Latest Ref Range: 8 - 252 ng/mL 1,569 (H) 1,126 (H) 1,054 (H) 994 (H) 1,003 (H) 698 (H) 688 (H) 605 (H) 513 (H) 521 273 (H) 217 222 172 118 101        Ref. Range 9/27/2016 11:10 6/23/2021 10:45   Hep B Surface Agn Latest Ref Range: NR  Nonreactive    Hepatitis B Surface Antibody Latest Ref Range: <8.00 m[IU]/mL 0.22    Hepatitis C Antibody Latest Ref Range: NR  Nonreactive...    HIV Antigen Antibody Combo Latest Ref Range: NR^Nonreactive      Nonreactive       PATHOLOGY:  5/10/21: TEST(S) REQUESTED:   Hemochromatosis Mutation Analysis by PCR     SPECIMEN DESCRIPTION:   Blood     HEMOCHROMATOSIS RESULTS     HFE Gene C282Y (G845A) RESULTS:     C282Y Mutation Interpretation: HOMOZYGOTE     HFE Gene H63D (C187G) RESULTS:     H63D Mutation Interpretation: NORMAL     HFE Gene S65C (A193T) RESULTS:     S65C Mutation Interpretation: NORMAL     Indication for testing: Carrier screening or diagnostic   testing for alpha-1-antitrypsin (AAT) deficiency.   Negative: This sample has a serum AAT protein concentration   in the normal range and is negative for the S and Z   deficiency alleles by genotyping. 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                          - Small (< 5 mm) esophageal varices.                          - Gastritis. Biopsied.                           - Normal examined duodenum.     Final Diagnosis   A. STOMACH, BIOPSY:  - Gastric mucosa with features of reactive gastropathy   - No H. pylori organisms identified on routine staining  - Negative for intestinal metaplasia and dysplasia       ASSESSMENT/PLAN:  Bradford Prado is a 61 year old female with past medical history of cirrhosis, CERDA, HTN, HLD, and DM2 who is referred to clinic today for C282Y/C282Y hereditary hemochromatosis. She was previously followed by Dr. Long in [place].    1) Homozygous C282Y/C282Y hereditary hemochromatosis. Patient diagnosed just in May 2021. Her last phlebotomy was in August 2021. Her last ferritin was 101.     We discussed today the importance of follow up as hemochromatosis, especially with evidence of cirrhosis, has increased risk of development of HCC. Patient will require yearly ultrasounds and AFP.     -Updated CBC, CMP, iron studies (transferrin saturation).  -She may require phlebotomy in the near future.   -Check liver ultrasound, AFP.     2) History of cirrhosis/CERDA  -See plan above.     3) HTN, HLD, DM2    4) Cancer screening: Patient reports she submits yearly FIT testing. Her only colonoscopy was over 10 years ago. She cannot recall her last mammogram. I discussed the importance of cancer screening.    5) Follow up in 2 weeks to review the results of the above and if need for phlebotomy.    Thank you for referring Bradford Prado to clinic today. Adequate time was dedicated to patient questions and answered to the expressed satisfaction of the patient.       Rochelle Girard DO  Hematology/Oncology  [facility]      Oncology Rooming Note    November 18, 2021 11:34 AM   Bradford Prado is a 61 year old female who presents for:    Chief Complaint   Patient presents with     Oncology Clinic Visit     Liver cirrhosis secondary to nonalcoholic steatohepatitis      Initial Vitals: BP (!) 154/84 (BP Location: Left arm, Patient Position: Sitting,  "Cuff Size: Adult Regular)   Pulse 92   Temp 98.5  F (36.9  C) (Oral)   Resp 18   Wt 62.4 kg (137 lb 9.6 oz)   LMP  (LMP Unknown)   SpO2 97%   BMI 27.79 kg/m   Estimated body mass index is 27.79 kg/m  as calculated from the following:    Height as of 11/10/21: 1.499 m (4' 11\").    Weight as of this encounter: 62.4 kg (137 lb 9.6 oz). Body surface area is 1.61 meters squared.  Severe Pain (7) Comment: Data Unavailable   No LMP recorded (lmp unknown). Patient has had a hysterectomy.  Allergies reviewed: Yes  Medications reviewed: Yes    Medications: MEDICATION REFILLS NEEDED TODAY. Provider was notified.  Pharmacy name entered into Tiren[facility]:    [facility] [place], [place] - [address] [place] [address] [facility]ARM[facility]Y [place], [place] - [address] [place] [address] [phone]    Clinical concerns: no       Estee Sharma, JOHNATHON                  Again, thank you for allowing me to participate in the care of your patient.        Sincerely,        Rochelle Girard, DO    "

## 2021-11-18 NOTE — PROGRESS NOTES
"Oncology Rooming Note    November 18, 2021 11:34 AM   Bradford Prado is a 61 year old female who presents for:    Chief Complaint   Patient presents with     Oncology Clinic Visit     Liver cirrhosis secondary to nonalcoholic steatohepatitis      Initial Vitals: BP (!) 154/84 (BP Location: Left arm, Patient Position: Sitting, Cuff Size: Adult Regular)   Pulse 92   Temp 98.5  F (36.9  C) (Oral)   Resp 18   Wt 62.4 kg (137 lb 9.6 oz)   LMP  (LMP Unknown)   SpO2 97%   BMI 27.79 kg/m   Estimated body mass index is 27.79 kg/m  as calculated from the following:    Height as of 11/10/21: 1.499 m (4' 11\").    Weight as of this encounter: 62.4 kg (137 lb 9.6 oz). Body surface area is 1.61 meters squared.  Severe Pain (7) Comment: Data Unavailable   No LMP recorded (lmp unknown). Patient has had a hysterectomy.  Allergies reviewed: Yes  Medications reviewed: Yes    Medications: MEDICATION REFILLS NEEDED TODAY. Provider was notified.  Pharmacy name entered into EPIC:    [facility] - [place], [place] - [address] [place] [address] PHARMACY [place], [place] - [address] [place] [address] [phone]    Clinical concerns: no       Estee Sharma CMA              "

## 2021-11-24 ENCOUNTER — TELEPHONE (OUTPATIENT)
Dept: EDUCATION SERVICES | Facility: CLINIC | Age: 61
End: 2021-11-24

## 2021-11-24 ENCOUNTER — HOSPITAL ENCOUNTER (OUTPATIENT)
Dept: ULTRASOUND IMAGING | Facility: CLINIC | Age: 61
Discharge: HOME | End: 2021-11-24
Attending: INTERNAL MEDICINE | Admitting: INTERNAL MEDICINE
Payer: COMMERCIAL

## 2021-11-24 ENCOUNTER — ALLIED HEALTH/NURSE VISIT (OUTPATIENT)
Dept: EDUCATION SERVICES | Facility: CLINIC | Age: 61
End: 2021-11-24
Attending: NURSE PRACTITIONER
Payer: COMMERCIAL

## 2021-11-24 DIAGNOSIS — E11.65 TYPE 2 DIABETES MELLITUS WITH HYPERGLYCEMIA, WITHOUT LONG-TERM CURRENT USE OF INSULIN (H): Primary | ICD-10-CM

## 2021-11-24 DIAGNOSIS — E11.65 TYPE 2 DIABETES MELLITUS WITH HYPERGLYCEMIA, WITHOUT LONG-TERM CURRENT USE OF INSULIN (H): ICD-10-CM

## 2021-11-24 DIAGNOSIS — E83.110 HEREDITARY HEMOCHROMATOSIS (H): ICD-10-CM

## 2021-11-24 PROCEDURE — 76705 ECHO EXAM OF ABDOMEN: CPT

## 2021-11-24 PROCEDURE — G0108 DIAB MANAGE TRN  PER INDIV: HCPCS | Performed by: DIETITIAN, REGISTERED

## 2021-11-24 NOTE — TELEPHONE ENCOUNTER
Patient would benefit from use of Freestyle Jammie 2 CGMS that has alarms for hypoglycemia & hyperglycemia, as patient reports she isn't sleeping due to fear of lows. I provided her today with Jammie 2 starter kit & 1 sensor but have pended orders for refills moving forward. Please sign off if you agree or provide an alternative plan.    Jessica Caro, RD, LD, [facility]

## 2021-11-24 NOTE — PROGRESS NOTES
"Diabetes Self-Management Education & Support    Presents for: Follow-up    SUBJECTIVE/OBJECTIVE:  Presents for: Follow-up  Accompanied by: Self  Diabetes education in the past 24mo: Yes  Focus of Visit: Taking Medication,Healthy Eating  Diabetes type: Type 2  Disease course: Stable  How confident are you filling out medical forms by yourself:: Not Assessed  Diabetes management related comments/concerns: I have a lot of questions - what do I do if my BG is 87 and I'm just going to eat a salad - do I take my insulin? How bad are the spikes in my BGs? I'm having a lot of lows when I walk.  Other concerns:: None  Cultural Influences/Ethnic Background:  Not  or     Diabetes Symptoms & Complications:     Complications assessed today?: No    Patient Problem List and Family Medical History reviewed for relevant medical history, current medical status, and diabetes risk factors.    Vitals:  LMP  (LMP Unknown)   Estimated body mass index is 27.79 kg/m  as calculated from the following:    Height as of 11/10/21: 1.499 m (4' 11\").    Weight as of 11/18/21: 62.4 kg (137 lb 9.6 oz).   Last 3 BP:   BP Readings from Last 3 Encounters:   11/18/21 (!) 154/84   11/10/21 111/65   10/19/21 138/72       History   Smoking Status     Former Smoker     Packs/day: 0.50     Years: 20.00     Types: Cigarettes     Quit date: 3/9/2010   Smokeless Tobacco     Never Used       Labs:  Lab Results   Component Value Date    A1C 6.6 10/19/2021    A1C 6.5 06/19/2021     Lab Results   Component Value Date    GLC 70 11/18/2021     05/10/2021     Lab Results   Component Value Date    LDL 89 06/19/2021     HDL Cholesterol   Date Value Ref Range Status   06/19/2021 31 (L) >49 mg/dL Final   ]  GFR Estimate   Date Value Ref Range Status   11/18/2021 >90 >60 mL/min/1.73m2 Final     Comment:     As of July 11, 2021, eGFR is calculated by the CKD-EPI creatinine equation, without race adjustment. eGFR can be influenced by muscle mass, " exercise, and diet. The reported eGFR is an estimation only and is only applicable if the renal function is stable.   05/10/2021 >90 >60 mL/min/[1.73_m2] Final     Comment:     Non  GFR Calc  Starting 12/18/2018, serum creatinine based estimated GFR (eGFR) will be   calculated using the Chronic Kidney Disease Epidemiology Collaboration   (CKD-EPI) equation.       GFR Estimate If Black   Date Value Ref Range Status   05/10/2021 >90 >60 mL/min/[1.73_m2] Final     Comment:      GFR Calc  Starting 12/18/2018, serum creatinine based estimated GFR (eGFR) will be   calculated using the Chronic Kidney Disease Epidemiology Collaboration   (CKD-EPI) equation.       Lab Results   Component Value Date    CR 0.51 11/18/2021    CR 0.57 05/10/2021     No results found for: MICROALBUMIN    Healthy Eating:  Healthy Eating Assessed Today: Yes  Meal planning/habits: Low carb,Smaller portions  Other: Eating 5 meals/day, wondering when she should take her insulin, wondering why certain foods cause such significant spikes in BGs    Being Active:  Being Active Assessed Today: Yes  Exercise:: Yes  Days per week of moderate to strenuous exercise (like a brisk walk): 6  How intense was your typical exercise? : Moderate (like brisk walking)  Barrier to exercise: None    Monitoring:  Monitoring Assessed Today: Yes  Did patient bring glucose meter to appointment? : Yes  Blood Glucose Meter: CGM  Times checking blood sugar at home (number): 5+  Times checking blood sugar at home (per): Day  Blood glucose trend: Decreasing    See LibreView reports below    Taking Medications:  Diabetes Medication(s)     Insulin       insulin aspart (NOVOLOG FLEXPEN) 100 UNIT/ML pen    INJECT 10 UNITS SUBCUTANEOUS 3 TIMES DAILY (WITH MEALS)     insulin glargine (LANTUS SOLOSTAR) 100 UNIT/ML pen    INJECT 42 UNITS SUBCUTANEOUS EVERY MORNING (BEFORE BREAKFAST)          Taking Medication Assessed Today: Yes  Current Treatments: Insulin  Injections  Dose schedule: Pre-breakfast,Pre-lunch,At bedtime,Pre-dinner  Given by: Patient  Problems taking diabetes medications regularly?: No  Diabetes medication side effects?: Yes    Problem Solving:  Problem Solving Assessed Today: Yes  Is the patient at risk for hypoglycemia?: Yes  Hypoglycemia Frequency: Weekly  Hypoglycemia Treatment: Candy  Medical ID: No  Does patient have glucagon emergency kit?: No  Is the patient at risk for DKA?: No  Does patient have severe weather/disaster plan for diabetes management?: No  Does patient have sick day plan for diabetes management?: No    Hypoglycemia symptoms  Hunger: Yes  Sweats: Yes  Feeling shaky: Yes    Hypoglycemia Complications  Blackouts: No  Hospitalization: No  Nocturnal hypoglycemia: Yes  Required assistance: No  Required glucagon injection: No  Seizures: No    Reducing Risks:  Reducing Risks Assessed Today: No    Healthy Coping:  Healthy Coping Assessed Today: Yes  Emotional response to diabetes: Fear/Anxiety,Concern for health and well-being,Guilt/Self-blame  Informal Support system:: Children  Stage of change: ACTION (Actively working towards change)  Support resources: None  Patient Activation Measure Survey Score:  No flowsheet data found.    Diabetes knowledge and skills assessment:   Patient is knowledgeable in diabetes management concepts related to: Healthy Eating and Being Active    Patient needs further education on the following diabetes management concepts: Monitoring, Taking Medication, Problem Solving, Reducing Risks and Healthy Coping    Based on learning assessment above, most appropriate setting for further diabetes education would be: Group class or Individual setting.      INTERVENTIONS:    REPORTS:                    Education provided today on:  AADE Self-Care Behaviors:  Healthy Eating: carbohydrate counting, consistency in amount, composition, and timing of food intake, portion control and plate planning method  Being Active:  relationship to blood glucose and precautions to take  Monitoring: log and interpret results, individual blood glucose targets and frequency of monitoring  Taking Medication: action of prescribed medication, side effects of prescribed medications and when to take medications  Problem Solving: high blood glucose - causes, signs/symptoms, treatment and prevention, low blood glucose - causes, signs/symptoms, treatment and prevention and carrying a carbohydrate source at all times    Pt verbalized understanding of concepts discussed and recommendations provided today.       Education Materials Provided:  Carbohydrate Counting, My Plate Planner and Freestyle Jammie 2 starter meter kit    ASSESSMENT:  Patient comes in with many questions about her blood sugars, insulin dosing, healthy eating & anxiety about hypoglycemia. Reviewed blood sugar trends, importance of time in target range, impact of low & high blood sugars on patient's overall health, importance of moderate carb consumption when taking insulin, carb counting, & use of Jammie 2 CGMS as patient states she can't sleep at night for fear that she will die for hypoglycemia. She has witness another person die from hypoglycemia and so this really worries her.     Glucose Patterns & Trends:  Overall very healthy bgs with occasional post-prandial spikes, overnight bgs running at borderline hypoglycemia range and afternoon walks typically dropping bgs quite low as well      PLAN  Recommend use of Jammie 2 CGMS that provides alarms for hypoglycemia & hyperglycemia to help patient sleep better and not worry as much about hypoglycemia. Will reach out to PCP in separate encounter for sign off on sensor - patient was started today on Jammie 2 reader & sensor starter kit   See Patient Instructions for co-developed, patient-stated behavior change goals.  AVS printed and provided to patient today. See Follow-Up section for recommended follow-up.    Jessica Caro RD, LD, [facility]   Time  Spent: 60 minutes  Encounter Type: Individual    Any diabetes medication dose changes were made via the CDE Protocol and Collaborative Practice Agreement with the patient's referring provider. A copy of this encounter was shared with the provider.

## 2021-11-24 NOTE — PATIENT INSTRUCTIONS
-Decrease your Lantus dose tonight to 38 units at bedtime  -I will have Paulette send new orders for the Jammie 2 sensors - do not refill the 14-day sensors, make sure they are Jammie 2 sensors when you pick them up at the pharmacy   -Count carbohydrates at meals & snacks and try to stick to limit of 45-60g per meal & 15g at snacks  -Don't take Novolog at a meal if you're not eating any carbohydrates     Call or send a [employer] message with any questions or concerns    Jessica Caro, RD, LD, [facility]   Diabetes Education Triage Line: [phone]  Diabetes Education Appointment [phone]

## 2021-11-24 NOTE — LETTER
"    11/24/2021         RE: Bradford Prado  [address]  [facility] [address]        Dear Colleague,    Thank you for referring your patient, Bradford Prado, to the [facility] [place]. Please see a copy of my visit note below.    Diabetes Self-Management Education & Support    Presents for: Follow-up    SUBJECTIVE/OBJECTIVE:  Presents for: Follow-up  Accompanied by: Self  Diabetes education in the past 24mo: Yes  Focus of Visit: Taking Medication,Healthy Eating  Diabetes type: Type 2  Disease course: Stable  How confident are you filling out medical forms by yourself:: Not Assessed  Diabetes management related comments/concerns: I have a lot of questions - what do I do if my BG is 87 and I'm just going to eat a salad - do I take my insulin? How bad are the spikes in my BGs? I'm having a lot of lows when I walk.  Other concerns:: None  Cultural Influences/Ethnic Background:  Not  or     Diabetes Symptoms & Complications:     Complications assessed today?: No    Patient Problem List and Family Medical History reviewed for relevant medical history, current medical status, and diabetes risk factors.    Vitals:  LMP  (LMP Unknown)   Estimated body mass index is 27.79 kg/m  as calculated from the following:    Height as of 11/10/21: 1.499 m (4' 11\").    Weight as of 11/18/21: 62.4 kg (137 lb 9.6 oz).   Last 3 BP:   BP Readings from Last 3 Encounters:   11/18/21 (!) 154/84   11/10/21 111/65   10/19/21 138/72       History   Smoking Status     Former Smoker     Packs/day: 0.50     Years: 20.00     Types: Cigarettes     Quit date: 3/9/2010   Smokeless Tobacco     Never Used       Labs:  Lab Results   Component Value Date    A1C 6.6 10/19/2021    A1C 6.5 06/19/2021     Lab Results   Component Value Date    GLC 70 11/18/2021     05/10/2021     Lab Results   Component Value Date    LDL 89 06/19/2021     HDL Cholesterol   Date Value Ref Range Status   06/19/2021 31 (L) >49 mg/dL Final "   ]  GFR Estimate   Date Value Ref Range Status   11/18/2021 >90 >60 mL/min/1.73m2 Final     Comment:     As of July 11, 2021, eGFR is calculated by the CKD-EPI creatinine equation, without race adjustment. eGFR can be influenced by muscle mass, exercise, and diet. The reported eGFR is an estimation only and is only applicable if the renal function is stable.   05/10/2021 >90 >60 mL/min/[1.73_m2] Final     Comment:     Non  GFR Calc  Starting 12/18/2018, serum creatinine based estimated GFR (eGFR) will be   calculated using the Chronic Kidney Disease Epidemiology Collaboration   (CKD-EPI) equation.       GFR Estimate If Black   Date Value Ref Range Status   05/10/2021 >90 >60 mL/min/[1.73_m2] Final     Comment:      GFR Calc  Starting 12/18/2018, serum creatinine based estimated GFR (eGFR) will be   calculated using the Chronic Kidney Disease Epidemiology Collaboration   (CKD-EPI) equation.       Lab Results   Component Value Date    CR 0.51 11/18/2021    CR 0.57 05/10/2021     No results found for: MICROALBUMIN    Healthy Eating:  Healthy Eating Assessed Today: Yes  Meal planning/habits: Low carb,Smaller portions  Other: Eating 5 meals/day, wondering when she should take her insulin, wondering why certain foods cause such significant spikes in BGs    Being Active:  Being Active Assessed Today: Yes  Exercise:: Yes  Days per week of moderate to strenuous exercise (like a brisk walk): 6  How intense was your typical exercise? : Moderate (like brisk walking)  Barrier to exercise: None    Monitoring:  Monitoring Assessed Today: Yes  Did patient bring glucose meter to appointment? : Yes  Blood Glucose Meter: CGM  Times checking blood sugar at home (number): 5+  Times checking blood sugar at home (per): Day  Blood glucose trend: Decreasing    See LibreView reports below    Taking Medications:  Diabetes Medication(s)     Insulin       insulin aspart (NOVOLOG FLEXPEN) 100 UNIT/ML pen     INJECT 10 UNITS SUBCUTANEOUS 3 TIMES DAILY (WITH MEALS)     insulin glargine (LANTUS SOLOSTAR) 100 UNIT/ML pen    INJECT 42 UNITS SUBCUTANEOUS EVERY MORNING (BEFORE BREAKFAST)          Taking Medication Assessed Today: Yes  Current Treatments: Insulin Injections  Dose schedule: Pre-breakfast,Pre-lunch,At bedtime,Pre-dinner  Given by: Patient  Problems taking diabetes medications regularly?: No  Diabetes medication side effects?: Yes    Problem Solving:  Problem Solving Assessed Today: Yes  Is the patient at risk for hypoglycemia?: Yes  Hypoglycemia Frequency: Weekly  Hypoglycemia Treatment: Geraldine  Medical ID: No  Does patient have glucagon emergency kit?: No  Is the patient at risk for DKA?: No  Does patient have severe weather/disaster plan for diabetes management?: No  Does patient have sick day plan for diabetes management?: No    Hypoglycemia symptoms  Hunger: Yes  Sweats: Yes  Feeling shaky: Yes    Hypoglycemia Complications  Blackouts: No  Hospitalization: No  Nocturnal hypoglycemia: Yes  Required assistance: No  Required glucagon injection: No  Seizures: No    Reducing Risks:  Reducing Risks Assessed Today: No    Healthy Coping:  Healthy Coping Assessed Today: Yes  Emotional response to diabetes: Fear/Anxiety,Concern for health and well-being,Guilt/Self-blame  Informal Support system:: Children  Stage of change: ACTION (Actively working towards change)  Support resources: None  Patient Activation Measure Survey Score:  No flowsheet data found.    Diabetes knowledge and skills assessment:   Patient is knowledgeable in diabetes management concepts related to: Healthy Eating and Being Active    Patient needs further education on the following diabetes management concepts: Monitoring, Taking Medication, Problem Solving, Reducing Risks and Healthy Coping    Based on learning assessment above, most appropriate setting for further diabetes education would be: Group class or Individual  setting.      INTERVENTIONS:    REPORTS:                    Education provided today on:  AADE Self-Care Behaviors:  Healthy Eating: carbohydrate counting, consistency in amount, composition, and timing of food intake, portion control and plate planning method  Being Active: relationship to blood glucose and precautions to take  Monitoring: log and interpret results, individual blood glucose targets and frequency of monitoring  Taking Medication: action of prescribed medication, side effects of prescribed medications and when to take medications  Problem Solving: high blood glucose - causes, signs/symptoms, treatment and prevention, low blood glucose - causes, signs/symptoms, treatment and prevention and carrying a carbohydrate source at all times    Pt verbalized understanding of concepts discussed and recommendations provided today.       Education Materials Provided:  Carbohydrate Counting, My Plate Planner and Freestyle Jammie 2 starter meter kit    ASSESSMENT:  Patient comes in with many questions about her blood sugars, insulin dosing, healthy eating & anxiety about hypoglycemia. Reviewed blood sugar trends, importance of time in target range, impact of low & high blood sugars on patient's overall health, importance of moderate carb consumption when taking insulin, carb counting, & use of Jammie 2 CGMS as patient states she can't sleep at night for fear that she will die for hypoglycemia. She has witness another person die from hypoglycemia and so this really worries her.     Glucose Patterns & Trends:  Overall very healthy bgs with occasional post-prandial spikes, overnight bgs running at borderline hypoglycemia range and afternoon walks typically dropping bgs quite low as well      PLAN  Recommend use of Jammie 2 CGMS that provides alarms for hypoglycemia & hyperglycemia to help patient sleep better and not worry as much about hypoglycemia. Will reach out to PCP in separate encounter for sign off on sensor -  patient was started today on Jammie 2 reader & sensor starter kit   See Patient Instructions for co-developed, patient-stated behavior change goals.  AVS printed and provided to patient today. See Follow-Up section for recommended follow-up.    Jessica Caro RD, LD, [facility]   Time Spent: 60 minutes  Encounter Type: Individual    Any diabetes medication dose changes were made via the CDE Protocol and Collaborative Practice Agreement with the patient's referring provider. A copy of this encounter was shared with the provider.

## 2021-12-01 NOTE — PROGRESS NOTES
[facility]    Hematology/Oncology Established Patient Follow-up Note    Treatment Summary:      Today's Date: 12/02/21    Reason for Follow-up:Hereditary Hemochromatosis  Referring Provider: Nimco Travis NP        HISTORY OF PRESENT ILLNESS: Bradford Prado is a 61 year old female with past medical history of cirrhosis, CERDA, HTN, HLD, and DM2 who is referred to clinic today for C282Y/C282Y hereditary hemochromatosis. She was previously followed by Dr. Long in [place].     Patient has family history of severe liver disease and recalls mother entering acute liver failure with need for repeat thoracenteses. Patient had then developed increasing LFTs and cirrhosis and workup for this included liver biopsy, which revealed cirrhosis and negative alpha 1 antitrypsin mutation testing. Record review shows patient has had evidence of elevated ferritin levels as high as >1500 in 2016. She underwent testing for hereditary hemochromatosis in May 2021 and returned positive as homozygous C282Y/C282Y.      Patient was then treated with phlebotomies twice per week for two months. Her most recent ferritin level was in August 2021 and 101.      Patient does have various hand arthralgias and follows with a hand surgeon for this with intermittent hand Xrays and steroid injections. She is a diabetic. No history of endocrinopathies or heart disease she is aware of.     INTERIM HISTORY:    Repeat ferritin has returned at 149. Hemoglobin 16 and hematocrit 46.9. AFP negative. ABD U/S showing cirrhosis/hepatic steatosis and a 1 cm gallbladder polyp. Patient denies RUQ pain, but does have intermittent nausea without identifiable triggers. She continues to have arthralgias and has B/L shoulder pain today. No recent injury.      REVIEW OF SYSTEMS:   A 14 point ROS was reviewed with pertinent positives and negatives in the HPI.       HOME MEDICATIONS:  Current Outpatient Medications   Medication Sig Dispense Refill      ACCU-CHEK GUIDE test strip Use to test blood sugar 4 times daily or as directed. 200 strip 11     acetaminophen (TYLENOL) 325 MG tablet Take 325-650 mg by mouth every 6 hours as needed for mild pain        amLODIPine (NORVASC) 5 MG tablet TAKE ONE TABLET BY MOUTH TWICE A  tablet 1     Biotin 10 MG CAPS Take 1 capsule by mouth daily       blood glucose (NO BRAND SPECIFIED) lancets standard Use to test blood sugar twice times daily or as directed. 100 each 1     blood glucose (NO BRAND SPECIFIED) test strip Use to test blood sugar twice times daily or as directed. 100 each 1     blood glucose (NO BRAND SPECIFIED) test strip Use to test blood sugars 2 times daily or as directed 100 strip 11     blood glucose monitoring (SOFTCLIX) lancets Use to test blood sugar 6 times daily. 200 each 11     Blood Glucose Monitoring Suppl (ACCU-CHEK GUIDE ME) w/Device KIT 1 each 6 times daily 1 kit 0     carvedilol (COREG) 12.5 MG tablet TAKE 1 TABLET (12.5 MG) BY MOUTH 2 TIMES DAILY (WITH MEALS) 180 tablet 1     Continuous Blood Gluc Sensor (FREESTYLE MEENA 2 SENSOR) MISC 1 patch every 14 days Use to check blood sugars per  guidelines 2 each 11     insulin aspart (NOVOLOG FLEXPEN) 100 UNIT/ML pen INJECT 10 UNITS SUBCUTANEOUS 3 TIMES DAILY (WITH MEALS) 30 mL 1     insulin glargine (LANTUS SOLOSTAR) 100 UNIT/ML pen INJECT 42 UNITS SUBCUTANEOUS EVERY MORNING (BEFORE BREAKFAST) 45 mL 1     Insulin Pen Needle (PEN NEEDLES) 32G X 4 MM MISC 1 each 4 times daily 200 each 11     losartan (COZAAR) 50 MG tablet TAKE 1 TABLET (50 MG) BY MOUTH DAILY 90 tablet 0     ondansetron (ZOFRAN) 4 MG tablet Take 1 tablet (4 mg) by mouth every 6 hours as needed for nausea or vomiting 15 tablet 0     pravastatin (PRAVACHOL) 20 MG tablet TAKE 1 TABLET (20 MG) BY MOUTH DAILY 90 tablet 0     sertraline (ZOLOFT) 50 MG tablet Take 1 tablet (50 mg) by mouth daily Take 50mg PO daily 90 tablet 1         ALLERGIES:  No Known Allergies      PAST  MEDICAL HISTORY:  Past Medical History:   Diagnosis Date     Diabetes mellitus (H)      Dyslipidemia      Hereditary hemochromatosis (H)      Hypertension      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) (H)          PAST SURGICAL HISTORY:  Past Surgical History:   Procedure Laterality Date     ESOPHAGOSCOPY, GASTROSCOPY, DUODENOSCOPY (EGD), COMBINED N/A 11/10/2021    Procedure: ESOPHAGOGASTRODUODENOSCOPY, WITH BIOPSY;  Surgeon: Leventhal, Thomas Michael, MD;  Location: [facility] OR     HYSTERECTOMY TOTAL ABDOMINAL      due to fibroids     LAPAROSCOPIC EVACUATION ECTOPIC PREGNANCY       TUBAL LIGATION           SOCIAL HISTORY:  Social History     Socioeconomic History     Marital status: Single     Spouse name: Not on file     Number of children: Not on file     Years of education: Not on file     Highest education level: Not on file   Occupational History     Not on file   Tobacco Use     Smoking status: Former Smoker     Packs/day: 0.50     Years: 20.00     Pack years: 10.00     Types: Cigarettes     Quit date: 3/9/2010     Years since quittin.7     Smokeless tobacco: Never Used   Vaping Use     Vaping Use: Never used   Substance and Sexual Activity     Alcohol use: No     Alcohol/week: 0.0 standard drinks     Drug use: No     Sexual activity: Never   Other Topics Concern     Parent/sibling w/ CABG, MI or angioplasty before 65F 55M? No   Social History Narrative    , 2 children, 2 grandchildren     Social Determinants of Health     Financial Resource Strain: Not on file   Food Insecurity: Not on file   Transportation Needs: Not on file   Physical Activity: Not on file   Stress: Not on file   Social Connections: Not on file   Intimate Partner Violence: Not on file   Housing Stability: Not on file         FAMILY HISTORY:  Family History   Problem Relation Age of Onset     Cirrhosis Mother         w/o alcohol history     Diabetes Mother      Emphysema Father      Hypertension Sister       Hemochromatosis Sister      Heart Defect Niece         Tetrology of Fallot     Breast Cancer No family hx of      Cancer - colorectal No family hx of          PHYSICAL EXAM:  Vital signs:  /81   Pulse 72   Resp 16   Wt 62.5 kg (137 lb 12.8 oz)   LMP  (LMP Unknown)   SpO2 95%   BMI 27.83 kg/m     ECO-1  GENERAL/CONSTITUTIONAL: No acute distress.  EYES: Pupils are equal, round, and react to light and accommodation. Extraocular movements intact.  No scleral icterus.  ENT/MOUTH: Neck supple. Oropharynx clear, no mucositis.  LYMPH: No anterior cervical, posterior cervical, supraclavicular, axillary or inguinal adenopathy.   RESPIRATORY: Clear to auscultation bilaterally. No crackles or wheezing.   CARDIOVASCULAR: Regular rate and rhythm without murmurs, gallops, or rubs.  GASTROINTESTINAL: No hepatosplenomegaly, masses, or tenderness. The patient has normal bowel sounds. No guarding.  No distention.  MUSCULOSKELETAL: Warm and well-perfused, no cyanosis, clubbing, or edema.  NEUROLOGIC: Cranial nerves II-XII are intact. Alert, oriented, answers questions appropriately.  INTEGUMENTARY: No rashes or jaundice.  GAIT: Steady, does not use assistive device      LABS:  CBC RESULTS:   Recent Labs   Lab Test 21  1209   WBC 10.5   RBC 5.29*   HGB 16.0*   HCT 46.9   MCV 89   MCH 30.2   MCHC 34.1   RDW 12.9         Ref. Range 2021 12:09   % Neutrophils Latest Units: % 63   % Lymphocytes Latest Units: % 24   % Monocytes Latest Units: % 9   % Eosinophils Latest Units: % 3   % Basophils Latest Units: % 0   Absolute Basophils Latest Ref Range: 0.0 - 0.2 10e3/uL 0.0   Absolute Eosinophils Latest Ref Range: 0.0 - 0.7 10e3/uL 0.3   Absolute Immature Granulocytes Latest Ref Range: <=0.0 10e3/uL 0.1 (H)   Absolute Lymphocytes Latest Ref Range: 0.8 - 5.3 10e3/uL 2.5   Absolute Monocytes Latest Ref Range: 0.0 - 1.3 10e3/uL 1.0   % Immature Granulocytes Latest Units: % 1   Absolute Neutrophils Latest Ref  Range: 1.6 - 8.3 10e3/uL 6.6   Absolute NRBCs Latest Units: 10e3/uL 0.0   NRBCs per 100 WBC Latest Ref Range: <1 /100 0       Recent Labs   Lab Test 11/18/21  1209 11/10/21  1008 11/10/21  0903 05/10/21  1055     --   --  138   POTASSIUM 3.9  --   --  4.2   CHLORIDE 108  --   --  105   CO2 24  --   --  26   ANIONGAP 7  --   --  7   GLC 70 115*   < > 207*   BUN 10  --   --  17   CR 0.51*  --   --  0.57   ESTELLE 9.1  --   --  9.1    < > = values in this interval not displayed.      Ref. Range 11/18/2021 12:09   Albumin Latest Ref Range: 3.4 - 5.0 g/dL 4.0   Protein Total Latest Ref Range: 6.8 - 8.8 g/dL 8.4   Bilirubin Total Latest Ref Range: 0.2 - 1.3 mg/dL 0.4   Alkaline Phosphatase Latest Ref Range: 40 - 150 U/L 84   ALT Latest Ref Range: 0 - 50 U/L 44   AST Latest Ref Range: 0 - 45 U/L 41        Ref. Range 11/18/2021 12:09   Alpha Fetoprotein Latest Ref Range: 0.0 - 8.0 ug/L 4.7      Ref. Range 11/18/2021 12:09   Ferritin Latest Ref Range: 8 - 252 ng/mL 149   Iron Latest Ref Range: 35 - 180 ug/dL 183 (H)   Iron Binding Cap Latest Ref Range: 240 - 430 ug/dL 339   Iron Saturation Index Latest Ref Range: 15 - 46 % 54 (H)            Ref. Range 5/10/2021 10:55   Ferritin Latest Ref Range: 8 - 252 ng/mL 1,126 (H)   Iron Latest Ref Range: 35 - 180 ug/dL 235 (H)   Iron Binding Cap Latest Ref Range: 240 - 430 ug/dL 306   Iron Saturation Index Latest Ref Range: 15 - 46 % 77 (H)              Ref. Range 9/27/2016 11:10 5/10/2021 10:55 6/15/2021 13:04 6/19/2021 11:29 6/23/2021 10:45 6/28/2021 11:05 7/1/2021 11:08 7/5/2021 12:39 7/7/2021 13:08 7/12/2021 14:31 7/19/2021 11:22 7/28/2021 13:53 8/3/2021 14:07 8/10/2021 14:03 8/25/2021 11:21 8/26/2021 10:00   Ferritin Latest Ref Range: 8 - 252 ng/mL 1,569 (H) 1,126 (H) 1,054 (H) 994 (H) 1,003 (H) 698 (H) 688 (H) 605 (H) 513 (H) 521 273 (H) 217 222 172 118 101           Ref. Range 9/27/2016 11:10 6/23/2021 10:45   Hep B Surface Agn Latest Ref Range: NR  Nonreactive     Hepatitis B  Surface Antibody Latest Ref Range: <8.00 m[IU]/mL 0.22     Hepatitis C Antibody Latest Ref Range: NR  Nonreactive...     HIV Antigen Antibody Combo Latest Ref Range: NR^Nonreactive       Nonreactive         PATHOLOGY:  5/10/21: TEST(S) REQUESTED:   Hemochromatosis Mutation Analysis by PCR     SPECIMEN DESCRIPTION:   Blood     HEMOCHROMATOSIS RESULTS     HFE Gene C282Y (G845A) RESULTS:     C282Y Mutation Interpretation: HOMOZYGOTE     HFE Gene H63D (C187G) RESULTS:     H63D Mutation Interpretation: NORMAL     HFE Gene S65C (A193T) RESULTS:     S65C Mutation Interpretation: NORMAL      Indication for testing: Carrier screening or diagnostic   testing for alpha-1-antitrypsin (AAT) deficiency.   Negative: This sample has a serum AAT protein concentration   in the normal range and is negative for the S and Z   deficiency alleles by genotyping. 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                          - Small (< 5 mm) esophageal varices.                          - Gastritis. Biopsied.                          - Normal examined duodenum.      Final Diagnosis   A. STOMACH, BIOPSY:  - Gastric mucosa with features of reactive gastropathy   - No H. pylori organisms identified on routine staining  - Negative for intestinal metaplasia and dysplasia     IMAGING:  ULTRASOUND ABDOMEN LIMITED November 24, 2021   IMPRESSION:  1.  Coarse increased echogenicity of the liver compatible with  steatosis and/or cirrhosis.  2.  1 cm gallbladder polyp.  3.   No hepatoma demonstrated.      ASSESSMENT/PLAN:  Bradford Prado is a 61 year old female with past medical history of cirrhosis, CERDA, HTN, HLD, and DM2 who is referred to clinic today for C282Y/C282Y hereditary hemochromatosis. She was previously followed by Dr. Long in [place].     1) Homozygous C282Y/C282Y hereditary hemochromatosis. Patient diagnosed just in May 2021. Her last phlebotomy was in August 2021 (ferritin 101). Current ferritin is 149 and patient with arthralgias.  -Plan for phlebotomy once weekly x4 weeks.  -Continue yearly liver ultrasound and AFP (4.7 as of 11/2021).    2) 1 cm galbladder polyp  -Discussed with patient she may require cholecystectomy due to increased risk of malignancy with galbladder polyps measuring >/= 1 cm.   -General surgery referral made at this time.     3) Intermittent nausea  -She has zofran PRN.  -Will trial PPI for one month to see if this improves symptoms.     4) History of cirrhosis/CERDA  -See plan above.      5) HTN, HLD, DM2     6) B/L shoulder pain: check B/L shoulder Xray.     5) Follow up in 4 weeks.       Thank you for referring Bradford Prado to clinic today. Adequate time was dedicated to patient questions and answered to the expressed satisfaction of the patient.         Rochelle Girard DO  Hematology/Oncology  [facility]

## 2021-12-02 ENCOUNTER — ONCOLOGY VISIT (OUTPATIENT)
Dept: ONCOLOGY | Facility: CLINIC | Age: 61
End: 2021-12-02
Attending: INTERNAL MEDICINE
Payer: COMMERCIAL

## 2021-12-02 VITALS
WEIGHT: 137.8 LBS | RESPIRATION RATE: 16 BRPM | DIASTOLIC BLOOD PRESSURE: 81 MMHG | BODY MASS INDEX: 27.83 KG/M2 | OXYGEN SATURATION: 95 % | HEART RATE: 72 BPM | SYSTOLIC BLOOD PRESSURE: 130 MMHG

## 2021-12-02 DIAGNOSIS — E83.110 HEREDITARY HEMOCHROMATOSIS (H): ICD-10-CM

## 2021-12-02 DIAGNOSIS — K21.00 GASTROESOPHAGEAL REFLUX DISEASE WITH ESOPHAGITIS WITHOUT HEMORRHAGE: Primary | ICD-10-CM

## 2021-12-02 DIAGNOSIS — M25.511 CHRONIC PAIN OF BOTH SHOULDERS: ICD-10-CM

## 2021-12-02 DIAGNOSIS — M25.512 CHRONIC PAIN OF BOTH SHOULDERS: ICD-10-CM

## 2021-12-02 DIAGNOSIS — G89.29 CHRONIC PAIN OF BOTH SHOULDERS: ICD-10-CM

## 2021-12-02 DIAGNOSIS — F43.22 ADJUSTMENT DISORDER WITH ANXIOUS MOOD: ICD-10-CM

## 2021-12-02 DIAGNOSIS — K82.4 GALLBLADDER POLYP: ICD-10-CM

## 2021-12-02 PROCEDURE — 99214 OFFICE O/P EST MOD 30 MIN: CPT | Performed by: INTERNAL MEDICINE

## 2021-12-02 PROCEDURE — G0463 HOSPITAL OUTPT CLINIC VISIT: HCPCS

## 2021-12-02 RX ORDER — HEPARIN SODIUM,PORCINE 10 UNIT/ML
5 VIAL (ML) INTRAVENOUS
Status: CANCELLED | OUTPATIENT
Start: 2021-12-03

## 2021-12-02 RX ORDER — PANTOPRAZOLE SODIUM 40 MG/1
40 TABLET, DELAYED RELEASE ORAL DAILY
Qty: 30 TABLET | Refills: 0 | Status: SHIPPED | OUTPATIENT
Start: 2021-12-02 | End: 2021-12-22

## 2021-12-02 RX ORDER — HEPARIN SODIUM (PORCINE) LOCK FLUSH IV SOLN 100 UNIT/ML 100 UNIT/ML
5 SOLUTION INTRAVENOUS
Status: CANCELLED | OUTPATIENT
Start: 2021-12-03

## 2021-12-02 ASSESSMENT — PAIN SCALES - GENERAL: PAINLEVEL: WORST PAIN (10)

## 2021-12-02 NOTE — PROGRESS NOTES
"Oncology Rooming Note    December 2, 2021 12:57 PM   Bradford Prado is a 61 year old female who presents for:    Chief Complaint   Patient presents with     Oncology Clinic Visit     Initial Vitals: /81   Pulse 72   Resp 16   Wt 62.5 kg (137 lb 12.8 oz)   LMP  (LMP Unknown)   SpO2 95%   BMI 27.83 kg/m   Estimated body mass index is 27.83 kg/m  as calculated from the following:    Height as of 11/10/21: 1.499 m (4' 11\").    Weight as of this encounter: 62.5 kg (137 lb 12.8 oz). Body surface area is 1.61 meters squared.  Worst Pain (10) Comment: Data Unavailable   No LMP recorded (lmp unknown). Patient has had a hysterectomy.  Allergies reviewed: Yes  Medications reviewed: Yes    Medications: Medication refills not needed today.  Pharmacy name entered into EPIC:    [facility] [place] [place] - [address] [place] [address] [facility] [place], [place] - [address] [place] [address] [phone]    Clinical concerns:  doctor was notified.      Umm Shepherd CMA            "

## 2021-12-02 NOTE — LETTER
12/2/2021         RE: Bradford Prado  [address]  [facility] [address]        Dear Colleague,    Thank you for referring your patient, Bradford Prado, to the [facility]. Please see a copy of my visit note below.    [facility]    Hematology/Oncology Established Patient Follow-up Note    Treatment Summary:      Today's Date: 12/02/21    Reason for Follow-up:Hereditary Hemochromatosis  Referring Provider: Nimco Travis NP        HISTORY OF PRESENT ILLNESS: Bradford Prado is a 61 year old female with past medical history of cirrhosis, CERDA, HTN, HLD, and DM2 who is referred to clinic today for C282Y/C282Y hereditary hemochromatosis. She was previously followed by Dr. Long in [place].     Patient has family history of severe liver disease and recalls mother entering acute liver failure with need for repeat thoracenteses. Patient had then developed increasing LFTs and cirrhosis and workup for this included liver biopsy, which revealed cirrhosis and negative alpha 1 antitrypsin mutation testing. Record review shows patient has had evidence of elevated ferritin levels as high as >1500 in 2016. She underwent testing for hereditary hemochromatosis in May 2021 and returned positive as homozygous C282Y/C282Y.      Patient was then treated with phlebotomies twice per week for two months. Her most recent ferritin level was in August 2021 and 101.      Patient does have various hand arthralgias and follows with a hand surgeon for this with intermittent hand Xrays and steroid injections. She is a diabetic. No history of endocrinopathies or heart disease she is aware of.     INTERIM HISTORY:    Repeat ferritin has returned at 149. Hemoglobin 16 and hematocrit 46.9. AFP negative. ABD U/S showing cirrhosis/hepatic steatosis and a 1 cm gallbladder polyp. Patient denies RUQ pain, but does have intermittent nausea without identifiable triggers. She continues to have arthralgias  and has B/L shoulder pain today. No recent injury.      REVIEW OF SYSTEMS:   A 14 point ROS was reviewed with pertinent positives and negatives in the HPI.       HOME MEDICATIONS:  Current Outpatient Medications   Medication Sig Dispense Refill     ACCU-CHEK GUIDE test strip Use to test blood sugar 4 times daily or as directed. 200 strip 11     acetaminophen (TYLENOL) 325 MG tablet Take 325-650 mg by mouth every 6 hours as needed for mild pain        amLODIPine (NORVASC) 5 MG tablet TAKE ONE TABLET BY MOUTH TWICE A  tablet 1     Biotin 10 MG CAPS Take 1 capsule by mouth daily       blood glucose (NO BRAND SPECIFIED) lancets standard Use to test blood sugar twice times daily or as directed. 100 each 1     blood glucose (NO BRAND SPECIFIED) test strip Use to test blood sugar twice times daily or as directed. 100 each 1     blood glucose (NO BRAND SPECIFIED) test strip Use to test blood sugars 2 times daily or as directed 100 strip 11     blood glucose monitoring (SOFTCLIX) lancets Use to test blood sugar 6 times daily. 200 each 11     Blood Glucose Monitoring Suppl (ACCU-CHEK GUIDE ME) w/Device KIT 1 each 6 times daily 1 kit 0     carvedilol (COREG) 12.5 MG tablet TAKE 1 TABLET (12.5 MG) BY MOUTH 2 TIMES DAILY (WITH MEALS) 180 tablet 1     Continuous Blood Gluc Sensor (FREESTYLE MEENA 2 SENSOR) MISC 1 patch every 14 days Use to check blood sugars per  guidelines 2 each 11     insulin aspart (NOVOLOG FLEXPEN) 100 UNIT/ML pen INJECT 10 UNITS SUBCUTANEOUS 3 TIMES DAILY (WITH MEALS) 30 mL 1     insulin glargine (LANTUS SOLOSTAR) 100 UNIT/ML pen INJECT 42 UNITS SUBCUTANEOUS EVERY MORNING (BEFORE BREAKFAST) 45 mL 1     Insulin Pen Needle (PEN NEEDLES) 32G X 4 MM MISC 1 each 4 times daily 200 each 11     losartan (COZAAR) 50 MG tablet TAKE 1 TABLET (50 MG) BY MOUTH DAILY 90 tablet 0     ondansetron (ZOFRAN) 4 MG tablet Take 1 tablet (4 mg) by mouth every 6 hours as needed for nausea or vomiting 15 tablet 0      pravastatin (PRAVACHOL) 20 MG tablet TAKE 1 TABLET (20 MG) BY MOUTH DAILY 90 tablet 0     sertraline (ZOLOFT) 50 MG tablet Take 1 tablet (50 mg) by mouth daily Take 50mg PO daily 90 tablet 1         ALLERGIES:  No Known Allergies      PAST MEDICAL HISTORY:  Past Medical History:   Diagnosis Date     Diabetes mellitus (H)      Dyslipidemia      Hereditary hemochromatosis (H)      Hypertension      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) (H)          PAST SURGICAL HISTORY:  Past Surgical History:   Procedure Laterality Date     ESOPHAGOSCOPY, GASTROSCOPY, DUODENOSCOPY (EGD), COMBINED N/A 11/10/2021    Procedure: ESOPHAGOGASTRODUODENOSCOPY, WITH BIOPSY;  Surgeon: Leventhal, Thomas Michael, MD;  Location: [facility] OR     HYSTERECTOMY TOTAL ABDOMINAL      due to fibroids     LAPAROSCOPIC EVACUATION ECTOPIC PREGNANCY       TUBAL LIGATION           SOCIAL HISTORY:  Social History     Socioeconomic History     Marital status: Single     Spouse name: Not on file     Number of children: Not on file     Years of education: Not on file     Highest education level: Not on file   Occupational History     Not on file   Tobacco Use     Smoking status: Former Smoker     Packs/day: 0.50     Years: 20.00     Pack years: 10.00     Types: Cigarettes     Quit date: 3/9/2010     Years since quittin.7     Smokeless tobacco: Never Used   Vaping Use     Vaping Use: Never used   Substance and Sexual Activity     Alcohol use: No     Alcohol/week: 0.0 standard drinks     Drug use: No     Sexual activity: Never   Other Topics Concern     Parent/sibling w/ CABG, MI or angioplasty before 65F 55M? No   Social History Narrative    , 2 children, 2 grandchildren     Social Determinants of Health     Financial Resource Strain: Not on file   Food Insecurity: Not on file   Transportation Needs: Not on file   Physical Activity: Not on file   Stress: Not on file   Social Connections: Not on file   Intimate Partner  Violence: Not on file   Housing Stability: Not on file         FAMILY HISTORY:  Family History   Problem Relation Age of Onset     Cirrhosis Mother         w/o alcohol history     Diabetes Mother      Emphysema Father      Hypertension Sister      Hemochromatosis Sister      Heart Defect Niece         Tetrology of Fallot     Breast Cancer No family hx of      Cancer - colorectal No family hx of          PHYSICAL EXAM:  Vital signs:  /81   Pulse 72   Resp 16   Wt 62.5 kg (137 lb 12.8 oz)   LMP  (LMP Unknown)   SpO2 95%   BMI 27.83 kg/m     ECO-1  GENERAL/CONSTITUTIONAL: No acute distress.  EYES: Pupils are equal, round, and react to light and accommodation. Extraocular movements intact.  No scleral icterus.  ENT/MOUTH: Neck supple. Oropharynx clear, no mucositis.  LYMPH: No anterior cervical, posterior cervical, supraclavicular, axillary or inguinal adenopathy.   RESPIRATORY: Clear to auscultation bilaterally. No crackles or wheezing.   CARDIOVASCULAR: Regular rate and rhythm without murmurs, gallops, or rubs.  GASTROINTESTINAL: No hepatosplenomegaly, masses, or tenderness. The patient has normal bowel sounds. No guarding.  No distention.  MUSCULOSKELETAL: Warm and well-perfused, no cyanosis, clubbing, or edema.  NEUROLOGIC: Cranial nerves II-XII are intact. Alert, oriented, answers questions appropriately.  INTEGUMENTARY: No rashes or jaundice.  GAIT: Steady, does not use assistive device      LABS:  CBC RESULTS:   Recent Labs   Lab Test 21  1209   WBC 10.5   RBC 5.29*   HGB 16.0*   HCT 46.9   MCV 89   MCH 30.2   MCHC 34.1   RDW 12.9         Ref. Range 2021 12:09   % Neutrophils Latest Units: % 63   % Lymphocytes Latest Units: % 24   % Monocytes Latest Units: % 9   % Eosinophils Latest Units: % 3   % Basophils Latest Units: % 0   Absolute Basophils Latest Ref Range: 0.0 - 0.2 10e3/uL 0.0   Absolute Eosinophils Latest Ref Range: 0.0 - 0.7 10e3/uL 0.3   Absolute Immature Granulocytes  Latest Ref Range: <=0.0 10e3/uL 0.1 (H)   Absolute Lymphocytes Latest Ref Range: 0.8 - 5.3 10e3/uL 2.5   Absolute Monocytes Latest Ref Range: 0.0 - 1.3 10e3/uL 1.0   % Immature Granulocytes Latest Units: % 1   Absolute Neutrophils Latest Ref Range: 1.6 - 8.3 10e3/uL 6.6   Absolute NRBCs Latest Units: 10e3/uL 0.0   NRBCs per 100 WBC Latest Ref Range: <1 /100 0       Recent Labs   Lab Test 11/18/21  1209 11/10/21  1008 11/10/21  0903 05/10/21  1055     --   --  138   POTASSIUM 3.9  --   --  4.2   CHLORIDE 108  --   --  105   CO2 24  --   --  26   ANIONGAP 7  --   --  7   GLC 70 115*   < > 207*   BUN 10  --   --  17   CR 0.51*  --   --  0.57   ESTELLE 9.1  --   --  9.1    < > = values in this interval not displayed.      Ref. Range 11/18/2021 12:09   Albumin Latest Ref Range: 3.4 - 5.0 g/dL 4.0   Protein Total Latest Ref Range: 6.8 - 8.8 g/dL 8.4   Bilirubin Total Latest Ref Range: 0.2 - 1.3 mg/dL 0.4   Alkaline Phosphatase Latest Ref Range: 40 - 150 U/L 84   ALT Latest Ref Range: 0 - 50 U/L 44   AST Latest Ref Range: 0 - 45 U/L 41        Ref. Range 11/18/2021 12:09   Alpha Fetoprotein Latest Ref Range: 0.0 - 8.0 ug/L 4.7      Ref. Range 11/18/2021 12:09   Ferritin Latest Ref Range: 8 - 252 ng/mL 149   Iron Latest Ref Range: 35 - 180 ug/dL 183 (H)   Iron Binding Cap Latest Ref Range: 240 - 430 ug/dL 339   Iron Saturation Index Latest Ref Range: 15 - 46 % 54 (H)            Ref. Range 5/10/2021 10:55   Ferritin Latest Ref Range: 8 - 252 ng/mL 1,126 (H)   Iron Latest Ref Range: 35 - 180 ug/dL 235 (H)   Iron Binding Cap Latest Ref Range: 240 - 430 ug/dL 306   Iron Saturation Index Latest Ref Range: 15 - 46 % 77 (H)              Ref. Range 9/27/2016 11:10 5/10/2021 10:55 6/15/2021 13:04 6/19/2021 11:29 6/23/2021 10:45 6/28/2021 11:05 7/1/2021 11:08 7/5/2021 12:39 7/7/2021 13:08 7/12/2021 14:31 7/19/2021 11:22 7/28/2021 13:53 8/3/2021 14:07 8/10/2021 14:03 8/25/2021 11:21 8/26/2021 10:00   Ferritin Latest Ref Range: 8 -  252 ng/mL 1,569 (H) 1,126 (H) 1,054 (H) 994 (H) 1,003 (H) 698 (H) 688 (H) 605 (H) 513 (H) 521 273 (H) 217 222 172 118 101           Ref. Range 9/27/2016 11:10 6/23/2021 10:45   Hep B Surface Agn Latest Ref Range: NR  Nonreactive     Hepatitis B Surface Antibody Latest Ref Range: <8.00 m[IU]/mL 0.22     Hepatitis C Antibody Latest Ref Range: NR  Nonreactive...     HIV Antigen Antibody Combo Latest Ref Range: NR^Nonreactive       Nonreactive         PATHOLOGY:  5/10/21: TEST(S) REQUESTED:   Hemochromatosis Mutation Analysis by PCR     SPECIMEN DESCRIPTION:   Blood     HEMOCHROMATOSIS RESULTS     HFE Gene C282Y (G845A) RESULTS:     C282Y Mutation Interpretation: HOMOZYGOTE     HFE Gene H63D (C187G) RESULTS:     H63D Mutation Interpretation: NORMAL     HFE Gene S65C (A193T) RESULTS:     S65C Mutation Interpretation: NORMAL      Indication for testing: Carrier screening or diagnostic   testing for alpha-1-antitrypsin (AAT) deficiency.   Negative: This sample has a serum AAT protein concentration   in the normal range and is negative for the S and Z   deficiency alleles by genotyping. 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                          - Small (< 5 mm) esophageal varices.                          - Gastritis. Biopsied.                          - Normal examined duodenum.      Final Diagnosis   A. STOMACH, BIOPSY:  - Gastric mucosa with features of reactive gastropathy   - No H. pylori organisms identified on routine staining  -  Negative for intestinal metaplasia and dysplasia     IMAGING:  ULTRASOUND ABDOMEN LIMITED November 24, 2021   IMPRESSION:  1.  Coarse increased echogenicity of the liver compatible with  steatosis and/or cirrhosis.  2.  1 cm gallbladder polyp.  3.  No hepatoma demonstrated.      ASSESSMENT/PLAN:  Bradford Prado is a 61 year old female with past medical history of cirrhosis, CERDA, HTN, HLD, and DM2 who is referred to clinic today for C282Y/C282Y hereditary hemochromatosis. She was previously followed by Dr. Long in [place].     1) Homozygous C282Y/C282Y hereditary hemochromatosis. Patient diagnosed just in May 2021. Her last phlebotomy was in August 2021 (ferritin 101). Current ferritin is 149 and patient with arthralgias.  -Plan for phlebotomy once weekly x4 weeks.  -Continue yearly liver ultrasound and AFP (4.7 as of 11/2021).    2) 1 cm galbladder polyp  -Discussed with patient she may require cholecystectomy due to increased risk of malignancy with galbladder polyps measuring >/= 1 cm.   -General surgery referral made at this time.     3) Intermittent nausea  -She has zofran PRN.  -Will trial PPI for one month to see if this improves symptoms.     4) History of cirrhosis/CERDA  -See plan above.      5) HTN, HLD, DM2     6) B/L shoulder pain: check B/L shoulder Xray.     5) Follow up in 4 weeks.       Thank you for referring Bradford Prado to clinic today. Adequate time was dedicated to patient questions and answered to the expressed satisfaction of the patient.         Rochelle Girard DO  Hematology/Oncology  [facility]        Oncology Rooming Note    December 2, 2021 12:57 PM   Bradford Prado is a 61 year old female who presents for:    Chief Complaint   Patient presents with     Oncology Clinic Visit     Initial Vitals: /81   Pulse 72   Resp 16   Wt 62.5 kg (137 lb 12.8 oz)   LMP  (LMP Unknown)   SpO2 95%   BMI 27.83 kg/m   Estimated body mass index is 27.83 kg/m  as calculated from the  "following:    Height as of 11/10/21: 1.499 m (4' 11\").    Weight as of this encounter: 62.5 kg (137 lb 12.8 oz). Body surface area is 1.61 meters squared.  Worst Pain (10) Comment: Data Unavailable   No LMP recorded (lmp unknown). Patient has had a hysterectomy.  Allergies reviewed: Yes  Medications reviewed: Yes    Medications: Medication refills not needed today.  Pharmacy name entered into [employer]:    [facility] [place], [place] - [address] [place] [address] - [address] [place] [address] [phone]    Clinical concerns:  doctor was notified.      Umm Shepherd, JOHNATHON                Again, thank you for allowing me to participate in the care of your patient.        Sincerely,        Rochelle Girard, DO    "

## 2021-12-04 DIAGNOSIS — I10 ESSENTIAL HYPERTENSION WITH GOAL BLOOD PRESSURE LESS THAN 140/90: Chronic | ICD-10-CM

## 2021-12-04 DIAGNOSIS — E11.29 POORLY CONTROLLED TYPE 2 DIABETES MELLITUS WITH RENAL COMPLICATION (H): Chronic | ICD-10-CM

## 2021-12-04 DIAGNOSIS — E78.5 HYPERLIPIDEMIA LDL GOAL <70: Chronic | ICD-10-CM

## 2021-12-04 DIAGNOSIS — E11.65 TYPE 2 DIABETES MELLITUS WITH HYPERGLYCEMIA, WITHOUT LONG-TERM CURRENT USE OF INSULIN (H): ICD-10-CM

## 2021-12-04 DIAGNOSIS — E11.65 POORLY CONTROLLED TYPE 2 DIABETES MELLITUS WITH RENAL COMPLICATION (H): Chronic | ICD-10-CM

## 2021-12-06 ENCOUNTER — HOSPITAL ENCOUNTER (OUTPATIENT)
Dept: GENERAL RADIOLOGY | Facility: CLINIC | Age: 61
Discharge: HOME | End: 2021-12-06
Attending: INTERNAL MEDICINE | Admitting: INTERNAL MEDICINE
Payer: COMMERCIAL

## 2021-12-06 DIAGNOSIS — G89.29 CHRONIC PAIN OF BOTH SHOULDERS: ICD-10-CM

## 2021-12-06 DIAGNOSIS — M25.511 CHRONIC PAIN OF BOTH SHOULDERS: ICD-10-CM

## 2021-12-06 DIAGNOSIS — M25.512 CHRONIC PAIN OF BOTH SHOULDERS: ICD-10-CM

## 2021-12-06 PROCEDURE — 73030 X-RAY EXAM OF SHOULDER: CPT | Mod: 50

## 2021-12-07 ENCOUNTER — TELEPHONE (OUTPATIENT)
Dept: ONCOLOGY | Facility: CLINIC | Age: 61
End: 2021-12-07
Payer: COMMERCIAL

## 2021-12-07 RX ORDER — LOSARTAN POTASSIUM 50 MG/1
TABLET ORAL
Qty: 90 TABLET | Refills: 0 | Status: SHIPPED | OUTPATIENT
Start: 2021-12-07 | End: 2022-04-28

## 2021-12-07 RX ORDER — PRAVASTATIN SODIUM 20 MG
TABLET ORAL
Qty: 90 TABLET | Refills: 0 | Status: SHIPPED | OUTPATIENT
Start: 2021-12-07 | End: 2022-05-02

## 2021-12-07 NOTE — TELEPHONE ENCOUNTER
Left message for Bradford to let her know the results of her shoulder X-rays - no acute pathology found, some chronic degenerative changes and osteopenia.  If any questions feel free to call back at [phone] option 1.

## 2021-12-07 NOTE — TELEPHONE ENCOUNTER
----- Message from Rochelle Girard DO sent at 12/6/2021  4:39 PM CST -----  Douglas Brink,     Please contact patient and let her know Xrays are essentially negative for acute pathology. She has chronic degenerative changes and osteopenia.     Thank you

## 2021-12-13 ENCOUNTER — OFFICE VISIT (OUTPATIENT)
Dept: SURGERY | Facility: CLINIC | Age: 61
End: 2021-12-13
Attending: INTERNAL MEDICINE
Payer: COMMERCIAL

## 2021-12-13 VITALS
SYSTOLIC BLOOD PRESSURE: 128 MMHG | DIASTOLIC BLOOD PRESSURE: 80 MMHG | HEIGHT: 59 IN | HEART RATE: 81 BPM | BODY MASS INDEX: 27.62 KG/M2 | WEIGHT: 137 LBS | OXYGEN SATURATION: 97 %

## 2021-12-13 DIAGNOSIS — K82.4 GALLBLADDER POLYP: Primary | ICD-10-CM

## 2021-12-13 PROCEDURE — 99244 OFF/OP CNSLTJ NEW/EST MOD 40: CPT | Performed by: SURGERY

## 2021-12-13 ASSESSMENT — MIFFLIN-ST. JEOR: SCORE: 1102.06

## 2021-12-13 NOTE — NURSING NOTE
Patient has gallbladder polyp.      Patient experiences nausea a couple of times a day.  No rhyme or reason, not related to any particular food    Kisha Humphrey RN-BSN

## 2021-12-15 ENCOUNTER — LAB (OUTPATIENT)
Dept: INFUSION THERAPY | Facility: CLINIC | Age: 61
End: 2021-12-15
Attending: INTERNAL MEDICINE
Payer: COMMERCIAL

## 2021-12-15 VITALS
DIASTOLIC BLOOD PRESSURE: 64 MMHG | SYSTOLIC BLOOD PRESSURE: 106 MMHG | HEART RATE: 67 BPM | TEMPERATURE: 98.8 F | RESPIRATION RATE: 16 BRPM | OXYGEN SATURATION: 97 %

## 2021-12-15 DIAGNOSIS — E83.110 HEREDITARY HEMOCHROMATOSIS (H): Primary | ICD-10-CM

## 2021-12-15 LAB
FERRITIN SERPL-MCNC: 179 NG/ML (ref 8–252)
HCT VFR BLD AUTO: 44.2 % (ref 35–47)
HGB BLD-MCNC: 15.1 G/DL (ref 11.7–15.7)

## 2021-12-15 PROCEDURE — 82728 ASSAY OF FERRITIN: CPT | Performed by: INTERNAL MEDICINE

## 2021-12-15 PROCEDURE — 99195 PHLEBOTOMY: CPT

## 2021-12-15 PROCEDURE — 85014 HEMATOCRIT: CPT | Performed by: INTERNAL MEDICINE

## 2021-12-15 PROCEDURE — 36415 COLL VENOUS BLD VENIPUNCTURE: CPT | Performed by: INTERNAL MEDICINE

## 2021-12-15 RX ORDER — HEPARIN SODIUM,PORCINE 10 UNIT/ML
5 VIAL (ML) INTRAVENOUS
Status: CANCELLED | OUTPATIENT
Start: 2021-12-22

## 2021-12-15 RX ORDER — HEPARIN SODIUM (PORCINE) LOCK FLUSH IV SOLN 100 UNIT/ML 100 UNIT/ML
5 SOLUTION INTRAVENOUS
Status: CANCELLED | OUTPATIENT
Start: 2021-12-22

## 2021-12-15 ASSESSMENT — PAIN SCALES - GENERAL: PAINLEVEL: MODERATE PAIN (5)

## 2021-12-15 NOTE — PROGRESS NOTES
Infusion Nursing Note:  Bradford Prado presents today for phlebotomy.    Patient seen by provider today: No   present during visit today: Not Applicable.    Note: c/o constant aching in joints .      Intravenous Access:  17G phlebotomy needle.    Treatment Conditions:  HGB 15.1   Ferritin 179    Post Infusion Assessment:  Access discontinued per protocol.       Discharge Plan:   Discharge instructions reviewed with: Patient.  Patient and/or family verbalized understanding of discharge instructions and all questions answered.  AVS to patient via "[employer]T.  Patient will return 12/22 for next appointment.   Patient discharged in stable condition accompanied by: self.  Departure Mode: Ambulatory.      Cristal Girard RN

## 2021-12-16 NOTE — PROGRESS NOTES
"[facility]  Surgery Consultation    PCP:  Paulette Travis [phone]  Consultation requested by: Rochelle Girard DO    HPI: This patient is a 61-year-old female referred by the above provider for consultation regarding gallbladder polyp.  She has a complex past medical history involving hereditary hemochromatosis.  She also has significant liver issues related to this.  She had had an abdominal ultrasound to evaluate her liver disease.  She was found to have a 1 cm polyp on ultrasound.  She does have intermittent nausea but no real significant upper abdominal pain.  No postprandial symptoms.    PMH:   has a past medical history of Diabetes mellitus (H), Dyslipidemia, Hereditary hemochromatosis (H), Hypertension, and 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) (H).  PSH:    has a past surgical history that includes tubal ligation; Laparoscopic evacuation ectopic pregnancy; Hysterectomy total abdominal (1995); and Esophagoscopy, gastroscopy, duodenoscopy (EGD), combined (N/A, 11/10/2021).  Social History:   reports that she quit smoking about 11 years ago. Her smoking use included cigarettes. She has a 10.00 pack-year smoking history. She has never used smokeless tobacco. She reports that she does not drink alcohol and does not use drugs.  Family History:  family history includes Cirrhosis in her mother; Diabetes in her mother; Emphysema in her father; Heart Defect in her niece; Hemochromatosis in her sister; Hypertension in her sister.  Medications/Allergies: Home medications and allergies reviewed.    ROS:  The 10 point Review of Systems is negative other than noted in the HPI.    Physical Exam:  /80   Pulse 81   Ht 1.499 m (4' 11\")   Wt 62.1 kg (137 lb)   LMP  (LMP Unknown)   SpO2 97%   BMI 27.67 kg/m    GENERAL: Generally appears well.  Psych: Alert and Oriented.  Normal affect  Eyes: Sclera clear  Respiratory:  Lungs clear to ausculation bilaterally with " good air excursion  Cardiovascular:  Regular Rate and Rhythm with no murmurs gallops or rubs, normal peripheral pulses  GI: Abdomen Non Distended Soft Non-Tender  No hernias palpated.  Lymphatic/Hematologic/Immune:  No femoral or cervical lymphadenopathy.  Integumentary:  No rashes  Neurological: grossly intact    Ultrasound shows gallbladder polyp No gall bladder wall thicking  No pericholecystic fluid Ducts Measures   All new lab and imaging data was reviewed.     Impression and Plan:  Patient is a 61 year old female with gallbladder polyp.    PLAN:   I discussed the pathophysiology of gallbladder disease and complications of cholecystitis and choledocholithiasis with the patient.  We discussed management options at this time.  She appears to have a child's class a liver disease and certainly could be a candidate for elective cholecystectomy.  We also discussed the possibility of interval ultrasound follow-up.  At present she is interested most in conservative treatment and wishes to have follow-up ultrasound.  This will be scheduled for 6 months from now.  We will follow up with office visit thereafter.  I also discussed the risks associated with the procedure including, but not limited to infection, bleeding, conversion to open, bile leak, bile duct injury, retained gallstones, pneumonia, MI, and anesthesia complications with the patient.  I also discussed if a complication did occur it may require further surgical intervention during or after the procedure. The patient indicated understanding of the discussion, asked appropriate questions, and provided consent. I provided the patient an information pamphlet. I have recommended a low fat diet and instructed the patient to go to ER if they developed persistent pain, persistent nausea and vomiting, or yellowness of skin.      Thank you very much for this consult.    Eber Gill M.D.  [facility]  [phone]    Please route or send letter  to:  Primary Care Provider (PCP) and Referring Provider

## 2021-12-22 ENCOUNTER — LAB (OUTPATIENT)
Dept: INFUSION THERAPY | Facility: CLINIC | Age: 61
End: 2021-12-22
Attending: INTERNAL MEDICINE
Payer: COMMERCIAL

## 2021-12-22 VITALS
HEART RATE: 70 BPM | SYSTOLIC BLOOD PRESSURE: 116 MMHG | RESPIRATION RATE: 16 BRPM | DIASTOLIC BLOOD PRESSURE: 61 MMHG | OXYGEN SATURATION: 98 %

## 2021-12-22 DIAGNOSIS — E83.110 HEREDITARY HEMOCHROMATOSIS (H): Primary | ICD-10-CM

## 2021-12-22 DIAGNOSIS — E83.110 HEREDITARY HEMOCHROMATOSIS (H): ICD-10-CM

## 2021-12-22 LAB
FERRITIN SERPL-MCNC: 97 NG/ML (ref 8–252)
HCT VFR BLD AUTO: 39.3 % (ref 35–47)
HGB BLD-MCNC: 13.6 G/DL (ref 11.7–15.7)

## 2021-12-22 PROCEDURE — 36415 COLL VENOUS BLD VENIPUNCTURE: CPT | Performed by: INTERNAL MEDICINE

## 2021-12-22 PROCEDURE — 82728 ASSAY OF FERRITIN: CPT | Performed by: INTERNAL MEDICINE

## 2021-12-22 PROCEDURE — 85014 HEMATOCRIT: CPT | Performed by: INTERNAL MEDICINE

## 2021-12-22 RX ORDER — HEPARIN SODIUM,PORCINE 10 UNIT/ML
5 VIAL (ML) INTRAVENOUS
Status: CANCELLED | OUTPATIENT
Start: 2021-12-29

## 2021-12-22 RX ORDER — HEPARIN SODIUM (PORCINE) LOCK FLUSH IV SOLN 100 UNIT/ML 100 UNIT/ML
5 SOLUTION INTRAVENOUS
Status: CANCELLED | OUTPATIENT
Start: 2021-12-29

## 2021-12-22 NOTE — PROGRESS NOTES
Medical Assistant Note:  Bradford Prado presents today for lab draw.    Patient seen by provider today: No.   present during visit today: Not Applicable.    Concerns: No Concerns.    Procedure:  Lab draw site: LAC, Needle type: BF, Gauge: 23. Ellie and coban applied    Post Assessment:  Labs drawn without difficulty: Yes.    Discharge Plan:  Departure Mode: Ambulatory.    Face to Face Time: 5.    Estee Sharma CMA

## 2021-12-22 NOTE — PROGRESS NOTES
Infusion Nursing Note:  Bradford Prado presents today for therapeutic phlebotomy.    Patient seen by provider today: No   present during visit today: Not Applicable.    Note: Patient arrives to infusion feeling well.  Has a bruise on right forearm from last week's phlebotomy that is still painful for patient. Per orders, remove 500 ml of blood for Hgb>11 and ferritin >50.  Patient under the impression that her orders were to do phlebotomy for ferritin >100. Ferritin at 97 today.  Dr. Rochelle Girard paged for clarification.    TORB: Dr. Rochelle Girard/ Jemma RN on 12/22/21 @1430:  - Hold phlebotomy today  - Cancel phlebotomy scheduled for 12/29  - Patient to return for labs and to see Dr. Girard on 1/6/22 as scheduled.    Patient updated on new plan and verbalized understanding.    Intravenous Access:  No Intravenous access/labs at this visit.    Treatment Conditions:  Lab Results   Component Value Date    HGB 13.6 12/22/2021    WBC 10.5 11/18/2021    ANEU 4.5 07/07/2021    ANEUTAUTO 6.6 11/18/2021     11/18/2021      Lab Results   Component Value Date     11/18/2021    POTASSIUM 3.9 11/18/2021    CR 0.51 (L) 11/18/2021    ESTELLE 9.1 11/18/2021    BILITOTAL 0.4 11/18/2021    ALBUMIN 4.0 11/18/2021    ALT 44 11/18/2021    AST 41 11/18/2021         Discharge Plan:   Patient declined prescription refills.  Discharge instructions reviewed with: Patient.  Patient and/or family verbalized understanding of discharge instructions and all questions answered.  AVS to patient via [employer].  Patient will return 1/6/22 for next appointment.   Patient discharged in stable condition accompanied by: self.  Departure Mode: Ambulatory.      Lida Llanos, RN

## 2022-01-05 NOTE — PROGRESS NOTES
[facility]    Hematology/Oncology Established Patient Follow-up Note    Treatment Summary:      Today's Date: 01/06/22    Reason for Follow-up: Hereditary Hemochromatosis  Referring Provider: Nimco Travis NP        HISTORY OF PRESENT ILLNESS: Bradford Prado is a 61 year old female with past medical history of cirrhosis, CERDA, HTN, HLD, and DM2 who is referred to clinic today for C282Y/C282Y hereditary hemochromatosis. She was previously followed by Dr. Long in [place].     Patient has family history of severe liver disease and recalls mother entering acute liver failure with need for repeat thoracenteses. Patient had then developed increasing LFTs and cirrhosis and workup for this included liver biopsy, which revealed cirrhosis and negative alpha 1 antitrypsin mutation testing. Record review shows patient has had evidence of elevated ferritin levels as high as >1500 in 2016. She underwent testing for hereditary hemochromatosis in May 2021 and returned positive as homozygous C282Y/C282Y.      Patient was then treated with phlebotomies twice per week for two months. Her most recent ferritin level was in August 2021 and 101.      Patient does have various hand arthralgias and follows with a hand surgeon for this with intermittent hand Xrays and steroid injections. She is a diabetic. No history of endocrinopathies or heart disease she is aware of.       INTERIM HISTORY:  12/13/21 evaluated by General Surgery. No acute intervention.    Phlebotomy 12/15. Ferritin had decreased from 179 to 93, currently. Energy levels are stable. She still has diffuse arthralgias and takes PRN NSAIDS.    REVIEW OF SYSTEMS:   A 14 point ROS was reviewed with pertinent positives and negatives in the HPI.       HOME MEDICATIONS:  Current Outpatient Medications   Medication Sig Dispense Refill     ACCU-CHEK GUIDE test strip Use to test blood sugar 4 times daily or as directed. 200 strip 11     acetaminophen (TYLENOL)  325 MG tablet Take 325-650 mg by mouth every 6 hours as needed for mild pain        amLODIPine (NORVASC) 5 MG tablet TAKE ONE TABLET BY MOUTH TWICE A  tablet 1     Biotin 10 MG CAPS Take 1 capsule by mouth daily       blood glucose (NO BRAND SPECIFIED) lancets standard Use to test blood sugar twice times daily or as directed. 100 each 1     blood glucose (NO BRAND SPECIFIED) test strip Use to test blood sugar twice times daily or as directed. 100 each 1     blood glucose (NO BRAND SPECIFIED) test strip Use to test blood sugars 2 times daily or as directed 100 strip 11     blood glucose monitoring (SOFTCLIX) lancets Use to test blood sugar 6 times daily. 200 each 11     Blood Glucose Monitoring Suppl (ACCU-CHEK GUIDE ME) w/Device KIT 1 each 6 times daily 1 kit 0     carvedilol (COREG) 12.5 MG tablet TAKE 1 TABLET (12.5 MG) BY MOUTH 2 TIMES DAILY (WITH MEALS) 180 tablet 1     Continuous Blood Gluc Sensor (FREESTYLE MEENA 2 SENSOR) MISC 1 patch every 14 days Use to check blood sugars per  guidelines 2 each 11     insulin aspart (NOVOLOG FLEXPEN) 100 UNIT/ML pen INJECT 10 UNITS SUBCUTANEOUS 3 TIMES DAILY (WITH MEALS) 30 mL 1     insulin glargine (LANTUS SOLOSTAR) 100 UNIT/ML pen INJECT 42 UNITS SUBCUTANEOUS EVERY MORNING (BEFORE BREAKFAST) 45 mL 1     Insulin Pen Needle (PEN NEEDLES) 32G X 4 MM MISC 1 each 4 times daily 200 each 11     losartan (COZAAR) 50 MG tablet TAKE ONE TABLET BY MOUTH ONCE DAILY 90 tablet 0     ondansetron (ZOFRAN) 4 MG tablet Take 1 tablet (4 mg) by mouth every 6 hours as needed for nausea or vomiting 15 tablet 0     pantoprazole (PROTONIX) 40 MG EC tablet Take 1 tablet (40 mg) by mouth daily 30 tablet 3     pravastatin (PRAVACHOL) 20 MG tablet TAKE ONE TABLET BY MOUTH ONCE DAILY 90 tablet 0     sertraline (ZOLOFT) 50 MG tablet TAKE ONE TABLET BY MOUTH ONCE DAILY 90 tablet 2         ALLERGIES:  No Known Allergies      PAST MEDICAL HISTORY:  Past Medical History:   Diagnosis  Date     Diabetes mellitus (H)      Dyslipidemia      Hereditary hemochromatosis (H)      Hypertension      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) (H)          PAST SURGICAL HISTORY:  Past Surgical History:   Procedure Laterality Date     ESOPHAGOSCOPY, GASTROSCOPY, DUODENOSCOPY (EGD), COMBINED N/A 11/10/2021    Procedure: ESOPHAGOGASTRODUODENOSCOPY, WITH BIOPSY;  Surgeon: Leventhal, Thomas Michael, MD;  Location: [facility] OR     HYSTERECTOMY TOTAL ABDOMINAL      due to fibroids     LAPAROSCOPIC EVACUATION ECTOPIC PREGNANCY       TUBAL LIGATION           SOCIAL HISTORY:  Social History     Socioeconomic History     Marital status: Single     Spouse name: Not on file     Number of children: Not on file     Years of education: Not on file     Highest education level: Not on file   Occupational History     Not on file   Tobacco Use     Smoking status: Former Smoker     Packs/day: 0.50     Years: 20.00     Pack years: 10.00     Types: Cigarettes     Quit date: 3/9/2010     Years since quittin.8     Smokeless tobacco: Never Used   Vaping Use     Vaping Use: Never used   Substance and Sexual Activity     Alcohol use: No     Alcohol/week: 0.0 standard drinks     Drug use: No     Sexual activity: Never   Other Topics Concern     Parent/sibling w/ CABG, MI or angioplasty before 65F 55M? No   Social History Narrative    , 2 children, 2 grandchildren     Social Determinants of Health     Financial Resource Strain: Not on file   Food Insecurity: Not on file   Transportation Needs: Not on file   Physical Activity: Not on file   Stress: Not on file   Social Connections: Not on file   Intimate Partner Violence: Not on file   Housing Stability: Not on file         FAMILY HISTORY:  Family History   Problem Relation Age of Onset     Cirrhosis Mother         w/o alcohol history     Diabetes Mother      Emphysema Father      Hypertension Sister      Hemochromatosis Sister      Heart Defect Niece          Tetrology of Fallot     Breast Cancer No family hx of      Cancer - colorectal No family hx of          PHYSICAL EXAM:  Vital signs:  /75   Pulse 92   Temp 98.4  F (36.9  C) (Oral)   Resp 16   Wt 62.3 kg (137 lb 6.4 oz)   LMP  (LMP Unknown)   SpO2 97%   BMI 27.75 kg/m     ECO-1  GENERAL/CONSTITUTIONAL: No acute distress.  EYES: Pupils are equal, round, and react to light and accommodation. Extraocular movements intact.  No scleral icterus.  ENT/MOUTH: Neck supple. Oropharynx clear, no mucositis.  LYMPH: No anterior cervical, posterior cervical, supraclavicular, axillary or inguinal adenopathy.   RESPIRATORY: Clear to auscultation bilaterally. No crackles or wheezing.   CARDIOVASCULAR: Regular rate and rhythm without murmurs, gallops, or rubs.  GASTROINTESTINAL: No hepatosplenomegaly, masses, or tenderness. The patient has normal bowel sounds. No guarding.  No distention.  MUSCULOSKELETAL: Warm and well-perfused, no cyanosis, clubbing, or edema.  NEUROLOGIC: Cranial nerves II-XII are intact. Alert, oriented, answers questions appropriately.  INTEGUMENTARY: No rashes or jaundice.  GAIT: Steady, does not use assistive device      LABS:   Ref. Range 2022 09:53   Sodium Latest Ref Range: 133 - 144 mmol/L 137   Potassium Latest Ref Range: 3.4 - 5.3 mmol/L 4.4   Chloride Latest Ref Range: 94 - 109 mmol/L 106   Carbon Dioxide Latest Ref Range: 20 - 32 mmol/L 28   Urea Nitrogen Latest Ref Range: 7 - 30 mg/dL 11   Creatinine Latest Ref Range: 0.52 - 1.04 mg/dL 0.62   GFR Estimate Latest Ref Range: >60 mL/min/1.73m2 >90   Calcium Latest Ref Range: 8.5 - 10.1 mg/dL 8.9   Anion Gap Latest Ref Range: 3 - 14 mmol/L 3   Albumin Latest Ref Range: 3.4 - 5.0 g/dL 4.2   Protein Total Latest Ref Range: 6.8 - 8.8 g/dL 8.5   Bilirubin Total Latest Ref Range: 0.2 - 1.3 mg/dL 0.4   Alkaline Phosphatase Latest Ref Range: 40 - 150 U/L 91   ALT Latest Ref Range: 0 - 50 U/L 34   AST Latest Ref Range: 0 - 45 U/L 32    Bilirubin Direct Latest Ref Range: 0.0 - 0.2 mg/dL 0.1   Ferritin Latest Ref Range: 8 - 252 ng/mL 93   Glucose Latest Ref Range: 70 - 99 mg/dL 210 (H)   WBC Latest Ref Range: 4.0 - 11.0 10e3/uL 8.7   Hemoglobin Latest Ref Range: 11.7 - 15.7 g/dL 15.1   Hematocrit Latest Ref Range: 35.0 - 47.0 % 45.1   Platelet Count Latest Ref Range: 150 - 450 10e3/uL 163   RBC Count Latest Ref Range: 3.80 - 5.20 10e6/uL 4.75   MCV Latest Ref Range: 78 - 100 fL 95   MCH Latest Ref Range: 26.5 - 33.0 pg 31.8   MCHC Latest Ref Range: 31.5 - 36.5 g/dL 33.5   RDW Latest Ref Range: 10.0 - 15.0 % 13.6   INR Latest Ref Range: 0.85 - 1.15  1.16 (H)         Ref. Range 11/18/2021 12:09   Alpha Fetoprotein Latest Ref Range: 0.0 - 8.0 ug/L 4.7        Ref. Range 11/18/2021 12:09   Ferritin Latest Ref Range: 8 - 252 ng/mL 149   Iron Latest Ref Range: 35 - 180 ug/dL 183 (H)   Iron Binding Cap Latest Ref Range: 240 - 430 ug/dL 339   Iron Saturation Index Latest Ref Range: 15 - 46 % 54 (H)              Ref. Range 5/10/2021 10:55   Ferritin Latest Ref Range: 8 - 252 ng/mL 1,126 (H)   Iron Latest Ref Range: 35 - 180 ug/dL 235 (H)   Iron Binding Cap Latest Ref Range: 240 - 430 ug/dL 306   Iron Saturation Index Latest Ref Range: 15 - 46 % 77 (H)              Ref. Range 9/27/2016 11:10 5/10/2021 10:55 6/15/2021 13:04 6/19/2021 11:29 6/23/2021 10:45 6/28/2021 11:05 7/1/2021 11:08 7/5/2021 12:39 7/7/2021 13:08 7/12/2021 14:31 7/19/2021 11:22 7/28/2021 13:53 8/3/2021 14:07 8/10/2021 14:03 8/25/2021 11:21 8/26/2021 10:00   Ferritin Latest Ref Range: 8 - 252 ng/mL 1,569 (H) 1,126 (H) 1,054 (H) 994 (H) 1,003 (H) 698 (H) 688 (H) 605 (H) 513 (H) 521 273 (H) 217 222 172 118 101           Ref. Range 9/27/2016 11:10 6/23/2021 10:45   Hep B Surface Agn Latest Ref Range: NR  Nonreactive     Hepatitis B Surface Antibody Latest Ref Range: <8.00 m[IU]/mL 0.22     Hepatitis C Antibody Latest Ref Range: NR  Nonreactive...     HIV Antigen Antibody Combo Latest Ref Range:  NR^Nonreactive       Nonreactive         PATHOLOGY:  5/10/21: TEST(S) REQUESTED:   Hemochromatosis Mutation Analysis by PCR     SPECIMEN DESCRIPTION:   Blood     HEMOCHROMATOSIS RESULTS     HFE Gene C282Y (G845A) RESULTS:     C282Y Mutation Interpretation: HOMOZYGOTE     HFE Gene H63D (C187G) RESULTS:     H63D Mutation Interpretation: NORMAL     HFE Gene S65C (A193T) RESULTS:     S65C Mutation Interpretation: NORMAL      Indication for testing: Carrier screening or diagnostic   testing for alpha-1-antitrypsin (AAT) deficiency.   Negative: This sample has a serum AAT protein concentration   in the normal range and is negative for the S and Z   deficiency alleles by genotyping. 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                          - Small (< 5 mm) esophageal varices.                          - Gastritis. Biopsied.                          - Normal examined duodenum.      Final Diagnosis   A. STOMACH, BIOPSY:  - Gastric mucosa with features of reactive gastropathy   - No H. pylori organisms identified on routine staining  - Negative for intestinal metaplasia and dysplasia      IMAGING:  ULTRASOUND ABDOMEN LIMITED November 24, 2021   IMPRESSION:  1.  Coarse increased echogenicity of the liver compatible with  steatosis and/or cirrhosis.  2.  1 cm gallbladder polyp.  3.  No hepatoma demonstrated.        ASSESSMENT/PLAN:  Bradford Prado is a 61 year old female with past medical history of cirrhosis, CERDA, HTN, HLD, and DM2 who is  referred to clinic today for C282Y/C282Y hereditary hemochromatosis. She was previously followed by Dr. Long in [place].     1) Homozygous C282Y/C282Y hereditary hemochromatosis. Patient diagnosed just in May 2021. Her last phlebotomy was in August 2021 (ferritin 101). Current ferritin is 179. Decreased to 93 with phlebotomy x1 on 12/15/22.  -No need for phlebotomy today as ferritin <100.  -Repeat CBC, CMP, ferritin level on 2/10/22 with follow up on that date.  -Continue yearly liver ultrasound and AFP (4.7 as of 11/2021).     2) 1 cm galbladder polyp  -Discussed with patient she may require cholecystectomy due to increased risk of malignancy with galbladder polyps measuring >/= 1 cm.   -Patient was evaluated by General Surgery on 12/13/21 and was offered surgery vs follow-up and she has elected for follow-up. She will repeat imaging in 6 months with surgery.     3) Intermittent nausea  -She has zofran PRN.  -Continue PPI.     4) History of cirrhosis/CERDA  -See plan above.      5) HTN, HLD, DM2. As followed by PCP.     6) Follow up with repeat labs on 2/10/22.        Thank you for referring Bradford Prado to clinic today. Adequate time was dedicated to patient questions and answered to the expressed satisfaction of the patient.         Rochelle Girard DO  Hematology/Oncology  [facility]

## 2022-01-06 ENCOUNTER — LAB (OUTPATIENT)
Dept: INFUSION THERAPY | Facility: CLINIC | Age: 62
End: 2022-01-06
Attending: INTERNAL MEDICINE
Payer: COMMERCIAL

## 2022-01-06 ENCOUNTER — ONCOLOGY VISIT (OUTPATIENT)
Dept: ONCOLOGY | Facility: CLINIC | Age: 62
End: 2022-01-06
Attending: INTERNAL MEDICINE
Payer: COMMERCIAL

## 2022-01-06 VITALS
TEMPERATURE: 98.4 F | WEIGHT: 137.4 LBS | DIASTOLIC BLOOD PRESSURE: 75 MMHG | BODY MASS INDEX: 27.75 KG/M2 | SYSTOLIC BLOOD PRESSURE: 128 MMHG | OXYGEN SATURATION: 97 % | RESPIRATION RATE: 16 BRPM | HEART RATE: 92 BPM

## 2022-01-06 DIAGNOSIS — E83.110 HEREDITARY HEMOCHROMATOSIS (H): Primary | ICD-10-CM

## 2022-01-06 DIAGNOSIS — K75.81 LIVER CIRRHOSIS SECONDARY TO NONALCOHOLIC STEATOHEPATITIS (NASH) (H): ICD-10-CM

## 2022-01-06 DIAGNOSIS — K74.60 LIVER CIRRHOSIS SECONDARY TO NONALCOHOLIC STEATOHEPATITIS (NASH) (H): ICD-10-CM

## 2022-01-06 LAB
AFP SERPL-MCNC: 4.6 UG/L (ref 0–8)
ALBUMIN SERPL-MCNC: 4.2 G/DL (ref 3.4–5)
ALP SERPL-CCNC: 91 U/L (ref 40–150)
ALT SERPL W P-5'-P-CCNC: 34 U/L (ref 0–50)
ANION GAP SERPL CALCULATED.3IONS-SCNC: 3 MMOL/L (ref 3–14)
AST SERPL W P-5'-P-CCNC: 32 U/L (ref 0–45)
BILIRUB DIRECT SERPL-MCNC: 0.1 MG/DL (ref 0–0.2)
BILIRUB SERPL-MCNC: 0.4 MG/DL (ref 0.2–1.3)
BUN SERPL-MCNC: 11 MG/DL (ref 7–30)
CALCIUM SERPL-MCNC: 8.9 MG/DL (ref 8.5–10.1)
CHLORIDE BLD-SCNC: 106 MMOL/L (ref 94–109)
CO2 SERPL-SCNC: 28 MMOL/L (ref 20–32)
CREAT SERPL-MCNC: 0.62 MG/DL (ref 0.52–1.04)
ERYTHROCYTE [DISTWIDTH] IN BLOOD BY AUTOMATED COUNT: 13.6 % (ref 10–15)
FERRITIN SERPL-MCNC: 93 NG/ML (ref 8–252)
GFR SERPL CREATININE-BSD FRML MDRD: >90 ML/MIN/1.73M2
GLUCOSE BLD-MCNC: 210 MG/DL (ref 70–99)
HCT VFR BLD AUTO: 45.1 % (ref 35–47)
HGB BLD-MCNC: 15.1 G/DL (ref 11.7–15.7)
INR PPP: 1.16 (ref 0.85–1.15)
MCH RBC QN AUTO: 31.8 PG (ref 26.5–33)
MCHC RBC AUTO-ENTMCNC: 33.5 G/DL (ref 31.5–36.5)
MCV RBC AUTO: 95 FL (ref 78–100)
PLATELET # BLD AUTO: 163 10E3/UL (ref 150–450)
POTASSIUM BLD-SCNC: 4.4 MMOL/L (ref 3.4–5.3)
PROT SERPL-MCNC: 8.5 G/DL (ref 6.8–8.8)
RBC # BLD AUTO: 4.75 10E6/UL (ref 3.8–5.2)
SODIUM SERPL-SCNC: 137 MMOL/L (ref 133–144)
WBC # BLD AUTO: 8.7 10E3/UL (ref 4–11)

## 2022-01-06 PROCEDURE — 99214 OFFICE O/P EST MOD 30 MIN: CPT | Performed by: INTERNAL MEDICINE

## 2022-01-06 PROCEDURE — G0463 HOSPITAL OUTPT CLINIC VISIT: HCPCS

## 2022-01-06 PROCEDURE — 82728 ASSAY OF FERRITIN: CPT | Performed by: INTERNAL MEDICINE

## 2022-01-06 PROCEDURE — 82248 BILIRUBIN DIRECT: CPT | Performed by: INTERNAL MEDICINE

## 2022-01-06 PROCEDURE — 85610 PROTHROMBIN TIME: CPT | Performed by: INTERNAL MEDICINE

## 2022-01-06 PROCEDURE — 82310 ASSAY OF CALCIUM: CPT | Performed by: INTERNAL MEDICINE

## 2022-01-06 PROCEDURE — 85027 COMPLETE CBC AUTOMATED: CPT | Performed by: INTERNAL MEDICINE

## 2022-01-06 PROCEDURE — 36415 COLL VENOUS BLD VENIPUNCTURE: CPT

## 2022-01-06 PROCEDURE — 82105 ALPHA-FETOPROTEIN SERUM: CPT | Performed by: INTERNAL MEDICINE

## 2022-01-06 RX ORDER — HEPARIN SODIUM,PORCINE 10 UNIT/ML
5 VIAL (ML) INTRAVENOUS
Status: CANCELLED | OUTPATIENT
Start: 2022-01-13

## 2022-01-06 RX ORDER — HEPARIN SODIUM (PORCINE) LOCK FLUSH IV SOLN 100 UNIT/ML 100 UNIT/ML
5 SOLUTION INTRAVENOUS
Status: CANCELLED | OUTPATIENT
Start: 2022-01-13

## 2022-01-06 ASSESSMENT — PAIN SCALES - GENERAL: PAINLEVEL: SEVERE PAIN (6)

## 2022-01-06 NOTE — PROGRESS NOTES
"Oncology Rooming Note    January 6, 2022 10:21 AM   Bradford Prado is a 61 year old female who presents for:    Chief Complaint   Patient presents with     Oncology Clinic Visit     Initial Vitals: Pulse 92   Temp 98.4  F (36.9  C) (Oral)   Resp 16   Wt 62.3 kg (137 lb 6.4 oz)   LMP  (LMP Unknown)   SpO2 97%   BMI 27.75 kg/m   Estimated body mass index is 27.75 kg/m  as calculated from the following:    Height as of 12/13/21: 1.499 m (4' 11\").    Weight as of this encounter: 62.3 kg (137 lb 6.4 oz). Body surface area is 1.61 meters squared.  Severe Pain (6) Comment: Data Unavailable   No LMP recorded (lmp unknown). Patient has had a hysterectomy.  Allergies reviewed: Yes  Medications reviewed: Yes    Medications: Medication refills not needed today.  Pharmacy name entered into [employer]:    [facility] [place], [place] - [address] [place] [address] [facility]ARM[facility] [place], [place] - [phone]    Clinical concerns: no      Peace Carpenter CMA            "

## 2022-01-06 NOTE — LETTER
1/6/2022         RE: Bradford Prado  [address]  [facility] [address]        Dear Colleague,    Thank you for referring your patient, Bradford Prado, to the [facility]. Please see a copy of my visit note below.    [facility]    Hematology/Oncology Established Patient Follow-up Note    Treatment Summary:      Today's Date: 01/06/22    Reason for Follow-up: Hereditary Hemochromatosis  Referring Provider: Nimco Travis NP        HISTORY OF PRESENT ILLNESS: Bradford Prado is a 61 year old female with past medical history of cirrhosis, CERDA, HTN, HLD, and DM2 who is referred to clinic today for C282Y/C282Y hereditary hemochromatosis. She was previously followed by Dr. Long in [place].     Patient has family history of severe liver disease and recalls mother entering acute liver failure with need for repeat thoracenteses. Patient had then developed increasing LFTs and cirrhosis and workup for this included liver biopsy, which revealed cirrhosis and negative alpha 1 antitrypsin mutation testing. Record review shows patient has had evidence of elevated ferritin levels as high as >1500 in 2016. She underwent testing for hereditary hemochromatosis in May 2021 and returned positive as homozygous C282Y/C282Y.      Patient was then treated with phlebotomies twice per week for two months. Her most recent ferritin level was in August 2021 and 101.      Patient does have various hand arthralgias and follows with a hand surgeon for this with intermittent hand Xrays and steroid injections. She is a diabetic. No history of endocrinopathies or heart disease she is aware of.       INTERIM HISTORY:  12/13/21 evaluated by General Surgery. No acute intervention.    Phlebotomy 12/15. Ferritin had decreased from 179 to 93, currently. Energy levels are stable. She still has diffuse arthralgias and takes PRN NSAIDS.    REVIEW OF SYSTEMS:   A 14 point ROS was reviewed with pertinent  positives and negatives in the HPI.       HOME MEDICATIONS:  Current Outpatient Medications   Medication Sig Dispense Refill     ACCU-CHEK GUIDE test strip Use to test blood sugar 4 times daily or as directed. 200 strip 11     acetaminophen (TYLENOL) 325 MG tablet Take 325-650 mg by mouth every 6 hours as needed for mild pain        amLODIPine (NORVASC) 5 MG tablet TAKE ONE TABLET BY MOUTH TWICE A  tablet 1     Biotin 10 MG CAPS Take 1 capsule by mouth daily       blood glucose (NO BRAND SPECIFIED) lancets standard Use to test blood sugar twice times daily or as directed. 100 each 1     blood glucose (NO BRAND SPECIFIED) test strip Use to test blood sugar twice times daily or as directed. 100 each 1     blood glucose (NO BRAND SPECIFIED) test strip Use to test blood sugars 2 times daily or as directed 100 strip 11     blood glucose monitoring (SOFTCLIX) lancets Use to test blood sugar 6 times daily. 200 each 11     Blood Glucose Monitoring Suppl (ACCU-CHEK GUIDE ME) w/Device KIT 1 each 6 times daily 1 kit 0     carvedilol (COREG) 12.5 MG tablet TAKE 1 TABLET (12.5 MG) BY MOUTH 2 TIMES DAILY (WITH MEALS) 180 tablet 1     Continuous Blood Gluc Sensor (FREESTYLE MEENA 2 SENSOR) MISC 1 patch every 14 days Use to check blood sugars per  guidelines 2 each 11     insulin aspart (NOVOLOG FLEXPEN) 100 UNIT/ML pen INJECT 10 UNITS SUBCUTANEOUS 3 TIMES DAILY (WITH MEALS) 30 mL 1     insulin glargine (LANTUS SOLOSTAR) 100 UNIT/ML pen INJECT 42 UNITS SUBCUTANEOUS EVERY MORNING (BEFORE BREAKFAST) 45 mL 1     Insulin Pen Needle (PEN NEEDLES) 32G X 4 MM MISC 1 each 4 times daily 200 each 11     losartan (COZAAR) 50 MG tablet TAKE ONE TABLET BY MOUTH ONCE DAILY 90 tablet 0     ondansetron (ZOFRAN) 4 MG tablet Take 1 tablet (4 mg) by mouth every 6 hours as needed for nausea or vomiting 15 tablet 0     pantoprazole (PROTONIX) 40 MG EC tablet Take 1 tablet (40 mg) by mouth daily 30 tablet 3     pravastatin (PRAVACHOL)  20 MG tablet TAKE ONE TABLET BY MOUTH ONCE DAILY 90 tablet 0     sertraline (ZOLOFT) 50 MG tablet TAKE ONE TABLET BY MOUTH ONCE DAILY 90 tablet 2         ALLERGIES:  No Known Allergies      PAST MEDICAL HISTORY:  Past Medical History:   Diagnosis Date     Diabetes mellitus (H)      Dyslipidemia      Hereditary hemochromatosis (H)      Hypertension      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) (H)          PAST SURGICAL HISTORY:  Past Surgical History:   Procedure Laterality Date     ESOPHAGOSCOPY, GASTROSCOPY, DUODENOSCOPY (EGD), COMBINED N/A 11/10/2021    Procedure: ESOPHAGOGASTRODUODENOSCOPY, WITH BIOPSY;  Surgeon: Leventhal, Thomas Michael, MD;  Location: [facility] OR     HYSTERECTOMY TOTAL ABDOMINAL      due to fibroids     LAPAROSCOPIC EVACUATION ECTOPIC PREGNANCY       TUBAL LIGATION           SOCIAL HISTORY:  Social History     Socioeconomic History     Marital status: Single     Spouse name: Not on file     Number of children: Not on file     Years of education: Not on file     Highest education level: Not on file   Occupational History     Not on file   Tobacco Use     Smoking status: Former Smoker     Packs/day: 0.50     Years: 20.00     Pack years: 10.00     Types: Cigarettes     Quit date: 3/9/2010     Years since quittin.8     Smokeless tobacco: Never Used   Vaping Use     Vaping Use: Never used   Substance and Sexual Activity     Alcohol use: No     Alcohol/week: 0.0 standard drinks     Drug use: No     Sexual activity: Never   Other Topics Concern     Parent/sibling w/ CABG, MI or angioplasty before 65F 55M? No   Social History Narrative    , 2 children, 2 grandchildren     Social Determinants of Health     Financial Resource Strain: Not on file   Food Insecurity: Not on file   Transportation Needs: Not on file   Physical Activity: Not on file   Stress: Not on file   Social Connections: Not on file   Intimate Partner Violence: Not on file   Housing Stability: Not on  file         FAMILY HISTORY:  Family History   Problem Relation Age of Onset     Cirrhosis Mother         w/o alcohol history     Diabetes Mother      Emphysema Father      Hypertension Sister      Hemochromatosis Sister      Heart Defect Niece         Tetrology of Fallot     Breast Cancer No family hx of      Cancer - colorectal No family hx of          PHYSICAL EXAM:  Vital signs:  /75   Pulse 92   Temp 98.4  F (36.9  C) (Oral)   Resp 16   Wt 62.3 kg (137 lb 6.4 oz)   LMP  (LMP Unknown)   SpO2 97%   BMI 27.75 kg/m     ECO-1  GENERAL/CONSTITUTIONAL: No acute distress.  EYES: Pupils are equal, round, and react to light and accommodation. Extraocular movements intact.  No scleral icterus.  ENT/MOUTH: Neck supple. Oropharynx clear, no mucositis.  LYMPH: No anterior cervical, posterior cervical, supraclavicular, axillary or inguinal adenopathy.   RESPIRATORY: Clear to auscultation bilaterally. No crackles or wheezing.   CARDIOVASCULAR: Regular rate and rhythm without murmurs, gallops, or rubs.  GASTROINTESTINAL: No hepatosplenomegaly, masses, or tenderness. The patient has normal bowel sounds. No guarding.  No distention.  MUSCULOSKELETAL: Warm and well-perfused, no cyanosis, clubbing, or edema.  NEUROLOGIC: Cranial nerves II-XII are intact. Alert, oriented, answers questions appropriately.  INTEGUMENTARY: No rashes or jaundice.  GAIT: Steady, does not use assistive device      LABS:   Ref. Range 2022 09:53   Sodium Latest Ref Range: 133 - 144 mmol/L 137   Potassium Latest Ref Range: 3.4 - 5.3 mmol/L 4.4   Chloride Latest Ref Range: 94 - 109 mmol/L 106   Carbon Dioxide Latest Ref Range: 20 - 32 mmol/L 28   Urea Nitrogen Latest Ref Range: 7 - 30 mg/dL 11   Creatinine Latest Ref Range: 0.52 - 1.04 mg/dL 0.62   GFR Estimate Latest Ref Range: >60 mL/min/1.73m2 >90   Calcium Latest Ref Range: 8.5 - 10.1 mg/dL 8.9   Anion Gap Latest Ref Range: 3 - 14 mmol/L 3   Albumin Latest Ref Range: 3.4 - 5.0 g/dL  4.2   Protein Total Latest Ref Range: 6.8 - 8.8 g/dL 8.5   Bilirubin Total Latest Ref Range: 0.2 - 1.3 mg/dL 0.4   Alkaline Phosphatase Latest Ref Range: 40 - 150 U/L 91   ALT Latest Ref Range: 0 - 50 U/L 34   AST Latest Ref Range: 0 - 45 U/L 32   Bilirubin Direct Latest Ref Range: 0.0 - 0.2 mg/dL 0.1   Ferritin Latest Ref Range: 8 - 252 ng/mL 93   Glucose Latest Ref Range: 70 - 99 mg/dL 210 (H)   WBC Latest Ref Range: 4.0 - 11.0 10e3/uL 8.7   Hemoglobin Latest Ref Range: 11.7 - 15.7 g/dL 15.1   Hematocrit Latest Ref Range: 35.0 - 47.0 % 45.1   Platelet Count Latest Ref Range: 150 - 450 10e3/uL 163   RBC Count Latest Ref Range: 3.80 - 5.20 10e6/uL 4.75   MCV Latest Ref Range: 78 - 100 fL 95   MCH Latest Ref Range: 26.5 - 33.0 pg 31.8   MCHC Latest Ref Range: 31.5 - 36.5 g/dL 33.5   RDW Latest Ref Range: 10.0 - 15.0 % 13.6   INR Latest Ref Range: 0.85 - 1.15  1.16 (H)         Ref. Range 11/18/2021 12:09   Alpha Fetoprotein Latest Ref Range: 0.0 - 8.0 ug/L 4.7        Ref. Range 11/18/2021 12:09   Ferritin Latest Ref Range: 8 - 252 ng/mL 149   Iron Latest Ref Range: 35 - 180 ug/dL 183 (H)   Iron Binding Cap Latest Ref Range: 240 - 430 ug/dL 339   Iron Saturation Index Latest Ref Range: 15 - 46 % 54 (H)              Ref. Range 5/10/2021 10:55   Ferritin Latest Ref Range: 8 - 252 ng/mL 1,126 (H)   Iron Latest Ref Range: 35 - 180 ug/dL 235 (H)   Iron Binding Cap Latest Ref Range: 240 - 430 ug/dL 306   Iron Saturation Index Latest Ref Range: 15 - 46 % 77 (H)              Ref. Range 9/27/2016 11:10 5/10/2021 10:55 6/15/2021 13:04 6/19/2021 11:29 6/23/2021 10:45 6/28/2021 11:05 7/1/2021 11:08 7/5/2021 12:39 7/7/2021 13:08 7/12/2021 14:31 7/19/2021 11:22 7/28/2021 13:53 8/3/2021 14:07 8/10/2021 14:03 8/25/2021 11:21 8/26/2021 10:00   Ferritin Latest Ref Range: 8 - 252 ng/mL 1,569 (H) 1,126 (H) 1,054 (H) 994 (H) 1,003 (H) 698 (H) 688 (H) 605 (H) 513 (H) 521 273 (H) 217 222 172 118 101           Ref. Range 9/27/2016 11:10  6/23/2021 10:45   Hep B Surface Agn Latest Ref Range: NR  Nonreactive     Hepatitis B Surface Antibody Latest Ref Range: <8.00 m[IU]/mL 0.22     Hepatitis C Antibody Latest Ref Range: NR  Nonreactive...     HIV Antigen Antibody Combo Latest Ref Range: NR^Nonreactive       Nonreactive         PATHOLOGY:  5/10/21: TEST(S) REQUESTED:   Hemochromatosis Mutation Analysis by PCR     SPECIMEN DESCRIPTION:   Blood     HEMOCHROMATOSIS RESULTS     HFE Gene C282Y (G845A) RESULTS:     C282Y Mutation Interpretation: HOMOZYGOTE     HFE Gene H63D (C187G) RESULTS:     H63D Mutation Interpretation: NORMAL     HFE Gene S65C (A193T) RESULTS:     S65C Mutation Interpretation: NORMAL      Indication for testing: Carrier screening or diagnostic   testing for alpha-1-antitrypsin (AAT) deficiency.   Negative: This sample has a serum AAT protein concentration   in the normal range and is negative for the S and Z   deficiency alleles by genotyping. 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                          - Small (< 5 mm) esophageal varices.                          - Gastritis. Biopsied.                          - Normal examined duodenum.      Final Diagnosis   A. STOMACH, BIOPSY:  - Gastric mucosa with features of reactive gastropathy   - No H. pylori organisms identified on routine staining  - Negative for intestinal metaplasia and dysplasia      IMAGING:  ULTRASOUND ABDOMEN LIMITED November 24, 2021   IMPRESSION:  1.  Coarse increased echogenicity of  the liver compatible with  steatosis and/or cirrhosis.  2.  1 cm gallbladder polyp.  3.  No hepatoma demonstrated.        ASSESSMENT/PLAN:  Bradford Prado is a 61 year old female with past medical history of cirrhosis, CERDA, HTN, HLD, and DM2 who is referred to clinic today for C282Y/C282Y hereditary hemochromatosis. She was previously followed by Dr. Long in [place].     1) Homozygous C282Y/C282Y hereditary hemochromatosis. Patient diagnosed just in May 2021. Her last phlebotomy was in August 2021 (ferritin 101). Current ferritin is 179. Decreased to 93 with phlebotomy x1 on 12/15/22.  -No need for phlebotomy today as ferritin <100.  -Repeat CBC, CMP, ferritin level on 2/10/22 with follow up on that date.  -Continue yearly liver ultrasound and AFP (4.7 as of 11/2021).     2) 1 cm galbladder polyp  -Discussed with patient she may require cholecystectomy due to increased risk of malignancy with galbladder polyps measuring >/= 1 cm.   -Patient was evaluated by General Surgery on 12/13/21 and was offered surgery vs follow-up and she has elected for follow-up. She will repeat imaging in 6 months with surgery.     3) Intermittent nausea  -She has zofran PRN.  -Continue PPI.     4) History of cirrhosis/CERDA  -See plan above.      5) HTN, HLD, DM2. As followed by PCP.     6) Follow up with repeat labs on 2/10/22.        Thank you for referring Bradford Prado to clinic today. Adequate time was dedicated to patient questions and answered to the expressed satisfaction of the patient.         Rochelle Girard DO  Hematology/Oncology  [facility]        Oncology Rooming Note    January 6, 2022 10:21 AM   Bradford Prado is a 61 year old female who presents for:    Chief Complaint   Patient presents with     Oncology Clinic Visit     Initial Vitals: Pulse 92   Temp 98.4  F (36.9  C) (Oral)   Resp 16   Wt 62.3 kg (137 lb 6.4 oz)   LMP  (LMP Unknown)   SpO2 97%   BMI 27.75 kg/m   Estimated body mass index is 27.75  "kg/m  as calculated from the following:    Height as of 12/13/21: 1.499 m (4' 11\").    Weight as of this encounter: 62.3 kg (137 lb 6.4 oz). Body surface area is 1.61 meters squared.  Severe Pain (6) Comment: Data Unavailable   No LMP recorded (lmp unknown). Patient has had a hysterectomy.  Allergies reviewed: Yes  Medications reviewed: Yes    Medications: Medication refills not needed today.  Pharmacy name entered into [facility]:    [address] - [address] [place] [address] [phone]    Clinical concerns: no      Peace Carpenter, JOHNATHON                Again, thank you for allowing me to participate in the care of your patient.        Sincerely,        Rochelle Girard, DO    "

## 2022-01-06 NOTE — PROGRESS NOTES
Medical Assistant Note:  Bradford Prado presents today for lab draw.    Patient seen by provider today: No.   present during visit today: Not Applicable.    Concerns: No Concerns.    Procedure:  Lab draw site: LAC, Needle type: BF, Gauge: 21. Gauze and coban applied    Post Assessment:  Labs drawn without difficulty: Yes.    Discharge Plan:  Departure Mode: Ambulatory.    Face to Face Time: 5.    Estee Sharma CMA

## 2022-01-20 ENCOUNTER — IMMUNIZATION (OUTPATIENT)
Dept: NURSING | Facility: CLINIC | Age: 62
End: 2022-01-20
Payer: COMMERCIAL

## 2022-01-20 PROCEDURE — 0054A COVID-19,PF,PFIZER (12+ YRS): CPT

## 2022-01-20 PROCEDURE — 91305 COVID-19,PF,PFIZER (12+ YRS): CPT

## 2022-01-24 ENCOUNTER — TRANSFERRED RECORDS (OUTPATIENT)
Dept: HEALTH INFORMATION MANAGEMENT | Facility: CLINIC | Age: 62
End: 2022-01-24
Payer: COMMERCIAL

## 2022-02-09 ENCOUNTER — LAB (OUTPATIENT)
Dept: LAB | Facility: CLINIC | Age: 62
End: 2022-02-09
Payer: COMMERCIAL

## 2022-02-09 DIAGNOSIS — E83.110 HEREDITARY HEMOCHROMATOSIS (H): ICD-10-CM

## 2022-02-09 LAB
ALBUMIN SERPL-MCNC: 3.5 G/DL (ref 3.4–5)
ALP SERPL-CCNC: 86 U/L (ref 40–150)
ALT SERPL W P-5'-P-CCNC: 41 U/L (ref 0–50)
ANION GAP SERPL CALCULATED.3IONS-SCNC: 5 MMOL/L (ref 3–14)
AST SERPL W P-5'-P-CCNC: 26 U/L (ref 0–45)
BASOPHILS # BLD AUTO: 0 10E3/UL (ref 0–0.2)
BASOPHILS NFR BLD AUTO: 0 %
BILIRUB SERPL-MCNC: 0.3 MG/DL (ref 0.2–1.3)
BUN SERPL-MCNC: 10 MG/DL (ref 7–30)
CALCIUM SERPL-MCNC: 8.7 MG/DL (ref 8.5–10.1)
CHLORIDE BLD-SCNC: 109 MMOL/L (ref 94–109)
CO2 SERPL-SCNC: 25 MMOL/L (ref 20–32)
CREAT SERPL-MCNC: 0.68 MG/DL (ref 0.52–1.04)
EOSINOPHIL # BLD AUTO: 0.2 10E3/UL (ref 0–0.7)
EOSINOPHIL NFR BLD AUTO: 2 %
ERYTHROCYTE [DISTWIDTH] IN BLOOD BY AUTOMATED COUNT: 12.7 % (ref 10–15)
FERRITIN SERPL-MCNC: 74 NG/ML (ref 8–252)
GFR SERPL CREATININE-BSD FRML MDRD: >90 ML/MIN/1.73M2
GLUCOSE BLD-MCNC: 160 MG/DL (ref 70–99)
HCT VFR BLD AUTO: 46.1 % (ref 35–47)
HGB BLD-MCNC: 15.1 G/DL (ref 11.7–15.7)
LYMPHOCYTES # BLD AUTO: 2.1 10E3/UL (ref 0.8–5.3)
LYMPHOCYTES NFR BLD AUTO: 21 %
MCH RBC QN AUTO: 31 PG (ref 26.5–33)
MCHC RBC AUTO-ENTMCNC: 32.8 G/DL (ref 31.5–36.5)
MCV RBC AUTO: 95 FL (ref 78–100)
MONOCYTES # BLD AUTO: 0.9 10E3/UL (ref 0–1.3)
MONOCYTES NFR BLD AUTO: 9 %
NEUTROPHILS # BLD AUTO: 6.6 10E3/UL (ref 1.6–8.3)
NEUTROPHILS NFR BLD AUTO: 67 %
PLATELET # BLD AUTO: 159 10E3/UL (ref 150–450)
POTASSIUM BLD-SCNC: 3.9 MMOL/L (ref 3.4–5.3)
PROT SERPL-MCNC: 7.4 G/DL (ref 6.8–8.8)
RBC # BLD AUTO: 4.87 10E6/UL (ref 3.8–5.2)
SODIUM SERPL-SCNC: 139 MMOL/L (ref 133–144)
WBC # BLD AUTO: 9.9 10E3/UL (ref 4–11)

## 2022-02-09 PROCEDURE — 80053 COMPREHEN METABOLIC PANEL: CPT

## 2022-02-09 PROCEDURE — 36415 COLL VENOUS BLD VENIPUNCTURE: CPT

## 2022-02-09 PROCEDURE — 85025 COMPLETE CBC W/AUTO DIFF WBC: CPT

## 2022-02-09 PROCEDURE — 82728 ASSAY OF FERRITIN: CPT

## 2022-02-09 NOTE — PROGRESS NOTES
[facility]    Hematology/Oncology Established Patient Follow-up Note    Treatment Summary:      Today's Date: 2/9/2022    Reason for Follow-up: Hereditary Hemochromatosis  Referring Provider: Nimco Travis NP      HISTORY OF PRESENT ILLNESS: Bradford Prado is a 61 year old female with past medical history of cirrhosis, CERDA, HTN, HLD, and DM2 who is referred to clinic today for C282Y/C282Y hereditary hemochromatosis. She was previously followed by Dr. Long in [place].     Patient has family history of severe liver disease and recalls mother entering acute liver failure with need for repeat thoracenteses. Patient had then developed increasing LFTs and cirrhosis and workup for this included liver biopsy, which revealed cirrhosis and negative alpha 1 antitrypsin mutation testing. Record review shows patient has had evidence of elevated ferritin levels as high as >1500 in 2016. She underwent testing for hereditary hemochromatosis in May 2021 and returned positive as homozygous C282Y/C282Y.      Patient was then treated with phlebotomies twice per week for two months. Her most recent ferritin level was in August 2021 and 101.      Patient does have various hand arthralgias and follows with a hand surgeon for this with intermittent hand Xrays and steroid injections. She is a diabetic. No history of endocrinopathies or heart disease she is aware of.     12/13/21 evaluated by General Surgery. No acute intervention.     Phlebotomy 12/15. Ferritin had decreased from 179 to 93, currently. Energy levels are stable. She still has diffuse arthralgias and takes PRN NSAIDS.       INTERIM HISTORY:  No acute events. Patient continues to monitor diet. She is looking forward to a trip to [place] next month. Ferritin has returned at 74.       REVIEW OF SYSTEMS:   A 14 point ROS was reviewed with pertinent positives and negatives in the HPI.       HOME MEDICATIONS:  Current Outpatient Medications   Medication  Sig Dispense Refill     ACCU-CHEK GUIDE test strip Use to test blood sugar 4 times daily or as directed. 200 strip 11     acetaminophen (TYLENOL) 325 MG tablet Take 325-650 mg by mouth every 6 hours as needed for mild pain        amLODIPine (NORVASC) 5 MG tablet TAKE ONE TABLET BY MOUTH TWICE A  tablet 1     Biotin 10 MG CAPS Take 1 capsule by mouth daily       blood glucose (NO BRAND SPECIFIED) lancets standard Use to test blood sugar twice times daily or as directed. 100 each 1     blood glucose (NO BRAND SPECIFIED) test strip Use to test blood sugar twice times daily or as directed. 100 each 1     blood glucose (NO BRAND SPECIFIED) test strip Use to test blood sugars 2 times daily or as directed 100 strip 11     blood glucose monitoring (SOFTCLIX) lancets Use to test blood sugar 6 times daily. 200 each 11     Blood Glucose Monitoring Suppl (ACCU-CHEK GUIDE ME) w/Device KIT 1 each 6 times daily 1 kit 0     carvedilol (COREG) 12.5 MG tablet TAKE 1 TABLET (12.5 MG) BY MOUTH 2 TIMES DAILY (WITH MEALS) 180 tablet 1     Continuous Blood Gluc Sensor (FREESTYLE MEENA 2 SENSOR) MISC 1 patch every 14 days Use to check blood sugars per  guidelines 2 each 11     insulin aspart (NOVOLOG FLEXPEN) 100 UNIT/ML pen INJECT 10 UNITS SUBCUTANEOUS 3 TIMES DAILY (WITH MEALS) 30 mL 1     insulin glargine (LANTUS SOLOSTAR) 100 UNIT/ML pen INJECT 42 UNITS SUBCUTANEOUS EVERY MORNING (BEFORE BREAKFAST) 45 mL 1     Insulin Pen Needle (PEN NEEDLES) 32G X 4 MM MISC 1 each 4 times daily 200 each 11     losartan (COZAAR) 50 MG tablet TAKE ONE TABLET BY MOUTH ONCE DAILY 90 tablet 0     ondansetron (ZOFRAN) 4 MG tablet Take 1 tablet (4 mg) by mouth every 6 hours as needed for nausea or vomiting 15 tablet 0     pantoprazole (PROTONIX) 40 MG EC tablet Take 1 tablet (40 mg) by mouth daily 30 tablet 3     pravastatin (PRAVACHOL) 20 MG tablet TAKE ONE TABLET BY MOUTH ONCE DAILY 90 tablet 0     sertraline (ZOLOFT) 50 MG tablet TAKE  ONE TABLET BY MOUTH ONCE DAILY 90 tablet 2     Turmeric (QC TUMERIC COMPLEX PO)            ALLERGIES:  No Known Allergies      PAST MEDICAL HISTORY:  Past Medical History:   Diagnosis Date     Diabetes mellitus (H)      Dyslipidemia      Hereditary hemochromatosis (H)      Hypertension      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) (H)          PAST SURGICAL HISTORY:  Past Surgical History:   Procedure Laterality Date     ESOPHAGOSCOPY, GASTROSCOPY, DUODENOSCOPY (EGD), COMBINED N/A 11/10/2021    Procedure: ESOPHAGOGASTRODUODENOSCOPY, WITH BIOPSY;  Surgeon: Leventhal, Thomas Michael, MD;  Location: [facility] OR     HYSTERECTOMY TOTAL ABDOMINAL      due to fibroids     LAPAROSCOPIC EVACUATION ECTOPIC PREGNANCY       TUBAL LIGATION           SOCIAL HISTORY:  Social History     Socioeconomic History     Marital status: Single     Spouse name: Not on file     Number of children: Not on file     Years of education: Not on file     Highest education level: Not on file   Occupational History     Not on file   Tobacco Use     Smoking status: Former Smoker     Packs/day: 0.50     Years: 20.00     Pack years: 10.00     Types: Cigarettes     Quit date: 3/9/2010     Years since quittin.9     Smokeless tobacco: Never Used   Vaping Use     Vaping Use: Never used   Substance and Sexual Activity     Alcohol use: No     Alcohol/week: 0.0 standard drinks     Drug use: No     Sexual activity: Never   Other Topics Concern     Parent/sibling w/ CABG, MI or angioplasty before 65F 55M? No   Social History Narrative    , 2 children, 2 grandchildren     Social Determinants of Health     Financial Resource Strain: Not on file   Food Insecurity: Not on file   Transportation Needs: Not on file   Physical Activity: Not on file   Stress: Not on file   Social Connections: Not on file   Intimate Partner Violence: Not on file   Housing Stability: Not on file         FAMILY HISTORY:  Family History   Problem Relation  "Age of Onset     Cirrhosis Mother         w/o alcohol history     Diabetes Mother      Emphysema Father      Hypertension Sister      Hemochromatosis Sister      Heart Defect Niece         Tetrology of Fallot     Breast Cancer No family hx of      Cancer - colorectal No family hx of          PHYSICAL EXAM:  Vital signs:  /65   Pulse 66   Temp 98.6  F (37  C) (Oral)   Resp 16   Ht 1.499 m (4' 11\")   Wt 61 kg (134 lb 6.4 oz)   LMP  (LMP Unknown)   SpO2 98%   BMI 27.15 kg/m     ECO-1  GENERAL/CONSTITUTIONAL: No acute distress.  EYES: Pupils are equal, round, and react to light and accommodation. Extraocular movements intact.  No scleral icterus.  ENT/MOUTH: Neck supple. Oropharynx clear, no mucositis.  LYMPH: No anterior cervical, posterior cervical, supraclavicular, axillary or inguinal adenopathy.   RESPIRATORY: Clear to auscultation bilaterally. No crackles or wheezing.   CARDIOVASCULAR: Regular rate and rhythm without murmurs, gallops, or rubs.  GASTROINTESTINAL: No hepatosplenomegaly, masses, or tenderness. The patient has normal bowel sounds. No guarding.  No distention.  MUSCULOSKELETAL: Warm and well-perfused, no cyanosis, clubbing, or edema.  NEUROLOGIC: Cranial nerves II-XII are intact. Alert, oriented, answers questions appropriately.  INTEGUMENTARY: No rashes or jaundice.  GAIT: Steady, does not use assistive device      LABS:   Ref. Range 2022 14:21   Sodium Latest Ref Range: 133 - 144 mmol/L 139   Potassium Latest Ref Range: 3.4 - 5.3 mmol/L 3.9   Chloride Latest Ref Range: 94 - 109 mmol/L 109   Carbon Dioxide Latest Ref Range: 20 - 32 mmol/L 25   Urea Nitrogen Latest Ref Range: 7 - 30 mg/dL 10   Creatinine Latest Ref Range: 0.52 - 1.04 mg/dL 0.68   GFR Estimate Latest Ref Range: >60 mL/min/1.73m2 >90   Calcium Latest Ref Range: 8.5 - 10.1 mg/dL 8.7   Anion Gap Latest Ref Range: 3 - 14 mmol/L 5   Albumin Latest Ref Range: 3.4 - 5.0 g/dL 3.5   Protein Total Latest Ref Range: 6.8 - 8.8 " g/dL 7.4   Bilirubin Total Latest Ref Range: 0.2 - 1.3 mg/dL 0.3   Alkaline Phosphatase Latest Ref Range: 40 - 150 U/L 86   ALT Latest Ref Range: 0 - 50 U/L 41   AST Latest Ref Range: 0 - 45 U/L 26   Ferritin Latest Ref Range: 8 - 252 ng/mL 74   Glucose Latest Ref Range: 70 - 99 mg/dL 160 (H)   WBC Latest Ref Range: 4.0 - 11.0 10e3/uL 9.9   Hemoglobin Latest Ref Range: 11.7 - 15.7 g/dL 15.1   Hematocrit Latest Ref Range: 35.0 - 47.0 % 46.1   Platelet Count Latest Ref Range: 150 - 450 10e3/uL 159   RBC Count Latest Ref Range: 3.80 - 5.20 10e6/uL 4.87   MCV Latest Ref Range: 78 - 100 fL 95   MCH Latest Ref Range: 26.5 - 33.0 pg 31.0   MCHC Latest Ref Range: 31.5 - 36.5 g/dL 32.8   RDW Latest Ref Range: 10.0 - 15.0 % 12.7   % Neutrophils Latest Units: % 67   % Lymphocytes Latest Units: % 21   % Monocytes Latest Units: % 9   % Eosinophils Latest Units: % 2   % Basophils Latest Units: % 0   Absolute Basophils Latest Ref Range: 0.0 - 0.2 10e3/uL 0.0   Absolute Eosinophils Latest Ref Range: 0.0 - 0.7 10e3/uL 0.2   Absolute Lymphocytes Latest Ref Range: 0.8 - 5.3 10e3/uL 2.1   Absolute Monocytes Latest Ref Range: 0.0 - 1.3 10e3/uL 0.9   Absolute Neutrophils Latest Ref Range: 1.6 - 8.3 10e3/uL 6.6         Ref. Range 11/18/2021 12:09   Alpha Fetoprotein Latest Ref Range: 0.0 - 8.0 ug/L 4.7        Ref. Range 11/18/2021 12:09   Ferritin Latest Ref Range: 8 - 252 ng/mL 149   Iron Latest Ref Range: 35 - 180 ug/dL 183 (H)   Iron Binding Cap Latest Ref Range: 240 - 430 ug/dL 339   Iron Saturation Index Latest Ref Range: 15 - 46 % 54 (H)              Ref. Range 5/10/2021 10:55   Ferritin Latest Ref Range: 8 - 252 ng/mL 1,126 (H)   Iron Latest Ref Range: 35 - 180 ug/dL 235 (H)   Iron Binding Cap Latest Ref Range: 240 - 430 ug/dL 306   Iron Saturation Index Latest Ref Range: 15 - 46 % 77 (H)              Ref. Range 9/27/2016 11:10 5/10/2021 10:55 6/15/2021 13:04 6/19/2021 11:29 6/23/2021 10:45 6/28/2021 11:05 7/1/2021 11:08 7/5/2021  12:39 7/7/2021 13:08 7/12/2021 14:31 7/19/2021 11:22 7/28/2021 13:53 8/3/2021 14:07 8/10/2021 14:03 8/25/2021 11:21 8/26/2021 10:00   Ferritin Latest Ref Range: 8 - 252 ng/mL 1,569 (H) 1,126 (H) 1,054 (H) 994 (H) 1,003 (H) 698 (H) 688 (H) 605 (H) 513 (H) 521 273 (H) 217 222 172 118 101           Ref. Range 9/27/2016 11:10 6/23/2021 10:45   Hep B Surface Agn Latest Ref Range: NR  Nonreactive     Hepatitis B Surface Antibody Latest Ref Range: <8.00 m[IU]/mL 0.22     Hepatitis C Antibody Latest Ref Range: NR  Nonreactive...     HIV Antigen Antibody Combo Latest Ref Range: NR^Nonreactive       Nonreactive         PATHOLOGY:  5/10/21: TEST(S) REQUESTED:   Hemochromatosis Mutation Analysis by PCR     SPECIMEN DESCRIPTION:   Blood     HEMOCHROMATOSIS RESULTS     HFE Gene C282Y (G845A) RESULTS:     C282Y Mutation Interpretation: HOMOZYGOTE     HFE Gene H63D (C187G) RESULTS:     H63D Mutation Interpretation: NORMAL     HFE Gene S65C (A193T) RESULTS:     S65C Mutation Interpretation: NORMAL      Indication for testing: Carrier screening or diagnostic   testing for alpha-1-antitrypsin (AAT) deficiency.   Negative: This sample has a serum AAT protein concentration   in the normal range and is negative for the S and Z   deficiency alleles by genotyping. 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                          - Small (< 5 mm) esophageal varices.                          - Gastritis. Biopsied.                          - Normal examined duodenum.       Final Diagnosis   A. STOMACH, BIOPSY:  - Gastric mucosa with features of reactive gastropathy   - No H. pylori organisms identified on routine staining  - Negative for intestinal metaplasia and dysplasia      IMAGING:  ULTRASOUND ABDOMEN LIMITED November 24, 2021   IMPRESSION:  1.  Coarse increased echogenicity of the liver compatible with  steatosis and/or cirrhosis.  2.  1 cm gallbladder polyp.  3.  No hepatoma demonstrated.        ASSESSMENT/PLAN:  Bradford Prado is a 61 year old female with past medical history of cirrhosis, CERDA, HTN, HLD, and DM2 who is referred to clinic today for C282Y/C282Y hereditary hemochromatosis. She was previously followed by Dr. Long in [place].     1) Homozygous C282Y/C282Y hereditary hemochromatosis. Patient diagnosed just in May 2021. Her last phlebotomy was in August 2021 (ferritin 101). Current ferritin is 179. Decreased to 93 with phlebotomy x1 on 12/15/21.  -No need for phlebotomy 2/10/22 as ferritin <100.  -Repeat CBC, CMP, ferritin level just prior to our visit on 4/7/22 with possible phlebotomy.  -Continue yearly liver ultrasound and AFP (4.7 as of 11/2021).     2) 1 cm galbladder polyp  -Discussed with patient she may require cholecystectomy due to increased risk of malignancy with galbladder polyps measuring >/= 1 cm.   -Patient was evaluated by General Surgery on 12/13/21 and was offered surgery vs follow-up and she has elected for follow-up. She will repeat imaging in 6 months with surgery.     3) Intermittent nausea  -She has zofran PRN.  -Continue PPI.     4) History of cirrhosis/CERDA  -See plan above.      5) HTN, HLD, DM2. As followed by PCP.     6) Follow up on 4/7/22 with possible phlebotomy. See plan above.        Thank you for referring Bradford Prado to clinic today. Adequate time was dedicated to patient questions and answered to the expressed satisfaction of the patient.         Rochelle Girard,   Hematology/Oncology  [facility]  [facility]icians

## 2022-02-10 ENCOUNTER — ONCOLOGY VISIT (OUTPATIENT)
Dept: ONCOLOGY | Facility: CLINIC | Age: 62
End: 2022-02-10
Attending: INTERNAL MEDICINE
Payer: COMMERCIAL

## 2022-02-10 VITALS
HEIGHT: 59 IN | BODY MASS INDEX: 27.09 KG/M2 | WEIGHT: 134.4 LBS | HEART RATE: 66 BPM | RESPIRATION RATE: 16 BRPM | OXYGEN SATURATION: 98 % | TEMPERATURE: 98.6 F | SYSTOLIC BLOOD PRESSURE: 107 MMHG | DIASTOLIC BLOOD PRESSURE: 65 MMHG

## 2022-02-10 DIAGNOSIS — E83.110 HEREDITARY HEMOCHROMATOSIS (H): Primary | ICD-10-CM

## 2022-02-10 PROCEDURE — G0463 HOSPITAL OUTPT CLINIC VISIT: HCPCS

## 2022-02-10 PROCEDURE — 99214 OFFICE O/P EST MOD 30 MIN: CPT | Performed by: INTERNAL MEDICINE

## 2022-02-10 ASSESSMENT — MIFFLIN-ST. JEOR: SCORE: 1090.26

## 2022-02-10 ASSESSMENT — PAIN SCALES - GENERAL: PAINLEVEL: MODERATE PAIN (4)

## 2022-02-10 NOTE — LETTER
2/10/2022         RE: Bradford Prado  [address]  [facility] [address]        Dear Colleague,    Thank you for referring your patient, Bradford Prado, to the [facility]. Please see a copy of my visit note below.    [facility]    Hematology/Oncology Established Patient Follow-up Note    Treatment Summary:      Today's Date: 2/9/2022    Reason for Follow-up: Hereditary Hemochromatosis  Referring Provider: Nimco Travis NP      HISTORY OF PRESENT ILLNESS: Bradford Prado is a 61 year old female with past medical history of cirrhosis, CERDA, HTN, HLD, and DM2 who is referred to clinic today for C282Y/C282Y hereditary hemochromatosis. She was previously followed by Dr. Long in [place].     Patient has family history of severe liver disease and recalls mother entering acute liver failure with need for repeat thoracenteses. Patient had then developed increasing LFTs and cirrhosis and workup for this included liver biopsy, which revealed cirrhosis and negative alpha 1 antitrypsin mutation testing. Record review shows patient has had evidence of elevated ferritin levels as high as >1500 in 2016. She underwent testing for hereditary hemochromatosis in May 2021 and returned positive as homozygous C282Y/C282Y.      Patient was then treated with phlebotomies twice per week for two months. Her most recent ferritin level was in August 2021 and 101.      Patient does have various hand arthralgias and follows with a hand surgeon for this with intermittent hand Xrays and steroid injections. She is a diabetic. No history of endocrinopathies or heart disease she is aware of.     12/13/21 evaluated by General Surgery. No acute intervention.     Phlebotomy 12/15. Ferritin had decreased from 179 to 93, currently. Energy levels are stable. She still has diffuse arthralgias and takes PRN NSAIDS.       INTERIM HISTORY:  No acute events. Patient continues to monitor diet. She is looking  forward to a trip to [place] next month. Ferritin has returned at 74.       REVIEW OF SYSTEMS:   A 14 point ROS was reviewed with pertinent positives and negatives in the HPI.       HOME MEDICATIONS:  Current Outpatient Medications   Medication Sig Dispense Refill     ACCU-CHEK GUIDE test strip Use to test blood sugar 4 times daily or as directed. 200 strip 11     acetaminophen (TYLENOL) 325 MG tablet Take 325-650 mg by mouth every 6 hours as needed for mild pain        amLODIPine (NORVASC) 5 MG tablet TAKE ONE TABLET BY MOUTH TWICE A  tablet 1     Biotin 10 MG CAPS Take 1 capsule by mouth daily       blood glucose (NO BRAND SPECIFIED) lancets standard Use to test blood sugar twice times daily or as directed. 100 each 1     blood glucose (NO BRAND SPECIFIED) test strip Use to test blood sugar twice times daily or as directed. 100 each 1     blood glucose (NO BRAND SPECIFIED) test strip Use to test blood sugars 2 times daily or as directed 100 strip 11     blood glucose monitoring (SOFTCLIX) lancets Use to test blood sugar 6 times daily. 200 each 11     Blood Glucose Monitoring Suppl (ACCU-CHEK GUIDE ME) w/Device KIT 1 each 6 times daily 1 kit 0     carvedilol (COREG) 12.5 MG tablet TAKE 1 TABLET (12.5 MG) BY MOUTH 2 TIMES DAILY (WITH MEALS) 180 tablet 1     Continuous Blood Gluc Sensor (FREESTYLE MEENA 2 SENSOR) MISC 1 patch every 14 days Use to check blood sugars per  guidelines 2 each 11     insulin aspart (NOVOLOG FLEXPEN) 100 UNIT/ML pen INJECT 10 UNITS SUBCUTANEOUS 3 TIMES DAILY (WITH MEALS) 30 mL 1     insulin glargine (LANTUS SOLOSTAR) 100 UNIT/ML pen INJECT 42 UNITS SUBCUTANEOUS EVERY MORNING (BEFORE BREAKFAST) 45 mL 1     Insulin Pen Needle (PEN NEEDLES) 32G X 4 MM MISC 1 each 4 times daily 200 each 11     losartan (COZAAR) 50 MG tablet TAKE ONE TABLET BY MOUTH ONCE DAILY 90 tablet 0     ondansetron (ZOFRAN) 4 MG tablet Take 1 tablet (4 mg) by mouth every 6 hours as needed for nausea or  vomiting 15 tablet 0     pantoprazole (PROTONIX) 40 MG EC tablet Take 1 tablet (40 mg) by mouth daily 30 tablet 3     pravastatin (PRAVACHOL) 20 MG tablet TAKE ONE TABLET BY MOUTH ONCE DAILY 90 tablet 0     sertraline (ZOLOFT) 50 MG tablet TAKE ONE TABLET BY MOUTH ONCE DAILY 90 tablet 2     Turmeric (QC TUMERIC COMPLEX PO)            ALLERGIES:  No Known Allergies      PAST MEDICAL HISTORY:  Past Medical History:   Diagnosis Date     Diabetes mellitus (H)      Dyslipidemia      Hereditary hemochromatosis (H)      Hypertension      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) (H)          PAST SURGICAL HISTORY:  Past Surgical History:   Procedure Laterality Date     ESOPHAGOSCOPY, GASTROSCOPY, DUODENOSCOPY (EGD), COMBINED N/A 11/10/2021    Procedure: ESOPHAGOGASTRODUODENOSCOPY, WITH BIOPSY;  Surgeon: Leventhal, Thomas Michael, MD;  Location: [facility] OR     HYSTERECTOMY TOTAL ABDOMINAL      due to fibroids     LAPAROSCOPIC EVACUATION ECTOPIC PREGNANCY       TUBAL LIGATION           SOCIAL HISTORY:  Social History     Socioeconomic History     Marital status: Single     Spouse name: Not on file     Number of children: Not on file     Years of education: Not on file     Highest education level: Not on file   Occupational History     Not on file   Tobacco Use     Smoking status: Former Smoker     Packs/day: 0.50     Years: 20.00     Pack years: 10.00     Types: Cigarettes     Quit date: 3/9/2010     Years since quittin.9     Smokeless tobacco: Never Used   Vaping Use     Vaping Use: Never used   Substance and Sexual Activity     Alcohol use: No     Alcohol/week: 0.0 standard drinks     Drug use: No     Sexual activity: Never   Other Topics Concern     Parent/sibling w/ CABG, MI or angioplasty before 65F 55M? No   Social History Narrative    , 2 children, 2 grandchildren     Social Determinants of Health     Financial Resource Strain: Not on file   Food Insecurity: Not on file  "  Transportation Needs: Not on file   Physical Activity: Not on file   Stress: Not on file   Social Connections: Not on file   Intimate Partner Violence: Not on file   Housing Stability: Not on file         FAMILY HISTORY:  Family History   Problem Relation Age of Onset     Cirrhosis Mother         w/o alcohol history     Diabetes Mother      Emphysema Father      Hypertension Sister      Hemochromatosis Sister      Heart Defect Niece         Tetrology of Fallot     Breast Cancer No family hx of      Cancer - colorectal No family hx of          PHYSICAL EXAM:  Vital signs:  /65   Pulse 66   Temp 98.6  F (37  C) (Oral)   Resp 16   Ht 1.499 m (4' 11\")   Wt 61 kg (134 lb 6.4 oz)   LMP  (LMP Unknown)   SpO2 98%   BMI 27.15 kg/m     ECO-1  GENERAL/CONSTITUTIONAL: No acute distress.  EYES: Pupils are equal, round, and react to light and accommodation. Extraocular movements intact.  No scleral icterus.  ENT/MOUTH: Neck supple. Oropharynx clear, no mucositis.  LYMPH: No anterior cervical, posterior cervical, supraclavicular, axillary or inguinal adenopathy.   RESPIRATORY: Clear to auscultation bilaterally. No crackles or wheezing.   CARDIOVASCULAR: Regular rate and rhythm without murmurs, gallops, or rubs.  GASTROINTESTINAL: No hepatosplenomegaly, masses, or tenderness. The patient has normal bowel sounds. No guarding.  No distention.  MUSCULOSKELETAL: Warm and well-perfused, no cyanosis, clubbing, or edema.  NEUROLOGIC: Cranial nerves II-XII are intact. Alert, oriented, answers questions appropriately.  INTEGUMENTARY: No rashes or jaundice.  GAIT: Steady, does not use assistive device      LABS:   Ref. Range 2022 14:21   Sodium Latest Ref Range: 133 - 144 mmol/L 139   Potassium Latest Ref Range: 3.4 - 5.3 mmol/L 3.9   Chloride Latest Ref Range: 94 - 109 mmol/L 109   Carbon Dioxide Latest Ref Range: 20 - 32 mmol/L 25   Urea Nitrogen Latest Ref Range: 7 - 30 mg/dL 10   Creatinine Latest Ref Range: 0.52 " - 1.04 mg/dL 0.68   GFR Estimate Latest Ref Range: >60 mL/min/1.73m2 >90   Calcium Latest Ref Range: 8.5 - 10.1 mg/dL 8.7   Anion Gap Latest Ref Range: 3 - 14 mmol/L 5   Albumin Latest Ref Range: 3.4 - 5.0 g/dL 3.5   Protein Total Latest Ref Range: 6.8 - 8.8 g/dL 7.4   Bilirubin Total Latest Ref Range: 0.2 - 1.3 mg/dL 0.3   Alkaline Phosphatase Latest Ref Range: 40 - 150 U/L 86   ALT Latest Ref Range: 0 - 50 U/L 41   AST Latest Ref Range: 0 - 45 U/L 26   Ferritin Latest Ref Range: 8 - 252 ng/mL 74   Glucose Latest Ref Range: 70 - 99 mg/dL 160 (H)   WBC Latest Ref Range: 4.0 - 11.0 10e3/uL 9.9   Hemoglobin Latest Ref Range: 11.7 - 15.7 g/dL 15.1   Hematocrit Latest Ref Range: 35.0 - 47.0 % 46.1   Platelet Count Latest Ref Range: 150 - 450 10e3/uL 159   RBC Count Latest Ref Range: 3.80 - 5.20 10e6/uL 4.87   MCV Latest Ref Range: 78 - 100 fL 95   MCH Latest Ref Range: 26.5 - 33.0 pg 31.0   MCHC Latest Ref Range: 31.5 - 36.5 g/dL 32.8   RDW Latest Ref Range: 10.0 - 15.0 % 12.7   % Neutrophils Latest Units: % 67   % Lymphocytes Latest Units: % 21   % Monocytes Latest Units: % 9   % Eosinophils Latest Units: % 2   % Basophils Latest Units: % 0   Absolute Basophils Latest Ref Range: 0.0 - 0.2 10e3/uL 0.0   Absolute Eosinophils Latest Ref Range: 0.0 - 0.7 10e3/uL 0.2   Absolute Lymphocytes Latest Ref Range: 0.8 - 5.3 10e3/uL 2.1   Absolute Monocytes Latest Ref Range: 0.0 - 1.3 10e3/uL 0.9   Absolute Neutrophils Latest Ref Range: 1.6 - 8.3 10e3/uL 6.6         Ref. Range 11/18/2021 12:09   Alpha Fetoprotein Latest Ref Range: 0.0 - 8.0 ug/L 4.7        Ref. Range 11/18/2021 12:09   Ferritin Latest Ref Range: 8 - 252 ng/mL 149   Iron Latest Ref Range: 35 - 180 ug/dL 183 (H)   Iron Binding Cap Latest Ref Range: 240 - 430 ug/dL 339   Iron Saturation Index Latest Ref Range: 15 - 46 % 54 (H)              Ref. Range 5/10/2021 10:55   Ferritin Latest Ref Range: 8 - 252 ng/mL 1,126 (H)   Iron Latest Ref Range: 35 - 180 ug/dL 235 (H)    Iron Binding Cap Latest Ref Range: 240 - 430 ug/dL 306   Iron Saturation Index Latest Ref Range: 15 - 46 % 77 (H)              Ref. Range 9/27/2016 11:10 5/10/2021 10:55 6/15/2021 13:04 6/19/2021 11:29 6/23/2021 10:45 6/28/2021 11:05 7/1/2021 11:08 7/5/2021 12:39 7/7/2021 13:08 7/12/2021 14:31 7/19/2021 11:22 7/28/2021 13:53 8/3/2021 14:07 8/10/2021 14:03 8/25/2021 11:21 8/26/2021 10:00   Ferritin Latest Ref Range: 8 - 252 ng/mL 1,569 (H) 1,126 (H) 1,054 (H) 994 (H) 1,003 (H) 698 (H) 688 (H) 605 (H) 513 (H) 521 273 (H) 217 222 172 118 101           Ref. Range 9/27/2016 11:10 6/23/2021 10:45   Hep B Surface Agn Latest Ref Range: NR  Nonreactive     Hepatitis B Surface Antibody Latest Ref Range: <8.00 m[IU]/mL 0.22     Hepatitis C Antibody Latest Ref Range: NR  Nonreactive...     HIV Antigen Antibody Combo Latest Ref Range: NR^Nonreactive       Nonreactive         PATHOLOGY:  5/10/21: TEST(S) REQUESTED:   Hemochromatosis Mutation Analysis by PCR     SPECIMEN DESCRIPTION:   Blood     HEMOCHROMATOSIS RESULTS     HFE Gene C282Y (G845A) RESULTS:     C282Y Mutation Interpretation: HOMOZYGOTE     HFE Gene H63D (C187G) RESULTS:     H63D Mutation Interpretation: NORMAL     HFE Gene S65C (A193T) RESULTS:     S65C Mutation Interpretation: NORMAL      Indication for testing: Carrier screening or diagnostic   testing for alpha-1-antitrypsin (AAT) deficiency.   Negative: This sample has a serum AAT protein concentration   in the normal range and is negative for the S and Z   deficiency alleles by genotyping. 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                          - Small (< 5 mm) esophageal varices.                          - Gastritis. Biopsied.                          - Normal examined duodenum.      Final Diagnosis   A. STOMACH, BIOPSY:  - Gastric mucosa with features of reactive gastropathy   - No H. pylori organisms identified on routine staining  - Negative for intestinal metaplasia and dysplasia      IMAGING:  ULTRASOUND ABDOMEN LIMITED November 24, 2021   IMPRESSION:  1.  Coarse increased echogenicity of the liver compatible with  steatosis and/or cirrhosis.  2.  1 cm gallbladder polyp.  3.  No hepatoma demonstrated.        ASSESSMENT/PLAN:  Bradford Prado is a 61 year old female with past medical history of cirrhosis, CERDA, HTN, HLD, and DM2 who is referred to clinic today for C282Y/C282Y hereditary hemochromatosis. She was previously followed by Dr. Long in [place].     1) Homozygous C282Y/C282Y hereditary hemochromatosis. Patient diagnosed just in May 2021. Her last phlebotomy was in August 2021 (ferritin 101). Current ferritin is 179. Decreased to 93 with phlebotomy x1 on 12/15/21.  -No need for phlebotomy 2/10/22 as ferritin <100.  -Repeat CBC, CMP, ferritin level just prior to our visit on 4/7/22 with possible phlebotomy.  -Continue yearly liver ultrasound and AFP (4.7 as of 11/2021).     2) 1 cm galbladder polyp  -Discussed with patient she may require cholecystectomy due to increased risk of malignancy with galbladder polyps measuring >/= 1 cm.   -Patient was evaluated by General Surgery on 12/13/21 and was offered surgery vs follow-up and she has elected for follow-up. She will repeat imaging in 6 months with surgery.     3) Intermittent nausea  -She has zofran PRN.  -Continue PPI.     4) History of cirrhosis/CERDA  -See plan above.      5) HTN, HLD, DM2. As followed by PCP.     6) Follow up on 4/7/22 with possible phlebotomy. See plan  "above.        Thank you for referring Bradford Prado to clinic today. Adequate time was dedicated to patient questions and answered to the expressed satisfaction of the patient.         Rochelle Girard DO  Hematology/Oncology  [facility]            Oncology Rooming Note    February 10, 2022 10:59 AM   Bradford Prado is a 61 year old female who presents for:    Chief Complaint   Patient presents with     Oncology Clinic Visit     Initial Vitals: /65   Pulse 66   Temp 98.6  F (37  C) (Oral)   Resp 16   Ht 1.499 m (4' 11\")   Wt 61 kg (134 lb 6.4 oz)   LMP  (LMP Unknown)   SpO2 98%   BMI 27.15 kg/m   Estimated body mass index is 27.15 kg/m  as calculated from the following:    Height as of this encounter: 1.499 m (4' 11\").    Weight as of this encounter: 61 kg (134 lb 6.4 oz). Body surface area is 1.59 meters squared.  Moderate Pain (4) Comment: Data Unavailable   No LMP recorded (lmp unknown). Patient has had a hysterectomy.  Allergies reviewed: Yes  Medications reviewed: Yes    Medications: Medication refills not needed today.  Pharmacy name entered into [employer]:    [facility] [place], [place] - [address] [place] [address] PHARMACY [place], [place] - [address] [place] [address] [phone]    Clinical concerns: no       Shari J. Schoenberger, [facility]                Again, thank you for allowing me to participate in the care of your patient.        Sincerely,        Rochelle Girard DO    "

## 2022-02-10 NOTE — PROGRESS NOTES
"Oncology Rooming Note    February 10, 2022 10:59 AM   Bradford Prado is a 61 year old female who presents for:    Chief Complaint   Patient presents with     Oncology Clinic Visit     Initial Vitals: /65   Pulse 66   Temp 98.6  F (37  C) (Oral)   Resp 16   Ht 1.499 m (4' 11\")   Wt 61 kg (134 lb 6.4 oz)   LMP  (LMP Unknown)   SpO2 98%   BMI 27.15 kg/m   Estimated body mass index is 27.15 kg/m  as calculated from the following:    Height as of this encounter: 1.499 m (4' 11\").    Weight as of this encounter: 61 kg (134 lb 6.4 oz). Body surface area is 1.59 meters squared.  Moderate Pain (4) Comment: Data Unavailable   No LMP recorded (lmp unknown). Patient has had a hysterectomy.  Allergies reviewed: Yes  Medications reviewed: Yes    Medications: Medication refills not needed today.  Pharmacy name entered into [employer]:    [facility] [place], [place] - [address] [place] [address] PHARMACY [place], [place] - [address] [place] [address] [phone]    Clinical concerns: no       Shari J. Schoenberger, CMA            "

## 2022-04-28 DIAGNOSIS — E11.29 POORLY CONTROLLED TYPE 2 DIABETES MELLITUS WITH RENAL COMPLICATION (H): Chronic | ICD-10-CM

## 2022-04-28 DIAGNOSIS — E11.65 POORLY CONTROLLED TYPE 2 DIABETES MELLITUS WITH RENAL COMPLICATION (H): Chronic | ICD-10-CM

## 2022-04-28 DIAGNOSIS — I16.0 HYPERTENSIVE URGENCY: ICD-10-CM

## 2022-04-28 DIAGNOSIS — I10 ESSENTIAL HYPERTENSION WITH GOAL BLOOD PRESSURE LESS THAN 140/90: Chronic | ICD-10-CM

## 2022-04-28 RX ORDER — AMLODIPINE BESYLATE 5 MG/1
TABLET ORAL
Qty: 180 TABLET | Refills: 2 | Status: SHIPPED | OUTPATIENT
Start: 2022-04-28 | End: 2023-02-28

## 2022-04-28 RX ORDER — LOSARTAN POTASSIUM 50 MG/1
TABLET ORAL
Qty: 90 TABLET | Refills: 2 | Status: SHIPPED | OUTPATIENT
Start: 2022-04-28 | End: 2023-01-26

## 2022-04-29 DIAGNOSIS — E11.65 POORLY CONTROLLED TYPE 2 DIABETES MELLITUS WITH RENAL COMPLICATION (H): Chronic | ICD-10-CM

## 2022-04-29 DIAGNOSIS — I16.0 HYPERTENSIVE URGENCY: ICD-10-CM

## 2022-04-29 DIAGNOSIS — E11.29 POORLY CONTROLLED TYPE 2 DIABETES MELLITUS WITH RENAL COMPLICATION (H): Chronic | ICD-10-CM

## 2022-04-29 DIAGNOSIS — E78.5 HYPERLIPIDEMIA LDL GOAL <70: Chronic | ICD-10-CM

## 2022-04-29 DIAGNOSIS — E11.65 TYPE 2 DIABETES MELLITUS WITH HYPERGLYCEMIA, WITHOUT LONG-TERM CURRENT USE OF INSULIN (H): ICD-10-CM

## 2022-05-02 ENCOUNTER — LAB (OUTPATIENT)
Dept: URGENT CARE | Facility: URGENT CARE | Age: 62
End: 2022-05-02
Attending: FAMILY MEDICINE
Payer: COMMERCIAL

## 2022-05-02 DIAGNOSIS — Z20.822 ENCOUNTER FOR LABORATORY TESTING FOR COVID-19 VIRUS: ICD-10-CM

## 2022-05-02 LAB — SARS-COV-2 RNA RESP QL NAA+PROBE: NEGATIVE

## 2022-05-02 PROCEDURE — U0003 INFECTIOUS AGENT DETECTION BY NUCLEIC ACID (DNA OR RNA); SEVERE ACUTE RESPIRATORY SYNDROME CORONAVIRUS 2 (SARS-COV-2) (CORONAVIRUS DISEASE [COVID-19]), AMPLIFIED PROBE TECHNIQUE, MAKING USE OF HIGH THROUGHPUT TECHNOLOGIES AS DESCRIBED BY CMS-2020-01-R: HCPCS

## 2022-05-02 PROCEDURE — U0005 INFEC AGEN DETEC AMPLI PROBE: HCPCS

## 2022-05-02 RX ORDER — PRAVASTATIN SODIUM 20 MG
TABLET ORAL
Qty: 90 TABLET | Refills: 0 | Status: SHIPPED | OUTPATIENT
Start: 2022-05-02 | End: 2022-08-22

## 2022-05-02 RX ORDER — CARVEDILOL 12.5 MG/1
TABLET ORAL
Qty: 180 TABLET | Refills: 1 | Status: SHIPPED | OUTPATIENT
Start: 2022-05-02 | End: 2022-11-16

## 2022-05-02 NOTE — TELEPHONE ENCOUNTER
Prescription approved per [facility] Refill Protocol.  Patient will be due for lipid panel in June r/t her pravastatin. Would PCP like to place an order?  Annual visit not due until October.    Order pended for consideration.     Edith Cui RN  [facility]

## 2022-05-05 ENCOUNTER — HOSPITAL ENCOUNTER (OUTPATIENT)
Dept: ULTRASOUND IMAGING | Facility: CLINIC | Age: 62
Discharge: HOME | End: 2022-05-05
Attending: SURGERY | Admitting: SURGERY
Payer: COMMERCIAL

## 2022-05-05 ENCOUNTER — OFFICE VISIT (OUTPATIENT)
Dept: SURGERY | Facility: CLINIC | Age: 62
End: 2022-05-05
Payer: COMMERCIAL

## 2022-05-05 VITALS — SYSTOLIC BLOOD PRESSURE: 120 MMHG | HEART RATE: 67 BPM | DIASTOLIC BLOOD PRESSURE: 80 MMHG

## 2022-05-05 DIAGNOSIS — K82.4 GALLBLADDER POLYP: Primary | ICD-10-CM

## 2022-05-05 DIAGNOSIS — K82.4 GALLBLADDER POLYP: ICD-10-CM

## 2022-05-05 PROCEDURE — 76705 ECHO EXAM OF ABDOMEN: CPT

## 2022-05-05 PROCEDURE — 99214 OFFICE O/P EST MOD 30 MIN: CPT | Performed by: SURGERY

## 2022-05-06 NOTE — PROGRESS NOTES
Patient is a very pleasant 61-year-old female previously seen in consultation for a gallbladder polyp.  Initial consultation was performed on December 13, 2021.  Reference that note for additional information.  This was reviewed prior to her evaluation.  In the interval since last being seen she reports that she has had little overall change in her health.  She does have some intermittent chronic issues with nausea but no vomiting.  No fevers or chills.  No significant right upper quadrant abdominal pain.  She did however experience some discomfort in the right subcostal area after a recent musculoskeletal injury while taking a bath.  A follow-up ultrasound was performed.    We discussed the results of the recent ultrasound.  This showed that the abnormality in her gallbladder is unchanged.  There continues to be right around 1 cm in size.    We had a thorough discussion regarding her management options.  Certainly the size of this would warrant surgical resection of the gallbladder.  She expressed consternation and concerned about this due to her underlying liver disease.  She states that she has a history of Johnson and cirrhosis.  That being said her liver function tests are normal.  INR is within the normal range and slightly higher than 1.  Ultrasound did not reveal evidence of portal hypertension or cirrhosis or ascites.  All that being said I think she is an appropriate candidate for cholecystectomy and certainly the size would indicate that this would be reasonable.  She expressed understanding of this.  At this time she can to give this further consideration.  I encouraged her to call back to schedule at her convenience.  Total time spent was 30 minutes with greater than 50% in face-to-face consultation.

## 2022-05-11 NOTE — PROGRESS NOTES
[facility]    Hematology/Oncology Established Patient Follow-up Note    Treatment Summary:      Today's Date: 5/11/22    Reason for Follow-up: Hereditary Hemochromatosis  Referring Provider: Nimco Travis NP      HISTORY OF PRESENT ILLNESS: Bradford Prado is a 61 year old female with past medical history of cirrhosis, CERDA, HTN, HLD, and DM2 who is referred to clinic today for C282Y/C282Y hereditary hemochromatosis. She was previously followed by Dr. Long in [place].     Patient has family history of severe liver disease and recalls mother entering acute liver failure with need for repeat thoracenteses. Patient had then developed increasing LFTs and cirrhosis and workup for this included liver biopsy, which revealed cirrhosis and negative alpha 1 antitrypsin mutation testing. Record review shows patient has had evidence of elevated ferritin levels as high as >1500 in 2016. She underwent testing for hereditary hemochromatosis in May 2021 and returned positive as homozygous C282Y/C282Y.      Patient was then treated with phlebotomies twice per week for two months. Her most recent ferritin level was in August 2021 and 101.      Patient does have various hand arthralgias and follows with a hand surgeon for this with intermittent hand Xrays and steroid injections. She is a diabetic. No history of endocrinopathies or heart disease she is aware of.      12/13/21 evaluated by General Surgery. No acute intervention.     Phlebotomy 12/15. Ferritin had decreased from 179 to 93, currently. Energy levels are stable. She still has diffuse arthralgias and takes PRN NSAIDS.    INTERIM HISTORY:  No acute events. Patient continues to monitor diet. Patient enjoyed [place] and extended her trip through April. Recently, her younger sister was also diagnosed with HH after infection with vibrio after consuming shellfish.      REVIEW OF SYSTEMS:   A 14 point ROS was reviewed with pertinent positives and negatives  in the HPI.       HOME MEDICATIONS:  Current Outpatient Medications   Medication Sig Dispense Refill     ACCU-CHEK GUIDE test strip Use to test blood sugar 4 times daily or as directed. 200 strip 11     acetaminophen (TYLENOL) 325 MG tablet Take 325-650 mg by mouth every 6 hours as needed for mild pain        amLODIPine (NORVASC) 5 MG tablet TAKE ONE TABLET BY MOUTH TWICE A  tablet 2     Biotin 10 MG CAPS Take 1 capsule by mouth daily       blood glucose (NO BRAND SPECIFIED) lancets standard Use to test blood sugar twice times daily or as directed. 100 each 1     blood glucose (NO BRAND SPECIFIED) test strip Use to test blood sugar twice times daily or as directed. 100 each 1     blood glucose (NO BRAND SPECIFIED) test strip Use to test blood sugars 2 times daily or as directed 100 strip 11     blood glucose monitoring (SOFTCLIX) lancets Use to test blood sugar 6 times daily. 200 each 11     Blood Glucose Monitoring Suppl (ACCU-CHEK GUIDE ME) w/Device KIT 1 each 6 times daily 1 kit 0     carvedilol (COREG) 12.5 MG tablet TAKE ONE TABLET BY MOUTH TWICE A DAY WITH MEALS 180 tablet 1     Continuous Blood Gluc Sensor (FREESTYLE MEENA 2 SENSOR) MISC 1 patch every 14 days Use to check blood sugars per  guidelines 2 each 11     insulin aspart (NOVOLOG FLEXPEN) 100 UNIT/ML pen INJECT 10 UNITS SUBCUTANEOUS 3 TIMES DAILY (WITH MEALS) 30 mL 1     insulin glargine (LANTUS SOLOSTAR) 100 UNIT/ML pen INJECT 42 UNITS SUBCUTANEOUS EVERY MORNING (BEFORE BREAKFAST) 45 mL 1     Insulin Pen Needle (PEN NEEDLES) 32G X 4 MM MISC 1 each 4 times daily 200 each 11     losartan (COZAAR) 50 MG tablet TAKE ONE TABLET BY MOUTH ONCE DAILY 90 tablet 2     ondansetron (ZOFRAN) 4 MG tablet Take 1 tablet (4 mg) by mouth every 6 hours as needed for nausea or vomiting 15 tablet 0     pantoprazole (PROTONIX) 40 MG EC tablet Take 1 tablet (40 mg) by mouth daily 30 tablet 3     pravastatin (PRAVACHOL) 20 MG tablet TAKE ONE TABLET BY  MOUTH ONCE DAILY 90 tablet 0     sertraline (ZOLOFT) 50 MG tablet TAKE ONE TABLET BY MOUTH ONCE DAILY 90 tablet 2     Turmeric (QC TUMERIC COMPLEX PO)            ALLERGIES:  No Known Allergies      PAST MEDICAL HISTORY:  Past Medical History:   Diagnosis Date     Diabetes mellitus (H)      Dyslipidemia      Hereditary hemochromatosis (H)      Hypertension      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) (H)          PAST SURGICAL HISTORY:  Past Surgical History:   Procedure Laterality Date     ESOPHAGOSCOPY, GASTROSCOPY, DUODENOSCOPY (EGD), COMBINED N/A 11/10/2021    Procedure: ESOPHAGOGASTRODUODENOSCOPY, WITH BIOPSY;  Surgeon: Leventhal, Thomas Michael, MD;  Location: [facility] OR     HYSTERECTOMY TOTAL ABDOMINAL      due to fibroids     LAPAROSCOPIC EVACUATION ECTOPIC PREGNANCY       TUBAL LIGATION           SOCIAL HISTORY:  Social History     Socioeconomic History     Marital status: Single     Spouse name: Not on file     Number of children: Not on file     Years of education: Not on file     Highest education level: Not on file   Occupational History     Not on file   Tobacco Use     Smoking status: Former Smoker     Packs/day: 0.50     Years: 20.00     Pack years: 10.00     Types: Cigarettes     Quit date: 3/9/2010     Years since quittin.1     Smokeless tobacco: Never Used   Vaping Use     Vaping Use: Never used   Substance and Sexual Activity     Alcohol use: No     Alcohol/week: 0.0 standard drinks     Drug use: No     Sexual activity: Never   Other Topics Concern     Parent/sibling w/ CABG, MI or angioplasty before 65F 55M? No   Social History Narrative    , 2 children, 2 grandchildren     Social Determinants of Health     Financial Resource Strain: Not on file   Food Insecurity: Not on file   Transportation Needs: Not on file   Physical Activity: Not on file   Stress: Not on file   Social Connections: Not on file   Intimate Partner Violence: Not on file   Housing Stability:  Not on file         FAMILY HISTORY:  Family History   Problem Relation Age of Onset     Cirrhosis Mother         w/o alcohol history     Diabetes Mother      Emphysema Father      Hypertension Sister      Hemochromatosis Sister      Heart Defect Niece         Tetrology of Fallot     Breast Cancer No family hx of      Cancer - colorectal No family hx of          PHYSICAL EXAM:  Vital signs:  /83   Pulse 68   Resp 16   Wt 61.1 kg (134 lb 12.8 oz)   LMP  (LMP Unknown)   SpO2 99%   BMI 27.23 kg/m     ECO-1  GENERAL/CONSTITUTIONAL: No acute distress.  EYES: Pupils are equal, round, and react to light and accommodation. Extraocular movements intact.  No scleral icterus.  ENT/MOUTH: Neck supple. Oropharynx clear, no mucositis.  LYMPH: No anterior cervical, posterior cervical, supraclavicular, axillary or inguinal adenopathy.   RESPIRATORY: Clear to auscultation bilaterally. No crackles or wheezing.   CARDIOVASCULAR: Regular rate and rhythm without murmurs, gallops, or rubs.  GASTROINTESTINAL: No hepatosplenomegaly, masses, or tenderness. The patient has normal bowel sounds. No guarding.  No distention.  MUSCULOSKELETAL: Warm and well-perfused, no cyanosis, clubbing, or edema.  NEUROLOGIC: Cranial nerves II-XII are intact. Alert, oriented, answers questions appropriately.  INTEGUMENTARY: No rashes or jaundice.  GAIT: Steady, does not use assistive device      LABS:  CBC RESULTS:   Recent Labs   Lab Test 22  1421   WBC 9.9   RBC 4.87   HGB 15.1   HCT 46.1   MCV 95   MCH 31.0   MCHC 32.8   RDW 12.7          Recent Labs   Lab Test 22  1421 22  0953    137   POTASSIUM 3.9 4.4   CHLORIDE 109 106   CO2 25 28   ANIONGAP 5 3   * 210*   BUN 10 11   CR 0.68 0.62   ESTELLE 8.7 8.9             Ref. Range 2021 12:09   Alpha Fetoprotein Latest Ref Range: 0.0 - 8.0 ug/L 4.7        Ref. Range 2021 12:09   Ferritin Latest Ref Range: 8 - 252 ng/mL 149   Iron Latest Ref Range: 35 -  180 ug/dL 183 (H)   Iron Binding Cap Latest Ref Range: 240 - 430 ug/dL 339   Iron Saturation Index Latest Ref Range: 15 - 46 % 54 (H)              Ref. Range 5/10/2021 10:55   Ferritin Latest Ref Range: 8 - 252 ng/mL 1,126 (H)   Iron Latest Ref Range: 35 - 180 ug/dL 235 (H)   Iron Binding Cap Latest Ref Range: 240 - 430 ug/dL 306   Iron Saturation Index Latest Ref Range: 15 - 46 % 77 (H)              Ref. Range 9/27/2016 11:10 5/10/2021 10:55 6/15/2021 13:04 6/19/2021 11:29 6/23/2021 10:45 6/28/2021 11:05 7/1/2021 11:08 7/5/2021 12:39 7/7/2021 13:08 7/12/2021 14:31 7/19/2021 11:22 7/28/2021 13:53 8/3/2021 14:07 8/10/2021 14:03 8/25/2021 11:21 8/26/2021 10:00   Ferritin Latest Ref Range: 8 - 252 ng/mL 1,569 (H) 1,126 (H) 1,054 (H) 994 (H) 1,003 (H) 698 (H) 688 (H) 605 (H) 513 (H) 521 273 (H) 217 222 172 118 101           Ref. Range 9/27/2016 11:10 6/23/2021 10:45   Hep B Surface Agn Latest Ref Range: NR  Nonreactive     Hepatitis B Surface Antibody Latest Ref Range: <8.00 m[IU]/mL 0.22     Hepatitis C Antibody Latest Ref Range: NR  Nonreactive...     HIV Antigen Antibody Combo Latest Ref Range: NR^Nonreactive       Nonreactive         PATHOLOGY:  5/10/21: TEST(S) REQUESTED:   Hemochromatosis Mutation Analysis by PCR     SPECIMEN DESCRIPTION:   Blood     HEMOCHROMATOSIS RESULTS     HFE Gene C282Y (G845A) RESULTS:     C282Y Mutation Interpretation: HOMOZYGOTE     HFE Gene H63D (C187G) RESULTS:     H63D Mutation Interpretation: NORMAL     HFE Gene S65C (A193T) RESULTS:     S65C Mutation Interpretation: NORMAL      Indication for testing: Carrier screening or diagnostic   testing for alpha-1-antitrypsin (AAT) deficiency.   Negative: This sample has a serum AAT protein concentration   in the normal range and is negative for the S and Z   deficiency alleles by genotyping. 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                          - Small (< 5 mm) esophageal varices.                          - Gastritis. Biopsied.                          - Normal examined duodenum.      Final Diagnosis   A. STOMACH, BIOPSY:  - Gastric mucosa with features of reactive gastropathy   - No H. pylori organisms identified on routine staining  - Negative for intestinal metaplasia and dysplasia      IMAGING:  ULTRASOUND ABDOMEN LIMITED November 24, 2021   IMPRESSION:  1.  Coarse increased echogenicity of the liver compatible with  steatosis and/or cirrhosis.  2.  1 cm gallbladder polyp.  3.  No hepatoma demonstrated.        ASSESSMENT/PLAN:  Bradford Prado is a 61 year old female with past medical history of cirrhosis, CERDA, HTN, HLD, and DM2 who is referred to clinic today for C282Y/C282Y hereditary hemochromatosis. She was previously followed by Dr. Long in [place].     1) Homozygous C282Y/C282Y hereditary hemochromatosis. Patient diagnosed just in May 2021. Her last phlebotomy was in August 2021 (ferritin 101). Current ferritin is 179. Decreased to 93 with phlebotomy x1 on 12/15/21.  -No need for phlebotomy 5/12/22 as ferritin <100.  -Repeat CBC, CMP, ferritin level just prior to our visit in 2 months with possible phlebotomy.  -Continue yearly liver ultrasound and AFP (4.7 as of 11/2021).     2) 1 cm galbladder polyp  -Discussed with patient she may require cholecystectomy due to increased risk of malignancy with galbladder polyps measuring >/= 1 cm.   -Patient was evaluated by General Surgery on 12/13/21 and was offered surgery vs follow-up and she has elected for  follow-up. She will repeat imaging in 6 months with surgery.     3) Intermittent nausea  -She has zofran PRN.  -Continue PPI.     4) History of cirrhosis/CERDA  -See plan above.      5) HTN, HLD, DM2. As followed by PCP.     6) Follow up in 2 months with possible phlebotomy. See plan above.        Thank you for referring Bradford Prado to clinic today. Adequate time was dedicated to patient questions and answered to the expressed satisfaction of the patient.         Rochelle Girard DO  Hematology/Oncology  [facility]

## 2022-05-12 ENCOUNTER — LAB (OUTPATIENT)
Dept: INFUSION THERAPY | Facility: CLINIC | Age: 62
End: 2022-05-12
Attending: INTERNAL MEDICINE
Payer: COMMERCIAL

## 2022-05-12 ENCOUNTER — ONCOLOGY VISIT (OUTPATIENT)
Dept: ONCOLOGY | Facility: CLINIC | Age: 62
End: 2022-05-12
Attending: INTERNAL MEDICINE
Payer: COMMERCIAL

## 2022-05-12 VITALS
SYSTOLIC BLOOD PRESSURE: 131 MMHG | RESPIRATION RATE: 16 BRPM | BODY MASS INDEX: 27.23 KG/M2 | OXYGEN SATURATION: 99 % | HEART RATE: 68 BPM | WEIGHT: 134.8 LBS | DIASTOLIC BLOOD PRESSURE: 83 MMHG

## 2022-05-12 DIAGNOSIS — E83.110 HEREDITARY HEMOCHROMATOSIS (H): ICD-10-CM

## 2022-05-12 DIAGNOSIS — E83.110 HEREDITARY HEMOCHROMATOSIS (H): Primary | ICD-10-CM

## 2022-05-12 LAB
BASOPHILS # BLD AUTO: 0 10E3/UL (ref 0–0.2)
BASOPHILS NFR BLD AUTO: 1 %
EOSINOPHIL # BLD AUTO: 0.2 10E3/UL (ref 0–0.7)
EOSINOPHIL NFR BLD AUTO: 3 %
ERYTHROCYTE [DISTWIDTH] IN BLOOD BY AUTOMATED COUNT: 13 % (ref 10–15)
FERRITIN SERPL-MCNC: 71 NG/ML (ref 8–252)
HCT VFR BLD AUTO: 41.5 % (ref 35–47)
HGB BLD-MCNC: 14.1 G/DL (ref 11.7–15.7)
IMM GRANULOCYTES # BLD: 0 10E3/UL
IMM GRANULOCYTES NFR BLD: 1 %
IRON SATN MFR SERPL: 31 % (ref 15–46)
IRON SERPL-MCNC: 96 UG/DL (ref 35–180)
LYMPHOCYTES # BLD AUTO: 1.5 10E3/UL (ref 0.8–5.3)
LYMPHOCYTES NFR BLD AUTO: 22 %
MCH RBC QN AUTO: 32 PG (ref 26.5–33)
MCHC RBC AUTO-ENTMCNC: 34 G/DL (ref 31.5–36.5)
MCV RBC AUTO: 94 FL (ref 78–100)
MONOCYTES # BLD AUTO: 0.6 10E3/UL (ref 0–1.3)
MONOCYTES NFR BLD AUTO: 9 %
NEUTROPHILS # BLD AUTO: 4.4 10E3/UL (ref 1.6–8.3)
NEUTROPHILS NFR BLD AUTO: 64 %
NRBC # BLD AUTO: 0 10E3/UL
NRBC BLD AUTO-RTO: 0 /100
PLATELET # BLD AUTO: 147 10E3/UL (ref 150–450)
RBC # BLD AUTO: 4.41 10E6/UL (ref 3.8–5.2)
TIBC SERPL-MCNC: 310 UG/DL (ref 240–430)
WBC # BLD AUTO: 6.7 10E3/UL (ref 4–11)

## 2022-05-12 PROCEDURE — 99213 OFFICE O/P EST LOW 20 MIN: CPT | Performed by: INTERNAL MEDICINE

## 2022-05-12 PROCEDURE — 36415 COLL VENOUS BLD VENIPUNCTURE: CPT

## 2022-05-12 PROCEDURE — 83550 IRON BINDING TEST: CPT | Performed by: INTERNAL MEDICINE

## 2022-05-12 PROCEDURE — G0463 HOSPITAL OUTPT CLINIC VISIT: HCPCS

## 2022-05-12 PROCEDURE — 82728 ASSAY OF FERRITIN: CPT | Performed by: INTERNAL MEDICINE

## 2022-05-12 PROCEDURE — 85025 COMPLETE CBC W/AUTO DIFF WBC: CPT | Performed by: INTERNAL MEDICINE

## 2022-05-12 ASSESSMENT — PAIN SCALES - GENERAL: PAINLEVEL: SEVERE PAIN (7)

## 2022-05-12 NOTE — PROGRESS NOTES
Medical Assistant Note:  Bradford Prado presents today for blood draw.    Patient seen by provider today: Yes: Dr Girard.   present during visit today: Not Applicable.    Concerns: No Concerns.    Procedure:  Lab draw site: LAC, Needle type: BF, Gauge: 23g.    Post Assessment:  Labs drawn without difficulty: Yes.    Discharge Plan:  Departure Mode: Ambulatory.    Face to Face Time: 5.    Peace Carpenter, CMA

## 2022-05-12 NOTE — PROGRESS NOTES
"Oncology Rooming Note    May 12, 2022 8:20 AM   Bradford Prado is a 61 year old female who presents for:    Chief Complaint   Patient presents with     Oncology Clinic Visit     Initial Vitals: /83   Pulse 68   Resp 16   LMP  (LMP Unknown)   SpO2 99%  Estimated body mass index is 27.15 kg/m  as calculated from the following:    Height as of 2/10/22: 1.499 m (4' 11\").    Weight as of 2/10/22: 61 kg (134 lb 6.4 oz). There is no height or weight on file to calculate BSA.  Data Unavailable Comment: Data Unavailable   No LMP recorded (lmp unknown). Patient has had a hysterectomy.  Allergies reviewed: Yes  Medications reviewed: Yes    Medications: Medication refills not needed today.  Pharmacy name entered into [employer]:    [facility] [place], [place] - [address] [place] [address] PHARMACY [place], [place] - [address] [place] [address] [phone]    Clinical concerns:  doctor was notified.      Umm Shepherd CMA            "

## 2022-05-12 NOTE — LETTER
5/12/2022         RE: Bradford Prado  [address]  [facility] [address]        Dear Colleague,    Thank you for referring your patient, Bradford Prado, to the [facility]. Please see a copy of my visit note below.    [facility]    Hematology/Oncology Established Patient Follow-up Note    Treatment Summary:      Today's Date: 5/11/22    Reason for Follow-up: Hereditary Hemochromatosis  Referring Provider: Nimco Travis NP      HISTORY OF PRESENT ILLNESS: Bradford Prado is a 61 year old female with past medical history of cirrhosis, CERDA, HTN, HLD, and DM2 who is referred to clinic today for C282Y/C282Y hereditary hemochromatosis. She was previously followed by Dr. Long in [place].     Patient has family history of severe liver disease and recalls mother entering acute liver failure with need for repeat thoracenteses. Patient had then developed increasing LFTs and cirrhosis and workup for this included liver biopsy, which revealed cirrhosis and negative alpha 1 antitrypsin mutation testing. Record review shows patient has had evidence of elevated ferritin levels as high as >1500 in 2016. She underwent testing for hereditary hemochromatosis in May 2021 and returned positive as homozygous C282Y/C282Y.      Patient was then treated with phlebotomies twice per week for two months. Her most recent ferritin level was in August 2021 and 101.      Patient does have various hand arthralgias and follows with a hand surgeon for this with intermittent hand Xrays and steroid injections. She is a diabetic. No history of endocrinopathies or heart disease she is aware of.      12/13/21 evaluated by General Surgery. No acute intervention.     Phlebotomy 12/15. Ferritin had decreased from 179 to 93, currently. Energy levels are stable. She still has diffuse arthralgias and takes PRN NSAIDS.    INTERIM HISTORY:  No acute events. Patient continues to monitor diet. Patient enjoyed  [place] and extended her trip through April. Recently, her younger sister was also diagnosed with HH after infection with vibrio after consuming shellfish.      REVIEW OF SYSTEMS:   A 14 point ROS was reviewed with pertinent positives and negatives in the HPI.       HOME MEDICATIONS:  Current Outpatient Medications   Medication Sig Dispense Refill     ACCU-CHEK GUIDE test strip Use to test blood sugar 4 times daily or as directed. 200 strip 11     acetaminophen (TYLENOL) 325 MG tablet Take 325-650 mg by mouth every 6 hours as needed for mild pain        amLODIPine (NORVASC) 5 MG tablet TAKE ONE TABLET BY MOUTH TWICE A  tablet 2     Biotin 10 MG CAPS Take 1 capsule by mouth daily       blood glucose (NO BRAND SPECIFIED) lancets standard Use to test blood sugar twice times daily or as directed. 100 each 1     blood glucose (NO BRAND SPECIFIED) test strip Use to test blood sugar twice times daily or as directed. 100 each 1     blood glucose (NO BRAND SPECIFIED) test strip Use to test blood sugars 2 times daily or as directed 100 strip 11     blood glucose monitoring (SOFTCLIX) lancets Use to test blood sugar 6 times daily. 200 each 11     Blood Glucose Monitoring Suppl (ACCU-CHEK GUIDE ME) w/Device KIT 1 each 6 times daily 1 kit 0     carvedilol (COREG) 12.5 MG tablet TAKE ONE TABLET BY MOUTH TWICE A DAY WITH MEALS 180 tablet 1     Continuous Blood Gluc Sensor (FREESTYLE MEENA 2 SENSOR) MISC 1 patch every 14 days Use to check blood sugars per  guidelines 2 each 11     insulin aspart (NOVOLOG FLEXPEN) 100 UNIT/ML pen INJECT 10 UNITS SUBCUTANEOUS 3 TIMES DAILY (WITH MEALS) 30 mL 1     insulin glargine (LANTUS SOLOSTAR) 100 UNIT/ML pen INJECT 42 UNITS SUBCUTANEOUS EVERY MORNING (BEFORE BREAKFAST) 45 mL 1     Insulin Pen Needle (PEN NEEDLES) 32G X 4 MM MISC 1 each 4 times daily 200 each 11     losartan (COZAAR) 50 MG tablet TAKE ONE TABLET BY MOUTH ONCE DAILY 90 tablet 2     ondansetron (ZOFRAN) 4 MG  tablet Take 1 tablet (4 mg) by mouth every 6 hours as needed for nausea or vomiting 15 tablet 0     pantoprazole (PROTONIX) 40 MG EC tablet Take 1 tablet (40 mg) by mouth daily 30 tablet 3     pravastatin (PRAVACHOL) 20 MG tablet TAKE ONE TABLET BY MOUTH ONCE DAILY 90 tablet 0     sertraline (ZOLOFT) 50 MG tablet TAKE ONE TABLET BY MOUTH ONCE DAILY 90 tablet 2     Turmeric (QC TUMERIC COMPLEX PO)            ALLERGIES:  No Known Allergies      PAST MEDICAL HISTORY:  Past Medical History:   Diagnosis Date     Diabetes mellitus (H)      Dyslipidemia      Hereditary hemochromatosis (H)      Hypertension      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) (H)          PAST SURGICAL HISTORY:  Past Surgical History:   Procedure Laterality Date     ESOPHAGOSCOPY, GASTROSCOPY, DUODENOSCOPY (EGD), COMBINED N/A 11/10/2021    Procedure: ESOPHAGOGASTRODUODENOSCOPY, WITH BIOPSY;  Surgeon: Leventhal, Thomas Michael, MD;  Location: [facility] OR     HYSTERECTOMY TOTAL ABDOMINAL      due to fibroids     LAPAROSCOPIC EVACUATION ECTOPIC PREGNANCY       TUBAL LIGATION           SOCIAL HISTORY:  Social History     Socioeconomic History     Marital status: Single     Spouse name: Not on file     Number of children: Not on file     Years of education: Not on file     Highest education level: Not on file   Occupational History     Not on file   Tobacco Use     Smoking status: Former Smoker     Packs/day: 0.50     Years: 20.00     Pack years: 10.00     Types: Cigarettes     Quit date: 3/9/2010     Years since quittin.1     Smokeless tobacco: Never Used   Vaping Use     Vaping Use: Never used   Substance and Sexual Activity     Alcohol use: No     Alcohol/week: 0.0 standard drinks     Drug use: No     Sexual activity: Never   Other Topics Concern     Parent/sibling w/ CABG, MI or angioplasty before 65F 55M? No   Social History Narrative    , 2 children, 2 grandchildren     Social Determinants of Health      Financial Resource Strain: Not on file   Food Insecurity: Not on file   Transportation Needs: Not on file   Physical Activity: Not on file   Stress: Not on file   Social Connections: Not on file   Intimate Partner Violence: Not on file   Housing Stability: Not on file         FAMILY HISTORY:  Family History   Problem Relation Age of Onset     Cirrhosis Mother         w/o alcohol history     Diabetes Mother      Emphysema Father      Hypertension Sister      Hemochromatosis Sister      Heart Defect Niece         Tetrology of Fallot     Breast Cancer No family hx of      Cancer - colorectal No family hx of          PHYSICAL EXAM:  Vital signs:  /83   Pulse 68   Resp 16   Wt 61.1 kg (134 lb 12.8 oz)   LMP  (LMP Unknown)   SpO2 99%   BMI 27.23 kg/m     ECO-1  GENERAL/CONSTITUTIONAL: No acute distress.  EYES: Pupils are equal, round, and react to light and accommodation. Extraocular movements intact.  No scleral icterus.  ENT/MOUTH: Neck supple. Oropharynx clear, no mucositis.  LYMPH: No anterior cervical, posterior cervical, supraclavicular, axillary or inguinal adenopathy.   RESPIRATORY: Clear to auscultation bilaterally. No crackles or wheezing.   CARDIOVASCULAR: Regular rate and rhythm without murmurs, gallops, or rubs.  GASTROINTESTINAL: No hepatosplenomegaly, masses, or tenderness. The patient has normal bowel sounds. No guarding.  No distention.  MUSCULOSKELETAL: Warm and well-perfused, no cyanosis, clubbing, or edema.  NEUROLOGIC: Cranial nerves II-XII are intact. Alert, oriented, answers questions appropriately.  INTEGUMENTARY: No rashes or jaundice.  GAIT: Steady, does not use assistive device      LABS:  CBC RESULTS:   Recent Labs   Lab Test 22  1421   WBC 9.9   RBC 4.87   HGB 15.1   HCT 46.1   MCV 95   MCH 31.0   MCHC 32.8   RDW 12.7          Recent Labs   Lab Test 22  1421 22  0953    137   POTASSIUM 3.9 4.4   CHLORIDE 109 106   CO2 25 28   ANIONGAP 5 3    * 210*   BUN 10 11   CR 0.68 0.62   ESTELLE 8.7 8.9             Ref. Range 11/18/2021 12:09   Alpha Fetoprotein Latest Ref Range: 0.0 - 8.0 ug/L 4.7        Ref. Range 11/18/2021 12:09   Ferritin Latest Ref Range: 8 - 252 ng/mL 149   Iron Latest Ref Range: 35 - 180 ug/dL 183 (H)   Iron Binding Cap Latest Ref Range: 240 - 430 ug/dL 339   Iron Saturation Index Latest Ref Range: 15 - 46 % 54 (H)              Ref. Range 5/10/2021 10:55   Ferritin Latest Ref Range: 8 - 252 ng/mL 1,126 (H)   Iron Latest Ref Range: 35 - 180 ug/dL 235 (H)   Iron Binding Cap Latest Ref Range: 240 - 430 ug/dL 306   Iron Saturation Index Latest Ref Range: 15 - 46 % 77 (H)              Ref. Range 9/27/2016 11:10 5/10/2021 10:55 6/15/2021 13:04 6/19/2021 11:29 6/23/2021 10:45 6/28/2021 11:05 7/1/2021 11:08 7/5/2021 12:39 7/7/2021 13:08 7/12/2021 14:31 7/19/2021 11:22 7/28/2021 13:53 8/3/2021 14:07 8/10/2021 14:03 8/25/2021 11:21 8/26/2021 10:00   Ferritin Latest Ref Range: 8 - 252 ng/mL 1,569 (H) 1,126 (H) 1,054 (H) 994 (H) 1,003 (H) 698 (H) 688 (H) 605 (H) 513 (H) 521 273 (H) 217 222 172 118 101           Ref. Range 9/27/2016 11:10 6/23/2021 10:45   Hep B Surface Agn Latest Ref Range: NR  Nonreactive     Hepatitis B Surface Antibody Latest Ref Range: <8.00 m[IU]/mL 0.22     Hepatitis C Antibody Latest Ref Range: NR  Nonreactive...     HIV Antigen Antibody Combo Latest Ref Range: NR^Nonreactive       Nonreactive         PATHOLOGY:  5/10/21: TEST(S) REQUESTED:   Hemochromatosis Mutation Analysis by PCR     SPECIMEN DESCRIPTION:   Blood     HEMOCHROMATOSIS RESULTS     HFE Gene C282Y (G845A) RESULTS:     C282Y Mutation Interpretation: HOMOZYGOTE     HFE Gene H63D (C187G) RESULTS:     H63D Mutation Interpretation: NORMAL     HFE Gene S65C (A193T) RESULTS:     S65C Mutation Interpretation: NORMAL      Indication for testing: Carrier screening or diagnostic   testing for alpha-1-antitrypsin (AAT) deficiency.   Negative: This sample has a serum AAT  protein concentration   in the normal range and is negative for the S and Z   deficiency alleles by genotyping. 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                          - Small (< 5 mm) esophageal varices.                          - Gastritis. Biopsied.                          - Normal examined duodenum.      Final Diagnosis   A. STOMACH, BIOPSY:  - Gastric mucosa with features of reactive gastropathy   - No H. pylori organisms identified on routine staining  - Negative for intestinal metaplasia and dysplasia      IMAGING:  ULTRASOUND ABDOMEN LIMITED November 24, 2021   IMPRESSION:  1.  Coarse increased echogenicity of the liver compatible with  steatosis and/or cirrhosis.  2.  1 cm gallbladder polyp.  3.  No hepatoma demonstrated.        ASSESSMENT/PLAN:  Bradford Prado is a 61 year old female with past medical history of cirrhosis, CERDA, HTN, HLD, and DM2 who is referred to clinic today for C282Y/C282Y hereditary hemochromatosis. She was previously followed by Dr. Long in [place].     1) Homozygous C282Y/C282Y hereditary hemochromatosis. Patient diagnosed just in May 2021. Her last phlebotomy was in August 2021 (ferritin 101). Current ferritin is 179. Decreased to 93 with phlebotomy x1 on 12/15/21.  -No need for phlebotomy 5/12/22 as ferritin <100.  -Repeat CBC, CMP, ferritin level just prior to our visit in 2 months with possible phlebotomy.  -Continue yearly liver ultrasound and AFP (4.7 as of 11/2021).     2) 1 cm galbladder  "polyp  -Discussed with patient she may require cholecystectomy due to increased risk of malignancy with galbladder polyps measuring >/= 1 cm.   -Patient was evaluated by General Surgery on 12/13/21 and was offered surgery vs follow-up and she has elected for follow-up. She will repeat imaging in 6 months with surgery.     3) Intermittent nausea  -She has zofran PRN.  -Continue PPI.     4) History of cirrhosis/CERDA  -See plan above.      5) HTN, HLD, DM2. As followed by PCP.     6) Follow up in 2 months with possible phlebotomy. See plan above.        Thank you for referring Bradford Prado to clinic today. Adequate time was dedicated to patient questions and answered to the expressed satisfaction of the patient.         Rochelle Girard DO  Hematology/Oncology  [facility]               Oncology Rooming Note    May 12, 2022 8:20 AM   Bradford Prado is a 61 year old female who presents for:    Chief Complaint   Patient presents with     Oncology Clinic Visit     Initial Vitals: /83   Pulse 68   Resp 16   LMP  (LMP Unknown)   SpO2 99%  Estimated body mass index is 27.15 kg/m  as calculated from the following:    Height as of 2/10/22: 1.499 m (4' 11\").    Weight as of 2/10/22: 61 kg (134 lb 6.4 oz). There is no height or weight on file to calculate BSA.  Data Unavailable Comment: Data Unavailable   No LMP recorded (lmp unknown). Patient has had a hysterectomy.  Allergies reviewed: Yes  Medications reviewed: Yes    Medications: Medication refills not needed today.  Pharmacy name entered into [facility]:    [place], [place] - [address] [place] [address] PHARMACY [place], [place] - [address] [place] [address] [phone]    Clinical concerns:  doctor was notified.      [facility]                Again, thank you for allowing me to participate in the care of your patient.        Sincerely,        Rochelle Girard DO    "

## 2022-05-14 ENCOUNTER — HEALTH MAINTENANCE LETTER (OUTPATIENT)
Age: 62
End: 2022-05-14

## 2022-06-01 ENCOUNTER — TRANSFERRED RECORDS (OUTPATIENT)
Dept: FAMILY MEDICINE | Facility: CLINIC | Age: 62
End: 2022-06-01

## 2022-06-07 DIAGNOSIS — K21.00 GASTROESOPHAGEAL REFLUX DISEASE WITH ESOPHAGITIS WITHOUT HEMORRHAGE: ICD-10-CM

## 2022-06-09 RX ORDER — PANTOPRAZOLE SODIUM 40 MG/1
40 TABLET, DELAYED RELEASE ORAL DAILY
Qty: 30 TABLET | Refills: 3 | Status: SHIPPED | OUTPATIENT
Start: 2022-06-09 | End: 2022-08-22

## 2022-06-09 NOTE — TELEPHONE ENCOUNTER
Routing refill request to provider for review/approval because:  Routing to prescriber     Laureano Marvin RN  [employer] Triage Nurse

## 2022-07-09 ENCOUNTER — HEALTH MAINTENANCE LETTER (OUTPATIENT)
Age: 62
End: 2022-07-09

## 2022-07-12 NOTE — PROGRESS NOTES
[facility]    Hematology/Oncology Established Patient Follow-up Note    Treatment Summary:      Today's Date: 7/13/22    Reason for Follow-up: Hereditary Hemochromatosis  Referring Provider: Nimco Travis NP      HISTORY OF PRESENT ILLNESS: Bradford Prado is a 61 year old female with past medical history of cirrhosis, CERDA, HTN, HLD, and DM2 who is referred to clinic today for C282Y/C282Y hereditary hemochromatosis. She was previously followed by Dr. Long in [place].     Patient has family history of severe liver disease and recalls mother entering acute liver failure with need for repeat thoracenteses. Patient had then developed increasing LFTs and cirrhosis and workup for this included liver biopsy, which revealed cirrhosis and negative alpha 1 antitrypsin mutation testing. Record review shows patient has had evidence of elevated ferritin levels as high as >1500 in 2016. She underwent testing for hereditary hemochromatosis in May 2021 and returned positive as homozygous C282Y/C282Y.      Patient was then treated with phlebotomies twice per week for two months. Her most recent ferritin level was in August 2021 and 101.      Patient does have various hand arthralgias and follows with a hand surgeon for this with intermittent hand Xrays and steroid injections. She is a diabetic. No history of endocrinopathies or heart disease she is aware of.      12/13/21 evaluated by General Surgery. No acute intervention.     Phlebotomy 12/15. Ferritin had decreased from 179 to 93, currently. Energy levels are stable. She still has diffuse arthralgias and takes PRN NSAIDS.       INTERIM HISTORY:  No acute events. Patient continues to monitor diet. She is in need of right wrist surgery (September 21, 2022). Ferritin is 68 today. No weight loss, pain.    Patient has continued nausea. She remains on PPI and PRN zofran. Repeat US is showing stable gallbladder polyp. She has not yet followed up with .        REVIEW OF SYSTEMS:   A 14 point ROS was reviewed with pertinent positives and negatives in the HPI.       HOME MEDICATIONS:  Current Outpatient Medications   Medication Sig Dispense Refill     NOVOLOG FLEXPEN 100 UNIT/ML soln INJECT 10 UNITS SUBCUTANEOUS 3 TIMES DAILY (WITH MEALS) 30 mL 0     ACCU-CHEK GUIDE test strip Use to test blood sugar 4 times daily or as directed. 200 strip 11     acetaminophen (TYLENOL) 325 MG tablet Take 325-650 mg by mouth every 6 hours as needed for mild pain        amLODIPine (NORVASC) 5 MG tablet TAKE ONE TABLET BY MOUTH TWICE A  tablet 2     Biotin 10 MG CAPS Take 1 capsule by mouth daily       blood glucose (NO BRAND SPECIFIED) lancets standard Use to test blood sugar twice times daily or as directed. 100 each 1     blood glucose (NO BRAND SPECIFIED) test strip Use to test blood sugar twice times daily or as directed. 100 each 1     blood glucose (NO BRAND SPECIFIED) test strip Use to test blood sugars 2 times daily or as directed 100 strip 11     blood glucose monitoring (SOFTCLIX) lancets Use to test blood sugar 6 times daily. 200 each 11     Blood Glucose Monitoring Suppl (ACCU-CHEK GUIDE ME) w/Device KIT 1 each 6 times daily 1 kit 0     carvedilol (COREG) 12.5 MG tablet TAKE ONE TABLET BY MOUTH TWICE A DAY WITH MEALS 180 tablet 1     Continuous Blood Gluc Sensor (FREESTYLE MEENA 2 SENSOR) MISC 1 patch every 14 days Use to check blood sugars per  guidelines 2 each 11     insulin glargine (LANTUS SOLOSTAR) 100 UNIT/ML pen INJECT 42 UNITS SUBCUTANEOUS EVERY MORNING (BEFORE BREAKFAST) 45 mL 1     Insulin Pen Needle (PEN NEEDLES) 32G X 4 MM MISC 1 each 4 times daily 200 each 11     losartan (COZAAR) 50 MG tablet TAKE ONE TABLET BY MOUTH ONCE DAILY 90 tablet 2     ondansetron (ZOFRAN) 4 MG tablet Take 1 tablet (4 mg) by mouth every 6 hours as needed for nausea or vomiting 15 tablet 0     pantoprazole (PROTONIX) 40 MG EC tablet Take 1 tablet (40 mg) by mouth  daily 30 tablet 3     pravastatin (PRAVACHOL) 20 MG tablet TAKE ONE TABLET BY MOUTH ONCE DAILY 90 tablet 0     sertraline (ZOLOFT) 50 MG tablet TAKE ONE TABLET BY MOUTH ONCE DAILY 90 tablet 2     Turmeric (QC TUMERIC COMPLEX PO)            ALLERGIES:  No Known Allergies      PAST MEDICAL HISTORY:  Past Medical History:   Diagnosis Date     Diabetes mellitus (H)      Dyslipidemia      Hereditary hemochromatosis (H)      Hypertension      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) (H)          PAST SURGICAL HISTORY:  Past Surgical History:   Procedure Laterality Date     ESOPHAGOSCOPY, GASTROSCOPY, DUODENOSCOPY (EGD), COMBINED N/A 11/10/2021    Procedure: ESOPHAGOGASTRODUODENOSCOPY, WITH BIOPSY;  Surgeon: Leventhal, Thomas Michael, MD;  Location: [facility] OR     HYSTERECTOMY TOTAL ABDOMINAL      due to fibroids     LAPAROSCOPIC EVACUATION ECTOPIC PREGNANCY       TUBAL LIGATION           SOCIAL HISTORY:  Social History     Socioeconomic History     Marital status: Single     Spouse name: Not on file     Number of children: Not on file     Years of education: Not on file     Highest education level: Not on file   Occupational History     Not on file   Tobacco Use     Smoking status: Former Smoker     Packs/day: 0.50     Years: 20.00     Pack years: 10.00     Types: Cigarettes     Quit date: 3/9/2010     Years since quittin.3     Smokeless tobacco: Never Used   Vaping Use     Vaping Use: Never used   Substance and Sexual Activity     Alcohol use: No     Alcohol/week: 0.0 standard drinks     Drug use: No     Sexual activity: Never   Other Topics Concern     Parent/sibling w/ CABG, MI or angioplasty before 65F 55M? No   Social History Narrative    , 2 children, 2 grandchildren     Social Determinants of Health     Financial Resource Strain: Not on file   Food Insecurity: Not on file   Transportation Needs: Not on file   Physical Activity: Not on file   Stress: Not on file   Social  Connections: Not on file   Intimate Partner Violence: Not on file   Housing Stability: Not on file         FAMILY HISTORY:  Family History   Problem Relation Age of Onset     Cirrhosis Mother         w/o alcohol history     Diabetes Mother      Emphysema Father      Hypertension Sister      Hemochromatosis Sister      Heart Defect Niece         Tetrology of Fallot     Breast Cancer No family hx of      Cancer - colorectal No family hx of          PHYSICAL EXAM:  Vital signs:  /71   Pulse 72   Temp 97.4  F (36.3  C) (Tympanic)   Resp 16   Wt 61.2 kg (135 lb)   LMP  (LMP Unknown)   SpO2 97%   BMI 27.27 kg/m     ECO-1  GENERAL/CONSTITUTIONAL: No acute distress.  EYES: Pupils are equal, round, and react to light and accommodation. Extraocular movements intact.  No scleral icterus.  ENT/MOUTH: Neck supple. Oropharynx clear, no mucositis.  LYMPH: No anterior cervical, posterior cervical, supraclavicular, axillary or inguinal adenopathy.   RESPIRATORY: Clear to auscultation bilaterally. No crackles or wheezing.   CARDIOVASCULAR: Regular rate and rhythm without murmurs, gallops, or rubs.  GASTROINTESTINAL: No hepatosplenomegaly, masses, or tenderness. The patient has normal bowel sounds. No guarding.  No distention.  MUSCULOSKELETAL: Warm and well-perfused, no cyanosis, clubbing, or edema.  NEUROLOGIC: Cranial nerves II-XII are intact. Alert, oriented, answers questions appropriately.  INTEGUMENTARY: No rashes or jaundice.  GAIT: Steady, does not use assistive device      LABS:  CBC RESULTS:   Recent Labs   Lab Test 22  0807   WBC 6.7   RBC 4.41   HGB 14.1   HCT 41.5   MCV 94   MCH 32.0   MCHC 34.0   RDW 13.0   *       Recent Labs   Lab Test 22  1421 22  0953    137   POTASSIUM 3.9 4.4   CHLORIDE 109 106   CO2 25 28   ANIONGAP 5 3   * 210*   BUN 10 11   CR 0.68 0.62   ESTELLE 8.7 8.9         Latest Reference Range & Units 21 11:22 21 13:53 21 14:07  08/10/21 14:03 08/25/21 11:21 08/26/21 10:00 11/18/21 12:09 12/15/21 14:31 12/22/21 13:11 01/06/22 09:53 02/09/22 14:21 05/12/22 08:07 07/13/22 13:11   Ferritin 8 - 252 ng/mL 273 (H) 217 222 172 118 101 149 179 97 93 74 71 68         Ref. Range 11/18/2021 12:09   Alpha Fetoprotein Latest Ref Range: 0.0 - 8.0 ug/L 4.7             Ref. Range 9/27/2016 11:10 6/23/2021 10:45   Hep B Surface Agn Latest Ref Range: NR  Nonreactive     Hepatitis B Surface Antibody Latest Ref Range: <8.00 m[IU]/mL 0.22     Hepatitis C Antibody Latest Ref Range: NR  Nonreactive...     HIV Antigen Antibody Combo Latest Ref Range: NR^Nonreactive       Nonreactive         PATHOLOGY:  5/10/21: TEST(S) REQUESTED:   Hemochromatosis Mutation Analysis by PCR     SPECIMEN DESCRIPTION:   Blood     HEMOCHROMATOSIS RESULTS     HFE Gene C282Y (G845A) RESULTS:     C282Y Mutation Interpretation: HOMOZYGOTE     HFE Gene H63D (C187G) RESULTS:     H63D Mutation Interpretation: NORMAL     HFE Gene S65C (A193T) RESULTS:     S65C Mutation Interpretation: NORMAL      Indication for testing: Carrier screening or diagnostic   testing for alpha-1-antitrypsin (AAT) deficiency.   Negative: This sample has a serum AAT protein concentration   in the normal range and is negative for the S and Z   deficiency alleles by genotyping. 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                          - Small (< 5 mm) esophageal varices.                          - Gastritis. Biopsied.                           - Normal examined duodenum.      Final Diagnosis   A. STOMACH, BIOPSY:  - Gastric mucosa with features of reactive gastropathy   - No H. pylori organisms identified on routine staining  - Negative for intestinal metaplasia and dysplasia      IMAGING:  ULTRASOUND ABDOMEN LIMITED November 24, 2021   IMPRESSION:  1.  Coarse increased echogenicity of the liver compatible with  steatosis and/or cirrhosis.  2.  1 cm gallbladder polyp.  3.  No hepatoma demonstrated.    US ABDOMEN LIMITED 5/5/2022  FINDINGS:     GALLBLADDER: Gallbladder polyp measuring 1 cm again noted and  unchanged. No gallstones, wall thickening, or pericholecystic fluid.  Negative sonographic Clark's sign.     BILE DUCTS: There is no biliary dilatation. The common duct measures 4  mm.     LIVER: Unremarkable where seen.                                                                     IMPRESSION:  1.  Stable gallbladder polyp.        ASSESSMENT/PLAN:  Bradford Prado is a 61 year old female with past medical history of cirrhosis, CERDA, HTN, HLD, and DM2 who is referred to clinic today for C282Y/C282Y hereditary hemochromatosis. She was previously followed by Dr. Long in [place].     1) Homozygous C282Y/C282Y hereditary hemochromatosis. Patient diagnosed just in May 2021. Her last phlebotomy was in August 2021 (ferritin 101). Ferritin 179, which decreased to 93 with phlebotomy x1 on 12/15/21.  -No need for phlebotomy 7/13/22 as ferritin <100.  -Repeat CBC, CMP, ferritin level, AFP just prior to our visit in November 2022 with possible phlebotomy.  -Continue yearly liver ultrasound and AFP (4.7 as of 11/2021).     2) 1 cm galbladder polyp  -Discussed with patient she may require cholecystectomy due to increased risk of malignancy with galbladder polyps measuring >/= 1 cm.   -Patient was evaluated by General Surgery on 12/13/21 and was offered surgery vs follow-up and she has elected for follow-up. Repeat US in May 2022 showing  stable GB polyp. Patient is in need of follow up with General Surgery as she continues to decide in regards to resection. I have asked her to contact Dr. Gill's office for follow up.  -I am uncertain if this would be contributing to continued nausea. Her EGD in 11/2021 showed gastritis and she remains on PPI and zofran. See plan below.     3) Intermittent nausea  -She has zofran PRN.  -Continue PPI.  -I have asked patient to follow up with PCP on this for further workup. She may be in need of manometry, etc.      4) History of cirrhosis/CERDA  -See plan above.   -EGD last done in 11/2021. Next due in 2023.      5) HTN, HLD, DM2. As followed by PCP.     6) Follow up in November 2022 with possible phlebotomy. See plan above.        Thank you for referring Bradford Prado to clinic today. Adequate time was dedicated to patient questions and answered to the expressed satisfaction of the patient.         Rochelle Girard,   Hematology/Oncology  [facility]

## 2022-07-13 ENCOUNTER — LAB (OUTPATIENT)
Dept: INFUSION THERAPY | Facility: CLINIC | Age: 62
End: 2022-07-13
Attending: INTERNAL MEDICINE
Payer: COMMERCIAL

## 2022-07-13 ENCOUNTER — ONCOLOGY VISIT (OUTPATIENT)
Dept: ONCOLOGY | Facility: CLINIC | Age: 62
End: 2022-07-13
Attending: INTERNAL MEDICINE
Payer: COMMERCIAL

## 2022-07-13 VITALS
HEART RATE: 72 BPM | WEIGHT: 135 LBS | SYSTOLIC BLOOD PRESSURE: 119 MMHG | DIASTOLIC BLOOD PRESSURE: 71 MMHG | OXYGEN SATURATION: 97 % | RESPIRATION RATE: 16 BRPM | BODY MASS INDEX: 27.27 KG/M2 | TEMPERATURE: 97.4 F

## 2022-07-13 DIAGNOSIS — R11.0 NAUSEA: Primary | ICD-10-CM

## 2022-07-13 DIAGNOSIS — K76.9 LESION OF LIVER GREATER THAN 1 CM IN DIAMETER: ICD-10-CM

## 2022-07-13 DIAGNOSIS — E83.110 HEREDITARY HEMOCHROMATOSIS (H): ICD-10-CM

## 2022-07-13 DIAGNOSIS — E83.110 HEREDITARY HEMOCHROMATOSIS (H): Primary | ICD-10-CM

## 2022-07-13 LAB
ALBUMIN SERPL-MCNC: 4 G/DL (ref 3.4–5)
ALP SERPL-CCNC: 100 U/L (ref 40–150)
ALT SERPL W P-5'-P-CCNC: 31 U/L (ref 0–50)
ANION GAP SERPL CALCULATED.3IONS-SCNC: 4 MMOL/L (ref 3–14)
AST SERPL W P-5'-P-CCNC: 19 U/L (ref 0–45)
BASOPHILS # BLD AUTO: 0 10E3/UL (ref 0–0.2)
BASOPHILS NFR BLD AUTO: 1 %
BILIRUB SERPL-MCNC: 0.5 MG/DL (ref 0.2–1.3)
BUN SERPL-MCNC: 12 MG/DL (ref 7–30)
CALCIUM SERPL-MCNC: 9.2 MG/DL (ref 8.5–10.1)
CHLORIDE BLD-SCNC: 110 MMOL/L (ref 94–109)
CO2 SERPL-SCNC: 25 MMOL/L (ref 20–32)
CREAT SERPL-MCNC: 0.56 MG/DL (ref 0.52–1.04)
EOSINOPHIL # BLD AUTO: 0.2 10E3/UL (ref 0–0.7)
EOSINOPHIL NFR BLD AUTO: 2 %
ERYTHROCYTE [DISTWIDTH] IN BLOOD BY AUTOMATED COUNT: 11.9 % (ref 10–15)
FERRITIN SERPL-MCNC: 68 NG/ML (ref 8–252)
GFR SERPL CREATININE-BSD FRML MDRD: >90 ML/MIN/1.73M2
GLUCOSE BLD-MCNC: 127 MG/DL (ref 70–99)
HCT VFR BLD AUTO: 44 % (ref 35–47)
HGB BLD-MCNC: 15.2 G/DL (ref 11.7–15.7)
IMM GRANULOCYTES # BLD: 0.1 10E3/UL
IMM GRANULOCYTES NFR BLD: 1 %
LYMPHOCYTES # BLD AUTO: 1.9 10E3/UL (ref 0.8–5.3)
LYMPHOCYTES NFR BLD AUTO: 22 %
MCH RBC QN AUTO: 32.5 PG (ref 26.5–33)
MCHC RBC AUTO-ENTMCNC: 34.5 G/DL (ref 31.5–36.5)
MCV RBC AUTO: 94 FL (ref 78–100)
MONOCYTES # BLD AUTO: 0.9 10E3/UL (ref 0–1.3)
MONOCYTES NFR BLD AUTO: 10 %
NEUTROPHILS # BLD AUTO: 5.6 10E3/UL (ref 1.6–8.3)
NEUTROPHILS NFR BLD AUTO: 64 %
NRBC # BLD AUTO: 0 10E3/UL
NRBC BLD AUTO-RTO: 0 /100
PLATELET # BLD AUTO: 155 10E3/UL (ref 150–450)
POTASSIUM BLD-SCNC: 3.9 MMOL/L (ref 3.4–5.3)
PROT SERPL-MCNC: 7.7 G/DL (ref 6.8–8.8)
RBC # BLD AUTO: 4.68 10E6/UL (ref 3.8–5.2)
SODIUM SERPL-SCNC: 139 MMOL/L (ref 133–144)
WBC # BLD AUTO: 8.7 10E3/UL (ref 4–11)

## 2022-07-13 PROCEDURE — 80053 COMPREHEN METABOLIC PANEL: CPT | Performed by: INTERNAL MEDICINE

## 2022-07-13 PROCEDURE — 36415 COLL VENOUS BLD VENIPUNCTURE: CPT

## 2022-07-13 PROCEDURE — 99214 OFFICE O/P EST MOD 30 MIN: CPT | Performed by: INTERNAL MEDICINE

## 2022-07-13 PROCEDURE — 82728 ASSAY OF FERRITIN: CPT | Performed by: INTERNAL MEDICINE

## 2022-07-13 PROCEDURE — 85004 AUTOMATED DIFF WBC COUNT: CPT | Performed by: INTERNAL MEDICINE

## 2022-07-13 PROCEDURE — G0463 HOSPITAL OUTPT CLINIC VISIT: HCPCS

## 2022-07-13 RX ORDER — HEPARIN SODIUM (PORCINE) LOCK FLUSH IV SOLN 100 UNIT/ML 100 UNIT/ML
5 SOLUTION INTRAVENOUS
Status: CANCELLED | OUTPATIENT
Start: 2022-09-11

## 2022-07-13 RX ORDER — ONDANSETRON 4 MG/1
4 TABLET, FILM COATED ORAL EVERY 6 HOURS PRN
Qty: 90 TABLET | Refills: 0 | Status: SHIPPED | OUTPATIENT
Start: 2022-07-13 | End: 2022-08-12

## 2022-07-13 RX ORDER — HEPARIN SODIUM,PORCINE 10 UNIT/ML
5 VIAL (ML) INTRAVENOUS
Status: CANCELLED | OUTPATIENT
Start: 2022-09-11

## 2022-07-13 ASSESSMENT — PAIN SCALES - GENERAL: PAINLEVEL: SEVERE PAIN (7)

## 2022-07-13 NOTE — PATIENT INSTRUCTIONS
-Discontinue phlebotomy in September 2022.  -Repeat labs with follow up in clinic in November 2022 and possible phlebotomy.

## 2022-07-13 NOTE — LETTER
7/13/2022         RE: Bradford Prado  [address]  [facility] [address]        Dear Colleague,    Thank you for referring your patient, Bradford Prado, to the [facility]. Please see a copy of my visit note below.    [facility]    Hematology/Oncology Established Patient Follow-up Note    Treatment Summary:      Today's Date: 7/13/22    Reason for Follow-up: Hereditary Hemochromatosis  Referring Provider: Nimco Travis NP      HISTORY OF PRESENT ILLNESS: Bradford Prado is a 61 year old female with past medical history of cirrhosis, CERDA, HTN, HLD, and DM2 who is referred to clinic today for C282Y/C282Y hereditary hemochromatosis. She was previously followed by Dr. Long in [place].     Patient has family history of severe liver disease and recalls mother entering acute liver failure with need for repeat thoracenteses. Patient had then developed increasing LFTs and cirrhosis and workup for this included liver biopsy, which revealed cirrhosis and negative alpha 1 antitrypsin mutation testing. Record review shows patient has had evidence of elevated ferritin levels as high as >1500 in 2016. She underwent testing for hereditary hemochromatosis in May 2021 and returned positive as homozygous C282Y/C282Y.      Patient was then treated with phlebotomies twice per week for two months. Her most recent ferritin level was in August 2021 and 101.      Patient does have various hand arthralgias and follows with a hand surgeon for this with intermittent hand Xrays and steroid injections. She is a diabetic. No history of endocrinopathies or heart disease she is aware of.      12/13/21 evaluated by General Surgery. No acute intervention.     Phlebotomy 12/15. Ferritin had decreased from 179 to 93, currently. Energy levels are stable. She still has diffuse arthralgias and takes PRN NSAIDS.       INTERIM HISTORY:  No acute events. Patient continues to monitor diet. She is in  need of right wrist surgery (September 21, 2022). Ferritin is 68 today. No weight loss, pain.    Patient has continued nausea. She remains on PPI and PRN zofran. Repeat US is showing stable gallbladder polyp. She has not yet followed up with GS.       REVIEW OF SYSTEMS:   A 14 point ROS was reviewed with pertinent positives and negatives in the HPI.       HOME MEDICATIONS:  Current Outpatient Medications   Medication Sig Dispense Refill     NOVOLOG FLEXPEN 100 UNIT/ML soln INJECT 10 UNITS SUBCUTANEOUS 3 TIMES DAILY (WITH MEALS) 30 mL 0     ACCU-CHEK GUIDE test strip Use to test blood sugar 4 times daily or as directed. 200 strip 11     acetaminophen (TYLENOL) 325 MG tablet Take 325-650 mg by mouth every 6 hours as needed for mild pain        amLODIPine (NORVASC) 5 MG tablet TAKE ONE TABLET BY MOUTH TWICE A  tablet 2     Biotin 10 MG CAPS Take 1 capsule by mouth daily       blood glucose (NO BRAND SPECIFIED) lancets standard Use to test blood sugar twice times daily or as directed. 100 each 1     blood glucose (NO BRAND SPECIFIED) test strip Use to test blood sugar twice times daily or as directed. 100 each 1     blood glucose (NO BRAND SPECIFIED) test strip Use to test blood sugars 2 times daily or as directed 100 strip 11     blood glucose monitoring (SOFTCLIX) lancets Use to test blood sugar 6 times daily. 200 each 11     Blood Glucose Monitoring Suppl (ACCU-CHEK GUIDE ME) w/Device KIT 1 each 6 times daily 1 kit 0     carvedilol (COREG) 12.5 MG tablet TAKE ONE TABLET BY MOUTH TWICE A DAY WITH MEALS 180 tablet 1     Continuous Blood Gluc Sensor (FREESTYLE MEENA 2 SENSOR) MISC 1 patch every 14 days Use to check blood sugars per  guidelines 2 each 11     insulin glargine (LANTUS SOLOSTAR) 100 UNIT/ML pen INJECT 42 UNITS SUBCUTANEOUS EVERY MORNING (BEFORE BREAKFAST) 45 mL 1     Insulin Pen Needle (PEN NEEDLES) 32G X 4 MM MISC 1 each 4 times daily 200 each 11     losartan (COZAAR) 50 MG tablet TAKE  ONE TABLET BY MOUTH ONCE DAILY 90 tablet 2     ondansetron (ZOFRAN) 4 MG tablet Take 1 tablet (4 mg) by mouth every 6 hours as needed for nausea or vomiting 15 tablet 0     pantoprazole (PROTONIX) 40 MG EC tablet Take 1 tablet (40 mg) by mouth daily 30 tablet 3     pravastatin (PRAVACHOL) 20 MG tablet TAKE ONE TABLET BY MOUTH ONCE DAILY 90 tablet 0     sertraline (ZOLOFT) 50 MG tablet TAKE ONE TABLET BY MOUTH ONCE DAILY 90 tablet 2     Turmeric (QC TUMERIC COMPLEX PO)            ALLERGIES:  No Known Allergies      PAST MEDICAL HISTORY:  Past Medical History:   Diagnosis Date     Diabetes mellitus (H)      Dyslipidemia      Hereditary hemochromatosis (H)      Hypertension      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) (H)          PAST SURGICAL HISTORY:  Past Surgical History:   Procedure Laterality Date     ESOPHAGOSCOPY, GASTROSCOPY, DUODENOSCOPY (EGD), COMBINED N/A 11/10/2021    Procedure: ESOPHAGOGASTRODUODENOSCOPY, WITH BIOPSY;  Surgeon: Leventhal, Thomas Michael, MD;  Location: [facility] OR     HYSTERECTOMY TOTAL ABDOMINAL      due to fibroids     LAPAROSCOPIC EVACUATION ECTOPIC PREGNANCY       TUBAL LIGATION           SOCIAL HISTORY:  Social History     Socioeconomic History     Marital status: Single     Spouse name: Not on file     Number of children: Not on file     Years of education: Not on file     Highest education level: Not on file   Occupational History     Not on file   Tobacco Use     Smoking status: Former Smoker     Packs/day: 0.50     Years: 20.00     Pack years: 10.00     Types: Cigarettes     Quit date: 3/9/2010     Years since quittin.3     Smokeless tobacco: Never Used   Vaping Use     Vaping Use: Never used   Substance and Sexual Activity     Alcohol use: No     Alcohol/week: 0.0 standard drinks     Drug use: No     Sexual activity: Never   Other Topics Concern     Parent/sibling w/ CABG, MI or angioplasty before 65F 55M? No   Social History Narrative    ,  2 children, 2 grandchildren     Social Determinants of Health     Financial Resource Strain: Not on file   Food Insecurity: Not on file   Transportation Needs: Not on file   Physical Activity: Not on file   Stress: Not on file   Social Connections: Not on file   Intimate Partner Violence: Not on file   Housing Stability: Not on file         FAMILY HISTORY:  Family History   Problem Relation Age of Onset     Cirrhosis Mother         w/o alcohol history     Diabetes Mother      Emphysema Father      Hypertension Sister      Hemochromatosis Sister      Heart Defect Niece         Tetrology of Fallot     Breast Cancer No family hx of      Cancer - colorectal No family hx of          PHYSICAL EXAM:  Vital signs:  /71   Pulse 72   Temp 97.4  F (36.3  C) (Tympanic)   Resp 16   Wt 61.2 kg (135 lb)   LMP  (LMP Unknown)   SpO2 97%   BMI 27.27 kg/m     ECO-1  GENERAL/CONSTITUTIONAL: No acute distress.  EYES: Pupils are equal, round, and react to light and accommodation. Extraocular movements intact.  No scleral icterus.  ENT/MOUTH: Neck supple. Oropharynx clear, no mucositis.  LYMPH: No anterior cervical, posterior cervical, supraclavicular, axillary or inguinal adenopathy.   RESPIRATORY: Clear to auscultation bilaterally. No crackles or wheezing.   CARDIOVASCULAR: Regular rate and rhythm without murmurs, gallops, or rubs.  GASTROINTESTINAL: No hepatosplenomegaly, masses, or tenderness. The patient has normal bowel sounds. No guarding.  No distention.  MUSCULOSKELETAL: Warm and well-perfused, no cyanosis, clubbing, or edema.  NEUROLOGIC: Cranial nerves II-XII are intact. Alert, oriented, answers questions appropriately.  INTEGUMENTARY: No rashes or jaundice.  GAIT: Steady, does not use assistive device      LABS:  CBC RESULTS:   Recent Labs   Lab Test 22  0807   WBC 6.7   RBC 4.41   HGB 14.1   HCT 41.5   MCV 94   MCH 32.0   MCHC 34.0   RDW 13.0   *       Recent Labs   Lab Test 22  1421  01/06/22  0953    137   POTASSIUM 3.9 4.4   CHLORIDE 109 106   CO2 25 28   ANIONGAP 5 3   * 210*   BUN 10 11   CR 0.68 0.62   ESTELLE 8.7 8.9         Latest Reference Range & Units 07/19/21 11:22 07/28/21 13:53 08/03/21 14:07 08/10/21 14:03 08/25/21 11:21 08/26/21 10:00 11/18/21 12:09 12/15/21 14:31 12/22/21 13:11 01/06/22 09:53 02/09/22 14:21 05/12/22 08:07 07/13/22 13:11   Ferritin 8 - 252 ng/mL 273 (H) 217 222 172 118 101 149 179 97 93 74 71 68         Ref. Range 11/18/2021 12:09   Alpha Fetoprotein Latest Ref Range: 0.0 - 8.0 ug/L 4.7             Ref. Range 9/27/2016 11:10 6/23/2021 10:45   Hep B Surface Agn Latest Ref Range: NR  Nonreactive     Hepatitis B Surface Antibody Latest Ref Range: <8.00 m[IU]/mL 0.22     Hepatitis C Antibody Latest Ref Range: NR  Nonreactive...     HIV Antigen Antibody Combo Latest Ref Range: NR^Nonreactive       Nonreactive         PATHOLOGY:  5/10/21: TEST(S) REQUESTED:   Hemochromatosis Mutation Analysis by PCR     SPECIMEN DESCRIPTION:   Blood     HEMOCHROMATOSIS RESULTS     HFE Gene C282Y (G845A) RESULTS:     C282Y Mutation Interpretation: HOMOZYGOTE     HFE Gene H63D (C187G) RESULTS:     H63D Mutation Interpretation: NORMAL     HFE Gene S65C (A193T) RESULTS:     S65C Mutation Interpretation: NORMAL      Indication for testing: Carrier screening or diagnostic   testing for alpha-1-antitrypsin (AAT) deficiency.   Negative: This sample has a serum AAT protein concentration   in the normal range and is negative for the S and Z   deficiency alleles by genotyping. 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                          - Small (< 5 mm) esophageal varices.                          - Gastritis. Biopsied.                          - Normal examined duodenum.      Final Diagnosis   A. STOMACH, BIOPSY:  - Gastric mucosa with features of reactive gastropathy   - No H. pylori organisms identified on routine staining  - Negative for intestinal metaplasia and dysplasia      IMAGING:  ULTRASOUND ABDOMEN LIMITED November 24, 2021   IMPRESSION:  1.  Coarse increased echogenicity of the liver compatible with  steatosis and/or cirrhosis.  2.  1 cm gallbladder polyp.  3.  No hepatoma demonstrated.    US ABDOMEN LIMITED 5/5/2022  FINDINGS:     GALLBLADDER: Gallbladder polyp measuring 1 cm again noted and  unchanged. No gallstones, wall thickening, or pericholecystic fluid.  Negative sonographic Clark's sign.     BILE DUCTS: There is no biliary dilatation. The common duct measures 4  mm.     LIVER: Unremarkable where seen.                                                                     IMPRESSION:  1.  Stable gallbladder polyp.        ASSESSMENT/PLAN:  Bradford Prado is a 61 year old female with past medical history of cirrhosis, CERDA, HTN, HLD, and DM2 who is referred to clinic today for C282Y/C282Y hereditary hemochromatosis. She was previously followed by Dr. Long in [place].     1) Homozygous C282Y/C282Y hereditary hemochromatosis. Patient diagnosed just in May 2021. Her last phlebotomy was in August 2021 (ferritin 101). Ferritin 179, which decreased to 93 with phlebotomy x1 on 12/15/21.  -No need for phlebotomy 7/13/22 as ferritin <100.  -Repeat CBC, CMP, ferritin level, AFP just prior to our visit in November 2022 with possible phlebotomy.  -Continue yearly liver ultrasound and AFP (4.7 as of 11/2021).     2) 1 cm galbladder polyp  -Discussed with patient she may require cholecystectomy due to increased risk of  malignancy with galbladder polyps measuring >/= 1 cm.   -Patient was evaluated by General Surgery on 12/13/21 and was offered surgery vs follow-up and she has elected for follow-up. Repeat US in May 2022 showing stable GB polyp. Patient is in need of follow up with General Surgery as she continues to decide in regards to resection. I have asked her to contact Dr. Gill's office for follow up.  -I am uncertain if this would be contributing to continued nausea. Her EGD in 11/2021 showed gastritis and she remains on PPI and zofran. See plan below.     3) Intermittent nausea  -She has zofran PRN.  -Continue PPI.  -I have asked patient to follow up with PCP on this for further workup. She may be in need of manometry, etc.      4) History of cirrhosis/CERDA  -See plan above.   -EGD last done in 11/2021. Next due in 2023.      5) HTN, HLD, DM2. As followed by PCP.     6) Follow up in November 2022 with possible phlebotomy. See plan above.        Thank you for referring Bradford Prado to clinic today. Adequate time was dedicated to patient questions and answered to the expressed satisfaction of the patient.         Rochelle Girard DO  Hematology/Oncology  [facility]            Again, thank you for allowing me to participate in the care of your patient.        Sincerely,        Rochelle Girard DO

## 2022-07-13 NOTE — PROGRESS NOTES
Nursing Note:  Bradford Prado presents today for Labs.    Patient seen by provider today: yes, Dr. Girard   present during visit today: Not Applicable.    Note: N/A.    Intravenous Access:  Labs drawn without difficulty.    Discharge Plan:   Patient was sent to [facility] for appointment with Dr. Girard.    Medina Somers RN

## 2022-07-13 NOTE — NURSING NOTE
"Oncology Rooming Note    July 13, 2022 1:41 PM   Bradford Prado is a 61 year old female who presents for:    Chief Complaint   Patient presents with     Oncology Clinic Visit       Hereditary hemochromatosis     Initial Vitals: /71   Pulse 72   Temp 97.4  F (36.3  C) (Tympanic)   Resp 16   Wt 61.2 kg (135 lb)   LMP  (LMP Unknown)   SpO2 97%   BMI 27.27 kg/m   Estimated body mass index is 27.27 kg/m  as calculated from the following:    Height as of 2/10/22: 1.499 m (4' 11\").    Weight as of this encounter: 61.2 kg (135 lb). Body surface area is 1.6 meters squared.  Severe Pain (7) Comment: Data Unavailable   No LMP recorded (lmp unknown). Patient has had a hysterectomy.  Allergies reviewed: Yes  Medications reviewed: Yes    Medications: Medication refills not needed today.  Pharmacy name entered into [facility]:    [facility] [place], [place] - [address] [place] [address] PHARMACY [place], [place] - [phone]    Clinical concerns: f/u       Beryl Molina CMA              "

## 2022-07-29 DIAGNOSIS — E11.65 POORLY CONTROLLED TYPE 2 DIABETES MELLITUS WITH RENAL COMPLICATION (H): Chronic | ICD-10-CM

## 2022-07-29 DIAGNOSIS — E11.29 POORLY CONTROLLED TYPE 2 DIABETES MELLITUS WITH RENAL COMPLICATION (H): Chronic | ICD-10-CM

## 2022-07-29 RX ORDER — PEN NEEDLE, DIABETIC 32GX 5/32"
NEEDLE, DISPOSABLE MISCELLANEOUS
Qty: 200 EACH | Refills: 1 | Status: SHIPPED | OUTPATIENT
Start: 2022-07-29 | End: 2023-12-21

## 2022-07-29 NOTE — TELEPHONE ENCOUNTER
Prescription approved per [facility] Refill Protocol     Linda Farris     RN MSN

## 2022-08-04 ENCOUNTER — TELEPHONE (OUTPATIENT)
Dept: FAMILY MEDICINE | Facility: CLINIC | Age: 62
End: 2022-08-04

## 2022-08-04 NOTE — TELEPHONE ENCOUNTER
Patient Quality Outreach    Patient is due for the following:   Diabetes -  A1C, LDL (Fasting), Eye Exam, and Diabetic Follow-Up Visit  Breast Cancer Screening - Mammogram  Physical Annual Wellness Visit      Topic Date Due    Pneumococcal Vaccine (1 - PCV) Never done    Hepatitis B Vaccine (1 of 3 - Risk 3-dose series) Never done    Zoster (Shingles) Vaccine (1 of 2) Never done    COVID-19 Vaccine (4 - Booster for Pfizer series) 05/20/2022       Next Steps:   Schedule a office visit for diabetes and a yearly physical.    Type of outreach:    Sent I Do Now I Don't message.    Next Steps:  Reach out within 90 days via Letter.    Questions for provider review:    None     NIGEL Parker LPN

## 2022-08-22 ENCOUNTER — OFFICE VISIT (OUTPATIENT)
Dept: FAMILY MEDICINE | Facility: CLINIC | Age: 62
End: 2022-08-22
Payer: COMMERCIAL

## 2022-08-22 VITALS
HEIGHT: 59 IN | BODY MASS INDEX: 26.83 KG/M2 | DIASTOLIC BLOOD PRESSURE: 70 MMHG | HEART RATE: 71 BPM | OXYGEN SATURATION: 96 % | TEMPERATURE: 97.7 F | SYSTOLIC BLOOD PRESSURE: 128 MMHG | WEIGHT: 133.1 LBS | RESPIRATION RATE: 18 BRPM

## 2022-08-22 DIAGNOSIS — E11.29 POORLY CONTROLLED TYPE 2 DIABETES MELLITUS WITH RENAL COMPLICATION (H): Chronic | ICD-10-CM

## 2022-08-22 DIAGNOSIS — Z12.31 VISIT FOR SCREENING MAMMOGRAM: ICD-10-CM

## 2022-08-22 DIAGNOSIS — M18.12 DEGENERATIVE ARTHRITIS OF THUMB, LEFT: ICD-10-CM

## 2022-08-22 DIAGNOSIS — E11.65 TYPE 2 DIABETES MELLITUS WITH HYPERGLYCEMIA, WITHOUT LONG-TERM CURRENT USE OF INSULIN (H): ICD-10-CM

## 2022-08-22 DIAGNOSIS — I10 ESSENTIAL HYPERTENSION WITH GOAL BLOOD PRESSURE LESS THAN 140/90: Chronic | ICD-10-CM

## 2022-08-22 DIAGNOSIS — K82.4 GALLBLADDER POLYP: ICD-10-CM

## 2022-08-22 DIAGNOSIS — Z01.818 PRE-OPERATIVE GENERAL PHYSICAL EXAMINATION: Primary | ICD-10-CM

## 2022-08-22 DIAGNOSIS — K75.81 LIVER CIRRHOSIS SECONDARY TO NONALCOHOLIC STEATOHEPATITIS (NASH) (H): ICD-10-CM

## 2022-08-22 DIAGNOSIS — K21.00 GASTROESOPHAGEAL REFLUX DISEASE WITH ESOPHAGITIS WITHOUT HEMORRHAGE: ICD-10-CM

## 2022-08-22 DIAGNOSIS — E11.65 POORLY CONTROLLED TYPE 2 DIABETES MELLITUS WITH RENAL COMPLICATION (H): Chronic | ICD-10-CM

## 2022-08-22 DIAGNOSIS — K74.60 LIVER CIRRHOSIS SECONDARY TO NONALCOHOLIC STEATOHEPATITIS (NASH) (H): ICD-10-CM

## 2022-08-22 DIAGNOSIS — E83.110 HEREDITARY HEMOCHROMATOSIS (H): ICD-10-CM

## 2022-08-22 DIAGNOSIS — R11.0 NAUSEA: ICD-10-CM

## 2022-08-22 DIAGNOSIS — E78.5 HYPERLIPIDEMIA LDL GOAL <70: Chronic | ICD-10-CM

## 2022-08-22 PROBLEM — R73.9 HYPERGLYCEMIA: Status: RESOLVED | Noted: 2021-02-23 | Resolved: 2022-08-22

## 2022-08-22 PROBLEM — R07.9 CHEST PAIN: Status: RESOLVED | Noted: 2021-02-23 | Resolved: 2022-08-22

## 2022-08-22 PROBLEM — I16.0 HYPERTENSIVE URGENCY: Status: RESOLVED | Noted: 2021-02-23 | Resolved: 2022-08-22

## 2022-08-22 LAB
ANION GAP SERPL CALCULATED.3IONS-SCNC: 6 MMOL/L (ref 3–14)
BUN SERPL-MCNC: 19 MG/DL (ref 7–30)
CALCIUM SERPL-MCNC: 9.2 MG/DL (ref 8.5–10.1)
CHLORIDE BLD-SCNC: 106 MMOL/L (ref 94–109)
CHOLEST SERPL-MCNC: 157 MG/DL
CO2 SERPL-SCNC: 27 MMOL/L (ref 20–32)
CREAT SERPL-MCNC: 0.71 MG/DL (ref 0.52–1.04)
CREAT UR-MCNC: 124 MG/DL
FASTING STATUS PATIENT QL REPORTED: ABNORMAL
GFR SERPL CREATININE-BSD FRML MDRD: >90 ML/MIN/1.73M2
GLUCOSE BLD-MCNC: 74 MG/DL (ref 70–99)
HBA1C MFR BLD: 6 % (ref 0–5.6)
HDLC SERPL-MCNC: 30 MG/DL
LDLC SERPL CALC-MCNC: 99 MG/DL
MICROALBUMIN UR-MCNC: 11 MG/L
MICROALBUMIN/CREAT UR: 8.87 MG/G CR (ref 0–25)
NONHDLC SERPL-MCNC: 127 MG/DL
POTASSIUM BLD-SCNC: 4 MMOL/L (ref 3.4–5.3)
SODIUM SERPL-SCNC: 139 MMOL/L (ref 133–144)
TRIGL SERPL-MCNC: 138 MG/DL

## 2022-08-22 PROCEDURE — 80061 LIPID PANEL: CPT | Performed by: NURSE PRACTITIONER

## 2022-08-22 PROCEDURE — 99214 OFFICE O/P EST MOD 30 MIN: CPT | Performed by: NURSE PRACTITIONER

## 2022-08-22 PROCEDURE — 82043 UR ALBUMIN QUANTITATIVE: CPT | Performed by: NURSE PRACTITIONER

## 2022-08-22 PROCEDURE — 83036 HEMOGLOBIN GLYCOSYLATED A1C: CPT | Performed by: NURSE PRACTITIONER

## 2022-08-22 PROCEDURE — 80048 BASIC METABOLIC PNL TOTAL CA: CPT | Performed by: NURSE PRACTITIONER

## 2022-08-22 PROCEDURE — 36415 COLL VENOUS BLD VENIPUNCTURE: CPT | Performed by: NURSE PRACTITIONER

## 2022-08-22 RX ORDER — PANTOPRAZOLE SODIUM 40 MG/1
40 TABLET, DELAYED RELEASE ORAL DAILY
Qty: 30 TABLET | Refills: 3 | Status: SHIPPED | OUTPATIENT
Start: 2022-08-22 | End: 2023-04-03

## 2022-08-22 RX ORDER — INSULIN GLARGINE 100 [IU]/ML
INJECTION, SOLUTION SUBCUTANEOUS
Qty: 45 ML | Refills: 1 | Status: SHIPPED | OUTPATIENT
Start: 2022-08-22 | End: 2023-04-05

## 2022-08-22 RX ORDER — ONDANSETRON 4 MG/1
4 TABLET, FILM COATED ORAL EVERY 8 HOURS PRN
Qty: 20 TABLET | Refills: 1 | Status: SHIPPED | OUTPATIENT
Start: 2022-08-22 | End: 2024-01-24

## 2022-08-22 RX ORDER — INSULIN ASPART 100 [IU]/ML
INJECTION, SOLUTION INTRAVENOUS; SUBCUTANEOUS
Qty: 30 ML | Refills: 1 | Status: SHIPPED | OUTPATIENT
Start: 2022-08-22 | End: 2023-02-28

## 2022-08-22 RX ORDER — ONDANSETRON 4 MG/1
TABLET, FILM COATED ORAL EVERY 8 HOURS PRN
COMMUNITY
End: 2022-08-22

## 2022-08-22 RX ORDER — PRAVASTATIN SODIUM 20 MG
20 TABLET ORAL DAILY
Qty: 90 TABLET | Refills: 3 | Status: SHIPPED | OUTPATIENT
Start: 2022-08-22 | End: 2023-08-31

## 2022-08-22 ASSESSMENT — PAIN SCALES - GENERAL: PAINLEVEL: MODERATE PAIN (5)

## 2022-08-22 NOTE — PROGRESS NOTES
[facility]  [address] [facility] [address]  Phone: [phone]  Primary Provider: Niall Wilson  Pre-op Performing Provider: NIALL WILSON      Chief Complaint   Patient presents with     Pre-Op Exam     Recheck Medication     Wants to discuss her prevastatin and the new zofran she is taking. Is concerned about liver toxicity.        PREOPERATIVE EVALUATION:  Today's date: 8/22/2022    Bradford Prado is a 61 year old female who presents for a preoperative evaluation.    Surgical Information:  Surgery/Procedure: carpometacarpal arthroscopy left thumb and hand surgery  Surgery Location: [facility]  Surgeon: Dr. Tan  Surgery Date: 9/21/2022  Time of Surgery: TBD  Where patient plans to recover: At home with family  Fax number for surgical facility: [phone]    Type of Anesthesia Anticipated: to be determined    Assessment & Plan     The proposed surgical procedure is considered INTERMEDIATE risk.    Pre-operative general physical examination       Degenerative arthritis of thumb, left       Type 2 diabetes mellitus with hyperglycemia, without long-term current use of insulin (H)  On insulin has CGM.  Follow-up with CDE - hold medications prior to surgery.    - Lipid panel reflex to direct LDL Fasting  - Hemoglobin A1c  - Basic metabolic panel  (Ca, Cl, CO2, Creat, Gluc, K, Na, BUN)  - Albumin Random Urine Quantitative with Creat Ratio  - pravastatin (PRAVACHOL) 20 MG tablet  Dispense: 90 tablet; Refill: 3  - insulin glargine (LANTUS SOLOSTAR) 100 UNIT/ML pen  Dispense: 45 mL; Refill: 1  - ondansetron (ZOFRAN) 4 MG tablet  Dispense: 20 tablet; Refill: 1  - AMB Adult Diabetes Educator Referral    Essential hypertension with goal blood pressure less than 140/90       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) (H)  Follow-up with Hepatology - make appointment.  Reviewed previous US done in May -  stable    Hyperlipidemia LDL goal <70     - Lipid panel reflex to direct LDL Fasting  - pravastatin (PRAVACHOL) 20 MG tablet  Dispense: 90 tablet; Refill: 3    Visit for screening mammogram     - MA SCREENING DIGITAL BILAT - Future  (s+30)    Hereditary hemochromatosis (H)   Following with Hemat/Oncology - stable labs    Poorly controlled type 2 diabetes mellitus with renal complication (H)     - insulin aspart (NOVOLOG FLEXPEN) 100 UNIT/ML pen  Dispense: 30 mL; Refill: 1    Gastroesophageal reflux disease with esophagitis without hemorrhage  Continue PPI  - pantoprazole (PROTONIX) 40 MG EC tablet  Dispense: 30 tablet; Refill: 3    Nausea  Ongoing symptoms - unsure if related to gastroparesis vs other etiology  Follow up with Hepatology to discuss  Continue prn use of Zofran     - ondansetron (ZOFRAN) 4 MG tablet  Dispense: 20 tablet; Refill: 1    Gallbladder polyp  Seen on last US - follow up with Surgeon to discuss next steps           Risks and Recommendations:  The patient has the following additional risks and recommendations for perioperative complications:   - No identified additional risk factors other than previously addressed    Medication Instructions:  Patient is to take all scheduled medications on the day of surgery EXCEPT for modifications listed below:   - aspirin: Discontinue aspirin 7-10 days prior to procedure to reduce bleeding risk. It should be resumed postoperatively.    - Long acting insulin (e.g. glargine, detemir): Take 80% of the usual evening or morning dose before surgery.    - short acting insulin (e.g. regular, lispro, aspart): HOLD on the morning of surgery.    - metformin: HOLD day of surgery.   - ibuprofen (Advil, Motrin): HOLD 1 day before surgery.     RECOMMENDATION:  APPROVAL GIVEN to proceed with proposed procedure, without further diagnostic evaluation.                   Subjective     HPI related to upcoming procedure: hand surgery    Preop Questions 8/22/2022   1. Have you  ever had a heart attack or stroke? No   2. Have you ever had surgery on your heart or blood vessels, such as a stent placement, a coronary artery bypass, or surgery on an artery in your head, neck, heart, or legs? No   3. Do you have chest pain with activity? UNKNOWN -    4. Do you have a history of  heart failure? No   5. Do you currently have a cold, bronchitis or symptoms of other infection? No   6. Do you have a cough, shortness of breath, or wheezing? No   7. Do you or anyone in your family have previous history of blood clots? No   8. Do you or does anyone in your family have a serious bleeding problem such as prolonged bleeding following surgeries or cuts? No   9. Have you ever had problems with anemia or been told to take iron pills? No   10. Have you had any abnormal blood loss such as black, tarry or bloody stools, or abnormal vaginal bleeding? No   11. Have you ever had a blood transfusion? No   12. Are you willing to have a blood transfusion if it is medically needed before, during, or after your surgery? Yes   13. Have you or any of your relatives ever had problems with anesthesia? No   14. Do you have sleep apnea, excessive snoring or daytime drowsiness? No   15. Do you have any artifical heart valves or other implanted medical devices like a pacemaker, defibrillator, or continuous glucose monitor? No   16. Do you have artificial joints? No   17. Are you allergic to latex? No   18. Is there any chance that you may be pregnant? No       Health Care Directive:  Patient does not have a Health Care Directive or Living Will: Discussed advance care planning with patient; however, patient declined at this time.    Preoperative Review of :   reviewed - controlled substances reflected in medication list.       Status of Chronic Conditions:  See problem list for active medical problems.  Problems all longstanding and stable, except as noted/documented.  See ROS for pertinent symptoms related to these  conditions.    DIABETES - Patient has a longstanding history of DiabetesType Type II . Patient is being treated with diet and insulin injections and denies significant side effects. Control has been good. Complicating factors include but are not limited to: hypertension and hyperlipidemia.     HYPERLIPIDEMIA - Patient has a long history of significant Hyperlipidemia requiring medication for treatment with recent good control. Patient reports no problems or side effects with the medication.     HYPERTENSION - Patient has longstanding history of HTN , currently denies any symptoms referable to elevated blood pressure. Specifically denies chest pain, palpitations, dyspnea, orthopnea, PND or peripheral edema. Blood pressure readings have been in normal range. Current medication regimen is as listed below. Patient denies any side effects of medication.       Review of Systems  Constitutional, neuro, ENT, endocrine, pulmonary, cardiac, gastrointestinal, genitourinary, musculoskeletal, integument and psychiatric systems are negative, except as otherwise noted.    Patient Active Problem List    Diagnosis Date Noted     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) (H)      Priority: Medium     Hereditary hemochromatosis (H) 06/10/2021     Priority: Medium     Diabetes mellitus (H)      Priority: Medium     Dyslipidemia      Priority: Medium     Chest pain 02/23/2021     Priority: Medium     Hyperglycemia 02/23/2021     Priority: Medium     Hypertensive urgency 02/23/2021     Priority: Medium     Type 2 diabetes mellitus with hyperglycemia, without long-term current use of insulin (H) 02/01/2017     Priority: Medium     Nephrolithiasis 09/19/2016     Priority: Medium     First episode September 2016       Hyperlipidemia LDL goal <70 08/09/2016     Priority: Medium     Essential hypertension with goal blood pressure less than 140/90 08/09/2016     Priority: Medium     Poorly controlled type 2 diabetes  mellitus with renal complication (H) 09/22/2015     Priority: Medium     Stenosing tenosynovitis 09/06/2012     Priority: Medium     Hand arthritis 08/23/2012     Priority: Medium      Past Medical History:   Diagnosis Date     Diabetes mellitus (H)      Dyslipidemia      Hereditary hemochromatosis (H)      Hypertension      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) (H)      Past Surgical History:   Procedure Laterality Date     ESOPHAGOSCOPY, GASTROSCOPY, DUODENOSCOPY (EGD), COMBINED N/A 11/10/2021    Procedure: ESOPHAGOGASTRODUODENOSCOPY, WITH BIOPSY;  Surgeon: Leventhal, Thomas Michael, MD;  Location: [facility] OR     HYSTERECTOMY TOTAL ABDOMINAL  1995    due to fibroids     LAPAROSCOPIC EVACUATION ECTOPIC PREGNANCY       TUBAL LIGATION       Current Outpatient Medications   Medication Sig Dispense Refill     ACCU-CHEK GUIDE test strip Use to test blood sugar 4 times daily or as directed. 200 strip 11     acetaminophen (TYLENOL) 325 MG tablet Take 325-650 mg by mouth every 6 hours as needed for mild pain        amLODIPine (NORVASC) 5 MG tablet TAKE ONE TABLET BY MOUTH TWICE A  tablet 2     BD NATALIYA U/F 32G X 4 MM insulin pen needle USE 4 TIMES DAILY 200 each 1     Biotin 10 MG CAPS Take 1 capsule by mouth daily       blood glucose (NO BRAND SPECIFIED) lancets standard Use to test blood sugar twice times daily or as directed. 100 each 1     blood glucose (NO BRAND SPECIFIED) test strip Use to test blood sugar twice times daily or as directed. 100 each 1     blood glucose (NO BRAND SPECIFIED) test strip Use to test blood sugars 2 times daily or as directed 100 strip 11     blood glucose monitoring (SOFTCLIX) lancets Use to test blood sugar 6 times daily. 200 each 11     Blood Glucose Monitoring Suppl (ACCU-CHEK GUIDE ME) w/Device KIT 1 each 6 times daily 1 kit 0     carvedilol (COREG) 12.5 MG tablet TAKE ONE TABLET BY MOUTH TWICE A DAY WITH MEALS 180 tablet 1     Continuous Blood Gluc Sensor  "(FREESTYLE MEENA 2 SENSOR) MISC 1 patch every 14 days Use to check blood sugars per  guidelines 2 each 11     insulin glargine (LANTUS SOLOSTAR) 100 UNIT/ML pen INJECT 42 UNITS SUBCUTANEOUS EVERY MORNING (BEFORE BREAKFAST) 45 mL 1     losartan (COZAAR) 50 MG tablet TAKE ONE TABLET BY MOUTH ONCE DAILY 90 tablet 2     NOVOLOG FLEXPEN 100 UNIT/ML soln INJECT 10 UNITS SUBCUTANEOUS 3 TIMES DAILY (WITH MEALS) 30 mL 0     ondansetron (ZOFRAN) 4 MG tablet Take by mouth every 8 hours as needed for nausea       pantoprazole (PROTONIX) 40 MG EC tablet Take 1 tablet (40 mg) by mouth daily 30 tablet 3     sertraline (ZOLOFT) 50 MG tablet TAKE ONE TABLET BY MOUTH ONCE DAILY 90 tablet 2     Turmeric (QC TUMERIC COMPLEX PO)        pravastatin (PRAVACHOL) 20 MG tablet TAKE ONE TABLET BY MOUTH ONCE DAILY (Patient not taking: Reported on 2022) 90 tablet 0       No Known Allergies     Social History     Tobacco Use     Smoking status: Former Smoker     Packs/day: 0.50     Years: 20.00     Pack years: 10.00     Types: Cigarettes     Quit date: 3/9/2010     Years since quittin.4     Smokeless tobacco: Never Used   Substance Use Topics     Alcohol use: No     Alcohol/week: 0.0 standard drinks     Family History   Problem Relation Age of Onset     Cirrhosis Mother         w/o alcohol history     Diabetes Mother      Emphysema Father      Hemochromatosis Brother      ALS Brother      Hemochromatosis Sister      Hemochromatosis Sister      Hypertension Sister      Hemochromatosis Sister      Heart Defect Niece         Tetrology of Fallot     Breast Cancer No family hx of      Cancer - colorectal No family hx of      History   Drug Use No         Objective     /70 (BP Location: Left arm, Patient Position: Sitting, Cuff Size: Adult Regular)   Pulse 71   Temp 97.7  F (36.5  C) (Tympanic)   Resp 18   Ht 1.499 m (4' 11\")   Wt 60.4 kg (133 lb 1.6 oz)   LMP  (LMP Unknown)   SpO2 96%   Breastfeeding No   BMI " 26.88 kg/m      Physical Exam    GENERAL APPEARANCE: healthy, alert and no distress     EYES: EOMI, PERRL     HENT: ear canals and TM's normal and nose and mouth without ulcers or lesions     NECK: no adenopathy, no asymmetry, masses, or scars and thyroid normal to palpation     RESP: lungs clear to auscultation - no rales, rhonchi or wheezes     CV: regular rates and rhythm, normal S1 S2, no S3 or S4 and no murmur, click or rub     ABDOMEN:  soft, nontender, no HSM or masses and bowel sounds normal     MS: extremities normal- no gross deformities noted, no evidence of inflammation in joints, FROM in all extremities.     SKIN: no suspicious lesions or rashes     NEURO: Normal strength and tone, sensory exam grossly normal, mentation intact and speech normal     PSYCH: mentation appears normal. and affect normal/bright     LYMPHATICS: No cervical adenopathy    Recent Labs   Lab Test 07/13/22  1311 05/12/22  0807 02/09/22  1421 01/06/22  0953 11/18/21  1209 10/19/21  1438 06/23/21  1045 06/21/21  0820 06/19/21  1129   HGB 15.2 14.1 15.1 15.1   < >  --    < > 14.3 13.2    147* 159 163   < >  --    < > 166 136*   INR  --   --   --  1.16*  --   --   --  1.13  --      --  139 137   < >  --   --   --   --    POTASSIUM 3.9  --  3.9 4.4   < >  --   --   --   --    CR 0.56  --  0.68 0.62   < >  --   --   --   --    A1C  --   --   --   --   --  6.6*  --   --  6.5*    < > = values in this interval not displayed.        Diagnostics:  Labs pending at this time.  Results will be reviewed when available.   No EKG required, no history of coronary heart disease, significant arrhythmia, peripheral arterial disease or other structural heart disease.    Revised Cardiac Risk Index (RCRI):  The patient has the following serious cardiovascular risks for perioperative complications:   - No serious cardiac risks = 0 points     RCRI Interpretation: 0 points: Class I (very low risk - 0.4% complication rate)           Signed  Electronically by: Paulette Travis, RUSS  Copy of this evaluation report is provided to requesting physician.

## 2022-08-22 NOTE — LETTER
August 24, 2022      Bradford Prado  [address] [address]        Dear ,    We are writing to inform you of your test results.    All of your lab results are normal.     Resulted Orders   Lipid panel reflex to direct LDL Fasting   Result Value Ref Range    Cholesterol 157 <200 mg/dL    Triglycerides 138 <150 mg/dL    Direct Measure HDL 30 (L) >=50 mg/dL    LDL Cholesterol Calculated 99 <=100 mg/dL    Non HDL Cholesterol 127 <130 mg/dL    Patient Fasting > 8hrs? Unknown     Narrative    Cholesterol  Desirable:  <200 mg/dL    Triglycerides  Normal:  Less than 150 mg/dL  Borderline High:  150-199 mg/dL  High:  200-499 mg/dL  Very High:  Greater than or equal to 500 mg/dL    Direct Measure HDL  Female:  Greater than or equal to 50 mg/dL   Male:  Greater than or equal to 40 mg/dL    LDL Cholesterol  Desirable:  <100mg/dL  Above Desirable:  100-129 mg/dL   Borderline High:  130-159 mg/dL   High:  160-189 mg/dL   Very High:  >= 190 mg/dL    Non HDL Cholesterol  Desirable:  130 mg/dL  Above Desirable:  130-159 mg/dL  Borderline High:  160-189 mg/dL  High:  190-219 mg/dL  Very High:  Greater than or equal to 220 mg/dL   Hemoglobin A1c   Result Value Ref Range    Hemoglobin A1C 6.0 (H) 0.0 - 5.6 %      Comment:      Normal <5.7%   Prediabetes 5.7-6.4%    Diabetes 6.5% or higher     Note: Adopted from ADA consensus guidelines.   Basic metabolic panel  (Ca, Cl, CO2, Creat, Gluc, K, Na, BUN)   Result Value Ref Range    Sodium 139 133 - 144 mmol/L    Potassium 4.0 3.4 - 5.3 mmol/L    Chloride 106 94 - 109 mmol/L    Carbon Dioxide (CO2) 27 20 - 32 mmol/L    Anion Gap 6 3 - 14 mmol/L    Urea Nitrogen 19 7 - 30 mg/dL    Creatinine 0.71 0.52 - 1.04 mg/dL    Calcium 9.2 8.5 - 10.1 mg/dL    Glucose 74 70 - 99 mg/dL    GFR Estimate >90 >60 mL/min/1.73m2      Comment:      Effective December 21, 2021 eGFRcr in adults is calculated using the 2021 CKD-EPI creatinine equation which includes age and gender (Luz et  al., NEJ, DOI: 10.1056/EEPEkt5787230)   Albumin Random Urine Quantitative with Creat Ratio   Result Value Ref Range    Creatinine Urine mg/dL 124 mg/dL    Albumin Urine mg/L 11 mg/L    Albumin Urine mg/g Cr 8.87 0.00 - 25.00 mg/g Cr       If you have any questions or concerns, please call the clinic at the number listed above.       Sincerely,      Paulette Travis NP

## 2022-08-22 NOTE — PATIENT INSTRUCTIONS
Discussed ongoing nausea - may be due to variable blood sugar readings.  Follow-up with Diabetic educator.    Continue  monitoring blood sugars.    Can use Ondansetron (Zofran) as needed.  Continue Pantoprazole (Protonix) 40 mg once daily.    ? Gastroparesis causing nausea.  ? Need for GI referral.    MAUREEN Kimbrough

## 2022-08-23 ENCOUNTER — TELEPHONE (OUTPATIENT)
Dept: FAMILY MEDICINE | Facility: CLINIC | Age: 62
End: 2022-08-23

## 2022-08-23 NOTE — LETTER
Bradford Prado  [address] [address]            Paulette Felder has a message about your medications-     - aspirin: Discontinue aspirin 7-10 days prior to procedure to reduce bleeding risk. It should be resumed postoperatively.    - Long acting insulin (Lantus): Take 80% of the usual evening or morning dose before surgery.    - short acting insulin (Novolog): HOLD on the morning of surgery.    - metformin: HOLD day of surgery.   - ibuprofen (Advil, Motrin): HOLD 1 day before surgery.      Paulette Travis, MAUREEN

## 2022-08-23 NOTE — TELEPHONE ENCOUNTER
Diabetes Education Scheduling Outreach #1:    Call to patient to schedule. Left message with phone number to call to schedule.    Also sent [employer] message for second attempt. Requested patient to call to schedule.    Erin Sharma OnCall  Diabetes and Nutrition Scheduling

## 2022-08-23 NOTE — TELEPHONE ENCOUNTER
Recently seen for pre op.  Send patient information on holding insulins prior to surgery.  Can send via Mychart or letter as well.  MAUREEN Kimbrough       - aspirin: Discontinue aspirin 7-10 days prior to procedure to reduce bleeding risk. It should be resumed postoperatively.    - Long acting insulin (Lantus): Take 80% of the usual evening or morning dose before surgery.    - short acting insulin (Novolog): HOLD on the morning of surgery.    - metformin: HOLD day of surgery.   - ibuprofen (Advil, Motrin): HOLD 1 day before surgery.     MAUREEN Kimbrough

## 2022-09-03 ENCOUNTER — HEALTH MAINTENANCE LETTER (OUTPATIENT)
Age: 62
End: 2022-09-03

## 2022-11-01 ENCOUNTER — HOSPITAL ENCOUNTER (OUTPATIENT)
Dept: MAMMOGRAPHY | Facility: CLINIC | Age: 62
Discharge: HOME | End: 2022-11-01
Attending: NURSE PRACTITIONER | Admitting: NURSE PRACTITIONER
Payer: COMMERCIAL

## 2022-11-01 DIAGNOSIS — Z12.31 VISIT FOR SCREENING MAMMOGRAM: ICD-10-CM

## 2022-11-01 PROCEDURE — 77067 SCR MAMMO BI INCL CAD: CPT

## 2022-11-10 ENCOUNTER — HOSPITAL ENCOUNTER (OUTPATIENT)
Dept: MAMMOGRAPHY | Facility: CLINIC | Age: 62
Discharge: HOME | End: 2022-11-10
Attending: NURSE PRACTITIONER
Payer: COMMERCIAL

## 2022-11-10 DIAGNOSIS — R92.8 ABNORMAL MAMMOGRAM: ICD-10-CM

## 2022-11-10 PROCEDURE — 77066 DX MAMMO INCL CAD BI: CPT

## 2022-11-15 ENCOUNTER — LAB (OUTPATIENT)
Dept: ONCOLOGY | Facility: CLINIC | Age: 62
End: 2022-11-15
Attending: INTERNAL MEDICINE
Payer: COMMERCIAL

## 2022-11-15 DIAGNOSIS — I16.0 HYPERTENSIVE URGENCY: ICD-10-CM

## 2022-11-15 DIAGNOSIS — E83.110 HEREDITARY HEMOCHROMATOSIS (H): ICD-10-CM

## 2022-11-15 LAB
AFP SERPL-MCNC: 3.4 NG/ML
ALBUMIN SERPL BCG-MCNC: 4.4 G/DL (ref 3.5–5.2)
ALP SERPL-CCNC: 98 U/L (ref 35–104)
ALT SERPL W P-5'-P-CCNC: 32 U/L (ref 10–35)
ANION GAP SERPL CALCULATED.3IONS-SCNC: 13 MMOL/L (ref 7–15)
AST SERPL W P-5'-P-CCNC: 28 U/L (ref 10–35)
BASOPHILS # BLD AUTO: 0 10E3/UL (ref 0–0.2)
BASOPHILS NFR BLD AUTO: 0 %
BILIRUB SERPL-MCNC: 0.6 MG/DL
BUN SERPL-MCNC: 11.8 MG/DL (ref 8–23)
CALCIUM SERPL-MCNC: 9.5 MG/DL (ref 8.8–10.2)
CHLORIDE SERPL-SCNC: 104 MMOL/L (ref 98–107)
CREAT SERPL-MCNC: 0.63 MG/DL (ref 0.51–0.95)
DEPRECATED HCO3 PLAS-SCNC: 25 MMOL/L (ref 22–29)
EOSINOPHIL # BLD AUTO: 0.1 10E3/UL (ref 0–0.7)
EOSINOPHIL NFR BLD AUTO: 2 %
ERYTHROCYTE [DISTWIDTH] IN BLOOD BY AUTOMATED COUNT: 12.3 % (ref 10–15)
FERRITIN SERPL-MCNC: 244 NG/ML (ref 11–328)
GFR SERPL CREATININE-BSD FRML MDRD: >90 ML/MIN/1.73M2
GLUCOSE SERPL-MCNC: 132 MG/DL (ref 70–99)
HCT VFR BLD AUTO: 45.4 % (ref 35–47)
HGB BLD-MCNC: 15.7 G/DL (ref 11.7–15.7)
IMM GRANULOCYTES # BLD: 0 10E3/UL
IMM GRANULOCYTES NFR BLD: 0 %
LYMPHOCYTES # BLD AUTO: 2.2 10E3/UL (ref 0.8–5.3)
LYMPHOCYTES NFR BLD AUTO: 31 %
MCH RBC QN AUTO: 32.2 PG (ref 26.5–33)
MCHC RBC AUTO-ENTMCNC: 34.6 G/DL (ref 31.5–36.5)
MCV RBC AUTO: 93 FL (ref 78–100)
MONOCYTES # BLD AUTO: 0.6 10E3/UL (ref 0–1.3)
MONOCYTES NFR BLD AUTO: 9 %
NEUTROPHILS # BLD AUTO: 4.1 10E3/UL (ref 1.6–8.3)
NEUTROPHILS NFR BLD AUTO: 58 %
NRBC # BLD AUTO: 0 10E3/UL
NRBC BLD AUTO-RTO: 0 /100
PLATELET # BLD AUTO: 148 10E3/UL (ref 150–450)
POTASSIUM SERPL-SCNC: 4.1 MMOL/L (ref 3.4–5.3)
PROT SERPL-MCNC: 7.4 G/DL (ref 6.4–8.3)
RBC # BLD AUTO: 4.87 10E6/UL (ref 3.8–5.2)
SODIUM SERPL-SCNC: 142 MMOL/L (ref 136–145)
WBC # BLD AUTO: 7.2 10E3/UL (ref 4–11)

## 2022-11-15 PROCEDURE — 36415 COLL VENOUS BLD VENIPUNCTURE: CPT

## 2022-11-15 PROCEDURE — 80053 COMPREHEN METABOLIC PANEL: CPT | Performed by: INTERNAL MEDICINE

## 2022-11-15 PROCEDURE — 82728 ASSAY OF FERRITIN: CPT | Performed by: INTERNAL MEDICINE

## 2022-11-15 PROCEDURE — 85025 COMPLETE CBC W/AUTO DIFF WBC: CPT | Performed by: INTERNAL MEDICINE

## 2022-11-15 PROCEDURE — 82105 ALPHA-FETOPROTEIN SERUM: CPT | Performed by: INTERNAL MEDICINE

## 2022-11-15 NOTE — PROGRESS NOTES
Medical Assistant Note:  Bradford Prado presents today for blood draw.    Patient seen by provider today: No.   present during visit today: Not Applicable.    Concerns: No Concerns.    Procedure:  Lab draw site: LT antecub, Needle type: butterfly, Gauge: 23.    Post Assessment:  Labs drawn without difficulty: Yes.    Discharge Plan:  Departure Mode: Ambulatory.    Face to Face Time: 10 mins.    Sarai Fontana CMA

## 2022-11-16 RX ORDER — CARVEDILOL 12.5 MG/1
TABLET ORAL
Qty: 180 TABLET | Refills: 1 | Status: SHIPPED | OUTPATIENT
Start: 2022-11-16 | End: 2023-06-14

## 2022-11-16 NOTE — PROGRESS NOTES
[facility]    Hematology/Oncology Established Patient Follow-up Note    Treatment Summary:      Today's Date: 11/17/22    Reason for Follow-up: Hereditary Hemochromatosis  Referring Provider: Nimco Travis NP      HISTORY OF PRESENT ILLNESS: Bradford Prado is a 61 year old female with past medical history of cirrhosis, CERDA, HTN, HLD, and DM2 who is referred to clinic today for C282Y/C282Y hereditary hemochromatosis. She was previously followed by Dr. Long in [place].     Patient has family history of severe liver disease and recalls mother entering acute liver failure with need for repeat thoracenteses. Patient had then developed increasing LFTs and cirrhosis and workup for this included liver biopsy, which revealed cirrhosis and negative alpha 1 antitrypsin mutation testing. Record review shows patient has had evidence of elevated ferritin levels as high as >1500 in 2016. She underwent testing for hereditary hemochromatosis in May 2021 and returned positive as homozygous C282Y/C282Y.      Patient was then treated with phlebotomies twice per week for two months. Her most recent ferritin level was in August 2021 and 101.      Patient does have various hand arthralgias and follows with a hand surgeon for this with intermittent hand Xrays and steroid injections. She is a diabetic. No history of endocrinopathies or heart disease she is aware of.      12/13/21 evaluated by General Surgery. No acute intervention.     Phlebotomy 12/15. Ferritin had decreased from 179 to 93, currently. Energy levels are stable. She still has diffuse arthralgias and takes PRN NSAIDS.       INTERIM HISTORY:  Patient states she was to have hand surgery, but she deferred this as has been treated with cortisone shot for trigger finger. She has not yet had follow up with Dr. Gill for GB polyp. She denies weight loss, chest pain, abdominal pain. Ferritin is 244.    REVIEW OF SYSTEMS:   A 14 point ROS was reviewed with  pertinent positives and negatives in the HPI.       HOME MEDICATIONS:  Current Outpatient Medications   Medication Sig Dispense Refill     ACCU-CHEK GUIDE test strip Use to test blood sugar 4 times daily or as directed. 200 strip 11     acetaminophen (TYLENOL) 325 MG tablet Take 325-650 mg by mouth every 6 hours as needed for mild pain        amLODIPine (NORVASC) 5 MG tablet TAKE ONE TABLET BY MOUTH TWICE A  tablet 2     BD NATALIYA U/F 32G X 4 MM insulin pen needle USE 4 TIMES DAILY 200 each 1     Biotin 10 MG CAPS Take 1 capsule by mouth daily       blood glucose (NO BRAND SPECIFIED) lancets standard Use to test blood sugar twice times daily or as directed. 100 each 1     blood glucose (NO BRAND SPECIFIED) test strip Use to test blood sugar twice times daily or as directed. 100 each 1     blood glucose (NO BRAND SPECIFIED) test strip Use to test blood sugars 2 times daily or as directed 100 strip 11     blood glucose monitoring (SOFTCLIX) lancets Use to test blood sugar 6 times daily. 200 each 11     Blood Glucose Monitoring Suppl (ACCU-CHEK GUIDE ME) w/Device KIT 1 each 6 times daily 1 kit 0     carvedilol (COREG) 12.5 MG tablet TAKE ONE TABLET BY MOUTH TWICE A DAY WITH MEALS 180 tablet 1     Continuous Blood Gluc Sensor (FREESTYLE MEENA 2 SENSOR) MISC 1 patch every 14 days Use to check blood sugars per  guidelines 2 each 11     insulin aspart (NOVOLOG FLEXPEN) 100 UNIT/ML pen Take 10 units three times daily with meals. Maximum of 30 units daily. 30 mL 1     insulin glargine (LANTUS SOLOSTAR) 100 UNIT/ML pen INJECT 42 UNITS SUBCUTANEOUS EVERY MORNING (BEFORE BREAKFAST) 45 mL 1     losartan (COZAAR) 50 MG tablet TAKE ONE TABLET BY MOUTH ONCE DAILY 90 tablet 2     ondansetron (ZOFRAN) 4 MG tablet Take 1 tablet (4 mg) by mouth every 8 hours as needed for nausea 20 tablet 1     pantoprazole (PROTONIX) 40 MG EC tablet Take 1 tablet (40 mg) by mouth daily 30 tablet 3     pravastatin (PRAVACHOL) 20 MG  tablet Take 1 tablet (20 mg) by mouth daily 90 tablet 3     sertraline (ZOLOFT) 50 MG tablet TAKE ONE TABLET BY MOUTH ONCE DAILY 90 tablet 0     Turmeric (QC TUMERIC COMPLEX PO)            ALLERGIES:  No Known Allergies      PAST MEDICAL HISTORY:  Past Medical History:   Diagnosis Date     Chest pain 2021     Diabetes mellitus (H)      Dyslipidemia      Hereditary hemochromatosis (H)      Hyperglycemia 2021     Hypertension      Hypertensive urgency 2021     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) (H)      Nephrolithiasis 2016    First episode 2016         PAST SURGICAL HISTORY:  Past Surgical History:   Procedure Laterality Date     ESOPHAGOSCOPY, GASTROSCOPY, DUODENOSCOPY (EGD), COMBINED N/A 11/10/2021    Procedure: ESOPHAGOGASTRODUODENOSCOPY, WITH BIOPSY;  Surgeon: Leventhal, Thomas Michael, MD;  Location: [facility] OR     HYSTERECTOMY TOTAL ABDOMINAL  1995    due to fibroids     LAPAROSCOPIC EVACUATION ECTOPIC PREGNANCY       TUBAL LIGATION           SOCIAL HISTORY:  Social History     Socioeconomic History     Marital status: Single     Spouse name: Not on file     Number of children: Not on file     Years of education: Not on file     Highest education level: Not on file   Occupational History     Not on file   Tobacco Use     Smoking status: Former     Packs/day: 0.50     Years: 20.00     Pack years: 10.00     Types: Cigarettes     Quit date: 3/9/2010     Years since quittin.6     Smokeless tobacco: Never   Vaping Use     Vaping Use: Never used   Substance and Sexual Activity     Alcohol use: No     Alcohol/week: 0.0 standard drinks     Drug use: No     Sexual activity: Never   Other Topics Concern     Parent/sibling w/ CABG, MI or angioplasty before 65F 55M? No   Social History Narrative    , 2 children, 2 grandchildren     Social Determinants of Health     Financial Resource Strain: Not on file   Food Insecurity: Not on file   Transportation  "Needs: Not on file   Physical Activity: Not on file   Stress: Not on file   Social Connections: Not on file   Intimate Partner Violence: Not on file   Housing Stability: Not on file         FAMILY HISTORY:  Family History   Problem Relation Age of Onset     Cirrhosis Mother         w/o alcohol history     Diabetes Mother      Emphysema Father      Hemochromatosis Brother      ALS Brother      Hemochromatosis Sister      Hemochromatosis Sister      Hypertension Sister      Hemochromatosis Sister      Heart Defect Niece         Tetrology of Fallot     Breast Cancer No family hx of      Cancer - colorectal No family hx of          PHYSICAL EXAM:  Vital signs:  /81   Pulse 72   Temp 97.2  F (36.2  C) (Tympanic)   Resp 16   Ht 1.499 m (4' 11\")   Wt 60.4 kg (133 lb 3.2 oz)   LMP  (LMP Unknown)   SpO2 95%   BMI 26.90 kg/m     ECO-1  GENERAL/CONSTITUTIONAL: No acute distress. Bronzed skin.  EYES: Pupils are equal, round, and react to light and accommodation. Extraocular movements intact.  No scleral icterus.  RESPIRATORY: Clear to auscultation bilaterally. No crackles or wheezing.   CARDIOVASCULAR: Regular rate and rhythm without murmurs, gallops, or rubs.  GASTROINTESTINAL: No hepatosplenomegaly, masses, or tenderness. The patient has normal bowel sounds. No guarding.  No distention.  MUSCULOSKELETAL: Warm and well-perfused, no cyanosis, clubbing, or edema.  NEUROLOGIC: Cranial nerves II-XII are intact. Alert, oriented, answers questions appropriately.  INTEGUMENTARY: No rashes or jaundice.  GAIT: Steady, does not use assistive device.      LABS:   Latest Reference Range & Units 11/15/22 14:37   Sodium 136 - 145 mmol/L 142   Potassium 3.4 - 5.3 mmol/L 4.1   Chloride 98 - 107 mmol/L 104   Carbon Dioxide (CO2) 22 - 29 mmol/L 25   Urea Nitrogen 8.0 - 23.0 mg/dL 11.8   Creatinine 0.51 - 0.95 mg/dL 0.63   GFR Estimate >60 mL/min/1.73m2 >90   Calcium 8.8 - 10.2 mg/dL 9.5   Anion Gap 7 - 15 mmol/L 13   Albumin " 3.5 - 5.2 g/dL 4.4   Protein Total 6.4 - 8.3 g/dL 7.4   Alkaline Phosphatase 35 - 104 U/L 98   ALT 10 - 35 U/L 32   AST 10 - 35 U/L 28   AFP tumor marker <=8.3 ng/mL 3.4   Bilirubin Total <=1.2 mg/dL 0.6   Ferritin 11 - 328 ng/mL 244   Glucose 70 - 99 mg/dL 132 (H)   WBC 4.0 - 11.0 10e3/uL 7.2   Hemoglobin 11.7 - 15.7 g/dL 15.7   Hematocrit 35.0 - 47.0 % 45.4   Platelet Count 150 - 450 10e3/uL 148 (L)   RBC Count 3.80 - 5.20 10e6/uL 4.87   MCV 78 - 100 fL 93   MCH 26.5 - 33.0 pg 32.2   MCHC 31.5 - 36.5 g/dL 34.6   RDW 10.0 - 15.0 % 12.3   % Neutrophils % 58   % Lymphocytes % 31   % Monocytes % 9   % Eosinophils % 2   % Basophils % 0   Absolute Basophils 0.0 - 0.2 10e3/uL 0.0   Absolute Eosinophils 0.0 - 0.7 10e3/uL 0.1   Absolute Immature Granulocytes <=0.4 10e3/uL 0.0   Absolute Lymphocytes 0.8 - 5.3 10e3/uL 2.2   Absolute Monocytes 0.0 - 1.3 10e3/uL 0.6   % Immature Granulocytes % 0   Absolute Neutrophils 1.6 - 8.3 10e3/uL 4.1   Absolute NRBCs 10e3/uL 0.0   NRBCs per 100 WBC <1 /100 0      Latest Reference Range & Units 11/15/22 14:37   AFP tumor marker <=8.3 ng/mL 3.4         Ref. Range 11/18/2021 12:09   Alpha Fetoprotein Latest Ref Range: 0.0 - 8.0 ug/L 4.7             Ref. Range 9/27/2016 11:10 6/23/2021 10:45   Hep B Surface Agn Latest Ref Range: NR  Nonreactive     Hepatitis B Surface Antibody Latest Ref Range: <8.00 m[IU]/mL 0.22     Hepatitis C Antibody Latest Ref Range: NR  Nonreactive...     HIV Antigen Antibody Combo Latest Ref Range: NR^Nonreactive       Nonreactive         PATHOLOGY:  5/10/21: TEST(S) REQUESTED:   Hemochromatosis Mutation Analysis by PCR     SPECIMEN DESCRIPTION:   Blood     HEMOCHROMATOSIS RESULTS     HFE Gene C282Y (G845A) RESULTS:     C282Y Mutation Interpretation: HOMOZYGOTE     HFE Gene H63D (C187G) RESULTS:     H63D Mutation Interpretation: NORMAL     HFE Gene S65C (A193T) RESULTS:     S65C Mutation Interpretation: NORMAL      Indication for testing: Carrier screening or  diagnostic   testing for alpha-1-antitrypsin (AAT) deficiency.   Negative: This sample has a serum AAT protein concentration   in the normal range and is negative for the S and Z   deficiency alleles by genotyping. 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                          - Small (< 5 mm) esophageal varices.                          - Gastritis. Biopsied.                          - Normal examined duodenum.      Final Diagnosis   A. STOMACH, BIOPSY:  - Gastric mucosa with features of reactive gastropathy   - No H. pylori organisms identified on routine staining  - Negative for intestinal metaplasia and dysplasia      IMAGING:  ULTRASOUND ABDOMEN LIMITED November 24, 2021   IMPRESSION:  1.  Coarse increased echogenicity of the liver compatible with  steatosis and/or cirrhosis.  2.  1 cm gallbladder polyp.  3.  No hepatoma demonstrated.    US ABDOMEN LIMITED 5/5/2022  FINDINGS:     GALLBLADDER: Gallbladder polyp measuring 1 cm again noted and  unchanged. No gallstones, wall thickening, or pericholecystic fluid.  Negative sonographic Clark's sign.     BILE DUCTS: There is no biliary dilatation. The common duct measures 4  mm.     LIVER: Unremarkable where seen.                                                                     IMPRESSION:  1.  Stable gallbladder polyp.        ASSESSMENT/PLAN:  Bradford Prado is a 61 year old female with past medical history of cirrhosis, CERDA, HTN, HLD, and DM2 who is referred to clinic today for C282Y/C282Y  hereditary hemochromatosis. She was previously followed by Dr. Long in [place].     1) Homozygous C282Y/C282Y hereditary hemochromatosis Patient diagnosed just in May 2021. Her last phlebotomy was in August 2021 (ferritin 101). Ferritin 179, which decreased to 93 with phlebotomy x1 on 12/15/21.  -Ferritin has increased to 244. She has phlebotomy scheduled today for 500 ml removal; will then schedule every 2 weeks x3 months for phlebotomy if ferritin is >100.  -Continue yearly liver ultrasound and AFP (3.4 as of 11/2022; liver US due in May 2023).     2) 1 cm galbladder polyp  -Discussed with patient she may require cholecystectomy due to increased risk of malignancy with galbladder polyps measuring >/= 1 cm.   -Patient was evaluated by General Surgery on 12/13/21 and was offered surgery vs follow-up and she has elected for follow-up. Repeat US in May 2022 showing stable GB polyp. Patient is in need of follow up with General Surgery as she continues to decide in regards to resection. I have asked her to contact Dr. Gill's office for follow up; she states she will due this soon.  -I am uncertain if this would be contributing to continued nausea. Her EGD in 11/2021 showed gastritis and she remains on PPI and zofran. See plan below.     3) Intermittent nausea  -She has zofran PRN.  -Continue PPI.  -I have asked patient to follow up with PCP on this for further workup. She may be in need of manometry, etc.      4) History of cirrhosis/CERDA  -See plan above.   -EGD last done in 11/2021. Next due in 2023.      5) HTN, HLD, DM2  -As followed by PCP.     6) Phleb today and then continue every 2 weeks x3 months if ferritin >100. Follow up in 3 months to review further phlebotomy needs.               Rochelle Girard,   Hematology/Oncology  [facility]

## 2022-11-17 ENCOUNTER — ONCOLOGY VISIT (OUTPATIENT)
Dept: ONCOLOGY | Facility: CLINIC | Age: 62
End: 2022-11-17
Attending: INTERNAL MEDICINE
Payer: COMMERCIAL

## 2022-11-17 ENCOUNTER — INFUSION THERAPY VISIT (OUTPATIENT)
Dept: INFUSION THERAPY | Facility: CLINIC | Age: 62
End: 2022-11-17
Attending: INTERNAL MEDICINE
Payer: COMMERCIAL

## 2022-11-17 VITALS
TEMPERATURE: 98.3 F | RESPIRATION RATE: 16 BRPM | DIASTOLIC BLOOD PRESSURE: 73 MMHG | HEART RATE: 79 BPM | OXYGEN SATURATION: 98 % | SYSTOLIC BLOOD PRESSURE: 108 MMHG

## 2022-11-17 VITALS
TEMPERATURE: 97.2 F | OXYGEN SATURATION: 95 % | RESPIRATION RATE: 16 BRPM | BODY MASS INDEX: 26.85 KG/M2 | DIASTOLIC BLOOD PRESSURE: 81 MMHG | HEART RATE: 72 BPM | HEIGHT: 59 IN | WEIGHT: 133.2 LBS | SYSTOLIC BLOOD PRESSURE: 124 MMHG

## 2022-11-17 DIAGNOSIS — E83.110 HEREDITARY HEMOCHROMATOSIS (H): Primary | ICD-10-CM

## 2022-11-17 PROCEDURE — 99195 PHLEBOTOMY: CPT

## 2022-11-17 PROCEDURE — 99214 OFFICE O/P EST MOD 30 MIN: CPT | Performed by: INTERNAL MEDICINE

## 2022-11-17 PROCEDURE — G0463 HOSPITAL OUTPT CLINIC VISIT: HCPCS | Mod: 25

## 2022-11-17 RX ORDER — HEPARIN SODIUM,PORCINE 10 UNIT/ML
5 VIAL (ML) INTRAVENOUS
Status: CANCELLED | OUTPATIENT
Start: 2022-12-01

## 2022-11-17 RX ORDER — HEPARIN SODIUM (PORCINE) LOCK FLUSH IV SOLN 100 UNIT/ML 100 UNIT/ML
5 SOLUTION INTRAVENOUS
Status: CANCELLED | OUTPATIENT
Start: 2022-12-01

## 2022-11-17 ASSESSMENT — PAIN SCALES - GENERAL: PAINLEVEL: MODERATE PAIN (5)

## 2022-11-17 NOTE — LETTER
11/17/2022         RE: Bradford Prado  [address]  [facility] [address]        Dear Colleague,    Thank you for referring your patient, Bradford Prado, to the [facility]. Please see a copy of my visit note below.    [facility]    Hematology/Oncology Established Patient Follow-up Note    Treatment Summary:      Today's Date: 11/17/22    Reason for Follow-up: Hereditary Hemochromatosis  Referring Provider: Nimco Travis NP      HISTORY OF PRESENT ILLNESS: Bradford Prado is a 61 year old female with past medical history of cirrhosis, CERDA, HTN, HLD, and DM2 who is referred to clinic today for C282Y/C282Y hereditary hemochromatosis. She was previously followed by Dr. Long in [place].     Patient has family history of severe liver disease and recalls mother entering acute liver failure with need for repeat thoracenteses. Patient had then developed increasing LFTs and cirrhosis and workup for this included liver biopsy, which revealed cirrhosis and negative alpha 1 antitrypsin mutation testing. Record review shows patient has had evidence of elevated ferritin levels as high as >1500 in 2016. She underwent testing for hereditary hemochromatosis in May 2021 and returned positive as homozygous C282Y/C282Y.      Patient was then treated with phlebotomies twice per week for two months. Her most recent ferritin level was in August 2021 and 101.      Patient does have various hand arthralgias and follows with a hand surgeon for this with intermittent hand Xrays and steroid injections. She is a diabetic. No history of endocrinopathies or heart disease she is aware of.      12/13/21 evaluated by General Surgery. No acute intervention.     Phlebotomy 12/15. Ferritin had decreased from 179 to 93, currently. Energy levels are stable. She still has diffuse arthralgias and takes PRN NSAIDS.       INTERIM HISTORY:  Patient states she was to have hand surgery, but she  deferred this as has been treated with cortisone shot for trigger finger. She has not yet had follow up with Dr. Gill for GB polyp. She denies weight loss, chest pain, abdominal pain. Ferritin is 244.    REVIEW OF SYSTEMS:   A 14 point ROS was reviewed with pertinent positives and negatives in the HPI.       HOME MEDICATIONS:  Current Outpatient Medications   Medication Sig Dispense Refill     ACCU-CHEK GUIDE test strip Use to test blood sugar 4 times daily or as directed. 200 strip 11     acetaminophen (TYLENOL) 325 MG tablet Take 325-650 mg by mouth every 6 hours as needed for mild pain        amLODIPine (NORVASC) 5 MG tablet TAKE ONE TABLET BY MOUTH TWICE A  tablet 2     BD NATALIYA U/F 32G X 4 MM insulin pen needle USE 4 TIMES DAILY 200 each 1     Biotin 10 MG CAPS Take 1 capsule by mouth daily       blood glucose (NO BRAND SPECIFIED) lancets standard Use to test blood sugar twice times daily or as directed. 100 each 1     blood glucose (NO BRAND SPECIFIED) test strip Use to test blood sugar twice times daily or as directed. 100 each 1     blood glucose (NO BRAND SPECIFIED) test strip Use to test blood sugars 2 times daily or as directed 100 strip 11     blood glucose monitoring (SOFTCLIX) lancets Use to test blood sugar 6 times daily. 200 each 11     Blood Glucose Monitoring Suppl (ACCU-CHEK GUIDE ME) w/Device KIT 1 each 6 times daily 1 kit 0     carvedilol (COREG) 12.5 MG tablet TAKE ONE TABLET BY MOUTH TWICE A DAY WITH MEALS 180 tablet 1     Continuous Blood Gluc Sensor (FREESTYLE MEENA 2 SENSOR) MISC 1 patch every 14 days Use to check blood sugars per  guidelines 2 each 11     insulin aspart (NOVOLOG FLEXPEN) 100 UNIT/ML pen Take 10 units three times daily with meals. Maximum of 30 units daily. 30 mL 1     insulin glargine (LANTUS SOLOSTAR) 100 UNIT/ML pen INJECT 42 UNITS SUBCUTANEOUS EVERY MORNING (BEFORE BREAKFAST) 45 mL 1     losartan (COZAAR) 50 MG tablet TAKE ONE TABLET BY MOUTH ONCE  DAILY 90 tablet 2     ondansetron (ZOFRAN) 4 MG tablet Take 1 tablet (4 mg) by mouth every 8 hours as needed for nausea 20 tablet 1     pantoprazole (PROTONIX) 40 MG EC tablet Take 1 tablet (40 mg) by mouth daily 30 tablet 3     pravastatin (PRAVACHOL) 20 MG tablet Take 1 tablet (20 mg) by mouth daily 90 tablet 3     sertraline (ZOLOFT) 50 MG tablet TAKE ONE TABLET BY MOUTH ONCE DAILY 90 tablet 0     Turmeric (QC TUMERIC COMPLEX PO)            ALLERGIES:  No Known Allergies      PAST MEDICAL HISTORY:  Past Medical History:   Diagnosis Date     Chest pain 2021     Diabetes mellitus (H)      Dyslipidemia      Hereditary hemochromatosis (H)      Hyperglycemia 2021     Hypertension      Hypertensive urgency 2021     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) (H)      Nephrolithiasis 2016    First episode 2016         PAST SURGICAL HISTORY:  Past Surgical History:   Procedure Laterality Date     ESOPHAGOSCOPY, GASTROSCOPY, DUODENOSCOPY (EGD), COMBINED N/A 11/10/2021    Procedure: ESOPHAGOGASTRODUODENOSCOPY, WITH BIOPSY;  Surgeon: Leventhal, Thomas Michael, MD;  Location: [facility] OR     HYSTERECTOMY TOTAL ABDOMINAL      due to fibroids     LAPAROSCOPIC EVACUATION ECTOPIC PREGNANCY       TUBAL LIGATION           SOCIAL HISTORY:  Social History     Socioeconomic History     Marital status: Single     Spouse name: Not on file     Number of children: Not on file     Years of education: Not on file     Highest education level: Not on file   Occupational History     Not on file   Tobacco Use     Smoking status: Former     Packs/day: 0.50     Years: 20.00     Pack years: 10.00     Types: Cigarettes     Quit date: 3/9/2010     Years since quittin.6     Smokeless tobacco: Never   Vaping Use     Vaping Use: Never used   Substance and Sexual Activity     Alcohol use: No     Alcohol/week: 0.0 standard drinks     Drug use: No     Sexual activity: Never   Other Topics Concern  "    Parent/sibling w/ CABG, MI or angioplasty before 65F 55M? No   Social History Narrative    , 2 children, 2 grandchildren     Social Determinants of Health     Financial Resource Strain: Not on file   Food Insecurity: Not on file   Transportation Needs: Not on file   Physical Activity: Not on file   Stress: Not on file   Social Connections: Not on file   Intimate Partner Violence: Not on file   Housing Stability: Not on file         FAMILY HISTORY:  Family History   Problem Relation Age of Onset     Cirrhosis Mother         w/o alcohol history     Diabetes Mother      Emphysema Father      Hemochromatosis Brother      ALS Brother      Hemochromatosis Sister      Hemochromatosis Sister      Hypertension Sister      Hemochromatosis Sister      Heart Defect Niece         Tetrology of Fallot     Breast Cancer No family hx of      Cancer - colorectal No family hx of          PHYSICAL EXAM:  Vital signs:  /81   Pulse 72   Temp 97.2  F (36.2  C) (Tympanic)   Resp 16   Ht 1.499 m (4' 11\")   Wt 60.4 kg (133 lb 3.2 oz)   LMP  (LMP Unknown)   SpO2 95%   BMI 26.90 kg/m     ECO-1  GENERAL/CONSTITUTIONAL: No acute distress. Bronzed skin.  EYES: Pupils are equal, round, and react to light and accommodation. Extraocular movements intact.  No scleral icterus.  RESPIRATORY: Clear to auscultation bilaterally. No crackles or wheezing.   CARDIOVASCULAR: Regular rate and rhythm without murmurs, gallops, or rubs.  GASTROINTESTINAL: No hepatosplenomegaly, masses, or tenderness. The patient has normal bowel sounds. No guarding.  No distention.  MUSCULOSKELETAL: Warm and well-perfused, no cyanosis, clubbing, or edema.  NEUROLOGIC: Cranial nerves II-XII are intact. Alert, oriented, answers questions appropriately.  INTEGUMENTARY: No rashes or jaundice.  GAIT: Steady, does not use assistive device.      LABS:   Latest Reference Range & Units 11/15/22 14:37   Sodium 136 - 145 mmol/L 142   Potassium 3.4 - 5.3 mmol/L " 4.1   Chloride 98 - 107 mmol/L 104   Carbon Dioxide (CO2) 22 - 29 mmol/L 25   Urea Nitrogen 8.0 - 23.0 mg/dL 11.8   Creatinine 0.51 - 0.95 mg/dL 0.63   GFR Estimate >60 mL/min/1.73m2 >90   Calcium 8.8 - 10.2 mg/dL 9.5   Anion Gap 7 - 15 mmol/L 13   Albumin 3.5 - 5.2 g/dL 4.4   Protein Total 6.4 - 8.3 g/dL 7.4   Alkaline Phosphatase 35 - 104 U/L 98   ALT 10 - 35 U/L 32   AST 10 - 35 U/L 28   AFP tumor marker <=8.3 ng/mL 3.4   Bilirubin Total <=1.2 mg/dL 0.6   Ferritin 11 - 328 ng/mL 244   Glucose 70 - 99 mg/dL 132 (H)   WBC 4.0 - 11.0 10e3/uL 7.2   Hemoglobin 11.7 - 15.7 g/dL 15.7   Hematocrit 35.0 - 47.0 % 45.4   Platelet Count 150 - 450 10e3/uL 148 (L)   RBC Count 3.80 - 5.20 10e6/uL 4.87   MCV 78 - 100 fL 93   MCH 26.5 - 33.0 pg 32.2   MCHC 31.5 - 36.5 g/dL 34.6   RDW 10.0 - 15.0 % 12.3   % Neutrophils % 58   % Lymphocytes % 31   % Monocytes % 9   % Eosinophils % 2   % Basophils % 0   Absolute Basophils 0.0 - 0.2 10e3/uL 0.0   Absolute Eosinophils 0.0 - 0.7 10e3/uL 0.1   Absolute Immature Granulocytes <=0.4 10e3/uL 0.0   Absolute Lymphocytes 0.8 - 5.3 10e3/uL 2.2   Absolute Monocytes 0.0 - 1.3 10e3/uL 0.6   % Immature Granulocytes % 0   Absolute Neutrophils 1.6 - 8.3 10e3/uL 4.1   Absolute NRBCs 10e3/uL 0.0   NRBCs per 100 WBC <1 /100 0      Latest Reference Range & Units 11/15/22 14:37   AFP tumor marker <=8.3 ng/mL 3.4         Ref. Range 11/18/2021 12:09   Alpha Fetoprotein Latest Ref Range: 0.0 - 8.0 ug/L 4.7             Ref. Range 9/27/2016 11:10 6/23/2021 10:45   Hep B Surface Agn Latest Ref Range: NR  Nonreactive     Hepatitis B Surface Antibody Latest Ref Range: <8.00 m[IU]/mL 0.22     Hepatitis C Antibody Latest Ref Range: NR  Nonreactive...     HIV Antigen Antibody Combo Latest Ref Range: NR^Nonreactive       Nonreactive         PATHOLOGY:  5/10/21: TEST(S) REQUESTED:   Hemochromatosis Mutation Analysis by PCR     SPECIMEN DESCRIPTION:   Blood     HEMOCHROMATOSIS RESULTS     HFE Gene C282Y (G845A)  RESULTS:     C282Y Mutation Interpretation: HOMOZYGOTE     HFE Gene H63D (C187G) RESULTS:     H63D Mutation Interpretation: NORMAL     HFE Gene S65C (A193T) RESULTS:     S65C Mutation Interpretation: NORMAL      Indication for testing: Carrier screening or diagnostic   testing for alpha-1-antitrypsin (AAT) deficiency.   Negative: This sample has a serum AAT protein concentration   in the normal range and is negative for the S and Z   deficiency alleles by genotyping. 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                          - Small (< 5 mm) esophageal varices.                          - Gastritis. Biopsied.                          - Normal examined duodenum.      Final Diagnosis   A. STOMACH, BIOPSY:  - Gastric mucosa with features of reactive gastropathy   - No H. pylori organisms identified on routine staining  - Negative for intestinal metaplasia and dysplasia      IMAGING:  ULTRASOUND ABDOMEN LIMITED November 24, 2021   IMPRESSION:  1.  Coarse increased echogenicity of the liver compatible with  steatosis and/or cirrhosis.  2.  1 cm gallbladder polyp.  3.  No hepatoma demonstrated.    US ABDOMEN LIMITED 5/5/2022  FINDINGS:     GALLBLADDER: Gallbladder polyp measuring 1 cm again noted and  unchanged. No gallstones, wall thickening, or pericholecystic fluid.  Negative sonographic Clark's sign.     BILE DUCTS: There is no biliary dilatation. The common duct measures 4  mm.     LIVER: Unremarkable where seen.                                                                      IMPRESSION:  1.  Stable gallbladder polyp.        ASSESSMENT/PLAN:  Bradford Prado is a 61 year old female with past medical history of cirrhosis, CERDA, HTN, HLD, and DM2 who is referred to clinic today for C282Y/C282Y hereditary hemochromatosis. She was previously followed by Dr. Long in [place].     1) Homozygous C282Y/C282Y hereditary hemochromatosis Patient diagnosed just in May 2021. Her last phlebotomy was in August 2021 (ferritin 101). Ferritin 179, which decreased to 93 with phlebotomy x1 on 12/15/21.  -Ferritin has increased to 244. She has phlebotomy scheduled today for 500 ml removal; will then schedule every 2 weeks x3 months for phlebotomy if ferritin is >100.  -Continue yearly liver ultrasound and AFP (3.4 as of 11/2022; liver US due in May 2023).     2) 1 cm galbladder polyp  -Discussed with patient she may require cholecystectomy due to increased risk of malignancy with galbladder polyps measuring >/= 1 cm.   -Patient was evaluated by General Surgery on 12/13/21 and was offered surgery vs follow-up and she has elected for follow-up. Repeat US in May 2022 showing stable GB polyp. Patient is in need of follow up with General Surgery as she continues to decide in regards to resection. I have asked her to contact Dr. Gill's office for follow up; she states she will due this soon.  -I am uncertain if this would be contributing to continued nausea. Her EGD in 11/2021 showed gastritis and she remains on PPI and zofran. See plan below.     3) Intermittent nausea  -She has zofran PRN.  -Continue PPI.  -I have asked patient to follow up with PCP on this for further workup. She may be in need of manometry, etc.      4) History of cirrhosis/CERDA  -See plan above.   -EGD last done in 11/2021. Next due in 2023.      5) HTN, HLD, DM2  -As followed by PCP.     6) Phleb today and then continue every 2 weeks x3 months if ferritin >100. Follow up in 3 months to review further  phlebotomy needs.               Rochelle Girard DO  Hematology/Oncology  [facility]            Again, thank you for allowing me to participate in the care of your patient.        Sincerely,        Rochelle Girard DO

## 2022-11-17 NOTE — PROGRESS NOTES
Infusion Nursing Note:  Bradford Prado presents today for Phlebotomy.    Patient seen by provider today: Yes: Dr. Rochelle Girard   present during visit today: Not Applicable.    Note: Phlebotomy completed over 11 minutes, with 500 ml removed per order.  Patient denies dizziness.  Patient monitored for 30 minutes post procedure.  Vital signs remained stable and WNL.    Intravenous Access:  Phlebotomy completed via 17g phlebotomy needle to left AC.    Treatment Conditions:  Lab Results   Component Value Date    HGB 15.7 11/15/2022    WBC 7.2 11/15/2022    ANEU 4.5 07/07/2021    ANEUTAUTO 4.1 11/15/2022     (L) 11/15/2022      Ferritin 244 (on 11/15/2022)    Results reviewed, labs MET treatment parameters, ok to proceed with treatment.    Post Infusion Assessment:  Patient tolerated phlebotomy without incident.  Access discontinued per protocol.     Discharge Plan:   Discharge instructions reviewed with: Patient.  Patient and/or family verbalized understanding of discharge instructions and all questions answered.  AVS to patient via [facility].  Patient instructed to stop at  to schedule next appointment, due in 2 weeks.  Patient discharged in stable condition accompanied by: self.  Departure Mode: Ambulatory.      Jose Eduardo Urbina RN

## 2022-11-17 NOTE — NURSING NOTE
"Oncology Rooming Note    November 17, 2022 11:47 AM   Bradford Prado is a 62 year old female who presents for:    Chief Complaint   Patient presents with     Oncology Clinic Visit     Hereditary hemochromatosis     Initial Vitals: /81   Pulse 72   Temp 97.2  F (36.2  C) (Tympanic)   Resp 16   Ht 1.499 m (4' 11\")   Wt 60.4 kg (133 lb 3.2 oz)   LMP  (LMP Unknown)   SpO2 95%   BMI 26.90 kg/m   Estimated body mass index is 26.9 kg/m  as calculated from the following:    Height as of this encounter: 1.499 m (4' 11\").    Weight as of this encounter: 60.4 kg (133 lb 3.2 oz). Body surface area is 1.59 meters squared.  Moderate Pain (5) Comment: Data Unavailable   No LMP recorded (lmp unknown). Patient has had a hysterectomy.  Allergies reviewed: Yes  Medications reviewed: Yes    Medications: Medication refills not needed today.  Pharmacy name entered into [facility]:    [facility] [place], [place] - [address] [place] [address] PHARMACY [place], [place] - [address] [place] [address] [phone]      Fany Keys CMA              "

## 2022-11-18 DIAGNOSIS — E83.110 HEREDITARY HEMOCHROMATOSIS (H): Primary | ICD-10-CM

## 2022-11-23 DIAGNOSIS — E83.110 HEREDITARY HEMOCHROMATOSIS (H): Primary | ICD-10-CM

## 2022-11-23 DIAGNOSIS — E11.65 TYPE 2 DIABETES MELLITUS WITH HYPERGLYCEMIA, WITHOUT LONG-TERM CURRENT USE OF INSULIN (H): ICD-10-CM

## 2022-11-26 NOTE — PATIENT INSTRUCTIONS
Bradford is scheduled for labs and possible phlebotomy every two weeks. She is also scheduled for a follow up with Dr. Girard on 02/09/23.    Carlee Anderson RN on 11/26/2022 at 4:03 PM

## 2022-11-28 ENCOUNTER — LAB (OUTPATIENT)
Dept: ONCOLOGY | Facility: CLINIC | Age: 62
End: 2022-11-28
Attending: INTERNAL MEDICINE
Payer: COMMERCIAL

## 2022-11-28 DIAGNOSIS — E83.110 HEREDITARY HEMOCHROMATOSIS (H): ICD-10-CM

## 2022-11-28 PROCEDURE — 36415 COLL VENOUS BLD VENIPUNCTURE: CPT

## 2022-11-28 PROCEDURE — 82728 ASSAY OF FERRITIN: CPT | Performed by: INTERNAL MEDICINE

## 2022-11-28 NOTE — PROGRESS NOTES
Medical Assistant Note:  Bradford Prado presents today for labs.    Patient seen by provider today: No.   present during visit today: Not Applicable.    Concerns: No Concerns.    Procedure:  Labs drawn: .    Post Assessment:  Labs drawn without difficulty: Yes.    Discharge Plan:  Departure Mode: Ambulatory.    Face to Face Time: 10 minutes .    Paulette Carrasquillo, CMA

## 2022-11-29 LAB — FERRITIN SERPL-MCNC: 88 NG/ML (ref 11–328)

## 2022-12-13 ENCOUNTER — LAB (OUTPATIENT)
Dept: ONCOLOGY | Facility: CLINIC | Age: 62
End: 2022-12-13
Attending: INTERNAL MEDICINE
Payer: COMMERCIAL

## 2022-12-13 DIAGNOSIS — E83.110 HEREDITARY HEMOCHROMATOSIS (H): ICD-10-CM

## 2022-12-13 LAB — FERRITIN SERPL-MCNC: 63 NG/ML (ref 11–328)

## 2022-12-13 PROCEDURE — 82728 ASSAY OF FERRITIN: CPT | Performed by: INTERNAL MEDICINE

## 2022-12-13 PROCEDURE — 36415 COLL VENOUS BLD VENIPUNCTURE: CPT

## 2023-01-10 ENCOUNTER — LAB (OUTPATIENT)
Dept: ONCOLOGY | Facility: CLINIC | Age: 63
End: 2023-01-10
Attending: INTERNAL MEDICINE
Payer: COMMERCIAL

## 2023-01-10 DIAGNOSIS — E83.110 HEREDITARY HEMOCHROMATOSIS (H): ICD-10-CM

## 2023-01-10 LAB — FERRITIN SERPL-MCNC: 60 NG/ML (ref 11–328)

## 2023-01-10 PROCEDURE — 82728 ASSAY OF FERRITIN: CPT | Performed by: INTERNAL MEDICINE

## 2023-01-10 PROCEDURE — 36415 COLL VENOUS BLD VENIPUNCTURE: CPT

## 2023-01-10 NOTE — PROGRESS NOTES
Medical Assistant Note:  Bradford Prado presents today for Labs.    Patient seen by provider today: No.   present during visit today: Not Applicable.    Concerns: No Concerns.    Procedure:  Lab draw site: Left lower forearm, Needle type: butterfly, Gauge: 23.    Post Assessment:  Labs drawn without difficulty: Yes.    Discharge Plan:  Departure Mode: Ambulatory.    Face to Face Time: 10 minutes .    Paulette Carrasquillo, CMA

## 2023-01-11 ENCOUNTER — PATIENT OUTREACH (OUTPATIENT)
Dept: ONCOLOGY | Facility: CLINIC | Age: 63
End: 2023-01-11

## 2023-01-11 NOTE — PROGRESS NOTES
Treatment Conditions Every visit  Every visit       Parameters: Proceed with phlebotomy if ferritin is greater than 100.   Volume to be removed: 500 mL.    Last released: Thu 11/17/2022         Latest Reference Range & Units 01/10/23 12:15   Ferritin 11 - 328 ng/mL 60     Based off of Bradford's Yanira level of 60, she does not qualify for Phlebotomy. Writer contacted Bradford. She is happy to hear she doesn't need this appointment.     Carlee Anderson RN on 1/11/2023 at 9:54 AM

## 2023-01-24 ENCOUNTER — LAB (OUTPATIENT)
Dept: ONCOLOGY | Facility: CLINIC | Age: 63
End: 2023-01-24
Attending: INTERNAL MEDICINE
Payer: COMMERCIAL

## 2023-01-24 DIAGNOSIS — E83.110 HEREDITARY HEMOCHROMATOSIS (H): ICD-10-CM

## 2023-01-24 LAB — FERRITIN SERPL-MCNC: 57 NG/ML (ref 11–328)

## 2023-01-24 PROCEDURE — 36415 COLL VENOUS BLD VENIPUNCTURE: CPT

## 2023-01-24 PROCEDURE — 82728 ASSAY OF FERRITIN: CPT | Performed by: INTERNAL MEDICINE

## 2023-01-24 NOTE — PROGRESS NOTES
Medical Assistant Note:  Bradford Prado presents today for blood draw.    Patient seen by provider today: No.   present during visit today: Not Applicable.    Concerns: No Concerns.    Procedure:  Lab draw site: left antecub, Needle type: butterfly, Gauge: 23.    Post Assessment:  Labs drawn without difficulty: Yes.    Discharge Plan:  Departure Mode: Ambulatory.    Face to Face Time: 10.    Beryl Molina, CMA

## 2023-01-26 ENCOUNTER — OFFICE VISIT (OUTPATIENT)
Dept: SURGERY | Facility: CLINIC | Age: 63
End: 2023-01-26
Payer: COMMERCIAL

## 2023-01-26 ENCOUNTER — TELEPHONE (OUTPATIENT)
Dept: SURGERY | Facility: CLINIC | Age: 63
End: 2023-01-26

## 2023-01-26 VITALS
SYSTOLIC BLOOD PRESSURE: 110 MMHG | HEIGHT: 59 IN | HEART RATE: 84 BPM | RESPIRATION RATE: 16 BRPM | WEIGHT: 134 LBS | DIASTOLIC BLOOD PRESSURE: 72 MMHG | OXYGEN SATURATION: 96 % | BODY MASS INDEX: 27.01 KG/M2

## 2023-01-26 DIAGNOSIS — K82.4 GALLBLADDER POLYP: Primary | ICD-10-CM

## 2023-01-26 PROCEDURE — 99213 OFFICE O/P EST LOW 20 MIN: CPT | Performed by: SURGERY

## 2023-01-26 NOTE — PROGRESS NOTES
Patient is a very pleasant 62-year-old female previously seen for consultation regarding gallbladder polyp.  This has been followed with serial ultrasounds without significant increase in size.  It remains around 1 cm in size.  Since the time of last being seen she states that she has been experiencing more intermittent right upper quadrant pain.  This comes on somewhat out of the blue but has on occasion also woke her up from sleep at night.  She has had some nausea associate with this.  No vomiting.  No other changes in bowel habits.    Chart was again reviewed.  Most recent ultrasound was in May of last year.    We discussed her management options.  We discussed the possibility of continuing observation with ongoing serial monitoring with ultrasound.  Given her symptoms she may be also experiencing some degree of symptomatic gallbladder disease relative to this and I think it would be reasonable to proceed with cholecystectomy.  She also is no longer interested in watchful waiting and would prefer to have this removed due to concern about possible malignancy in the future.  We will therefore proceed with cholecystectomy at her convenience.  Total time spent was 25 minutes with greater than 50% in face-to-face consultation.

## 2023-01-26 NOTE — TELEPHONE ENCOUNTER
Orders received for Lap genoveva with Dr. Eber Gill.       Left message for patient to call me at her convenience to schedule surgery.     Samantha MORALES    Surgery Coordinator  [facility]  [facility]  [phone]

## 2023-01-27 ENCOUNTER — TELEPHONE (OUTPATIENT)
Dept: SURGERY | Facility: CLINIC | Age: 63
End: 2023-01-27
Payer: COMMERCIAL

## 2023-01-27 NOTE — TELEPHONE ENCOUNTER
Type of surgery: Lap genoveva  Location of surgery: [facility]  Date and time of surgery: 2/8/23 at 12pm  Surgeon: Dr. Eber Gill  Pre-Op Appt Date: patient to schedule  Post-Op Appt Date: patient to schedule   Packet sent out: Yes  Pre-cert/Authorization completed:  Not Applicable  Date: 1/27/23

## 2023-02-07 ENCOUNTER — LAB (OUTPATIENT)
Dept: ONCOLOGY | Facility: CLINIC | Age: 63
End: 2023-02-07
Attending: INTERNAL MEDICINE
Payer: COMMERCIAL

## 2023-02-07 DIAGNOSIS — E83.110 HEREDITARY HEMOCHROMATOSIS (H): ICD-10-CM

## 2023-02-07 LAB — FERRITIN SERPL-MCNC: 61 NG/ML (ref 11–328)

## 2023-02-07 PROCEDURE — 36415 COLL VENOUS BLD VENIPUNCTURE: CPT

## 2023-02-07 PROCEDURE — 82728 ASSAY OF FERRITIN: CPT | Performed by: INTERNAL MEDICINE

## 2023-02-07 NOTE — PROGRESS NOTES
Medical Assistant Note:  Bradford Prado presents today for labs .    Patient seen by provider today: No.   present during visit today: Not Applicable.    Concerns: No Concerns.    Procedure:  Lab draw site: left antecub, Needle type: butterfly, Gauge: 23.    Post Assessment:  Labs drawn without difficulty: Yes.    Discharge Plan:  Departure Mode: Ambulatory.    Face to Face Time: 10 minutes.    Paulette Carrasquillo, CMA

## 2023-02-10 ENCOUNTER — OFFICE VISIT (OUTPATIENT)
Dept: FAMILY MEDICINE | Facility: CLINIC | Age: 63
End: 2023-02-10
Payer: COMMERCIAL

## 2023-02-10 VITALS
BODY MASS INDEX: 28.48 KG/M2 | WEIGHT: 141 LBS | HEART RATE: 74 BPM | OXYGEN SATURATION: 97 % | DIASTOLIC BLOOD PRESSURE: 68 MMHG | SYSTOLIC BLOOD PRESSURE: 124 MMHG | TEMPERATURE: 97.2 F

## 2023-02-10 DIAGNOSIS — K82.4 POLYP OF GALLBLADDER: ICD-10-CM

## 2023-02-10 DIAGNOSIS — G89.29 CHRONIC THUMB PAIN, LEFT: ICD-10-CM

## 2023-02-10 DIAGNOSIS — E83.110 HEREDITARY HEMOCHROMATOSIS (H): ICD-10-CM

## 2023-02-10 DIAGNOSIS — M79.645 CHRONIC THUMB PAIN, LEFT: ICD-10-CM

## 2023-02-10 DIAGNOSIS — E11.65 TYPE 2 DIABETES MELLITUS WITH HYPERGLYCEMIA, WITHOUT LONG-TERM CURRENT USE OF INSULIN (H): ICD-10-CM

## 2023-02-10 DIAGNOSIS — Z01.818 PRE-OPERATIVE GENERAL PHYSICAL EXAMINATION: Primary | ICD-10-CM

## 2023-02-10 DIAGNOSIS — L60.0 INFECTION, NAIL, INGROWING: ICD-10-CM

## 2023-02-10 LAB
ANION GAP SERPL CALCULATED.3IONS-SCNC: 13 MMOL/L (ref 7–15)
BUN SERPL-MCNC: 13.6 MG/DL (ref 8–23)
CALCIUM SERPL-MCNC: 9.9 MG/DL (ref 8.8–10.2)
CHLORIDE SERPL-SCNC: 105 MMOL/L (ref 98–107)
CREAT SERPL-MCNC: 0.69 MG/DL (ref 0.51–0.95)
DEPRECATED HCO3 PLAS-SCNC: 24 MMOL/L (ref 22–29)
ERYTHROCYTE [DISTWIDTH] IN BLOOD BY AUTOMATED COUNT: 11.7 % (ref 10–15)
GFR SERPL CREATININE-BSD FRML MDRD: >90 ML/MIN/1.73M2
GLUCOSE SERPL-MCNC: 136 MG/DL (ref 70–99)
HBA1C MFR BLD: 6.1 % (ref 0–5.6)
HCT VFR BLD AUTO: 45.9 % (ref 35–47)
HGB BLD-MCNC: 15.5 G/DL (ref 11.7–15.7)
MCH RBC QN AUTO: 31.8 PG (ref 26.5–33)
MCHC RBC AUTO-ENTMCNC: 33.8 G/DL (ref 31.5–36.5)
MCV RBC AUTO: 94 FL (ref 78–100)
PLATELET # BLD AUTO: 162 10E3/UL (ref 150–450)
POTASSIUM SERPL-SCNC: 4.2 MMOL/L (ref 3.4–5.3)
RBC # BLD AUTO: 4.88 10E6/UL (ref 3.8–5.2)
SODIUM SERPL-SCNC: 142 MMOL/L (ref 136–145)
WBC # BLD AUTO: 9.3 10E3/UL (ref 4–11)

## 2023-02-10 PROCEDURE — 80048 BASIC METABOLIC PNL TOTAL CA: CPT | Performed by: NURSE PRACTITIONER

## 2023-02-10 PROCEDURE — 99214 OFFICE O/P EST MOD 30 MIN: CPT | Performed by: NURSE PRACTITIONER

## 2023-02-10 PROCEDURE — 83036 HEMOGLOBIN GLYCOSYLATED A1C: CPT | Performed by: NURSE PRACTITIONER

## 2023-02-10 PROCEDURE — 36415 COLL VENOUS BLD VENIPUNCTURE: CPT | Performed by: NURSE PRACTITIONER

## 2023-02-10 PROCEDURE — 85027 COMPLETE CBC AUTOMATED: CPT | Performed by: NURSE PRACTITIONER

## 2023-02-10 RX ORDER — MUPIROCIN 20 MG/G
OINTMENT TOPICAL 3 TIMES DAILY
Qty: 1 G | Refills: 0 | Status: SHIPPED | OUTPATIENT
Start: 2023-02-10 | End: 2024-01-30

## 2023-02-10 ASSESSMENT — PAIN SCALES - GENERAL: PAINLEVEL: NO PAIN (0)

## 2023-02-10 NOTE — H&P (VIEW-ONLY)
[facility]  [address] [address]  Phone: [phone]  Primary Provider: Niall Wilson  Pre-op Performing Provider: NIALL WILSON    PREOPERATIVE EVALUATION:  Today's date: 2/10/2023    Bradford Prado is a 62 year old female who presents for a preoperative evaluation.    Surgical Information:  Surgery/Procedure: Left wrist-unsure of the name-treating arthritis   Surgery Location: [facility]  Surgeon: Dr. Tan  Surgery Date: 02/27/2023  Time of Surgery: TBD  Where patient plans to recover: At home with family  Fax number for surgical facility: [phone]    Type of Anesthesia Anticipated: General    Surgical Information:  Surgery/Procedure: Laparoscopic cholecystectomy  Surgery Location: [facility]  Surgeon: Eber Gill MD  Surgery Date: 02/13/2023  Time of Surgery: 10:55am  Where patient plans to recover: At home with family  Fax number for surgical facility: Note does not need to be faxed, will be available electronically in Epic.    Type of Anesthesia Anticipated: General    Assessment & Plan     The proposed surgical procedure is considered INTERMEDIATE risk.    Pre-operative general physical examination       Type 2 diabetes mellitus with hyperglycemia, without long-term current use of insulin (H)  See AVS for insulin adjustment. Has CGM - monitor for hypoglycemia while NPO    Chronic thumb pain, left       Polyp of gallbladder            Risks and Recommendations:  The patient has the following additional risks and recommendations for perioperative complications:  Diabetes:  - Patient is on insulin therapy; diabetic NPO guidelines provided and discussed.    Medication Instructions:  Patient is to take all scheduled medications on the day of surgery   - Long acting insulin (e.g. glargine, detemir): Take 80% of the usual evening or morning dose before surgery.           - short acting insulin (e.g. regular,  lispro, aspart): HOLD on the morning of surgery.     RECOMMENDATION:  APPROVAL GIVEN to proceed with proposed procedure, without further diagnostic evaluation.         Subjective     HPI related to upcoming procedure: Gallbladder - recently over the past month report dull ache RUQ and some occasional extreme nausea with vomiting.  US done 05/2022 that showed 1 cm gallbladder polyp.  No gallstones or wall thickening seen.    Ongoing left thumb and 2nd MC pain and trigger finger symptoms, and contracture. Was getting steroid injection into thumb for years. Wearing brace without improvement in pain. Taking Aleve on occasion for pain.  Seeing [facility] for this and recommended surgery.      Preop Questions 2/10/2023   1. Have you ever had a heart attack or stroke? No   2. Have you ever had surgery on your heart or blood vessels, such as a stent placement, a coronary artery bypass, or surgery on an artery in your head, neck, heart, or legs? No   3. Do you have chest pain with activity? No   4. Do you have a history of  heart failure? No   5. Do you currently have a cold, bronchitis or symptoms of other infection? No   6. Do you have a cough, shortness of breath, or wheezing? No   7. Do you or anyone in your family have previous history of blood clots? No   8. Do you or does anyone in your family have a serious bleeding problem such as prolonged bleeding following surgeries or cuts? No   9. Have you ever had problems with anemia or been told to take iron pills? No   10. Have you had any abnormal blood loss such as black, tarry or bloody stools, or abnormal vaginal bleeding? No   11. Have you ever had a blood transfusion? No   12. Are you willing to have a blood transfusion if it is medically needed before, during, or after your surgery? Yes   13. Have you or any of your relatives ever had problems with anesthesia? No   14. Do you have sleep apnea, excessive snoring or daytime drowsiness? No   15. Do you have any artifical heart  valves or other implanted medical devices like a pacemaker, defibrillator, or continuous glucose monitor? YES - CGM   15a. What type of device do you have? glucose moniter   15b. Name of the clinic that manages your device:  [facility]   16. Do you have artificial joints? No   17. Are you allergic to latex? No   18. Is there any chance that you may be pregnant? No       Health Care Directive:  Patient does not have a Health Care Directive or Living Will: Discussed advance care planning with patient; information given to patient to review.    Preoperative Review of :   reviewed - no record of controlled substances prescribed.       Status of Chronic Conditions:  See problem list for active medical problems.  Problems all longstanding and stable, except as noted/documented.  See ROS for pertinent symptoms related to these conditions.    DIABETES - Patient has a longstanding history of DiabetesType Type II . Patient is being treated with diet, exercise and insulin injections and denies significant side effects. Control has been good. Complicating factors include but are not limited to: hypertension and hyperlipidemia.     HYPERLIPIDEMIA - Patient has a long history of significant Hyperlipidemia requiring medication for treatment with recent good control. Patient reports no problems or side effects with the medication.     HYPERTENSION - Patient has longstanding history of HTN , currently denies any symptoms referable to elevated blood pressure. Specifically denies chest pain, palpitations, dyspnea, orthopnea, PND or peripheral edema. Blood pressure readings have been in normal range. Current medication regimen is as listed below. Patient denies any side effects of medication.       Review of Systems  Constitutional, neuro, ENT, endocrine, pulmonary, cardiac, gastrointestinal, genitourinary, musculoskeletal, integument and psychiatric systems are negative, except as otherwise noted.    Patient Active Problem List     Diagnosis Date Noted     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) (H)      Priority: Medium     Hereditary hemochromatosis (H) 06/10/2021     Priority: Medium     Dyslipidemia      Priority: Medium     Type 2 diabetes mellitus with hyperglycemia, without long-term current use of insulin (H) 02/01/2017     Priority: Medium     Hyperlipidemia LDL goal <70 08/09/2016     Priority: Medium     Essential hypertension with goal blood pressure less than 140/90 08/09/2016     Priority: Medium     Poorly controlled type 2 diabetes mellitus with renal complication (H) 09/22/2015     Priority: Medium     Stenosing tenosynovitis 09/06/2012     Priority: Medium     Hand arthritis 08/23/2012     Priority: Medium      Past Medical History:   Diagnosis Date     Chest pain 2/23/2021     Diabetes mellitus (H)      Dyslipidemia      Hereditary hemochromatosis (H)      Hyperglycemia 2/23/2021     Hypertension      Hypertensive urgency 2/23/2021     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) (H)      Nephrolithiasis 9/19/2016    First episode September 2016     Past Surgical History:   Procedure Laterality Date     ESOPHAGOSCOPY, GASTROSCOPY, DUODENOSCOPY (EGD), COMBINED N/A 11/10/2021    Procedure: ESOPHAGOGASTRODUODENOSCOPY, WITH BIOPSY;  Surgeon: Leventhal, Thomas Michael, MD;  Location: [facility] OR     HYSTERECTOMY TOTAL ABDOMINAL  1995    due to fibroids     LAPAROSCOPIC EVACUATION ECTOPIC PREGNANCY       TUBAL LIGATION       Current Outpatient Medications   Medication Sig Dispense Refill     amLODIPine (NORVASC) 5 MG tablet TAKE ONE TABLET BY MOUTH TWICE A  tablet 2     BD NATALIYA U/F 32G X 4 MM insulin pen needle USE 4 TIMES DAILY 200 each 1     carvedilol (COREG) 12.5 MG tablet TAKE ONE TABLET BY MOUTH TWICE A DAY WITH MEALS 180 tablet 1     Continuous Blood Gluc Sensor (FREESTYLE MEENA 2 SENSOR) MISC CHANGE EVERY 14 DAYS. USE TO CHECK BLOOD SUGARS PER  GUIDELINES 6 each 2      insulin aspart (NOVOLOG FLEXPEN) 100 UNIT/ML pen Take 10 units three times daily with meals. Maximum of 30 units daily. 30 mL 1     insulin glargine (LANTUS SOLOSTAR) 100 UNIT/ML pen INJECT 42 UNITS SUBCUTANEOUS EVERY MORNING (BEFORE BREAKFAST) 45 mL 1     losartan (COZAAR) 50 MG tablet TAKE ONE TABLET BY MOUTH ONCE DAILY 90 tablet 0     pantoprazole (PROTONIX) 40 MG EC tablet Take 1 tablet (40 mg) by mouth daily 30 tablet 3     pravastatin (PRAVACHOL) 20 MG tablet Take 1 tablet (20 mg) by mouth daily 90 tablet 3     sertraline (ZOLOFT) 50 MG tablet TAKE ONE TABLET BY MOUTH ONCE DAILY 90 tablet 0     ACCU-CHEK GUIDE test strip Use to test blood sugar 4 times daily or as directed. (Patient not taking: Reported on 2/10/2023) 200 strip 11     acetaminophen (TYLENOL) 325 MG tablet Take 325-650 mg by mouth every 6 hours as needed for mild pain  (Patient not taking: Reported on 2/10/2023)       Biotin 10 MG CAPS Take 1 capsule by mouth daily (Patient not taking: Reported on 2/10/2023)       blood glucose (NO BRAND SPECIFIED) lancets standard Use to test blood sugar twice times daily or as directed. (Patient not taking: Reported on 2/10/2023) 100 each 1     blood glucose (NO BRAND SPECIFIED) test strip Use to test blood sugar twice times daily or as directed. (Patient not taking: Reported on 2/10/2023) 100 each 1     blood glucose (NO BRAND SPECIFIED) test strip Use to test blood sugars 2 times daily or as directed (Patient not taking: Reported on 2/10/2023) 100 strip 11     blood glucose monitoring (SOFTCLIX) lancets Use to test blood sugar 6 times daily. (Patient not taking: Reported on 2/10/2023) 200 each 11     Blood Glucose Monitoring Suppl (ACCU-CHEK GUIDE ME) w/Device KIT 1 each 6 times daily (Patient not taking: Reported on 2/10/2023) 1 kit 0     ondansetron (ZOFRAN) 4 MG tablet Take 1 tablet (4 mg) by mouth every 8 hours as needed for nausea (Patient not taking: Reported on 2/10/2023) 20 tablet 1     Turmeric  (QC TUMERIC COMPLEX PO)  (Patient not taking: Reported on 2/10/2023)         No Known Allergies     Social History     Tobacco Use     Smoking status: Former     Packs/day: 0.50     Years: 20.00     Pack years: 10.00     Types: Cigarettes     Quit date: 3/9/2010     Years since quittin.9     Smokeless tobacco: Never   Substance Use Topics     Alcohol use: No     Alcohol/week: 0.0 standard drinks     Family History   Problem Relation Age of Onset     Cirrhosis Mother         w/o alcohol history     Diabetes Mother      Emphysema Father      Hemochromatosis Brother      ALS Brother      Hemochromatosis Sister      Hemochromatosis Sister      Hypertension Sister      Hemochromatosis Sister      Heart Defect Niece         Tetrology of Fallot     Breast Cancer No family hx of      Cancer - colorectal No family hx of      History   Drug Use No         Objective     /68   Pulse 74   Temp 97.2  F (36.2  C) (Tympanic)   Wt 64 kg (141 lb)   LMP  (LMP Unknown)   SpO2 97%   BMI 28.48 kg/m      Physical Exam    GENERAL APPEARANCE: healthy, alert and no distress     EYES: EOMI, PERRL     HENT: ear canals and TM's normal and nose and mouth without ulcers or lesions     NECK: no adenopathy, no asymmetry, masses, or scars and thyroid normal to palpation     RESP: lungs clear to auscultation - no rales, rhonchi or wheezes     CV: regular rates and rhythm, normal S1 S2, no S3 or S4 and no murmur, click or rub     ABDOMEN:  soft, nontender, no HSM or masses and bowel sounds normal     MS: extremities normal- no gross deformities noted, left hand in brace - thumb.     SKIN: no suspicious lesions or rashes     NEURO: Normal strength and tone, sensory exam grossly normal, mentation intact and speech normal     PSYCH: mentation appears normal. and affect normal/bright     LYMPHATICS: No cervical adenopathy    Recent Labs   Lab Test 11/15/22  1437 22  1352 22  1311 22  1421 22  0953 21  1209  10/19/21  1438 06/23/21  1045 06/21/21  0820   HGB 15.7  --  15.2   < > 15.1   < >  --    < > 14.3   *  --  155   < > 163   < >  --    < > 166   INR  --   --   --   --  1.16*  --   --   --  1.13    139 139   < > 137   < >  --   --   --    POTASSIUM 4.1 4.0 3.9   < > 4.4   < >  --   --   --    CR 0.63 0.71 0.56   < > 0.62   < >  --   --   --    A1C  --  6.0*  --   --   --   --  6.6*  --   --     < > = values in this interval not displayed.      Results for orders placed or performed in visit on 02/10/23   Hemoglobin A1c     Status: Abnormal   Result Value Ref Range    Hemoglobin A1C 6.1 (H) 0.0 - 5.6 %   CBC with platelets     Status: Normal   Result Value Ref Range    WBC Count 9.3 4.0 - 11.0 10e3/uL    RBC Count 4.88 3.80 - 5.20 10e6/uL    Hemoglobin 15.5 11.7 - 15.7 g/dL    Hematocrit 45.9 35.0 - 47.0 %    MCV 94 78 - 100 fL    MCH 31.8 26.5 - 33.0 pg    MCHC 33.8 31.5 - 36.5 g/dL    RDW 11.7 10.0 - 15.0 %    Platelet Count 162 150 - 450 10e3/uL         Diagnostics:  Labs pending at this time.  Results will be reviewed when available.   No EKG required, no history of coronary heart disease, significant arrhythmia, peripheral arterial disease or other structural heart disease.    Revised Cardiac Risk Index (RCRI):  The patient has the following serious cardiovascular risks for perioperative complications:   - Diabetes Mellitus (on Insulin) = 1 point     RCRI Interpretation: 1 point: Class II (low risk - 0.9% complication rate)           Signed Electronically by: Paulette Travis NP  Copy of this evaluation report is provided to requesting physician.

## 2023-02-10 NOTE — PROGRESS NOTES
[facility]  [address] [address]  Phone: [phone]  Primary Provider: Niall Wilson  Pre-op Performing Provider: NIALL WILSON    PREOPERATIVE EVALUATION:  Today's date: 2/10/2023    Bradford Prado is a 62 year old female who presents for a preoperative evaluation.    Surgical Information:  Surgery/Procedure: Left wrist-unsure of the name-treating arthritis   Surgery Location: [facility]  Surgeon: Dr. Tan  Surgery Date: 02/27/2023  Time of Surgery: TBD  Where patient plans to recover: At home with family  Fax number for surgical facility: [phone]    Type of Anesthesia Anticipated: General    Surgical Information:  Surgery/Procedure: Laparoscopic cholecystectomy  Surgery Location: [facility]  Surgeon: Eber Gill MD  Surgery Date: 02/13/2023  Time of Surgery: 10:55am  Where patient plans to recover: At home with family  Fax number for surgical facility: Note does not need to be faxed, will be available electronically in Epic.    Type of Anesthesia Anticipated: General    Assessment & Plan     The proposed surgical procedure is considered INTERMEDIATE risk.    Pre-operative general physical examination       Type 2 diabetes mellitus with hyperglycemia, without long-term current use of insulin (H)  See AVS for insulin adjustment. Has CGM - monitor for hypoglycemia while NPO    Chronic thumb pain, left       Polyp of gallbladder            Risks and Recommendations:  The patient has the following additional risks and recommendations for perioperative complications:  Diabetes:  - Patient is on insulin therapy; diabetic NPO guidelines provided and discussed.    Medication Instructions:  Patient is to take all scheduled medications on the day of surgery   - Long acting insulin (e.g. glargine, detemir): Take 80% of the usual evening or morning dose before surgery.           - short acting insulin (e.g. regular,  lispro, aspart): HOLD on the morning of surgery.     RECOMMENDATION:  APPROVAL GIVEN to proceed with proposed procedure, without further diagnostic evaluation.         Subjective     HPI related to upcoming procedure: Gallbladder - recently over the past month report dull ache RUQ and some occasional extreme nausea with vomiting.  US done 05/2022 that showed 1 cm gallbladder polyp.  No gallstones or wall thickening seen.    Ongoing left thumb and 2nd MC pain and trigger finger symptoms, and contracture. Was getting steroid injection into thumb for years. Wearing brace without improvement in pain. Taking Aleve on occasion for pain.  Seeing [facility] for this and recommended surgery.      Preop Questions 2/10/2023   1. Have you ever had a heart attack or stroke? No   2. Have you ever had surgery on your heart or blood vessels, such as a stent placement, a coronary artery bypass, or surgery on an artery in your head, neck, heart, or legs? No   3. Do you have chest pain with activity? No   4. Do you have a history of  heart failure? No   5. Do you currently have a cold, bronchitis or symptoms of other infection? No   6. Do you have a cough, shortness of breath, or wheezing? No   7. Do you or anyone in your family have previous history of blood clots? No   8. Do you or does anyone in your family have a serious bleeding problem such as prolonged bleeding following surgeries or cuts? No   9. Have you ever had problems with anemia or been told to take iron pills? No   10. Have you had any abnormal blood loss such as black, tarry or bloody stools, or abnormal vaginal bleeding? No   11. Have you ever had a blood transfusion? No   12. Are you willing to have a blood transfusion if it is medically needed before, during, or after your surgery? Yes   13. Have you or any of your relatives ever had problems with anesthesia? No   14. Do you have sleep apnea, excessive snoring or daytime drowsiness? No   15. Do you have any artifical heart  valves or other implanted medical devices like a pacemaker, defibrillator, or continuous glucose monitor? YES - CGM   15a. What type of device do you have? glucose moniter   15b. Name of the clinic that manages your device:  [facility]   16. Do you have artificial joints? No   17. Are you allergic to latex? No   18. Is there any chance that you may be pregnant? No       Health Care Directive:  Patient does not have a Health Care Directive or Living Will: Discussed advance care planning with patient; information given to patient to review.    Preoperative Review of :   reviewed - no record of controlled substances prescribed.       Status of Chronic Conditions:  See problem list for active medical problems.  Problems all longstanding and stable, except as noted/documented.  See ROS for pertinent symptoms related to these conditions.    DIABETES - Patient has a longstanding history of DiabetesType Type II . Patient is being treated with diet, exercise and insulin injections and denies significant side effects. Control has been good. Complicating factors include but are not limited to: hypertension and hyperlipidemia.     HYPERLIPIDEMIA - Patient has a long history of significant Hyperlipidemia requiring medication for treatment with recent good control. Patient reports no problems or side effects with the medication.     HYPERTENSION - Patient has longstanding history of HTN , currently denies any symptoms referable to elevated blood pressure. Specifically denies chest pain, palpitations, dyspnea, orthopnea, PND or peripheral edema. Blood pressure readings have been in normal range. Current medication regimen is as listed below. Patient denies any side effects of medication.       Review of Systems  Constitutional, neuro, ENT, endocrine, pulmonary, cardiac, gastrointestinal, genitourinary, musculoskeletal, integument and psychiatric systems are negative, except as otherwise noted.    Patient Active Problem List     Diagnosis Date Noted     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) (H)      Priority: Medium     Hereditary hemochromatosis (H) 06/10/2021     Priority: Medium     Dyslipidemia      Priority: Medium     Type 2 diabetes mellitus with hyperglycemia, without long-term current use of insulin (H) 02/01/2017     Priority: Medium     Hyperlipidemia LDL goal <70 08/09/2016     Priority: Medium     Essential hypertension with goal blood pressure less than 140/90 08/09/2016     Priority: Medium     Poorly controlled type 2 diabetes mellitus with renal complication (H) 09/22/2015     Priority: Medium     Stenosing tenosynovitis 09/06/2012     Priority: Medium     Hand arthritis 08/23/2012     Priority: Medium      Past Medical History:   Diagnosis Date     Chest pain 2/23/2021     Diabetes mellitus (H)      Dyslipidemia      Hereditary hemochromatosis (H)      Hyperglycemia 2/23/2021     Hypertension      Hypertensive urgency 2/23/2021     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) (H)      Nephrolithiasis 9/19/2016    First episode September 2016     Past Surgical History:   Procedure Laterality Date     ESOPHAGOSCOPY, GASTROSCOPY, DUODENOSCOPY (EGD), COMBINED N/A 11/10/2021    Procedure: ESOPHAGOGASTRODUODENOSCOPY, WITH BIOPSY;  Surgeon: Leventhal, Thomas Michael, MD;  Location: [facility] OR     HYSTERECTOMY TOTAL ABDOMINAL  1995    due to fibroids     LAPAROSCOPIC EVACUATION ECTOPIC PREGNANCY       TUBAL LIGATION       Current Outpatient Medications   Medication Sig Dispense Refill     amLODIPine (NORVASC) 5 MG tablet TAKE ONE TABLET BY MOUTH TWICE A  tablet 2     BD NATALIYA U/F 32G X 4 MM insulin pen needle USE 4 TIMES DAILY 200 each 1     carvedilol (COREG) 12.5 MG tablet TAKE ONE TABLET BY MOUTH TWICE A DAY WITH MEALS 180 tablet 1     Continuous Blood Gluc Sensor (FREESTYLE MEENA 2 SENSOR) MISC CHANGE EVERY 14 DAYS. USE TO CHECK BLOOD SUGARS PER  GUIDELINES 6 each 2      insulin aspart (NOVOLOG FLEXPEN) 100 UNIT/ML pen Take 10 units three times daily with meals. Maximum of 30 units daily. 30 mL 1     insulin glargine (LANTUS SOLOSTAR) 100 UNIT/ML pen INJECT 42 UNITS SUBCUTANEOUS EVERY MORNING (BEFORE BREAKFAST) 45 mL 1     losartan (COZAAR) 50 MG tablet TAKE ONE TABLET BY MOUTH ONCE DAILY 90 tablet 0     pantoprazole (PROTONIX) 40 MG EC tablet Take 1 tablet (40 mg) by mouth daily 30 tablet 3     pravastatin (PRAVACHOL) 20 MG tablet Take 1 tablet (20 mg) by mouth daily 90 tablet 3     sertraline (ZOLOFT) 50 MG tablet TAKE ONE TABLET BY MOUTH ONCE DAILY 90 tablet 0     ACCU-CHEK GUIDE test strip Use to test blood sugar 4 times daily or as directed. (Patient not taking: Reported on 2/10/2023) 200 strip 11     acetaminophen (TYLENOL) 325 MG tablet Take 325-650 mg by mouth every 6 hours as needed for mild pain  (Patient not taking: Reported on 2/10/2023)       Biotin 10 MG CAPS Take 1 capsule by mouth daily (Patient not taking: Reported on 2/10/2023)       blood glucose (NO BRAND SPECIFIED) lancets standard Use to test blood sugar twice times daily or as directed. (Patient not taking: Reported on 2/10/2023) 100 each 1     blood glucose (NO BRAND SPECIFIED) test strip Use to test blood sugar twice times daily or as directed. (Patient not taking: Reported on 2/10/2023) 100 each 1     blood glucose (NO BRAND SPECIFIED) test strip Use to test blood sugars 2 times daily or as directed (Patient not taking: Reported on 2/10/2023) 100 strip 11     blood glucose monitoring (SOFTCLIX) lancets Use to test blood sugar 6 times daily. (Patient not taking: Reported on 2/10/2023) 200 each 11     Blood Glucose Monitoring Suppl (ACCU-CHEK GUIDE ME) w/Device KIT 1 each 6 times daily (Patient not taking: Reported on 2/10/2023) 1 kit 0     ondansetron (ZOFRAN) 4 MG tablet Take 1 tablet (4 mg) by mouth every 8 hours as needed for nausea (Patient not taking: Reported on 2/10/2023) 20 tablet 1     Turmeric  (QC TUMERIC COMPLEX PO)  (Patient not taking: Reported on 2/10/2023)         No Known Allergies     Social History     Tobacco Use     Smoking status: Former     Packs/day: 0.50     Years: 20.00     Pack years: 10.00     Types: Cigarettes     Quit date: 3/9/2010     Years since quittin.9     Smokeless tobacco: Never   Substance Use Topics     Alcohol use: No     Alcohol/week: 0.0 standard drinks     Family History   Problem Relation Age of Onset     Cirrhosis Mother         w/o alcohol history     Diabetes Mother      Emphysema Father      Hemochromatosis Brother      ALS Brother      Hemochromatosis Sister      Hemochromatosis Sister      Hypertension Sister      Hemochromatosis Sister      Heart Defect Niece         Tetrology of Fallot     Breast Cancer No family hx of      Cancer - colorectal No family hx of      History   Drug Use No         Objective     /68   Pulse 74   Temp 97.2  F (36.2  C) (Tympanic)   Wt 64 kg (141 lb)   LMP  (LMP Unknown)   SpO2 97%   BMI 28.48 kg/m      Physical Exam    GENERAL APPEARANCE: healthy, alert and no distress     EYES: EOMI, PERRL     HENT: ear canals and TM's normal and nose and mouth without ulcers or lesions     NECK: no adenopathy, no asymmetry, masses, or scars and thyroid normal to palpation     RESP: lungs clear to auscultation - no rales, rhonchi or wheezes     CV: regular rates and rhythm, normal S1 S2, no S3 or S4 and no murmur, click or rub     ABDOMEN:  soft, nontender, no HSM or masses and bowel sounds normal     MS: extremities normal- no gross deformities noted, left hand in brace - thumb.     SKIN: no suspicious lesions or rashes     NEURO: Normal strength and tone, sensory exam grossly normal, mentation intact and speech normal     PSYCH: mentation appears normal. and affect normal/bright     LYMPHATICS: No cervical adenopathy    Recent Labs   Lab Test 11/15/22  1437 22  1352 22  1311 22  1421 22  0953 21  1209  10/19/21  1438 06/23/21  1045 06/21/21  0820   HGB 15.7  --  15.2   < > 15.1   < >  --    < > 14.3   *  --  155   < > 163   < >  --    < > 166   INR  --   --   --   --  1.16*  --   --   --  1.13    139 139   < > 137   < >  --   --   --    POTASSIUM 4.1 4.0 3.9   < > 4.4   < >  --   --   --    CR 0.63 0.71 0.56   < > 0.62   < >  --   --   --    A1C  --  6.0*  --   --   --   --  6.6*  --   --     < > = values in this interval not displayed.      Results for orders placed or performed in visit on 02/10/23   Hemoglobin A1c     Status: Abnormal   Result Value Ref Range    Hemoglobin A1C 6.1 (H) 0.0 - 5.6 %   CBC with platelets     Status: Normal   Result Value Ref Range    WBC Count 9.3 4.0 - 11.0 10e3/uL    RBC Count 4.88 3.80 - 5.20 10e6/uL    Hemoglobin 15.5 11.7 - 15.7 g/dL    Hematocrit 45.9 35.0 - 47.0 %    MCV 94 78 - 100 fL    MCH 31.8 26.5 - 33.0 pg    MCHC 33.8 31.5 - 36.5 g/dL    RDW 11.7 10.0 - 15.0 %    Platelet Count 162 150 - 450 10e3/uL         Diagnostics:  Labs pending at this time.  Results will be reviewed when available.   No EKG required, no history of coronary heart disease, significant arrhythmia, peripheral arterial disease or other structural heart disease.    Revised Cardiac Risk Index (RCRI):  The patient has the following serious cardiovascular risks for perioperative complications:   - Diabetes Mellitus (on Insulin) = 1 point     RCRI Interpretation: 1 point: Class II (low risk - 0.9% complication rate)           Signed Electronically by: Paulette Travis NP  Copy of this evaluation report is provided to requesting physician.

## 2023-02-10 NOTE — PROGRESS NOTES
[facility]  [address] [address]  Phone: [phone]  Primary Provider: Niall Travis  Pre-op Performing Provider: NIALL TRAVIS    {Provider  Link to PREOP SmartSet  Use this to apply standard patient instructions to AVS; includes medication directions, common orders, guidelines for anemia, warfarin, additional testing   :403506}  PREOPERATIVE EVALUATION:  Today's date: 2/10/2023    Bradford Prado is a 62 year old female who presents for a preoperative evaluation.    Surgical Information:  Surgery/Procedure: Laparoscopic cholecystectomy  Surgery Location: [facility]  Surgeon: Eber Gill MD  Surgery Date: 02/13/2023  Time of Surgery: 10:55am  Where patient plans to recover: At home with family  Fax number for surgical facility: Note does not need to be faxed, will be available electronically in Epic.    Type of Anesthesia Anticipated: General    {2021 Provider Charting Preference for Preop :766232}    Subjective     HPI related to upcoming procedure: ***    Preop Questions 2/10/2023   1. Have you ever had a heart attack or stroke? No   2. Have you ever had surgery on your heart or blood vessels, such as a stent placement, a coronary artery bypass, or surgery on an artery in your head, neck, heart, or legs? No   3. Do you have chest pain with activity? No   4. Do you have a history of  heart failure? No   5. Do you currently have a cold, bronchitis or symptoms of other infection? No   6. Do you have a cough, shortness of breath, or wheezing? No   7. Do you or anyone in your family have previous history of blood clots? No   8. Do you or does anyone in your family have a serious bleeding problem such as prolonged bleeding following surgeries or cuts? No   9. Have you ever had problems with anemia or been told to take iron pills? No   10. Have you had any abnormal blood loss such as black, tarry or bloody stools, or  abnormal vaginal bleeding? No   11. Have you ever had a blood transfusion? No   12. Are you willing to have a blood transfusion if it is medically needed before, during, or after your surgery? Yes   13. Have you or any of your relatives ever had problems with anesthesia? No   14. Do you have sleep apnea, excessive snoring or daytime drowsiness? No   15. Do you have any artifical heart valves or other implanted medical devices like a pacemaker, defibrillator, or continuous glucose monitor? YES - ***   15a. What type of device do you have? glucose moniter   15b. Name of the clinic that manages your device:  [place]   16. Do you have artificial joints? No   17. Are you allergic to latex? No   18. Is there any chance that you may be pregnant? No       Health Care Directive:  Patient does not have a Health Care Directive or Living Will: {ADVANCE_DIRECTIVE_STATUS:010709}    Preoperative Review of :  {Mnpmpreport:983454}  {Review MNPMP for all patients per [facility] MNPMP Profile:969336}    {Chronic problem details (Optional) :564101}    Review of Systems  {ROS Preop Choices:803479}    Patient Active Problem List    Diagnosis Date Noted     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) (H)      Priority: Medium     Hereditary hemochromatosis (H) 06/10/2021     Priority: Medium     Dyslipidemia      Priority: Medium     Type 2 diabetes mellitus with hyperglycemia, without long-term current use of insulin (H) 02/01/2017     Priority: Medium     Hyperlipidemia LDL goal <70 08/09/2016     Priority: Medium     Essential hypertension with goal blood pressure less than 140/90 08/09/2016     Priority: Medium     Poorly controlled type 2 diabetes mellitus with renal complication (H) 09/22/2015     Priority: Medium     Stenosing tenosynovitis 09/06/2012     Priority: Medium     Hand arthritis 08/23/2012     Priority: Medium      Past Medical History:   Diagnosis Date     Chest pain 2/23/2021     Diabetes mellitus  (H)      Dyslipidemia      Hereditary hemochromatosis (H)      Hyperglycemia 2/23/2021     Hypertension      Hypertensive urgency 2/23/2021     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) (H)      Nephrolithiasis 9/19/2016    First episode September 2016     Past Surgical History:   Procedure Laterality Date     ESOPHAGOSCOPY, GASTROSCOPY, DUODENOSCOPY (EGD), COMBINED N/A 11/10/2021    Procedure: ESOPHAGOGASTRODUODENOSCOPY, WITH BIOPSY;  Surgeon: Leventhal, Thomas Michael, MD;  Location: [facility] OR     HYSTERECTOMY TOTAL ABDOMINAL  1995    due to fibroids     LAPAROSCOPIC EVACUATION ECTOPIC PREGNANCY       TUBAL LIGATION       Current Outpatient Medications   Medication Sig Dispense Refill     amLODIPine (NORVASC) 5 MG tablet TAKE ONE TABLET BY MOUTH TWICE A  tablet 2     BD NATALIYA U/F 32G X 4 MM insulin pen needle USE 4 TIMES DAILY 200 each 1     carvedilol (COREG) 12.5 MG tablet TAKE ONE TABLET BY MOUTH TWICE A DAY WITH MEALS 180 tablet 1     Continuous Blood Gluc Sensor (FREESTYLE MEENA 2 SENSOR) MISC CHANGE EVERY 14 DAYS. USE TO CHECK BLOOD SUGARS PER  GUIDELINES 6 each 2     insulin aspart (NOVOLOG FLEXPEN) 100 UNIT/ML pen Take 10 units three times daily with meals. Maximum of 30 units daily. 30 mL 1     insulin glargine (LANTUS SOLOSTAR) 100 UNIT/ML pen INJECT 42 UNITS SUBCUTANEOUS EVERY MORNING (BEFORE BREAKFAST) 45 mL 1     losartan (COZAAR) 50 MG tablet TAKE ONE TABLET BY MOUTH ONCE DAILY 90 tablet 0     pantoprazole (PROTONIX) 40 MG EC tablet Take 1 tablet (40 mg) by mouth daily 30 tablet 3     pravastatin (PRAVACHOL) 20 MG tablet Take 1 tablet (20 mg) by mouth daily 90 tablet 3     sertraline (ZOLOFT) 50 MG tablet TAKE ONE TABLET BY MOUTH ONCE DAILY 90 tablet 0     ACCU-CHEK GUIDE test strip Use to test blood sugar 4 times daily or as directed. (Patient not taking: Reported on 2/10/2023) 200 strip 11     acetaminophen (TYLENOL) 325 MG tablet Take 325-650 mg by mouth  every 6 hours as needed for mild pain  (Patient not taking: Reported on 2/10/2023)       Biotin 10 MG CAPS Take 1 capsule by mouth daily (Patient not taking: Reported on 2/10/2023)       blood glucose (NO BRAND SPECIFIED) lancets standard Use to test blood sugar twice times daily or as directed. (Patient not taking: Reported on 2/10/2023) 100 each 1     blood glucose (NO BRAND SPECIFIED) test strip Use to test blood sugar twice times daily or as directed. (Patient not taking: Reported on 2/10/2023) 100 each 1     blood glucose (NO BRAND SPECIFIED) test strip Use to test blood sugars 2 times daily or as directed (Patient not taking: Reported on 2/10/2023) 100 strip 11     blood glucose monitoring (SOFTCLIX) lancets Use to test blood sugar 6 times daily. (Patient not taking: Reported on 2/10/2023) 200 each 11     Blood Glucose Monitoring Suppl (ACCU-CHEK GUIDE ME) w/Device KIT 1 each 6 times daily (Patient not taking: Reported on 2/10/2023) 1 kit 0     ondansetron (ZOFRAN) 4 MG tablet Take 1 tablet (4 mg) by mouth every 8 hours as needed for nausea (Patient not taking: Reported on 2/10/2023) 20 tablet 1     Turmeric (QC TUMERIC COMPLEX PO)  (Patient not taking: Reported on 2/10/2023)         No Known Allergies     Social History     Tobacco Use     Smoking status: Former     Packs/day: 0.50     Years: 20.00     Pack years: 10.00     Types: Cigarettes     Quit date: 3/9/2010     Years since quittin.9     Smokeless tobacco: Never   Substance Use Topics     Alcohol use: No     Alcohol/week: 0.0 standard drinks     {FAMILY HISTORY (Optional):523418899}  History   Drug Use No         Objective     /68   Pulse 74   Temp 97.2  F (36.2  C) (Tympanic)   Wt 64 kg (141 lb)   LMP  (LMP Unknown)   SpO2 97%   BMI 28.48 kg/m      Physical Exam  {EXAM Preop Choices:394569}    Recent Labs   Lab Test 11/15/22  1437 22  1352 22  1311 22  1421 22  0953 21  1209 10/19/21  1438 21  1045  "21  0820   HGB 15.7  --  15.2   < > 15.1   < >  --    < > 14.3   *  --  155   < > 163   < >  --    < > 166   INR  --   --   --   --  1.16*  --   --   --  1.13    139 139   < > 137   < >  --   --   --    POTASSIUM 4.1 4.0 3.9   < > 4.4   < >  --   --   --    CR 0.63 0.71 0.56   < > 0.62   < >  --   --   --    A1C  --  6.0*  --   --   --   --  6.6*  --   --     < > = values in this interval not displayed.        Diagnostics:  {LABS:725352}   {EK}    Revised Cardiac Risk Index (RCRI):  The patient has the following serious cardiovascular risks for perioperative complications:  {PREOP REVISED CARDIAC RISK INDEX (RCRI) :780545::\" - No serious cardiac risks = 0 points\"}     RCRI Interpretation: {REVISED CARDIAC RISK INTERPRETATION :542063}         Signed Electronically by: Paulette Travis NP  Copy of this evaluation report is provided to requesting physician.    {Provider Resources  Preop [facility] Preop Guidelines  Revised Cardiac Risk Index :588406}  "

## 2023-02-13 ENCOUNTER — ANESTHESIA EVENT (OUTPATIENT)
Dept: SURGERY | Facility: CLINIC | Age: 63
End: 2023-02-13
Payer: COMMERCIAL

## 2023-02-13 ENCOUNTER — HOSPITAL ENCOUNTER (OUTPATIENT)
Facility: CLINIC | Age: 63
Discharge: HOME | End: 2023-02-13
Attending: SURGERY | Admitting: SURGERY
Payer: COMMERCIAL

## 2023-02-13 ENCOUNTER — APPOINTMENT (OUTPATIENT)
Dept: SURGERY | Facility: PHYSICIAN GROUP | Age: 63
End: 2023-02-13
Payer: COMMERCIAL

## 2023-02-13 ENCOUNTER — ANESTHESIA (OUTPATIENT)
Dept: SURGERY | Facility: CLINIC | Age: 63
End: 2023-02-13
Payer: COMMERCIAL

## 2023-02-13 VITALS
DIASTOLIC BLOOD PRESSURE: 67 MMHG | OXYGEN SATURATION: 95 % | TEMPERATURE: 98.1 F | HEART RATE: 75 BPM | SYSTOLIC BLOOD PRESSURE: 110 MMHG | WEIGHT: 139.4 LBS | RESPIRATION RATE: 17 BRPM | HEIGHT: 59 IN | BODY MASS INDEX: 28.1 KG/M2

## 2023-02-13 DIAGNOSIS — K82.4 GALLBLADDER POLYP: Primary | ICD-10-CM

## 2023-02-13 PROCEDURE — 250N000011 HC RX IP 250 OP 636: Performed by: SURGERY

## 2023-02-13 PROCEDURE — 258N000001 HC RX 258: Performed by: SURGERY

## 2023-02-13 PROCEDURE — 258N000003 HC RX IP 258 OP 636: Performed by: SURGERY

## 2023-02-13 PROCEDURE — 250N000009 HC RX 250: Performed by: SURGERY

## 2023-02-13 PROCEDURE — 250N000013 HC RX MED GY IP 250 OP 250 PS 637: Performed by: SURGERY

## 2023-02-13 PROCEDURE — 360N000076 HC SURGERY LEVEL 3, PER MIN: Performed by: SURGERY

## 2023-02-13 PROCEDURE — 370N000017 HC ANESTHESIA TECHNICAL FEE, PER MIN: Performed by: SURGERY

## 2023-02-13 PROCEDURE — 250N000012 HC RX MED GY IP 250 OP 636 PS 637: Performed by: SURGERY

## 2023-02-13 PROCEDURE — 88304 TISSUE EXAM BY PATHOLOGIST: CPT | Mod: TC | Performed by: SURGERY

## 2023-02-13 PROCEDURE — 47562 LAPAROSCOPIC CHOLECYSTECTOMY: CPT | Mod: AS | Performed by: PHYSICIAN ASSISTANT

## 2023-02-13 PROCEDURE — 710N000012 HC RECOVERY PHASE 2, PER MINUTE: Performed by: SURGERY

## 2023-02-13 PROCEDURE — 47562 LAPAROSCOPIC CHOLECYSTECTOMY: CPT | Performed by: SURGERY

## 2023-02-13 PROCEDURE — 88304 TISSUE EXAM BY PATHOLOGIST: CPT | Mod: 26 | Performed by: PATHOLOGY

## 2023-02-13 PROCEDURE — 999N000141 HC STATISTIC PRE-PROCEDURE NURSING ASSESSMENT: Performed by: SURGERY

## 2023-02-13 PROCEDURE — 272N000001 HC OR GENERAL SUPPLY STERILE: Performed by: SURGERY

## 2023-02-13 PROCEDURE — 710N000009 HC RECOVERY PHASE 1, LEVEL 1, PER MIN: Performed by: SURGERY

## 2023-02-13 RX ORDER — MAGNESIUM HYDROXIDE 1200 MG/15ML
LIQUID ORAL PRN
Status: DISCONTINUED | OUTPATIENT
Start: 2023-02-13 | End: 2023-02-13 | Stop reason: HOSPADM

## 2023-02-13 RX ORDER — EPHEDRINE SULFATE 50 MG/ML
INJECTION, SOLUTION INTRAMUSCULAR; INTRAVENOUS; SUBCUTANEOUS PRN
Status: DISCONTINUED | OUTPATIENT
Start: 2023-02-13 | End: 2023-02-13

## 2023-02-13 RX ORDER — FENTANYL CITRATE 0.05 MG/ML
25 INJECTION, SOLUTION INTRAMUSCULAR; INTRAVENOUS
Status: DISCONTINUED | OUTPATIENT
Start: 2023-02-13 | End: 2023-02-13 | Stop reason: HOSPADM

## 2023-02-13 RX ORDER — OXYCODONE HYDROCHLORIDE 5 MG/1
5 TABLET ORAL EVERY 4 HOURS PRN
Status: DISCONTINUED | OUTPATIENT
Start: 2023-02-13 | End: 2023-02-13 | Stop reason: HOSPADM

## 2023-02-13 RX ORDER — PROPOFOL 10 MG/ML
INJECTION, EMULSION INTRAVENOUS PRN
Status: DISCONTINUED | OUTPATIENT
Start: 2023-02-13 | End: 2023-02-13

## 2023-02-13 RX ORDER — APREPITANT 40 MG/1
40 CAPSULE ORAL
Status: COMPLETED | OUTPATIENT
Start: 2023-02-13 | End: 2023-02-13

## 2023-02-13 RX ORDER — OXYCODONE HYDROCHLORIDE 5 MG/1
5-10 TABLET ORAL
Status: DISCONTINUED | OUTPATIENT
Start: 2023-02-13 | End: 2023-02-13 | Stop reason: HOSPADM

## 2023-02-13 RX ORDER — SODIUM CHLORIDE, SODIUM LACTATE, POTASSIUM CHLORIDE, CALCIUM CHLORIDE 600; 310; 30; 20 MG/100ML; MG/100ML; MG/100ML; MG/100ML
INJECTION, SOLUTION INTRAVENOUS CONTINUOUS
Status: DISCONTINUED | OUTPATIENT
Start: 2023-02-13 | End: 2023-02-13 | Stop reason: HOSPADM

## 2023-02-13 RX ORDER — HYDROMORPHONE HCL IN WATER/PF 6 MG/30 ML
0.4 PATIENT CONTROLLED ANALGESIA SYRINGE INTRAVENOUS EVERY 5 MIN PRN
Status: DISCONTINUED | OUTPATIENT
Start: 2023-02-13 | End: 2023-02-13 | Stop reason: HOSPADM

## 2023-02-13 RX ORDER — DEXMEDETOMIDINE HYDROCHLORIDE 4 UG/ML
INJECTION, SOLUTION INTRAVENOUS PRN
Status: DISCONTINUED | OUTPATIENT
Start: 2023-02-13 | End: 2023-02-13

## 2023-02-13 RX ORDER — SCOLOPAMINE TRANSDERMAL SYSTEM 1 MG/1
1 PATCH, EXTENDED RELEASE TRANSDERMAL ONCE
Status: DISCONTINUED | OUTPATIENT
Start: 2023-02-13 | End: 2023-02-13 | Stop reason: HOSPADM

## 2023-02-13 RX ORDER — FENTANYL CITRATE 50 UG/ML
25 INJECTION, SOLUTION INTRAMUSCULAR; INTRAVENOUS EVERY 5 MIN PRN
Status: DISCONTINUED | OUTPATIENT
Start: 2023-02-13 | End: 2023-02-13 | Stop reason: HOSPADM

## 2023-02-13 RX ORDER — FENTANYL CITRATE 50 UG/ML
50 INJECTION, SOLUTION INTRAMUSCULAR; INTRAVENOUS EVERY 5 MIN PRN
Status: DISCONTINUED | OUTPATIENT
Start: 2023-02-13 | End: 2023-02-13 | Stop reason: HOSPADM

## 2023-02-13 RX ORDER — FENTANYL CITRATE 50 UG/ML
INJECTION, SOLUTION INTRAMUSCULAR; INTRAVENOUS PRN
Status: DISCONTINUED | OUTPATIENT
Start: 2023-02-13 | End: 2023-02-13

## 2023-02-13 RX ORDER — PROPOFOL 10 MG/ML
INJECTION, EMULSION INTRAVENOUS CONTINUOUS PRN
Status: DISCONTINUED | OUTPATIENT
Start: 2023-02-13 | End: 2023-02-13

## 2023-02-13 RX ORDER — GLYCOPYRROLATE 0.2 MG/ML
INJECTION, SOLUTION INTRAMUSCULAR; INTRAVENOUS PRN
Status: DISCONTINUED | OUTPATIENT
Start: 2023-02-13 | End: 2023-02-13

## 2023-02-13 RX ORDER — ONDANSETRON 4 MG/1
4 TABLET, ORALLY DISINTEGRATING ORAL EVERY 30 MIN PRN
Status: DISCONTINUED | OUTPATIENT
Start: 2023-02-13 | End: 2023-02-13 | Stop reason: HOSPADM

## 2023-02-13 RX ORDER — POLYETHYLENE GLYCOL 3350 17 G/17G
17 POWDER, FOR SOLUTION ORAL DAILY PRN
Qty: 510 G | Refills: 0 | Status: SHIPPED | OUTPATIENT
Start: 2023-02-13 | End: 2023-12-28

## 2023-02-13 RX ORDER — HYDROMORPHONE HYDROCHLORIDE 1 MG/ML
INJECTION, SOLUTION INTRAMUSCULAR; INTRAVENOUS; SUBCUTANEOUS PRN
Status: DISCONTINUED | OUTPATIENT
Start: 2023-02-13 | End: 2023-02-13

## 2023-02-13 RX ORDER — NEOSTIGMINE METHYLSULFATE 1 MG/ML
VIAL (ML) INJECTION PRN
Status: DISCONTINUED | OUTPATIENT
Start: 2023-02-13 | End: 2023-02-13

## 2023-02-13 RX ORDER — BUPIVACAINE HYDROCHLORIDE AND EPINEPHRINE 2.5; 5 MG/ML; UG/ML
INJECTION, SOLUTION EPIDURAL; INFILTRATION; INTRACAUDAL; PERINEURAL
Status: DISCONTINUED
Start: 2023-02-13 | End: 2023-02-13 | Stop reason: HOSPADM

## 2023-02-13 RX ORDER — CEFAZOLIN SODIUM/WATER 2 G/20 ML
2 SYRINGE (ML) INTRAVENOUS SEE ADMIN INSTRUCTIONS
Status: DISCONTINUED | OUTPATIENT
Start: 2023-02-13 | End: 2023-02-13 | Stop reason: HOSPADM

## 2023-02-13 RX ORDER — LIDOCAINE HYDROCHLORIDE 20 MG/ML
INJECTION, SOLUTION INFILTRATION; PERINEURAL PRN
Status: DISCONTINUED | OUTPATIENT
Start: 2023-02-13 | End: 2023-02-13

## 2023-02-13 RX ORDER — DEXAMETHASONE SODIUM PHOSPHATE 4 MG/ML
INJECTION, SOLUTION INTRA-ARTICULAR; INTRALESIONAL; INTRAMUSCULAR; INTRAVENOUS; SOFT TISSUE PRN
Status: DISCONTINUED | OUTPATIENT
Start: 2023-02-13 | End: 2023-02-13

## 2023-02-13 RX ORDER — BUPIVACAINE HYDROCHLORIDE AND EPINEPHRINE 2.5; 5 MG/ML; UG/ML
INJECTION, SOLUTION INFILTRATION; PERINEURAL PRN
Status: DISCONTINUED | OUTPATIENT
Start: 2023-02-13 | End: 2023-02-13 | Stop reason: HOSPADM

## 2023-02-13 RX ORDER — HYDROMORPHONE HCL IN WATER/PF 6 MG/30 ML
0.2 PATIENT CONTROLLED ANALGESIA SYRINGE INTRAVENOUS EVERY 5 MIN PRN
Status: DISCONTINUED | OUTPATIENT
Start: 2023-02-13 | End: 2023-02-13 | Stop reason: HOSPADM

## 2023-02-13 RX ORDER — OXYCODONE HYDROCHLORIDE 5 MG/1
10 TABLET ORAL EVERY 4 HOURS PRN
Status: DISCONTINUED | OUTPATIENT
Start: 2023-02-13 | End: 2023-02-13 | Stop reason: HOSPADM

## 2023-02-13 RX ORDER — ONDANSETRON 2 MG/ML
4 INJECTION INTRAMUSCULAR; INTRAVENOUS EVERY 30 MIN PRN
Status: DISCONTINUED | OUTPATIENT
Start: 2023-02-13 | End: 2023-02-13 | Stop reason: HOSPADM

## 2023-02-13 RX ORDER — CEFAZOLIN SODIUM/WATER 2 G/20 ML
2 SYRINGE (ML) INTRAVENOUS
Status: DISCONTINUED | OUTPATIENT
Start: 2023-02-13 | End: 2023-02-13 | Stop reason: HOSPADM

## 2023-02-13 RX ORDER — OXYCODONE HYDROCHLORIDE 5 MG/1
5-10 TABLET ORAL EVERY 4 HOURS PRN
Qty: 10 TABLET | Refills: 0 | Status: SHIPPED | OUTPATIENT
Start: 2023-02-13 | End: 2023-07-28

## 2023-02-13 RX ORDER — ONDANSETRON 2 MG/ML
INJECTION INTRAMUSCULAR; INTRAVENOUS PRN
Status: DISCONTINUED | OUTPATIENT
Start: 2023-02-13 | End: 2023-02-13

## 2023-02-13 RX ADMIN — SODIUM CHLORIDE, POTASSIUM CHLORIDE, SODIUM LACTATE AND CALCIUM CHLORIDE: 600; 310; 30; 20 INJECTION, SOLUTION INTRAVENOUS at 10:02

## 2023-02-13 RX ADMIN — PHENYLEPHRINE HYDROCHLORIDE 150 MCG: 10 INJECTION INTRAVENOUS at 10:58

## 2023-02-13 RX ADMIN — DEXMEDETOMIDINE HYDROCHLORIDE 4 MCG: 200 INJECTION INTRAVENOUS at 11:14

## 2023-02-13 RX ADMIN — PROPOFOL 20 MG: 10 INJECTION, EMULSION INTRAVENOUS at 11:20

## 2023-02-13 RX ADMIN — GLYCOPYRROLATE 0.4 MG: 0.2 INJECTION, SOLUTION INTRAMUSCULAR; INTRAVENOUS at 11:30

## 2023-02-13 RX ADMIN — Medication 5 MG: at 11:03

## 2023-02-13 RX ADMIN — PHENYLEPHRINE HYDROCHLORIDE 100 MCG: 10 INJECTION INTRAVENOUS at 11:03

## 2023-02-13 RX ADMIN — DEXMEDETOMIDINE HYDROCHLORIDE 4 MCG: 200 INJECTION INTRAVENOUS at 11:09

## 2023-02-13 RX ADMIN — FENTANYL CITRATE 100 MCG: 50 INJECTION, SOLUTION INTRAMUSCULAR; INTRAVENOUS at 10:52

## 2023-02-13 RX ADMIN — Medication 5 MG: at 11:09

## 2023-02-13 RX ADMIN — HYDROMORPHONE HYDROCHLORIDE 0.25 MG: 1 INJECTION, SOLUTION INTRAMUSCULAR; INTRAVENOUS; SUBCUTANEOUS at 11:20

## 2023-02-13 RX ADMIN — ONDANSETRON 4 MG: 2 INJECTION INTRAMUSCULAR; INTRAVENOUS at 11:40

## 2023-02-13 RX ADMIN — PROPOFOL 200 MG: 10 INJECTION, EMULSION INTRAVENOUS at 10:52

## 2023-02-13 RX ADMIN — NEOSTIGMINE METHYLSULFATE 3 MG: 1 INJECTION, SOLUTION INTRAVENOUS at 11:30

## 2023-02-13 RX ADMIN — ROCURONIUM BROMIDE 50 MG: 50 INJECTION, SOLUTION INTRAVENOUS at 10:52

## 2023-02-13 RX ADMIN — SCOPALAMINE 1 PATCH: 1 PATCH, EXTENDED RELEASE TRANSDERMAL at 10:25

## 2023-02-13 RX ADMIN — OXYCODONE HYDROCHLORIDE 5 MG: 5 TABLET ORAL at 13:14

## 2023-02-13 RX ADMIN — PHENYLEPHRINE HYDROCHLORIDE 100 MCG: 10 INJECTION INTRAVENOUS at 11:14

## 2023-02-13 RX ADMIN — PHENYLEPHRINE HYDROCHLORIDE 100 MCG: 10 INJECTION INTRAVENOUS at 11:00

## 2023-02-13 RX ADMIN — DEXAMETHASONE SODIUM PHOSPHATE 4 MG: 4 INJECTION, SOLUTION INTRA-ARTICULAR; INTRALESIONAL; INTRAMUSCULAR; INTRAVENOUS; SOFT TISSUE at 11:03

## 2023-02-13 RX ADMIN — FENTANYL CITRATE 50 MCG: 50 INJECTION, SOLUTION INTRAMUSCULAR; INTRAVENOUS at 12:06

## 2023-02-13 RX ADMIN — PHENYLEPHRINE HYDROCHLORIDE 100 MCG: 10 INJECTION INTRAVENOUS at 10:56

## 2023-02-13 RX ADMIN — Medication 2 G: at 10:49

## 2023-02-13 RX ADMIN — LIDOCAINE HYDROCHLORIDE 100 MG: 20 INJECTION, SOLUTION INFILTRATION; PERINEURAL at 10:52

## 2023-02-13 RX ADMIN — PROPOFOL 125 MCG/KG/MIN: 10 INJECTION, EMULSION INTRAVENOUS at 10:52

## 2023-02-13 RX ADMIN — APREPITANT 40 MG: 40 CAPSULE ORAL at 10:25

## 2023-02-13 RX ADMIN — MIDAZOLAM 2 MG: 1 INJECTION INTRAMUSCULAR; INTRAVENOUS at 10:49

## 2023-02-13 ASSESSMENT — ACTIVITIES OF DAILY LIVING (ADL)
ADLS_ACUITY_SCORE: 35

## 2023-02-13 NOTE — ANESTHESIA PROCEDURE NOTES
Airway       Patient location during procedure: OR       Procedure Start/Stop Times: 2/13/2023 10:55 AM  Staff -        CRNA: Marleny England APRN CRNA       Performed By: CRNA  Consent for Airway        Urgency: elective  Indications and Patient Condition       Indications for airway management: lei-procedural       Induction type:intravenous       Mask difficulty assessment: 1 - vent by mask    Final Airway Details       Final airway type: endotracheal airway       Successful airway: ETT - single  Endotracheal Airway Details        ETT size (mm): 7.0       Cuffed: yes       Successful intubation technique: direct laryngoscopy       DL Blade Type: MAC 3       Grade View of Cords: 1       Adjucts: stylet       Position: Right       Measured from: gums/teeth       Secured at (cm): 21       Bite block used: None    Post intubation assessment        Placement verified by: capnometry, equal breath sounds and chest rise        Number of attempts at approach: 1       Number of other approaches attempted: 0       Secured with: silk tape       Ease of procedure: easy       Dentition: Unchanged    Medication(s) Administered   Medication Administration Time: 2/13/2023 10:55 AM

## 2023-02-13 NOTE — ANESTHESIA CARE TRANSFER NOTE
Patient: Bradford Prado    Procedure: Procedure(s):  Laparoscopic cholecystectomy       Diagnosis: Gallbladder polyp [K82.4]  Diagnosis Additional Information: No value filed.    Anesthesia Type:   General     Note:    Oropharynx: oropharynx clear of all foreign objects and spontaneously breathing  Level of Consciousness: drowsy  Oxygen Supplementation: face mask  Level of Supplemental Oxygen (L/min / FiO2): 6  Independent Airway: airway patency satisfactory and stable  Dentition: dentition unchanged  Vital Signs Stable: post-procedure vital signs reviewed and stable  Report to RN Given: handoff report given  Patient transferred to: PACU    Handoff Report: Identifed the Patient, Identified the Reponsible Provider, Reviewed the pertinent medical history, Discussed the surgical course, Reviewed Intra-OP anesthesia mangement and issues during anesthesia, Set expectations for post-procedure period and Allowed opportunity for questions and acknowledgement of understanding      Vitals:  Vitals Value Taken Time   /64 02/13/23 1153   Temp     Pulse 71 02/13/23 1156   Resp 25 02/13/23 1156   SpO2 98 % 02/13/23 1156   Vitals shown include unvalidated device data.    Electronically Signed By: OPAL Zhao CRNA  February 13, 2023  11:57 AM

## 2023-02-13 NOTE — OP NOTE
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    PREOPERATIVE MEDICATIONS:  Ancef IV.    SURGEON:  Eber Gill MD    ASSISTANT:  Erin Dominguez PA-C, Physician assistant first assistant was necessary during the performance of this procedure for expertise in patient positioning, prepping, draping, trocar placement, camera management, retraction and exposure, and suctioning.    INDICATIONS: Patient presented to my office initially back in December 2021.  She was referred for consultation regarding gallbladder polyp.  She has significant underlying medical comorbid conditions relative to hereditary hemochromatosis.  This is resulted in Johnson and liver cirrhosis.  She had been found on imaging to have a 1 cm gallbladder polyp.  This has been followed with additional ultrasounds.  The most recent ultrasound in May 2022 showed that this polyp was stable.  That being said she has developed signs and symptoms of biliary colic.Extensive discussion was undertaken regarding the procedure of cholecystectomy.  The potential risks of bleeding, infection, bile duct or visceral injury were thoroughly reviewed.  All of the patient's questions were answered.  The patient wishes to proceed with cholecystectomy.    PROCEDURE:  After informed consent was obtained the patient was taken to the operating suite and uneventfully endotracheally intubated.  The abdomen was prepped and draped in a sterile fashion.  Surgeon initiated timeout was acknowledged.  After infiltration with local anesthestic a curvilinear incision was made in the supraumbilical position and through this a Verees needle was passed intraperitoneally.  Position was confirmed using the saline drop test. A CO2 pneumoperitoneum was then created.  After adequate insufflation the needle was  removed and replaced with an 11mm trocar .  Two other trocars were placed under laparoscopic visualization following the infiltration of local anesthetic.  Visualization of the upper abdomen revealed a very nodular and cirrhotic appearing liver.  Gallbladder was seen at the inferior margin of the liver and appeared markedly distended.  There were also some omental adhesions along the surface of the gallbladder.  The gallbladder itself was decompressed with a needle and syringe.  Due to the enlargement of the gallbladder an additional 5 mm port was placed for superior retraction.    The gallbladder was grasped and used to elevate the liver further.  I began dissecting out some fatty adhesions down near the neck of the gallbladder until a cystic duct was encountered.  I continued the dissection using combination of sharp and blunt dissection until the cystic duct was largely dissected out.  I continued the dissection up along the sides of the gallbladder, both medially and laterally, until I had created a space between the gallbladder and the liver.  At this point, I encountered the cystic artery, just posterior and lateral to the cystic duct.  This again was dissected out.  Once I had created a window where only the cystic artery and duct were noted to be entering the gallbladder, I felt that this represented our critical view.  The cystic artery and duct were then doubly clipped and divided.  I continued the dissection up along the body of the gallbladder, freeing all attachments and adhesions of the gallbladder to the liver.  Gallbladder was removed from the liver in an atraumatic fashion.  The gallbladder was then placed in a retrieval pouch and removed from the abdomen.  The gallbladder fossa was reinspected, and all areas of bleeding were managed with electrocautery.  I irrigated the area with normal saline and aspirated it out.  I then reinspected the abdomen, and everything appeared to be in pristine  condition. The trocars were removed and carbon dioxide was massaged from the abdomen. Local anesthetic was injected. Fascia at the 11mm port site was closed with 0 vicryl suture.  Skin incisions were closed with subcuticular 4-0 Monocryl sutures.  The patient tolerated this procedure without difficulty. Needle and sponge counts were correct. The patient was taken from the operating room To the recovery room in a stable condition.      ESTIMATED BLOOD LOSS: 5cc    Eber Gill MD

## 2023-02-13 NOTE — ANESTHESIA POSTPROCEDURE EVALUATION
Patient: Bradford Prado    Procedure: Procedure(s):  Laparoscopic cholecystectomy       Anesthesia Type:  General    Note:  Disposition: Outpatient   Postop Pain Control: Uneventful            Sign Out: Well controlled pain   PONV: No   Neuro/Psych: Uneventful            Sign Out: Acceptable/Baseline neuro status   Airway/Respiratory: Uneventful            Sign Out: Acceptable/Baseline resp. status   CV/Hemodynamics: Uneventful            Sign Out: Acceptable CV status   Other NRE: NONE   DID A NON-ROUTINE EVENT OCCUR? No           Last vitals:  Vitals Value Taken Time   BP 94/67 02/13/23 1245   Temp 36.1  C (97  F) 02/13/23 1230   Pulse 70 02/13/23 1247   Resp 17 02/13/23 1247   SpO2 89 % 02/13/23 1247   Vitals shown include unvalidated device data.    Electronically Signed By: Andrea Louis MD  February 13, 2023  12:48 PM

## 2023-02-13 NOTE — INTERVAL H&P NOTE
"I have reviewed the surgical (or preoperative) H&P that is linked to this encounter, and examined the patient. There are no significant changes    Clinical Conditions Present on Arrival:  Clinically Significant Risk Factors Present on Admission                    # Overweight: Estimated body mass index is 28.16 kg/m  as calculated from the following:    Height as of this encounter: 1.499 m (4' 11\").    Weight as of this encounter: 63.2 kg (139 lb 6.4 oz).       "

## 2023-02-13 NOTE — BRIEF OP NOTE
[facility]    Brief Operative Note    Pre-operative diagnosis: Gallbladder polyp [K82.4]  Post-operative diagnosis Same    Procedure:    Laparoscopic cholecystectomy    Surgeon:       * Eber Gill MD - Primary     * Erin Dominguez PA-C - Assisting     Anesthesia: General     Estimated Blood Loss: 5 mL from 2/13/2023 10:47 AM to 2/13/2023 11:51 AM      Specimens:   ID Type Source Tests Collected by Time Destination   1 : GALLBLADDER AND CONTENTS Tissue Gallbladder SURGICAL PATHOLOGY EXAM Eber Gill MD 2/13/2023 11:26 AM      Findings:  As above. No immediate complications. See operative report for full details.      Erin Dominguez PA-C  [facility]  [phone]

## 2023-02-13 NOTE — ANESTHESIA PREPROCEDURE EVALUATION
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) (H)      Nephrolithiasis 2016    First episode 2016      Past Surgical History:   Procedure Laterality Date     ESOPHAGOSCOPY, GASTROSCOPY, DUODENOSCOPY (EGD), COMBINED N/A 11/10/2021    Procedure: ESOPHAGOGASTRODUODENOSCOPY, WITH BIOPSY;  Surgeon: Leventhal, Thomas Michael, MD;  Location: [facility] OR     HYSTERECTOMY TOTAL ABDOMINAL  1995    due to fibroids     LAPAROSCOPIC EVACUATION ECTOPIC PREGNANCY       TUBAL LIGATION        No Known Allergies   Social History     Tobacco Use     Smoking status: Former     Packs/day: 0.50     Years: 20.00     Pack years: 10.00     Types: Cigarettes     Quit date: 3/9/2010     Years since quittin.9     Smokeless tobacco: Never   Substance Use Topics     Alcohol use: No     Alcohol/week: 0.0 standard drinks      Wt Readings from Last 1 Encounters:   23 63.2 kg (139 lb 6.4 oz)        Anesthesia Evaluation   Pt has had prior anesthetic.     History of anesthetic complications  - PONV.      ROS/MED HX  ENT/Pulmonary:  - neg pulmonary ROS     Neurologic:  - neg neurologic ROS     Cardiovascular:     (+) Dyslipidemia hypertension-----    METS/Exercise Tolerance: >4 METS    Hematologic:       Musculoskeletal:   (+) arthritis,     GI/Hepatic: Comment: CERDA  Hereditary hemochromatosis     (+) hepatitis type Other, liver disease,     Renal/Genitourinary:     (+) renal disease, type: CRI,     Endo: Comment: Continuous glucose monitor, 160 in preop    (+) type II DM, Last HgA1c: 6.0,     Psychiatric/Substance Use:   - neg psychiatric ROS     Infectious Disease:       Malignancy:       Other:            Physical Exam    Airway        Mallampati: II   TM distance: > 3 FB   Neck ROM: full   Mouth opening: > 3 cm    Respiratory Devices and Support         Dental       (+) Full dentures      Cardiovascular   cardiovascular exam normal          Pulmonary   pulmonary exam normal                OUTSIDE LABS:  CBC:   Lab Results   Component Value Date    WBC 9.3 02/10/2023    WBC 7.2 11/15/2022    HGB 15.5 02/10/2023    HGB 15.7 11/15/2022    HCT 45.9 02/10/2023    HCT 45.4 11/15/2022     02/10/2023     (L) 11/15/2022     BMP:   Lab Results   Component Value Date     02/10/2023     11/15/2022    POTASSIUM 4.2 02/10/2023    POTASSIUM 4.1 11/15/2022    CHLORIDE 105 02/10/2023    CHLORIDE 104 11/15/2022    CO2 24 02/10/2023    CO2 25 11/15/2022    BUN 13.6 02/10/2023    BUN 11.8 11/15/2022    CR 0.69 02/10/2023    CR 0.63 11/15/2022     (H) 02/10/2023     (H) 11/15/2022     COAGS:   Lab Results   Component Value Date    INR 1.16 (H) 01/06/2022     POC:   Lab Results   Component Value Date     (H) 06/21/2021    HCG (A) 09/22/2016     Canceled, Test credited   Specimen improperly labeled  CALLED ER ASKED FOR RECOLLECT       HEPATIC:   Lab Results   Component Value Date    ALBUMIN 4.4 11/15/2022    PROTTOTAL 7.4 11/15/2022    ALT 32 11/15/2022    AST 28 11/15/2022    ALKPHOS 98 11/15/2022    BILITOTAL 0.6 11/15/2022     OTHER:   Lab Results   Component Value Date    LACT 1.9 09/19/2016    A1C 6.1 (H) 02/10/2023    ESTELLE 9.9 02/10/2023    LIPASE 193 09/22/2016    TSH 4.51 (H) 01/18/2019    T4 1.26 01/18/2019    SED 7 08/23/2012       Anesthesia Plan    ASA Status:  3   NPO Status:  NPO Appropriate    Anesthesia Type: General.     - Airway: ETT   Induction: Intravenous, Propofol.   Maintenance: TIVA.   Techniques and Equipment:     - Airway: Video-Laryngoscope         Consents    Anesthesia Plan(s)  and associated risks, benefits, and realistic alternatives discussed. Questions answered and patient/representative(s) expressed understanding.     - Discussed: Risks, Benefits and Alternatives for the PROCEDURE were discussed     - Discussed with:  Patient         Postoperative Care    Pain management: IV analgesics, Multi-modal analgesia.   PONV prophylaxis: Ondansetron (or other 5HT-3), Dexamethasone or Solumedrol, Background Propofol Infusion, Scopolamine patch, Aprepitant     Comments:                Andrea Louis MD

## 2023-02-13 NOTE — OR NURSING
VSS. A&O. Pain tolerable. Lap sites CDI. Chetna PO, no n/v. DC instructions, meds and follow up reviewed with pt and daughter. Questions answered, able to teachback. DC to home with daughter.

## 2023-02-13 NOTE — DISCHARGE INSTRUCTIONS
[facility]  Discharge Instructions: Post-Operative Cholecystectomy    ACTIVITY  Expect to feel tired after your surgery.  This will gradually resolve.    Take frequent, short walks and increase your activity gradually.    Avoid strenuous physical activity or heavy lifting greater than 15-20 lbs. for 2-3 weeks.  You may climb stairs.  You may drive without restrictions when you are not using any prescription pain medication and feel comfortable in a car.  You may return to work/school when you are comfortable without any prescription pain medication.    WOUND CARE  You may remove your outer dressing or Band-Aids and shower 48 hours after the surgery.  Pat your incisions dry and leave them open to air.  Re-apply dressing (Band-Aids or gauze/tape) as needed for comfort or drainage.  You may have steri-strips (looks like white tape) on your incision.  You may peel off the steri-strips 2 weeks after your surgery if they have not peeled off on their own.  Do not soak your incisions in a tub or pool for 2 weeks.   Do not apply any lotions, creams, or ointments to your incisions.  A ridge under your incisions is normal and will gradually resolve.    DIET  Start with liquids, then gradually resume your regular diet as tolerated.  Avoid heavy, spicy, and greasy meals for 2-3 days.  Drink plenty of fluids to stay hydrated.  It is not uncommon to experience some loose stools or diarrhea after surgery.  This is your body's way of adapting to the bile which will slowly drain into your intestine.  A low fat diet may help with this.  This should improve over 1-2 months.    PAIN  Expect some tenderness and discomfort at the incision sites.  Use the prescribed pain medication at your discretion.  Expect gradual resolution of your pain over several days.  You may take ibuprofen with food (unless you have been told not to) or acetaminophen/Tylenol instead of or in addition to your prescribed pain  medication.  Do not drink alcohol or drive while you are taking pain medications.  You may apply ice to your incisions in 20 minute intervals as needed for the next 48 hours.  After that time, consider switching to heat if you prefer.    EXPECTATIONS  Pain medications can cause constipation.  Limit use when possible and drink plenty of water. Prune juice is also helpful for constipation.  You may take the prescribed Miralax once or twice daily as needed for constipation. You may also use Senna-Docusate, Milk of Magnesia, and/or a Dulcolax suppository (all available over the counter) if needed.  You may discontinue these medications once you are having regular bowel movements and/or are no longer taking your narcotic pain medication.    You may have shoulder or upper back discomfort due to the gas used in surgery.  This is temporary and should resolve in 48-72 hours.  Short, frequent walks may help with this.  If you are unable to urinate for 8 hours or feel as though you are not emptying your bladder adequately, we recommend you seek care at an ER or Urgent Care facility for possible catheter placement.     FOLLOW UP  Our office will contact you in approximately 2-3 weeks to check on your progress and answer any questions you may have.  If you are doing well, you will not need to return for a follow up appointment.  If any concerns are identified over the phone, we will help you make an appointment to see a provider.   If you have not received a phone call, have any questions or concerns, or would like to be seen, please call us at [phone] and ask to speak with our nurse.  We are located at [address] [place] [address].    CALL OUR OFFICE -[phone] IF YOU HAVE:   Chills or fever above 101 F.  Increased redness, warmth, or drainage at your incisions.  Significant bleeding.  Pain not relieved by your pain medication or rest.  Increasing pain after the first 48 hours.  Any other  concerns or questions.                               Same Day Surgery Discharge Instructions for  Sedation and General Anesthesia     It's not unusual to feel dizzy, light-headed or faint for up to 24 hours after surgery or while taking pain medication.  If you have these symptoms: sit for a few minutes before standing and have someone assist you when you get up to walk or use the bathroom.    You should rest and relax for the next 24 hours. We recommend you make arrangements to have an adult stay with you for at least 24 hours after your discharge.  Avoid hazardous and strenuous activity.    DO NOT DRIVE any vehicle or operate mechanical equipment for 24 hours following the end of your surgery.  Even though you may feel normal, your reactions may be affected by the medication you have received.    Do not drink alcoholic beverages for 24 hours following surgery.     Slowly progress to your regular diet as you feel able. It's not unusual to feel nauseated and/or vomit after receiving anesthesia.  If you develop these symptoms, drink clear liquids (apple juice, ginger ale, broth, 7-up, etc. ) until you feel better.  If your nausea and vomiting persists for 24 hours, please notify your surgeon.      All narcotic pain medications, along with inactivity and anesthesia, can cause constipation. Drinking plenty of liquids and increasing fiber intake will help.    For any questions of a medical nature, call your surgeon.    Do not make important decisions for 24 hours.    If you had general anesthesia, you may have a sore throat for a couple of days related to the breathing tube used during surgery.  You may use Cepacol lozenges to help with this discomfort.  If it worsens or if you develop a fever, contact your surgeon.     If you feel your pain is not well managed with the pain medications prescribed by your surgeon, please contact your surgeon's office to let them know so they can address your concerns.     **If you have  questions or concerns about your procedure,  call Dr. Gill at [phone]**          Information for Patients Discharging with a Transderm Scopolamine Patch     Dry mouth is a common side effect.  Drowsiness is another common side effect especially when combined with pain medication.  Please avoid activities that require mental alertness such as driving a car or making important legal decisions.  Since Scopolamine can cause temporary dilation of the pupils and blurred vision if it comes in contact with the eyes; be sure to wash your hands thoroughly with soap and water immediately after handling the patch.   When you remove your patch, please stick it to a tissue or paper towel for disposal.    Remove the patch immediately and contact a physician in the unlikely event that you experience symptoms of acute glaucoma (pain and reddening of the eyes, accompanied by dilated pupils).  Remove the patch if you develop any difficulties urinating.  If you cannot urinate after removing your patch, please notify your surgeon.  Remove the patch 24 hours after surgery.

## 2023-02-15 LAB
PATH REPORT.COMMENTS IMP SPEC: NORMAL
PATH REPORT.COMMENTS IMP SPEC: NORMAL
PATH REPORT.FINAL DX SPEC: NORMAL
PATH REPORT.GROSS SPEC: NORMAL
PATH REPORT.MICROSCOPIC SPEC OTHER STN: NORMAL
PATH REPORT.RELEVANT HX SPEC: NORMAL
PHOTO IMAGE: NORMAL

## 2023-02-21 ENCOUNTER — LAB (OUTPATIENT)
Dept: ONCOLOGY | Facility: CLINIC | Age: 63
End: 2023-02-21
Attending: INTERNAL MEDICINE
Payer: COMMERCIAL

## 2023-02-21 DIAGNOSIS — E83.110 HEREDITARY HEMOCHROMATOSIS (H): ICD-10-CM

## 2023-02-21 LAB — FERRITIN SERPL-MCNC: 96 NG/ML (ref 11–328)

## 2023-02-21 PROCEDURE — 82728 ASSAY OF FERRITIN: CPT | Performed by: INTERNAL MEDICINE

## 2023-02-21 PROCEDURE — 36415 COLL VENOUS BLD VENIPUNCTURE: CPT

## 2023-02-22 NOTE — RESULT ENCOUNTER NOTE
"Result reviewed, result normal or expected, please call patient  \"Polyp\" was in fact a stone which sometimes is the case, nothing to worry about moving forward  "

## 2023-02-23 ENCOUNTER — TELEPHONE (OUTPATIENT)
Dept: SURGERY | Facility: CLINIC | Age: 63
End: 2023-02-23
Payer: COMMERCIAL

## 2023-02-23 NOTE — TELEPHONE ENCOUNTER
"Procedure: laparoscopic cholecystectomy    Date: 2/13/2023    Surgeon: Jairo    Called patient to discuss pathology results:    Final Diagnosis   Gallbladder, cholecystectomy:  -Acute on chronic cholecystitis with cholelithiasis     Per Dr. Gill:  \"Polyp\" was in fact a stone which sometimes is the case, nothing to worry about moving forward     Patient verbalized understanding of this, happy to hear that it was nothing concerning    No further questions    She will call PREVERETT Humphrey RN-BSN    "

## 2023-03-01 ENCOUNTER — TELEPHONE (OUTPATIENT)
Dept: SURGERY | Facility: CLINIC | Age: 63
End: 2023-03-01
Payer: COMMERCIAL

## 2023-03-01 NOTE — TELEPHONE ENCOUNTER
Patient Name: Bradford Prado  Today's Date: 03/01/23    Bradford was called today for routine postop check. She underwent laparoscopic cholecystectomy with Dr. Gill on 2/13/23. Today Bradford tells me that she is doing very well. She is no longer taking any narcotic pain medications, she only used those for a couple days after surgery. She is eating and drinking fine, moving her bowels well. She feels better than she did prior to surgery, has not been having the nausea like she used to experience. We reviewed her surgical pathology which showed a gallstone and acute/chronic cholecystitis, no polyp, no malignancy.  Bradford is doing well today, she has no further questions. I expect her to continue to make a full recovery. She will follow-up with us on an as-needed basis going forward.      Erin Dominguez PA-C  [facility]  [phone]

## 2023-03-14 ENCOUNTER — LAB (OUTPATIENT)
Dept: LAB | Facility: CLINIC | Age: 63
End: 2023-03-14
Payer: COMMERCIAL

## 2023-03-14 DIAGNOSIS — E83.110 HEREDITARY HEMOCHROMATOSIS (H): ICD-10-CM

## 2023-03-14 LAB — FERRITIN SERPL-MCNC: 70 NG/ML (ref 11–328)

## 2023-03-14 PROCEDURE — 36415 COLL VENOUS BLD VENIPUNCTURE: CPT

## 2023-03-14 PROCEDURE — 82728 ASSAY OF FERRITIN: CPT

## 2023-03-15 NOTE — PROGRESS NOTES
[facility]    Hematology/Oncology Established Patient Follow-up Note    Treatment Summary:      Today's Date: 3/16/23    Reason for Follow-up: Hereditary Hemochromatosis  Referring Provider: Nimco Travis NP      HISTORY OF PRESENT ILLNESS: Bradford Prado is a 62 year old female with past medical history of cirrhosis, CERDA, HTN, HLD, and DM2 who is referred to clinic today for C282Y/C282Y hereditary hemochromatosis. She was previously followed by Dr. Long in [place].     Patient has family history of severe liver disease and recalls mother entering acute liver failure with need for repeat thoracenteses. Patient had then developed increasing LFTs and cirrhosis and workup for this included liver biopsy, which revealed cirrhosis and negative alpha 1 antitrypsin mutation testing. Record review shows patient has had evidence of elevated ferritin levels as high as >1500 in 2016. She underwent testing for hereditary hemochromatosis in May 2021 and returned positive as homozygous C282Y/C282Y.      Patient was then treated with phlebotomies twice per week for two months. Her most recent ferritin level was in August 2021 and 101.      Patient does have various hand arthralgias and follows with a hand surgeon for this with intermittent hand Xrays and steroid injections. She is a diabetic. No history of endocrinopathies or heart disease she is aware of.      12/13/21 evaluated by General Surgery. No acute intervention.     Phlebotomy 12/15. Ferritin had decreased from 179 to 93, currently. Energy levels are stable. She still has diffuse arthralgias and takes PRN NSAIDS.       INTERIM HISTORY:  She had cholecystectomy. She also had bone spur surgery of the left wrist. She has not required phlebotomy since 11/2022.       REVIEW OF SYSTEMS:   A 14 point ROS was reviewed with pertinent positives and negatives in the HPI.       HOME MEDICATIONS:  Current Outpatient Medications   Medication Sig Dispense  Refill     ACCU-CHEK GUIDE test strip Use to test blood sugar 4 times daily or as directed. (Patient not taking: Reported on 2/10/2023) 200 strip 11     acetaminophen (TYLENOL) 325 MG tablet Take 325-650 mg by mouth every 6 hours as needed for mild pain       amLODIPine (NORVASC) 5 MG tablet TAKE ONE TABLET BY MOUTH TWICE A  tablet 3     BD NATALIYA U/F 32G X 4 MM insulin pen needle USE 4 TIMES DAILY 200 each 1     Biotin 10 MG CAPS Take 1 capsule by mouth daily       blood glucose (NO BRAND SPECIFIED) lancets standard Use to test blood sugar twice times daily or as directed. (Patient not taking: Reported on 2/10/2023) 100 each 1     blood glucose (NO BRAND SPECIFIED) test strip Use to test blood sugar twice times daily or as directed. (Patient not taking: Reported on 2/10/2023) 100 each 1     blood glucose (NO BRAND SPECIFIED) test strip Use to test blood sugars 2 times daily or as directed (Patient not taking: Reported on 2/10/2023) 100 strip 11     blood glucose monitoring (SOFTCLIX) lancets Use to test blood sugar 6 times daily. (Patient not taking: Reported on 2/10/2023) 200 each 11     Blood Glucose Monitoring Suppl (ACCU-CHEK GUIDE ME) w/Device KIT 1 each 6 times daily (Patient not taking: Reported on 2/10/2023) 1 kit 0     carvedilol (COREG) 12.5 MG tablet TAKE ONE TABLET BY MOUTH TWICE A DAY WITH MEALS 180 tablet 1     Continuous Blood Gluc Sensor (FREESTYLE MEENA 2 SENSOR) MISC CHANGE EVERY 14 DAYS. USE TO CHECK BLOOD SUGARS PER  GUIDELINES 6 each 2     insulin aspart (NOVOLOG FLEXPEN) 100 UNIT/ML pen INJECT 10 UNITS UNDER THE SKIN THREE TIMES DAILY WITH MEALS. MAXIMUM OF 30 UNITS DAILY. 30 mL 1     insulin glargine (LANTUS SOLOSTAR) 100 UNIT/ML pen INJECT 42 UNITS SUBCUTANEOUS EVERY MORNING (BEFORE BREAKFAST) (Patient taking differently: INJECT 42 UNITS SUBCUTANEOUS EVERY MORNING (BEFORE BREAKFAST)) 45 mL 1     losartan (COZAAR) 50 MG tablet TAKE ONE TABLET BY MOUTH ONCE DAILY 90 tablet 0      mupirocin (BACTROBAN) 2 % external ointment Apply topically 3 times daily 1 g 0     ondansetron (ZOFRAN) 4 MG tablet Take 1 tablet (4 mg) by mouth every 8 hours as needed for nausea 20 tablet 1     oxyCODONE (ROXICODONE) 5 MG tablet Take 1-2 tablets (5-10 mg) by mouth every 4 hours as needed for moderate to severe pain 10 tablet 0     pantoprazole (PROTONIX) 40 MG EC tablet Take 1 tablet (40 mg) by mouth daily 30 tablet 3     polyethylene glycol (MIRALAX) 17 GM/Dose powder Take 17 g by mouth daily as needed for constipation (after surgery) 510 g 0     pravastatin (PRAVACHOL) 20 MG tablet Take 1 tablet (20 mg) by mouth daily 90 tablet 3     sertraline (ZOLOFT) 50 MG tablet TAKE ONE TABLET BY MOUTH ONCE DAILY 90 tablet 0     Turmeric (QC TUMERIC COMPLEX PO)            ALLERGIES:  No Known Allergies      PAST MEDICAL HISTORY:  Past Medical History:   Diagnosis Date     Chest pain 2/23/2021     Diabetes mellitus (H)      Dyslipidemia      Hereditary hemochromatosis (H)      Hyperglycemia 2/23/2021     Hypertension      Hypertensive urgency 2/23/2021     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) (H)      Nephrolithiasis 9/19/2016    First episode September 2016         PAST SURGICAL HISTORY:  Past Surgical History:   Procedure Laterality Date     ESOPHAGOSCOPY, GASTROSCOPY, DUODENOSCOPY (EGD), COMBINED N/A 11/10/2021    Procedure: ESOPHAGOGASTRODUODENOSCOPY, WITH BIOPSY;  Surgeon: Leventhal, Thomas Michael, MD;  [place] [facility] [place]     HYSTERECTOMY TOTAL ABDOMINAL  1995    due to fibroids     LAPAROSCOPIC CHOLECYSTECTOMY N/A 2/13/2023    Procedure: Laparoscopic cholecystectomy;  Surgeon: Eber Gill MD;  Location:  OR     LAPAROSCOPIC EVACUATION ECTOPIC PREGNANCY       TUBAL LIGATION           SOCIAL HISTORY:  Social History     Socioeconomic History     Marital status: Single     Spouse name: Not on file     Number of children: Not on file     Years of education: Not on file      "Highest education level: Not on file   Occupational History     Not on file   Tobacco Use     Smoking status: Former     Packs/day: 0.50     Years: 20.00     Pack years: 10.00     Types: Cigarettes     Quit date: 3/9/2010     Years since quittin.0     Smokeless tobacco: Never   Vaping Use     Vaping Use: Never used   Substance and Sexual Activity     Alcohol use: No     Alcohol/week: 0.0 standard drinks     Drug use: No     Sexual activity: Never   Other Topics Concern     Parent/sibling w/ CABG, MI or angioplasty before 65F 55M? No   Social History Narrative    , 2 children, 2 grandchildren     Social Determinants of Health     Financial Resource Strain: Not on file   Food Insecurity: Not on file   Transportation Needs: Not on file   Physical Activity: Not on file   Stress: Not on file   Social Connections: Not on file   Intimate Partner Violence: Not At Risk     Fear of Current or Ex-Partner: No     Emotionally Abused: No     Physically Abused: No     Sexually Abused: No   Housing Stability: Not on file         FAMILY HISTORY:  Family History   Problem Relation Age of Onset     Cirrhosis Mother         w/o alcohol history     Diabetes Mother      Emphysema Father      Hemochromatosis Brother      ALS Brother      Hemochromatosis Sister      Hemochromatosis Sister      Hypertension Sister      Hemochromatosis Sister      Heart Defect Niece         Tetrology of Fallot     Breast Cancer No family hx of      Cancer - colorectal No family hx of          PHYSICAL EXAM:  Vital signs:  /78   Pulse 67   Temp 97.7  F (36.5  C) (Tympanic)   Resp 16   Ht 1.499 m (4' 11\")   Wt 66 kg (145 lb 6.4 oz)   LMP  (LMP Unknown)   SpO2 97%   BMI 29.37 kg/m     ECO-1  GENERAL/CONSTITUTIONAL: No acute distress. Bronzed skin.  EYES: Pupils are equal, round, and react to light and accommodation. Extraocular movements intact.  No scleral icterus.  RESPIRATORY: Clear to auscultation bilaterally. No crackles or " wheezing.   CARDIOVASCULAR: Regular rate and rhythm without murmurs, gallops, or rubs.  GASTROINTESTINAL: No hepatosplenomegaly, masses, or tenderness. The patient has normal bowel sounds. No guarding.  No distention.  MUSCULOSKELETAL: Warm and well-perfused, no cyanosis, clubbing, or edema.  NEUROLOGIC: Cranial nerves II-XII are intact. Alert, oriented, answers questions appropriately.  INTEGUMENTARY: No rashes or jaundice.  GAIT: Steady, does not use assistive device.      LABS:     Latest Reference Range & Units 05/12/22 08:07 07/13/22 13:11 11/15/22 14:37 11/28/22 12:48 12/13/22 12:34 01/10/23 12:15 01/24/23 12:39 02/07/23 12:13 02/21/23 11:59 03/14/23 10:45   Ferritin 11 - 328 ng/mL 71 68 244 88 63 60 57 61 96 70        Latest Reference Range & Units 11/15/22 14:37   AFP tumor marker <=8.3 ng/mL 3.4         Ref. Range 11/18/2021 12:09   Alpha Fetoprotein Latest Ref Range: 0.0 - 8.0 ug/L 4.7             Ref. Range 9/27/2016 11:10 6/23/2021 10:45   Hep B Surface Agn Latest Ref Range: NR  Nonreactive     Hepatitis B Surface Antibody Latest Ref Range: <8.00 m[IU]/mL 0.22     Hepatitis C Antibody Latest Ref Range: NR  Nonreactive...     HIV Antigen Antibody Combo Latest Ref Range: NR^Nonreactive       Nonreactive         PATHOLOGY:  5/10/21: TEST(S) REQUESTED:   Hemochromatosis Mutation Analysis by PCR     SPECIMEN DESCRIPTION:   Blood     HEMOCHROMATOSIS RESULTS     HFE Gene C282Y (G845A) RESULTS:     C282Y Mutation Interpretation: HOMOZYGOTE     HFE Gene H63D (C187G) RESULTS:     H63D Mutation Interpretation: NORMAL     HFE Gene S65C (A193T) RESULTS:     S65C Mutation Interpretation: NORMAL      Indication for testing: Carrier screening or diagnostic   testing for alpha-1-antitrypsin (AAT) deficiency.   Negative: This sample has a serum AAT protein concentration   in the normal range and is negative for the S and Z   deficiency alleles by genotyping. 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                          - Small (< 5 mm) esophageal varices.                          - Gastritis. Biopsied.                          - Normal examined duodenum.      Final Diagnosis   A. STOMACH, BIOPSY:  - Gastric mucosa with features of reactive gastropathy   - No H. pylori organisms identified on routine staining  - Negative for intestinal metaplasia and dysplasia        Case Report   Surgical Pathology Report                         Case: [id number]                                   Authorizing Provider:  Eber Gill MD Collected:           02/13/2023 11:26 AM           Ordering Location:     [facility]          Received:            02/13/2023 12:35 PM                                  [facility] OR                                                             Pathologist:           Marnie De La Rosa MD                                                                            Specimen:    Gallbladder                                                                                Final Diagnosis   Gallbladder, cholecystectomy:  -Acute on chronic cholecystitis with cholelithiasis            IMAGING:  ULTRASOUND ABDOMEN LIMITED November 24, 2021   IMPRESSION:  1.  Coarse increased echogenicity of the liver compatible with  steatosis and/or cirrhosis.  2.  1 cm gallbladder polyp.  3.  No hepatoma demonstrated.    US ABDOMEN LIMITED  5/5/2022  FINDINGS:     GALLBLADDER: Gallbladder polyp measuring 1 cm again noted and  unchanged. No gallstones, wall thickening, or pericholecystic fluid.  Negative sonographic Clark's sign.     BILE DUCTS: There is no biliary dilatation. The common duct measures 4  mm.     LIVER: Unremarkable where seen.                                                                     IMPRESSION:  1.  Stable gallbladder polyp.        ASSESSMENT/PLAN:  Bradford Prado is a 62 year old female with past medical history of cirrhosis, CERDA, HTN, HLD, and DM2 who is referred to clinic today for C282Y/C282Y hereditary hemochromatosis. She was previously followed by Dr. Long in [place].     1) Homozygous C282Y/C282Y hereditary hemochromatosis Patient diagnosed just in May 2021. Her last phlebotomy was in August 2021 (ferritin 101). Ferritin 179, which decreased to 93 with phlebotomy x1 on 12/15/21.  -Ferritin has increased to 244. She has phlebotomy scheduled today for 500 ml removal; will then schedule every 2 weeks x3 months for phlebotomy if ferritin is >100.  -Continue yearly liver ultrasound and AFP (3.4 as of 11/2022).     2) History of cirrhosis/CERDA  -See plan above.   -EGD last done in 11/2021. Next due in 2023.      3) HTN, HLD, DM2  -As followed by PCP.     4) -No phlebotomy today.  -Recheck ferritin in 4 months prior to follow up in clinic with possible phlebotomy.   -Liver US and AFP will be due in 11/2023.               Rochelle Girard DO  Hematology/Oncology  [facility]

## 2023-03-16 ENCOUNTER — ONCOLOGY VISIT (OUTPATIENT)
Dept: ONCOLOGY | Facility: CLINIC | Age: 63
End: 2023-03-16
Attending: INTERNAL MEDICINE
Payer: COMMERCIAL

## 2023-03-16 VITALS
BODY MASS INDEX: 29.31 KG/M2 | TEMPERATURE: 97.7 F | SYSTOLIC BLOOD PRESSURE: 139 MMHG | OXYGEN SATURATION: 97 % | HEIGHT: 59 IN | RESPIRATION RATE: 16 BRPM | DIASTOLIC BLOOD PRESSURE: 78 MMHG | WEIGHT: 145.4 LBS | HEART RATE: 67 BPM

## 2023-03-16 DIAGNOSIS — E83.110 HEREDITARY HEMOCHROMATOSIS (H): Primary | ICD-10-CM

## 2023-03-16 PROCEDURE — 99213 OFFICE O/P EST LOW 20 MIN: CPT | Performed by: INTERNAL MEDICINE

## 2023-03-16 PROCEDURE — G0463 HOSPITAL OUTPT CLINIC VISIT: HCPCS | Performed by: INTERNAL MEDICINE

## 2023-03-16 ASSESSMENT — PAIN SCALES - GENERAL: PAINLEVEL: SEVERE PAIN (7)

## 2023-03-16 NOTE — LETTER
3/16/2023         RE: Bradford Prado  [address]  [facility] [address]        Dear Colleague,    Thank you for referring your patient, Bradford Prado, to the [facility]. Please see a copy of my visit note below.    [facility]    Hematology/Oncology Established Patient Follow-up Note    Treatment Summary:      Today's Date: 3/16/23    Reason for Follow-up: Hereditary Hemochromatosis  Referring Provider: Nimco Travis NP      HISTORY OF PRESENT ILLNESS: Bradford Prado is a 62 year old female with past medical history of cirrhosis, CERDA, HTN, HLD, and DM2 who is referred to clinic today for C282Y/C282Y hereditary hemochromatosis. She was previously followed by Dr. Long in [place].     Patient has family history of severe liver disease and recalls mother entering acute liver failure with need for repeat thoracenteses. Patient had then developed increasing LFTs and cirrhosis and workup for this included liver biopsy, which revealed cirrhosis and negative alpha 1 antitrypsin mutation testing. Record review shows patient has had evidence of elevated ferritin levels as high as >1500 in 2016. She underwent testing for hereditary hemochromatosis in May 2021 and returned positive as homozygous C282Y/C282Y.      Patient was then treated with phlebotomies twice per week for two months. Her most recent ferritin level was in August 2021 and 101.      Patient does have various hand arthralgias and follows with a hand surgeon for this with intermittent hand Xrays and steroid injections. She is a diabetic. No history of endocrinopathies or heart disease she is aware of.      12/13/21 evaluated by General Surgery. No acute intervention.     Phlebotomy 12/15. Ferritin had decreased from 179 to 93, currently. Energy levels are stable. She still has diffuse arthralgias and takes PRN NSAIDS.       INTERIM HISTORY:  She had cholecystectomy. She also had bone spur surgery of  the left wrist. She has not required phlebotomy since 11/2022.       REVIEW OF SYSTEMS:   A 14 point ROS was reviewed with pertinent positives and negatives in the HPI.       HOME MEDICATIONS:  Current Outpatient Medications   Medication Sig Dispense Refill     ACCU-CHEK GUIDE test strip Use to test blood sugar 4 times daily or as directed. (Patient not taking: Reported on 2/10/2023) 200 strip 11     acetaminophen (TYLENOL) 325 MG tablet Take 325-650 mg by mouth every 6 hours as needed for mild pain       amLODIPine (NORVASC) 5 MG tablet TAKE ONE TABLET BY MOUTH TWICE A  tablet 3     BD NATALIYA U/F 32G X 4 MM insulin pen needle USE 4 TIMES DAILY 200 each 1     Biotin 10 MG CAPS Take 1 capsule by mouth daily       blood glucose (NO BRAND SPECIFIED) lancets standard Use to test blood sugar twice times daily or as directed. (Patient not taking: Reported on 2/10/2023) 100 each 1     blood glucose (NO BRAND SPECIFIED) test strip Use to test blood sugar twice times daily or as directed. (Patient not taking: Reported on 2/10/2023) 100 each 1     blood glucose (NO BRAND SPECIFIED) test strip Use to test blood sugars 2 times daily or as directed (Patient not taking: Reported on 2/10/2023) 100 strip 11     blood glucose monitoring (SOFTCLIX) lancets Use to test blood sugar 6 times daily. (Patient not taking: Reported on 2/10/2023) 200 each 11     Blood Glucose Monitoring Suppl (ACCU-CHEK GUIDE ME) w/Device KIT 1 each 6 times daily (Patient not taking: Reported on 2/10/2023) 1 kit 0     carvedilol (COREG) 12.5 MG tablet TAKE ONE TABLET BY MOUTH TWICE A DAY WITH MEALS 180 tablet 1     Continuous Blood Gluc Sensor (FREESTYLE MEENA 2 SENSOR) MISC CHANGE EVERY 14 DAYS. USE TO CHECK BLOOD SUGARS PER  GUIDELINES 6 each 2     insulin aspart (NOVOLOG FLEXPEN) 100 UNIT/ML pen INJECT 10 UNITS UNDER THE SKIN THREE TIMES DAILY WITH MEALS. MAXIMUM OF 30 UNITS DAILY. 30 mL 1     insulin glargine (LANTUS SOLOSTAR) 100 UNIT/ML  pen INJECT 42 UNITS SUBCUTANEOUS EVERY MORNING (BEFORE BREAKFAST) (Patient taking differently: INJECT 42 UNITS SUBCUTANEOUS EVERY MORNING (BEFORE BREAKFAST)) 45 mL 1     losartan (COZAAR) 50 MG tablet TAKE ONE TABLET BY MOUTH ONCE DAILY 90 tablet 0     mupirocin (BACTROBAN) 2 % external ointment Apply topically 3 times daily 1 g 0     ondansetron (ZOFRAN) 4 MG tablet Take 1 tablet (4 mg) by mouth every 8 hours as needed for nausea 20 tablet 1     oxyCODONE (ROXICODONE) 5 MG tablet Take 1-2 tablets (5-10 mg) by mouth every 4 hours as needed for moderate to severe pain 10 tablet 0     pantoprazole (PROTONIX) 40 MG EC tablet Take 1 tablet (40 mg) by mouth daily 30 tablet 3     polyethylene glycol (MIRALAX) 17 GM/Dose powder Take 17 g by mouth daily as needed for constipation (after surgery) 510 g 0     pravastatin (PRAVACHOL) 20 MG tablet Take 1 tablet (20 mg) by mouth daily 90 tablet 3     sertraline (ZOLOFT) 50 MG tablet TAKE ONE TABLET BY MOUTH ONCE DAILY 90 tablet 0     Turmeric (QC TUMERIC COMPLEX PO)            ALLERGIES:  No Known Allergies      PAST MEDICAL HISTORY:  Past Medical History:   Diagnosis Date     Chest pain 2/23/2021     Diabetes mellitus (H)      Dyslipidemia      Hereditary hemochromatosis (H)      Hyperglycemia 2/23/2021     Hypertension      Hypertensive urgency 2/23/2021     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) (H)      Nephrolithiasis 9/19/2016    First episode September 2016         PAST SURGICAL HISTORY:  Past Surgical History:   Procedure Laterality Date     ESOPHAGOSCOPY, GASTROSCOPY, DUODENOSCOPY (EGD), COMBINED N/A 11/10/2021    Procedure: ESOPHAGOGASTRODUODENOSCOPY, WITH BIOPSY;  Surgeon: Leventhal, Thomas Michael, MD;  Location: [facility] OR     HYSTERECTOMY TOTAL ABDOMINAL  1995    due to fibroids     LAPAROSCOPIC CHOLECYSTECTOMY N/A 2/13/2023    Procedure: Laparoscopic cholecystectomy;  Surgeon: Eber Gill MD;  Location:  OR     LAPAROSCOPIC  "EVACUATION ECTOPIC PREGNANCY       TUBAL LIGATION           SOCIAL HISTORY:  Social History     Socioeconomic History     Marital status: Single     Spouse name: Not on file     Number of children: Not on file     Years of education: Not on file     Highest education level: Not on file   Occupational History     Not on file   Tobacco Use     Smoking status: Former     Packs/day: 0.50     Years: 20.00     Pack years: 10.00     Types: Cigarettes     Quit date: 3/9/2010     Years since quittin.0     Smokeless tobacco: Never   Vaping Use     Vaping Use: Never used   Substance and Sexual Activity     Alcohol use: No     Alcohol/week: 0.0 standard drinks     Drug use: No     Sexual activity: Never   Other Topics Concern     Parent/sibling w/ CABG, MI or angioplasty before 65F 55M? No   Social History Narrative    , 2 children, 2 grandchildren     Social Determinants of Health     Financial Resource Strain: Not on file   Food Insecurity: Not on file   Transportation Needs: Not on file   Physical Activity: Not on file   Stress: Not on file   Social Connections: Not on file   Intimate Partner Violence: Not At Risk     Fear of Current or Ex-Partner: No     Emotionally Abused: No     Physically Abused: No     Sexually Abused: No   Housing Stability: Not on file         FAMILY HISTORY:  Family History   Problem Relation Age of Onset     Cirrhosis Mother         w/o alcohol history     Diabetes Mother      Emphysema Father      Hemochromatosis Brother      ALS Brother      Hemochromatosis Sister      Hemochromatosis Sister      Hypertension Sister      Hemochromatosis Sister      Heart Defect Niece         Tetrology of Fallot     Breast Cancer No family hx of      Cancer - colorectal No family hx of          PHYSICAL EXAM:  Vital signs:  /78   Pulse 67   Temp 97.7  F (36.5  C) (Tympanic)   Resp 16   Ht 1.499 m (4' 11\")   Wt 66 kg (145 lb 6.4 oz)   LMP  (LMP Unknown)   SpO2 97%   BMI 29.37 kg/m   "   ECO-1  GENERAL/CONSTITUTIONAL: No acute distress. Bronzed skin.  EYES: Pupils are equal, round, and react to light and accommodation. Extraocular movements intact.  No scleral icterus.  RESPIRATORY: Clear to auscultation bilaterally. No crackles or wheezing.   CARDIOVASCULAR: Regular rate and rhythm without murmurs, gallops, or rubs.  GASTROINTESTINAL: No hepatosplenomegaly, masses, or tenderness. The patient has normal bowel sounds. No guarding.  No distention.  MUSCULOSKELETAL: Warm and well-perfused, no cyanosis, clubbing, or edema.  NEUROLOGIC: Cranial nerves II-XII are intact. Alert, oriented, answers questions appropriately.  INTEGUMENTARY: No rashes or jaundice.  GAIT: Steady, does not use assistive device.      LABS:     Latest Reference Range & Units 22 08:07 22 13:11 11/15/22 14:37 22 12:48 22 12:34 01/10/23 12:15 23 12:39 23 12:13 23 11:59 23 10:45   Ferritin 11 - 328 ng/mL 71 68 244 88 63 60 57 61 96 70        Latest Reference Range & Units 11/15/22 14:37   AFP tumor marker <=8.3 ng/mL 3.4         Ref. Range 2021 12:09   Alpha Fetoprotein Latest Ref Range: 0.0 - 8.0 ug/L 4.7             Ref. Range 2016 11:10 2021 10:45   Hep B Surface Agn Latest Ref Range: NR  Nonreactive     Hepatitis B Surface Antibody Latest Ref Range: <8.00 m[IU]/mL 0.22     Hepatitis C Antibody Latest Ref Range: NR  Nonreactive...     HIV Antigen Antibody Combo Latest Ref Range: NR^Nonreactive       Nonreactive         PATHOLOGY:  5/10/21: TEST(S) REQUESTED:   Hemochromatosis Mutation Analysis by PCR     SPECIMEN DESCRIPTION:   Blood     HEMOCHROMATOSIS RESULTS     HFE Gene C282Y (G845A) RESULTS:     C282Y Mutation Interpretation: HOMOZYGOTE     HFE Gene H63D (C187G) RESULTS:     H63D Mutation Interpretation: NORMAL     HFE Gene S65C (A193T) RESULTS:     S65C Mutation Interpretation: NORMAL      Indication for testing: Carrier screening or diagnostic   testing for  alpha-1-antitrypsin (AAT) deficiency.   Negative: This sample has a serum AAT protein concentration   in the normal range and is negative for the S and Z   deficiency alleles by genotyping. 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                          - Small (< 5 mm) esophageal varices.                          - Gastritis. Biopsied.                          - Normal examined duodenum.      Final Diagnosis   A. STOMACH, BIOPSY:  - Gastric mucosa with features of reactive gastropathy   - No H. pylori organisms identified on routine staining  - Negative for intestinal metaplasia and dysplasia        Case Report   Surgical Pathology Report                         Case: [id number]                                   Authorizing Provider:  Eber Gill MD Collected:           02/13/2023 11:26 AM           Ordering Location:     [facility]          Received:            02/13/2023 12:35 PM                                  [facility] OR                                                             Pathologist:           Marnie De La Rosa MD                                                                            Specimen:    Gallbladder                                                                                Final Diagnosis   Gallbladder, cholecystectomy:  -Acute on chronic cholecystitis with cholelithiasis            IMAGING:  ULTRASOUND  ABDOMEN LIMITED November 24, 2021   IMPRESSION:  1.  Coarse increased echogenicity of the liver compatible with  steatosis and/or cirrhosis.  2.  1 cm gallbladder polyp.  3.  No hepatoma demonstrated.    US ABDOMEN LIMITED 5/5/2022  FINDINGS:     GALLBLADDER: Gallbladder polyp measuring 1 cm again noted and  unchanged. No gallstones, wall thickening, or pericholecystic fluid.  Negative sonographic Clark's sign.     BILE DUCTS: There is no biliary dilatation. The common duct measures 4  mm.     LIVER: Unremarkable where seen.                                                                     IMPRESSION:  1.  Stable gallbladder polyp.        ASSESSMENT/PLAN:  Bradford Prado is a 62 year old female with past medical history of cirrhosis, CERDA, HTN, HLD, and DM2 who is referred to clinic today for C282Y/C282Y hereditary hemochromatosis. She was previously followed by Dr. Long in [place].     1) Homozygous C282Y/C282Y hereditary hemochromatosis Patient diagnosed just in May 2021. Her last phlebotomy was in August 2021 (ferritin 101). Ferritin 179, which decreased to 93 with phlebotomy x1 on 12/15/21.  -Ferritin has increased to 244. She has phlebotomy scheduled today for 500 ml removal; will then schedule every 2 weeks x3 months for phlebotomy if ferritin is >100.  -Continue yearly liver ultrasound and AFP (3.4 as of 11/2022).     2) History of cirrhosis/CERDA  -See plan above.   -EGD last done in 11/2021. Next due in 2023.      3) HTN, HLD, DM2  -As followed by PCP.     4) -No phlebotomy today.  -Recheck ferritin in 4 months prior to follow up in clinic with possible phlebotomy.   -Liver US and AFP will be due in 11/2023.               Rochelle Girard DO  Hematology/Oncology  [facility]            Again, thank you for allowing me to participate in the care of your patient.        Sincerely,        Rochelle Girard DO

## 2023-03-16 NOTE — NURSING NOTE
"Oncology Rooming Note    March 16, 2023 1:22 PM   Bradford Prado is a 62 year old female who presents for:    Chief Complaint   Patient presents with     Oncology Clinic Visit     Hereditary hemochromatosis      Initial Vitals: /78   Pulse 67   Temp 97.7  F (36.5  C) (Tympanic)   Resp 16   Ht 1.499 m (4' 11\")   Wt 66 kg (145 lb 6.4 oz)   LMP  (LMP Unknown)   SpO2 97%   BMI 29.37 kg/m   Estimated body mass index is 29.37 kg/m  as calculated from the following:    Height as of this encounter: 1.499 m (4' 11\").    Weight as of this encounter: 66 kg (145 lb 6.4 oz). Body surface area is 1.66 meters squared.  Severe Pain (7) Comment: Data Unavailable   No LMP recorded (lmp unknown). Patient has had a hysterectomy.  Allergies reviewed: Yes  Medications reviewed: Yes    Medications: Medication refills not needed today.  Pharmacy name entered into [employer]:    [facility] [place], [place] - [address] [place] [address] [facility]ARM[facility]Y [place], [place] - [address] [place] [address] [phone]    Clinical concerns: f/u       Beryl Molina CMA              "

## 2023-03-20 ENCOUNTER — TRANSFERRED RECORDS (OUTPATIENT)
Dept: HEALTH INFORMATION MANAGEMENT | Facility: CLINIC | Age: 63
End: 2023-03-20
Payer: COMMERCIAL

## 2023-03-27 ENCOUNTER — TRANSFERRED RECORDS (OUTPATIENT)
Dept: HEALTH INFORMATION MANAGEMENT | Facility: CLINIC | Age: 63
End: 2023-03-27
Payer: COMMERCIAL

## 2023-03-31 DIAGNOSIS — K21.00 GASTROESOPHAGEAL REFLUX DISEASE WITH ESOPHAGITIS WITHOUT HEMORRHAGE: ICD-10-CM

## 2023-04-03 RX ORDER — PANTOPRAZOLE SODIUM 40 MG/1
TABLET, DELAYED RELEASE ORAL
Qty: 30 TABLET | Refills: 0 | Status: SHIPPED | OUTPATIENT
Start: 2023-04-03 | End: 2023-05-02

## 2023-04-04 DIAGNOSIS — E11.65 POORLY CONTROLLED TYPE 2 DIABETES MELLITUS WITH RENAL COMPLICATION (H): Chronic | ICD-10-CM

## 2023-04-04 DIAGNOSIS — E11.29 POORLY CONTROLLED TYPE 2 DIABETES MELLITUS WITH RENAL COMPLICATION (H): Chronic | ICD-10-CM

## 2023-04-04 DIAGNOSIS — E11.65 TYPE 2 DIABETES MELLITUS WITH HYPERGLYCEMIA, WITHOUT LONG-TERM CURRENT USE OF INSULIN (H): ICD-10-CM

## 2023-04-05 RX ORDER — INSULIN GLARGINE 100 [IU]/ML
INJECTION, SOLUTION SUBCUTANEOUS
Qty: 45 ML | Refills: 1 | Status: SHIPPED | OUTPATIENT
Start: 2023-04-05 | End: 2023-05-02

## 2023-04-05 RX ORDER — INSULIN ASPART 100 [IU]/ML
INJECTION, SOLUTION INTRAVENOUS; SUBCUTANEOUS
Qty: 30 ML | Refills: 1 | Status: SHIPPED | OUTPATIENT
Start: 2023-04-05 | End: 2023-08-25

## 2023-04-05 NOTE — TELEPHONE ENCOUNTER
"Prescription approved per [facility] Refill Protocol.    Pending Prescriptions:                       Disp   Refills    NOVOLOG FLEXPEN 100 UNIT/ML soln [Pharmac*30 mL  1            Sig: INJECT 10 UNITS UNDER THE SKIN THREE TIMES DAILY           WITH MEALS. MAXIMUM OF 30 UNITS DAILY.    LANTUS SOLOSTAR 100 UNIT/ML soln [Pharmac*45 mL  1            Sig: INJECT 42 UNITS SUBCUTANEOUS EVERY MORNING           (BEFORE BREAKFAST)      Requested Prescriptions   Pending Prescriptions Disp Refills     NOVOLOG FLEXPEN 100 UNIT/ML soln [Pharmacy Med Name: NOVOLOG FLEXPEN 100UNIT/ML SOPN] 30 mL 1     Sig: INJECT 10 UNITS UNDER THE SKIN THREE TIMES DAILY WITH MEALS. MAXIMUM OF 30 UNITS DAILY.       Short Acting Insulin Protocol Passed - 4/4/2023 10:22 AM        Passed - Serum creatinine on file in past 12 months     Recent Labs   Lab Test 02/10/23  1340   CR 0.69       Ok to refill medication if creatinine is low          Passed - HgbA1C in past 3 or 6 months     If HgbA1C is 8 or greater, it needs to be on file within the past 3 months.  If less than 8, must be on file within the past 6 months.     Recent Labs   Lab Test 02/10/23  1340   A1C 6.1*             Passed - Medication is active on med list        Passed - Patient is age 18 or older        Passed - Recent (6 mo) or future (30 days) visit within the authorizing provider's specialty     Patient had office visit in the last 6 months or has a visit in the next 30 days with authorizing provider or within the authorizing provider's specialty.  See \"Patient Info\" tab in inbasket, or \"Choose Columns\" in Meds & Orders section of the refill encounter.               LANTUS SOLOSTAR 100 UNIT/ML soln [Pharmacy Med Name: LANTUS SOLOSTAR 100UNIT/ML SOPN] 45 mL 1     Sig: INJECT 42 UNITS SUBCUTANEOUS EVERY MORNING (BEFORE BREAKFAST)       Long Acting Insulin Protocol Passed - 4/4/2023 10:22 AM        Passed - Serum creatinine on file in past 12 months     Recent Labs   Lab Test " "02/10/23  1340   CR 0.69       Ok to refill medication if creatinine is low          Passed - HgbA1C in past 3 or 6 months     If HgbA1C is 8 or greater, it needs to be on file within the past 3 months.  If less than 8, must be on file within the past 6 months.     Recent Labs   Lab Test 02/10/23  1340   A1C 6.1*             Passed - Medication is active on med list        Passed - Patient is age 18 or older        Passed - Recent (6 mo) or future (30 days) visit within the authorizing provider's specialty     Patient had office visit in the last 6 months or has a visit in the next 30 days with authorizing provider or within the authorizing provider's specialty.  See \"Patient Info\" tab in inbasket, or \"Choose Columns\" in Meds & Orders section of the refill encounter.               Juliana Bella RN on 4/5/2023 at 11:43 AM    "

## 2023-04-14 NOTE — PATIENT INSTRUCTIONS
Bradford is scheduled for the following:     - 07/26/23 labs  - 07/28/23 Dr. Girard follow up  - 07/28/23 possible phleb     Carlee Anderson RN on 4/14/2023 at 3:21 PM

## 2023-04-17 ENCOUNTER — TRANSFERRED RECORDS (OUTPATIENT)
Dept: HEALTH INFORMATION MANAGEMENT | Facility: CLINIC | Age: 63
End: 2023-04-17
Payer: COMMERCIAL

## 2023-05-01 DIAGNOSIS — K21.00 GASTROESOPHAGEAL REFLUX DISEASE WITH ESOPHAGITIS WITHOUT HEMORRHAGE: ICD-10-CM

## 2023-05-01 DIAGNOSIS — I10 ESSENTIAL HYPERTENSION WITH GOAL BLOOD PRESSURE LESS THAN 140/90: Chronic | ICD-10-CM

## 2023-05-01 DIAGNOSIS — E11.65 TYPE 2 DIABETES MELLITUS WITH HYPERGLYCEMIA, WITHOUT LONG-TERM CURRENT USE OF INSULIN (H): ICD-10-CM

## 2023-05-01 DIAGNOSIS — E11.29 POORLY CONTROLLED TYPE 2 DIABETES MELLITUS WITH RENAL COMPLICATION (H): Chronic | ICD-10-CM

## 2023-05-01 DIAGNOSIS — E11.65 POORLY CONTROLLED TYPE 2 DIABETES MELLITUS WITH RENAL COMPLICATION (H): Chronic | ICD-10-CM

## 2023-05-01 DIAGNOSIS — F43.22 ADJUSTMENT DISORDER WITH ANXIOUS MOOD: ICD-10-CM

## 2023-05-02 RX ORDER — LOSARTAN POTASSIUM 50 MG/1
TABLET ORAL
Qty: 90 TABLET | Refills: 0 | Status: SHIPPED | OUTPATIENT
Start: 2023-05-02 | End: 2023-08-02

## 2023-05-02 RX ORDER — INSULIN GLARGINE 100 [IU]/ML
INJECTION, SOLUTION SUBCUTANEOUS
Qty: 45 ML | Refills: 0 | Status: SHIPPED | OUTPATIENT
Start: 2023-05-02 | End: 2023-08-25

## 2023-05-02 RX ORDER — PANTOPRAZOLE SODIUM 40 MG/1
TABLET, DELAYED RELEASE ORAL
Qty: 90 TABLET | Refills: 0 | Status: SHIPPED | OUTPATIENT
Start: 2023-05-02 | End: 2023-08-02

## 2023-07-22 ENCOUNTER — HEALTH MAINTENANCE LETTER (OUTPATIENT)
Age: 63
End: 2023-07-22

## 2023-07-26 ENCOUNTER — LAB (OUTPATIENT)
Dept: LAB | Facility: CLINIC | Age: 63
End: 2023-07-26
Payer: COMMERCIAL

## 2023-07-26 DIAGNOSIS — E83.110 HEREDITARY HEMOCHROMATOSIS (H): ICD-10-CM

## 2023-07-26 LAB — FERRITIN SERPL-MCNC: 88 NG/ML (ref 11–328)

## 2023-07-26 PROCEDURE — 36415 COLL VENOUS BLD VENIPUNCTURE: CPT

## 2023-07-26 PROCEDURE — 82728 ASSAY OF FERRITIN: CPT

## 2023-07-28 ENCOUNTER — ONCOLOGY VISIT (OUTPATIENT)
Dept: ONCOLOGY | Facility: CLINIC | Age: 63
End: 2023-07-28
Attending: INTERNAL MEDICINE
Payer: COMMERCIAL

## 2023-07-28 ENCOUNTER — INFUSION THERAPY VISIT (OUTPATIENT)
Dept: INFUSION THERAPY | Facility: CLINIC | Age: 63
End: 2023-07-28
Attending: INTERNAL MEDICINE
Payer: COMMERCIAL

## 2023-07-28 VITALS
WEIGHT: 146.1 LBS | HEART RATE: 66 BPM | DIASTOLIC BLOOD PRESSURE: 72 MMHG | TEMPERATURE: 98 F | RESPIRATION RATE: 16 BRPM | SYSTOLIC BLOOD PRESSURE: 108 MMHG | OXYGEN SATURATION: 96 % | BODY MASS INDEX: 29.51 KG/M2

## 2023-07-28 VITALS
DIASTOLIC BLOOD PRESSURE: 67 MMHG | TEMPERATURE: 97.8 F | HEART RATE: 65 BPM | OXYGEN SATURATION: 98 % | RESPIRATION RATE: 16 BRPM | SYSTOLIC BLOOD PRESSURE: 117 MMHG

## 2023-07-28 DIAGNOSIS — E83.110 HEREDITARY HEMOCHROMATOSIS (H): ICD-10-CM

## 2023-07-28 DIAGNOSIS — E83.110 HEREDITARY HEMOCHROMATOSIS (H): Primary | ICD-10-CM

## 2023-07-28 PROCEDURE — 99214 OFFICE O/P EST MOD 30 MIN: CPT | Performed by: INTERNAL MEDICINE

## 2023-07-28 PROCEDURE — 99195 PHLEBOTOMY: CPT

## 2023-07-28 PROCEDURE — G0463 HOSPITAL OUTPT CLINIC VISIT: HCPCS | Performed by: INTERNAL MEDICINE

## 2023-07-28 RX ORDER — PREGABALIN 75 MG/1
CAPSULE ORAL
COMMUNITY
Start: 2023-06-28 | End: 2024-01-30

## 2023-07-28 RX ORDER — HEPARIN SODIUM,PORCINE 10 UNIT/ML
5 VIAL (ML) INTRAVENOUS
OUTPATIENT
Start: 2023-11-12

## 2023-07-28 RX ORDER — HEPARIN SODIUM (PORCINE) LOCK FLUSH IV SOLN 100 UNIT/ML 100 UNIT/ML
5 SOLUTION INTRAVENOUS
OUTPATIENT
Start: 2023-11-12

## 2023-07-28 ASSESSMENT — PAIN SCALES - GENERAL: PAINLEVEL: SEVERE PAIN (6)

## 2023-07-28 NOTE — PROGRESS NOTES
Infusion Nursing Note:  Bradford Prado presents today for Phlebotomy.    Patient seen by provider today: Yes: Dr. Rochelle Girard   present during visit today: Not Applicable.    Note: Bradford reports she is feeling well today.  She states she has eaten today and drank a fair amount of fluids.  Patient drank 500 ml of water during visit, declined snack.      Difficulty obtaining phlebotomy access today, required 4 attempts.  Access obtained in right lower arm, with sluggish blood flow.  Phlebotomy completed over 40 minutes, with 500 ml removed per order.  Patient denies dizziness or other symptoms.  Patient monitored for 30 minutes post procedure.  Vital signs remained stable and WNL.  Advised patient to rest and to increase PO fluid intake for next 24 hours.  She verbalized understanding.    Intravenous Access:  17g phlebotomy to right lower arm.    Treatment Conditions:   07/26/23 09:46   Ferritin 88     Results reviewed, labs MET treatment parameters, ok to proceed with treatment.    Post Phlebotomy Assessment:  Patient tolerated phlebotomy without incident.  Access discontinued per protocol.     Discharge Plan:   Discharge instructions reviewed with: Patient.  Patient and/or family verbalized understanding of discharge instructions and all questions answered.  AVS to patient via [employer].  Patient will be due to return 11/2023 for next appointment, not yet scheduled.  Patient discharged in stable condition accompanied by: self.  Departure Mode: Ambulatory.      Jose Eduardo Urbina RN

## 2023-07-28 NOTE — PROGRESS NOTES
"Oncology Rooming Note    July 28, 2023 11:13 AM   Bradford Prado is a 62 year old female who presents for:    Chief Complaint   Patient presents with    Oncology Clinic Visit     Initial Vitals: /72   Pulse 66   Temp 98  F (36.7  C) (Oral)   Resp 16   Wt 66.3 kg (146 lb 1.6 oz)   LMP  (LMP Unknown)   SpO2 96%   BMI 29.51 kg/m   Estimated body mass index is 29.51 kg/m  as calculated from the following:    Height as of 3/16/23: 1.499 m (4' 11\").    Weight as of this encounter: 66.3 kg (146 lb 1.6 oz). Body surface area is 1.66 meters squared.  Severe Pain (6) Comment: Data Unavailable   No LMP recorded (lmp unknown). Patient has had a hysterectomy.  Allergies reviewed: Yes  Medications reviewed: Yes    Medications: Medication refills not needed today.  Pharmacy name entered into [employer]:    [facility] [address] - [address] [place] [address] [phone]    Clinical concerns: follow up        Marianela Eaton          [facility]    Hematology/Oncology Established Patient Follow-up Note    Treatment Summary:      Today's Date: 7/28/23    Reason for Follow-up: Hereditary Hemochromatosis  Referring Provider: Nimco Travis NP      HISTORY OF PRESENT ILLNESS: Bradford Prado is a 62 year old female with past medical history of cirrhosis, CERDA, HTN, HLD, and DM2 who is referred to clinic today for C282Y/C282Y hereditary hemochromatosis. She was previously followed by Dr. Long in [place].     Patient has family history of severe liver disease and recalls mother entering acute liver failure with need for repeat thoracenteses. Patient had then developed increasing LFTs and cirrhosis and workup for this included liver biopsy, which revealed cirrhosis and negative alpha 1 antitrypsin mutation testing. Record review shows patient has had evidence of elevated ferritin levels as high as >1500 in " 2016. She underwent testing for hereditary hemochromatosis in May 2021 and returned positive as homozygous C282Y/C282Y.      Patient was then treated with phlebotomies twice per week for two months. Her most recent ferritin level was in August 2021 and 101.      Patient does have various hand arthralgias and follows with a hand surgeon for this with intermittent hand Xrays and steroid injections. She is a diabetic. No history of endocrinopathies or heart disease she is aware of.      12/13/21 evaluated by General Surgery. No acute intervention.     Phlebotomy 12/15. Ferritin had decreased from 179 to 93, currently. Energy levels are stable. She still has diffuse arthralgias and takes PRN NSAIDS.       INTERIM HISTORY:  She had cholecystectomy. She also had bone spur surgery of the left wrist. She has not required phlebotomy since 11/2022.     She has felt more sluggish in recent weeks. She wishes to move forward with phlebotomy and will update parameters to >50.      REVIEW OF SYSTEMS:   A 14 point ROS was reviewed with pertinent positives and negatives in the HPI.       HOME MEDICATIONS:  Current Outpatient Medications   Medication Sig Dispense Refill    ACCU-CHEK GUIDE test strip Use to test blood sugar 4 times daily or as directed. 200 strip 11    acetaminophen (TYLENOL) 325 MG tablet Take 325-650 mg by mouth every 6 hours as needed for mild pain      amLODIPine (NORVASC) 5 MG tablet TAKE ONE TABLET BY MOUTH TWICE A  tablet 3    BD NATALIYA U/F 32G X 4 MM insulin pen needle USE 4 TIMES DAILY 200 each 1    blood glucose (NO BRAND SPECIFIED) lancets standard Use to test blood sugar twice times daily or as directed. 100 each 1    blood glucose (NO BRAND SPECIFIED) test strip Use to test blood sugar twice times daily or as directed. 100 each 1    blood glucose (NO BRAND SPECIFIED) test strip Use to test blood sugars 2 times daily or as directed 100 strip 11    blood glucose monitoring (SOFTCLIX) lancets Use to  test blood sugar 6 times daily. 200 each 11    Blood Glucose Monitoring Suppl (ACCU-CHEK GUIDE ME) w/Device KIT 1 each 6 times daily 1 kit 0    carvedilol (COREG) 12.5 MG tablet TAKE ONE TABLET BY MOUTH TWICE A DAY WITH MEALS 180 tablet 1    Continuous Blood Gluc Sensor (FREESTYLE MEENA 2 SENSOR) MISC CHANGE EVERY 14 DAYS. USE TO CHECK BLOOD SUGARS PER  GUIDELINES 6 each 2    LANTUS SOLOSTAR 100 UNIT/ML soln INJECT 42 UNITS SUBCUTANEOUS EVERY MORNING (BEFORE BREAKFAST) 45 mL 0    losartan (COZAAR) 50 MG tablet TAKE ONE TABLET BY MOUTH ONCE DAILY 90 tablet 0    mupirocin (BACTROBAN) 2 % external ointment Apply topically 3 times daily 1 g 0    NOVOLOG FLEXPEN 100 UNIT/ML soln INJECT 10 UNITS UNDER THE SKIN THREE TIMES DAILY WITH MEALS. MAXIMUM OF 30 UNITS DAILY. 30 mL 1    ondansetron (ZOFRAN) 4 MG tablet Take 1 tablet (4 mg) by mouth every 8 hours as needed for nausea 20 tablet 1    pantoprazole (PROTONIX) 40 MG EC tablet TAKE ONE TABLET BY MOUTH ONCE DAILY 90 tablet 0    polyethylene glycol (MIRALAX) 17 GM/Dose powder Take 17 g by mouth daily as needed for constipation (after surgery) 510 g 0    pravastatin (PRAVACHOL) 20 MG tablet Take 1 tablet (20 mg) by mouth daily 90 tablet 3    pregabalin (LYRICA) 75 MG capsule       sertraline (ZOLOFT) 50 MG tablet TAKE ONE TABLET BY MOUTH ONCE DAILY 90 tablet 0         ALLERGIES:  No Known Allergies      PAST MEDICAL HISTORY:  Past Medical History:   Diagnosis Date    Chest pain 2/23/2021    Diabetes mellitus (H)     Dyslipidemia     Hereditary hemochromatosis (H)     Hyperglycemia 2/23/2021    Hypertension     Hypertensive urgency 2/23/2021    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) (H)     Nephrolithiasis 9/19/2016    First episode September 2016         PAST SURGICAL HISTORY:  Past Surgical History:   Procedure Laterality Date    ESOPHAGOSCOPY, GASTROSCOPY, DUODENOSCOPY (EGD), COMBINED N/A 11/10/2021    Procedure:  ESOPHAGOGASTRODUODENOSCOPY, WITH BIOPSY;  Surgeon: Leventhal, Thomas Michael, MD;  Location: [facility] OR    HYSTERECTOMY TOTAL ABDOMINAL  1995    due to fibroids    LAPAROSCOPIC CHOLECYSTECTOMY N/A 2023    Procedure: Laparoscopic cholecystectomy;  Surgeon: Eber Gill MD;  Location: [facility] OR    LAPAROSCOPIC EVACUATION ECTOPIC PREGNANCY      TUBAL LIGATION           SOCIAL HISTORY:  Social History     Socioeconomic History    Marital status: Single     Spouse name: Not on file    Number of children: Not on file    Years of education: Not on file    Highest education level: Not on file   Occupational History    Not on file   Tobacco Use    Smoking status: Former     Packs/day: 0.50     Years: 20.00     Pack years: 10.00     Types: Cigarettes     Quit date: 3/9/2010     Years since quittin.3    Smokeless tobacco: Never   Vaping Use    Vaping Use: Never used   Substance and Sexual Activity    Alcohol use: No     Alcohol/week: 0.0 standard drinks of alcohol    Drug use: No    Sexual activity: Never   Other Topics Concern    Parent/sibling w/ CABG, MI or angioplasty before 65F 55M? No   Social History Narrative    , 2 children, 2 grandchildren     Social Determinants of Health     Financial Resource Strain: Not on file   Food Insecurity: Not on file   Transportation Needs: Not on file   Physical Activity: Not on file   Stress: Not on file   Social Connections: Not on file   Intimate Partner Violence: Not At Risk (2023)    Humiliation, Afraid, Rape, and Kick questionnaire     Fear of Current or Ex-Partner: No     Emotionally Abused: No     Physically Abused: No     Sexually Abused: No   Housing Stability: Not on file         FAMILY HISTORY:  Family History   Problem Relation Age of Onset    Cirrhosis Mother         w/o alcohol history    Diabetes Mother     Emphysema Father     Hemochromatosis Brother     ALS Brother     Hemochromatosis Sister     Hemochromatosis Sister     Hypertension Sister      Hemochromatosis Sister     Heart Defect Niece         Tetrology of Fallot    Breast Cancer No family hx of     Cancer - colorectal No family hx of          PHYSICAL EXAM:  Vital signs:  /72   Pulse 66   Temp 98  F (36.7  C) (Oral)   Resp 16   Wt 66.3 kg (146 lb 1.6 oz)   LMP  (LMP Unknown)   SpO2 96%   BMI 29.51 kg/m     ECO-1  GENERAL/CONSTITUTIONAL: No acute distress. Bronzed skin.  EYES: Pupils are equal, round, and react to light and accommodation. Extraocular movements intact.  No scleral icterus.  RESPIRATORY: Clear to auscultation bilaterally. No crackles or wheezing.   CARDIOVASCULAR: Regular rate and rhythm without murmurs, gallops, or rubs.  GASTROINTESTINAL: No hepatosplenomegaly, masses, or tenderness. The patient has normal bowel sounds. No guarding.  No distention.  MUSCULOSKELETAL: Warm and well-perfused, no cyanosis, clubbing, or edema.  NEUROLOGIC: Cranial nerves II-XII are intact. Alert, oriented, answers questions appropriately.  INTEGUMENTARY: No rashes or jaundice.  GAIT: Steady, does not use assistive device.      LABS:     Latest Reference Range & Units 23 09:46   Ferritin 11 - 328 ng/mL 88        Latest Reference Range & Units 11/15/22 14:37   AFP tumor marker <=8.3 ng/mL 3.4         Ref. Range 2021 12:09   Alpha Fetoprotein Latest Ref Range: 0.0 - 8.0 ug/L 4.7             Ref. Range 2016 11:10 2021 10:45   Hep B Surface Agn Latest Ref Range: NR  Nonreactive     Hepatitis B Surface Antibody Latest Ref Range: <8.00 m[IU]/mL 0.22     Hepatitis C Antibody Latest Ref Range: NR  Nonreactive...     HIV Antigen Antibody Combo Latest Ref Range: NR^Nonreactive       Nonreactive         PATHOLOGY:  5/10/21: TEST(S) REQUESTED:   Hemochromatosis Mutation Analysis by PCR     SPECIMEN DESCRIPTION:   Blood     HEMOCHROMATOSIS RESULTS     HFE Gene C282Y (G845A) RESULTS:     C282Y Mutation Interpretation: HOMOZYGOTE     HFE Gene H63D (C187G) RESULTS:     H63D Mutation  Interpretation: NORMAL     HFE Gene S65C (A193T) RESULTS:     S65C Mutation Interpretation: NORMAL      Indication for testing: Carrier screening or diagnostic   testing for alpha-1-antitrypsin (AAT) deficiency.   Negative: This sample has a serum AAT protein concentration   in the normal range and is negative for the S and Z   deficiency alleles by genotyping. 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                          - Small (< 5 mm) esophageal varices.                          - Gastritis. Biopsied.                          - Normal examined duodenum.      Final Diagnosis   A. STOMACH, BIOPSY:  - Gastric mucosa with features of reactive gastropathy   - No H. pylori organisms identified on routine staining  - Negative for intestinal metaplasia and dysplasia        Case Report   Surgical Pathology Report                         Case: [id number]                                   Authorizing Provider:  Eber Gill MD Collected:           02/13/2023 11:26 AM           Ordering Location:     [facility]          Received:            02/13/2023 12:35 PM                                  [facility] OR                                                             Pathologist:           Marnie De La Rosa MD                                                                            Specimen:    Gallbladder                                                                                 Final Diagnosis   Gallbladder, cholecystectomy:  -Acute on chronic cholecystitis with cholelithiasis            IMAGING:  ULTRASOUND ABDOMEN LIMITED November 24, 2021   IMPRESSION:  1.  Coarse increased echogenicity of the liver compatible with  steatosis and/or cirrhosis.  2.  1 cm gallbladder polyp.  3.  No hepatoma demonstrated.    US ABDOMEN LIMITED 5/5/2022  FINDINGS:     GALLBLADDER: Gallbladder polyp measuring 1 cm again noted and  unchanged. No gallstones, wall thickening, or pericholecystic fluid.  Negative sonographic Clark's sign.     BILE DUCTS: There is no biliary dilatation. The common duct measures 4  mm.     LIVER: Unremarkable where seen.                                                                     IMPRESSION:  1.  Stable gallbladder polyp.        ASSESSMENT/PLAN:  Bradford Prado is a 62 year old female with past medical history of cirrhosis, CERDA, HTN, HLD, and DM2 who is referred to clinic today for C282Y/C282Y hereditary hemochromatosis. She was previously followed by Dr. Long in [place].     1) Homozygous C282Y/C282Y hereditary hemochromatosis Patient diagnosed just in May 2021. Her last phlebotomy was in August 2021 (ferritin 101). Ferritin 179, which decreased to 93 with phlebotomy x1 on 12/15/21.  -Ferritin has increased to 244. She has not required phlebotomy since 11/2022. She has been more sluggish and will keep ferritin goal <50.  -Continue yearly liver ultrasound and AFP (3.4 as of 11/2022).     2) History of cirrhosis/CERDA  -See plan above.   -EGD last done in 11/2021. Next due in 2023.      3) HTN, HLD, DM2  -As followed by PCP.     4) -Phleb today for ferritin >50.  -Recheck ferritin in 4 months prior to follow up in clinic with possible phlebotomy.   -Liver US and AFP will be due in 11/2023.               Rochelle Girard,   Hematology/Oncology  [facility]

## 2023-07-28 NOTE — LETTER
"    7/28/2023         RE: Bradford Prado  [address]  [facility] [address]        Dear Colleague,    Thank you for referring your patient, Bradford Prado, to the [facility]. Please see a copy of my visit note below.    Oncology Rooming Note    July 28, 2023 11:13 AM   Bradford Prado is a 62 year old female who presents for:    Chief Complaint   Patient presents with     Oncology Clinic Visit     Initial Vitals: /72   Pulse 66   Temp 98  F (36.7  C) (Oral)   Resp 16   Wt 66.3 kg (146 lb 1.6 oz)   LMP  (LMP Unknown)   SpO2 96%   BMI 29.51 kg/m   Estimated body mass index is 29.51 kg/m  as calculated from the following:    Height as of 3/16/23: 1.499 m (4' 11\").    Weight as of this encounter: 66.3 kg (146 lb 1.6 oz). Body surface area is 1.66 meters squared.  Severe Pain (6) Comment: Data Unavailable   No LMP recorded (lmp unknown). Patient has had a hysterectomy.  Allergies reviewed: Yes  Medications reviewed: Yes    Medications: Medication refills not needed today.  Pharmacy name entered into [facility] [employer]:    [facility] [place], [place] - [address] [place] [address] - [address] [place] [address] [phone]    Clinical concerns: follow up        Marianela Eaton          [facility]    Hematology/Oncology Established Patient Follow-up Note    Treatment Summary:      Today's Date: 7/28/23    Reason for Follow-up: Hereditary Hemochromatosis  Referring Provider: Nimco Travis NP      HISTORY OF PRESENT ILLNESS: Bradford Prado is a 62 year old female with past medical history of cirrhosis, CERDA, HTN, HLD, and DM2 who is referred to clinic today for C282Y/C282Y hereditary hemochromatosis. She was previously followed by Dr. Long in [place].     Patient has family history of severe liver disease and recalls mother entering acute liver failure with need for repeat thoracenteses. Patient had " then developed increasing LFTs and cirrhosis and workup for this included liver biopsy, which revealed cirrhosis and negative alpha 1 antitrypsin mutation testing. Record review shows patient has had evidence of elevated ferritin levels as high as >1500 in 2016. She underwent testing for hereditary hemochromatosis in May 2021 and returned positive as homozygous C282Y/C282Y.      Patient was then treated with phlebotomies twice per week for two months. Her most recent ferritin level was in August 2021 and 101.      Patient does have various hand arthralgias and follows with a hand surgeon for this with intermittent hand Xrays and steroid injections. She is a diabetic. No history of endocrinopathies or heart disease she is aware of.      12/13/21 evaluated by General Surgery. No acute intervention.     Phlebotomy 12/15. Ferritin had decreased from 179 to 93, currently. Energy levels are stable. She still has diffuse arthralgias and takes PRN NSAIDS.       INTERIM HISTORY:  She had cholecystectomy. She also had bone spur surgery of the left wrist. She has not required phlebotomy since 11/2022.     She has felt more sluggish in recent weeks. She wishes to move forward with phlebotomy and will update parameters to >50.      REVIEW OF SYSTEMS:   A 14 point ROS was reviewed with pertinent positives and negatives in the HPI.       HOME MEDICATIONS:  Current Outpatient Medications   Medication Sig Dispense Refill     ACCU-CHEK GUIDE test strip Use to test blood sugar 4 times daily or as directed. 200 strip 11     acetaminophen (TYLENOL) 325 MG tablet Take 325-650 mg by mouth every 6 hours as needed for mild pain       amLODIPine (NORVASC) 5 MG tablet TAKE ONE TABLET BY MOUTH TWICE A  tablet 3     BD NATALIYA U/F 32G X 4 MM insulin pen needle USE 4 TIMES DAILY 200 each 1     blood glucose (NO BRAND SPECIFIED) lancets standard Use to test blood sugar twice times daily or as directed. 100 each 1     blood glucose (NO BRAND  SPECIFIED) test strip Use to test blood sugar twice times daily or as directed. 100 each 1     blood glucose (NO BRAND SPECIFIED) test strip Use to test blood sugars 2 times daily or as directed 100 strip 11     blood glucose monitoring (SOFTCLIX) lancets Use to test blood sugar 6 times daily. 200 each 11     Blood Glucose Monitoring Suppl (ACCU-CHEK GUIDE ME) w/Device KIT 1 each 6 times daily 1 kit 0     carvedilol (COREG) 12.5 MG tablet TAKE ONE TABLET BY MOUTH TWICE A DAY WITH MEALS 180 tablet 1     Continuous Blood Gluc Sensor (FREESTYLE MEENA 2 SENSOR) MISC CHANGE EVERY 14 DAYS. USE TO CHECK BLOOD SUGARS PER  GUIDELINES 6 each 2     LANTUS SOLOSTAR 100 UNIT/ML soln INJECT 42 UNITS SUBCUTANEOUS EVERY MORNING (BEFORE BREAKFAST) 45 mL 0     losartan (COZAAR) 50 MG tablet TAKE ONE TABLET BY MOUTH ONCE DAILY 90 tablet 0     mupirocin (BACTROBAN) 2 % external ointment Apply topically 3 times daily 1 g 0     NOVOLOG FLEXPEN 100 UNIT/ML soln INJECT 10 UNITS UNDER THE SKIN THREE TIMES DAILY WITH MEALS. MAXIMUM OF 30 UNITS DAILY. 30 mL 1     ondansetron (ZOFRAN) 4 MG tablet Take 1 tablet (4 mg) by mouth every 8 hours as needed for nausea 20 tablet 1     pantoprazole (PROTONIX) 40 MG EC tablet TAKE ONE TABLET BY MOUTH ONCE DAILY 90 tablet 0     polyethylene glycol (MIRALAX) 17 GM/Dose powder Take 17 g by mouth daily as needed for constipation (after surgery) 510 g 0     pravastatin (PRAVACHOL) 20 MG tablet Take 1 tablet (20 mg) by mouth daily 90 tablet 3     pregabalin (LYRICA) 75 MG capsule        sertraline (ZOLOFT) 50 MG tablet TAKE ONE TABLET BY MOUTH ONCE DAILY 90 tablet 0         ALLERGIES:  No Known Allergies      PAST MEDICAL HISTORY:  Past Medical History:   Diagnosis Date     Chest pain 2/23/2021     Diabetes mellitus (H)      Dyslipidemia      Hereditary hemochromatosis (H)      Hyperglycemia 2/23/2021     Hypertension      Hypertensive urgency 2/23/2021     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) (H)      Nephrolithiasis 2016    First episode 2016         PAST SURGICAL HISTORY:  Past Surgical History:   Procedure Laterality Date     ESOPHAGOSCOPY, GASTROSCOPY, DUODENOSCOPY (EGD), COMBINED N/A 11/10/2021    Procedure: ESOPHAGOGASTRODUODENOSCOPY, WITH BIOPSY;  Surgeon: Leventhal, Thomas Michael, MD;  Location: [facility] OR     HYSTERECTOMY TOTAL ABDOMINAL  1995    due to fibroids     LAPAROSCOPIC CHOLECYSTECTOMY N/A 2023    Procedure: Laparoscopic cholecystectomy;  Surgeon: Eber Gill MD;  Location: [facility] OR     LAPAROSCOPIC EVACUATION ECTOPIC PREGNANCY       TUBAL LIGATION           SOCIAL HISTORY:  Social History     Socioeconomic History     Marital status: Single     Spouse name: Not on file     Number of children: Not on file     Years of education: Not on file     Highest education level: Not on file   Occupational History     Not on file   Tobacco Use     Smoking status: Former     Packs/day: 0.50     Years: 20.00     Pack years: 10.00     Types: Cigarettes     Quit date: 3/9/2010     Years since quittin.3     Smokeless tobacco: Never   Vaping Use     Vaping Use: Never used   Substance and Sexual Activity     Alcohol use: No     Alcohol/week: 0.0 standard drinks of alcohol     Drug use: No     Sexual activity: Never   Other Topics Concern     Parent/sibling w/ CABG, MI or angioplasty before 65F 55M? No   Social History Narrative    , 2 children, 2 grandchildren     Social Determinants of Health     Financial Resource Strain: Not on file   Food Insecurity: Not on file   Transportation Needs: Not on file   Physical Activity: Not on file   Stress: Not on file   Social Connections: Not on file   Intimate Partner Violence: Not At Risk (2023)    Humiliation, Afraid, Rape, and Kick questionnaire      Fear of Current or Ex-Partner: No      Emotionally Abused: No      Physically Abused: No      Sexually Abused: No   Housing Stability: Not on file          FAMILY HISTORY:  Family History   Problem Relation Age of Onset     Cirrhosis Mother         w/o alcohol history     Diabetes Mother      Emphysema Father      Hemochromatosis Brother      ALS Brother      Hemochromatosis Sister      Hemochromatosis Sister      Hypertension Sister      Hemochromatosis Sister      Heart Defect Niece         Tetrology of Fallot     Breast Cancer No family hx of      Cancer - colorectal No family hx of          PHYSICAL EXAM:  Vital signs:  /72   Pulse 66   Temp 98  F (36.7  C) (Oral)   Resp 16   Wt 66.3 kg (146 lb 1.6 oz)   LMP  (LMP Unknown)   SpO2 96%   BMI 29.51 kg/m     ECO-1  GENERAL/CONSTITUTIONAL: No acute distress. Bronzed skin.  EYES: Pupils are equal, round, and react to light and accommodation. Extraocular movements intact.  No scleral icterus.  RESPIRATORY: Clear to auscultation bilaterally. No crackles or wheezing.   CARDIOVASCULAR: Regular rate and rhythm without murmurs, gallops, or rubs.  GASTROINTESTINAL: No hepatosplenomegaly, masses, or tenderness. The patient has normal bowel sounds. No guarding.  No distention.  MUSCULOSKELETAL: Warm and well-perfused, no cyanosis, clubbing, or edema.  NEUROLOGIC: Cranial nerves II-XII are intact. Alert, oriented, answers questions appropriately.  INTEGUMENTARY: No rashes or jaundice.  GAIT: Steady, does not use assistive device.      LABS:     Latest Reference Range & Units 23 09:46   Ferritin 11 - 328 ng/mL 88        Latest Reference Range & Units 11/15/22 14:37   AFP tumor marker <=8.3 ng/mL 3.4         Ref. Range 2021 12:09   Alpha Fetoprotein Latest Ref Range: 0.0 - 8.0 ug/L 4.7             Ref. Range 2016 11:10 2021 10:45   Hep B Surface Agn Latest Ref Range: NR  Nonreactive     Hepatitis B Surface Antibody Latest Ref Range: <8.00 m[IU]/mL 0.22     Hepatitis C Antibody Latest Ref Range: NR  Nonreactive...     HIV Antigen Antibody Combo Latest Ref Range: NR^Nonreactive        Nonreactive         PATHOLOGY:  5/10/21: TEST(S) REQUESTED:   Hemochromatosis Mutation Analysis by PCR     SPECIMEN DESCRIPTION:   Blood     HEMOCHROMATOSIS RESULTS     HFE Gene C282Y (G845A) RESULTS:     C282Y Mutation Interpretation: HOMOZYGOTE     HFE Gene H63D (C187G) RESULTS:     H63D Mutation Interpretation: NORMAL     HFE Gene S65C (A193T) RESULTS:     S65C Mutation Interpretation: NORMAL      Indication for testing: Carrier screening or diagnostic   testing for alpha-1-antitrypsin (AAT) deficiency.   Negative: This sample has a serum AAT protein concentration   in the normal range and is negative for the S and Z   deficiency alleles by genotyping. 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                          - Small (< 5 mm) esophageal varices.                          - Gastritis. Biopsied.                          - Normal examined duodenum.      Final Diagnosis   A. STOMACH, BIOPSY:  - Gastric mucosa with features of reactive gastropathy   - No H. pylori organisms identified on routine staining  - Negative for intestinal metaplasia and dysplasia        Case Report   Surgical Pathology Report                         Case: [id number]                                   Authorizing Provider:  Eber Gill MD Collected:           02/13/2023 11:26 AM           Ordering Location:     [facility]          Received:            02/13/2023 12:35 PM                                  [facility]                                                              Pathologist:           Marnie De La Rosa MD                                                                            Specimen:    Gallbladder                                                                                Final Diagnosis   Gallbladder, cholecystectomy:  -Acute on chronic cholecystitis with cholelithiasis            IMAGING:  ULTRASOUND ABDOMEN LIMITED November 24, 2021   IMPRESSION:  1.  Coarse increased echogenicity of the liver compatible with  steatosis and/or cirrhosis.  2.  1 cm gallbladder polyp.  3.  No hepatoma demonstrated.    US ABDOMEN LIMITED 5/5/2022  FINDINGS:     GALLBLADDER: Gallbladder polyp measuring 1 cm again noted and  unchanged. No gallstones, wall thickening, or pericholecystic fluid.  Negative sonographic Clark's sign.     BILE DUCTS: There is no biliary dilatation. The common duct measures 4  mm.     LIVER: Unremarkable where seen.                                                                     IMPRESSION:  1.  Stable gallbladder polyp.        ASSESSMENT/PLAN:  Bradford Prado is a 62 year old female with past medical history of cirrhosis, CERDA, HTN, HLD, and DM2 who is referred to clinic today for C282Y/C282Y hereditary hemochromatosis. She was previously followed by Dr. Long in [place].     1) Homozygous C282Y/C282Y hereditary hemochromatosis Patient diagnosed just in May 2021. Her last phlebotomy was in August 2021 (ferritin 101). Ferritin 179, which decreased to 93 with phlebotomy x1 on 12/15/21.  -Ferritin has increased to 244. She has not required phlebotomy since 11/2022. She has been more sluggish and will keep ferritin goal <50.  -Continue yearly liver ultrasound and AFP (3.4 as of 11/2022).     2) History of cirrhosis/CERDA  -See plan above.   -EGD last done in 11/2021. Next due in 2023.      3) HTN, HLD, DM2  -As followed by PCP.     4) -Phleb today  for ferritin >50.  -Recheck ferritin in 4 months prior to follow up in clinic with possible phlebotomy.   -Liver US and AFP will be due in 11/2023.               Rochelle Girard DO  Hematology/Oncology  [facility]                  Again, thank you for allowing me to participate in the care of your patient.        Sincerely,        Rochelle Girard DO

## 2023-07-31 DIAGNOSIS — E11.65 POORLY CONTROLLED TYPE 2 DIABETES MELLITUS WITH RENAL COMPLICATION (H): Chronic | ICD-10-CM

## 2023-07-31 DIAGNOSIS — F43.22 ADJUSTMENT DISORDER WITH ANXIOUS MOOD: ICD-10-CM

## 2023-07-31 DIAGNOSIS — K21.00 GASTROESOPHAGEAL REFLUX DISEASE WITH ESOPHAGITIS WITHOUT HEMORRHAGE: ICD-10-CM

## 2023-07-31 DIAGNOSIS — E11.29 POORLY CONTROLLED TYPE 2 DIABETES MELLITUS WITH RENAL COMPLICATION (H): Chronic | ICD-10-CM

## 2023-07-31 DIAGNOSIS — I10 ESSENTIAL HYPERTENSION WITH GOAL BLOOD PRESSURE LESS THAN 140/90: Chronic | ICD-10-CM

## 2023-08-02 RX ORDER — LOSARTAN POTASSIUM 50 MG/1
TABLET ORAL
Qty: 90 TABLET | Refills: 0 | Status: SHIPPED | OUTPATIENT
Start: 2023-08-02 | End: 2023-11-13

## 2023-08-02 RX ORDER — PANTOPRAZOLE SODIUM 40 MG/1
TABLET, DELAYED RELEASE ORAL
Qty: 90 TABLET | Refills: 0 | Status: SHIPPED | OUTPATIENT
Start: 2023-08-02 | End: 2023-11-13

## 2023-08-14 DIAGNOSIS — E11.65 TYPE 2 DIABETES MELLITUS WITH HYPERGLYCEMIA, WITHOUT LONG-TERM CURRENT USE OF INSULIN (H): ICD-10-CM

## 2023-08-14 NOTE — TELEPHONE ENCOUNTER
Routing to ordering provider for consideration, not on refill protocol.           Linda Farris     RN MSN

## 2023-08-25 DIAGNOSIS — E11.65 TYPE 2 DIABETES MELLITUS WITH HYPERGLYCEMIA, WITHOUT LONG-TERM CURRENT USE OF INSULIN (H): ICD-10-CM

## 2023-08-25 DIAGNOSIS — E11.65 POORLY CONTROLLED TYPE 2 DIABETES MELLITUS WITH RENAL COMPLICATION (H): Chronic | ICD-10-CM

## 2023-08-25 DIAGNOSIS — E11.29 POORLY CONTROLLED TYPE 2 DIABETES MELLITUS WITH RENAL COMPLICATION (H): Chronic | ICD-10-CM

## 2023-08-25 RX ORDER — INSULIN ASPART 100 [IU]/ML
INJECTION, SOLUTION INTRAVENOUS; SUBCUTANEOUS
Qty: 30 ML | Refills: 0 | Status: SHIPPED | OUTPATIENT
Start: 2023-08-25 | End: 2023-11-13

## 2023-08-25 RX ORDER — INSULIN GLARGINE 100 [IU]/ML
INJECTION, SOLUTION SUBCUTANEOUS
Qty: 45 ML | Refills: 0 | Status: SHIPPED | OUTPATIENT
Start: 2023-08-25 | End: 2024-01-09

## 2023-08-25 NOTE — TELEPHONE ENCOUNTER
Refilled 1 time only, please call patient to schedule for diabetes follow up and labs.    Linda Farris MSN, RN

## 2023-08-28 ENCOUNTER — TELEPHONE (OUTPATIENT)
Dept: ONCOLOGY | Facility: CLINIC | Age: 63
End: 2023-08-28
Payer: COMMERCIAL

## 2023-08-28 NOTE — TELEPHONE ENCOUNTER
Patient called to report wrist and shoulder pain. The pain seems to be worse after phlebotomy. Her last phlebotomy was on 7/28 and the pain started in the middle of the night that night. It originally started on the right side, and it either got better or she got used to it. She got it x-rayed and that was normal. Now the pain is on both sides in the wrists and shoulders but is worse on the left side. Rates the pain 7/10. Describes it as a deep soreness. It comes and goes. It wakes her up at night but also happens during the day. The pain is random and there is no pattern to it. Denies any injuries. Takes Aleve or Advil but it is not helping. Has tried heating pads and Aspercreme. Denies chest pain/heaviness.     She is wondering if Dr. Girard thinks the pain could be triggered by phlebotomy. She is also wondering if bringing the ferritin down to 50 is possibly too low. Informed patient that Dr. Girard is not in the office today and offered to check with one of the APPs, but patient prefers to wait for Dr. Girard's opinion since she knows her. Will have triage follow up with patient tomorrow.    Ninoska Woodard, RN  Triage Nurse Advisor  [facility]

## 2023-08-29 NOTE — TELEPHONE ENCOUNTER
Called patient and discussed the message as noted below, understanding verbalized. She will continue to monitor sx and call back with any new or worsening sx.     Maranda Gaitan RN, BSN, PHN, OCN  [facility]    Rochelle Girard DO Stadem, Kara May, ODALYS; Carlee Anderson RN47 minutes ago (8:12 AM)     JL  This is uncommon, but a possibility. Can hold on next phlebotomy. Bradford should hydrate well and can alternate tylenol and ibuprofen for pain.    Thank you

## 2023-08-30 DIAGNOSIS — E78.5 HYPERLIPIDEMIA LDL GOAL <70: Chronic | ICD-10-CM

## 2023-08-30 DIAGNOSIS — E11.65 TYPE 2 DIABETES MELLITUS WITH HYPERGLYCEMIA, WITHOUT LONG-TERM CURRENT USE OF INSULIN (H): ICD-10-CM

## 2023-08-30 NOTE — TELEPHONE ENCOUNTER
Pending Prescriptions:                       Disp   Refills    pravastatin (PRAVACHOL) 20 MG tablet [Pha*90 tab*3            Sig: TAKE ONE TABLET BY MOUTH ONCE DAILY    Routing refill request to provider for review/approval because:  Labs out of range:    LDL Cholesterol Calculated   Date Value Ref Range Status   08/22/2022 99 <=100 mg/dL Final   06/19/2021 89 <100 mg/dL Final     Comment:     Desirable:       <100 mg/dl         Arun Chung RN

## 2023-08-31 RX ORDER — PRAVASTATIN SODIUM 20 MG
20 TABLET ORAL DAILY
Qty: 90 TABLET | Refills: 0 | Status: SHIPPED | OUTPATIENT
Start: 2023-08-31 | End: 2023-12-21

## 2023-09-01 NOTE — TELEPHONE ENCOUNTER
Left detailed message for pt to schedule lab. Please order labs for lipids.    .Meagan Rodriguez PSC

## 2023-09-08 ENCOUNTER — OFFICE VISIT (OUTPATIENT)
Dept: URGENT CARE | Facility: URGENT CARE | Age: 63
End: 2023-09-08
Payer: COMMERCIAL

## 2023-09-08 VITALS
TEMPERATURE: 97 F | DIASTOLIC BLOOD PRESSURE: 70 MMHG | SYSTOLIC BLOOD PRESSURE: 128 MMHG | RESPIRATION RATE: 16 BRPM | OXYGEN SATURATION: 98 % | HEART RATE: 75 BPM

## 2023-09-08 DIAGNOSIS — E11.65 TYPE 2 DIABETES MELLITUS WITH HYPERGLYCEMIA, WITHOUT LONG-TERM CURRENT USE OF INSULIN (H): ICD-10-CM

## 2023-09-08 DIAGNOSIS — R31.9 URINARY TRACT INFECTION WITH HEMATURIA, SITE UNSPECIFIED: ICD-10-CM

## 2023-09-08 DIAGNOSIS — R19.7 DIARRHEA, UNSPECIFIED TYPE: ICD-10-CM

## 2023-09-08 DIAGNOSIS — N39.0 URINARY TRACT INFECTION WITH HEMATURIA, SITE UNSPECIFIED: ICD-10-CM

## 2023-09-08 DIAGNOSIS — R11.2 NAUSEA AND VOMITING, UNSPECIFIED VOMITING TYPE: Primary | ICD-10-CM

## 2023-09-08 LAB
ALBUMIN SERPL-MCNC: 4.3 G/DL (ref 3.4–5)
ALBUMIN UR-MCNC: 100 MG/DL
ALP SERPL-CCNC: 75 U/L (ref 40–150)
ALT SERPL W P-5'-P-CCNC: 33 U/L (ref 0–50)
AMYLASE SERPL-CCNC: 66 U/L (ref 28–100)
ANION GAP SERPL CALCULATED.3IONS-SCNC: 9 MMOL/L (ref 3–14)
APPEARANCE UR: CLEAR
AST SERPL W P-5'-P-CCNC: 38 U/L (ref 0–45)
BACTERIA #/AREA URNS HPF: ABNORMAL /HPF
BASOPHILS # BLD AUTO: 0 10E3/UL (ref 0–0.2)
BASOPHILS NFR BLD AUTO: 0 %
BILIRUB SERPL-MCNC: 1.3 MG/DL (ref 0.2–1.3)
BILIRUB UR QL STRIP: ABNORMAL
BUN SERPL-MCNC: 11 MG/DL (ref 7–30)
CALCIUM SERPL-MCNC: 9.4 MG/DL (ref 8.5–10.1)
CHLORIDE BLD-SCNC: 104 MMOL/L (ref 94–109)
CO2 SERPL-SCNC: 28 MMOL/L (ref 20–32)
COLOR UR AUTO: YELLOW
CREAT SERPL-MCNC: 0.7 MG/DL (ref 0.52–1.04)
EGFRCR SERPLBLD CKD-EPI 2021: >90 ML/MIN/1.73M2
EOSINOPHIL # BLD AUTO: 0 10E3/UL (ref 0–0.7)
EOSINOPHIL NFR BLD AUTO: 0 %
ERYTHROCYTE [DISTWIDTH] IN BLOOD BY AUTOMATED COUNT: 12.1 % (ref 10–15)
GLUCOSE BLD-MCNC: 157 MG/DL (ref 70–99)
GLUCOSE UR STRIP-MCNC: NEGATIVE MG/DL
HCT VFR BLD AUTO: 43.2 % (ref 35–47)
HGB BLD-MCNC: 15 G/DL (ref 11.7–15.7)
HGB UR QL STRIP: NEGATIVE
IMM GRANULOCYTES # BLD: 0 10E3/UL
IMM GRANULOCYTES NFR BLD: 0 %
KETONES UR STRIP-MCNC: 15 MG/DL
LEUKOCYTE ESTERASE UR QL STRIP: ABNORMAL
LIPASE SERPL-CCNC: 47 U/L (ref 13–60)
LYMPHOCYTES # BLD AUTO: 2.1 10E3/UL (ref 0.8–5.3)
LYMPHOCYTES NFR BLD AUTO: 20 %
MCH RBC QN AUTO: 31.4 PG (ref 26.5–33)
MCHC RBC AUTO-ENTMCNC: 34.7 G/DL (ref 31.5–36.5)
MCV RBC AUTO: 90 FL (ref 78–100)
MONOCYTES # BLD AUTO: 1 10E3/UL (ref 0–1.3)
MONOCYTES NFR BLD AUTO: 9 %
MUCOUS THREADS #/AREA URNS LPF: PRESENT /LPF
NEUTROPHILS # BLD AUTO: 7.2 10E3/UL (ref 1.6–8.3)
NEUTROPHILS NFR BLD AUTO: 70 %
NITRATE UR QL: POSITIVE
PH UR STRIP: 6 [PH] (ref 5–7)
PLATELET # BLD AUTO: 129 10E3/UL (ref 150–450)
POTASSIUM BLD-SCNC: 4.1 MMOL/L (ref 3.4–5.3)
PROT SERPL-MCNC: 7.6 G/DL (ref 6.8–8.8)
RBC # BLD AUTO: 4.78 10E6/UL (ref 3.8–5.2)
RBC #/AREA URNS AUTO: ABNORMAL /HPF
SODIUM SERPL-SCNC: 141 MMOL/L (ref 133–144)
SP GR UR STRIP: 1.02 (ref 1–1.03)
SQUAMOUS #/AREA URNS AUTO: ABNORMAL /LPF
UROBILINOGEN UR STRIP-ACNC: 1 E.U./DL
WBC # BLD AUTO: 10.4 10E3/UL (ref 4–11)
WBC #/AREA URNS AUTO: ABNORMAL /HPF

## 2023-09-08 PROCEDURE — 81001 URINALYSIS AUTO W/SCOPE: CPT | Performed by: PHYSICIAN ASSISTANT

## 2023-09-08 PROCEDURE — 82150 ASSAY OF AMYLASE: CPT | Performed by: PHYSICIAN ASSISTANT

## 2023-09-08 PROCEDURE — 85025 COMPLETE CBC W/AUTO DIFF WBC: CPT | Performed by: PHYSICIAN ASSISTANT

## 2023-09-08 PROCEDURE — 87086 URINE CULTURE/COLONY COUNT: CPT | Performed by: PHYSICIAN ASSISTANT

## 2023-09-08 PROCEDURE — 36415 COLL VENOUS BLD VENIPUNCTURE: CPT | Performed by: PHYSICIAN ASSISTANT

## 2023-09-08 PROCEDURE — 99214 OFFICE O/P EST MOD 30 MIN: CPT | Performed by: PHYSICIAN ASSISTANT

## 2023-09-08 PROCEDURE — 80053 COMPREHEN METABOLIC PANEL: CPT | Performed by: PHYSICIAN ASSISTANT

## 2023-09-08 PROCEDURE — 83690 ASSAY OF LIPASE: CPT | Performed by: PHYSICIAN ASSISTANT

## 2023-09-08 RX ORDER — LOPERAMIDE HYDROCHLORIDE 2 MG/1
2 TABLET ORAL 3 TIMES DAILY PRN
Qty: 21 TABLET | Refills: 0 | Status: SHIPPED | OUTPATIENT
Start: 2023-09-08 | End: 2024-01-30

## 2023-09-08 RX ORDER — CEFDINIR 300 MG/1
300 CAPSULE ORAL 2 TIMES DAILY
Qty: 20 CAPSULE | Refills: 0 | Status: SHIPPED | OUTPATIENT
Start: 2023-09-08 | End: 2023-09-13

## 2023-09-08 RX ORDER — ONDANSETRON 4 MG/1
4 TABLET, ORALLY DISINTEGRATING ORAL EVERY 8 HOURS PRN
Qty: 16 TABLET | Refills: 0 | Status: SHIPPED | OUTPATIENT
Start: 2023-09-08 | End: 2024-01-24

## 2023-09-08 NOTE — PROGRESS NOTES
Assessment & Plan     Nausea and vomiting, unspecified vomiting type    CMP normal  CBC normal  Increase oral fluids  The 'BRAT' diet is suggested, then progress to diet as tolerated as symptoms gautam. Call if bloody stools, persistent diarrhea, vomiting, fever or abdominal pain.    Start on zofran prn nausea and vomiting    - ondansetron (ZOFRAN ODT) 4 MG ODT tab; Take 1 tablet (4 mg) by mouth every 8 hours as needed for nausea    Diarrhea, unspecified type    Diarrhea is loose, watery stools (bowel movements). The exact cause is often hard to find. Sometimes diarrhea is your body's way of getting rid of what caused an upset stomach. Viruses, food poisoning, and many medicines can cause diarrhea. Some people get diarrhea in response to emotional stress, anxiety, or certain foods.  Almost everyone has diarrhea now and then. It usually isn't serious, and your stools will return to normal soon. The important thing to do is replace the fluids you have lost, so you can prevent dehydration.    CMP was normal  UA appears dark and there is concern for infection  Increase oral fluids  Collect stool cultures and return to clinic    - Enteric Bacteria and Virus Panel by TALIA Stool; Future  - Ova and Parasite Exam Routine; Future  - Enteric Bacteria and Virus Panel by TALIA Stool  - Ova and Parasite Exam Routine  - loperamide (IMODIUM A-D) 2 MG tablet; Take 1 tablet (2 mg) by mouth 3 times daily as needed for diarrhea    Type 2 diabetes mellitus with hyperglycemia, without long-term current use of insulin (H)    Patient is diabetic and she has been monitoring blood sugars    Urinary tract infection with hematuria, site unspecified    You have been diagnosed with a UTI.  This is one of the most common infections in women because women have a shorter urethra than men. Bacteria have a shorter distance to travel to reach the bladder.. Women who have gone through menopause also lose the protection from estrogen that lowers the  "chance of getting a UTI. And some women are at higher risk because of their genes. The most common cause of bladder infections is bacteria from the bowels. The bacteria get onto the skin around the opening of the urethra. From there, they can get into the urine. Then they travel up to the bladder, causing inflammation and infection.  Make sure you urinate after sex, drink plenty of fluids and do not hold in your urine.  We have started you on antibiotics for infection and we are awaiting urine culture results, if there is antibiotic resistance on the culture we will call you and switch antibiotics.     - cefdinir (OMNICEF) 300 MG capsule; Take 1 capsule (300 mg) by mouth 2 times daily for 5 days    Review of external notes as documented elsewhere in note       BMI:   Estimated body mass index is 29.51 kg/m  as calculated from the following:    Height as of 3/16/23: 1.499 m (4' 11\").    Weight as of 7/28/23: 66.3 kg (146 lb 1.6 oz).     At today's visit with Bradford Prado , we discussed results, diagnosis, medications and formulated a plan.  We also discussed red flags for immediate return to clinic/ER, as well as indications for follow up with PCP if not improved in 3 days. Patient understood and agreed to plan. rBadford Prado was discharged with stable vitals and has no further questions.       No follow-ups on file.    Mychal Nolen, [facility], PA-C  [facility]    Randy Felder is a 62 year old, presenting for the following health issues:  Nausea, Vomiting, & Diarrhea (Vomiting and diarrhea X 5 days)      HPI   Review of Systems   Constitutional, HEENT, cardiovascular, pulmonary, GI, , musculoskeletal, neuro, skin, endocrine and psych systems are negative, except as otherwise noted.      Objective    /70   Pulse 75   Temp 97  F (36.1  C) (Tympanic)   Resp 16   LMP  (LMP Unknown)   SpO2 98%   There is no height or weight on file to calculate BMI.  Physical Exam   GENERAL: " healthy, alert and no distress  RESP: lungs clear to auscultation - no rales, rhonchi or wheezes  CV: regular rate and rhythm, normal S1 S2, no S3 or S4, no murmur, click or rub, no peripheral edema and peripheral pulses strong  ABDOMEN: soft, nontender, no hepatosplenomegaly, no masses and bowel sounds normal  MS: no gross musculoskeletal defects noted, no edema  SKIN: no suspicious lesions or rashes  NEURO: Normal strength and tone, mentation intact and speech normal  PSYCH: mentation appears normal, affect normal/bright        Results for orders placed or performed in visit on 09/08/23   UA with Microscopic reflex to Culture     Status: Abnormal    Specimen: Urine, Midstream   Result Value Ref Range    Color Urine Yellow Colorless, Straw, Light Yellow, Yellow    Appearance Urine Clear Clear    Glucose Urine Negative Negative mg/dL    Bilirubin Urine Small (A) Negative    Ketones Urine 15 (A) Negative mg/dL    Specific Gravity Urine 1.025 1.003 - 1.035    Blood Urine Negative Negative    pH Urine 6.0 5.0 - 7.0    Protein Albumin Urine 100 (A) Negative mg/dL    Urobilinogen Urine 1.0 0.2, 1.0 E.U./dL    Nitrite Urine Positive (A) Negative    Leukocyte Esterase Urine Trace (A) Negative   Comprehensive metabolic panel (BMP + Alb, Alk Phos, ALT, AST, Total. Bili, TP)     Status: Abnormal   Result Value Ref Range    Sodium 141 133 - 144 mmol/L    Potassium 4.1 3.4 - 5.3 mmol/L    Chloride 104 94 - 109 mmol/L    Carbon Dioxide (CO2) 28 20 - 32 mmol/L    Anion Gap 9 3 - 14 mmol/L    Urea Nitrogen 11 7 - 30 mg/dL    Creatinine 0.70 0.52 - 1.04 mg/dL    Calcium 9.4 8.5 - 10.1 mg/dL    Glucose 157 (H) 70 - 99 mg/dL    Alkaline Phosphatase 75 40 - 150 U/L    AST 38 0 - 45 U/L    ALT 33 0 - 50 U/L    Protein Total 7.6 6.8 - 8.8 g/dL    Albumin 4.3 3.4 - 5.0 g/dL    Bilirubin Total 1.3 0.2 - 1.3 mg/dL    GFR Estimate >90 >60 mL/min/1.73m2   CBC with platelets and differential     Status: Abnormal   Result Value Ref Range     WBC Count 10.4 4.0 - 11.0 10e3/uL    RBC Count 4.78 3.80 - 5.20 10e6/uL    Hemoglobin 15.0 11.7 - 15.7 g/dL    Hematocrit 43.2 35.0 - 47.0 %    MCV 90 78 - 100 fL    MCH 31.4 26.5 - 33.0 pg    MCHC 34.7 31.5 - 36.5 g/dL    RDW 12.1 10.0 - 15.0 %    Platelet Count 129 (L) 150 - 450 10e3/uL    % Neutrophils 70 %    % Lymphocytes 20 %    % Monocytes 9 %    % Eosinophils 0 %    % Basophils 0 %    % Immature Granulocytes 0 %    Absolute Neutrophils 7.2 1.6 - 8.3 10e3/uL    Absolute Lymphocytes 2.1 0.8 - 5.3 10e3/uL    Absolute Monocytes 1.0 0.0 - 1.3 10e3/uL    Absolute Eosinophils 0.0 0.0 - 0.7 10e3/uL    Absolute Basophils 0.0 0.0 - 0.2 10e3/uL    Absolute Immature Granulocytes 0.0 <=0.4 10e3/uL   Urine Microscopic Exam     Status: Abnormal   Result Value Ref Range    Bacteria Urine Moderate (A) None Seen /HPF    RBC Urine 0-2 0-2 /HPF /HPF    WBC Urine 5-10 (A) 0-5 /HPF /HPF    Squamous Epithelials Urine Few (A) None Seen /LPF    Mucus Urine Present (A) None Seen /LPF   CBC with platelets and differential     Status: Abnormal    Narrative    The following orders were created for panel order CBC with platelets and differential.  Procedure                               Abnormality         Status                     ---------                               -----------         ------                     CBC with platelets and d...[[id number]]  Abnormal            Final result                 Please view results for these tests on the individual orders.

## 2023-09-10 LAB
ADV 40+41 DNA STL QL NAA+NON-PROBE: NEGATIVE
ASTRO TYP 1-8 RNA STL QL NAA+NON-PROBE: NEGATIVE
BACTERIA UR CULT: NORMAL
C CAYETANENSIS DNA STL QL NAA+NON-PROBE: NEGATIVE
CAMPYLOBACTER DNA SPEC NAA+PROBE: NEGATIVE
CRYPTOSP DNA STL QL NAA+NON-PROBE: NEGATIVE
E COLI O157 DNA STL QL NAA+NON-PROBE: NORMAL
E HISTOLYT DNA STL QL NAA+NON-PROBE: NEGATIVE
EAEC ASTA GENE ISLT QL NAA+PROBE: NEGATIVE
EC STX1+STX2 GENES STL QL NAA+NON-PROBE: NEGATIVE
EPEC EAE GENE STL QL NAA+NON-PROBE: NEGATIVE
ETEC LTA+ST1A+ST1B TOX ST NAA+NON-PROBE: NEGATIVE
G LAMBLIA DNA STL QL NAA+NON-PROBE: NEGATIVE
NOROVIRUS GI+II RNA STL QL NAA+NON-PROBE: NEGATIVE
P SHIGELLOIDES DNA STL QL NAA+NON-PROBE: NEGATIVE
RVA RNA STL QL NAA+NON-PROBE: NEGATIVE
SALMONELLA SP RPOD STL QL NAA+PROBE: NEGATIVE
SAPO I+II+IV+V RNA STL QL NAA+NON-PROBE: NEGATIVE
SHIGELLA SP+EIEC IPAH ST NAA+NON-PROBE: NEGATIVE
V CHOLERAE DNA SPEC QL NAA+PROBE: NEGATIVE
VIBRIO DNA SPEC NAA+PROBE: NEGATIVE
Y ENTEROCOL DNA STL QL NAA+PROBE: NEGATIVE

## 2023-09-10 PROCEDURE — 87507 IADNA-DNA/RNA PROBE TQ 12-25: CPT | Performed by: PHYSICIAN ASSISTANT

## 2023-09-10 PROCEDURE — 87209 SMEAR COMPLEX STAIN: CPT | Performed by: PHYSICIAN ASSISTANT

## 2023-09-10 PROCEDURE — 87177 OVA AND PARASITES SMEARS: CPT | Performed by: PHYSICIAN ASSISTANT

## 2023-09-11 LAB — O+P STL MICRO: NEGATIVE

## 2023-09-12 DIAGNOSIS — E11.65 TYPE 2 DIABETES MELLITUS WITH HYPERGLYCEMIA, WITHOUT LONG-TERM CURRENT USE OF INSULIN (H): ICD-10-CM

## 2023-09-30 ENCOUNTER — HEALTH MAINTENANCE LETTER (OUTPATIENT)
Age: 63
End: 2023-09-30

## 2023-10-09 ENCOUNTER — MYC MEDICAL ADVICE (OUTPATIENT)
Dept: GASTROENTEROLOGY | Facility: CLINIC | Age: 63
End: 2023-10-09
Payer: COMMERCIAL

## 2023-10-18 DIAGNOSIS — E11.65 TYPE 2 DIABETES MELLITUS WITH HYPERGLYCEMIA, WITHOUT LONG-TERM CURRENT USE OF INSULIN (H): ICD-10-CM

## 2023-10-18 NOTE — TELEPHONE ENCOUNTER
Covering for primary/ordering provider:  Is there a reason pt is requesting this? Rx was sent a month ago with 2 refills.  Josselyn Barrientos, CNP

## 2023-10-18 NOTE — TELEPHONE ENCOUNTER
LM for patient, rx was sent in last month for rx and 2 refills. Kavita Monroe on 10/18/2023 at 12:40 PM

## 2023-10-20 NOTE — TELEPHONE ENCOUNTER
REFERRAL INFORMATION:  Referring Provider:  Mychal Nolen PA-C  Referring Clinic:  [facility]   Reason for Visit/Diagnosis: Nausea and vomiting, unspecified vomiting type [R11.2]     FUTURE VISIT INFORMATION:  Appointment Date: 10/25/23  Appointment Time: 10:00 AM      NOTES STATUS DETAILS   OFFICE NOTE from Referring Provider Internal 9/8/23 - Mychal Nolen PA-C - [facility] - nausea and vomiting    OFFICE NOTE from Other Specialist Internal 1/26/23, 5/5/22, 12/13/21 - Eber Gill MD - [facility] [place] [facility] - Gallbladder Polyp; RUQ abdominal pain; Nausea and vomiting     5/10/23, 10/19/21, 3/2/21, 1/18/19, 9/27/16 - Paulette Travis NP - [facility] - Nausea and vomiting; Gallbladder polyp; RUQ abdominal pain; Liver chirrhosis     11/17/22, 7/13/22, 5/12/22, 12/2/21 - Rochelle Girard DO - [facility] Hematology - Nausea; hereditary hemochromatosis; gallbladder polyp     7/26/21 - Leventhal, Thomas Michael, MD - [facility] Hepatology - 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; secondary esophageal varices without bleeding ; Hereditary hemochromatosis    OPERATIVE REPORT Internal 2/13/23 - LAPAROSCOPIC CHOLECYSTECTOMY - Eber Gill MD - Gallbladder polyp; nausea and vomiting; RUQ abdominal pain; hereditary hemochromatosis    MEDICATION LIST Internal         ENDOSCOPY  Internal 11/10/21   STOOL TESTING Internal Ova and Parasite - 9/10/23    Enteric Bacteria - 9/10/23    Cologuard - 3/10/21   PERTINENT LABS Internal 9/8/23 - CBCPD;  CMP; Lipase; Amylase; Ferritin    PATHOLOGY REPORTS (RELATED) Internal 2/13/23- Gallbladder    11/10/21 - Stomach bx    6/21/21 - Righ lobe liver lesion bx    IMAGING (CT, MRI, EGD, MRCP, Small Bowel Follow Through/SBT, MR/CT Enterography) Internal US Abdomen - 5/5/22, 11/24/21, 9/22/16    MR Abdomen - 8/24/21, 4/2/21    CT Abdomen Pelvis - 6/21/21, 9/19/16    CT Liver bx - 6/21/21    XR Chest 2/23/21

## 2023-10-20 NOTE — TELEPHONE ENCOUNTER
Called to remind patient of their upcoming appointment with our GI clinic, on Wednesday, October 25th at 10:00am with Dr. Gabriela Wilkins. This appointment is scheduled as a video visit. You will receive a call approximately 30 minutes prior to check you in, you must be in [place] for this visit., if your appointment is virtual (video or telephone) you need to be in [place] for the visit. To reschedule or cancel patient to call [phone].    Asha Martin

## 2023-10-22 NOTE — TELEPHONE ENCOUNTER
Received call from IR at the [facility], patient can be seen on 6/21 at 8:00    Called patient and advised of appointment for IR biopsy at the . Patient stated this will work for her and will arrive on 6/21 at 8:00 at the [place].     Kaur Cano RN on 6/11/2021 at 10:43 AM    
Aortic aneurysm

## 2023-10-25 ENCOUNTER — PRE VISIT (OUTPATIENT)
Dept: GASTROENTEROLOGY | Facility: CLINIC | Age: 63
End: 2023-10-25

## 2023-10-25 ENCOUNTER — VIRTUAL VISIT (OUTPATIENT)
Dept: GASTROENTEROLOGY | Facility: CLINIC | Age: 63
End: 2023-10-25
Payer: COMMERCIAL

## 2023-10-25 DIAGNOSIS — R11.2 NAUSEA AND VOMITING, UNSPECIFIED VOMITING TYPE: ICD-10-CM

## 2023-10-25 DIAGNOSIS — K91.5 POST-CHOLECYSTECTOMY SYNDROME: ICD-10-CM

## 2023-10-25 DIAGNOSIS — R19.7 DIARRHEA, UNSPECIFIED TYPE: Primary | ICD-10-CM

## 2023-10-25 DIAGNOSIS — Z90.49 S/P CHOLECYSTECTOMY: ICD-10-CM

## 2023-10-25 PROCEDURE — 99214 OFFICE O/P EST MOD 30 MIN: CPT | Mod: 95 | Performed by: INTERNAL MEDICINE

## 2023-10-25 RX ORDER — CHOLESTYRAMINE LIGHT 4 G/5.7G
4 POWDER, FOR SUSPENSION ORAL
Qty: 60 PACKET | Refills: 1 | Status: SHIPPED | OUTPATIENT
Start: 2023-10-25

## 2023-10-25 ASSESSMENT — PATIENT HEALTH QUESTIONNAIRE - PHQ9: SUM OF ALL RESPONSES TO PHQ QUESTIONS 1-9: 7

## 2023-10-25 ASSESSMENT — PAIN SCALES - GENERAL: PAINLEVEL: NO PAIN (0)

## 2023-10-25 NOTE — NURSING NOTE
Is the patient currently in the state of [place]? YES    Visit mode:VIDEO    If the visit is dropped, the patient can be reconnected by: VIDEO VISIT: Text to cell phone:   Telephone Information:   Mobile [phone]       Will anyone else be joining the visit? NO  (If patient encounters technical issues they should call [phone]    How would you like to obtain your AVS? MyChart    Are changes needed to the allergy or medication list? No    Reason for visit: No chief complaint on file.    Asia VARGAS

## 2023-10-25 NOTE — PROGRESS NOTES
Virtual Visit Details    Type of service:  Video Visit   Video Start Time:  9:57  Video End Time: 10:12    Originating Location (pt. Location): Home    Distant Location (provider location):  On-site  Platform used for Video Visit: [facility]    GASTROENTEROLOGY Video Follow up visit    CC/REFERRING MD:    Paulette Travis    HISTORY OF PRESENT ILLNESS:    Bradford Prado is a 63 year old female who is being evaluated via a billable video visit.      September: urgent care on 7th day of vomiting/diarrhea. She was given anti-diarrheal and anti-nausea, then had 7 more days of vomiting and diarrhea.  Lost weight, could not hold anything down, felt very very ill.  Didn't take insulin for 2 weeks but kept checking glucose and it was always around 100.    Now: nausea couple times a day.  Diarrhea: now feels she cannot leave the house because of urgency - sometimes cannot make it to the toilet.  Some days has solid stools.  Some days has flare up, multiple trips to the bathroom, endorses tenesmus  No blood in stools  Lost weight during episode in September, now has gained it back    Once in a while gets a dull pain on left side    Colonoscopy > 10 yrs ago, states has since been completing CRC screening with stool testing      Next month scheduled [facility],   Last phlebotomy was a couple months ago    Med hx/  Cholecystectomy one year ago - gallstones (no polyp)      Fam hx/  No CRC  M -  of hemochromatosis  2 siblings - HH  Both parents have the HH gene    I have reviewed and updated the patient's Past Medical History, Social History, Family History and Medication List.    ALLERGIES  Patient has no known allergies.  Current Outpatient Medications   Medication Sig Dispense Refill    ACCU-CHEK GUIDE test strip Use to test blood sugar 4 times daily or as directed. 200 strip 11    acetaminophen (TYLENOL) 325 MG tablet Take 325-650 mg by mouth every 6 hours as needed for mild pain      amLODIPine (NORVASC) 5 MG tablet TAKE  ONE TABLET BY MOUTH TWICE A  tablet 3    BD NATALYIA U/F 32G X 4 MM insulin pen needle USE 4 TIMES DAILY 200 each 1    blood glucose (NO BRAND SPECIFIED) lancets standard Use to test blood sugar twice times daily or as directed. 100 each 1    blood glucose (NO BRAND SPECIFIED) test strip Use to test blood sugar twice times daily or as directed. 100 each 1    blood glucose (NO BRAND SPECIFIED) test strip Use to test blood sugars 2 times daily or as directed 100 strip 11    blood glucose monitoring (SOFTCLIX) lancets Use to test blood sugar 6 times daily. 200 each 11    Blood Glucose Monitoring Suppl (ACCU-CHEK GUIDE ME) w/Device KIT 1 each 6 times daily 1 kit 0    carvedilol (COREG) 12.5 MG tablet TAKE ONE TABLET BY MOUTH TWICE A DAY WITH MEALS 180 tablet 1    Continuous Blood Gluc Sensor (FREESTYLE MEENA 2 SENSOR) MISC CHANGE EVERY 14 DAYS. USE TO CHECK BLOOD SUGARS PER  GUIDELINES 1 each 2    LANTUS SOLOSTAR 100 UNIT/ML soln INJECT 42 UNITS SUBCUTANEOUS EVERY MORNING (BEFORE BREAKFAST) 45 mL 0    loperamide (IMODIUM A-D) 2 MG tablet Take 1 tablet (2 mg) by mouth 3 times daily as needed for diarrhea 21 tablet 0    losartan (COZAAR) 50 MG tablet TAKE ONE TABLET BY MOUTH ONCE DAILY 90 tablet 0    mupirocin (BACTROBAN) 2 % external ointment Apply topically 3 times daily 1 g 0    NOVOLOG FLEXPEN 100 UNIT/ML soln INJECT 10 UNITS UNDER THE SKIN THREE TIMES DAILY WITH MEALS. MAXIMUM OF 30 UNITS DAILY. 30 mL 0    ondansetron (ZOFRAN ODT) 4 MG ODT tab Take 1 tablet (4 mg) by mouth every 8 hours as needed for nausea 16 tablet 0    ondansetron (ZOFRAN) 4 MG tablet Take 1 tablet (4 mg) by mouth every 8 hours as needed for nausea 20 tablet 1    pantoprazole (PROTONIX) 40 MG EC tablet TAKE ONE TABLET BY MOUTH ONCE DAILY 90 tablet 0    polyethylene glycol (MIRALAX) 17 GM/Dose powder Take 17 g by mouth daily as needed for constipation (after surgery) 510 g 0    pravastatin (PRAVACHOL) 20 MG tablet TAKE ONE TABLET BY  MOUTH ONCE DAILY 90 tablet 0    pregabalin (LYRICA) 75 MG capsule       sertraline (ZOLOFT) 50 MG tablet TAKE ONE TABLET BY MOUTH ONCE DAILY 90 tablet 0       PE/  Gen: pleasant NAD  HEENT: hearing intact  Psych: AOx3, normal mood and affect    PERTINENT STUDIES have been reviewed.  2/13/23  Gallbladder, cholecystectomy:  -Acute on chronic cholecystitis with cholelithiasis     11/2021 EGD: small varices    tTG IgA wnl one year ago      Impression/Recommendations:  Bradford Prado is a 63 year old female with DM, hereditary hemochromatosis and CERDA cirrhosis, who presents for evaluation of diarrhea.  Had severe symptoms in September, now somewhat improved however with intermittent fecal urgency significant impact to quality of life.  Ddx includes celiac vs SIBO vs post-infectious IBS vs micro colitis vs post-cholecystectomy syndrome vs other.  -- trial Questran (sugar free)  -- colonoscopy for eval - we discussed it is important she complete this even if Questran is successful      Appointment duration: 15 minutes    RTC 4 months earlier prn    Thank you for this consultation.  It was a pleasure to participate in the care of this patient; please contact us with any further questions.

## 2023-10-25 NOTE — LETTER
10/25/2023         RE: Bradford Prado  [address]  [place] [facility] [address]        Dear Colleague,    Thank you for referring your patient, Bradford Prado, to the [facility] [place]. Please see a copy of my visit note below.    Virtual Visit Details    Type of service:  Video Visit   Video Start Time:  9:57  Video End Time: 10:12    Originating Location (pt. Location): Home    Distant Location (provider location):  On-site  Platform used for Video Visit: [facility]    GASTROENTEROLOGY Video Follow up visit    CC/REFERRING MD:    Paulette Travis    HISTORY OF PRESENT ILLNESS:    Bradford Prado is a 63 year old female who is being evaluated via a billable video visit.      September: urgent care on 7th day of vomiting/diarrhea. She was given anti-diarrheal and anti-nausea, then had 7 more days of vomiting and diarrhea.  Lost weight, could not hold anything down, felt very very ill.  Didn't take insulin for 2 weeks but kept checking glucose and it was always around 100.    Now: nausea couple times a day.  Diarrhea: now feels she cannot leave the house because of urgency - sometimes cannot make it to the toilet.  Some days has solid stools.  Some days has flare up, multiple trips to the bathroom, endorses tenesmus  No blood in stools  Lost weight during episode in September, now has gained it back    Once in a while gets a dull pain on left side    Colonoscopy > 10 yrs ago, states has since been completing CRC screening with stool testing      Next month scheduled [facility] [place]  Last phlebotomy was a couple months ago    Med hx/  Cholecystectomy one year ago - gallstones (no polyp)      Fam hx/  No CRC  M -  of hemochromatosis  2 siblings - HH  Both parents have the HH gene    I have reviewed and updated the patient's Past Medical History, Social History, Family History and Medication List.    ALLERGIES  Patient has no known allergies.  Current Outpatient Medications   Medication  Sig Dispense Refill    ACCU-CHEK GUIDE test strip Use to test blood sugar 4 times daily or as directed. 200 strip 11    acetaminophen (TYLENOL) 325 MG tablet Take 325-650 mg by mouth every 6 hours as needed for mild pain      amLODIPine (NORVASC) 5 MG tablet TAKE ONE TABLET BY MOUTH TWICE A  tablet 3    BD NATALIYA U/F 32G X 4 MM insulin pen needle USE 4 TIMES DAILY 200 each 1    blood glucose (NO BRAND SPECIFIED) lancets standard Use to test blood sugar twice times daily or as directed. 100 each 1    blood glucose (NO BRAND SPECIFIED) test strip Use to test blood sugar twice times daily or as directed. 100 each 1    blood glucose (NO BRAND SPECIFIED) test strip Use to test blood sugars 2 times daily or as directed 100 strip 11    blood glucose monitoring (SOFTCLIX) lancets Use to test blood sugar 6 times daily. 200 each 11    Blood Glucose Monitoring Suppl (ACCU-CHEK GUIDE ME) w/Device KIT 1 each 6 times daily 1 kit 0    carvedilol (COREG) 12.5 MG tablet TAKE ONE TABLET BY MOUTH TWICE A DAY WITH MEALS 180 tablet 1    Continuous Blood Gluc Sensor (FREESTYLE MEENA 2 SENSOR) MISC CHANGE EVERY 14 DAYS. USE TO CHECK BLOOD SUGARS PER  GUIDELINES 1 each 2    LANTUS SOLOSTAR 100 UNIT/ML soln INJECT 42 UNITS SUBCUTANEOUS EVERY MORNING (BEFORE BREAKFAST) 45 mL 0    loperamide (IMODIUM A-D) 2 MG tablet Take 1 tablet (2 mg) by mouth 3 times daily as needed for diarrhea 21 tablet 0    losartan (COZAAR) 50 MG tablet TAKE ONE TABLET BY MOUTH ONCE DAILY 90 tablet 0    mupirocin (BACTROBAN) 2 % external ointment Apply topically 3 times daily 1 g 0    NOVOLOG FLEXPEN 100 UNIT/ML soln INJECT 10 UNITS UNDER THE SKIN THREE TIMES DAILY WITH MEALS. MAXIMUM OF 30 UNITS DAILY. 30 mL 0    ondansetron (ZOFRAN ODT) 4 MG ODT tab Take 1 tablet (4 mg) by mouth every 8 hours as needed for nausea 16 tablet 0    ondansetron (ZOFRAN) 4 MG tablet Take 1 tablet (4 mg) by mouth every 8 hours as needed for nausea 20 tablet 1     pantoprazole (PROTONIX) 40 MG EC tablet TAKE ONE TABLET BY MOUTH ONCE DAILY 90 tablet 0    polyethylene glycol (MIRALAX) 17 GM/Dose powder Take 17 g by mouth daily as needed for constipation (after surgery) 510 g 0    pravastatin (PRAVACHOL) 20 MG tablet TAKE ONE TABLET BY MOUTH ONCE DAILY 90 tablet 0    pregabalin (LYRICA) 75 MG capsule       sertraline (ZOLOFT) 50 MG tablet TAKE ONE TABLET BY MOUTH ONCE DAILY 90 tablet 0       PE/  Gen: pleasant NAD  HEENT: hearing intact  Psych: AOx3, normal mood and affect    PERTINENT STUDIES have been reviewed.  2/13/23  Gallbladder, cholecystectomy:  -Acute on chronic cholecystitis with cholelithiasis     11/2021 EGD: small varices    tTG IgA wnl one year ago      Impression/Recommendations:  Bradford Prado is a 63 year old female with DM, hereditary hemochromatosis and CERDA cirrhosis, who presents for evaluation of diarrhea.  Had severe symptoms in September, now somewhat improved however with intermittent fecal urgency significant impact to quality of life.  Ddx includes celiac vs SIBO vs post-infectious IBS vs micro colitis vs post-cholecystectomy syndrome vs other.  -- trial Questran (sugar free)  -- colonoscopy for eval - we discussed it is important she complete this even if Questran is successful      Appointment duration: 15 minutes    RTC 4 months earlier prn    Thank you for this consultation.  It was a pleasure to participate in the care of this patient; please contact us with any further questions.            Again, thank you for allowing me to participate in the care of your patient.      Sincerely,    Gabriela Wilkins MD

## 2023-10-26 ENCOUNTER — TELEPHONE (OUTPATIENT)
Dept: GASTROENTEROLOGY | Facility: CLINIC | Age: 63
End: 2023-10-26
Payer: COMMERCIAL

## 2023-10-26 NOTE — TELEPHONE ENCOUNTER
Spoke with patient and scheduled Dr. Wilkins's 4 mo follow-up order with [facility]. Patient is scheduled for a video visit with Vidal Amador on 2/26/24.

## 2023-11-13 DIAGNOSIS — F43.22 ADJUSTMENT DISORDER WITH ANXIOUS MOOD: ICD-10-CM

## 2023-11-13 DIAGNOSIS — K21.00 GASTROESOPHAGEAL REFLUX DISEASE WITH ESOPHAGITIS WITHOUT HEMORRHAGE: ICD-10-CM

## 2023-11-13 DIAGNOSIS — I10 ESSENTIAL HYPERTENSION WITH GOAL BLOOD PRESSURE LESS THAN 140/90: Chronic | ICD-10-CM

## 2023-11-13 DIAGNOSIS — E11.65 POORLY CONTROLLED TYPE 2 DIABETES MELLITUS WITH RENAL COMPLICATION (H): Chronic | ICD-10-CM

## 2023-11-13 DIAGNOSIS — E11.29 POORLY CONTROLLED TYPE 2 DIABETES MELLITUS WITH RENAL COMPLICATION (H): Chronic | ICD-10-CM

## 2023-11-13 RX ORDER — LOSARTAN POTASSIUM 50 MG/1
TABLET ORAL
Qty: 90 TABLET | Refills: 0 | Status: SHIPPED | OUTPATIENT
Start: 2023-11-13 | End: 2024-01-24

## 2023-11-13 RX ORDER — PANTOPRAZOLE SODIUM 40 MG/1
TABLET, DELAYED RELEASE ORAL
Qty: 90 TABLET | Refills: 0 | Status: SHIPPED | OUTPATIENT
Start: 2023-11-13 | End: 2024-01-24

## 2023-11-13 RX ORDER — INSULIN ASPART 100 [IU]/ML
INJECTION, SOLUTION INTRAVENOUS; SUBCUTANEOUS
Qty: 30 ML | Refills: 0 | Status: SHIPPED | OUTPATIENT
Start: 2023-11-13 | End: 2024-01-09

## 2023-11-13 NOTE — TELEPHONE ENCOUNTER
Message given to patient with good understanding.  Patient made appt for 1/12/24. Kavita Monroe on 11/13/2023 at 4:40 PM     68 yo F suffering from acute GI bleeding and anemia due to acute blood loss/hemorrhage  sepsis from obstructing kidney stone now s/p ureteral stent  shock due to hypovolemia/hemorrhage/anemia - resolved  Intubated for protection of airway on 8/19 - extubated 8/20  CTA - NO evidence bleeding, no dissection  hypokalemia - improved  hypernatremia due to dehydration and hypovolemia - improved    Plan at this time is for continued care in CCU  HOB 30  stop IVF for now - resume at 23:59 when NPO  Clear liquid for now  plan for repeat endoscopy in AM by Dr Yoo  GI and DVT prophylaxis as appropriate  repeat Hgb/BMP 16:00 - transfuse as appropriate  Pt to complete 10 days Rocephin, Flagyl  Urology to decide prior to D/c whether to address stone during this admission or at later date  Will continue labetalol  supplement lytes  GI input/intervention appreciated  Supportive care 68 yo F suffering from acute GI bleeding and anemia due to acute blood loss/hemorrhage  sepsis from obstructing kidney stone now s/p ureteral stent  shock due to hypovolemia/hemorrhage/anemia - resolved  Intubated for protection of airway on 8/19 - extubated 8/20  CTA - NO evidence bleeding, no dissection  hypokalemia - improved  hypernatremia due to dehydration and hypovolemia - improved    Plan at this time is for continued care in CCU  HOB 30  stop IVF for now - resume at 23:59 when NPO  Clear liquid for now  plan for repeat endoscopy in AM by Dr Yoo  GI and DVT prophylaxis as appropriate  repeat Hgb/BMP 16:00 - transfuse as appropriate  Pt to complete 10 days Rocephin, Flagyl  Urology to decide prior to D/c whether to address stone during this admission or at later date  Will continue labetalol  supplement lytes  GI input/intervention appreciated  Supportive care    case d/w Dr Abrams of the hospitalist service who will assume care for the patient.

## 2023-11-21 ENCOUNTER — TELEPHONE (OUTPATIENT)
Dept: GASTROENTEROLOGY | Facility: CLINIC | Age: 63
End: 2023-11-21
Payer: COMMERCIAL

## 2023-11-21 NOTE — TELEPHONE ENCOUNTER
"Endoscopy Scheduling Screen    Have you had a positive Covid test in the last 14 days?  No    Are you active on MyChart?   Yes    What insurance is in the chart?  Other:  [facility]    Ordering/Referring Provider: ANTELMO HERNANDEZ    (If ordering provider performs procedure, schedule with ordering provider unless otherwise instructed. )    BMI: Estimated body mass index is 29.51 kg/m  as calculated from the following:    Height as of 3/16/23: 1.499 m (4' 11\").    Weight as of 7/28/23: 66.3 kg (146 lb 1.6 oz).     Sedation Ordered  MAC/deep sedation.   BMI<= 45 45 < BMI <= 48 48 < BMI < = 50  BMI > 50   No Restrictions No [facility] ASC  No [facility]  [facility] with exceptions Hospital Only OR Only       Are you taking any prescription medications for pain 3 or more times per week?   No    Do you have a history of malignant hyperthermia or adverse reaction to anesthesia?  Yes (Continue with scheduling. Nurse will notify anesthesia at scheduled location for eval.)    (Females) Are you currently pregnant?        Have you been diagnosed or told you have pulmonary hypertension?   No    Do you have an LVAD?  No    Have you been told you have moderate to severe sleep apnea?  No    Have you been told you have COPD, asthma, or any other lung disease?  No    Do you have any heart conditions?  No     Have you ever had an organ transplant?   No    Have you ever had or are you awaiting a heart or lung transplant?   No    Have you had a stroke or transient ischemic attack (TIA aka \"mini stroke\" in the last 6 months?   No    Have you been diagnosed with or been told you have cirrhosis of the liver?   Yes (RN Review required for scheduling unless scheduling in Hospital.)    Are you currently on dialysis?   No    Do you need assistance transferring?   No    BMI: Estimated body mass index is 29.51 kg/m  as calculated from the following:    Height as of 3/16/23: 1.499 m (4' 11\").    Weight as of 7/28/23: 66.3 kg (146 lb 1.6 oz).     Is patients BMI " > 40 and scheduling location [facility]?  No    Do you take an injectable medication for weight loss or diabetes (excluding insulin)?  No    Do you take the medication Naltrexone?  No    Do you take blood thinners?  No       Prep   Are you currently on dialysis or do you have chronic kidney disease?  No    Do you have a diagnosis of diabetes?  Yes (Golytely Prep)    Do you have a diagnosis of cystic fibrosis (CF)?  No    On a regular basis do you go 3 -5 days between bowel movements?  No    BMI > 40?  No    Preferred Pharmacy:    [address] [address]  Phone: [phone] Fax: [phone] Alternate Fax: [phone], [phone]    Final Scheduling Details   Colonoscopy prep sent?  Standard Golytely    Procedure scheduled  Colonoscopy    Surgeon:  NEGRITO HERNANDEZ      Date of procedure:   2/29     Pre-OP / PAC:   Y PT WAS TOLD    Location   SH    Sedation    MAC      Patient Reminders:   You will receive a call from a Nurse to review instructions and health history.  This assessment must be completed prior to your procedure.  Failure to complete the Nurse assessment may result in the procedure being cancelled.      On the day of your procedure, please designate an adult(s) who can drive you home stay with you for the next 24 hours. The medicines used in the exam will make you sleepy. You will not be able to drive.      You cannot take public transportation, ride share services, or non-medical taxi service without a responsible caregiver.  Medical transport services are allowed with the requirement that a responsible caregiver will receive you at your destination.  We require that drivers and caregivers are confirmed prior to your procedure.

## 2023-11-21 NOTE — TELEPHONE ENCOUNTER
Pre Assessment RN Review    Focused Assessments      Cirrhosis Assessment    Results in Past 90 Days  Result Component Current Result Ref Range Previous Result Ref Range   Hemoglobin 15.0 (9/8/2023) 11.7 - 15.7 g/dL Not in Time Range    Platelet Count 129 (L) (9/8/2023) 150 - 450 10e3/uL Not in Time Range        Computed MELD 3.0 unavailable. One or more values for this score either were not found within the given timeframe or did not fit some other criterion.  Computed MELD-Na unavailable. One or more values for this score either were not found within the given timeframe or did not fit some other criterion.      INR <= 2.0*  and  Hgb >= 9 g/dL  and  Plt >= 50 10^9/L INR > 2.0   OR  Hgb < 9 g/dL   OR  Plt < 50 10^9/L   No Scheduling Restrictions Hospital Only   *Exception for patients on anticoagulation therapy.    Hx of variceal bleeding? No. (no scheduling restrictions)    Paracentesis in the last month? No      Scheduling Status & Recommendations    Sedation: MAC/Deep Sedation - Per order.  Location Type: No Scheduling Restrictions - Per exclusion criteria.

## 2023-12-21 DIAGNOSIS — E78.5 HYPERLIPIDEMIA LDL GOAL <70: Chronic | ICD-10-CM

## 2023-12-21 DIAGNOSIS — E11.65 POORLY CONTROLLED TYPE 2 DIABETES MELLITUS WITH RENAL COMPLICATION (H): Chronic | ICD-10-CM

## 2023-12-21 DIAGNOSIS — E11.29 POORLY CONTROLLED TYPE 2 DIABETES MELLITUS WITH RENAL COMPLICATION (H): Chronic | ICD-10-CM

## 2023-12-21 DIAGNOSIS — E11.65 TYPE 2 DIABETES MELLITUS WITH HYPERGLYCEMIA, WITHOUT LONG-TERM CURRENT USE OF INSULIN (H): ICD-10-CM

## 2023-12-21 RX ORDER — PRAVASTATIN SODIUM 20 MG
TABLET ORAL
Qty: 90 TABLET | Refills: 0 | Status: SHIPPED | OUTPATIENT
Start: 2023-12-21 | End: 2024-01-24

## 2023-12-21 RX ORDER — PEN NEEDLE, DIABETIC 32GX 5/32"
NEEDLE, DISPOSABLE MISCELLANEOUS
Qty: 200 EACH | Refills: 0 | Status: SHIPPED | OUTPATIENT
Start: 2023-12-21 | End: 2024-07-02

## 2023-12-26 ENCOUNTER — LAB (OUTPATIENT)
Dept: LAB | Facility: CLINIC | Age: 63
End: 2023-12-26
Payer: COMMERCIAL

## 2023-12-26 DIAGNOSIS — E78.5 HYPERLIPIDEMIA LDL GOAL <70: Chronic | ICD-10-CM

## 2023-12-26 DIAGNOSIS — E83.110 HEREDITARY HEMOCHROMATOSIS (H): ICD-10-CM

## 2023-12-26 DIAGNOSIS — E11.65 TYPE 2 DIABETES MELLITUS WITH HYPERGLYCEMIA, WITHOUT LONG-TERM CURRENT USE OF INSULIN (H): ICD-10-CM

## 2023-12-26 LAB
AFP SERPL-MCNC: 4.8 NG/ML
CHOLEST SERPL-MCNC: 170 MG/DL
CREAT UR-MCNC: 98.5 MG/DL
FASTING STATUS PATIENT QL REPORTED: YES
HBA1C MFR BLD: 6 % (ref 0–5.6)
HDLC SERPL-MCNC: 32 MG/DL
LDLC SERPL CALC-MCNC: 112 MG/DL
MICROALBUMIN UR-MCNC: <12 MG/L
MICROALBUMIN/CREAT UR: NORMAL MG/G{CREAT}
NONHDLC SERPL-MCNC: 138 MG/DL
TRIGL SERPL-MCNC: 130 MG/DL

## 2023-12-26 PROCEDURE — 82570 ASSAY OF URINE CREATININE: CPT

## 2023-12-26 PROCEDURE — 36415 COLL VENOUS BLD VENIPUNCTURE: CPT

## 2023-12-26 PROCEDURE — 80061 LIPID PANEL: CPT

## 2023-12-26 PROCEDURE — 82043 UR ALBUMIN QUANTITATIVE: CPT

## 2023-12-26 PROCEDURE — 82105 ALPHA-FETOPROTEIN SERUM: CPT

## 2023-12-26 PROCEDURE — 83036 HEMOGLOBIN GLYCOSYLATED A1C: CPT

## 2023-12-26 PROCEDURE — 82728 ASSAY OF FERRITIN: CPT

## 2023-12-26 NOTE — RESULT ENCOUNTER NOTE
Allison Prado,     I'm a covering provider for Paulette Travis who is currently out of office. Your A1c results are staying stable, keep up the good work! To help prevent the progression/worsening of your A1c score please: Try to limit your dietary intake of carbohydrate rich foods like rice, pasta, breads, potatoes, sweets. Additionally, limit your intake of drinks with added sugars, such as juice, regular soda, and regular sports or energy drinks.  It is ok for you to have lean meats, chicken, turkey, fish, eggs, nuts, beans, lentils, and tofu. With regards to exercise, try to get at least 30 minutes of CONTINUOUS aerobic exercise at least 3 times per week.     We are still awaiting the results of your other lab work which can take a few days to come back.    Sincerely,     Cata Molina MD

## 2023-12-27 ENCOUNTER — ANCILLARY PROCEDURE (OUTPATIENT)
Dept: ULTRASOUND IMAGING | Facility: CLINIC | Age: 63
End: 2023-12-27
Attending: INTERNAL MEDICINE
Payer: COMMERCIAL

## 2023-12-27 DIAGNOSIS — E83.110 HEREDITARY HEMOCHROMATOSIS (H): ICD-10-CM

## 2023-12-27 LAB — FERRITIN SERPL-MCNC: 60 NG/ML (ref 11–328)

## 2023-12-27 PROCEDURE — 76700 US EXAM ABDOM COMPLETE: CPT

## 2023-12-28 ENCOUNTER — ONCOLOGY VISIT (OUTPATIENT)
Dept: ONCOLOGY | Facility: CLINIC | Age: 63
End: 2023-12-28
Attending: INTERNAL MEDICINE
Payer: COMMERCIAL

## 2023-12-28 ENCOUNTER — PATIENT OUTREACH (OUTPATIENT)
Dept: CARE COORDINATION | Facility: CLINIC | Age: 63
End: 2023-12-28

## 2023-12-28 VITALS
OXYGEN SATURATION: 97 % | BODY MASS INDEX: 27.19 KG/M2 | HEIGHT: 60 IN | TEMPERATURE: 98.7 F | WEIGHT: 138.5 LBS | SYSTOLIC BLOOD PRESSURE: 131 MMHG | HEART RATE: 72 BPM | DIASTOLIC BLOOD PRESSURE: 75 MMHG | RESPIRATION RATE: 16 BRPM

## 2023-12-28 DIAGNOSIS — E83.110 HEREDITARY HEMOCHROMATOSIS (H): Primary | ICD-10-CM

## 2023-12-28 PROCEDURE — 99215 OFFICE O/P EST HI 40 MIN: CPT | Performed by: INTERNAL MEDICINE

## 2023-12-28 PROCEDURE — G0463 HOSPITAL OUTPT CLINIC VISIT: HCPCS | Performed by: INTERNAL MEDICINE

## 2023-12-28 ASSESSMENT — PAIN SCALES - GENERAL: PAINLEVEL: MILD PAIN (3)

## 2023-12-28 NOTE — PROGRESS NOTES
[facility]    Hematology/Oncology Established Patient Follow-up Note    Treatment Summary:      Today's Date: 12/28/23    Reason for Follow-up: Hereditary Hemochromatosis  Referring Provider: Nimco Travis NP      HISTORY OF PRESENT ILLNESS: Bradford Prado is a 63 year old female with past medical history of cirrhosis, CERDA, HTN, HLD, and DM2 who is referred to clinic today for C282Y/C282Y hereditary hemochromatosis. She was previously followed by Dr. Long in [place].     Patient has family history of severe liver disease and recalls mother entering acute liver failure with need for repeat thoracenteses. Patient had then developed increasing LFTs and cirrhosis and workup for this included liver biopsy, which revealed cirrhosis and negative alpha 1 antitrypsin mutation testing. Record review shows patient has had evidence of elevated ferritin levels as high as >1500 in 2016. She underwent testing for hereditary hemochromatosis in May 2021 and returned positive as homozygous C282Y/C282Y.      Patient was then treated with phlebotomies twice per week for two months. Her most recent ferritin level was in August 2021 and 101.      Patient does have various hand arthralgias and follows with a hand surgeon for this with intermittent hand Xrays and steroid injections. She is a diabetic. No history of endocrinopathies or heart disease she is aware of.      12/13/21 evaluated by General Surgery. No acute intervention.     Phlebotomy 12/15. Ferritin had decreased from 179 to 93, currently. Energy levels are stable. She still has diffuse arthralgias and takes PRN NSAIDS.       INTERIM HISTORY:  She did not tolerate last phlebotomy well and wishes to change her current schedule.    She has been ill recently with diarrhea. She has only recently had improvement in her oral intake. She will soon have colonoscopy.      REVIEW OF SYSTEMS:   A 14 point ROS was reviewed with pertinent positives and negatives  in the HPI.       HOME MEDICATIONS:  Current Outpatient Medications   Medication Sig Dispense Refill    ACCU-CHEK GUIDE test strip Use to test blood sugar 4 times daily or as directed. 200 strip 11    acetaminophen (TYLENOL) 325 MG tablet Take 325-650 mg by mouth every 6 hours as needed for mild pain      amLODIPine (NORVASC) 5 MG tablet TAKE ONE TABLET BY MOUTH TWICE A  tablet 3    blood glucose (NO BRAND SPECIFIED) lancets standard Use to test blood sugar twice times daily or as directed. 100 each 1    blood glucose (NO BRAND SPECIFIED) test strip Use to test blood sugar twice times daily or as directed. 100 each 1    blood glucose (NO BRAND SPECIFIED) test strip Use to test blood sugars 2 times daily or as directed 100 strip 11    blood glucose monitoring (SOFTCLIX) lancets Use to test blood sugar 6 times daily. 200 each 11    Blood Glucose Monitoring Suppl (ACCU-CHEK GUIDE ME) w/Device KIT 1 each 6 times daily 1 kit 0    carvedilol (COREG) 12.5 MG tablet TAKE ONE TABLET BY MOUTH TWICE A DAY WITH MEALS 180 tablet 1    cholestyramine light (PREVALITE) 4 GM powder Take 1 packet (4 g) by mouth 3 times daily (with meals) 60 packet 1    Continuous Blood Gluc Sensor (FREESTYLE MEENA 2 SENSOR) MISC CHANGE EVERY 14 DAYS. USE TO CHECK BLOOD SUGARS PER  GUIDELINES 1 each 2    insulin pen needle (BD NATALIYA U/F) 32G X 4 MM miscellaneous USE 4 TIMES DAILY 200 each 0    LANTUS SOLOSTAR 100 UNIT/ML soln INJECT 42 UNITS SUBCUTANEOUS EVERY MORNING (BEFORE BREAKFAST) 45 mL 0    loperamide (IMODIUM A-D) 2 MG tablet Take 1 tablet (2 mg) by mouth 3 times daily as needed for diarrhea 21 tablet 0    losartan (COZAAR) 50 MG tablet TAKE ONE TABLET BY MOUTH ONCE DAILY 90 tablet 0    mupirocin (BACTROBAN) 2 % external ointment Apply topically 3 times daily 1 g 0    NOVOLOG FLEXPEN 100 UNIT/ML soln INJECT 10 UNITS UNDER THE SKIN THREE TIMES DAILY WITH MEALS. MAXIMUM OF 30 UNITS DAILY. 30 mL 0    ondansetron (ZOFRAN ODT) 4  MG ODT tab Take 1 tablet (4 mg) by mouth every 8 hours as needed for nausea 16 tablet 0    ondansetron (ZOFRAN) 4 MG tablet Take 1 tablet (4 mg) by mouth every 8 hours as needed for nausea 20 tablet 1    pantoprazole (PROTONIX) 40 MG EC tablet TAKE ONE TABLET BY MOUTH ONCE DAILY 90 tablet 0    polyethylene glycol (MIRALAX) 17 GM/Dose powder Take 17 g by mouth daily as needed for constipation (after surgery) 510 g 0    pravastatin (PRAVACHOL) 20 MG tablet TAKE ONE TABLET BY MOUTH ONCE DAILY. DUE FOR LABS 90 tablet 0    pregabalin (LYRICA) 75 MG capsule       sertraline (ZOLOFT) 50 MG tablet TAKE ONE TABLET BY MOUTH ONCE DAILY 90 tablet 0         ALLERGIES:  No Known Allergies      PAST MEDICAL HISTORY:  Past Medical History:   Diagnosis Date    Chest pain 2/23/2021    Diabetes mellitus (H)     Dyslipidemia     Hereditary hemochromatosis (H24)     Hyperglycemia 2/23/2021    Hypertension     Hypertensive urgency 2/23/2021    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) (H)     Nephrolithiasis 9/19/2016    First episode September 2016         PAST SURGICAL HISTORY:  Past Surgical History:   Procedure Laterality Date    ESOPHAGOSCOPY, GASTROSCOPY, DUODENOSCOPY (EGD), COMBINED N/A 11/10/2021    Procedure: ESOPHAGOGASTRODUODENOSCOPY, WITH BIOPSY;  Surgeon: Leventhal, Thomas Michael, MD;  Location: [facility] OR    HYSTERECTOMY TOTAL ABDOMINAL  1995    due to fibroids    LAPAROSCOPIC CHOLECYSTECTOMY N/A 2/13/2023    Procedure: Laparoscopic cholecystectomy;  Surgeon: Eber Gill MD;  Location: [facility] OR    LAPAROSCOPIC EVACUATION ECTOPIC PREGNANCY      TUBAL LIGATION           SOCIAL HISTORY:  Social History     Socioeconomic History    Marital status: Single     Spouse name: Not on file    Number of children: Not on file    Years of education: Not on file    Highest education level: Not on file   Occupational History    Not on file   Tobacco Use    Smoking status: Former     Packs/day: 0.50     Years:  20.00     Additional pack years: 0.00     Total pack years: 10.00     Types: Cigarettes     Quit date: 3/9/2010     Years since quittin.8    Smokeless tobacco: Never   Vaping Use    Vaping Use: Never used   Substance and Sexual Activity    Alcohol use: No     Alcohol/week: 0.0 standard drinks of alcohol    Drug use: No    Sexual activity: Never   Other Topics Concern    Parent/sibling w/ CABG, MI or angioplasty before 65F 55M? No   Social History Narrative    , 2 children, 2 grandchildren     Social Determinants of Health     Financial Resource Strain: Not on file   Food Insecurity: Not on file   Transportation Needs: Not on file   Physical Activity: Not on file   Stress: Not on file   Social Connections: Not on file   Interpersonal Safety: Not At Risk (2023)    Humiliation, Afraid, Rape, and Kick questionnaire     Fear of Current or Ex-Partner: No     Emotionally Abused: No     Physically Abused: No     Sexually Abused: No   Housing Stability: Not on file         FAMILY HISTORY:  Family History   Problem Relation Age of Onset    Cirrhosis Mother         w/o alcohol history    Diabetes Mother     Emphysema Father     Hemochromatosis Brother     ALS Brother     Hemochromatosis Sister     Hemochromatosis Sister     Hypertension Sister     Hemochromatosis Sister     Heart Defect Niece         Tetrology of Fallot    Breast Cancer No family hx of     Cancer - colorectal No family hx of          PHYSICAL EXAM:  Vital signs:  /75   Pulse 72   Temp 98.7  F (37.1  C) (Oral)   Resp 16   Ht 1.524 m (5')   Wt 62.8 kg (138 lb 8 oz)   LMP  (LMP Unknown)   SpO2 97%   BMI 27.05 kg/m     ECO-1  GENERAL/CONSTITUTIONAL: No acute distress. Bronzed skin.  EYES: Pupils are equal, round, and react to light and accommodation. Extraocular movements intact.  No scleral icterus.  MUSCULOSKELETAL: Warm and well-perfused, no cyanosis, clubbing, or edema.  NEUROLOGIC: Cranial nerves II-XII are intact. Alert,  oriented, answers questions appropriately.  INTEGUMENTARY: No rashes or jaundice.  GAIT: Steady, does not use assistive device.      LABS: Reviewed with patient today.   Latest Reference Range & Units 01/10/23 12:15 01/24/23 12:39 02/07/23 12:13 02/21/23 11:59 03/14/23 10:45 07/26/23 09:46 12/26/23 10:11   Ferritin 11 - 328 ng/mL 60 57 61 96 70 88 60        Latest Reference Range & Units 12/26/23 10:11   AFP tumor marker <=8.3 ng/mL 4.8        Latest Reference Range & Units 11/15/22 14:37   AFP tumor marker <=8.3 ng/mL 3.4         Ref. Range 11/18/2021 12:09   Alpha Fetoprotein Latest Ref Range: 0.0 - 8.0 ug/L 4.7             Ref. Range 9/27/2016 11:10 6/23/2021 10:45   Hep B Surface Agn Latest Ref Range: NR  Nonreactive     Hepatitis B Surface Antibody Latest Ref Range: <8.00 m[IU]/mL 0.22     Hepatitis C Antibody Latest Ref Range: NR  Nonreactive...     HIV Antigen Antibody Combo Latest Ref Range: NR^Nonreactive       Nonreactive         PATHOLOGY:  5/10/21: TEST(S) REQUESTED:   Hemochromatosis Mutation Analysis by PCR     SPECIMEN DESCRIPTION:   Blood     HEMOCHROMATOSIS RESULTS     HFE Gene C282Y (G845A) RESULTS:     C282Y Mutation Interpretation: HOMOZYGOTE     HFE Gene H63D (C187G) RESULTS:     H63D Mutation Interpretation: NORMAL     HFE Gene S65C (A193T) RESULTS:     S65C Mutation Interpretation: NORMAL      Indication for testing: Carrier screening or diagnostic   testing for alpha-1-antitrypsin (AAT) deficiency.   Negative: This sample has a serum AAT protein concentration   in the normal range and is negative for the S and Z   deficiency alleles by genotyping. 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                          - Small (< 5 mm) esophageal varices.                          - Gastritis. Biopsied.                          - Normal examined duodenum.      Final Diagnosis   A. STOMACH, BIOPSY:  - Gastric mucosa with features of reactive gastropathy   - No H. pylori organisms identified on routine staining  - Negative for intestinal metaplasia and dysplasia        Case Report   Surgical Pathology Report                         Case: [id number]                                   Authorizing Provider:  Eber Gill MD Collected:           02/13/2023 11:26 AM           Ordering Location:     [facility]          Received:            02/13/2023 12:35 PM                                  [facility] OR                                                             Pathologist:           Marnie De La Rosa MD                                                                            Specimen:    Gallbladder                                                                                Final Diagnosis   Gallbladder, cholecystectomy:  -Acute on chronic cholecystitis with cholelithiasis            IMAGING:  ULTRASOUND ABDOMEN LIMITED November 24, 2021   IMPRESSION:  1.  Coarse increased echogenicity of the liver compatible with  steatosis and/or cirrhosis.  2.  1 cm gallbladder polyp.  3.  No hepatoma demonstrated.    US ABDOMEN LIMITED 5/5/2022  FINDINGS:     GALLBLADDER: Gallbladder polyp measuring 1 cm again noted and  unchanged. No gallstones, wall thickening, or pericholecystic fluid.  Negative sonographic Clark's sign.     BILE DUCTS: There is no biliary dilatation. The common duct measures 4  mm.     LIVER: Unremarkable where  seen.                                                                     IMPRESSION:  1.  Stable gallbladder polyp.    ABD US 12/27/23:  IMPRESSION:  1.  Coarsened hepatic echotexture suggestive of underlying intrinsic liver disease. No suspicious focal liver lesion.  2.  Mild splenomegaly.  3.  Interval cholecystectomy.        ASSESSMENT/PLAN:  Bradford Prado is a 63 year old female with past medical history of cirrhosis, CERDA, HTN, HLD, and DM2 who is referred to clinic today for C282Y/C282Y hereditary hemochromatosis. She was previously followed by Dr. Long in [place].     1) Homozygous C282Y/C282Y hereditary hemochromatosis Patient diagnosed just in May 2021. Her last phlebotomy was in August 2021 (ferritin 101). Ferritin 179, which decreased to 93 with phlebotomy x1 on 12/15/21.  -Patient had last required phlebotomy in 11/2022 for ferritin 244. Subsequently, ferritin remained 60-80s. Patient had stated she felt sluggish with ferritin of 88 and phlebotomy was changed for goal ferritin <50. With this, she has not been able to tolerate. Will change to phlebotomy once every 6 months for goal ferritin <100.  -ABD US normal 12/2023.  -Continue yearly liver ultrasound and AFP (4.8 as of 12/2023).     2) History of cirrhosis/CERDA  -See plan above.   -EGD last done in 11/2021. Next due in 2023.      3) Gallbladder polyp  -s/p cholecystectomy (acute on chronic cholecystitis).    4) HTN, HLD, DM2  -As followed by PCP.     5) Diarrhea  -She has ongoing workup with PCP. She is soon to have colonoscopy.     6) -Change to phlebotomy once every 6 months for goal ferritin <100.  -Recheck ferritin in 6 months prior to follow up in clinic with possible phlebotomy.   -Liver US and AFP will be due in 12/2024.   -I discussed with patient my planned departure from [facility] and she will establish with a new hematologist after February 2024.                   Rochelle Girard,   Hematology/Oncology  [facility]  Physicians

## 2023-12-28 NOTE — PROGRESS NOTES
Social Work - Distress Screen Intervention  [facility]    Identified Concern and Score from Distress Screenin. How concerned are you about your ability to eat? 5     2. How concerned are you about unintended weight loss or your current weight? 0     3. How concerned are you about feeling depressed or very sad?  0     4. How concerned are you about feeling anxious or very scared?  0     5. Do you struggle with the loss of meaning and anni in your life?  Not at all     6. How concerned are you about work and home life issues that may be affected by your cancer?  (!) 10     7. How concerned are you about knowing what resources are available to help you?  0     8. Do you currently have what you would describe as [employer] or spiritual struggles? Not at all     9. If you want to be contacted by one of our professionals, I can send a message to them right now.  No data recorded     Date of Distress Screen: 23  Data: At time of last visit, patient scored positive on distress screening.  outreached to patient today to follow up on elevated distress and introduce psychosocial services and support.  Intervention/Education provided:  contacted patient by phone to discuss distress screening results. Left voicemail message    Follow-up Required:  to remain available for support and await return call from patient    Eliza Mora, MSW, LICSW, OSW-C  Clinical - Adult Oncology  She/Her/Hers  Phone: [phone]  [facility]: M, Thu  *every other Tue, 8am-4:30pm  [facility]: W, F, *every other Tue, 8am-4:30pm

## 2023-12-28 NOTE — NURSING NOTE
Oncology Rooming Note    December 28, 2023 8:46 AM   Bradford Prado is a 63 year old female who presents for:    Chief Complaint   Patient presents with    Oncology Clinic Visit     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)      Initial Vitals: /75   Pulse 72   Temp 98.7  F (37.1  C) (Oral)   Resp 16   Ht 1.524 m (5')   Wt 62.8 kg (138 lb 8 oz)   LMP  (LMP Unknown)   SpO2 97%   BMI 27.05 kg/m   Estimated body mass index is 27.05 kg/m  as calculated from the following:    Height as of this encounter: 1.524 m (5').    Weight as of this encounter: 62.8 kg (138 lb 8 oz). Body surface area is 1.63 meters squared.  Mild Pain (3) Comment: Data Unavailable   No LMP recorded (lmp unknown). Patient has had a hysterectomy.  Allergies reviewed: Yes  Medications reviewed: Yes    Medications: Medication refills not needed today.  Pharmacy name entered into [employer]:    [facility] [place], [place] - [address] [place] [address] PHARMACY [place], [place] - [address] [place] [address] [phone]    Frailty Screening:   Is the patient here for a new oncology consult visit in cancer care? 2. No      Clinical concerns: follow up       Yuko Patel MA

## 2023-12-28 NOTE — LETTER
12/28/2023         RE: Bradford Prado  [address]  [facility] [address]        Dear Colleague,    Thank you for referring your patient, Bradford Prado, to the [facility]. Please see a copy of my visit note below.    [facility]    Hematology/Oncology Established Patient Follow-up Note    Treatment Summary:      Today's Date: 12/28/23    Reason for Follow-up: Hereditary Hemochromatosis  Referring Provider: Nimco Travis NP      HISTORY OF PRESENT ILLNESS: Bradford Prado is a 63 year old female with past medical history of cirrhosis, CERDA, HTN, HLD, and DM2 who is referred to clinic today for C282Y/C282Y hereditary hemochromatosis. She was previously followed by Dr. Long in [place].     Patient has family history of severe liver disease and recalls mother entering acute liver failure with need for repeat thoracenteses. Patient had then developed increasing LFTs and cirrhosis and workup for this included liver biopsy, which revealed cirrhosis and negative alpha 1 antitrypsin mutation testing. Record review shows patient has had evidence of elevated ferritin levels as high as >1500 in 2016. She underwent testing for hereditary hemochromatosis in May 2021 and returned positive as homozygous C282Y/C282Y.      Patient was then treated with phlebotomies twice per week for two months. Her most recent ferritin level was in August 2021 and 101.      Patient does have various hand arthralgias and follows with a hand surgeon for this with intermittent hand Xrays and steroid injections. She is a diabetic. No history of endocrinopathies or heart disease she is aware of.      12/13/21 evaluated by General Surgery. No acute intervention.     Phlebotomy 12/15. Ferritin had decreased from 179 to 93, currently. Energy levels are stable. She still has diffuse arthralgias and takes PRN NSAIDS.       INTERIM HISTORY:  She did not tolerate last phlebotomy well and wishes to  change her current schedule.    She has been ill recently with diarrhea. She has only recently had improvement in her oral intake. She will soon have colonoscopy.      REVIEW OF SYSTEMS:   A 14 point ROS was reviewed with pertinent positives and negatives in the HPI.       HOME MEDICATIONS:  Current Outpatient Medications   Medication Sig Dispense Refill     ACCU-CHEK GUIDE test strip Use to test blood sugar 4 times daily or as directed. 200 strip 11     acetaminophen (TYLENOL) 325 MG tablet Take 325-650 mg by mouth every 6 hours as needed for mild pain       amLODIPine (NORVASC) 5 MG tablet TAKE ONE TABLET BY MOUTH TWICE A  tablet 3     blood glucose (NO BRAND SPECIFIED) lancets standard Use to test blood sugar twice times daily or as directed. 100 each 1     blood glucose (NO BRAND SPECIFIED) test strip Use to test blood sugar twice times daily or as directed. 100 each 1     blood glucose (NO BRAND SPECIFIED) test strip Use to test blood sugars 2 times daily or as directed 100 strip 11     blood glucose monitoring (SOFTCLIX) lancets Use to test blood sugar 6 times daily. 200 each 11     Blood Glucose Monitoring Suppl (ACCU-CHEK GUIDE ME) w/Device KIT 1 each 6 times daily 1 kit 0     carvedilol (COREG) 12.5 MG tablet TAKE ONE TABLET BY MOUTH TWICE A DAY WITH MEALS 180 tablet 1     cholestyramine light (PREVALITE) 4 GM powder Take 1 packet (4 g) by mouth 3 times daily (with meals) 60 packet 1     Continuous Blood Gluc Sensor (FREESTYLE MEENA 2 SENSOR) MISC CHANGE EVERY 14 DAYS. USE TO CHECK BLOOD SUGARS PER  GUIDELINES 1 each 2     insulin pen needle (BD NATALIYA U/F) 32G X 4 MM miscellaneous USE 4 TIMES DAILY 200 each 0     LANTUS SOLOSTAR 100 UNIT/ML soln INJECT 42 UNITS SUBCUTANEOUS EVERY MORNING (BEFORE BREAKFAST) 45 mL 0     loperamide (IMODIUM A-D) 2 MG tablet Take 1 tablet (2 mg) by mouth 3 times daily as needed for diarrhea 21 tablet 0     losartan (COZAAR) 50 MG tablet TAKE ONE TABLET BY  MOUTH ONCE DAILY 90 tablet 0     mupirocin (BACTROBAN) 2 % external ointment Apply topically 3 times daily 1 g 0     NOVOLOG FLEXPEN 100 UNIT/ML soln INJECT 10 UNITS UNDER THE SKIN THREE TIMES DAILY WITH MEALS. MAXIMUM OF 30 UNITS DAILY. 30 mL 0     ondansetron (ZOFRAN ODT) 4 MG ODT tab Take 1 tablet (4 mg) by mouth every 8 hours as needed for nausea 16 tablet 0     ondansetron (ZOFRAN) 4 MG tablet Take 1 tablet (4 mg) by mouth every 8 hours as needed for nausea 20 tablet 1     pantoprazole (PROTONIX) 40 MG EC tablet TAKE ONE TABLET BY MOUTH ONCE DAILY 90 tablet 0     polyethylene glycol (MIRALAX) 17 GM/Dose powder Take 17 g by mouth daily as needed for constipation (after surgery) 510 g 0     pravastatin (PRAVACHOL) 20 MG tablet TAKE ONE TABLET BY MOUTH ONCE DAILY. 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) (H)      Nephrolithiasis 9/19/2016    First episode September 2016         PAST SURGICAL HISTORY:  Past Surgical History:   Procedure Laterality Date     ESOPHAGOSCOPY, GASTROSCOPY, DUODENOSCOPY (EGD), COMBINED N/A 11/10/2021    Procedure: ESOPHAGOGASTRODUODENOSCOPY, WITH BIOPSY;  Surgeon: Leventhal, Thomas Michael, MD;  [place] [facility] [place]     HYSTERECTOMY TOTAL ABDOMINAL  1995    due to fibroids     LAPAROSCOPIC CHOLECYSTECTOMY N/A 2/13/2023    Procedure: Laparoscopic cholecystectomy;  Surgeon: Eber Gill MD;  Location: [facility] OR     LAPAROSCOPIC EVACUATION ECTOPIC PREGNANCY       TUBAL LIGATION           SOCIAL HISTORY:  Social History      Socioeconomic History     Marital status: Single     Spouse name: Not on file     Number of children: Not on file     Years of education: Not on file     Highest education level: Not on file   Occupational History     Not on file   Tobacco Use     Smoking status: Former     Packs/day: 0.50     Years: 20.00     Additional pack years: 0.00     Total pack years: 10.00     Types: Cigarettes     Quit date: 3/9/2010     Years since quittin.8     Smokeless tobacco: Never   Vaping Use     Vaping Use: Never used   Substance and Sexual Activity     Alcohol use: No     Alcohol/week: 0.0 standard drinks of alcohol     Drug use: No     Sexual activity: Never   Other Topics Concern     Parent/sibling w/ CABG, MI or angioplasty before 65F 55M? No   Social History Narrative    , 2 children, 2 grandchildren     Social Determinants of Health     Financial Resource Strain: Not on file   Food Insecurity: Not on file   Transportation Needs: Not on file   Physical Activity: Not on file   Stress: Not on file   Social Connections: Not on file   Interpersonal Safety: Not At Risk (2023)    Humiliation, Afraid, Rape, and Kick questionnaire      Fear of Current or Ex-Partner: No      Emotionally Abused: No      Physically Abused: No      Sexually Abused: No   Housing Stability: Not on file         FAMILY HISTORY:  Family History   Problem Relation Age of Onset     Cirrhosis Mother         w/o alcohol history     Diabetes Mother      Emphysema Father      Hemochromatosis Brother      ALS Brother      Hemochromatosis Sister      Hemochromatosis Sister      Hypertension Sister      Hemochromatosis Sister      Heart Defect Niece         Tetrology of Fallot     Breast Cancer No family hx of      Cancer - colorectal No family hx of          PHYSICAL EXAM:  Vital signs:  /75   Pulse 72   Temp 98.7  F (37.1  C) (Oral)   Resp 16   Ht 1.524 m (5')   Wt 62.8 kg (138 lb 8 oz)   LMP  (LMP Unknown)   SpO2 97%   BMI 27.05  kg/m     ECO-1  GENERAL/CONSTITUTIONAL: No acute distress. Bronzed skin.  EYES: Pupils are equal, round, and react to light and accommodation. Extraocular movements intact.  No scleral icterus.  MUSCULOSKELETAL: Warm and well-perfused, no cyanosis, clubbing, or edema.  NEUROLOGIC: Cranial nerves II-XII are intact. Alert, oriented, answers questions appropriately.  INTEGUMENTARY: No rashes or jaundice.  GAIT: Steady, does not use assistive device.      LABS: Reviewed with patient today.   Latest Reference Range & Units 01/10/23 12:15 23 12:39 23 12:13 23 11:59 23 10:45 23 09:46 23 10:11   Ferritin 11 - 328 ng/mL 60 57 61 96 70 88 60        Latest Reference Range & Units 23 10:11   AFP tumor marker <=8.3 ng/mL 4.8        Latest Reference Range & Units 11/15/22 14:37   AFP tumor marker <=8.3 ng/mL 3.4         Ref. Range 2021 12:09   Alpha Fetoprotein Latest Ref Range: 0.0 - 8.0 ug/L 4.7             Ref. Range 2016 11:10 2021 10:45   Hep B Surface Agn Latest Ref Range: NR  Nonreactive     Hepatitis B Surface Antibody Latest Ref Range: <8.00 m[IU]/mL 0.22     Hepatitis C Antibody Latest Ref Range: NR  Nonreactive...     HIV Antigen Antibody Combo Latest Ref Range: NR^Nonreactive       Nonreactive         PATHOLOGY:  5/10/21: TEST(S) REQUESTED:   Hemochromatosis Mutation Analysis by PCR     SPECIMEN DESCRIPTION:   Blood     HEMOCHROMATOSIS RESULTS     HFE Gene C282Y (G845A) RESULTS:     C282Y Mutation Interpretation: HOMOZYGOTE     HFE Gene H63D (C187G) RESULTS:     H63D Mutation Interpretation: NORMAL     HFE Gene S65C (A193T) RESULTS:     S65C Mutation Interpretation: NORMAL      Indication for testing: Carrier screening or diagnostic   testing for alpha-1-antitrypsin (AAT) deficiency.   Negative: This sample has a serum AAT protein concentration   in the normal range and is negative for the S and Z   deficiency alleles by genotyping. 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                          - Small (< 5 mm) esophageal varices.                          - Gastritis. Biopsied.                          - Normal examined duodenum.      Final Diagnosis   A. STOMACH, BIOPSY:  - Gastric mucosa with features of reactive gastropathy   - No H. pylori organisms identified on routine staining  - Negative for intestinal metaplasia and dysplasia        Case Report   Surgical Pathology Report                         Case: [id number]                                   Authorizing Provider:  Eber Gill MD Collected:           02/13/2023 11:26 AM           Ordering Location:     [facility]          Received:            02/13/2023 12:35 PM                                  [facility] Main OR                                                             Pathologist:           Marnie De La Rosa MD                                                                            Specimen:    Gallbladder                                                                                Final Diagnosis   Gallbladder, cholecystectomy:  -Acute on chronic cholecystitis with cholelithiasis            IMAGING:  ULTRASOUND ABDOMEN LIMITED November 24, 2021   IMPRESSION:  1.  Coarse increased echogenicity of the liver compatible with  steatosis and/or cirrhosis.  2.  1 cm gallbladder polyp.  3.  No hepatoma demonstrated.    US ABDOMEN  LIMITED 5/5/2022  FINDINGS:     GALLBLADDER: Gallbladder polyp measuring 1 cm again noted and  unchanged. No gallstones, wall thickening, or pericholecystic fluid.  Negative sonographic Clark's sign.     BILE DUCTS: There is no biliary dilatation. The common duct measures 4  mm.     LIVER: Unremarkable where seen.                                                                     IMPRESSION:  1.  Stable gallbladder polyp.    ABD US 12/27/23:  IMPRESSION:  1.  Coarsened hepatic echotexture suggestive of underlying intrinsic liver disease. No suspicious focal liver lesion.  2.  Mild splenomegaly.  3.  Interval cholecystectomy.        ASSESSMENT/PLAN:  Bradford Prado is a 63 year old female with past medical history of cirrhosis, CERDA, HTN, HLD, and DM2 who is referred to clinic today for C282Y/C282Y hereditary hemochromatosis. She was previously followed by Dr. Long in [place].     1) Homozygous C282Y/C282Y hereditary hemochromatosis Patient diagnosed just in May 2021. Her last phlebotomy was in August 2021 (ferritin 101). Ferritin 179, which decreased to 93 with phlebotomy x1 on 12/15/21.  -Patient had last required phlebotomy in 11/2022 for ferritin 244. Subsequently, ferritin remained 60-80s. Patient had stated she felt sluggish with ferritin of 88 and phlebotomy was changed for goal ferritin <50. With this, she has not been able to tolerate. Will change to phlebotomy once every 6 months for goal ferritin <100.  -ABD US normal 12/2023.  -Continue yearly liver ultrasound and AFP (4.8 as of 12/2023).     2) History of cirrhosis/CERDA  -See plan above.   -EGD last done in 11/2021. Next due in 2023.      3) Gallbladder polyp  -s/p cholecystectomy (acute on chronic cholecystitis).    4) HTN, HLD, DM2  -As followed by PCP.     5) Diarrhea  -She has ongoing workup with PCP. She is soon to have colonoscopy.     6) -Change to phlebotomy once every 6 months for goal ferritin <100.  -Recheck ferritin in 6 months prior to  follow up in clinic with possible phlebotomy.   -Liver US and AFP will be due in 12/2024.   -I discussed with patient my planned departure from [facility] and she will establish with a new hematologist after February 2024.                   Rochelle Girard DO  Hematology/Oncology  [facility]                  Again, thank you for allowing me to participate in the care of your patient.        Sincerely,        Rochelle Girard DO

## 2024-01-09 DIAGNOSIS — E11.65 POORLY CONTROLLED TYPE 2 DIABETES MELLITUS WITH RENAL COMPLICATION (H): Chronic | ICD-10-CM

## 2024-01-09 DIAGNOSIS — E11.65 TYPE 2 DIABETES MELLITUS WITH HYPERGLYCEMIA, WITHOUT LONG-TERM CURRENT USE OF INSULIN (H): ICD-10-CM

## 2024-01-09 DIAGNOSIS — E11.29 POORLY CONTROLLED TYPE 2 DIABETES MELLITUS WITH RENAL COMPLICATION (H): Chronic | ICD-10-CM

## 2024-01-09 RX ORDER — INSULIN ASPART 100 [IU]/ML
INJECTION, SOLUTION INTRAVENOUS; SUBCUTANEOUS
Qty: 30 ML | Refills: 1 | Status: SHIPPED | OUTPATIENT
Start: 2024-01-09 | End: 2024-01-24

## 2024-01-09 RX ORDER — INSULIN GLARGINE 100 [IU]/ML
INJECTION, SOLUTION SUBCUTANEOUS
Qty: 45 ML | Refills: 1 | Status: SHIPPED | OUTPATIENT
Start: 2024-01-09 | End: 2024-01-24

## 2024-01-24 ENCOUNTER — OFFICE VISIT (OUTPATIENT)
Dept: FAMILY MEDICINE | Facility: CLINIC | Age: 64
End: 2024-01-24
Payer: COMMERCIAL

## 2024-01-24 VITALS
DIASTOLIC BLOOD PRESSURE: 84 MMHG | HEART RATE: 92 BPM | WEIGHT: 134.2 LBS | TEMPERATURE: 98.1 F | SYSTOLIC BLOOD PRESSURE: 136 MMHG | BODY MASS INDEX: 26.35 KG/M2 | HEIGHT: 60 IN | OXYGEN SATURATION: 95 % | RESPIRATION RATE: 16 BRPM

## 2024-01-24 DIAGNOSIS — Z79.4 TYPE 2 DIABETES MELLITUS WITH DIABETIC NEUROPATHY, WITH LONG-TERM CURRENT USE OF INSULIN (H): Primary | ICD-10-CM

## 2024-01-24 DIAGNOSIS — R19.7 DIARRHEA, UNSPECIFIED TYPE: ICD-10-CM

## 2024-01-24 DIAGNOSIS — E78.5 HYPERLIPIDEMIA LDL GOAL <70: Chronic | ICD-10-CM

## 2024-01-24 DIAGNOSIS — E83.110 HEREDITARY HEMOCHROMATOSIS (H): ICD-10-CM

## 2024-01-24 DIAGNOSIS — E11.40 TYPE 2 DIABETES MELLITUS WITH DIABETIC NEUROPATHY, WITH LONG-TERM CURRENT USE OF INSULIN (H): Primary | ICD-10-CM

## 2024-01-24 DIAGNOSIS — M79.601 BILATERAL ARM PAIN: ICD-10-CM

## 2024-01-24 DIAGNOSIS — M79.602 BILATERAL ARM PAIN: ICD-10-CM

## 2024-01-24 DIAGNOSIS — F43.23 SITUATIONAL MIXED ANXIETY AND DEPRESSIVE DISORDER: ICD-10-CM

## 2024-01-24 DIAGNOSIS — I10 ESSENTIAL HYPERTENSION WITH GOAL BLOOD PRESSURE LESS THAN 140/90: Chronic | ICD-10-CM

## 2024-01-24 DIAGNOSIS — Z12.31 ENCOUNTER FOR SCREENING MAMMOGRAM FOR BREAST CANCER: ICD-10-CM

## 2024-01-24 DIAGNOSIS — R11.2 NAUSEA AND VOMITING, UNSPECIFIED VOMITING TYPE: ICD-10-CM

## 2024-01-24 DIAGNOSIS — K21.00 GASTROESOPHAGEAL REFLUX DISEASE WITH ESOPHAGITIS WITHOUT HEMORRHAGE: ICD-10-CM

## 2024-01-24 DIAGNOSIS — G89.29 OTHER CHRONIC PAIN: ICD-10-CM

## 2024-01-24 PROCEDURE — 99214 OFFICE O/P EST MOD 30 MIN: CPT | Performed by: NURSE PRACTITIONER

## 2024-01-24 RX ORDER — CARVEDILOL 12.5 MG/1
12.5 TABLET ORAL 2 TIMES DAILY WITH MEALS
Qty: 180 TABLET | Refills: 3 | Status: SHIPPED | OUTPATIENT
Start: 2024-01-24

## 2024-01-24 RX ORDER — PRAVASTATIN SODIUM 20 MG
TABLET ORAL
Qty: 90 TABLET | Refills: 3 | Status: SHIPPED | OUTPATIENT
Start: 2024-01-24

## 2024-01-24 RX ORDER — INSULIN ASPART 100 [IU]/ML
INJECTION, SOLUTION INTRAVENOUS; SUBCUTANEOUS
Qty: 30 ML | Refills: 1 | Status: SHIPPED | OUTPATIENT
Start: 2024-01-24

## 2024-01-24 RX ORDER — GABAPENTIN 100 MG/1
CAPSULE ORAL
Qty: 60 CAPSULE | Refills: 2 | Status: SHIPPED | OUTPATIENT
Start: 2024-01-24 | End: 2024-01-30

## 2024-01-24 RX ORDER — PANTOPRAZOLE SODIUM 40 MG/1
40 TABLET, DELAYED RELEASE ORAL DAILY
Qty: 90 TABLET | Refills: 3 | Status: SHIPPED | OUTPATIENT
Start: 2024-01-24

## 2024-01-24 RX ORDER — AMLODIPINE BESYLATE 5 MG/1
5 TABLET ORAL 2 TIMES DAILY
Qty: 180 TABLET | Refills: 3 | Status: SHIPPED | OUTPATIENT
Start: 2024-01-24

## 2024-01-24 RX ORDER — INSULIN GLARGINE 100 [IU]/ML
INJECTION, SOLUTION SUBCUTANEOUS
Qty: 45 ML | Refills: 1 | Status: SHIPPED | OUTPATIENT
Start: 2024-01-24 | End: 2024-01-30

## 2024-01-24 RX ORDER — LOSARTAN POTASSIUM 50 MG/1
50 TABLET ORAL DAILY
Qty: 90 TABLET | Refills: 3 | Status: SHIPPED | OUTPATIENT
Start: 2024-01-24

## 2024-01-24 RX ORDER — ONDANSETRON 4 MG/1
4 TABLET, ORALLY DISINTEGRATING ORAL EVERY 8 HOURS PRN
Qty: 16 TABLET | Refills: 0 | Status: SHIPPED | OUTPATIENT
Start: 2024-01-24

## 2024-01-24 ASSESSMENT — ANXIETY QUESTIONNAIRES
2. NOT BEING ABLE TO STOP OR CONTROL WORRYING: NEARLY EVERY DAY
GAD7 TOTAL SCORE: 21
6. BECOMING EASILY ANNOYED OR IRRITABLE: NEARLY EVERY DAY
1. FEELING NERVOUS, ANXIOUS, OR ON EDGE: NEARLY EVERY DAY
GAD7 TOTAL SCORE: 21
IF YOU CHECKED OFF ANY PROBLEMS ON THIS QUESTIONNAIRE, HOW DIFFICULT HAVE THESE PROBLEMS MADE IT FOR YOU TO DO YOUR WORK, TAKE CARE OF THINGS AT HOME, OR GET ALONG WITH OTHER PEOPLE: EXTREMELY DIFFICULT
3. WORRYING TOO MUCH ABOUT DIFFERENT THINGS: NEARLY EVERY DAY
7. FEELING AFRAID AS IF SOMETHING AWFUL MIGHT HAPPEN: NEARLY EVERY DAY
5. BEING SO RESTLESS THAT IT IS HARD TO SIT STILL: NEARLY EVERY DAY

## 2024-01-24 ASSESSMENT — PATIENT HEALTH QUESTIONNAIRE - PHQ9
5. POOR APPETITE OR OVEREATING: NEARLY EVERY DAY
10. IF YOU CHECKED OFF ANY PROBLEMS, HOW DIFFICULT HAVE THESE PROBLEMS MADE IT FOR YOU TO DO YOUR WORK, TAKE CARE OF THINGS AT HOME, OR GET ALONG WITH OTHER PEOPLE: EXTREMELY DIFFICULT
SUM OF ALL RESPONSES TO PHQ QUESTIONS 1-9: 18
SUM OF ALL RESPONSES TO PHQ QUESTIONS 1-9: 18

## 2024-01-24 ASSESSMENT — PAIN SCALES - GENERAL: PAINLEVEL: SEVERE PAIN (6)

## 2024-01-24 NOTE — PROGRESS NOTES
Assessment & Plan     Type 2 diabetes mellitus with diabetic neuropathy, with long-term current use of insulin (H)  Controlled - recently had A1C which was at goal.  Discussed hypoglycemia - treatment and symptoms.  Referral Diabetes education - patient requesting more information.    - insulin glargine (LANTUS SOLOSTAR) 100 UNIT/ML pen  Dispense: 45 mL; Refill: 1  - insulin aspart (NOVOLOG FLEXPEN) 100 UNIT/ML pen  Dispense: 30 mL; Refill: 1  - Adult Diabetes Education  Referral    Hereditary hemochromatosis (H24)  Following with Onc/Hemat - getting phlebotomy.  Reports increasing arm/shoulder/hand pain bilateral    Essential hypertension with goal blood pressure less than 140/90  Controlled     - amLODIPine (NORVASC) 5 MG tablet  Dispense: 180 tablet; Refill: 3  - carvedilol (COREG) 12.5 MG tablet  Dispense: 180 tablet; Refill: 3  - losartan (COZAAR) 50 MG tablet  Dispense: 90 tablet; Refill: 3    Nausea and vomiting, unspecified vomiting type  Likely worsened by worsening anxiety.  Due for repeat UGI - this was ordered today to hopefully get added on to colonoscopy.    - ondansetron (ZOFRAN ODT) 4 MG ODT tab  Dispense: 16 tablet; Refill: 0  - Adult GI  Referral - Procedure Only    Gastroesophageal reflux disease with esophagitis without hemorrhage     - pantoprazole (PROTONIX) 40 MG EC tablet  Dispense: 90 tablet; Refill: 3    Hyperlipidemia LDL goal <70     - pravastatin (PRAVACHOL) 20 MG tablet  Dispense: 90 tablet; Refill: 3    Situational mixed anxiety and depressive disorder  Worsening anxiety/depression.  Increased Sertraline to 100 mg - follow up in 2-3 months.    - sertraline (ZOLOFT) 50 MG tablet  Dispense: 180 tablet; Refill: 1    Diarrhea, unspecified type  Chronic - had full work up including stool studies all of which were normal.  Follow-up with GI - complete colonoscopy as scheduled.    Other chronic pain  Added Gabapentin for chronic pain - ? Neuropathy.  The risks, benefits  "and treatment options of prescribed medications or other treatments have been discussed with the patient. The patient verbalized their understanding and should call or follow up if no improvement or if they develop further problems.  Referral to Pain clinic for management recommendations - preferably non pharm.  - gabapentin (NEURONTIN) 100 MG capsule  Dispense: 60 capsule; Refill: 2  - Pain Management  Referral    Bilateral arm pain     - Pain Management  Referral    Encounter for screening mammogram for breast cancer     - *MA Screening Digital Bilateral           BMI  Estimated body mass index is 26.65 kg/m  as calculated from the following:    Height as of this encounter: 1.511 m (4' 11.5\").    Weight as of this encounter: 60.9 kg (134 lb 3.2 oz).       Depression Screening Follow Up        1/24/2024    10:01 AM   PHQ   PHQ-9 Total Score 18   Q9: Thoughts of better off dead/self-harm past 2 weeks Not at all         1/24/2024    10:01 AM   Last PHQ-9   1.  Little interest or pleasure in doing things 0   2.  Feeling down, depressed, or hopeless 3   3.  Trouble falling or staying asleep, or sleeping too much 3   4.  Feeling tired or having little energy 3   5.  Poor appetite or overeating 3   6.  Feeling bad about yourself 3   7.  Trouble concentrating 3   8.  Moving slowly or restless 0   Q9: Thoughts of better off dead/self-harm past 2 weeks 0   PHQ-9 Total Score 18       Follow Up Actions Taken  Patient counseled, no additional follow up at this time.  Patient declined referral.     See Patient Instructions    Randy Felder is a 63 year old, presenting for the following health issues:  Diabetes (Renew meds), Hypertension (Renew meds), Lipids (Renew meds), Anxiety (Renew meds), and Imm/Inj (Covid pfizer)        1/24/2024    10:03 AM   Additional Questions   Roomed by Chandu ANTONIO CMA   Accompanied by self     Via the Health Maintenance questionnaire, the patient has reported the following " services have been completed -Mammogram, this information has been sent to the abstraction team.  HPI       Diabetes Follow-up    How often are you checking your blood sugar? Continuous glucose monitor  What time of day are you checking your blood sugars (select all that apply)?  Before and after meals and At bedtime  Have you had any blood sugars above 200?  Yes   Have you had any blood sugars below 70?  Yes   What symptoms do you notice when your blood sugar is low?  Dizzy and nausea  What concerns do you have today about your diabetes? Blood sugar is often over 200 and Low blood sugar   Do you have any of these symptoms? (Select all that apply)  Blurry vision  Have you had a diabetic eye exam in the last 12 months? No      Hyperlipidemia Follow-Up    Are you regularly taking any medication or supplement to lower your cholesterol?   Yes- pravastatin  Are you having muscle aches or other side effects that you think could be caused by your cholesterol lowering medication?  No    Hypertension Follow-up    Do you check your blood pressure regularly outside of the clinic? Yes   Are you following a low salt diet? Yes  Are your blood pressures ever more than 140 on the top number (systolic) OR more   than 90 on the bottom number (diastolic), for example 140/90? No    BP Readings from Last 2 Encounters:   01/24/24 136/84   12/28/23 131/75     Hemoglobin A1C (%)   Date Value   12/26/2023 6.0 (H)   02/10/2023 6.1 (H)   06/19/2021 6.5 (H)   02/23/2021 11.1 (H)     LDL Cholesterol Calculated (mg/dL)   Date Value   12/26/2023 112 (H)   08/22/2022 99   06/19/2021 89   04/30/2019 76         Depression and Anxiety Follow-Up  How are you doing with your depression since your last visit? No change  How are you doing with your anxiety since your last visit?  Worsened   Are you having other symptoms that might be associated with depression or anxiety? Yes:  crying a lot   Have you had a significant life event? Health Concerns   Do  you have any concerns with your use of alcohol or other drugs? No    Social History     Tobacco Use    Smoking status: Former     Packs/day: 0.50     Years: 20.00     Additional pack years: 0.00     Total pack years: 10.00     Types: Cigarettes     Quit date: 3/9/2010     Years since quittin.8    Smokeless tobacco: Never   Vaping Use    Vaping Use: Never used   Substance Use Topics    Alcohol use: No     Alcohol/week: 0.0 standard drinks of alcohol    Drug use: No         10/25/2023     9:35 AM 2024    10:01 AM   PHQ   PHQ-9 Total Score 7 18   Q9: Thoughts of better off dead/self-harm past 2 weeks Not at all Not at all         2024    10:11 AM   DOUG-7 SCORE   Total Score 21         2024    10:01 AM   Last PHQ-9   1.  Little interest or pleasure in doing things 0   2.  Feeling down, depressed, or hopeless 3   3.  Trouble falling or staying asleep, or sleeping too much 3   4.  Feeling tired or having little energy 3   5.  Poor appetite or overeating 3   6.  Feeling bad about yourself 3   7.  Trouble concentrating 3   8.  Moving slowly or restless 0   Q9: Thoughts of better off dead/self-harm past 2 weeks 0   PHQ-9 Total Score 18         2024    10:11 AM   DOUG-7    1. Feeling nervous, anxious, or on edge 3   2. Not being able to stop or control worrying 3   3. Worrying too much about different things 3   4. Trouble relaxing 3   5. Being so restless that it is hard to sit still 3   6. Becoming easily annoyed or irritable 3   7. Feeling afraid, as if something awful might happen 3   DOUG-7 Total Score 21   If you checked any problems, how difficult have they made it for you to do your work, take care of things at home, or get along with other people? Extremely difficult       Suicide Assessment Five-step Evaluation and Treatment (SAFE-T)     How many servings of fruits and vegetables do you eat daily?  2-3  On average, how many sweetened beverages do you drink each day (Examples: soda, juice,  "sweet tea, etc.  Do NOT count diet or artificially sweetened beverages)?   0  How many days per week do you exercise enough to make your heart beat faster? 7  How many minutes a day do you exercise enough to make your heart beat faster? 30 - 60  How many days per week do you miss taking your medication? 0        Review of Systems  Constitutional, HEENT, cardiovascular, pulmonary, GI, , musculoskeletal, neuro, skin, endocrine and psych systems are negative, except as otherwise noted.  POS or bilateral wrist/hand pain - reports worsened by phlebotomy. History of carpal tunnel syndrome with surgery - last to the right. Had gotten steroid injections which reports will help with help.  POS for worsening anxiety - reports it as daily. Reports being tearful often. Denies SI reports, safe at home.      Objective    /84   Pulse 92   Temp 98.1  F (36.7  C) (Tympanic)   Resp 16   Ht 1.511 m (4' 11.5\")   Wt 60.9 kg (134 lb 3.2 oz)   LMP  (LMP Unknown)   SpO2 95%   BMI 26.65 kg/m    Body mass index is 26.65 kg/m .  Physical Exam   GENERAL: alert and no distress  NECK: no adenopathy, no asymmetry, masses, or scars  RESP: lungs clear to auscultation - no rales, rhonchi or wheezes  CV: regular rate and rhythm, normal S1 S2, no S3 or S4, no murmur, click or rub, no peripheral edema  ABDOMEN: soft, nontender, no hepatosplenomegaly, no masses and bowel sounds normal  MS: no gross musculoskeletal defects noted, no edema  SKIN: no suspicious lesions or rashes  NEURO: Normal strength and tone, mentation intact and speech normal  PSYCH: mentation appears normal, inattentive, tearful, anxious, fatigued, and judgement and insight intact            Signed Electronically by: Paulette Travis DNP     Answers submitted by the patient for this visit:  Patient Health Questionnaire (Submitted on 1/24/2024)  If you checked off any problems, how difficult have these problems made it for you to do your work, take care of things " at home, or get along with other people?: Extremely difficult  PHQ9 TOTAL SCORE: 18

## 2024-01-24 NOTE — PATIENT INSTRUCTIONS
Will try Gabapentin - start by taking at bedtime 100 mg daily.  Then after 1 week, can increase to 100 mg twice daily.    Increased Sertraline to 100 mg to see if this helps with anxiety, depressive symptoms.  Referral to Pain clinic.    Follow up with me in 3 months.  Refilled medications today.  Referral to diabetes educator - they will call you to schedule.      Paulette Travis, DNP

## 2024-01-26 ENCOUNTER — TELEPHONE (OUTPATIENT)
Dept: GASTROENTEROLOGY | Facility: CLINIC | Age: 64
End: 2024-01-26
Payer: COMMERCIAL

## 2024-01-26 NOTE — TELEPHONE ENCOUNTER
The EGD is added to 2/29 colonoscopy at  with [facility].     Please inform patient that EGD scheduled 6/6 at  is cancelled.

## 2024-01-26 NOTE — TELEPHONE ENCOUNTER
Endoscopy Scheduling Screen  Completed 11/21  Final Scheduling Details   Colonoscopy prep sent?  N/A    Procedure scheduled  Upper endoscopy (EGD)    Surgeon:  fritz     Date of procedure:  6/6     Pre-OP / PAC:   Yes - Patient informed of pre-op requirement.    Location  [facility] - Per order.    Sedation   MAC/Deep Sedation - Per order.      Patient Reminders:   You will receive a call from a Nurse to review instructions and health history.  This assessment must be completed prior to your procedure.  Failure to complete the Nurse assessment may result in the procedure being cancelled.      On the day of your procedure, please designate an adult(s) who can drive you home stay with you for the next 24 hours. The medicines used in the exam will make you sleepy. You will not be able to drive.      You cannot take public transportation, ride share services, or non-medical taxi service without a responsible caregiver.  Medical transport services are allowed with the requirement that a responsible caregiver will receive you at your destination.  We require that drivers and caregivers are confirmed prior to your procedure.

## 2024-01-30 ENCOUNTER — OFFICE VISIT (OUTPATIENT)
Dept: FAMILY MEDICINE | Facility: CLINIC | Age: 64
End: 2024-01-30
Payer: COMMERCIAL

## 2024-01-30 VITALS
OXYGEN SATURATION: 98 % | BODY MASS INDEX: 27.29 KG/M2 | SYSTOLIC BLOOD PRESSURE: 110 MMHG | HEART RATE: 70 BPM | DIASTOLIC BLOOD PRESSURE: 78 MMHG | HEIGHT: 60 IN | RESPIRATION RATE: 24 BRPM | WEIGHT: 139 LBS | TEMPERATURE: 97.6 F

## 2024-01-30 DIAGNOSIS — E11.40 TYPE 2 DIABETES MELLITUS WITH DIABETIC NEUROPATHY, WITH LONG-TERM CURRENT USE OF INSULIN (H): ICD-10-CM

## 2024-01-30 DIAGNOSIS — Z01.818 PREOP GENERAL PHYSICAL EXAM: Primary | ICD-10-CM

## 2024-01-30 DIAGNOSIS — R11.2 NAUSEA AND VOMITING, UNSPECIFIED VOMITING TYPE: ICD-10-CM

## 2024-01-30 DIAGNOSIS — Z79.4 TYPE 2 DIABETES MELLITUS WITH DIABETIC NEUROPATHY, WITH LONG-TERM CURRENT USE OF INSULIN (H): ICD-10-CM

## 2024-01-30 DIAGNOSIS — K52.9 CHRONIC DIARRHEA: ICD-10-CM

## 2024-01-30 DIAGNOSIS — G89.29 OTHER CHRONIC PAIN: ICD-10-CM

## 2024-01-30 PROBLEM — E11.29 TYPE 2 DIABETES MELLITUS WITH KIDNEY COMPLICATION, WITH LONG-TERM CURRENT USE OF INSULIN (H): Status: ACTIVE | Noted: 2017-02-01

## 2024-01-30 PROCEDURE — 91320 SARSCV2 VAC 30MCG TRS-SUC IM: CPT | Performed by: NURSE PRACTITIONER

## 2024-01-30 PROCEDURE — 90480 ADMN SARSCOV2 VAC 1/ONLY CMP: CPT | Performed by: NURSE PRACTITIONER

## 2024-01-30 PROCEDURE — 99214 OFFICE O/P EST MOD 30 MIN: CPT | Mod: 25 | Performed by: NURSE PRACTITIONER

## 2024-01-30 RX ORDER — GABAPENTIN 100 MG/1
CAPSULE ORAL
Qty: 90 CAPSULE | Refills: 3 | Status: SHIPPED | OUTPATIENT
Start: 2024-01-30 | End: 2024-06-04

## 2024-01-30 RX ORDER — INSULIN GLARGINE 100 [IU]/ML
INJECTION, SOLUTION SUBCUTANEOUS
Qty: 45 ML | Refills: 1 | Status: SHIPPED | OUTPATIENT
Start: 2024-01-30 | End: 2024-09-16

## 2024-01-30 ASSESSMENT — PAIN SCALES - GENERAL: PAINLEVEL: SEVERE PAIN (7)

## 2024-01-30 NOTE — PROGRESS NOTES
Preoperative Evaluation  [facility]  [address] [place] [address]  Phone: [phone]  Primary Provider: Paulette Travis  Pre-op Performing Provider: PAULETTE TRAVIS  Jan 30, 2024        Bradford is a 63 year old, presenting for the following:  Pre-Op Exam and Imm/Inj ([employer] covid )        1/30/2024    11:02 AM   Additional Questions   Roomed by Chandu ANTONIO CMA   Accompanied by self     Surgical Information  Surgery/Procedure:   Colonoscopy N/A MAC   Esophagoscopy, gastroscopy, duodenoscopy (EGD), combined       Surgery Location:  GI   Surgeon: Royce Mari MD   Surgery Date: 2/29/2024  Time of Surgery: 10:00AM   Where patient plans to recover: At home with family  Fax number for surgical facility: Note does not need to be faxed, will be available electronically in Epic.    Assessment & Plan     The proposed surgical procedure is considered LOW risk.    Preop general physical exam       Type 2 diabetes mellitus with diabetic neuropathy, with long-term current use of insulin (H)  A1C at goal - checked at last visit.  Hemoglobin A1C   Date Value Ref Range Status   12/26/2023 6.0 (H) 0.0 - 5.6 % Final     Comment:     Normal <5.7%   Prediabetes 5.7-6.4%    Diabetes 6.5% or higher     Note: Adopted from ADA consensus guidelines.   06/19/2021 6.5 (H) 0 - 5.6 % Final     Comment:     Normal <5.7% Prediabetes 5.7-6.4%  Diabetes 6.5% or higher - adopted from ADA   consensus guidelines.       Referral to diabetes education - has appointment tomorrow.   Pre procedural insulin changes discussed - on AVS.  Decreased Lantus today given lower A1C - discussed hypoglycemia.  Will message CDE with updates and changes.    - insulin glargine (LANTUS SOLOSTAR) 100 UNIT/ML pen  Dispense: 45 mL; Refill: 1  - Adult Eye  Referral    Chronic diarrhea       Nausea and vomiting, unspecified vomiting type       Other chronic pain   Increased dose - follow up in 2-3 months  for recheck.    - gabapentin (NEURONTIN) 100 MG capsule  Dispense: 90 capsule; Refill: 3              - No identified additional risk factors other than previously addressed    Antiplatelet or Anticoagulation Medication Instructions   - Patient is on no antiplatelet or anticoagulation medications.    Additional Medication Instructions  Patient is to take all scheduled medications on the day of surgery EXCEPT for modifications listed below:   - Long acting insulin (e.g. glargine, detemir): Take 18 unit of the usual evening or morning dose before surgery.     - short acting insulin (e.g. regular, lispro, aspart): HOLD on the morning of surgery.     Recommendation  APPROVAL GIVEN to proceed with proposed procedure, without further diagnostic evaluation.       Subjective       Via the Health Maintenance questionnaire, the patient has reported the following services have been completed -Mammogram, this information has been sent to the abstraction team.  HPI related to upcoming procedure: Chronic diarrhea for months - colonoscopy to further evaluate.  Also reporting nausea/vomiting - upper endoscopy has been added on same day. No hematemesis, or hematochezia.        1/30/2024    10:48 AM   Preop Questions   1. Have you ever had a heart attack or stroke? No   2. Have you ever had surgery on your heart or blood vessels, such as a stent placement, a coronary artery bypass, or surgery on an artery in your head, neck, heart, or legs? No   3. Do you have chest pain with activity? No   4. Do you have a history of  heart failure? No   5. Do you currently have a cold, bronchitis or symptoms of other infection? No   6. Do you have a cough, shortness of breath, or wheezing? No   7. Do you or anyone in your family have previous history of blood clots? No   8. Do you or does anyone in your family have a serious bleeding problem such as prolonged bleeding following surgeries or cuts? No   9. Have you ever had problems with anemia or  been told to take iron pills? No   10. Have you had any abnormal blood loss such as black, tarry or bloody stools, or abnormal vaginal bleeding? No   11. Have you ever had a blood transfusion? No   12. Are you willing to have a blood transfusion if it is medically needed before, during, or after your surgery? Yes   13. Have you or any of your relatives ever had problems with anesthesia? No   14. Do you have sleep apnea, excessive snoring or daytime drowsiness? No   15. Do you have any artifical heart valves or other implanted medical devices like a pacemaker, defibrillator, or continuous glucose monitor? No   16. Do you have artificial joints? No   17. Are you allergic to latex? No       Health Care Directive  Patient does not have a Health Care Directive or Living Will: Discussed advance care planning with patient; however, patient declined at this time.    Preoperative Review of    reviewed - no record of controlled substances prescribed.       Status of Chronic Conditions:  See problem list for active medical problems.  Problems all longstanding and stable, except as noted/documented.  See ROS for pertinent symptoms related to these conditions.    DIABETES - Patient has a longstanding history of DiabetesType Type II . Patient is being treated with insulin injections and denies significant side effects. Control has been good. Complicating factors include but are not limited to: hypertension, hyperlipidemia, and neuropathy.     HYPERLIPIDEMIA - Patient has a long history of significant Hyperlipidemia requiring medication for treatment with recent good control. Patient reports no problems or side effects with the medication.     HYPERTENSION - Patient has longstanding history of HTN , currently denies any symptoms referable to elevated blood pressure. Specifically denies chest pain, palpitations, dyspnea, orthopnea, PND or peripheral edema. Blood pressure readings have been in normal range. Current medication  regimen is as listed below. Patient denies any side effects of medication.     Patient Active Problem List    Diagnosis Date Noted    Situational mixed anxiety and depressive disorder 01/24/2024     Priority: Medium    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) (H)      Priority: Medium    Hereditary hemochromatosis (H24) 06/10/2021     Priority: Medium    Dyslipidemia      Priority: Medium    Type 2 diabetes mellitus with hyperglycemia, without long-term current use of insulin (H) 02/01/2017     Priority: Medium    Hyperlipidemia LDL goal <70 08/09/2016     Priority: Medium    Essential hypertension with goal blood pressure less than 140/90 08/09/2016     Priority: Medium    Poorly controlled type 2 diabetes mellitus with renal complication (H) 09/22/2015     Priority: Medium    Stenosing tenosynovitis 09/06/2012     Priority: Medium    Hand arthritis 08/23/2012     Priority: Medium      Past Medical History:   Diagnosis Date    Chest pain 2/23/2021    Diabetes mellitus (H)     Dyslipidemia     Hereditary hemochromatosis (H24)     Hyperglycemia 2/23/2021    Hypertension     Hypertensive urgency 2/23/2021    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) (H)     Nephrolithiasis 9/19/2016    First episode September 2016     Past Surgical History:   Procedure Laterality Date    ESOPHAGOSCOPY, GASTROSCOPY, DUODENOSCOPY (EGD), COMBINED N/A 11/10/2021    Procedure: ESOPHAGOGASTRODUODENOSCOPY, WITH BIOPSY;  Surgeon: Leventhal, Thomas Michael, MD;  Location: [facility] OR    HYSTERECTOMY TOTAL ABDOMINAL  1995    due to fibroids    LAPAROSCOPIC CHOLECYSTECTOMY N/A 2/13/2023    Procedure: Laparoscopic cholecystectomy;  Surgeon: Eber Gill MD;  Location:  OR    LAPAROSCOPIC EVACUATION ECTOPIC PREGNANCY      TUBAL LIGATION       Current Outpatient Medications   Medication Sig Dispense Refill    ACCU-CHEK GUIDE test strip Use to test blood sugar 4 times daily or as directed. 200  strip 11    amLODIPine (NORVASC) 5 MG tablet Take 1 tablet (5 mg) by mouth 2 times daily 180 tablet 3    blood glucose (NO BRAND SPECIFIED) lancets standard Use to test blood sugar twice times daily or as directed. 100 each 1    blood glucose (NO BRAND SPECIFIED) test strip Use to test blood sugar twice times daily or as directed. 100 each 1    blood glucose (NO BRAND SPECIFIED) test strip Use to test blood sugars 2 times daily or as directed 100 strip 11    blood glucose monitoring (SOFTCLIX) lancets Use to test blood sugar 6 times daily. 200 each 11    Blood Glucose Monitoring Suppl (ACCU-CHEK GUIDE ME) w/Device KIT 1 each 6 times daily 1 kit 0    carvedilol (COREG) 12.5 MG tablet Take 1 tablet (12.5 mg) by mouth 2 times daily (with meals) 180 tablet 3    cholestyramine light (PREVALITE) 4 GM powder Take 1 packet (4 g) by mouth 3 times daily (with meals) 60 packet 1    Continuous Blood Gluc Sensor (FREESTYLE MEENA 2 SENSOR) MISC CHANGE EVERY 14 DAYS. USE TO CHECK BLOOD SUGARS PER  GUIDELINES 1 each 2    gabapentin (NEURONTIN) 100 MG capsule Take 1 capsule (100 mg) by mouth daily for 7 days, THEN 1 capsule (100 mg) 2 times daily for 30 days. 60 capsule 2    insulin aspart (NOVOLOG FLEXPEN) 100 UNIT/ML pen INJECT 10 UNITS UNDER THE SKIN THREE TIMES DAILY WITH MEALS. MAXIMUM OF 30 UNITS DAILY. 30 mL 1    insulin glargine (LANTUS SOLOSTAR) 100 UNIT/ML pen INJECT 42 UNITS SUBCUTANEOUS EVERY MORNING (BEFORE BREAKFAST) 45 mL 1    insulin pen needle (BD NATALIYA U/F) 32G X 4 MM miscellaneous USE 4 TIMES DAILY 200 each 0    losartan (COZAAR) 50 MG tablet Take 1 tablet (50 mg) by mouth daily 90 tablet 3    pantoprazole (PROTONIX) 40 MG EC tablet Take 1 tablet (40 mg) by mouth daily 90 tablet 3    pravastatin (PRAVACHOL) 20 MG tablet TAKE ONE TABLET BY MOUTH ONCE DAILY. DUE FOR LABS 90 tablet 3    sertraline (ZOLOFT) 50 MG tablet Take 2 tablets (100 mg) by mouth daily 180 tablet 1    loperamide (IMODIUM A-D) 2 MG  "tablet Take 1 tablet (2 mg) by mouth 3 times daily as needed for diarrhea (Patient not taking: Reported on 2023) 21 tablet 0    mupirocin (BACTROBAN) 2 % external ointment Apply topically 3 times daily (Patient not taking: Reported on 2024) 1 g 0    ondansetron (ZOFRAN ODT) 4 MG ODT tab Take 1 tablet (4 mg) by mouth every 8 hours as needed for nausea 16 tablet 0    pregabalin (LYRICA) 75 MG capsule          No Known Allergies     Social History     Tobacco Use    Smoking status: Former     Packs/day: 0.50     Years: 20.00     Additional pack years: 0.00     Total pack years: 10.00     Types: Cigarettes     Quit date: 3/9/2010     Years since quittin.9    Smokeless tobacco: Never   Substance Use Topics    Alcohol use: No     Alcohol/week: 0.0 standard drinks of alcohol     Family History   Problem Relation Age of Onset    Cirrhosis Mother         w/o alcohol history    Diabetes Mother     Emphysema Father     Hemochromatosis Brother     ALS Brother     Hemochromatosis Sister     Hemochromatosis Sister     Hypertension Sister     Hemochromatosis Sister     Heart Defect Niece         Tetrology of Fallot    Breast Cancer No family hx of     Cancer - colorectal No family hx of      History   Drug Use No         Review of Systems    Review of Systems  Constitutional, neuro, ENT, endocrine, pulmonary, cardiac, gastrointestinal, genitourinary, musculoskeletal, integument and psychiatric systems are negative, except as otherwise noted.    Objective    /78 (BP Location: Right arm, Patient Position: Sitting, Cuff Size: Adult Regular)   Pulse 70   Temp 97.6  F (36.4  C) (Tympanic)   Resp 24   Ht 1.511 m (4' 11.5\")   Wt 63 kg (139 lb)   LMP  (LMP Unknown)   SpO2 98%   BMI 27.60 kg/m     Estimated body mass index is 27.6 kg/m  as calculated from the following:    Height as of this encounter: 1.511 m (4' 11.5\").    Weight as of this encounter: 63 kg (139 lb).  Physical Exam  GENERAL: alert and no " distress  HENT: ear canals and TM's normal, nose and mouth without ulcers or lesions  NECK: no adenopathy, no asymmetry, masses, or scars  RESP: lungs clear to auscultation - no rales, rhonchi or wheezes  CV: regular rate and rhythm, normal S1 S2, no S3 or S4, no murmur, click or rub, no peripheral edema  ABDOMEN: soft, nontender, no hepatosplenomegaly, no masses and bowel sounds normal  MS: no gross musculoskeletal defects noted, no edema  NEURO: Normal strength and tone, mentation intact and speech normal  PSYCH: mentation appears normal, affect normal/bright    Recent Labs   Lab Test 12/26/23  1011 09/08/23  1234 02/10/23  1340   HGB  --  15.0 15.5   PLT  --  129* 162   NA  --  141 142   POTASSIUM  --  4.1 4.2   CR  --  0.70 0.69   A1C 6.0*  --  6.1*        Diagnostics  No results found for this or any previous visit (from the past 240 hour(s)).   No EKG required, no history of coronary heart disease, significant arrhythmia, peripheral arterial disease or other structural heart disease.    Revised Cardiac Risk Index (RCRI)  The patient has the following serious cardiovascular risks for perioperative complications:   - No serious cardiac risks = 0 points     RCRI Interpretation: 0 points: Class I (very low risk - 0.4% complication rate)         Signed Electronically by: Paulette Travis DNP  Copy of this evaluation report is provided to requesting physician.

## 2024-01-30 NOTE — PATIENT INSTRUCTIONS
Preparing for Your Surgery  Getting started  A nurse will call you to review your health history and instructions. They will give you an arrival time based on your scheduled surgery time. Please be ready to share:  Your doctor's clinic name and phone number  Your medical, surgical, and anesthesia history  A list of allergies and sensitivities  A list of medicines, including herbal treatments and over-the-counter drugs  Whether the patient has a legal guardian (ask how to send us the papers in advance)  Please tell us if you're pregnant--or if there's any chance you might be pregnant. Some surgeries may injure a fetus (unborn baby), so they require a pregnancy test. Surgeries that are safe for a fetus don't always need a test, and you can choose whether to have one.   If you have a child who's having surgery, please ask for a copy of Preparing for Your Child's Surgery.    Preparing for surgery  Within 10 to 30 days of surgery: Have a pre-op exam (sometimes called an H&P, or History and Physical). This can be done at a clinic or pre-operative center.  If you're having a , you may not need this exam. Talk to your care team.  At your pre-op exam, talk to your care team about all medicines you take. If you need to stop any medicines before surgery, ask when to start taking them again.  We do this for your safety. Many medicines can make you bleed too much during surgery. Some change how well surgery (anesthesia) drugs work.  Call your insurance company to let them know you're having surgery. (If you don't have insurance, call [phone].)  Call your clinic if there's any change in your health. This includes signs of a cold or flu (sore throat, runny nose, cough, rash, fever). It also includes a scrape or scratch near the surgery site.  If you have questions on the day of surgery, call your hospital or surgery center.  Eating and drinking guidelines  For your safety: Unless your surgeon tells you otherwise,  follow the guidelines below.  Eat and drink as usual until 8 hours before you arrive for surgery. After that, no food or milk.  Drink clear liquids until 2 hours before you arrive. These are liquids you can see through, like water, Gatorade, and Propel Water. They also include plain black coffee and tea (no cream or milk), candy, and breath mints. You can spit out gum when you arrive.  If you drink alcohol: Stop drinking it the night before surgery.  If your care team tells you to take medicine on the morning of surgery, it's okay to take it with a sip of water.  Preventing infection  Shower or bathe the night before and morning of your surgery. Follow the instructions your clinic gave you. (If no instructions, use regular soap.)  Don't shave or clip hair near your surgery site. We'll remove the hair if needed.  Don't smoke or vape the morning of surgery. You may chew nicotine gum up to 2 hours before surgery. A nicotine patch is okay.  Note: Some surgeries require you to completely quit smoking and nicotine. Check with your surgeon.  Your care team will make every effort to keep you safe from infection. We will:  Clean our hands often with soap and water (or an alcohol-based hand rub).  Clean the skin at your surgery site with a special soap that kills germs.  Give you a special gown to keep you warm. (Cold raises the risk of infection.)  Wear special hair covers, masks, gowns and gloves during surgery.  Give antibiotic medicine, if prescribed. Not all surgeries need antibiotics.  What to bring on the day of surgery  Photo ID and insurance card  Copy of your health care directive, if you have one  Glasses and hearing aids (bring cases)  You can't wear contacts during surgery  Inhaler and eye drops, if you use them (tell us about these when you arrive)  CPAP machine or breathing device, if you use them  A few personal items, if spending the night  If you have . . .  A pacemaker, ICD (cardiac defibrillator) or other  implant: Bring the ID card.  An implanted stimulator: Bring the remote control.  A legal guardian: Bring a copy of the certified (court-stamped) guardianship papers.  Please remove any jewelry, including body piercings. Leave jewelry and other valuables at home.  If you're going home the day of surgery  You must have a responsible adult drive you home. They should stay with you overnight as well.  If you don't have someone to stay with you, and you aren't safe to go home alone, we may keep you overnight. Insurance often won't pay for this.  After surgery  If it's hard to control your pain or you need more pain medicine, please call your surgeon's office.  Questions?   If you have any questions for your care team, list them here: _________________________________________________________________________________________________________________________________________________________________________ ____________________________________ ____________________________________ ____________________________________  For informational purposes only. Not to replace the advice of your health care provider. Copyright   2003, 2019 [employer]. All rights reserved. Clinically reviewed by Meredith Benedict MD. [employer] 284217 - REV 12/22.    How to Take Your Medication Before Surgery  Additional Medication Instructions  Patient is to take all scheduled medications on the day of surgery EXCEPT for modifications listed below:   - Long acting insulin (e.g. glargine, detemir): Take 18 unit of the usual evening or morning dose before surgery.     - short acting insulin (e.g. regular, lispro, aspart): HOLD on the morning of surgery.

## 2024-01-31 ENCOUNTER — VIRTUAL VISIT (OUTPATIENT)
Dept: EDUCATION SERVICES | Facility: CLINIC | Age: 64
End: 2024-01-31
Payer: COMMERCIAL

## 2024-01-31 DIAGNOSIS — E11.40 TYPE 2 DIABETES MELLITUS WITH DIABETIC NEUROPATHY, WITH LONG-TERM CURRENT USE OF INSULIN (H): ICD-10-CM

## 2024-01-31 DIAGNOSIS — Z79.4 TYPE 2 DIABETES MELLITUS WITH DIABETIC NEUROPATHY, WITH LONG-TERM CURRENT USE OF INSULIN (H): ICD-10-CM

## 2024-01-31 PROCEDURE — G0108 DIAB MANAGE TRN  PER INDIV: HCPCS | Mod: 95 | Performed by: NUTRITIONIST

## 2024-01-31 NOTE — PROGRESS NOTES
Diabetes Self-Management Education & Support    Presents for: Individual review    Type of service:  Video Visit    If the video visit is dropped, the video visit invitation should be resent by: Text to cell phone: [phone]    Originating Location (pt. Location): Home  Distant Location (provider location): [facility]  Mode of Communication:  Video Conference via Phanfare    Video Start Time:  10:27 AM  Video End Time (time video stopped): 11:28 AM    How would patient like to obtain AVS? MyChart      ASSESSMENT:  Bradford reports that her blood glucose levels are averaging 144 mg/dL, ranging  mg/DL fasting and some 200 readings after dinner meal.  States that her low alarms on her CGM have not gone off since the reduction of Lantus.  She has been taking a few units of Novolog after meals if her blood glucose levels rise over 200 mg/dL.  Review of diet shows 2 meals/day with approximately 15 grams carbohydrate at breakfast and approximately 45 grams carbohydrate at dinner meal.  She skips lunch and will have nuts or fruit midday.  She will try to connect her CGM to her cell phone and then connect to [facility] team so that we can review her data at the next visit.      Patient's most recent   Lab Results   Component Value Date    A1C 6.0 12/26/2023    A1C 6.5 06/19/2021     is meeting goal of <7.0    Diabetes knowledge and skills assessment:   Patient is knowledgeable in diabetes management concepts related to: Monitoring, Taking Medication, and Healthy Coping    Continue education with the following diabetes management concepts: Healthy Eating, Being Active, Problem Solving, and Reducing Risks    Based on learning assessment above, most appropriate setting for further diabetes education would be: Individual setting.      PLAN    1) Consume 3 carbohydrate choices at each meal  2) Take Novolog 15 minutes prior to eating  3) Take Lantus as close to the same time  "every day  4) Connect Jammie 2 sensors to Jammie 2 macie     Topics to cover at upcoming visits: Healthy Eating, Being Active, Problem Solving, and Reducing Risks    Follow-up: 2/16/24    See Care Plan for co-developed, patient-state behavior change goals.  AVS provided for patient today.    Education Materials Provided:  [facility] Understanding Diabetes Booklet and My Plate Planner      SUBJECTIVE/OBJECTIVE:  Presents for: Individual review  Accompanied by: Self  Diabetes education in the past 24mo: Yes  Diabetes type: Type 2  Disease course: Getting harder to manage  Other concerns:: None  Cultural Influences/Ethnic Background:  Not  or     Diabetes Symptoms & Complications:  Diabetes Related Symptoms: Fatigue, Visual change  Weight trend: Stable  Symptom course: Worsening  Disease course: Getting harder to manage       Patient Problem List and Family Medical History reviewed for relevant medical history, current medical status, and diabetes risk factors.    Vitals:  LMP  (LMP Unknown)   Estimated body mass index is 27.6 kg/m  as calculated from the following:    Height as of 1/30/24: 1.511 m (4' 11.5\").    Weight as of 1/30/24: 63 kg (139 lb).   Last 3 BP:   BP Readings from Last 3 Encounters:   01/30/24 110/78   01/24/24 136/84   12/28/23 131/75       History   Smoking Status    Former    Packs/day: 0.50    Years: 20.00    Types: Cigarettes    Quit date: 3/9/2010   Smokeless Tobacco    Never       Labs:  Lab Results   Component Value Date    A1C 6.0 12/26/2023    A1C 6.5 06/19/2021     Lab Results   Component Value Date     09/08/2023     05/10/2021     Lab Results   Component Value Date     12/26/2023    LDL 89 06/19/2021     HDL Cholesterol   Date Value Ref Range Status   06/19/2021 31 (L) >49 mg/dL Final     Direct Measure HDL   Date Value Ref Range Status   12/26/2023 32 (L) >=50 mg/dL Final   ]  GFR Estimate   Date Value Ref Range Status   09/08/2023 >90 >60 " "mL/min/1.73m2 Final   05/10/2021 >90 >60 mL/min/[1.73_m2] Final     Comment:     Non  GFR Calc  Starting 12/18/2018, serum creatinine based estimated GFR (eGFR) will be   calculated using the Chronic Kidney Disease Epidemiology Collaboration   (CKD-EPI) equation.       GFR Estimate If Black   Date Value Ref Range Status   05/10/2021 >90 >60 mL/min/[1.73_m2] Final     Comment:      GFR Calc  Starting 12/18/2018, serum creatinine based estimated GFR (eGFR) will be   calculated using the Chronic Kidney Disease Epidemiology Collaboration   (CKD-EPI) equation.       Lab Results   Component Value Date    CR 0.70 09/08/2023    CR 0.57 05/10/2021     No results found for: \"MICROALBUMIN\"    Healthy Eating:  Healthy Eating Assessed Today: Yes  Cultural/Moravian diet restrictions?: No  Do you have any food allergies or intolerances?: Yes  Please list your food allergies / intolerances: Hemochromatosis - no iron fortified foods, red meat  Meal planning/habits: Carb counting  Who cooks/prepares meals for you?: Self, Daughter  Who purchases food in  your home?: Self  How many times a week on average do you eat food made away from home (restaurant/take-out)?: 0  Meals include: Breakfast, Dinner  Breakfast: 10-11 AM: oatmeal steel cut 1/2 cup with cinnamon OR scrambled eggs and 1 slice sourdough bread OR egg, antonio and cheese sandwich on 1 slice bread  Lunch: snack time  Dinner: 6 PM: pot pie top crust OR pork with baked potato  small with salad OR Taco (2) and refried beans  Snacks: orange, grapes and cashew nuts  Beverages: Coffee, Water (sf creamer)  Has patient met with a dietitian in the past?: Yes    Being Active:  Being Active Assessed Today: Yes  Exercise:: Currently not exercising  Barrier to exercise: None    Monitoring:  Monitoring Assessed Today: Yes  Did patient bring glucose meter to appointment? : Yes  Blood Glucose Meter: CGM (Jammie 2 with reader)  Times checking blood sugar at home " (number): 4  Times checking blood sugar at home (per): Day  Blood glucose trend: Fluctuating    Taking Medications:  Diabetes Medication(s)       Insulin       insulin aspart (NOVOLOG FLEXPEN) 100 UNIT/ML pen INJECT 10 UNITS UNDER THE SKIN THREE TIMES DAILY WITH MEALS. MAXIMUM OF 30 UNITS DAILY.     insulin glargine (LANTUS SOLOSTAR) 100 UNIT/ML pen INJECT 38 UNITS SUBCUTANEOUS EVERY MORNING (BEFORE BREAKFAST)            Taking Medication Assessed Today: Yes  Current Treatments: Insulin Injections  Dose schedule: Pre-breakfast, Pre-dinner, Other (Lantus from 10 am - 12 pm)  Given by: Patient  Injection/Infusion sites: Abdomen  Problems taking diabetes medications regularly?: No  Diabetes medication side effects?: No    Problem Solving:  Problem Solving Assessed Today: Yes  Is the patient at risk for hypoglycemia?: Yes  Hypoglycemia Frequency: Weekly  Hypoglycemia Treatment: Candy (gurmeet fallon)  Patient carries a carbohydrate source: Yes  Medical ID: No  Does patient have glucagon emergency kit?: No  Does patient have severe weather/disaster plan for diabetes management?: No  Does patient have sick day plan for diabetes management?: No    Hypoglycemia Symptoms  Hypoglycemia: Dizziness/Lightheadedness, Confusion    Hypoglycemia Complications  Hypoglycemia Complications: None    Reducing Risks:  Reducing Risks Assessed Today: No    Healthy Coping:  Healthy Coping Assessed Today: Yes  Emotional response to diabetes: Acceptance  Informal Support system:: Family  Stage of change: ACTION (Actively working towards change)      Care Plan and Education Provided:  Care Plan: Diabetes   Updates made by Nithya Hamilton RD since 1/31/2024 12:00 AM        Problem: HbA1C Not In Goal         Goal: Establish Regular Follow-Ups with PCP         Task: Discuss with PCP the recommended timing for patient's next follow up visit(s)    Responsible User: Nithya Hamilton RD        Task: Discuss schedule for PCP visits with patient     Responsible User: Nithya Hamilton RD        Goal: Get HbA1C Level in Goal         Task: Educate patient on diabetes education self-management topics    Responsible User: Ntihya Hamilton RD        Task: Educate patient on benefits of regular glucose monitoring    Responsible User: Nithya Hamilton RD        Task: Refer patient to appropriate extended care team member, as needed (Medication Therapy Management, Behavioral Health, Physical Therapy, etc.)    Responsible User: Nithya Hamilton RD        Task: Discuss diabetes treatment plan with patient    Responsible User: Nithya Hamilton RD        Problem: Diabetes Self-Management Education Needed to Optimize Self-Care Behaviors         Goal: Understand diabetes pathophysiology and disease progression         Task: Provide education on diabetes pathophysiology and disease progression specfic to patient's diabetes type    Responsible User: Nithya Hamilton RD        Goal: Healthy Eating - follow a healthy eating pattern for diabetes         Task: Provide education on portion control and consistency in amount, composition and timing of food intake Completed 1/31/2024   Responsible User: Nithya Hamilton RD        Task: Provide education on managing carbohydrate intake (carbohydrate counting, plate planning method, etc.) Completed 1/31/2024   Responsible User: iNthya Hamilton RD        Task: Provide education on weight management    Responsible User: Nithya Hamilton RD        Task: Provide education on heart healthy eating    Responsible User: Nithya Hamilton RD        Task: Provide education on eating out    Responsible User: Nithya Hamilton RD        Task: Develop individualized healthy eating plan with patient    Responsible User: Nithya Hamilton RD        Goal: Being Active - get regular physical activity, working up to at least 150 minutes per week         Task: Provide education on relationship of activity to glucose and precautions  to take if at risk for low glucose Completed 1/31/2024   Responsible User: Nithya Hamilton RD        Task: Discuss barriers to physical activity with patient    Responsible User: Nithya Hamilton RD        Task: Develop physical activity plan with patient    Responsible User: Nithya Hamilton RD        Task: Explore community resources including walking groups, assistance programs, and home videos    Responsible User: Nithya Hamilton RD        Goal: Monitoring - monitor glucose and ketones as directed         Task: Provide education on blood glucose monitoring (purpose, proper technique, frequency, glucose targets, interpreting results, when to use glucose control solution, sharps disposal)    Responsible User: Nithya Hamilton RD        Task: Provide education on continuous glucose monitoring (sensor placement, use of macie or /reader, understanding glucose trends, alerts and alarms, differences between sensor glucose and blood glucose) Completed 1/31/2024   Responsible User: Nithya Hamilton RD        Task: Provide education on ketone monitoring (when to monitor, frequency, etc.)    Responsible User: Nithya Hamilton RD        Goal: Taking Medication - patient is consistently taking medications as directed         Task: Provide education on action of prescribed medication, including when to take and possible side effects Completed 1/31/2024   Responsible User: Nithya Hamilton RD        Task: Provide education on insulin and injectable diabetes medications, including administration, storage, site selection and rotation for injection sites Completed 1/31/2024   Responsible User: Nithya Hamilton RD        Task: Discuss barriers to medication adherence with patient and provide management technique ideas as appropriate    Responsible User: Nithya Hamilton RD        Task: Provide education on frequency and refill details of medications    Responsible User: Nithya Hamilton RD         Goal: Problem Solving - know how to prevent and manage short-term diabetes complications         Task: Provide education on high blood glucose - causes, signs/symptoms, prevention and treatment    Responsible User: Nithya Hamilton RD        Task: Provide education on low blood glucose - causes, signs/symptoms, prevention, treatment, carrying a carbohydrate source at all times, and medical identification Completed 1/31/2024   Responsible User: Nithya Hamilton RD        Task: Provide education on safe travel with diabetes    Responsible User: Nithya Hamilton RD        Task: Provide education on how to care for diabetes on sick days    Responsible User: Nithya Hamilton RD        Task: Provide education on when to call a health care provider    Responsible User: Nithya Hamilton RD        Goal: Reducing Risks - know how to prevent and treat long-term diabetes complications         Task: Provide education on major complications of diabetes, prevention, early diagnostic measures and treatment of complications    Responsible User: Nithya Hamilton RD        Task: Provide education on recommended care for dental, eye and foot health    Responsible User: Nithya Hamilton RD        Task: Provide education on Hemoglobin A1c - goals and relationship to blood glucose levels    Responsible User: Nithya Hamilton RD        Task: Provide education on recommendations for heart health - lipid levels and goals, blood pressure and goals, and aspirin therapy, if indicated    Responsible User: Nithya Hamilton RD        Task: Provide education on tobacco cessation    Responsible User: Nithya Hamilton RD        Goal: Healthy Coping - use available resources to cope with the challenges of managing diabetes         Task: Discuss recognizing feelings about having diabetes    Responsible User: Nithya Hamilton RD        Task: Provide education on the benefits of making appropriate lifestyle changes     Responsible User: Nithya Hamilton RD        Task: Provide education on benefits of utilizing support systems    Responsible User: Nithya Hamilton RD        Task: Discuss methods for coping with stress    Responsible User: Nithya Hamilton RD        Task: Provide education on when to seek professional counseling    Responsible User: Nithya Hamilton RD Michelle DiDio, RD, LD, [facility]     Time Spent: 60 minutes  Encounter Type: Individual    Any diabetes medication dose changes were made via the CDE Protocol per the patient's referring provider. A copy of this encounter was shared with the provider.

## 2024-01-31 NOTE — PATIENT INSTRUCTIONS
Douglas Felder,    Here are some things that we discussed today.      Goals for Diabetes Care:    1. Eat 3 balanced meals each day - Monitor carb intake and aim for 45 grams per meal  (3 carbohydrate choices) and 15 grams carbohydrate (1 carbohydrate choice at snacks)     Carbohydrate 1 choice = 15 grams      Aim to eat a balanced plate - half your plate fruits/veggies, 1/4 the plate protein and 1/4 plate starch (rice, potato, pasta)    2. Check blood sugars multiple times each day at varying times   Blood Glucose Targets:   1. Fasting and before meal target is 80 - 130 mg/dl   2. 2 hours after a meal target is < 180 mg/dl  Always remember to bring meter and/or log book to all appointments.    Jammie Sharing Instructions  Connecting to the Clinic on the [employer]:     Step 1: Open the Settings Menu in your [employer].       -Click the three blue lines to open the Settings Menu.     Step 2: Click Connected Apps.     Step 3: Click Connect or Manage next to [employer].     Step 4: Clinic Connect to a Practice:     Step 5: Link to a practice:   -To connect to [facility] enter the following Practice ID: [id number] in the field provided and click the Add button. You should now see your healthcare practice or clinic name appear in your Linked Practices list. This means you have successfully linked accounts and your healthcare professional now has access to all your glucose data.     [employer] Customer Service: [phone]   [email or web address]       3. Activity really helps improve blood sugars. Try to Incorporate 30 minutes activity into each day - does not need to be all at one time & walking counts!    4. Treating low BG. Rule of 15 = BG 50-70 mg/dL = 15 gram carb (4 oz juice, 4 glucose tabs, 4 jolly ranchers OR 4 oz pop), then recheck BG in 15 minutes. If still low repeat. (If BG <50 mg/dL = 8 oz pop/juice, 8 jolly ranchers or 8 glucose tabs).    5. Take diabetes  medications as prescribed   -Lantus 38 units once daily at 10 AM (Try to take at the same time every day)  -Novolog 10 units at meals (take 15 minutes prior to eating)    Follow up with your Diabetic Educator to assess BG targets and need for modifications to medications and/or lifestyle.    Call with any questions.  Thank you!  Nithya Mansfield RD, LD, [facility]   Certified Diabetes Care &   [facility]  Triage [phone] or Scheduling [phone]

## 2024-01-31 NOTE — LETTER
1/31/2024         RE: Bradford Prado  [address]  [facility] [address]        Dear Colleague,    Thank you for referring your patient, Bradford Prado, to the [facility]. Please see a copy of my visit note below.    Diabetes Self-Management Education & Support    Presents for: Individual review    Type of service:  Video Visit    If the video visit is dropped, the video visit invitation should be resent by: Text to cell phone: [phone]    Originating Location (pt. Location): Home  Distant Location (provider location): [facility]  Mode of Communication:  Video Conference via [employer]    Video Start Time:  10:27 AM  Video End Time (time video stopped): 11:28 AM    How would patient like to obtain AVS? MyChart      ASSESSMENT:  Bradford reports that her blood glucose levels are averaging 144 mg/dL, ranging  mg/DL fasting and some 200 readings after dinner meal.  States that her low alarms on her CGM have not gone off since the reduction of Lantus.  She has been taking a few units of Novolog after meals if her blood glucose levels rise over 200 mg/dL.  Review of diet shows 2 meals/day with approximately 15 grams carbohydrate at breakfast and approximately 45 grams carbohydrate at dinner meal.  She skips lunch and will have nuts or fruit midday.  She will try to connect her CGM to her cell phone and then connect to [facility] team so that we can review her data at the next visit.      Patient's most recent   Lab Results   Component Value Date    A1C 6.0 12/26/2023    A1C 6.5 06/19/2021     is meeting goal of <7.0    Diabetes knowledge and skills assessment:   Patient is knowledgeable in diabetes management concepts related to: Monitoring, Taking Medication, and Healthy Coping    Continue education with the following diabetes management concepts: Healthy Eating, Being Active, Problem Solving, and Reducing Risks    Based on  "learning assessment above, most appropriate setting for further diabetes education would be: Individual setting.      PLAN    1) Consume 3 carbohydrate choices at each meal  2) Take Novolog 15 minutes prior to eating  3) Take Lantus as close to the same time every day  4) Connect [employer] 2 sensors to [employer] macie     Topics to cover at upcoming visits: Healthy Eating, Being Active, Problem Solving, and Reducing Risks    Follow-up: 2/16/24    See Care Plan for co-developed, patient-state behavior change goals.  AVS provided for patient today.    Education Materials Provided:  [facility] Understanding Diabetes Booklet and My Plate Planner      SUBJECTIVE/OBJECTIVE:  Presents for: Individual review  Accompanied by: Self  Diabetes education in the past 24mo: Yes  Diabetes type: Type 2  Disease course: Getting harder to manage  Other concerns:: None  Cultural Influences/Ethnic Background:  Not  or     Diabetes Symptoms & Complications:  Diabetes Related Symptoms: Fatigue, Visual change  Weight trend: Stable  Symptom course: Worsening  Disease course: Getting harder to manage       Patient Problem List and Family Medical History reviewed for relevant medical history, current medical status, and diabetes risk factors.    Vitals:  LMP  (LMP Unknown)   Estimated body mass index is 27.6 kg/m  as calculated from the following:    Height as of 1/30/24: 1.511 m (4' 11.5\").    Weight as of 1/30/24: 63 kg (139 lb).   Last 3 BP:   BP Readings from Last 3 Encounters:   01/30/24 110/78   01/24/24 136/84   12/28/23 131/75       History   Smoking Status     Former     Packs/day: 0.50     Years: 20.00     Types: Cigarettes     Quit date: 3/9/2010   Smokeless Tobacco     Never       Labs:  Lab Results   Component Value Date    A1C 6.0 12/26/2023    A1C 6.5 06/19/2021     Lab Results   Component Value Date     09/08/2023     05/10/2021     Lab Results   Component Value Date     12/26/2023    LDL 89 " "06/19/2021     HDL Cholesterol   Date Value Ref Range Status   06/19/2021 31 (L) >49 mg/dL Final     Direct Measure HDL   Date Value Ref Range Status   12/26/2023 32 (L) >=50 mg/dL Final   ]  GFR Estimate   Date Value Ref Range Status   09/08/2023 >90 >60 mL/min/1.73m2 Final   05/10/2021 >90 >60 mL/min/[1.73_m2] Final     Comment:     Non  GFR Calc  Starting 12/18/2018, serum creatinine based estimated GFR (eGFR) will be   calculated using the Chronic Kidney Disease Epidemiology Collaboration   (CKD-EPI) equation.       GFR Estimate If Black   Date Value Ref Range Status   05/10/2021 >90 >60 mL/min/[1.73_m2] Final     Comment:      GFR Calc  Starting 12/18/2018, serum creatinine based estimated GFR (eGFR) will be   calculated using the Chronic Kidney Disease Epidemiology Collaboration   (CKD-EPI) equation.       Lab Results   Component Value Date    CR 0.70 09/08/2023    CR 0.57 05/10/2021     No results found for: \"MICROALBUMIN\"    Healthy Eating:  Healthy Eating Assessed Today: Yes  Cultural/Yazdanism diet restrictions?: No  Do you have any food allergies or intolerances?: Yes  Please list your food allergies / intolerances: Hemochromatosis - no iron fortified foods, red meat  Meal planning/habits: Carb counting  Who cooks/prepares meals for you?: Self, Daughter  Who purchases food in  your home?: Self  How many times a week on average do you eat food made away from home (restaurant/take-out)?: 0  Meals include: Breakfast, Dinner  Breakfast: 10-11 AM: oatmeal steel cut 1/2 cup with cinnamon OR scrambled eggs and 1 slice sourdough bread OR egg, antonio and cheese sandwich on 1 slice bread  Lunch: snack time  Dinner: 6 PM: pot pie top crust OR pork with baked potato  small with salad OR Taco (2) and refried beans  Snacks: orange, grapes and cashew nuts  Beverages: Coffee, Water (sf creamer)  Has patient met with a dietitian in the past?: Yes    Being Active:  Being Active Assessed " Today: Yes  Exercise:: Currently not exercising  Barrier to exercise: None    Monitoring:  Monitoring Assessed Today: Yes  Did patient bring glucose meter to appointment? : Yes  Blood Glucose Meter: CGM (Jammie 2 with reader)  Times checking blood sugar at home (number): 4  Times checking blood sugar at home (per): Day  Blood glucose trend: Fluctuating    Taking Medications:  Diabetes Medication(s)       Insulin       insulin aspart (NOVOLOG FLEXPEN) 100 UNIT/ML pen INJECT 10 UNITS UNDER THE SKIN THREE TIMES DAILY WITH MEALS. MAXIMUM OF 30 UNITS DAILY.     insulin glargine (LANTUS SOLOSTAR) 100 UNIT/ML pen INJECT 38 UNITS SUBCUTANEOUS EVERY MORNING (BEFORE BREAKFAST)            Taking Medication Assessed Today: Yes  Current Treatments: Insulin Injections  Dose schedule: Pre-breakfast, Pre-dinner, Other (Lantus from 10 am - 12 pm)  Given by: Patient  Injection/Infusion sites: Abdomen  Problems taking diabetes medications regularly?: No  Diabetes medication side effects?: No    Problem Solving:  Problem Solving Assessed Today: Yes  Is the patient at risk for hypoglycemia?: Yes  Hypoglycemia Frequency: Weekly  Hypoglycemia Treatment: Candy (gurmeet fallon)  Patient carries a carbohydrate source: Yes  Medical ID: No  Does patient have glucagon emergency kit?: No  Does patient have severe weather/disaster plan for diabetes management?: No  Does patient have sick day plan for diabetes management?: No    Hypoglycemia Symptoms  Hypoglycemia: Dizziness/Lightheadedness, Confusion    Hypoglycemia Complications  Hypoglycemia Complications: None    Reducing Risks:  Reducing Risks Assessed Today: No    Healthy Coping:  Healthy Coping Assessed Today: Yes  Emotional response to diabetes: Acceptance  Informal Support system:: Family  Stage of change: ACTION (Actively working towards change)      Care Plan and Education Provided:  Care Plan: Diabetes   Updates made by Nithya Hamilton RD since 1/31/2024 12:00 AM        Problem:  HbA1C Not In Goal         Goal: Establish Regular Follow-Ups with PCP         Task: Discuss with PCP the recommended timing for patient's next follow up visit(s)    Responsible User: Nithya Hamilton RD        Task: Discuss schedule for PCP visits with patient    Responsible User: Nithya Hamilton RD        Goal: Get HbA1C Level in Goal         Task: Educate patient on diabetes education self-management topics    Responsible User: Nithya Hamilton RD        Task: Educate patient on benefits of regular glucose monitoring    Responsible User: Nithya Hamilton RD        Task: Refer patient to appropriate extended care team member, as needed (Medication Therapy Management, Behavioral Health, Physical Therapy, etc.)    Responsible User: Nithya Hamilton RD        Task: Discuss diabetes treatment plan with patient    Responsible User: Nithya Hamilton RD        Problem: Diabetes Self-Management Education Needed to Optimize Self-Care Behaviors         Goal: Understand diabetes pathophysiology and disease progression         Task: Provide education on diabetes pathophysiology and disease progression specfic to patient's diabetes type    Responsible User: Nithya Hamilton RD        Goal: Healthy Eating - follow a healthy eating pattern for diabetes         Task: Provide education on portion control and consistency in amount, composition and timing of food intake Completed 1/31/2024   Responsible User: Nithya Hamilton RD        Task: Provide education on managing carbohydrate intake (carbohydrate counting, plate planning method, etc.) Completed 1/31/2024   Responsible User: Nithya Hamilton RD        Task: Provide education on weight management    Responsible User: Nithya Hamilton RD        Task: Provide education on heart healthy eating    Responsible User: Nithya Hamilton RD        Task: Provide education on eating out    Responsible User: Nithya Hamilton RD        Task: Develop individualized  healthy eating plan with patient    Responsible User: Nithya Hamilton RD        Goal: Being Active - get regular physical activity, working up to at least 150 minutes per week         Task: Provide education on relationship of activity to glucose and precautions to take if at risk for low glucose Completed 1/31/2024   Responsible User: Nithya Hamilton RD        Task: Discuss barriers to physical activity with patient    Responsible User: Nithya Hamilton RD        Task: Develop physical activity plan with patient    Responsible User: Nithya Hamilton RD        Task: Explore community resources including walking groups, assistance programs, and home videos    Responsible User: Nithya Hamilton RD        Goal: Monitoring - monitor glucose and ketones as directed         Task: Provide education on blood glucose monitoring (purpose, proper technique, frequency, glucose targets, interpreting results, when to use glucose control solution, sharps disposal)    Responsible User: Nithya Hamilton RD        Task: Provide education on continuous glucose monitoring (sensor placement, use of macie or /reader, understanding glucose trends, alerts and alarms, differences between sensor glucose and blood glucose) Completed 1/31/2024   Responsible User: Nithya Hamilton RD        Task: Provide education on ketone monitoring (when to monitor, frequency, etc.)    Responsible User: Nithya Hamilton RD        Goal: Taking Medication - patient is consistently taking medications as directed         Task: Provide education on action of prescribed medication, including when to take and possible side effects Completed 1/31/2024   Responsible User: Nithya Hamilton RD        Task: Provide education on insulin and injectable diabetes medications, including administration, storage, site selection and rotation for injection sites Completed 1/31/2024   Responsible User: Nithya Hamilton RD        Task: Discuss barriers  to medication adherence with patient and provide management technique ideas as appropriate    Responsible User: Nithya Hamilton RD        Task: Provide education on frequency and refill details of medications    Responsible User: Nithya Hamilton RD        Goal: Problem Solving - know how to prevent and manage short-term diabetes complications         Task: Provide education on high blood glucose - causes, signs/symptoms, prevention and treatment    Responsible User: Nithya Hamilton RD        Task: Provide education on low blood glucose - causes, signs/symptoms, prevention, treatment, carrying a carbohydrate source at all times, and medical identification Completed 1/31/2024   Responsible User: Nithya Hamilton RD        Task: Provide education on safe travel with diabetes    Responsible User: Nithya Hamilton RD        Task: Provide education on how to care for diabetes on sick days    Responsible User: Nithya Hamilton RD        Task: Provide education on when to call a health care provider    Responsible User: Ntihya Hamilton RD        Goal: Reducing Risks - know how to prevent and treat long-term diabetes complications         Task: Provide education on major complications of diabetes, prevention, early diagnostic measures and treatment of complications    Responsible User: Nithya Hamilton RD        Task: Provide education on recommended care for dental, eye and foot health    Responsible User: Nithya Hamilton RD        Task: Provide education on Hemoglobin A1c - goals and relationship to blood glucose levels    Responsible User: Nithya Hamilton RD        Task: Provide education on recommendations for heart health - lipid levels and goals, blood pressure and goals, and aspirin therapy, if indicated    Responsible User: Nithya Hamilton RD        Task: Provide education on tobacco cessation    Responsible User: Nithya Hamilton RD        Goal: Healthy Coping - use available resources  to cope with the challenges of managing diabetes         Task: Discuss recognizing feelings about having diabetes    Responsible User: Nithya Hamilton RD        Task: Provide education on the benefits of making appropriate lifestyle changes    Responsible User: Nithya Hamilton RD        Task: Provide education on benefits of utilizing support systems    Responsible User: Nithya Hamilton RD        Task: Discuss methods for coping with stress    Responsible User: Nithya Hamilton RD        Task: Provide education on when to seek professional counseling    Responsible User: Nithya Hamilton RD Michelle DiDio, RD, LD, [facility]     Time Spent: 60 minutes  Encounter Type: Individual    Any diabetes medication dose changes were made via the CDE Protocol per the patient's referring provider. A copy of this encounter was shared with the provider.

## 2024-02-01 DIAGNOSIS — F43.23 SITUATIONAL MIXED ANXIETY AND DEPRESSIVE DISORDER: ICD-10-CM

## 2024-02-01 DIAGNOSIS — I10 ESSENTIAL HYPERTENSION WITH GOAL BLOOD PRESSURE LESS THAN 140/90: Chronic | ICD-10-CM

## 2024-02-01 NOTE — TELEPHONE ENCOUNTER
I have reviewed the history, physical exam, assessment and management plans.  I concur with or have edited all elements of her note., Patient's visit was conducted through video telecommunication. Patient consented before the start of visit as to understanding of privacy concerns, possible technological failure, and their responsibility of carrying out instructions of plan.  Provider was located in their home during this visit. Pt needing a refills of these.    .Meagan Lyons PSC

## 2024-02-02 RX ORDER — CARVEDILOL 12.5 MG/1
12.5 TABLET ORAL 2 TIMES DAILY WITH MEALS
Qty: 180 TABLET | Refills: 3 | OUTPATIENT
Start: 2024-02-02

## 2024-02-05 ENCOUNTER — TELEPHONE (OUTPATIENT)
Dept: FAMILY MEDICINE | Facility: CLINIC | Age: 64
End: 2024-02-05
Payer: COMMERCIAL

## 2024-02-05 DIAGNOSIS — F43.23 SITUATIONAL MIXED ANXIETY AND DEPRESSIVE DISORDER: ICD-10-CM

## 2024-02-05 DIAGNOSIS — I10 ESSENTIAL HYPERTENSION WITH GOAL BLOOD PRESSURE LESS THAN 140/90: Chronic | ICD-10-CM

## 2024-02-05 RX ORDER — CARVEDILOL 12.5 MG/1
12.5 TABLET ORAL 2 TIMES DAILY WITH MEALS
Qty: 180 TABLET | Refills: 3 | Status: CANCELLED | OUTPATIENT
Start: 2024-02-05

## 2024-02-05 NOTE — TELEPHONE ENCOUNTER
Pending Prescriptions:                       Disp   Refills    carvedilol (COREG) 12.5 MG tablet         180 ta*3            Sig: Take 1 tablet (12.5 mg) by mouth 2 times daily           (with meals)    sertraline (ZOLOFT) 50 MG tablet          180 ta*1            Sig: Take 2 tablets (100 mg) by mouth daily    PRESCRIPTIONS CANCELLED, PHARMACY NEEDS NEW RX FOR BOTH MEDS ASAP.    Kavita Monroe on 2/5/2024 at 10:26 AM

## 2024-02-07 ENCOUNTER — TELEPHONE (OUTPATIENT)
Dept: GASTROENTEROLOGY | Facility: CLINIC | Age: 64
End: 2024-02-07
Payer: COMMERCIAL

## 2024-02-14 ENCOUNTER — TELEPHONE (OUTPATIENT)
Dept: GASTROENTEROLOGY | Facility: CLINIC | Age: 64
End: 2024-02-14
Payer: COMMERCIAL

## 2024-02-14 DIAGNOSIS — R19.7 DIARRHEA: Primary | ICD-10-CM

## 2024-02-14 RX ORDER — BISACODYL 5 MG/1
TABLET, DELAYED RELEASE ORAL
Qty: 4 TABLET | Refills: 0 | Status: ON HOLD | OUTPATIENT
Start: 2024-02-14 | End: 2024-02-29

## 2024-02-14 NOTE — TELEPHONE ENCOUNTER
"Per scheduling questionnaire: \"Do you have a history of malignant hyperthermia or adverse reaction to anesthesia? Yes\" - Please discuss with patient.      -----------------------------------------------------------------------------------------------------------------    Pre visit planning completed.      Procedure details:    Patient scheduled for Colonoscopy/Upper endoscopy (EGD) on 2/29/2024.     Arrival time: 0900. Procedure time 1000    Pre op exam needed? Yes. Completed 1/30/2024 with Paulette Travis DNP    Facility location: [facility]; [address] [place] [address]    Sedation type: MAC    Indication for procedure:   Nausea and vomiting, unspecified vomiting type   Diarrhea, unspecified type         Chart review:     Electronic implanted devices? No    Recent diagnosis of diverticulitis within the last 6 weeks? No    Diabetic? Yes. See medication holding recommendations     Diabetic medication HOLDING recommendations: (if applicable)  Oral diabetic medications: No  Diabetic injectables: No  Insulin: Yes. Consult with managing provider       Medication review:    Anticoagulants? No    NSAIDS? No NSAID medications per patient's medication list.  RN will verify with pre-assessment call.    Other medication HOLDING recommendations:  N/A      Prep for procedure:     Bowel prep recommendation: Standard Golytely    Due to:  diabetes.     Prep instructions sent via [employer] Bowel prep script sent to    [place], [place] - [address] [place] [address]        Paulette Wong RN  Endoscopy Procedure Pre Assessment RN  [phone] option 4  "

## 2024-02-15 NOTE — TELEPHONE ENCOUNTER
Patient reports nausea/vomiting after sedation.     ----------------------------------------------------------------------------------------------------------------      Pre assessment completed for upcoming procedure.    Procedure details:    Arrival time and facility location reviewed.    Pre op exam needed? Yes. Yes. Completed 1/30/2024 with Paulette Travis DNP    Designated  policy reviewed. Instructed to have someone stay 24 hours post procedure.     COVID policy reviewed.      Medication review:    Medications reviewed. Please see supporting documentation below. Holding recommendations discussed (if applicable).       Prep for procedure:     Reviewed procedure prep instructions.     Patient verbalized understanding and had no questions or concerns at this time.        Paulette Wong RN  Endoscopy Procedure Pre Assessment RN  [phone] option 4

## 2024-02-16 ENCOUNTER — TELEPHONE (OUTPATIENT)
Dept: EDUCATION SERVICES | Facility: CLINIC | Age: 64
End: 2024-02-16

## 2024-02-16 ENCOUNTER — VIRTUAL VISIT (OUTPATIENT)
Dept: EDUCATION SERVICES | Facility: CLINIC | Age: 64
End: 2024-02-16
Payer: COMMERCIAL

## 2024-02-16 DIAGNOSIS — Z79.4 TYPE 2 DIABETES MELLITUS WITH DIABETIC NEUROPATHY, WITH LONG-TERM CURRENT USE OF INSULIN (H): Primary | ICD-10-CM

## 2024-02-16 DIAGNOSIS — E11.40 TYPE 2 DIABETES MELLITUS WITH DIABETIC NEUROPATHY, WITH LONG-TERM CURRENT USE OF INSULIN (H): Primary | ICD-10-CM

## 2024-02-16 PROCEDURE — G0108 DIAB MANAGE TRN  PER INDIV: HCPCS | Mod: 95 | Performed by: NUTRITIONIST

## 2024-02-16 NOTE — PROGRESS NOTES
Diabetes Self-Management Education & Support    Presents for: Follow-up    Type of service:  Video Visit    If the video visit is dropped, the video visit invitation should be resent by: Text to cell phone: [phone]    Originating Location (pt. Location): Home  Distant Location (provider location): Offsite  Mode of Communication:  Video Conference via [employer]    Video Start Time:  9:30 AM  Video End Time (time video stopped): 10:30 AM    How would patient like to obtain AVS? MyChart      ASSESSMENT:  Bradford is wearing Jammie 2 sensors and uses with , unable to connect to her data but she reports that 7 day average = 128 mg/dL, 14 day average = 132 mg/dL and 30 day average = 138 mg/dL.  She has had a few hypoglycemic events after walking, these were a few days ago, she does tend to go greater than 5 hours between meals.  Recommend that she try to eat at least a piece of fruit between meals and if this does not correct to decrease Lantus to 36 units.  She also reports a few readings >200 mg/dL and she was using a written scale given in the hospital.  Due to changes in her insulin doses since discharge rewrote correction scale (see below).  She would benefit to have a glucagon pen available, set up order today.      Patient's most recent   Lab Results   Component Value Date    A1C 6.0 12/26/2023    A1C 6.5 06/19/2021     is meeting goal of <7.0    Diabetes knowledge and skills assessment:   Patient is knowledgeable in diabetes management concepts related to: Healthy Eating, Being Active, Monitoring, Taking Medication, Problem Solving, Reducing Risks, and Healthy Coping    Continue education with the following diabetes management concepts: Continuation of Lifestyle Changes     Based on learning assessment above, most appropriate setting for further diabetes education would be: Individual setting.      PLAN    1) If you continue to have recurrent hypoglycemia before your meals then reduce the Lantus to 36  "units.  2) New correction scale to replace scale given in hospital  Correction Scale:  1 unit lower your blood sugar 30 mg/dL  151-180 mg/dL = 1 unit  181-210 mg/dL = 2 units  211-240 mg/dL = 3 units  241-270 mg/dL = 4 units  271-300 mg/dL = 5 units  301-330 mg/dL = 6 units  331-360 mg/dL = 7 units  361-390 mg/dL = 8 units    3) Try to connect to [email or web address] to download [email or web address]       Follow-up: 4/16/24    See Care Plan for co-developed, patient-state behavior change goals.  AVS provided for patient today.    Education Materials Provided:  No new materials provided today      SUBJECTIVE/OBJECTIVE:  Presents for: Follow-up  Accompanied by: Self  Diabetes education in the past 24mo: Yes  Diabetes type: Type 2  Disease course: Getting harder to manage  Other concerns:: None  Cultural Influences/Ethnic Background:  Not  or     Diabetes Symptoms & Complications:  Diabetes Related Symptoms: Fatigue, Visual change  Weight trend: Stable  Symptom course: Worsening  Disease course: Getting harder to manage       Patient Problem List and Family Medical History reviewed for relevant medical history, current medical status, and diabetes risk factors.    Vitals:  LMP  (LMP Unknown)   Estimated body mass index is 27.6 kg/m  as calculated from the following:    Height as of 1/30/24: 1.511 m (4' 11.5\").    Weight as of 1/30/24: 63 kg (139 lb).   Last 3 BP:   BP Readings from Last 3 Encounters:   01/30/24 110/78   01/24/24 136/84   12/28/23 131/75       History   Smoking Status    Former    Packs/day: 0.50    Years: 20.00    Types: Cigarettes    Quit date: 3/9/2010   Smokeless Tobacco    Never       Labs:  Lab Results   Component Value Date    A1C 6.0 12/26/2023    A1C 6.5 06/19/2021     Lab Results   Component Value Date     09/08/2023     05/10/2021     Lab Results   Component Value Date     12/26/2023    LDL 89 06/19/2021     HDL Cholesterol   Date Value Ref Range Status   06/19/2021 31 " "(L) >49 mg/dL Final     Direct Measure HDL   Date Value Ref Range Status   12/26/2023 32 (L) >=50 mg/dL Final   ]  GFR Estimate   Date Value Ref Range Status   09/08/2023 >90 >60 mL/min/1.73m2 Final   05/10/2021 >90 >60 mL/min/[1.73_m2] Final     Comment:     Non  GFR Calc  Starting 12/18/2018, serum creatinine based estimated GFR (eGFR) will be   calculated using the Chronic Kidney Disease Epidemiology Collaboration   (CKD-EPI) equation.       GFR Estimate If Black   Date Value Ref Range Status   05/10/2021 >90 >60 mL/min/[1.73_m2] Final     Comment:      GFR Calc  Starting 12/18/2018, serum creatinine based estimated GFR (eGFR) will be   calculated using the Chronic Kidney Disease Epidemiology Collaboration   (CKD-EPI) equation.       Lab Results   Component Value Date    CR 0.70 09/08/2023    CR 0.57 05/10/2021     No results found for: \"MICROALBUMIN\"    Healthy Eating:  Healthy Eating Assessed Today: Yes  Cultural/Christianity diet restrictions?: No  Do you have any food allergies or intolerances?: Yes  Please list your food allergies / intolerances: Hemochromatosis - no iron fortified foods, red meat  Meal planning/habits: Carb counting  Who cooks/prepares meals for you?: Self, Daughter  Who purchases food in  your home?: Self  How many times a week on average do you eat food made away from home (restaurant/take-out)?: 0  Meals include: Breakfast, Dinner  Breakfast: 10-11 AM: oatmeal steel cut 1/2 cup with cinnamon OR scrambled eggs and 1 slice sourdough bread OR egg, antonio and cheese sandwich on 1 slice bread  Lunch: snack time  Dinner: 6 PM: pot pie top crust OR pork with baked potato  small with salad OR Taco (2) and refried beans  Snacks: orange, grapes and cashew nuts or banana bread  Beverages: Coffee, Water (sf creamer)  Has patient met with a dietitian in the past?: Yes    Being Active:  Being Active Assessed Today: Yes  Exercise:: Yes (walks)  Days per week of moderate to " strenuous exercise (like a brisk walk): 7  On average, minutes per day of exercise at this level: 30 (30 minutes during the week at 1pm and 60 minutes on weekends)  How intense was your typical exercise? : Moderate (like brisk walking)  Exercise Minutes per Week: 210  Barrier to exercise: None    Monitoring:  Monitoring Assessed Today: Yes  Did patient bring glucose meter to appointment? : Yes  Blood Glucose Meter: CGM (Jammie 2 with reader)  Times checking blood sugar at home (number): 5+  Times checking blood sugar at home (per): Day  Blood glucose trend: Fluctuating      Taking Medications:  Diabetes Medication(s)       Insulin       insulin aspart (NOVOLOG FLEXPEN) 100 UNIT/ML pen INJECT 10 UNITS UNDER THE SKIN THREE TIMES DAILY WITH MEALS. MAXIMUM OF 30 UNITS DAILY.     insulin glargine (LANTUS SOLOSTAR) 100 UNIT/ML pen INJECT 38 UNITS SUBCUTANEOUS EVERY MORNING (BEFORE BREAKFAST)            Taking Medication Assessed Today: Yes  Current Treatments: Insulin Injections  Dose schedule: Pre-breakfast, Pre-dinner, Other (Lantus from 10 am - 12 pm)  Given by: Patient  Injection/Infusion sites: Abdomen  Problems taking diabetes medications regularly?: No  Diabetes medication side effects?: No    Problem Solving:  Problem Solving Assessed Today: Yes  Is the patient at risk for hypoglycemia?: Yes  Hypoglycemia Frequency: Weekly  Hypoglycemia Treatment: Candy (gurmeet fallon)  Patient carries a carbohydrate source: Yes  Medical ID: No  Does patient have glucagon emergency kit?: No  Does patient have severe weather/disaster plan for diabetes management?: No  Does patient have sick day plan for diabetes management?: No    Hypoglycemia Symptoms  Hypoglycemia: Dizziness/Lightheadedness, Confusion    Hypoglycemia Complications  Hypoglycemia Complications: None    Reducing Risks:  Reducing Risks Assessed Today: Yes  Has dilated eye exam at least once a year?: No (has appt 3/4/24)  Sees dentist every 6 months?: No  (dentures)  Feet checked by healthcare provider in the last year?: Yes    Healthy Coping:  Healthy Coping Assessed Today: Yes  Emotional response to diabetes: Acceptance  Informal Support system:: Family  Stage of change: ACTION (Actively working towards change)      Care Plan and Education Provided:  Care Plan: Diabetes   Updates made by Nithya Hamilton RD since 2/16/2024 12:00 AM        Problem: Diabetes Self-Management Education Needed to Optimize Self-Care Behaviors         Goal: Understand diabetes pathophysiology and disease progression         Task: Provide education on diabetes pathophysiology and disease progression specfic to patient's diabetes type Completed 2/16/2024   Responsible User: Nithya Hamilton RD        Goal: Being Active - get regular physical activity, working up to at least 150 minutes per week    This Visit's Progress: 70%   Note:    Walk >30 minutes per day 5 days/week and add some resistance activity 2 days/week       Goal: Problem Solving - know how to prevent and manage short-term diabetes complications         Task: Provide education on how to care for diabetes on sick days Completed 2/16/2024   Responsible User: Nithya Hamilton RD        Goal: Reducing Risks - know how to prevent and treat long-term diabetes complications         Task: Provide education on major complications of diabetes, prevention, early diagnostic measures and treatment of complications Completed 2/16/2024   Responsible User: Nithya Hamilton RD        Task: Provide education on recommended care for dental, eye and foot health Completed 2/16/2024   Responsible User: Nithya Hamilton RD        Task: Provide education on Hemoglobin A1c - goals and relationship to blood glucose levels Completed 2/16/2024   Responsible User: Nithya Hamilton RD        Task: Provide education on recommendations for heart health - lipid levels and goals, blood pressure and goals, and aspirin therapy, if indicated Completed  2/16/2024   Responsible User: Nithya Hamilton RD        Goal: Healthy Coping - use available resources to cope with the challenges of managing diabetes         Task: Provide education on the benefits of making appropriate lifestyle changes Completed 2/16/2024   Responsible User: Nithya Hamilton RD Michelle DiDio, RD, LD, [facility]     Time Spent: 60 minutes  Encounter Type: Individual    Any diabetes medication dose changes were made via the CDE Protocol per the patient's referring provider. A copy of this encounter was shared with the provider.

## 2024-02-16 NOTE — TELEPHONE ENCOUNTER
Bradford has been keeping her blood glucose levels in very good control, she has had a few hypoglycemic events.  Would benefit to have a glucagon pen available at home.

## 2024-02-16 NOTE — LETTER
2/16/2024         RE: Bradford Prado  [address]  [facility] [address]        Dear Colleague,    Thank you for referring your patient, Bradford Prado, to the [facility]. Please see a copy of my visit note below.    Diabetes Self-Management Education & Support    Presents for: Follow-up    Type of service:  Video Visit    If the video visit is dropped, the video visit invitation should be resent by: Text to cell phone: [phone]    Originating Location (pt. Location): Home  Distant Location (provider location): Offsite  Mode of Communication:  Video Conference via [employer] Start Time:  9:30 AM  Video End Time (time video stopped): 10:30 AM    How would patient like to obtain AVS? MyChart      ASSESSMENT:  Bradford is wearing Jammie 2 sensors and uses with , unable to connect to her data but she reports that 7 day average = 128 mg/dL, 14 day average = 132 mg/dL and 30 day average = 138 mg/dL.  She has had a few hypoglycemic events after walking, these were a few days ago, she does tend to go greater than 5 hours between meals.  Recommend that she try to eat at least a piece of fruit between meals and if this does not correct to decrease Lantus to 36 units.  She also reports a few readings >200 mg/dL and she was using a written scale given in the hospital.  Due to changes in her insulin doses since discharge rewrote correction scale (see below).  She would benefit to have a glucagon pen available, set up order today.      Patient's most recent   Lab Results   Component Value Date    A1C 6.0 12/26/2023    A1C 6.5 06/19/2021     is meeting goal of <7.0    Diabetes knowledge and skills assessment:   Patient is knowledgeable in diabetes management concepts related to: Healthy Eating, Being Active, Monitoring, Taking Medication, Problem Solving, Reducing Risks, and Healthy Coping    Continue education with the following diabetes management concepts: Continuation of  "Lifestyle Changes     Based on learning assessment above, most appropriate setting for further diabetes education would be: Individual setting.      PLAN    1) If you continue to have recurrent hypoglycemia before your meals then reduce the Lantus to 36 units.  2) New correction scale to replace scale given in hospital  Correction Scale:  1 unit lower your blood sugar 30 mg/dL  151-180 mg/dL = 1 unit  181-210 mg/dL = 2 units  211-240 mg/dL = 3 units  241-270 mg/dL = 4 units  271-300 mg/dL = 5 units  301-330 mg/dL = 6 units  331-360 mg/dL = 7 units  361-390 mg/dL = 8 units    3) Try to connect to [email or web address] to download Jammie       Follow-up: 4/16/24    See Care Plan for co-developed, patient-state behavior change goals.  AVS provided for patient today.    Education Materials Provided:  No new materials provided today      SUBJECTIVE/OBJECTIVE:  Presents for: Follow-up  Accompanied by: Self  Diabetes education in the past 24mo: Yes  Diabetes type: Type 2  Disease course: Getting harder to manage  Other concerns:: None  Cultural Influences/Ethnic Background:  Not  or     Diabetes Symptoms & Complications:  Diabetes Related Symptoms: Fatigue, Visual change  Weight trend: Stable  Symptom course: Worsening  Disease course: Getting harder to manage       Patient Problem List and Family Medical History reviewed for relevant medical history, current medical status, and diabetes risk factors.    Vitals:  LMP  (LMP Unknown)   Estimated body mass index is 27.6 kg/m  as calculated from the following:    Height as of 1/30/24: 1.511 m (4' 11.5\").    Weight as of 1/30/24: 63 kg (139 lb).   Last 3 BP:   BP Readings from Last 3 Encounters:   01/30/24 110/78   01/24/24 136/84   12/28/23 131/75       History   Smoking Status     Former     Packs/day: 0.50     Years: 20.00     Types: Cigarettes     Quit date: 3/9/2010   Smokeless Tobacco     Never       Labs:  Lab Results   Component Value Date    A1C 6.0 " "12/26/2023    A1C 6.5 06/19/2021     Lab Results   Component Value Date     09/08/2023     05/10/2021     Lab Results   Component Value Date     12/26/2023    LDL 89 06/19/2021     HDL Cholesterol   Date Value Ref Range Status   06/19/2021 31 (L) >49 mg/dL Final     Direct Measure HDL   Date Value Ref Range Status   12/26/2023 32 (L) >=50 mg/dL Final   ]  GFR Estimate   Date Value Ref Range Status   09/08/2023 >90 >60 mL/min/1.73m2 Final   05/10/2021 >90 >60 mL/min/[1.73_m2] Final     Comment:     Non  GFR Calc  Starting 12/18/2018, serum creatinine based estimated GFR (eGFR) will be   calculated using the Chronic Kidney Disease Epidemiology Collaboration   (CKD-EPI) equation.       GFR Estimate If Black   Date Value Ref Range Status   05/10/2021 >90 >60 mL/min/[1.73_m2] Final     Comment:      GFR Calc  Starting 12/18/2018, serum creatinine based estimated GFR (eGFR) will be   calculated using the Chronic Kidney Disease Epidemiology Collaboration   (CKD-EPI) equation.       Lab Results   Component Value Date    CR 0.70 09/08/2023    CR 0.57 05/10/2021     No results found for: \"MICROALBUMIN\"    Healthy Eating:  Healthy Eating Assessed Today: Yes  Cultural/Sabianism diet restrictions?: No  Do you have any food allergies or intolerances?: Yes  Please list your food allergies / intolerances: Hemochromatosis - no iron fortified foods, red meat  Meal planning/habits: Carb counting  Who cooks/prepares meals for you?: Self, Daughter  Who purchases food in  your home?: Self  How many times a week on average do you eat food made away from home (restaurant/take-out)?: 0  Meals include: Breakfast, Dinner  Breakfast: 10-11 AM: oatmeal steel cut 1/2 cup with cinnamon OR scrambled eggs and 1 slice sourdough bread OR egg, antonio and cheese sandwich on 1 slice bread  Lunch: snack time  Dinner: 6 PM: pot pie top crust OR pork with baked potato  small with salad OR Taco (2) and " refried beans  Snacks: orange, grapes and cashew nuts or banana bread  Beverages: Coffee, Water ( creamer)  Has patient met with a dietitian in the past?: Yes    Being Active:  Being Active Assessed Today: Yes  Exercise:: Yes (walks)  Days per week of moderate to strenuous exercise (like a brisk walk): 7  On average, minutes per day of exercise at this level: 30 (30 minutes during the week at 1pm and 60 minutes on weekends)  How intense was your typical exercise? : Moderate (like brisk walking)  Exercise Minutes per Week: 210  Barrier to exercise: None    Monitoring:  Monitoring Assessed Today: Yes  Did patient bring glucose meter to appointment? : Yes  Blood Glucose Meter: CGM (Jammie 2 with reader)  Times checking blood sugar at home (number): 5+  Times checking blood sugar at home (per): Day  Blood glucose trend: Fluctuating      Taking Medications:  Diabetes Medication(s)       Insulin       insulin aspart (NOVOLOG FLEXPEN) 100 UNIT/ML pen INJECT 10 UNITS UNDER THE SKIN THREE TIMES DAILY WITH MEALS. MAXIMUM OF 30 UNITS DAILY.     insulin glargine (LANTUS SOLOSTAR) 100 UNIT/ML pen INJECT 38 UNITS SUBCUTANEOUS EVERY MORNING (BEFORE BREAKFAST)            Taking Medication Assessed Today: Yes  Current Treatments: Insulin Injections  Dose schedule: Pre-breakfast, Pre-dinner, Other (Lantus from 10 am - 12 pm)  Given by: Patient  Injection/Infusion sites: Abdomen  Problems taking diabetes medications regularly?: No  Diabetes medication side effects?: No    Problem Solving:  Problem Solving Assessed Today: Yes  Is the patient at risk for hypoglycemia?: Yes  Hypoglycemia Frequency: Weekly  Hypoglycemia Treatment: Candy (gurmeet fallon)  Patient carries a carbohydrate source: Yes  Medical ID: No  Does patient have glucagon emergency kit?: No  Does patient have severe weather/disaster plan for diabetes management?: No  Does patient have sick day plan for diabetes management?: No    Hypoglycemia Symptoms  Hypoglycemia:  Dizziness/Lightheadedness, Confusion    Hypoglycemia Complications  Hypoglycemia Complications: None    Reducing Risks:  Reducing Risks Assessed Today: Yes  Has dilated eye exam at least once a year?: No (has appt 3/4/24)  Sees dentist every 6 months?: No (dentures)  Feet checked by healthcare provider in the last year?: Yes    Healthy Coping:  Healthy Coping Assessed Today: Yes  Emotional response to diabetes: Acceptance  Informal Support system:: Family  Stage of change: ACTION (Actively working towards change)      Care Plan and Education Provided:  Care Plan: Diabetes   Updates made by Nithya Hamilton RD since 2/16/2024 12:00 AM        Problem: Diabetes Self-Management Education Needed to Optimize Self-Care Behaviors         Goal: Understand diabetes pathophysiology and disease progression         Task: Provide education on diabetes pathophysiology and disease progression specfic to patient's diabetes type Completed 2/16/2024   Responsible User: Nithya Hamilton RD        Goal: Being Active - get regular physical activity, working up to at least 150 minutes per week    This Visit's Progress: 70%   Note:    Walk >30 minutes per day 5 days/week and add some resistance activity 2 days/week       Goal: Problem Solving - know how to prevent and manage short-term diabetes complications         Task: Provide education on how to care for diabetes on sick days Completed 2/16/2024   Responsible User: Nithya Hamilton RD        Goal: Reducing Risks - know how to prevent and treat long-term diabetes complications         Task: Provide education on major complications of diabetes, prevention, early diagnostic measures and treatment of complications Completed 2/16/2024   Responsible User: Nithya Hamilton RD        Task: Provide education on recommended care for dental, eye and foot health Completed 2/16/2024   Responsible User: Nithya Hamilton RD        Task: Provide education on Hemoglobin A1c - goals and  relationship to blood glucose levels Completed 2/16/2024   Responsible User: Nithya Hamilton RD        Task: Provide education on recommendations for heart health - lipid levels and goals, blood pressure and goals, and aspirin therapy, if indicated Completed 2/16/2024   Responsible User: Nithya Hamilton RD        Goal: Healthy Coping - use available resources to cope with the challenges of managing diabetes         Task: Provide education on the benefits of making appropriate lifestyle changes Completed 2/16/2024   Responsible User: Nithya Hamilton RD Michelle DiDio, RD, LD, [facility]     Time Spent: 60 minutes  Encounter Type: Individual    Any diabetes medication dose changes were made via the CDE Protocol per the patient's referring provider. A copy of this encounter was shared with the provider.

## 2024-02-16 NOTE — PATIENT INSTRUCTIONS
Douglas Felder,    Here are some things that we discussed today.      Goals for Diabetes Care:    1. Eat 3 balanced meals each day - Monitor carb intake and aim for 45 grams per meal  (3 carbohydrate choices) and 15 grams carbohydrate (1 carbohydrate choice at snacks)     Carbohydrate 1 choice = 15 grams      Aim to eat a balanced plate - half your plate fruits/veggies, 1/4 the plate protein and 1/4 plate starch (rice, potato, pasta)     2. Check blood sugars multiple times each day at varying times              Blood Glucose Targets:              1. Fasting and before meal target is 80 - 130 mg/dl              2. 2 hours after a meal target is < 180 mg/dl  Always remember to bring meter and/or log book to all appointment    3. Activity really helps improve blood sugars. Try to Incorporate 30 minutes activity into each day - does not need to be all at one time & walking counts!    4. Treating low BG. Rule of 15 = BG 50-70 mg/dL = 15 gram carb (4 oz juice, 4 glucose tabs, OR 4 oz pop), then recheck BG in 15 minutes. If still low repeat. (If BG <50 mg/dL = 8 oz pop/juice or 8 glucose tabs).    5. Take diabetes medications as prescribed   -Lantus 38 units once daily at 10 AM (Try to take at the same time every day)  -Novolog 10 units at meals (take 15 minutes prior to eating) + correction scale if needed    Correction Scale:  1 unit lower your blood sugar 30 mg/dL  151-180 mg/dL = 1 unit  181-210 mg/dL = 2 units  211-240 mg/dL = 3 units  241-270 mg/dL = 4 units  271-300 mg/dL = 5 units  301-330 mg/dL = 6 units  331-360 mg/dL = 7 units  361-390 mg/dL = 8 units    Sending order to  your pharmacy for a gvoke pen, this is to used with severe hypoglycemia.  [email or web address]-use    [facility] customer service number:  [phone].      Follow up with your Diabetic Educator to assess BG targets and need for modifications to medications and/or lifestyle.    Call with any questions.  Thank  you!  Nithya Mansfield RD, LD, [facility]   Certified Diabetes Care &   [facility]  Triage [phone] or Scheduling [phone]

## 2024-02-17 ENCOUNTER — HEALTH MAINTENANCE LETTER (OUTPATIENT)
Age: 64
End: 2024-02-17

## 2024-02-26 ENCOUNTER — VIRTUAL VISIT (OUTPATIENT)
Dept: GASTROENTEROLOGY | Facility: CLINIC | Age: 64
End: 2024-02-26
Attending: INTERNAL MEDICINE
Payer: COMMERCIAL

## 2024-02-26 VITALS — WEIGHT: 134 LBS | BODY MASS INDEX: 26.31 KG/M2 | HEIGHT: 60 IN

## 2024-02-26 DIAGNOSIS — R19.7 DIARRHEA, UNSPECIFIED TYPE: ICD-10-CM

## 2024-02-26 DIAGNOSIS — Z90.49 S/P CHOLECYSTECTOMY: ICD-10-CM

## 2024-02-26 DIAGNOSIS — R11.2 NAUSEA AND VOMITING, UNSPECIFIED VOMITING TYPE: ICD-10-CM

## 2024-02-26 PROCEDURE — 99213 OFFICE O/P EST LOW 20 MIN: CPT | Mod: 95 | Performed by: PHYSICIAN ASSISTANT

## 2024-02-26 ASSESSMENT — PAIN SCALES - GENERAL: PAINLEVEL: NO PAIN (0)

## 2024-02-26 NOTE — PROGRESS NOTES
GASTROENTEROLOGY Follow-up VIDEO VISIT    CC/REFERRING MD:    Paulette Travis    REASON FOR CONSULTATION:   Gabriela Wilkins for   Chief Complaint   Patient presents with    RECHECK       HISTORY OF PRESENT ILLNESS:    Bradford Prado is a 63 year old female who is being evaluated via a billable video visit for follow-up.  This is my first time meeting with patient.  She previously had a visit with Dr. Gabriela Wilkins in late October 2023.  To review, she developed a significant GI illness in September 2023.  Had roughly 14 days of nausea, vomiting, and diarrhea.  She recovered from that but then developed more persistent loose stool.  She had also been experiencing more frequent episodes of nausea and vomiting.    Her past medical history was notable for hereditary hemochromatosis, followed regularly with ultrasound and AFP.  Has phlebotomy regularly.  Also underwent cholecystectomy due to gallstones in 2022.    After her visit with Dr. Wilkins, she was referred for both EGD and colonoscopy.  This is scheduled in 3 days with Dr. Mari.  She does have history of minor esophageal varices related to cirrhosis, most recently had EGD in November 2021.  Has been on pantoprazole since then.  Does not get any typical GERD symptoms, early satiety, or upper abdominal pain.  Has not had any blood in the stool.  Most recent colonoscopy was over 10 years ago.  She was started on Questran, taking twice daily and tolerating well.  Typically has 2 BMs per day, the first will be formed and the second tends to be looser.        I have reviewed and updated the patient's Past Medical History, Social History, Family History and Medication List.    Exam:    General appearance:  Healthy appearing adult, in no acute distress  Eyes:  Sclera anicteric, Pupils round and reactive to light  Ears, nose, mouth and throat:  No obvious external lesions of ears and nose.  Hearing intact  Neck:  Symmetric, No obvious external  lesions  Respiratory:  Normal respiration, no use of accessory muscles   MSK:  No visual upper extremity, neck or facial muscle atrophy  ABD:  No visual abdominal distention, no audible borborygmi  Skin:  No rashes or jaundice   Psychiatric:  Oriented to person, place and time, Appropriate mood and affect.   Neurologic:  Peripheral muscle function and dexterity appear to be intact      PERTINENT STUDIES have been reviewed.    ASSESSMENT/PLAN:    Bradford Prado is a 63 year old female who presents for follow up of a couple of concerns.  She had a pretty significant GI illness in September 2023, sounds like it might have been viral gastroenteritis.  Since then, she has had recurrent diarrhea and intermittent nausea and vomiting.  She has had improvement in her stools on Questran.  She does have endoscopic exams coming up in a few days.  Reviewed that colonoscopy will be useful to update her colorectal cancer screening, would also recommend biopsies to rule out microscopic colitis.  EGD should be helpful to assess for esophagitis, hiatal hernia, peptic ulcer disease, H. pylori.  Celiac disease may be in the differential with recurrent diarrhea as well.  She also has known history of minor esophageal varices, and is due for screening on that.  We also discussed that her recurrent nausea and vomiting could be related to gastroparesis, given her history of diabetes, and I would recommend a gastric emptying scan following her scopes if there is not an obvious source for her nausea and vomiting on that test.  She stated understanding of plan and I will reach out to her next week with scope results and biopsies.    1. Nausea and vomiting, unspecified vomiting type  - Adult GI Clinic Follow-Up Order    2. Diarrhea, unspecified type  - Adult GI Clinic Follow-Up Order    3. S/P cholecystectomy  - Adult GI Clinic Follow-Up Order      Video-Visit Details    Video Visit Time: 10 minutes    Type of service:  Video  Visit    Originating Location (pt. Location): Home    Distant Location (provider location):  Off-site    Platform used for Video Visit: Nathalia    A total of 15 minutes was spent with reviewing the chart, discussing with the patient, documentation and coordination of care.    Vidal Amador PA-C  Division of Gastroenterology, Hepatology, and Nutrition  [facility]  [phone]

## 2024-02-26 NOTE — NURSING NOTE
Is the patient currently in the state of [place]? YES    Visit mode:VIDEO    If the visit is dropped, the patient can be reconnected by: VIDEO VISIT: Text to cell phone:   Telephone Information:   Mobile [phone]       Will anyone else be joining the visit? NO  (If patient encounters technical issues they should call [phone]    How would you like to obtain your AVS? MyChart    Are changes needed to the allergy or medication list? No    Reason for visit: NICK VARGAS

## 2024-02-28 ENCOUNTER — ANESTHESIA EVENT (OUTPATIENT)
Dept: GASTROENTEROLOGY | Facility: CLINIC | Age: 64
End: 2024-02-28
Payer: COMMERCIAL

## 2024-02-28 RX ORDER — ONDANSETRON 2 MG/ML
4 INJECTION INTRAMUSCULAR; INTRAVENOUS EVERY 6 HOURS PRN
Status: CANCELLED | OUTPATIENT
Start: 2024-02-28

## 2024-02-28 RX ORDER — NALOXONE HYDROCHLORIDE 0.4 MG/ML
0.4 INJECTION, SOLUTION INTRAMUSCULAR; INTRAVENOUS; SUBCUTANEOUS
Status: CANCELLED | OUTPATIENT
Start: 2024-02-28

## 2024-02-28 RX ORDER — NALOXONE HYDROCHLORIDE 0.4 MG/ML
0.2 INJECTION, SOLUTION INTRAMUSCULAR; INTRAVENOUS; SUBCUTANEOUS
Status: CANCELLED | OUTPATIENT
Start: 2024-02-28

## 2024-02-28 RX ORDER — PROCHLORPERAZINE MALEATE 10 MG
10 TABLET ORAL EVERY 6 HOURS PRN
Status: CANCELLED | OUTPATIENT
Start: 2024-02-28

## 2024-02-28 RX ORDER — ONDANSETRON 4 MG/1
4 TABLET, ORALLY DISINTEGRATING ORAL EVERY 6 HOURS PRN
Status: CANCELLED | OUTPATIENT
Start: 2024-02-28

## 2024-02-28 RX ORDER — FLUMAZENIL 0.1 MG/ML
0.2 INJECTION, SOLUTION INTRAVENOUS
Status: CANCELLED | OUTPATIENT
Start: 2024-02-28 | End: 2024-02-29

## 2024-02-28 RX ORDER — LIDOCAINE 40 MG/G
CREAM TOPICAL
Status: CANCELLED | OUTPATIENT
Start: 2024-02-28

## 2024-02-28 RX ORDER — ONDANSETRON 2 MG/ML
4 INJECTION INTRAMUSCULAR; INTRAVENOUS
Status: CANCELLED | OUTPATIENT
Start: 2024-02-28

## 2024-02-29 ENCOUNTER — ANESTHESIA (OUTPATIENT)
Dept: GASTROENTEROLOGY | Facility: CLINIC | Age: 64
End: 2024-02-29
Payer: COMMERCIAL

## 2024-02-29 ENCOUNTER — HOSPITAL ENCOUNTER (OUTPATIENT)
Facility: CLINIC | Age: 64
Discharge: HOME | End: 2024-02-29
Attending: INTERNAL MEDICINE | Admitting: INTERNAL MEDICINE
Payer: COMMERCIAL

## 2024-02-29 VITALS
OXYGEN SATURATION: 92 % | HEART RATE: 68 BPM | SYSTOLIC BLOOD PRESSURE: 107 MMHG | RESPIRATION RATE: 28 BRPM | DIASTOLIC BLOOD PRESSURE: 58 MMHG

## 2024-02-29 LAB
COLONOSCOPY: NORMAL
UPPER GI ENDOSCOPY: NORMAL

## 2024-02-29 PROCEDURE — 272N000105 HC DEVICE CLIP QUICK: Performed by: INTERNAL MEDICINE

## 2024-02-29 PROCEDURE — 250N000011 HC RX IP 250 OP 636: Performed by: NURSE ANESTHETIST, CERTIFIED REGISTERED

## 2024-02-29 PROCEDURE — 250N000009 HC RX 250: Performed by: NURSE ANESTHETIST, CERTIFIED REGISTERED

## 2024-02-29 PROCEDURE — 370N000017 HC ANESTHESIA TECHNICAL FEE, PER MIN: Performed by: INTERNAL MEDICINE

## 2024-02-29 PROCEDURE — 45385 COLONOSCOPY W/LESION REMOVAL: CPT | Performed by: ANESTHESIOLOGY

## 2024-02-29 PROCEDURE — 999N000010 HC STATISTIC ANES STAT CODE-CRNA PER MINUTE: Performed by: INTERNAL MEDICINE

## 2024-02-29 PROCEDURE — 43239 EGD BIOPSY SINGLE/MULTIPLE: CPT | Performed by: INTERNAL MEDICINE

## 2024-02-29 PROCEDURE — 88305 TISSUE EXAM BY PATHOLOGIST: CPT | Mod: TC | Performed by: INTERNAL MEDICINE

## 2024-02-29 PROCEDURE — 45385 COLONOSCOPY W/LESION REMOVAL: CPT | Performed by: NURSE ANESTHETIST, CERTIFIED REGISTERED

## 2024-02-29 PROCEDURE — 45390 COLONOSCOPY W/RESECTION: CPT

## 2024-02-29 PROCEDURE — 45380 COLONOSCOPY AND BIOPSY: CPT | Performed by: INTERNAL MEDICINE

## 2024-02-29 PROCEDURE — 45385 COLONOSCOPY W/LESION REMOVAL: CPT | Mod: XU | Performed by: INTERNAL MEDICINE

## 2024-02-29 PROCEDURE — 258N000003 HC RX IP 258 OP 636: Performed by: NURSE ANESTHETIST, CERTIFIED REGISTERED

## 2024-02-29 PROCEDURE — 88305 TISSUE EXAM BY PATHOLOGIST: CPT | Mod: 26 | Performed by: PATHOLOGY

## 2024-02-29 RX ORDER — PROPOFOL 10 MG/ML
INJECTION, EMULSION INTRAVENOUS PRN
Status: DISCONTINUED | OUTPATIENT
Start: 2024-02-29 | End: 2024-02-29

## 2024-02-29 RX ORDER — SODIUM CHLORIDE, SODIUM LACTATE, POTASSIUM CHLORIDE, CALCIUM CHLORIDE 600; 310; 30; 20 MG/100ML; MG/100ML; MG/100ML; MG/100ML
INJECTION, SOLUTION INTRAVENOUS CONTINUOUS PRN
Status: DISCONTINUED | OUTPATIENT
Start: 2024-02-29 | End: 2024-02-29

## 2024-02-29 RX ORDER — LIDOCAINE HYDROCHLORIDE 20 MG/ML
INJECTION, SOLUTION INFILTRATION; PERINEURAL PRN
Status: DISCONTINUED | OUTPATIENT
Start: 2024-02-29 | End: 2024-02-29

## 2024-02-29 RX ORDER — ONDANSETRON 2 MG/ML
INJECTION INTRAMUSCULAR; INTRAVENOUS PRN
Status: DISCONTINUED | OUTPATIENT
Start: 2024-02-29 | End: 2024-02-29

## 2024-02-29 RX ORDER — DEXMEDETOMIDINE HYDROCHLORIDE 4 UG/ML
INJECTION, SOLUTION INTRAVENOUS PRN
Status: DISCONTINUED | OUTPATIENT
Start: 2024-02-29 | End: 2024-02-29

## 2024-02-29 RX ORDER — PROPOFOL 10 MG/ML
INJECTION, EMULSION INTRAVENOUS CONTINUOUS PRN
Status: DISCONTINUED | OUTPATIENT
Start: 2024-02-29 | End: 2024-02-29

## 2024-02-29 RX ADMIN — PHENYLEPHRINE HYDROCHLORIDE 100 MCG: 10 INJECTION INTRAVENOUS at 11:00

## 2024-02-29 RX ADMIN — PHENYLEPHRINE HYDROCHLORIDE 100 MCG: 10 INJECTION INTRAVENOUS at 10:19

## 2024-02-29 RX ADMIN — PROPOFOL 150 MCG/KG/MIN: 10 INJECTION, EMULSION INTRAVENOUS at 09:48

## 2024-02-29 RX ADMIN — PHENYLEPHRINE HYDROCHLORIDE 100 MCG: 10 INJECTION INTRAVENOUS at 10:31

## 2024-02-29 RX ADMIN — ONDANSETRON 4 MG: 2 INJECTION INTRAMUSCULAR; INTRAVENOUS at 09:48

## 2024-02-29 RX ADMIN — PHENYLEPHRINE HYDROCHLORIDE 100 MCG: 10 INJECTION INTRAVENOUS at 10:04

## 2024-02-29 RX ADMIN — SODIUM CHLORIDE, POTASSIUM CHLORIDE, SODIUM LACTATE AND CALCIUM CHLORIDE: 600; 310; 30; 20 INJECTION, SOLUTION INTRAVENOUS at 09:45

## 2024-02-29 RX ADMIN — DEXMEDETOMIDINE HYDROCHLORIDE 12 MCG: 200 INJECTION INTRAVENOUS at 09:49

## 2024-02-29 RX ADMIN — PROPOFOL 30 MG: 10 INJECTION, EMULSION INTRAVENOUS at 10:42

## 2024-02-29 RX ADMIN — DEXMEDETOMIDINE HYDROCHLORIDE 8 MCG: 200 INJECTION INTRAVENOUS at 09:44

## 2024-02-29 RX ADMIN — LIDOCAINE HYDROCHLORIDE 50 MG: 20 INJECTION, SOLUTION INFILTRATION; PERINEURAL at 09:47

## 2024-02-29 RX ADMIN — PHENYLEPHRINE HYDROCHLORIDE 100 MCG: 10 INJECTION INTRAVENOUS at 10:51

## 2024-02-29 RX ADMIN — PROPOFOL 30 MG: 10 INJECTION, EMULSION INTRAVENOUS at 09:55

## 2024-02-29 ASSESSMENT — ACTIVITIES OF DAILY LIVING (ADL)
ADLS_ACUITY_SCORE: 37
ADLS_ACUITY_SCORE: 37
ADLS_ACUITY_SCORE: 35
ADLS_ACUITY_SCORE: 37

## 2024-02-29 ASSESSMENT — LIFESTYLE VARIABLES: TOBACCO_USE: 0

## 2024-02-29 ASSESSMENT — ENCOUNTER SYMPTOMS: SEIZURES: 0

## 2024-02-29 NOTE — ANESTHESIA CARE TRANSFER NOTE
Patient: Bradford Prado    Procedure: Procedure(s):  Colonoscopy  Esophagoscopy, gastroscopy, duodenoscopy (EGD), combined  COLONOSCOPY, WITH POLYPECTOMY AND BIOPSY       Diagnosis: Diarrhea, unspecified type [R19.7]  Nausea and vomiting, unspecified vomiting type [R11.2]  Diagnosis Additional Information: No value filed.    Anesthesia Type:   MAC     Note:    Oropharynx: oropharynx clear of all foreign objects and spontaneously breathing  Level of Consciousness: drowsy  Oxygen Supplementation: nasal cannula  Level of Supplemental Oxygen (L/min / FiO2): 2  Independent Airway: airway patency satisfactory and stable  Dentition: dentition unchanged  Vital Signs Stable: post-procedure vital signs reviewed and stable  Report to RN Given: handoff report given  Patient transferred to: PACU  Comments: After procedure in [facility] [place] under monitored anesthesia care (MAC), patient exhibited spontaneous respirations, oxygen via nasal cannula with oxygen saturation maintained greater than 98%, patient brought to [facility] recovery bay for postprocedure recovery, SpO2, NiBP, and EKG monitors and alarms on and functioning, report on patient's clinical status given to recovery-trained endoscopy RN, RN questions answered.        Handoff Report: Identifed the Patient, Identified the Reponsible Provider, Reviewed the pertinent medical history, Discussed the surgical course, Reviewed Intra-OP anesthesia mangement and issues during anesthesia, Set expectations for post-procedure period and Allowed opportunity for questions and acknowledgement of understanding  Vitals:  Vitals Value Taken Time   BP     Temp     Pulse 64 02/29/24 1107   Resp 16 02/29/24 1107   SpO2 99 % 02/29/24 1107   Vitals shown include unfiled device data.    Electronically Signed By: OPAL Parikh CRNA  February 29, 2024  11:08 AM

## 2024-02-29 NOTE — ANESTHESIA PREPROCEDURE EVALUATION
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) (H)     Nephrolithiasis 2016    First episode 2016    PONV (postoperative nausea and vomiting)     Poorly controlled type 2 diabetes mellitus with renal complication (H)     Type 2 diabetes mellitus with diabetic neuropathy, with long-term current use of insulin (H)       Past Surgical History:   Procedure Laterality Date    COLONOSCOPY      ESOPHAGOSCOPY, GASTROSCOPY, DUODENOSCOPY (EGD), COMBINED N/A 11/10/2021    Procedure: ESOPHAGOGASTRODUODENOSCOPY, WITH BIOPSY;  Surgeon: Leventhal, Thomas Michael, MD;  Location: [facility] OR    HYSTERECTOMY TOTAL ABDOMINAL  1995    due to fibroids    LAPAROSCOPIC CHOLECYSTECTOMY N/A 2023    Procedure: Laparoscopic cholecystectomy;  Surgeon: Eber Gill MD;  Location: [facility] OR    LAPAROSCOPIC EVACUATION ECTOPIC PREGNANCY      TUBAL LIGATION        No Known Allergies   Social History     Tobacco Use    Smoking status: Former     Packs/day: 0.50     Years: 20.00     Additional pack years: 0.00     Total pack years: 10.00     Types: Cigarettes     Quit date: 3/9/2010     Years since quittin.9    Smokeless tobacco: Never   Substance Use Topics    Alcohol use: No     Alcohol/week: 0.0 standard drinks of alcohol      Wt Readings from Last 1 Encounters:   24 60.8 kg (134 lb)        Anesthesia Evaluation   Pt has had prior anesthetic.     History of anesthetic complications  - PONV.      ROS/MED HX  ENT/Pulmonary:    (-) tobacco use, asthma and sleep  apnea   Neurologic:    (-) no seizures and no CVA   Cardiovascular:     (+)  hypertension- -   -  - -                                      METS/Exercise Tolerance:     Hematologic:       Musculoskeletal:       GI/Hepatic:     (+)           hepatitis (cirrhosis)  liver disease,    (-) GERD   Renal/Genitourinary:     (+) renal disease,             Endo:     (+)  type II DM,   Using insulin,                 Psychiatric/Substance Use:       Infectious Disease:       Malignancy:       Other:            Physical Exam    Airway        Mallampati: II   TM distance: > 3 FB   Neck ROM: full   Mouth opening: > 3 cm    Respiratory Devices and Support         Dental       (+) Minor Abnormalities - some fillings, tiny chips      Cardiovascular   cardiovascular exam normal          Pulmonary   pulmonary exam normal                OUTSIDE LABS:  CBC:   Lab Results   Component Value Date    WBC 10.4 09/08/2023    WBC 9.3 02/10/2023    HGB 15.0 09/08/2023    HGB 15.5 02/10/2023    HCT 43.2 09/08/2023    HCT 45.9 02/10/2023     (L) 09/08/2023     02/10/2023     BMP:   Lab Results   Component Value Date     09/08/2023     02/10/2023    POTASSIUM 4.1 09/08/2023    POTASSIUM 4.2 02/10/2023    CHLORIDE 104 09/08/2023    CHLORIDE 105 02/10/2023    CO2 28 09/08/2023    CO2 24 02/10/2023    BUN 11 09/08/2023    BUN 13.6 02/10/2023    CR 0.70 09/08/2023    CR 0.69 02/10/2023     (H) 09/08/2023     (H) 02/10/2023     COAGS:   Lab Results   Component Value Date    INR 1.16 (H) 01/06/2022     POC:   Lab Results   Component Value Date     (H) 06/21/2021    HCG (A) 09/22/2016     Canceled, Test credited   Specimen improperly labeled  CALLED ER ASKED FOR RECOLLECT       HEPATIC:   Lab Results   Component Value Date    ALBUMIN 4.3 09/08/2023    PROTTOTAL 7.6 09/08/2023    ALT 33 09/08/2023    AST 38 09/08/2023    ALKPHOS 75 09/08/2023    BILITOTAL 1.3 09/08/2023     OTHER:   Lab Results   Component  "Value Date    LACT 1.9 09/19/2016    A1C 6.0 (H) 12/26/2023    ESTELLE 9.4 09/08/2023    LIPASE 47 09/08/2023    AMYLASE 66 09/08/2023    TSH 4.51 (H) 01/18/2019    T4 1.26 01/18/2019    SED 7 08/23/2012       Anesthesia Plan    ASA Status:  2       Anesthesia Type: MAC.              Consents    Anesthesia Plan(s) and associated risks, benefits, and realistic alternatives discussed. Questions answered and patient/representative(s) expressed understanding.     - Discussed:     - Discussed with:  Patient            Postoperative Care       PONV prophylaxis: Ondansetron (or other 5HT-3)     Comments:               Samantha Dawson I have reviewed the pertinent notes and labs in the chart from the past 30 days and (re)examined the patient.  Any updates or changes from those notes are reflected in this note.              # Overweight: Estimated body mass index is 26.61 kg/m  as calculated from the following:    Height as of 2/26/24: 1.511 m (4' 11.5\").    Weight as of 2/26/24: 60.8 kg (134 lb).      "

## 2024-02-29 NOTE — H&P
I have seen and evaluated the patient today.  There are no significant changes in the patient's history or physical exam from the prior date of service.  The patient is stable and appropriate to undergo the proposed endoscopic procedure today.  Royce Mari MD

## 2024-03-04 ENCOUNTER — OFFICE VISIT (OUTPATIENT)
Dept: OPHTHALMOLOGY | Facility: CLINIC | Age: 64
End: 2024-03-04
Attending: NURSE PRACTITIONER
Payer: COMMERCIAL

## 2024-03-04 DIAGNOSIS — E11.40 TYPE 2 DIABETES MELLITUS WITH DIABETIC NEUROPATHY, WITH LONG-TERM CURRENT USE OF INSULIN (H): Primary | ICD-10-CM

## 2024-03-04 DIAGNOSIS — Z79.4 TYPE 2 DIABETES MELLITUS WITH DIABETIC NEUROPATHY, WITH LONG-TERM CURRENT USE OF INSULIN (H): Primary | ICD-10-CM

## 2024-03-04 DIAGNOSIS — H52.13 MYOPIA OF BOTH EYES WITH ASTIGMATISM AND PRESBYOPIA: ICD-10-CM

## 2024-03-04 DIAGNOSIS — H52.203 MYOPIA OF BOTH EYES WITH ASTIGMATISM AND PRESBYOPIA: ICD-10-CM

## 2024-03-04 DIAGNOSIS — H52.4 MYOPIA OF BOTH EYES WITH ASTIGMATISM AND PRESBYOPIA: ICD-10-CM

## 2024-03-04 PROCEDURE — 92004 COMPRE OPH EXAM NEW PT 1/>: CPT | Performed by: STUDENT IN AN ORGANIZED HEALTH CARE EDUCATION/TRAINING PROGRAM

## 2024-03-04 PROCEDURE — 92015 DETERMINE REFRACTIVE STATE: CPT

## 2024-03-04 PROCEDURE — G0463 HOSPITAL OUTPT CLINIC VISIT: HCPCS | Performed by: STUDENT IN AN ORGANIZED HEALTH CARE EDUCATION/TRAINING PROGRAM

## 2024-03-04 ASSESSMENT — VISUAL ACUITY
OS_PH_CC+: -1
OS_CC: 20/30
METHOD: SNELLEN - LINEAR
OS_PH_CC: 20/25
CORRECTION_TYPE: GLASSES
OD_CC+: -2
OD_CC: 20/25

## 2024-03-04 ASSESSMENT — REFRACTION_MANIFEST
OS_CYLINDER: +0.25
OD_SPHERE: -0.75
OD_AXIS: 010
OS_ADD: +2.50
OD_CYLINDER: +0.50
OS_SPHERE: -0.50
OD_ADD: +2.50
OS_AXIS: 170

## 2024-03-04 ASSESSMENT — REFRACTION_WEARINGRX
OD_CYLINDER: SPHERE
OS_ADD: +1.25
OS_CYLINDER: SPHERE
SPECS_TYPE: PAL
OS_SPHERE: -0.75
OD_SPHERE: -0.75
OD_ADD: +1.25

## 2024-03-04 ASSESSMENT — SLIT LAMP EXAM - LIDS
COMMENTS: WNL
COMMENTS: WNL

## 2024-03-04 ASSESSMENT — CONF VISUAL FIELD
OS_SUPERIOR_NASAL_RESTRICTION: 0
METHOD: COUNTING FINGERS
OD_NORMAL: 1
OD_INFERIOR_TEMPORAL_RESTRICTION: 0
OS_INFERIOR_TEMPORAL_RESTRICTION: 0
OD_SUPERIOR_TEMPORAL_RESTRICTION: 0
OD_SUPERIOR_NASAL_RESTRICTION: 0
OS_NORMAL: 1
OD_INFERIOR_NASAL_RESTRICTION: 0
OS_INFERIOR_NASAL_RESTRICTION: 0
OS_SUPERIOR_TEMPORAL_RESTRICTION: 0

## 2024-03-04 ASSESSMENT — TONOMETRY
OD_IOP_MMHG: 15
IOP_METHOD: TONOPEN
OS_IOP_MMHG: 16

## 2024-03-04 ASSESSMENT — CUP TO DISC RATIO
OD_RATIO: 0.2
OS_RATIO: 0.2

## 2024-03-04 ASSESSMENT — EXTERNAL EXAM - RIGHT EYE: OD_EXAM: WNL

## 2024-03-04 ASSESSMENT — EXTERNAL EXAM - LEFT EYE: OS_EXAM: WNL

## 2024-03-04 NOTE — PROGRESS NOTES
HPI    Pt here for yearly diabetic eye exam. TESSA about 7 years ago. Vision is not as clear as it used to be. Harder time seeing the television. Pt has glasses prescription but feels that she does not see well with her glasses. No flashes or floaters. No redness or dryness.  DM2 BS: 132 this morning per pt.  Lab Results       Component                Value               Date                       A1C                      6.0                 12/26/2023                 A1C                      6.1                 02/10/2023                 A1C                      6.0                 08/22/2022                 A1C                      6.6                 10/19/2021                 A1C                      6.5                 06/19/2021                 A1C                      11.1                02/23/2021                 A1C                      7.1                 04/30/2019                 A1C                      10.8                01/18/2019                 A1C                      7.0                 02/01/2017              JUDY Kapoor March 4, 2024 1:56 PM        Last edited by Dakotah Mendez COMT on 3/4/2024  1:57 PM.          Review of systems for the eyes was negative other than the pertinent positives/negatives listed in the HPI.    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) (H)     Nephrolithiasis 09/19/2016    First episode September 2016    PONV (postoperative nausea and vomiting)     Poorly controlled type 2 diabetes mellitus with renal complication (H)     Type 2 diabetes mellitus with diabetic neuropathy, with long-term  current use of insulin (H)        Assessment & Plan      Bradford Prado is a 63 year old female with the following diagnoses:    T2DM without Retinopathy OU  Dot blot hemorrhage left eye  - single DBH noted in left eye may be related to DM or HTN  - strict BP/BG control  - patient understands importance  of good blood glucose control in order to reduce the risk of developing diabetic retinopathy  - yearly DFE    Hereditary Hemochromatosis  - no ocular manifestations noted at this time    Nuclear sclerotic cataract OU  - not bothering patient  - observe    Myopia of both eyes with astigmatism and presbyopia  - refraction reviewed and dispensed today    Counseled return/RD precautions  Letter to referring provider    Patient disposition:   Return in about 1 year (around 3/4/2025) for Annual Visit, or sooner changes.    Attending Physician Attestation:  Complete documentation of historical and exam elements from today's encounter can be found in the full encounter summary report (not reduplicated in this progress note).  I personally obtained the chief complaint(s) and history of present illness.  I confirmed and edited as necessary the review of systems, past medical/surgical history, family history, social history, and examination findings as documented by others; and I examined the patient myself.  I personally reviewed the relevant tests, images, and reports as documented above.  I formulated and edited as necessary the assessment and plan and discussed the findings and management plan with the patient and family. . - Pamela Chaudhry MD

## 2024-03-04 NOTE — NURSING NOTE
Chief Complaints and History of Present Illnesses   Patient presents with    Diabetic Eye Exam     Chief Complaint(s) and History of Present Illness(es)       Diabetic Eye Exam               Comments    Pt here for yearly diabetic eye exam. TESSA about 7 years ago. Vision is not as clear as it used to be. Harder time seeing the television. Pt has glasses prescription but feels that she does not see well with her glasses. No flashes or floaters. No redness or dryness.  DM2 BS: 132 this morning per pt.  Lab Results       Component                Value               Date                       A1C                      6.0                 12/26/2023                 A1C                      6.1                 02/10/2023                 A1C                      6.0                 08/22/2022                 A1C                      6.6                 10/19/2021                 A1C                      6.5                 06/19/2021                 A1C                      11.1                02/23/2021                 A1C                      7.1                 04/30/2019                 A1C                      10.8                01/18/2019                 A1C                      7.0                 02/01/2017              JUDY Kapoor March 4, 2024 1:56 PM

## 2024-03-25 ASSESSMENT — ANXIETY QUESTIONNAIRES
IF YOU CHECKED OFF ANY PROBLEMS ON THIS QUESTIONNAIRE, HOW DIFFICULT HAVE THESE PROBLEMS MADE IT FOR YOU TO DO YOUR WORK, TAKE CARE OF THINGS AT HOME, OR GET ALONG WITH OTHER PEOPLE: SOMEWHAT DIFFICULT
7. FEELING AFRAID AS IF SOMETHING AWFUL MIGHT HAPPEN: MORE THAN HALF THE DAYS
5. BEING SO RESTLESS THAT IT IS HARD TO SIT STILL: SEVERAL DAYS
6. BECOMING EASILY ANNOYED OR IRRITABLE: MORE THAN HALF THE DAYS
4. TROUBLE RELAXING: MORE THAN HALF THE DAYS
1. FEELING NERVOUS, ANXIOUS, OR ON EDGE: MORE THAN HALF THE DAYS
GAD7 TOTAL SCORE: 13
8. IF YOU CHECKED OFF ANY PROBLEMS, HOW DIFFICULT HAVE THESE MADE IT FOR YOU TO DO YOUR WORK, TAKE CARE OF THINGS AT HOME, OR GET ALONG WITH OTHER PEOPLE?: SOMEWHAT DIFFICULT
2. NOT BEING ABLE TO STOP OR CONTROL WORRYING: MORE THAN HALF THE DAYS
GAD7 TOTAL SCORE: 13
7. FEELING AFRAID AS IF SOMETHING AWFUL MIGHT HAPPEN: MORE THAN HALF THE DAYS
3. WORRYING TOO MUCH ABOUT DIFFERENT THINGS: MORE THAN HALF THE DAYS

## 2024-03-25 ASSESSMENT — PAIN SCALES - PAIN ENJOYMENT GENERAL ACTIVITY SCALE (PEG)
PEG_TOTALSCORE: 6.67
INTERFERED_GENERAL_ACTIVITY: 7
AVG_PAIN_PASTWEEK: 6
AVG_PAIN_PASTWEEK: 6
INTERFERED_ENJOYMENT_LIFE: 7
INTERFERED_GENERAL_ACTIVITY: 7
PEG_TOTALSCORE: 6.67
INTERFERED_ENJOYMENT_LIFE: 7

## 2024-03-26 NOTE — PROGRESS NOTES
Date:3/27/2024      COMPREHENSIVE PAIN CLINIC INITIAL EVALUATION    I had the pleasure of meeting Ms. Bradford Prado on 3/27/2024 in the [facility] in consult for Dr. Travis with regards to her pain.  The patient is a 63 year old female with past medical history of DM type II, HLD, HTN, anxiety, depression, hereditary hemochromatosis associated with phlebotomies, cirrohsis, diabetic neuropathy, chronic pain in b/l arms, wrists, fingers, who presents for evaluation of chronic pain.      Subjective:  Patient endorses chronic pain in b/l wrists R>L and she braces her wrists periodically.  Wrist pain started 2022 after she started phlebotomies of hemodcromatosis with Dr. Girard, [facility] in [place]. She does lab work every 3 months.  She has modified her iron intake.  She also has b/l trigger finger which started over ten years ago.  She had b/l 1st [facility] joint surgeries by Dr. Tan, [facility].  She gets periodic trigger finger steroid injections by Dr. Tan. She braces her fingers for trigger finger. She comopleted hand therapy at [facility] after last wrist surgery 2023.  Patient denies numbness and tingling in b/l upper extremities.  She has weakness in b/ arms, hands and fingers.  She drops items at times.  She has had 3 surgeries for trigger finger.  She is due for another cortisone shot for trigger finger.  The patient describes the pain as constant, dull and achey..  She reports that the pain is made worse by phlebotomies, using her hands.  Her pain is improved with steroid injections, parafin wax treatments, heat, bracing.  She rates her currenty pain score at 6/10, but it can be as low as 6/10 or as severe as 8.5/10.     Patient endorses anxiety and depression.  Patient does not follow with a mental health care provider.  Last hand PT was 07/2023 after L) wrist surgery at [facility].  She continues to do the exercises learned at PT.  Patient exercises by walking her dogs for 1 hour at the dog park.    Progress Notes  Reviewed:  03/04/2024 Dr. Pamela Chaudhry, Opthamology  02/26/2024 MILES Doyle - GI symptoms      She denies any new problems with falls or balance, any new numbness or weakness of the arms or legs, any new bowel or bladder incontinence, any night sweats or unexplained fevers, or any sudden or unexpected weight loss.      Bradford Prado has not been seen at a pain clinic in the past.        Current Treatments:  Sertraline 50mg 2 tabs daily  [id number] in am and 200mg q hs for last month.  She denies negative side effects from gabapentin.  Heat  Parafin wax treatments  Bracing and splinting wrist and fingers  Voltaren gel is helpful    Previous Medication Treatments Included:  Anti-convulsants: pregabalin was helpful after L) wrist surgery per [facility] Dr. Tan  Muscle relaxors: no  Anti-depressants: none  Benzodiazapine's: none  Acetaminophen/NSAIDs: no acetaminophen.  Aleve periodically only due to cirrohosis  Topicals: only voltaren gel  Opioids: Tramadol was helpful and did not exterience any negative side effects s/p surgery.      Other Treatments Have Included:  Physical therapy: 07/2023 after wrist surgery  Pain Psychology: no  Chiropractic: no  Acupuncture: yes to quit smoking  TENs Unit: no  Injections: multiple steroid injections for trigger finger through [facility]  Surgeries: b/l 1st [facility] joint surgeries, 3 trigger finger surgeries  Dry Needling: no  Massage: no      Past Medical History:  Medical history reviewed.  Past Medical History:   Diagnosis Date    Chest pain 02/23/2021    Diabetes mellitus (H)     Dyslipidemia     Hereditary hemochromatosis (H24)     Hyperglycemia 02/23/2021    Hypertension     Hypertensive urgency 02/23/2021    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) (H)     Nephrolithiasis 09/19/2016    First episode September 2016    PONV (postoperative nausea and vomiting)     Poorly controlled type 2 diabetes mellitus with renal complication (H)     Type 2 diabetes mellitus  with diabetic neuropathy, with long-term current use of insulin (H)       Patient Active Problem List   Diagnosis    Hand arthritis    Stenosing tenosynovitis    Hyperlipidemia LDL goal <70    Essential hypertension with goal blood pressure less than 140/90    Type 2 diabetes mellitus with kidney complication, with long-term current use of insulin (H)    Dyslipidemia    Hereditary hemochromatosis (H24)    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) (H)    Situational mixed anxiety and depressive disorder         Past Surgical History:  Pertinent surgical history reviewed.  Past Surgical History:   Procedure Laterality Date    COLONOSCOPY      COLONOSCOPY N/A 2/29/2024    Procedure: Colonoscopy;  Surgeon: Royce Mari MD;  Location:  GI    COLONOSCOPY N/A 2/29/2024    Procedure: COLONOSCOPY, WITH POLYPECTOMY AND BIOPSY;  Surgeon: Royce Mari MD;  Location:  GI    ESOPHAGOSCOPY, GASTROSCOPY, DUODENOSCOPY (EGD), COMBINED N/A 11/10/2021    Procedure: ESOPHAGOGASTRODUODENOSCOPY, WITH BIOPSY;  Surgeon: Leventhal, Thomas Michael, MD;  Location: [facility] OR    ESOPHAGOSCOPY, GASTROSCOPY, DUODENOSCOPY (EGD), COMBINED N/A 2/29/2024    Procedure: Esophagoscopy, gastroscopy, duodenoscopy (EGD), combined;  Surgeon: Royce Mari MD;  Location:  GI    HYSTERECTOMY TOTAL ABDOMINAL  01/01/1995    due to fibroids    LAPAROSCOPIC CHOLECYSTECTOMY N/A 02/13/2023    Procedure: Laparoscopic cholecystectomy;  Surgeon: Eber Gill MD;  Location:  OR    LAPAROSCOPIC EVACUATION ECTOPIC PREGNANCY      TUBAL LIGATION            Medications: Pertinent medications reviewed.  Current Outpatient Medications   Medication Sig Dispense Refill    ACCU-CHEK GUIDE test strip Use to test blood sugar 4 times daily or as directed. 200 strip 11    amLODIPine (NORVASC) 5 MG tablet Take 1 tablet (5 mg) by mouth 2 times daily 180 tablet 3    blood glucose (NO BRAND SPECIFIED)  lancets standard Use to test blood sugar twice times daily or as directed. 100 each 1    blood glucose (NO BRAND SPECIFIED) test strip Use to test blood sugar twice times daily or as directed. 100 each 1    blood glucose (NO BRAND SPECIFIED) test strip Use to test blood sugars 2 times daily or as directed 100 strip 11    blood glucose monitoring (SOFTCLIX) lancets Use to test blood sugar 6 times daily. 200 each 11    Blood Glucose Monitoring Suppl (ACCU-CHEK GUIDE ME) w/Device KIT 1 each 6 times daily 1 kit 0    carvedilol (COREG) 12.5 MG tablet Take 1 tablet (12.5 mg) by mouth 2 times daily (with meals) 180 tablet 3    cholestyramine light (PREVALITE) 4 GM powder Take 1 packet (4 g) by mouth 3 times daily (with meals) 60 packet 1    Continuous Blood Gluc Sensor (FREESTYLE MEENA 2 SENSOR) MISC CHANGE EVERY 14 DAYS. USE TO CHECK BLOOD SUGARS PER  GUIDELINES 1 each 2    gabapentin (NEURONTIN) 100 MG capsule Take 100 mg am and 200 mg at bedtime 90 capsule 3    Glucagon (GVOKE HYPOPEN) 1 MG/0.2ML pen Inject the contents of one device under the skin into lower abdomen, outer thigh, or outer upper arm as needed for severe hypoglycemia. If no response after 15 minutes, additional 1 mg dose from a new device may be injected while waiting for emergency assistance 0.4 mL 3    insulin aspart (NOVOLOG FLEXPEN) 100 UNIT/ML pen INJECT 10 UNITS UNDER THE SKIN THREE TIMES DAILY WITH MEALS. MAXIMUM OF 30 UNITS DAILY. 30 mL 1    insulin glargine (LANTUS SOLOSTAR) 100 UNIT/ML pen INJECT 38 UNITS SUBCUTANEOUS EVERY MORNING (BEFORE BREAKFAST) 45 mL 1    insulin pen needle (BD NATALIYA U/F) 32G X 4 MM miscellaneous USE 4 TIMES DAILY 200 each 0    losartan (COZAAR) 50 MG tablet Take 1 tablet (50 mg) by mouth daily 90 tablet 3    ondansetron (ZOFRAN ODT) 4 MG ODT tab Take 1 tablet (4 mg) by mouth every 8 hours as needed for nausea 16 tablet 0    pantoprazole (PROTONIX) 40 MG EC tablet Take 1 tablet (40 mg) by mouth daily 90 tablet  3    pravastatin (PRAVACHOL) 20 MG tablet TAKE ONE TABLET BY MOUTH ONCE DAILY. DUE FOR LABS 90 tablet 3    sertraline (ZOLOFT) 50 MG tablet Take 2 tablets (100 mg) by mouth daily 180 tablet 1       [place] Prescription Monitoring Program reviewed 3/27/2024.  No concern for abuse or misuse of controlled medications based on this report.  03/15/2024 Gabapentin 100mg 90 tabs for 30 days  02/16/2024 Gabapentin 100mg 90 tabs for 30 days  01/24/2024 Gabapentin 100mg 60 tabs for 30 days  06/28/2023 Pregabalin 75mg 30 tabs     Allergies: Pertinent allergies reviewed.   No Known Allergies    Family History:   family history includes ALS in her brother; Cirrhosis in her mother; Diabetes in her mother; Emphysema in her father; Heart Defect in her niece; Hemochromatosis in her brother, sister, sister, and sister; Hypertension in her sister.    Social History:   She is single.  She applied for SSD.  She lives in a house with her daughter and 2 dogs and 3 cats. She enjoys walking her dogs.  She enjoys the internet.  She  reports that she quit smoking about 14 years ago. Her smoking use included cigarettes. She has a 10 pack-year smoking history. She has never used smokeless tobacco. She reports that she does not drink alcohol and does not use drugs.  Social History     Social History Narrative    , 2 children, 2 grandchildren         Review of Systems:      (Positive responses bolded)  GENERAL: fever/chills, fatigue, general unwell feeling, weight gain/loss  HEAD/EYES:  headache, dizziness, or vision changes  EARS/NOSE/THROAT: nosebleeds, hearing loss, sinus infection, earache, tinnitus  IMMUNE:  allergies, cancer, immune deficiency, or infections  SKIN:  itching, rash, hives  HEME/Lymphatic: anemia, easy bruising, easy bleeding  RESPIRATORY: cough, wheezing, or shortness of breath  CARDIOVASCULAR/Circulation: extremity edema, syncope, hypertension, tachycardia, or angina  GASTROINTESTINAL: abdominal pain, nausea/emesis,  diarrhea, constipation, hematochezia, or melena  ENDOCRINE:  diabetes, steroid use, thyroid disease or osteoporosis  MUSCULOSKELETAL: myalgias, joint pain, stiffness, neck pain, back pain, arthritis, or gout  GENITOURINARY: frequency, urgency, dysuria, difficulty voiding, hematuria or incontinence  NEUROLOGIC: weakness, numbness, paresthesias, seizure, tremor, stroke or memory loss  PSYCHIATRIC: depression, anxiety, stress, suicidal thoughts/attempts or mood swings      Physical Exam:    Constitutional: She is oriented to person, place, and time.  She appears well-developed and well-nourished. She is not in acute distress.   HENT:     Head: Normocephalic and atraumatic.     Eyes: Pupils are equal, round, and reactive to light. EOM are normal. No scleral icterus.   Pulmonary/Chest:  NWOB. No respiratory distress.   Neurological: She is alert and oriented to person, place, and time. Coordination grossly normal.  Romberg test negative. Skin: Skin is warm and dry. She is not diaphoretic.   Psychiatric: She has a normal mood and affect. Her behavior is normal. Judgment and thought content normal.  Patient answers questions appropriately.  MSK: Gait is normal.     Cervical spine:  ROM: full  Rotation/ext to right: pain free  Rotation/ext to left: pain free  Myofascial tenderness: negative  Normal 5/5 UE strength bilaterally but causes pain  Normal sensation to light touch in the C4-T1 distributions bilaterally   No allodynia, dysesthesia, or hyperalgesia in the upper extremities bilaterally   Reflexes: Normal 2/2 of biceps and bracioradialis          DIRE Score for ongoing opioid management is calculated as follows:    Diagnosis = 2    Intractability = 3    Risk: Psych = 3  Chem Hlth = 3  Reliability = 3  Social = 2    Efficacy = 3    Total DIRE Score = 19 (14 or higher predicts good candidate for ongoing opioid management; 13 or lower predicts poor candidate for opioid management)         Imaging:  No imaging to  review.    EMG:  na      Diagnosis:  (M79.601,  M79.602) Bilateral arm pain  (primary encounter diagnosis)  Comment:  Plan: traMADol (ULTRAM) 50 MG tablet            (E83.110) Hereditary hemochromatosis (H24)  Comment:   Plan: Needs to get labs done to see if phlebotomy is required.    (M19.049) Hand arthritis  Comment:   Plan: Continue voltaren gel, heat, parafin treatments.    (M65.9) Stenosing tenosynovitis  Comment:   Plan: Continue to follow up with [facility] for b/l trigger finger injections.    (G89.29) Chronic intractable pain  Comment:   Plan: Continue exercises learned at hand therapy        Plan:  A multimodal plan was developed today to treat your pain.  Multimodal analgesia is a strategy that reduces reliance on opioids through the use of non-opioid analgesics and therapies that have different mechanisms of action.      Diagnostics: none        Medications:  Start tramadol 50mg 1 tab daily PRN severe pain 30 tabs.  Increase gabapentin 100mg in am 2 tabs q hs.  Increase by 1 tab every 3 days until taking 3 tabs three times a day.      Discussed medical cannabis.    The following OTC pain medications may be helpful, use as directed: Voltaren Gel 1%, CBD products, Arnica products, Capsaicin products, Australian Dream Cream, Epson It, Lidocaine Patch, Solanpas, Biofreeze, Aspercream, Tiger Balm and Jarett Emu cream.        Therapies:  Discussed anti-inflammatory lifestyle.    Discussed Pain Psychology - consider in the future.  Psychological treatments are also important part of pain management.  Understanding and managing the thoughts, emotions and behaviors that accompany the discomfort can help you cope more effectively with your pain and can actually reduce the intensity of your pain.      PHYSICAL THERAPY -  Encourage patient to continue to do exercises learned at physical therapy.  Discussed the importance of core strengthening, ROM, stretching exercises with the patient and how each of these  entities is important in decreasing pain.  Explained to the patient that the purpose of physical therapy is to teach the patient a home exercise program.  These exercises need to be performed every day in order to decrease pain and prevent future occurrences of pain.        Discussed Grounding Mat - handout provided  https://www.RotaBan.com/watch?v=[email or web address]    Discussed Frequency Specific Microcurrent - handout provided  Treatment for Neuropathic Pain.   [facility] [phone]  [facility] [phone]  [facility] [phone]  [facility] [phone]  May be able to be billed as a chiropractic service depending on your insurance coverage.     Apply heat PRN. Use parafin      Interventions:  Steroid injections and surgery for trigger finger through [facility]        Follow up:     1 month in [facility]        OPAL Yap, RN, CNP, FNP  [facility]        BILLING TIME DOCUMENTATION:   The total TIME spent on this patient on the date of the encounter/appointment was 77 minutes.

## 2024-03-27 ENCOUNTER — OFFICE VISIT (OUTPATIENT)
Dept: PALLIATIVE MEDICINE | Facility: CLINIC | Age: 64
End: 2024-03-27
Attending: NURSE PRACTITIONER
Payer: COMMERCIAL

## 2024-03-27 VITALS — DIASTOLIC BLOOD PRESSURE: 70 MMHG | HEART RATE: 64 BPM | SYSTOLIC BLOOD PRESSURE: 124 MMHG

## 2024-03-27 DIAGNOSIS — M65.80 STENOSING TENOSYNOVITIS: Chronic | ICD-10-CM

## 2024-03-27 DIAGNOSIS — G89.29 CHRONIC INTRACTABLE PAIN: ICD-10-CM

## 2024-03-27 DIAGNOSIS — E83.110 HEREDITARY HEMOCHROMATOSIS (H): ICD-10-CM

## 2024-03-27 DIAGNOSIS — M79.602 BILATERAL ARM PAIN: Primary | ICD-10-CM

## 2024-03-27 DIAGNOSIS — M19.049 HAND ARTHRITIS: Chronic | ICD-10-CM

## 2024-03-27 DIAGNOSIS — M79.601 BILATERAL ARM PAIN: Primary | ICD-10-CM

## 2024-03-27 PROCEDURE — 99417 PROLNG OP E/M EACH 15 MIN: CPT | Performed by: NURSE PRACTITIONER

## 2024-03-27 PROCEDURE — 99205 OFFICE O/P NEW HI 60 MIN: CPT | Performed by: NURSE PRACTITIONER

## 2024-03-27 RX ORDER — TRAMADOL HYDROCHLORIDE 50 MG/1
50 TABLET ORAL AT BEDTIME
Qty: 30 TABLET | Refills: 0 | Status: SHIPPED | OUTPATIENT
Start: 2024-03-27 | End: 2024-08-08

## 2024-03-27 ASSESSMENT — PAIN SCALES - GENERAL: PAINLEVEL: SEVERE PAIN (6)

## 2024-03-27 NOTE — PATIENT INSTRUCTIONS
Plan:  A multimodal plan was developed today to treat your pain.  Multimodal analgesia is a strategy that reduces reliance on opioids through the use of non-opioid analgesics and therapies that have different mechanisms of action.    Diagnostics: none    Medications:  Start tramadol 50mg 1 tab daily PRN severe pain 30 tabs.  Increase gabapentin 100mg in am 2 tabs q hs.  Increase by 1 tab every 3 days until taking 3 tabs three times a day.    Discussed medical cannabis.  The following OTC pain medications may be helpful, use as directed: Voltaren Gel 1%, CBD products, Arnica products, Capsaicin products, Australian Dream Cream, Epson It, Lidocaine Patch, Solanpas, Biofreeze, Aspercream, Tiger Balm and Jarett Emu cream.    Therapies:  Discussed anti-inflammatory lifestyle.    Discussed Pain Psychology - consider in the future.  Psychological treatments are also important part of pain management.  Understanding and managing the thoughts, emotions and behaviors that accompany the discomfort can help you cope more effectively with your pain and can actually reduce the intensity of your pain.      PHYSICAL THERAPY -  Encourage patient to continue working with physical therapy.  Discussed the importance of core strengthening, ROM, stretching exercises with the patient and how each of these entities is important in decreasing pain.  Explained to the patient that the purpose of physical therapy is to teach the patient a home exercise program.  These exercises need to be performed every day in order to decrease pain and prevent future occurrences of pain.        Discussed Grounding Mat - handout provided  https://www.Urge.com/watch?v=[email or web address]    Discussed Frequency Specific Microcurrent - handout provided  Treatment for Neuropathic Pain.   [facility] [phone]  [facility] [phone]  [facility] [phone]  [facility] [phone]  May be able to be billed as a chiropractic  service depending on your insurance coverage.     Apply heat or cold PRN. Use parafin      Interventions:  Steroid injections and surgery for trigger finger through [facility]      Follow up:   1 month in [facility]    OPAL Yap, RN, CNP, FNP    ----------------------------------------------------------------  Clinic Number:  [phone]   Call with any questions about your care and for scheduling assistance.   Calls are returned Monday through Friday between 8 AM and 4:30 PM. We usually get back to you within 2 business days depending on the issue/request.    If we are prescribing your medications:  For opioid medication refills, call the clinic or send a [facility] message 7 days in advance.  Please include:  Name of requested medication  Name of the pharmacy.  For non-opioid medications, call your pharmacy directly to request a refill. Please allow 3-4 days to be processed.   Per [place] State Law:  All controlled substance prescriptions must be filled within 30 days of being written.    For those controlled substances allowing refills, pickup must occur within 30 days of last fill.      We believe regular attendance is key to your success in our program!    Any time you are unable to keep your appointment we ask that you call us at least 24 hours in advance to cancel.This will allow us to offer the appointment time to another patient.   Multiple missed appointments may lead to dismissal from the clinic.

## 2024-04-16 ENCOUNTER — TELEPHONE (OUTPATIENT)
Dept: EDUCATION SERVICES | Facility: CLINIC | Age: 64
End: 2024-04-16

## 2024-04-16 ENCOUNTER — ALLIED HEALTH/NURSE VISIT (OUTPATIENT)
Dept: EDUCATION SERVICES | Facility: CLINIC | Age: 64
End: 2024-04-16
Payer: COMMERCIAL

## 2024-04-16 DIAGNOSIS — E11.40 TYPE 2 DIABETES MELLITUS WITH DIABETIC NEUROPATHY, WITH LONG-TERM CURRENT USE OF INSULIN (H): Primary | ICD-10-CM

## 2024-04-16 DIAGNOSIS — Z79.4 TYPE 2 DIABETES MELLITUS WITH DIABETIC NEUROPATHY, WITH LONG-TERM CURRENT USE OF INSULIN (H): Primary | ICD-10-CM

## 2024-04-16 PROCEDURE — G0108 DIAB MANAGE TRN  PER INDIV: HCPCS | Performed by: NUTRITIONIST

## 2024-04-16 RX ORDER — BLOOD-GLUCOSE SENSOR
1 EACH MISCELLANEOUS
Qty: 6 EACH | Refills: 5 | Status: SHIPPED | OUTPATIENT
Start: 2024-04-16

## 2024-04-16 RX ORDER — KETOROLAC TROMETHAMINE 30 MG/ML
1 INJECTION, SOLUTION INTRAMUSCULAR; INTRAVENOUS ONCE
Qty: 1 EACH | Refills: 0 | Status: SHIPPED | OUTPATIENT
Start: 2024-04-16 | End: 2024-08-12

## 2024-04-16 NOTE — LETTER
"    4/16/2024         RE: Bradford Prado  [address]  [place] [address]        Dear Colleague,    Thank you for referring your patient, Bradford Prado, to the [facility]. Please see a copy of my visit note below.    Diabetes Self-Management Education & Support    Presents for: Follow-up    Type of Service: In Person Visit      ASSESSMENT:  Bradford is wearing Jammie 2 sensors and uses .  See downloaded information below.  The sensors have been active 63% of the time as she is not scanning enough.  She is in target range 81% based on low data usage.  The past 7 days readings have increased especially around her dinner meal, she reports that she has been having more company and this will \"get back to normal\".  She continues to walk 3-4 days/week and is agreeable to scan sensor more often, may benefit to change to Jammie 3 with bluetooth.      Patient's most recent   Lab Results   Component Value Date    A1C 6.0 12/26/2023    A1C 6.5 06/19/2021     is meeting goal of <7.0    Diabetes knowledge and skills assessment:   Patient is knowledgeable in diabetes management concepts related to: Healthy Eating, Being Active, Monitoring, Taking Medication, Problem Solving, Reducing Risks, and Healthy Coping    Continue education with the following diabetes management concepts: Continuation of Lifestyle Changes     Based on learning assessment above, most appropriate setting for further diabetes education would be: Individual setting.      PLAN    1) Scan sensors 5+ times/day (upon waking, around meals and before bedtime)  2) Limit carbohydrate at meals to 3 carbohydrate choices and 1 carbohydrate choice at snacks  3) Continue to increase walking      Follow-up: 5/16/24    See Care Plan for co-developed, patient-state behavior change goals.  AVS provided for patient today.    Education Materials Provided:  No new materials provided today      SUBJECTIVE/OBJECTIVE:  Presents for: " "Follow-up  Accompanied by: Self  Diabetes education in the past 24mo: Yes  Focus of Visit: Monitoring, Reducing Risks  Diabetes type: Type 2, Other (see Comments)  Please elaborate:: Hemochromatosis  Disease course: Stable  How confident are you filling out medical forms by yourself:: Extremely  Transportation concerns: No  Difficulty affording diabetes medication?: No  Difficulty affording diabetes testing supplies?: No  Other concerns:: None  Cultural Influences/Ethnic Background:  Not  or     Diabetes Symptoms & Complications:  Diabetes Related Symptoms: None  Weight trend: Fluctuating  Symptom course: Stable  Disease course: Stable  Complications assessed today?: Yes  Autonomic neuropathy: No  CVA: No  Heart disease: No  Nephropathy: No  Peripheral neuropathy: No  Peripheral Vascular Disease: No  Retinopathy: No  Sexual dysfunction: No    Patient Problem List and Family Medical History reviewed for relevant medical history, current medical status, and diabetes risk factors.    Vitals:  LMP  (LMP Unknown)   Estimated body mass index is 26.61 kg/m  as calculated from the following:    Height as of 2/26/24: 1.511 m (4' 11.5\").    Weight as of 2/26/24: 60.8 kg (134 lb).   Last 3 BP:   BP Readings from Last 3 Encounters:   03/27/24 124/70   02/29/24 107/58   01/30/24 110/78       History   Smoking Status     Former     Packs/day: 0.50     Years: 20.00     Types: Cigarettes     Quit date: 3/9/2010   Smokeless Tobacco     Never       Labs:  Lab Results   Component Value Date    A1C 6.0 12/26/2023    A1C 6.5 06/19/2021     Lab Results   Component Value Date     09/08/2023     05/10/2021     Lab Results   Component Value Date     12/26/2023    LDL 89 06/19/2021     HDL Cholesterol   Date Value Ref Range Status   06/19/2021 31 (L) >49 mg/dL Final     Direct Measure HDL   Date Value Ref Range Status   12/26/2023 32 (L) >=50 mg/dL Final   ]  GFR Estimate   Date Value Ref Range Status " "  09/08/2023 >90 >60 mL/min/1.73m2 Final   05/10/2021 >90 >60 mL/min/[1.73_m2] Final     Comment:     Non  GFR Calc  Starting 12/18/2018, serum creatinine based estimated GFR (eGFR) will be   calculated using the Chronic Kidney Disease Epidemiology Collaboration   (CKD-EPI) equation.       GFR Estimate If Black   Date Value Ref Range Status   05/10/2021 >90 >60 mL/min/[1.73_m2] Final     Comment:      GFR Calc  Starting 12/18/2018, serum creatinine based estimated GFR (eGFR) will be   calculated using the Chronic Kidney Disease Epidemiology Collaboration   (CKD-EPI) equation.       Lab Results   Component Value Date    CR 0.70 09/08/2023    CR 0.57 05/10/2021     No results found for: \"MICROALBUMIN\"    Healthy Eating:  Healthy Eating Assessed Today: Yes  Cultural/Judaism diet restrictions?: No  Do you have any food allergies or intolerances?: Yes  Please list your food allergies / intolerances: Hemochromatosis - no iron fortified foods, red meat  Meal planning/habits: Carb counting  Who cooks/prepares meals for you?: Self, Daughter  Who purchases food in  your home?: Self  How many times a week on average do you eat food made away from home (restaurant/take-out)?: 0  Meals include: Breakfast, Dinner, Evening Snack  Breakfast: 10-11 AM: oatmeal steel cut 1/2 cup with cinnamon OR scrambled eggs and 1 slice sourdough bread OR egg, antonio and cheese sandwich on 1 slice bread  Lunch: snack time  Dinner: 6 PM: pot pie top crust OR pork with baked potato  small with salad OR Taco (2) and refried beans  Snacks: orange, grapes and cashew nuts or banana bread  Beverages: Coffee, Water, Diet soda (sf creamer)  Has patient met with a dietitian in the past?: Yes    Being Active:  Being Active Assessed Today: Yes  Exercise:: Yes (walks)  Days per week of moderate to strenuous exercise (like a brisk walk): 3  On average, minutes per day of exercise at this level: 30 (30 minutes during the week at " 1pm and 60 minutes on weekends)  How intense was your typical exercise? : Moderate (like brisk walking)  Exercise Minutes per Week: 90  Barrier to exercise: Physical limitation    Monitoring:  Monitoring Assessed Today: Yes  Did patient bring glucose meter to appointment? : Yes  Blood Glucose Meter: CGM (Jammie 2 with reader)  Times checking blood sugar at home (number): 3  Times checking blood sugar at home (per): Day  Blood glucose trend: Fluctuating              Taking Medications:  Diabetes Medication(s)       Diabetic Other       Glucagon (GVOKE HYPOPEN) 1 MG/0.2ML pen Inject the contents of one device under the skin into lower abdomen, outer thigh, or outer upper arm as needed for severe hypoglycemia. If no response after 15 minutes, additional 1 mg dose from a new device may be injected while waiting for emergency assistance       Insulin       insulin aspart (NOVOLOG FLEXPEN) 100 UNIT/ML pen INJECT 10 UNITS UNDER THE SKIN THREE TIMES DAILY WITH MEALS. MAXIMUM OF 30 UNITS DAILY.     insulin glargine (LANTUS SOLOSTAR) 100 UNIT/ML pen INJECT 38 UNITS SUBCUTANEOUS EVERY MORNING (BEFORE BREAKFAST)          Taking Medication Assessed Today: Yes  Current Treatments: Insulin Injections, Diet  Dose schedule: Pre-breakfast, Pre-dinner, Other (Lantus from 10 am - 12 pm)  Given by: Patient  Injection/Infusion sites: Abdomen  Problems taking diabetes medications regularly?: No  Diabetes medication side effects?: No    Problem Solving:  Problem Solving Assessed Today: Yes  Is the patient at risk for hypoglycemia?: Yes  Hypoglycemia Frequency: Rarely  Hypoglycemia Treatment: Candy (gurmeet fallon)  Patient carries a carbohydrate source: Yes  Medical ID: No  Does patient have glucagon emergency kit?: No  Does patient have severe weather/disaster plan for diabetes management?: No  Does patient have sick day plan for diabetes management?: No    Hypoglycemia Symptoms  Hypoglycemia: Dizziness/Lightheadedness,  Confusion    Hypoglycemia Complications  Hypoglycemia Complications: None    Reducing Risks:  Reducing Risks Assessed Today: Yes  Diabetes Risks: Age over 45 years, Hyperlipidemia  CAD Risks: Hypertension, Dyslipidemia, Diabetes Mellitus  Has dilated eye exam at least once a year?: Yes (has appt 3/4/24)  Sees dentist every 6 months?: No (dentures)  Feet checked by healthcare provider in the last year?: Yes    Healthy Coping:  Healthy Coping Assessed Today: Yes  Emotional response to diabetes: Acceptance  Informal Support system:: Children  Stage of change: ACTION (Actively working towards change)    Care Plan and Education Provided:  Care Plan: Diabetes   Updates made by Nithya Hamilton RD since 4/16/2024 12:00 AM        Problem: Diabetes Self-Management Education Needed to Optimize Self-Care Behaviors         Goal: Being Active - get regular physical activity, working up to at least 150 minutes per week    This Visit's Progress: 50%   Recent Progress: 70%   Note:    Walk >30 minutes per day 5 days/week and add some resistance activity 2 days/week       Goal: Problem Solving - know how to prevent and manage short-term diabetes complications         Task: Provide education on high blood glucose - causes, signs/symptoms, prevention and treatment Completed 4/16/2024   Responsible User: Nithya Hamilton RD        Goal: Healthy Coping - use available resources to cope with the challenges of managing diabetes         Task: Discuss recognizing feelings about having diabetes Completed 4/16/2024   Responsible User: Nithya Hamilton RD Michelle DiDio, RD, LD, [facility]     Time Spent: 30 minutes  Encounter Type: Individual    Any diabetes medication dose changes were made via the CDE Protocol per the patient's referring provider. A copy of this encounter was shared with the provider.

## 2024-04-16 NOTE — TELEPHONE ENCOUNTER
Bradford is wearing Jammie 2 sensors, her sensors for the past month has only been 63% active.  She reports that she forgets to scan.  Would benefit to change to Jammie 3  and sensors with blue tooth capability.  Set up order for your approval.    Nithya Mansfield, RD, LD, [facility]

## 2024-04-16 NOTE — PATIENT INSTRUCTIONS
Douglas Felder,    Here are some things that we discussed today.      Goals for Diabetes Care:    1. Eat 3 balanced meals each day - Monitor carb intake and aim for 45-60 grams per meal (3-4 carbohydrate choices) and 15 grams carbohydrate (1 carbohydrate choice) per snack (2/day)     Carbohydrate 1 choice = 15 grams    Aim to eat a balanced plate - half your plate fruits/veggies, 1/4 the plate protein and 1/4 plate starch (rice, potato, pasta)    2. Check blood sugars 5+ time each day at varying times (upon waking, before bedtime and around meals)   Blood Glucose Targets:   1. Fasting and before meal target is 80 - 130 mg/dl   2. 2 hours after a meal target is < 180 mg/dl  Always remember to bring meter and/or log book to all appointments.    3. Activity really helps improve blood sugars. Try to Incorporate 30 minutes activity into each day - does not need to be all at one time & walking counts!    4. Treating low BG. Rule of 15 = BG 50-70 mg/dL = 15 gram carb (4 oz juice, 4 glucose tabs, OR 4 oz pop), then recheck BG in 15 minutes. If still low repeat. (If BG <50 mg/dL = 8 oz pop/juice or 8 glucose tabs).    5. Take diabetes medications as prescribed   Lantus 38 units AM  Humalog 10 units 2 meals    Correction Scale:  1 unit lower your blood sugar 30 mg/dL  151-180 mg/dL = 1 unit  181-210 mg/dL = 2 units  211-240 mg/dL = 3 units  241-270 mg/dL = 4 units  271-300 mg/dL = 5 units  301-330 mg/dL = 6 units  331-360 mg/dL = 7 units  361-390 mg/dL = 8 units    Follow up with your Diabetic Educator to assess BG targets and need for modifications to medications and/or lifestyle.    Call with any questions.  Thank you!  Nithya Mansfield, RD, LD, [facility]   Certified Diabetes Care &   [facility]  Triage [phone] or Scheduling [phone]       1) If you continue to have recurrent hypoglycemia before your meals then reduce the Lantus to 36 units.  2) New correction scale to replace scale given in  [facility]

## 2024-04-16 NOTE — PROGRESS NOTES
"Diabetes Self-Management Education & Support    Presents for: Follow-up    Type of Service: In Person Visit      ASSESSMENT:  Bradford is wearing Jammie 2 sensors and uses .  See downloaded information below.  The sensors have been active 63% of the time as she is not scanning enough.  She is in target range 81% based on low data usage.  The past 7 days readings have increased especially around her dinner meal, she reports that she has been having more company and this will \"get back to normal\".  She continues to walk 3-4 days/week and is agreeable to scan sensor more often, may benefit to change to Jammie 3 with bluetooth.      Patient's most recent   Lab Results   Component Value Date    A1C 6.0 12/26/2023    A1C 6.5 06/19/2021     is meeting goal of <7.0    Diabetes knowledge and skills assessment:   Patient is knowledgeable in diabetes management concepts related to: Healthy Eating, Being Active, Monitoring, Taking Medication, Problem Solving, Reducing Risks, and Healthy Coping    Continue education with the following diabetes management concepts: Continuation of Lifestyle Changes     Based on learning assessment above, most appropriate setting for further diabetes education would be: Individual setting.      PLAN    1) Scan sensors 5+ times/day (upon waking, around meals and before bedtime)  2) Limit carbohydrate at meals to 3 carbohydrate choices and 1 carbohydrate choice at snacks  3) Continue to increase walking      Follow-up: 5/16/24    See Care Plan for co-developed, patient-state behavior change goals.  AVS provided for patient today.    Education Materials Provided:  No new materials provided today      SUBJECTIVE/OBJECTIVE:  Presents for: Follow-up  Accompanied by: Self  Diabetes education in the past 24mo: Yes  Focus of Visit: Monitoring, Reducing Risks  Diabetes type: Type 2, Other (see Comments)  Please elaborate:: Hemochromatosis  Disease course: Stable  How confident are you filling out medical " "forms by yourself:: Extremely  Transportation concerns: No  Difficulty affording diabetes medication?: No  Difficulty affording diabetes testing supplies?: No  Other concerns:: None  Cultural Influences/Ethnic Background:  Not  or     Diabetes Symptoms & Complications:  Diabetes Related Symptoms: None  Weight trend: Fluctuating  Symptom course: Stable  Disease course: Stable  Complications assessed today?: Yes  Autonomic neuropathy: No  CVA: No  Heart disease: No  Nephropathy: No  Peripheral neuropathy: No  Peripheral Vascular Disease: No  Retinopathy: No  Sexual dysfunction: No    Patient Problem List and Family Medical History reviewed for relevant medical history, current medical status, and diabetes risk factors.    Vitals:  LMP  (LMP Unknown)   Estimated body mass index is 26.61 kg/m  as calculated from the following:    Height as of 2/26/24: 1.511 m (4' 11.5\").    Weight as of 2/26/24: 60.8 kg (134 lb).   Last 3 BP:   BP Readings from Last 3 Encounters:   03/27/24 124/70   02/29/24 107/58   01/30/24 110/78       History   Smoking Status    Former    Packs/day: 0.50    Years: 20.00    Types: Cigarettes    Quit date: 3/9/2010   Smokeless Tobacco    Never       Labs:  Lab Results   Component Value Date    A1C 6.0 12/26/2023    A1C 6.5 06/19/2021     Lab Results   Component Value Date     09/08/2023     05/10/2021     Lab Results   Component Value Date     12/26/2023    LDL 89 06/19/2021     HDL Cholesterol   Date Value Ref Range Status   06/19/2021 31 (L) >49 mg/dL Final     Direct Measure HDL   Date Value Ref Range Status   12/26/2023 32 (L) >=50 mg/dL Final   ]  GFR Estimate   Date Value Ref Range Status   09/08/2023 >90 >60 mL/min/1.73m2 Final   05/10/2021 >90 >60 mL/min/[1.73_m2] Final     Comment:     Non  GFR Calc  Starting 12/18/2018, serum creatinine based estimated GFR (eGFR) will be   calculated using the Chronic Kidney Disease Epidemiology " "Collaboration   (CKD-EPI) equation.       GFR Estimate If Black   Date Value Ref Range Status   05/10/2021 >90 >60 mL/min/[1.73_m2] Final     Comment:      GFR Calc  Starting 12/18/2018, serum creatinine based estimated GFR (eGFR) will be   calculated using the Chronic Kidney Disease Epidemiology Collaboration   (CKD-EPI) equation.       Lab Results   Component Value Date    CR 0.70 09/08/2023    CR 0.57 05/10/2021     No results found for: \"MICROALBUMIN\"    Healthy Eating:  Healthy Eating Assessed Today: Yes  Cultural/Hoahaoism diet restrictions?: No  Do you have any food allergies or intolerances?: Yes  Please list your food allergies / intolerances: Hemochromatosis - no iron fortified foods, red meat  Meal planning/habits: Carb counting  Who cooks/prepares meals for you?: Self, Daughter  Who purchases food in  your home?: Self  How many times a week on average do you eat food made away from home (restaurant/take-out)?: 0  Meals include: Breakfast, Dinner, Evening Snack  Breakfast: 10-11 AM: oatmeal steel cut 1/2 cup with cinnamon OR scrambled eggs and 1 slice sourdough bread OR egg, antonio and cheese sandwich on 1 slice bread  Lunch: snack time  Dinner: 6 PM: pot pie top crust OR pork with baked potato  small with salad OR Taco (2) and refried beans  Snacks: orange, grapes and cashew nuts or banana bread  Beverages: Coffee, Water, Diet soda (sf creamer)  Has patient met with a dietitian in the past?: Yes    Being Active:  Being Active Assessed Today: Yes  Exercise:: Yes (walks)  Days per week of moderate to strenuous exercise (like a brisk walk): 3  On average, minutes per day of exercise at this level: 30 (30 minutes during the week at 1pm and 60 minutes on weekends)  How intense was your typical exercise? : Moderate (like brisk walking)  Exercise Minutes per Week: 90  Barrier to exercise: Physical limitation    Monitoring:  Monitoring Assessed Today: Yes  Did patient bring glucose meter to " appointment? : Yes  Blood Glucose Meter: CGM (Jammie 2 with reader)  Times checking blood sugar at home (number): 3  Times checking blood sugar at home (per): Day  Blood glucose trend: Fluctuating              Taking Medications:  Diabetes Medication(s)       Diabetic Other       Glucagon (GVOKE HYPOPEN) 1 MG/0.2ML pen Inject the contents of one device under the skin into lower abdomen, outer thigh, or outer upper arm as needed for severe hypoglycemia. If no response after 15 minutes, additional 1 mg dose from a new device may be injected while waiting for emergency assistance       Insulin       insulin aspart (NOVOLOG FLEXPEN) 100 UNIT/ML pen INJECT 10 UNITS UNDER THE SKIN THREE TIMES DAILY WITH MEALS. MAXIMUM OF 30 UNITS DAILY.     insulin glargine (LANTUS SOLOSTAR) 100 UNIT/ML pen INJECT 38 UNITS SUBCUTANEOUS EVERY MORNING (BEFORE BREAKFAST)          Taking Medication Assessed Today: Yes  Current Treatments: Insulin Injections, Diet  Dose schedule: Pre-breakfast, Pre-dinner, Other (Lantus from 10 am - 12 pm)  Given by: Patient  Injection/Infusion sites: Abdomen  Problems taking diabetes medications regularly?: No  Diabetes medication side effects?: No    Problem Solving:  Problem Solving Assessed Today: Yes  Is the patient at risk for hypoglycemia?: Yes  Hypoglycemia Frequency: Rarely  Hypoglycemia Treatment: Candy (gurmeet radhajustin)  Patient carries a carbohydrate source: Yes  Medical ID: No  Does patient have glucagon emergency kit?: No  Does patient have severe weather/disaster plan for diabetes management?: No  Does patient have sick day plan for diabetes management?: No    Hypoglycemia Symptoms  Hypoglycemia: Dizziness/Lightheadedness, Confusion    Hypoglycemia Complications  Hypoglycemia Complications: None    Reducing Risks:  Reducing Risks Assessed Today: Yes  Diabetes Risks: Age over 45 years, Hyperlipidemia  CAD Risks: Hypertension, Dyslipidemia, Diabetes Mellitus  Has dilated eye exam at least once a  year?: Yes (has appt 3/4/24)  Sees dentist every 6 months?: No (dentures)  Feet checked by healthcare provider in the last year?: Yes    Healthy Coping:  Healthy Coping Assessed Today: Yes  Emotional response to diabetes: Acceptance  Informal Support system:: Children  Stage of change: ACTION (Actively working towards change)    Care Plan and Education Provided:  Care Plan: Diabetes   Updates made by Nithya Hamilton RD since 4/16/2024 12:00 AM        Problem: Diabetes Self-Management Education Needed to Optimize Self-Care Behaviors         Goal: Being Active - get regular physical activity, working up to at least 150 minutes per week    This Visit's Progress: 50%   Recent Progress: 70%   Note:    Walk >30 minutes per day 5 days/week and add some resistance activity 2 days/week       Goal: Problem Solving - know how to prevent and manage short-term diabetes complications         Task: Provide education on high blood glucose - causes, signs/symptoms, prevention and treatment Completed 4/16/2024   Responsible User: Nithya Hamilton RD        Goal: Healthy Coping - use available resources to cope with the challenges of managing diabetes         Task: Discuss recognizing feelings about having diabetes Completed 4/16/2024   Responsible User: Nithya Hamilton RD Michelle DiDio, RD, LD, [facility]     Time Spent: 30 minutes  Encounter Type: Individual    Any diabetes medication dose changes were made via the CDE Protocol per the patient's referring provider. A copy of this encounter was shared with the provider.

## 2024-04-17 ENCOUNTER — TRANSFERRED RECORDS (OUTPATIENT)
Dept: HEALTH INFORMATION MANAGEMENT | Facility: CLINIC | Age: 64
End: 2024-04-17
Payer: COMMERCIAL

## 2024-05-01 NOTE — PROGRESS NOTES
Date:05/02/2024      COMPREHENSIVE PAIN CLINIC FOLLOW UP EVALUATION    I had the pleasure of meeting Ms. Bradford Prado on 3/27/2024 in the [facility] in consult for Dr. Travis with regards to her pain.  The patient is a 63 year old female with past medical history of DM type II, HLD, HTN, anxiety, depression, hereditary hemochromatosis associated with phlebotomies, cirrohsis, diabetic neuropathy, chronic pain in b/l arms, wrists, fingers, who presents for evaluation of chronic pain.        Updates since last appointment on 03/27/2024 initial consult.  tramadol 50mg 1 tab daily PRN severe pain 30 tabs.  Increase gabapentin 100mg in am 2 tabs q hs.  Increase by 1 tab every 3 days until taking 3 tabs three times a day.  She is taking 100mg 2 tabs in am and 1 tab at noon and 2 tabs in evening.    She reports significant reduction in pain and she is able to be more active during the day.  She denies any negative side effects from the medication.  She follow with [facility]      Interventions/Injections: 04/17/2024 Dr. Erin Tan, [facility] - trigger finger injection L) hand x 2      Progress Notes Reviewed:  04/17/2024 Dr. Erin Tan, [facility] - trigger finger injection L) hand x 2      Any hospitalizations/ER/UC visits since last appointment:  no  Any falls/accidents since last appointment:  no      Primary Pain   Patient endorses chronic pain in b/l wrists R>L and she braces her wrists periodically.  Wrist pain started 2022 after she started phlebotomies of hemodcromatosis with Dr. Girard, [facility] in [place]. She does lab work every 3 months.  She has modified her iron intake.  She also has b/l trigger finger which started over ten years ago.  She had b/l 1st [facility] joint surgeries by Dr. Tan, [facility].  She gets periodic trigger finger steroid injections by Dr. Tan. She braces her fingers for trigger finger. She completed hand therapy at [facility] after last wrist surgery 2023.  Patient denies numbness and tingling in b/l  upper extremities.  She has weakness in b/ arms, hands and fingers.  She drops items at times.  She has had 3 surgeries for trigger finger.  She is due for another cortisone shot for trigger finger.  The patient describes the pain as constant, dull and achey.        Characteristics:  No changes in pain characteristics since last appointment.      What makes the pain better:   Her pain is improved with steroid injections, parafin wax treatments, heat, bracing.    What makes the pain worse:  She reports that the pain is made worse by phlebotomies, using her hands.  05/02/2024 current pain on 0/10 VAS:  3     Worst pain:   5     Best pain:    6    03/27/2024 current pain score at 6/10, but it can be as low as 6/10 or as severe as 8.5/10.     Current Pain Related Medications  Any medications changes since last appointment:    Tramadol 50mg 1 tabs prn on bad days max BID  Sertraline 50mg 2 tabs daily  [id number] 2 tabs in am and 2 tabs q hs 1 tab at noon.  She denies negative side effects from  Heat  Parafin wax treatments  Bracing and splinting wrist and fingers  Voltaren gel is helpful  Trigger finger injections are helpful      Therapies discuss on initial consult:   Physical therapy, Pain Psychology, TENs unit, Grounding Mat, Frequency Specific Micro Current, Anti-inflammatory Lifestyle    Social:      Employment:      Exercise:  Last hand PT was 07/2023 after L) wrist surgery at [facility].  She continues to do the exercises learned at PT.  Patient exercises by walking her dogs for 1 hour at the dog park.    Hobbies:  She enjoys taking her dogs to the dog park.    Mental Health:    Patient endorses anxiety and depression.  Patient does not follow with a mental health care provider.        Plan on initial consult 03/27/2024:  A multimodal plan was developed today to treat your pain.  Multimodal analgesia is a strategy that reduces reliance on opioids through the use of non-opioid analgesics and therapies that have  different mechanisms of action.      Diagnostics: none        Medications:  Start tramadol 50mg 1 tab daily PRN severe pain 30 tabs.  Increase gabapentin 100mg in am 2 tabs q hs.  Increase by 1 tab every 3 days until taking 3 tabs three times a day.      Discussed medical cannabis.    The following OTC pain medications may be helpful, use as directed: Voltaren Gel 1%, CBD products, Arnica products, Capsaicin products, Australian Dream Cream, Epson It, Lidocaine Patch, Solanpas, Biofreeze, Aspercream, Tiger Balm and Jarett Emu cream.      Therapies:  Discussed anti-inflammatory lifestyle.    Discussed Pain Psychology - consider in the future.  Psychological treatments are also important part of pain management.  Understanding and managing the thoughts, emotions and behaviors that accompany the discomfort can help you cope more effectively with your pain and can actually reduce the intensity of your pain.      PHYSICAL THERAPY -  Encourage patient to continue to do exercises learned at physical therapy.  Discussed the importance of core strengthening, ROM, stretching exercises with the patient and how each of these entities is important in decreasing pain.  Explained to the patient that the purpose of physical therapy is to teach the patient a home exercise program.  These exercises need to be performed every day in order to decrease pain and prevent future occurrences of pain.        Discussed Grounding Mat - handout provided  https://www.youtube.com/watch?v=[email or web address]    Discussed Frequency Specific Microcurrent - handout provided  Treatment for Neuropathic Pain.   [facility] [phone]  [facility] [phone]  [facility] [phone]  [facility] [phone]  May be able to be billed as a chiropractic service depending on your insurance coverage.     Apply heat PRN. Use parafin      Interventions:  Steroid injections and surgery for trigger finger through [facility]         Follow up:     1 month in [facility]          ----------------------------------------------------------------------------------------------------------------------------------------  History of pain on initial consult 03/27/2024          Subjective:  Patient endorses chronic pain in b/l wrists R>L and she braces her wrists periodically.  Wrist pain started 2022 after she started phlebotomies of hemodcromatosis with Dr. Girard, [facility] in [place]. She does lab work every 3 months.  She has modified her iron intake.  She also has b/l trigger finger which started over ten years ago.  She had b/l 1st [facility] joint surgeries by Dr. Tan, [facility].  She gets periodic trigger finger steroid injections by Dr. Tan. She braces her fingers for trigger finger. She comopleted hand therapy at [facility] after last wrist surgery 2023.  Patient denies numbness and tingling in b/l upper extremities.  She has weakness in b/ arms, hands and fingers.  She drops items at times.  She has had 3 surgeries for trigger finger.  She is due for another cortisone shot for trigger finger.  The patient describes the pain as constant, dull and achey..  She reports that the pain is made worse by phlebotomies, using her hands.  Her pain is improved with steroid injections, parafin wax treatments, heat, bracing.  She rates her currenty pain score at 6/10, but it can be as low as 6/10 or as severe as 8.5/10.     Patient endorses anxiety and depression.  Patient does not follow with a mental health care provider.  Last hand PT was 07/2023 after L) wrist surgery at [facility].  She continues to do the exercises learned at PT.  Patient exercises by walking her dogs for 1 hour at the dog park.    Progress Notes Reviewed:  03/04/2024 Dr. Pamela Chaudhry, Opthamology  02/26/2024 Vidal Amador, PA - GI symptoms      She denies any new problems with falls or balance, any new numbness or weakness of the arms or legs, any new bowel or bladder incontinence, any night  sweats or unexplained fevers, or any sudden or unexpected weight loss.      Bradford Prado has not been seen at a pain clinic in the past.        Current Treatments:  Sertraline 50mg 2 tabs daily  [id number] in am and 200mg q hs for last month.  She denies negative side effects from gabapentin.  Heat  Parafin wax treatments  Bracing and splinting wrist and fingers  Voltaren gel is helpful    Previous Medication Treatments Included:  Anti-convulsants: pregabalin was helpful after L) wrist surgery per [facility] Dr. Tan  Muscle relaxors: no  Anti-depressants: none  Benzodiazapine's: none  Acetaminophen/NSAIDs: no acetaminophen.  Aleve periodically only due to cirrohosis  Topicals: only voltaren gel  Opioids: Tramadol was helpful and did not exterience any negative side effects s/p surgery.      Other Treatments Have Included:  Physical therapy: 07/2023 after wrist surgery  Pain Psychology: no  Chiropractic: no  Acupuncture: yes to quit smoking  TENs Unit: no  Injections: multiple steroid injections for trigger finger through [facility]  Surgeries: b/l 1st [facility] joint surgeries, 3 trigger finger surgeries  Dry Needling: no  Massage: no      Past Medical History:  Medical history reviewed.  Past Medical History:   Diagnosis Date    Chest pain 02/23/2021    Diabetes mellitus (H)     Dyslipidemia     Hereditary hemochromatosis (H24)     Hyperglycemia 02/23/2021    Hypertension     Hypertensive urgency 02/23/2021    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) (H)     Nephrolithiasis 09/19/2016    First episode September 2016    PONV (postoperative nausea and vomiting)     Poorly controlled type 2 diabetes mellitus with renal complication (H)     Type 2 diabetes mellitus with diabetic neuropathy, with long-term current use of insulin (H)       Patient Active Problem List   Diagnosis    Hand arthritis    Stenosing tenosynovitis    Hyperlipidemia LDL goal <70    Essential hypertension with goal blood pressure less  than 140/90    Type 2 diabetes mellitus with kidney complication, with long-term current use of insulin (H)    Dyslipidemia    Hereditary hemochromatosis (H24)    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) (H)    Situational mixed anxiety and depressive disorder         Past Surgical History:  Pertinent surgical history reviewed.  Past Surgical History:   Procedure Laterality Date    COLONOSCOPY      COLONOSCOPY N/A 2/29/2024    Procedure: Colonoscopy;  Surgeon: Royce Mari MD;  Location:  GI    COLONOSCOPY N/A 2/29/2024    Procedure: COLONOSCOPY, WITH POLYPECTOMY AND BIOPSY;  Surgeon: Royce Mari MD;  Location:  GI    ESOPHAGOSCOPY, GASTROSCOPY, DUODENOSCOPY (EGD), COMBINED N/A 11/10/2021    Procedure: ESOPHAGOGASTRODUODENOSCOPY, WITH BIOPSY;  Surgeon: Leventhal, Thomas Michael, MD;  [place] [facility] [place]    ESOPHAGOSCOPY, GASTROSCOPY, DUODENOSCOPY (EGD), COMBINED N/A 2/29/2024    Procedure: Esophagoscopy, gastroscopy, duodenoscopy (EGD), combined;  Surgeon: Royce Mari MD;  Location:  GI    HYSTERECTOMY TOTAL ABDOMINAL  01/01/1995    due to fibroids    LAPAROSCOPIC CHOLECYSTECTOMY N/A 02/13/2023    Procedure: Laparoscopic cholecystectomy;  Surgeon: Eber Gill MD;  Location:  OR    LAPAROSCOPIC EVACUATION ECTOPIC PREGNANCY      TUBAL LIGATION            Medications: Pertinent medications reviewed.  Current Outpatient Medications   Medication Sig Dispense Refill    ACCU-CHEK GUIDE test strip Use to test blood sugar 4 times daily or as directed. 200 strip 11    amLODIPine (NORVASC) 5 MG tablet Take 1 tablet (5 mg) by mouth 2 times daily 180 tablet 3    blood glucose (NO BRAND SPECIFIED) lancets standard Use to test blood sugar twice times daily or as directed. 100 each 1    blood glucose (NO BRAND SPECIFIED) test strip Use to test blood sugar twice times daily or as directed. 100 each 1    blood glucose (NO BRAND SPECIFIED) test  strip Use to test blood sugars 2 times daily or as directed 100 strip 11    blood glucose monitoring (SOFTCLIX) lancets Use to test blood sugar 6 times daily. 200 each 11    Blood Glucose Monitoring Suppl (ACCU-CHEK GUIDE ME) w/Device KIT 1 each 6 times daily 1 kit 0    carvedilol (COREG) 12.5 MG tablet Take 1 tablet (12.5 mg) by mouth 2 times daily (with meals) 180 tablet 3    cholestyramine light (PREVALITE) 4 GM powder Take 1 packet (4 g) by mouth 3 times daily (with meals) 60 packet 1    Continuous Blood Gluc Sensor (FREESTYLE MEENA 2 SENSOR) MISC CHANGE EVERY 14 DAYS. USE TO CHECK BLOOD SUGARS PER  GUIDELINES 1 each 2    Continuous Blood Gluc Sensor (FREESTYLE MEENA 3 SENSOR) MISC 1 each every 14 days Use 1 sensor every 14 days. Use to read blood sugars per 's instructions. 6 each 5    gabapentin (NEURONTIN) 100 MG capsule Take 100 mg am and 200 mg at bedtime 90 capsule 3    Glucagon (GVOKE HYPOPEN) 1 MG/0.2ML pen Inject the contents of one device under the skin into lower abdomen, outer thigh, or outer upper arm as needed for severe hypoglycemia. If no response after 15 minutes, additional 1 mg dose from a new device may be injected while waiting for emergency assistance 0.4 mL 3    insulin aspart (NOVOLOG FLEXPEN) 100 UNIT/ML pen INJECT 10 UNITS UNDER THE SKIN THREE TIMES DAILY WITH MEALS. MAXIMUM OF 30 UNITS DAILY. 30 mL 1    insulin glargine (LANTUS SOLOSTAR) 100 UNIT/ML pen INJECT 38 UNITS SUBCUTANEOUS EVERY MORNING (BEFORE BREAKFAST) 45 mL 1    insulin pen needle (BD NATALIYA U/F) 32G X 4 MM miscellaneous USE 4 TIMES DAILY 200 each 0    losartan (COZAAR) 50 MG tablet Take 1 tablet (50 mg) by mouth daily 90 tablet 3    ondansetron (ZOFRAN ODT) 4 MG ODT tab Take 1 tablet (4 mg) by mouth every 8 hours as needed for nausea 16 tablet 0    pantoprazole (PROTONIX) 40 MG EC tablet Take 1 tablet (40 mg) by mouth daily 90 tablet 3    pravastatin (PRAVACHOL) 20 MG tablet TAKE ONE TABLET BY MOUTH  ONCE DAILY. DUE FOR LABS 90 tablet 3    sertraline (ZOLOFT) 50 MG tablet Take 2 tablets (100 mg) by mouth daily 180 tablet 1    traMADol (ULTRAM) 50 MG tablet Take 1 tablet (50 mg) by mouth at bedtime 30 tablet 0       [place] Prescription Monitoring Program reviewed 05/01/2024.  No concern for abuse or misuse of controlled medications based on this report.  04/18/2024 Gabapentin 100mg 90 tabs for 30 days  03/27/2024 Tramadol 50mg 7 tabs for 7 days  03/15/2024 Gabapentin 100mg 90 tabs for 30 days  02/16/2024 Gabapentin 100mg 90 tabs for 30 days  01/24/2024 Gabapentin 100mg 60 tabs for 30 days  06/28/2023 Pregabalin 75mg 30 tabs     Allergies: Pertinent allergies reviewed.   No Known Allergies    Family History:   family history includes ALS in her brother; Cirrhosis in her mother; Diabetes in her mother; Emphysema in her father; Heart Defect in her niece; Hemochromatosis in her brother, sister, sister, and sister; Hypertension in her sister.    Social History:   She is single.  She applied for SSD.  She lives in a house with her daughter and 2 dogs and 3 cats. She enjoys walking her dogs.  She enjoys the internet.  She  reports that she quit smoking about 14 years ago. Her smoking use included cigarettes. She started smoking about 34 years ago. She has a 10 pack-year smoking history. She has never used smokeless tobacco. She reports that she does not drink alcohol and does not use drugs.  Social History     Social History Narrative    , 2 children, 2 grandchildren         Review of Systems:      (Positive responses bolded)  GENERAL: fever/chills, fatigue, general unwell feeling, weight gain/loss  HEAD/EYES:  headache, dizziness, or vision changes  EARS/NOSE/THROAT: nosebleeds, hearing loss, sinus infection, earache, tinnitus  IMMUNE:  allergies, cancer, immune deficiency, or infections  SKIN:  itching, rash, hives  HEME/Lymphatic: anemia, easy bruising, easy bleeding  RESPIRATORY: cough, wheezing, or shortness of  breath  CARDIOVASCULAR/Circulation: extremity edema, syncope, hypertension, tachycardia, or angina  GASTROINTESTINAL: abdominal pain, nausea/emesis, diarrhea, constipation, hematochezia, or melena  ENDOCRINE:  diabetes, steroid use, thyroid disease or osteoporosis  MUSCULOSKELETAL: myalgias, joint pain, stiffness, neck pain, back pain, arthritis, or gout  GENITOURINARY: frequency, urgency, dysuria, difficulty voiding, hematuria or incontinence  NEUROLOGIC: weakness, numbness, paresthesias, seizure, tremor, stroke or memory loss  PSYCHIATRIC: depression, anxiety, stress, suicidal thoughts/attempts or mood swings      Physical Exam:    Constitutional: She is oriented to person, place, and time.  She appears well-developed and well-nourished. She is not in acute distress.   HENT:     Head: Normocephalic and atraumatic.     Eyes: Pupils are equal, round, and reactive to light. EOM are normal. No scleral icterus.   Pulmonary/Chest:  NWOB. No respiratory distress.   Neurological: She is alert and oriented to person, place, and time. Coordination grossly normal.  Romberg test negative. Skin: Skin is warm and dry. She is not diaphoretic.   Psychiatric: She has a normal mood and affect. Her behavior is normal. Judgment and thought content normal.  Patient answers questions appropriately.  MSK: Gait is normal.     Cervical spine:  ROM: full  Rotation/ext to right: pain free  Rotation/ext to left: pain free  Myofascial tenderness: negative  Normal 5/5 UE strength bilaterally but causes pain  Normal sensation to light touch in the C4-T1 distributions bilaterally   No allodynia, dysesthesia, or hyperalgesia in the upper extremities bilaterally   Reflexes: Normal 2/2 of biceps and bracioradialis          DIRE Score for ongoing opioid management is calculated as follows:    Diagnosis = 2    Intractability = 3    Risk: Psych = 3  Chem Hlth = 3  Reliability = 3  Social = 2    Efficacy = 3    Total DIRE Score = 19 (14 or higher  predicts good candidate for ongoing opioid management; 13 or lower predicts poor candidate for opioid management)         Imaging:  No imaging to review.    EMG:  na      Diagnosis:  (M79.601,  M79.602) Bilateral arm pain  (primary encounter diagnosis)  Comment:  Plan: traMADol (ULTRAM) 50 MG tablet            (E83.110) Hereditary hemochromatosis (H24)  Comment:   Plan: Needs to get labs done to see if phlebotomy is required.    (M19.049) Hand arthritis  Comment:   Plan: Continue voltaren gel, heat, parafin treatments.    (M65.9) Stenosing tenosynovitis  Comment:   Plan: Continue to follow up with [facility] for b/l trigger finger injections.    (G89.29) Chronic intractable pain  Comment:   Plan: Continue exercises learned at hand therapy        Plan on 05/02/2024:  Continue tramadol 50mg max BID and gabapentin 200mg am and hs 100mg at noon.    Follow up in clinic in [place] in 3 months...sooner if needed.    OPAL Yap, RN, CNP, FNP  [facility]/[place] Locations        BILLING TIME DOCUMENTATION:   The total TIME spent on this patient on the date of the encounter/appointment was 19 minutes.

## 2024-05-02 ENCOUNTER — OFFICE VISIT (OUTPATIENT)
Dept: PALLIATIVE MEDICINE | Facility: CLINIC | Age: 64
End: 2024-05-02
Attending: NURSE PRACTITIONER
Payer: COMMERCIAL

## 2024-05-02 VITALS — DIASTOLIC BLOOD PRESSURE: 68 MMHG | OXYGEN SATURATION: 95 % | HEART RATE: 67 BPM | SYSTOLIC BLOOD PRESSURE: 102 MMHG

## 2024-05-02 DIAGNOSIS — M19.049 HAND ARTHRITIS: ICD-10-CM

## 2024-05-02 DIAGNOSIS — G89.29 CHRONIC INTRACTABLE PAIN: ICD-10-CM

## 2024-05-02 DIAGNOSIS — M65.80 STENOSING TENOSYNOVITIS: ICD-10-CM

## 2024-05-02 DIAGNOSIS — M79.602 BILATERAL ARM PAIN: Primary | ICD-10-CM

## 2024-05-02 DIAGNOSIS — M79.601 BILATERAL ARM PAIN: Primary | ICD-10-CM

## 2024-05-02 DIAGNOSIS — E83.110 HEREDITARY HEMOCHROMATOSIS (H): ICD-10-CM

## 2024-05-02 PROCEDURE — 99213 OFFICE O/P EST LOW 20 MIN: CPT | Performed by: NURSE PRACTITIONER

## 2024-05-02 PROCEDURE — G2211 COMPLEX E/M VISIT ADD ON: HCPCS | Performed by: NURSE PRACTITIONER

## 2024-05-02 ASSESSMENT — PAIN SCALES - GENERAL: PAINLEVEL: MODERATE PAIN (4)

## 2024-05-02 NOTE — PATIENT INSTRUCTIONS
Plan on 05/02/2024:  Continue tramadol 50mg max BID and gabapentin 200mg am and hs 100mg at noon.    Follow up in clinic in [place] in 3 months...sooner if needed.    OPAL Yap, RN, CNP, FNP  [facility] Locations        ----------------------------------------------------------------  Clinic Number:  [phone]   Call with any questions about your care and for scheduling assistance.   Calls are returned Monday through Friday between 8 AM and 4:30 PM. We usually get back to you within 2 business days depending on the issue/request.    If we are prescribing your medications:  For opioid medication refills, call the clinic or send a [employer] message 7 days in advance.  Please include:  Name of requested medication  Name of the pharmacy.  For non-opioid medications, call your pharmacy directly to request a refill. Please allow 3-4 days to be processed.   Per [place] State Law:  All controlled substance prescriptions must be filled within 30 days of being written.    For those controlled substances allowing refills, pickup must occur within 30 days of last fill.      We believe regular attendance is key to your success in our program!    Any time you are unable to keep your appointment we ask that you call us at least 24 hours in advance to cancel.This will allow us to offer the appointment time to another patient.   Multiple missed appointments may lead to dismissal from the clinic.

## 2024-05-02 NOTE — LETTER
Opioid / Opioid Plus Controlled Substance Agreement    This is an agreement between you and your provider about the safe and appropriate use of controlled substance/opioids prescribed by your care team. Controlled substances are medicines that can cause physical and mental dependence (abuse).    There are strict laws about having and using these medicines. We here at [facility] are committing to working with you in your efforts to get better. To support you in this work, we ll help you schedule regular office appointments for medicine refills. If we must cancel or change your appointment for any reason, we ll make sure you have enough medicine to last until your next appointment.     As a Provider, I will:  Listen carefully to your concerns and treat you with respect.   Recommend a treatment plan that I believe is in your best interest. This plan may involve therapies other than opioid pain medication.   Talk with you often about the possible benefits, and the risk of harm of any medicine that we prescribe for you.   Provide a plan on how to taper (discontinue or go off) using this medicine if the decision is made to stop its use.    As a Patient, I understand that opioid(s):   Are a controlled substance prescribed by my care team to help me function or work and manage my condition(s).   Are strong medicines and can cause serious side effects such as:  Drowsiness, which can seriously affect my driving ability  A lower breathing rate, enough to cause death  Harm to my thinking ability   Depression   Abuse of and addiction to this medicine  Need to be taken exactly as prescribed. Combining opioids with certain medicines or chemicals (such as illegal drugs, sedatives, sleeping pills, and benzodiazepines) can be dangerous or even fatal. If I stop opioids suddenly, I may have severe withdrawal symptoms.  Do not work for all types of pain nor for all patients. If they re not helpful, I may be asked to stop  them.        The risks, benefits and side effects of these medicine(s) were explained to me. I agree that:  I will take part in other treatments as advised by my care team. This may be psychiatry or counseling, physical therapy, behavioral therapy, group treatment or a referral to a specialist.     I will keep all my appointments. I understand that this is part of the monitoring of opioids. My care team may require an office visit for EVERY opioid/controlled substance refill. If I miss appointments or don t follow instructions, my care team may stop my medicine.    I will take my medicines as prescribed. I will not change the dose or schedule unless my care team tells me to. There will be no refills if I run out early.     I may be asked to come to the clinic and complete a urine drug test or complete a pill count at any time. If I don t give a urine sample or participate in a pill count, the care team may stop my medicine.    I will only receive prescriptions from this clinic for chronic pain. If I am treated by another provider for acute pain issues, I will tell them that I am taking opioid pain medication for chronic pain and that I have a treatment agreement with this provider. I will inform my [facility] care team within one business day if I am given a prescription for any pain medication by another healthcare provider. My [facility] care team can contact other providers and pharmacists about my use of any medicines.    It is up to me to make sure that I don t run out of my medicines on weekends or holidays. If my care team is willing to refill my opioid prescription without a visit, I must request refills only during office hours. Refills may take up to 3 business days to process. I will use one pharmacy to fill all my opioid and other controlled substance prescriptions. I will notify the clinic about any changes to my insurance or medication availability.    I am responsible for my  prescriptions. If the medicine/prescription is lost, stolen or destroyed, it will not be replaced. I also agree not to share controlled substance medicines with anyone.    I am aware I should not use any illegal or recreational drugs. I agree not to drink alcohol unless my care team says I can.       If I enroll in the [place] [facility] program, I will tell my care team prior to my next refill.     I will tell my care team right away if I become pregnant, have a new medical problem treated outside of my regular clinic, or have a change in my medications.    I understand that this medicine can affect my thinking, judgment and reaction time. Alcohol and drugs affect the brain and body, which can affect the safety of my driving. Being under the influence of alcohol or drugs can affect my decision-making, behaviors, personal safety, and the safety of others. Driving while impaired (DWI) can occur if a person is driving, operating, or in physical control of a car, motorcycle, boat, snowmobile, ATV, motorbike, off-road vehicle, or any other motor vehicle ([place] Statute 169A.20). I understand the risk if I choose to drive or operate any vehicle or machinery.    I understand that if I do not follow any of the conditions above, my prescriptions or treatment may be stopped or changed.          Opioids  What You Need to Know    What are opioids?   Opioids are pain medicines that must be prescribed by a doctor. They are also known as narcotics.     Examples are:   morphine (MS Contin, Anat)  oxycodone (Oxycontin)  oxycodone and acetaminophen (Percocet)  hydrocodone and acetaminophen (Vicodin, Norco)   fentanyl patch (Duragesic)   hydromorphone (Dilaudid)   methadone  codeine (Tylenol #3)     What do opioids do well?   Opioids are best for severe short-term pain such as after a surgery or injury. They may work well for cancer pain. They may help some people with long-lasting (chronic) pain.     What do opioids NOT do  well?   Opioids never get rid of pain entirely, and they don t work well for most patients with chronic pain. Opioids don t reduce swelling, one of the causes of pain.                                    Other ways to manage chronic pain and improve function include:     Treat the health problem that may be causing pain  Anti-inflammation medicines, which reduce swelling and tenderness, such as ibuprofen (Advil, Motrin) or naproxen (Aleve)  Acetaminophen (Tylenol)  Antidepressants and anti-seizure medicines, especially for nerve pain  Topical treatments such as patches or creams  Injections or nerve blocks  Chiropractic or osteopathic treatment  Acupuncture, massage, deep breathing, meditation, visual imagery, aromatherapy  Use heat or ice at the pain site  Physical therapy   Exercise  Stop smoking  Take part in therapy       Risks and side effects     Talk to your doctor before you start or decide to keep taking opioids. Possible side effects include:    Lowering your breathing rate enough to cause death  Overdose, including death, especially if taking higher than prescribed doses  Worse depression symptoms; less pleasure in things you usually enjoy  Feeling tired or sluggish  Slower thoughts or cloudy thinking  Being more sensitive to pain over time; pain is harder to control  Trouble sleeping or restless sleep  Changes in hormone levels (for example, less testosterone)  Changes in sex drive or ability to have sex  Constipation  Unsafe driving  Itching and sweating  Dizziness  Nausea, throwing up and dry mouth    What else should I know about opioids?    Opioids may lead to dependence, tolerance, or addiction.    Dependence means that if you stop or reduce the medicine too quickly, you will have withdrawal symptoms. These include loose poop (diarrhea), jitters, flu-like symptoms, nervousness and tremors. Dependence is not the same as addiction.                     Tolerance means needing higher doses over time to  get the same effect. This may increase the chance of serious side effects.    Addiction is when people improperly use a substance that harms their body, their mind or their relations with others. Use of opiates can cause a relapse of addiction if you have a history of drug or alcohol abuse.    People who have used opioids for a long time may have a lower quality of life, worse depression, higher levels of pain and more visits to doctors.    You can overdose on opioids. Take these steps to lower your risk of overdose:    Recognize the signs:  Signs of overdose include decrease or loss of consciousness (blackout), slowed breathing, trouble waking up and blue lips. If someone is worried about overdose, they should call 911.    Talk to your doctor about Narcan (naloxone).   If you are at risk for overdose, you may be given a prescription for Narcan. This medicine very quickly reverses the effects of opioids.   If you overdose, a friend or family member can give you Narcan while waiting for the ambulance. They need to know the signs of overdose and how to give Narcan.     Don't use alcohol or street drugs.   Taking them with opioids can cause death.    Do not take any of these medicines unless your doctor says it s OK. Taking these with opioids can cause death:  Benzodiazepines, such as lorazepam (Ativan), alprazolam (Xanax) or diazepam (Valium)  Muscle relaxers, such as cyclobenzaprine (Flexeril)  Sleeping pills like zolpidem (Ambien)   Other opioids      How to keep you and other people safe while taking opioids:    Never share your opioids with others.  Opioid medicines are regulated by the Drug Enforcement Agency ([place]). Selling or sharing medications is a criminal act.    2. Be sure to store opioids in a secure place, locked up if possible. Young children can easily swallow them and overdose.    3. When you are traveling with your medicines, keep them in the original bottles. If you use a pill box, be sure you also  carry a copy of your medicine list from your clinic or pharmacy.    4. Safe disposal of opioids    Most pharmacies have places to get rid of medicine, called disposal kiosks. Medicine disposal options are also available in every [facility]. Search your county and  medication disposal  to find more options. You can find more details at:  https://www.pca.[email or web address]./living-green/managing-unwanted-medications     I agree that my provider, clinic care team, and pharmacy may work with any city, state or federal law enforcement agency that investigates the misuse, sale, or other diversion of my controlled medicine. I will allow my provider to discuss my care with, or share a copy of, this agreement with any other treating provider, pharmacy or emergency room where I receive care.    I have read this agreement and have asked questions about anything I did not understand.    _______________________________________________________  Patient Signature - Bradford Prado _____________________                   Date     _______________________________________________________  Provider Signature - OPAL Tristan CNP   _____________________                   Date     _______________________________________________________  Witness Signature (required if provider not present while patient signing)   _____________________                   Date

## 2024-05-30 ENCOUNTER — PATIENT OUTREACH (OUTPATIENT)
Dept: GASTROENTEROLOGY | Facility: CLINIC | Age: 64
End: 2024-05-30
Payer: COMMERCIAL

## 2024-05-30 DIAGNOSIS — Z12.11 SPECIAL SCREENING FOR MALIGNANT NEOPLASMS, COLON: Primary | ICD-10-CM

## 2024-05-30 NOTE — PROGRESS NOTES
"[facility] Screening Colonoscopy Referral Review    Patient meets the inclusion criteria for screening colonoscopy standing order.    Ordering/Referring Provider:  Paulette Travis      BMI: Estimated body mass index is 26.61 kg/m  as calculated from the following:    Height as of 2/26/24: 1.511 m (4' 11.5\").    Weight as of 2/26/24: 60.8 kg (134 lb).     Sedation:  Does patient have any of the following conditions affecting sedation?  Chronic or scheduled pain/narcotic medication use: MAC sedation recommended    Previous Scopes:  Any previous recommendations or follow up needs based on previous scope?  na / No recommendations.    Medical Concerns to Postpone Order:  Does patient have any of the following medical concerns that should postpone/delay colonoscopy referral?  No medical conditions affecting colonoscopy referral.    Final Referral Details:  Based on patient's medical history patient is appropriate for referral order with MAC/deep sedation.   BMI<= 45 45 < BMI <= 48 48 < BMI < = 50  BMI > 50   No Restrictions No [facility]  [facility]  [facility] with exceptions Hospital Only OR Only     "

## 2024-06-04 ENCOUNTER — MYC MEDICAL ADVICE (OUTPATIENT)
Dept: PALLIATIVE MEDICINE | Facility: CLINIC | Age: 64
End: 2024-06-04
Payer: COMMERCIAL

## 2024-06-04 DIAGNOSIS — G89.29 OTHER CHRONIC PAIN: ICD-10-CM

## 2024-06-04 RX ORDER — GABAPENTIN 100 MG/1
300 CAPSULE ORAL 3 TIMES DAILY
Qty: 270 CAPSULE | Refills: 2 | Status: SHIPPED | OUTPATIENT
Start: 2024-06-04 | End: 2024-08-08

## 2024-06-04 NOTE — TELEPHONE ENCOUNTER
Chart review:  From last apt: 3/27/24  Increase gabapentin 100mg in am 2 tabs q hs. Increase by 1 tab every 3 days until taking 3 tabs three times a day.    Pending Prescriptions:                       Disp   Refills    gabapentin (NEURONTIN) 100 MG capsule     270 ca*2            Sig: Take 3 capsules (300 mg) by mouth 3 times daily    [facility]

## 2024-06-19 ENCOUNTER — DOCUMENTATION ONLY (OUTPATIENT)
Dept: LAB | Facility: CLINIC | Age: 64
End: 2024-06-19
Payer: COMMERCIAL

## 2024-06-19 DIAGNOSIS — E83.110 HEREDITARY HEMOCHROMATOSIS (H): Primary | ICD-10-CM

## 2024-06-19 NOTE — PROGRESS NOTES
Standing orders have been signed by Mary June PA-C.     Cheryl Wallace, RN, BSN, PHN, OCN     6/19/2024 at 3:53 PM  Nurse Care Coordinator  [facility]  P: [phone]   F: [phone]

## 2024-06-19 NOTE — PROGRESS NOTES
Bradford Prado has an upcoming lab appointment:    Future Appointments   Date Time Provider Department Center   6/26/2024 11:30 AM [facility]   7/1/2024  9:00 AM Francine Elizalde MD [facility]   7/1/2024 10:00 AM [facility] CHAIR [facility]   8/8/2024 10:30 AM Kimi Herman APRN CNP [facility]     Patient is scheduled for the following lab(s):   Scheduling Notes: natasha long   Rescheduled: 6/11/2024 2:28 PM By: NEAL POWELL       There is no order available. Please review and place either future orders or HMPO (Review of Health Maintenance Protocol Orders), as appropriate.    Health Maintenance Due   Topic    A1C      If no labs are needed at this time please have the Care Team contact patient regarding this information and cancel lab appointment. Thank you.     Jessica JOHNSON

## 2024-06-26 ENCOUNTER — LAB (OUTPATIENT)
Dept: LAB | Facility: CLINIC | Age: 64
End: 2024-06-26
Payer: COMMERCIAL

## 2024-06-26 DIAGNOSIS — E11.29 TYPE 2 DIABETES MELLITUS WITH KIDNEY COMPLICATION, WITH LONG-TERM CURRENT USE OF INSULIN (H): Primary | ICD-10-CM

## 2024-06-26 DIAGNOSIS — E83.110 HEREDITARY HEMOCHROMATOSIS (H): ICD-10-CM

## 2024-06-26 DIAGNOSIS — Z79.4 TYPE 2 DIABETES MELLITUS WITH KIDNEY COMPLICATION, WITH LONG-TERM CURRENT USE OF INSULIN (H): Primary | ICD-10-CM

## 2024-06-26 LAB
FERRITIN SERPL-MCNC: 64 NG/ML (ref 11–328)
HBA1C MFR BLD: 6.2 % (ref 0–5.6)

## 2024-06-26 PROCEDURE — 82728 ASSAY OF FERRITIN: CPT

## 2024-06-26 PROCEDURE — 36415 COLL VENOUS BLD VENIPUNCTURE: CPT

## 2024-06-26 PROCEDURE — 83036 HEMOGLOBIN GLYCOSYLATED A1C: CPT

## 2024-07-01 ENCOUNTER — ONCOLOGY VISIT (OUTPATIENT)
Dept: ONCOLOGY | Facility: CLINIC | Age: 64
End: 2024-07-01
Attending: INTERNAL MEDICINE
Payer: COMMERCIAL

## 2024-07-01 VITALS
BODY MASS INDEX: 28.51 KG/M2 | WEIGHT: 141.4 LBS | SYSTOLIC BLOOD PRESSURE: 157 MMHG | OXYGEN SATURATION: 97 % | HEART RATE: 75 BPM | RESPIRATION RATE: 20 BRPM | TEMPERATURE: 98.2 F | HEIGHT: 59 IN | DIASTOLIC BLOOD PRESSURE: 90 MMHG

## 2024-07-01 DIAGNOSIS — E83.110 HEREDITARY HEMOCHROMATOSIS (H): Primary | ICD-10-CM

## 2024-07-01 PROCEDURE — 99214 OFFICE O/P EST MOD 30 MIN: CPT | Performed by: INTERNAL MEDICINE

## 2024-07-01 PROCEDURE — G0463 HOSPITAL OUTPT CLINIC VISIT: HCPCS | Performed by: INTERNAL MEDICINE

## 2024-07-01 ASSESSMENT — ENCOUNTER SYMPTOMS
PSYCHIATRIC NEGATIVE: 1
HEMATOLOGIC/LYMPHATIC NEGATIVE: 1
FATIGUE: 1
ENDOCRINE COMMENTS: DIABETES MELLITUS
ABDOMINAL PAIN: 1
ABDOMINAL DISTENTION: 1
DIARRHEA: 1
ARTHRALGIAS: 1
NAUSEA: 1
SLEEP DISTURBANCE: 0

## 2024-07-01 ASSESSMENT — PAIN SCALES - GENERAL: PAINLEVEL: SEVERE PAIN (6)

## 2024-07-01 NOTE — PROGRESS NOTES
Assessment & Plan   Hereditary Hemochromatosis  Multiple colonic polyps  Diabetes mellitus  Ferritin target <100, most recent = 63    Monitor ferritin every 3 months with phlebotomy as needed to maintain in target range.    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  She apparently was found by Dr. Leventhal to have inherited genes from both parents, and she reports that her mother  from complications of iron overload.     She is not fond of needles and had a LOT or problems and pain after her last phlebotomy.  We spoke briefly about option for port-a-cath for repeat phlebotomies but she has no interest in that for now.    She had recent GI problems including nausea, some vomiting, poor control of bowels.  She is being seen by GI.    She also is on treatment for including insulin and diet for diabetes.      ECOG = 0    Oncologic History  PATHOLOGY:  5/10/21: TEST(S) REQUESTED:   Hemochromatosis Mutation Analysis by PCR     SPECIMEN DESCRIPTION:   Blood     HEMOCHROMATOSIS RESULTS     HFE Gene C282Y (G845A) RESULTS:     C282Y Mutation Interpretation: HOMOZYGOTE     HFE Gene H63D (C187G) RESULTS:     H63D Mutation Interpretation: NORMAL     HFE Gene S65C (A193T) RESULTS:     S65C Mutation Interpretation: NORMAL      Indication for testing: Carrier screening or diagnostic   testing for alpha-1-antitrypsin (AAT) deficiency.   Negative: This sample has a serum AAT protein concentration   in the normal range and is negative for the S and Z   deficiency alleles by genotyping. 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                          - Small (< 5 mm) esophageal varices.                          - Gastritis. Biopsied.                          - Normal examined duodenum.      Final Diagnosis   A. STOMACH, BIOPSY:  - Gastric mucosa with features of reactive gastropathy   - No H. pylori organisms identified on routine staining  - Negative for intestinal metaplasia and dysplasia         Case Report   Surgical Pathology Report                         Case: [id number]                                   Authorizing Provider:  Eber Gill MD Collected:           02/13/2023 11:26 AM           Ordering Location:     [facility]          Received:            02/13/2023 12:35 PM                                  [facility] OR                                                             Pathologist:           Marnie De La Rosa MD                                                                            Specimen:    Gallbladder                                                                                 Final Diagnosis   Gallbladder, cholecystectomy:  -Acute on chronic cholecystitis with cholelithiasis               IMAGING:  ULTRASOUND ABDOMEN LIMITED November 24, 2021   IMPRESSION:  1.  Coarse increased echogenicity of the liver compatible with  steatosis and/or cirrhosis.  2.  1 cm gallbladder polyp.  3.  No hepatoma demonstrated.     US ABDOMEN LIMITED 5/5/2022  FINDINGS:     GALLBLADDER: Gallbladder polyp measuring 1 cm again noted and  unchanged. No gallstones, wall thickening, or pericholecystic fluid.  Negative sonographic Clark's sign.     BILE DUCTS: There is no biliary dilatation. The common duct measures 4  mm.     LIVER: Unremarkable where  seen.                                                                      IMPRESSION:  1.  Stable gallbladder polyp.     ABD US 12/27/23:  IMPRESSION:  1.  Coarsened hepatic echotexture suggestive of underlying intrinsic liver disease. No suspicious focal liver lesion.  2.  Mild splenomegaly.  3.  Interval cholecystectomy.      Patient Active Problem List   Diagnosis    Hand arthritis    Stenosing tenosynovitis    Hyperlipidemia LDL goal <70    Essential hypertension with goal blood pressure less than 140/90    Type 2 diabetes mellitus with kidney complication, with long-term current use of insulin (H)    Dyslipidemia    Hereditary hemochromatosis (H24)    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) (H)    Situational mixed anxiety and depressive disorder     Current Outpatient Medications   Medication Sig Dispense Refill    amLODIPine (NORVASC) 5 MG tablet Take 1 tablet (5 mg) by mouth 2 times daily 180 tablet 3    blood glucose (NO BRAND SPECIFIED) lancets standard Use to test blood sugar twice times daily or as directed. 100 each 1    blood glucose (NO BRAND SPECIFIED) test strip Use to test blood sugar twice times daily or as directed. 100 each 1    blood glucose (NO BRAND SPECIFIED) test strip Use to test blood sugars 2 times daily or as directed 100 strip 11    blood glucose monitoring (SOFTCLIX) lancets Use to test blood sugar 6 times daily. 200 each 11    Blood Glucose Monitoring Suppl (ACCU-CHEK GUIDE ME) w/Device KIT 1 each 6 times daily 1 kit 0    carvedilol (COREG) 12.5 MG tablet Take 1 tablet (12.5 mg) by mouth 2 times daily (with meals) 180 tablet 3    cholestyramine light (PREVALITE) 4 GM powder Take 1 packet (4 g) by mouth 3 times daily (with meals) 60 packet 1    Continuous Blood Gluc Sensor (FREESTYLE MEENA 2 SENSOR) MISC CHANGE EVERY 14 DAYS. USE TO CHECK BLOOD SUGARS PER  GUIDELINES 1 each 2    Continuous Blood Gluc Sensor (FREESTYLE MEENA 3 SENSOR) MISC 1 each  every 14 days Use 1 sensor every 14 days. Use to read blood sugars per 's instructions. 6 each 5    gabapentin (NEURONTIN) 100 MG capsule Take 3 capsules (300 mg) by mouth 3 times daily 270 capsule 2    Glucagon (GVOKE HYPOPEN) 1 MG/0.2ML pen Inject the contents of one device under the skin into lower abdomen, outer thigh, or outer upper arm as needed for severe hypoglycemia. If no response after 15 minutes, additional 1 mg dose from a new device may be injected while waiting for emergency assistance 0.4 mL 3    insulin aspart (NOVOLOG FLEXPEN) 100 UNIT/ML pen INJECT 10 UNITS UNDER THE SKIN THREE TIMES DAILY WITH MEALS. MAXIMUM OF 30 UNITS DAILY. 30 mL 1    insulin glargine (LANTUS SOLOSTAR) 100 UNIT/ML pen INJECT 38 UNITS SUBCUTANEOUS EVERY MORNING (BEFORE BREAKFAST) 45 mL 1    insulin pen needle (BD NATALIYA U/F) 32G X 4 MM miscellaneous USE 4 TIMES DAILY 200 each 0    losartan (COZAAR) 50 MG tablet Take 1 tablet (50 mg) by mouth daily 90 tablet 3    ondansetron (ZOFRAN ODT) 4 MG ODT tab Take 1 tablet (4 mg) by mouth every 8 hours as needed for nausea 16 tablet 0    pantoprazole (PROTONIX) 40 MG EC tablet Take 1 tablet (40 mg) by mouth daily 90 tablet 3    pravastatin (PRAVACHOL) 20 MG tablet TAKE ONE TABLET BY MOUTH ONCE DAILY. DUE FOR LABS 90 tablet 3    sertraline (ZOLOFT) 50 MG tablet Take 2 tablets (100 mg) by mouth daily 180 tablet 1    traMADol (ULTRAM) 50 MG tablet Take 1 tablet (50 mg) by mouth at bedtime 30 tablet 0    ACCU-CHEK GUIDE test strip Use to test blood sugar 4 times daily or as directed. (Patient not taking: Reported on 7/1/2024) 200 strip 11     No current facility-administered medications for this visit.     Past Medical History:   Diagnosis Date    Chest pain 02/23/2021    Diabetes mellitus (H)     Dyslipidemia     Hereditary hemochromatosis (H24)     Hyperglycemia 02/23/2021    Hypertension     Hypertensive urgency 02/23/2021    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) (H)     Nephrolithiasis 09/19/2016    First episode September 2016    PONV (postoperative nausea and vomiting)     Poorly controlled type 2 diabetes mellitus with renal complication (H)     Type 2 diabetes mellitus with diabetic neuropathy, with long-term current use of insulin (H)      Past Surgical History:   Procedure Laterality Date    COLONOSCOPY      COLONOSCOPY N/A 2/29/2024    Procedure: Colonoscopy;  Surgeon: Royce Mari MD;  Location:  GI    COLONOSCOPY N/A 2/29/2024    Procedure: COLONOSCOPY, WITH POLYPECTOMY AND BIOPSY;  Surgeon: Royce Mari MD;  Location:  GI    ESOPHAGOSCOPY, GASTROSCOPY, DUODENOSCOPY (EGD), COMBINED N/A 11/10/2021    Procedure: ESOPHAGOGASTRODUODENOSCOPY, WITH BIOPSY;  Surgeon: Leventhal, Thomas Michael, MD;  Location: [facility] OR    ESOPHAGOSCOPY, GASTROSCOPY, DUODENOSCOPY (EGD), COMBINED N/A 2/29/2024    Procedure: Esophagoscopy, gastroscopy, duodenoscopy (EGD), combined;  Surgeon: Royce Mari MD;  Location:  GI    HYSTERECTOMY TOTAL ABDOMINAL  01/01/1995    due to fibroids    LAPAROSCOPIC CHOLECYSTECTOMY N/A 02/13/2023    Procedure: Laparoscopic cholecystectomy;  Surgeon: Eber Gill MD;  Location:  OR    LAPAROSCOPIC EVACUATION ECTOPIC PREGNANCY      TUBAL LIGATION       Socioeconomic History    Marital status: Single     Social Determinants of Health     Interpersonal Safety: Low Risk  (1/24/2024)    Interpersonal Safety     Do you feel physically and emotionally safe where you currently live?: Yes     Within the past 12 months, have you been hit, slapped, kicked or otherwise physically hurt by someone?: No     Within the past 12 months, have you been humiliated or emotionally abused in other ways by your partner or ex-partner?: No     Review of Systems   Constitutional:  Positive for fatigue.   Gastrointestinal:  Positive for abdominal distention, abdominal pain, diarrhea and nausea.  "  Endocrine:        Diabetes mellitus   Musculoskeletal:  Positive for arthralgias.   Hematological: Negative.    Psychiatric/Behavioral: Negative.  Negative for sleep disturbance.    All other systems reviewed and are negative.      BP (!) 157/90   Pulse 75   Temp 98.2  F (36.8  C) (Temporal)   Resp 20   Ht 1.499 m (4' 11\")   Wt 64.1 kg (141 lb 6.4 oz)   LMP  (LMP Unknown)   SpO2 97%   BMI 28.56 kg/m      Physical Exam  Vitals and nursing note reviewed.   Constitutional:       Appearance: She is well-developed and normal weight. She is not ill-appearing.      Comments: Pleasant, calm middle aged white woman   HENT:      Head: Normocephalic and atraumatic.      Mouth/Throat:      Mouth: Mucous membranes are moist.      Dentition: Normal dentition. No dental caries.   Eyes:      Extraocular Movements: Extraocular movements intact.      Conjunctiva/sclera: Conjunctivae normal.      Pupils: Pupils are equal, round, and reactive to light.   Cardiovascular:      Rate and Rhythm: Normal rate and regular rhythm.      Pulses: Normal pulses.      Heart sounds: Normal heart sounds.   Pulmonary:      Effort: Pulmonary effort is normal.      Breath sounds: Normal breath sounds.   Abdominal:      General: There is no distension.      Palpations: Abdomen is soft. There is no mass.      Tenderness: There is no abdominal tenderness. There is no guarding.   Musculoskeletal:         General: No swelling or deformity.      Cervical back: Normal range of motion and neck supple.   Lymphadenopathy:      Cervical: No cervical adenopathy.      Upper Body:      Right upper body: No axillary or epitrochlear adenopathy.      Left upper body: No axillary or epitrochlear adenopathy.   Skin:     General: Skin is warm and dry.   Neurological:      General: No focal deficit present.      Mental Status: She is alert and oriented to person, place, and time.      Cranial Nerves: No cranial nerve deficit.      Motor: No weakness. "   Psychiatric:         Mood and Affect: Mood normal.         Behavior: Behavior normal. Behavior is cooperative.         Thought Content: Thought content normal.         Judgment: Judgment normal.       Recent Results (from the past 720 hour(s))   HEMOGLOBIN A1C    Collection Time: 06/26/24 11:17 AM   Result Value Ref Range    Hemoglobin A1C 6.2 (H) 0.0 - 5.6 %   Ferritin    Collection Time: 06/26/24 11:17 AM   Result Value Ref Range    Ferritin 64 11 - 328 ng/mL     No results found for this or any previous visit (from the past 744 hour(s)).

## 2024-07-01 NOTE — LETTER
2024      Bradford Prado  [address]  [facility] [address]      Dear Colleague,    Thank you for referring your patient, Bradford Prado, to the [facility]. Please see a copy of my visit note below.    Assessment & Plan   Hereditary Hemochromatosis  Multiple colonic polyps  Diabetes mellitus  Ferritin target <100, most recent = 63    Monitor ferritin every 3 months with phlebotomy as needed to maintain in target range.    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  She apparently was found by Dr. Leventhal to have inherited genes from both parents, and she reports that her mother  from complications of iron overload.     She is not fond of needles and had a LOT or problems and pain after her last phlebotomy.  We spoke briefly about option for port-a-cath for repeat phlebotomies but she has no interest in that for now.    She had recent GI problems including nausea, some vomiting, poor control of bowels.  She is being seen by GI.    She also is on treatment for including insulin and diet for diabetes.      ECOG = 0    Oncologic History  PATHOLOGY:  5/10/21: TEST(S) REQUESTED:   Hemochromatosis Mutation Analysis by PCR     SPECIMEN DESCRIPTION:   Blood     HEMOCHROMATOSIS RESULTS     HFE Gene C282Y (G845A) RESULTS:     C282Y Mutation Interpretation: HOMOZYGOTE     HFE Gene H63D (C187G) RESULTS:     H63D Mutation Interpretation: NORMAL     HFE Gene S65C (A193T) RESULTS:     S65C Mutation Interpretation: NORMAL      Indication for testing: Carrier screening or diagnostic   testing for alpha-1-antitrypsin (AAT) deficiency.   Negative: This sample has a serum AAT protein concentration   in the normal range and is negative for the S and Z   deficiency alleles by genotyping. This individual is not   predicted to be affected with AAT deficiency     Liver biopsy 21:  FINAL  DIAGNOSIS:   Liver, Needle Biopsy Directed at Suspected Mass Lesion:   Severe hemosiderosis with 4+ iron on a scale of 0-4:    -With mild steatohepatitis and advanced cirrhosis (Laennec fibrosis stage    4C)    -Consistent with genetic hemochromatosis overlapping with steatohepatitis    -No neoplastic or other mass lesions identified       EGD 11/10/21:  Impression:     - Z-line regular, 36 cm from the incisors.                          - Small (< 5 mm) esophageal varices.                          - Gastritis. Biopsied.                          - Normal examined duodenum.      Final Diagnosis   A. STOMACH, BIOPSY:  - Gastric mucosa with features of reactive gastropathy   - No H. pylori organisms identified on routine staining  - Negative for intestinal metaplasia and dysplasia         Case Report   Surgical Pathology Report                         Case: [id number]                                   Authorizing Provider:  Eber Gill MD Collected:           02/13/2023 11:26 AM           Ordering Location:     [facility]          Received:            02/13/2023 12:35 PM                                  [facility] OR                                                             Pathologist:           Marnie De La Rosa MD                                                                            Specimen:    Gallbladder                                                                                 Final Diagnosis   Gallbladder, cholecystectomy:  -Acute on chronic cholecystitis with cholelithiasis               IMAGING:  ULTRASOUND ABDOMEN LIMITED November 24, 2021   IMPRESSION:  1.  Coarse increased echogenicity of the liver compatible with  steatosis and/or cirrhosis.  2.  1 cm gallbladder polyp.  3.  No hepatoma demonstrated.     US ABDOMEN LIMITED 5/5/2022  FINDINGS:     GALLBLADDER: Gallbladder polyp measuring 1  cm again noted and  unchanged. No gallstones, wall thickening, or pericholecystic fluid.  Negative sonographic Clark's sign.     BILE DUCTS: There is no biliary dilatation. The common duct measures 4  mm.     LIVER: Unremarkable where seen.                                                                      IMPRESSION:  1.  Stable gallbladder polyp.     ABD US 12/27/23:  IMPRESSION:  1.  Coarsened hepatic echotexture suggestive of underlying intrinsic liver disease. No suspicious focal liver lesion.  2.  Mild splenomegaly.  3.  Interval cholecystectomy.      Patient Active Problem List   Diagnosis     Hand arthritis     Stenosing tenosynovitis     Hyperlipidemia LDL goal <70     Essential hypertension with goal blood pressure less than 140/90     Type 2 diabetes mellitus with kidney complication, with long-term current use of insulin (H)     Dyslipidemia     Hereditary hemochromatosis (H24)     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) (H)     Situational mixed anxiety and depressive disorder     Current Outpatient Medications   Medication Sig Dispense Refill     amLODIPine (NORVASC) 5 MG tablet Take 1 tablet (5 mg) by mouth 2 times daily 180 tablet 3     blood glucose (NO BRAND SPECIFIED) lancets standard Use to test blood sugar twice times daily or as directed. 100 each 1     blood glucose (NO BRAND SPECIFIED) test strip Use to test blood sugar twice times daily or as directed. 100 each 1     blood glucose (NO BRAND SPECIFIED) test strip Use to test blood sugars 2 times daily or as directed 100 strip 11     blood glucose monitoring (SOFTCLIX) lancets Use to test blood sugar 6 times daily. 200 each 11     Blood Glucose Monitoring Suppl (ACCU-CHEK GUIDE ME) w/Device KIT 1 each 6 times daily 1 kit 0     carvedilol (COREG) 12.5 MG tablet Take 1 tablet (12.5 mg) by mouth 2 times daily (with meals) 180 tablet 3     cholestyramine light (PREVALITE) 4 GM powder Take 1 packet (4 g) by mouth 3 times  daily (with meals) 60 packet 1     Continuous Blood Gluc Sensor (FREESTYLE MEENA 2 SENSOR) MISC CHANGE EVERY 14 DAYS. USE TO CHECK BLOOD SUGARS PER  GUIDELINES 1 each 2     Continuous Blood Gluc Sensor (FREESTYLE MEENA 3 SENSOR) MISC 1 each every 14 days Use 1 sensor every 14 days. Use to read blood sugars per 's instructions. 6 each 5     gabapentin (NEURONTIN) 100 MG capsule Take 3 capsules (300 mg) by mouth 3 times daily 270 capsule 2     Glucagon (GVOKE HYPOPEN) 1 MG/0.2ML pen Inject the contents of one device under the skin into lower abdomen, outer thigh, or outer upper arm as needed for severe hypoglycemia. If no response after 15 minutes, additional 1 mg dose from a new device may be injected while waiting for emergency assistance 0.4 mL 3     insulin aspart (NOVOLOG FLEXPEN) 100 UNIT/ML pen INJECT 10 UNITS UNDER THE SKIN THREE TIMES DAILY WITH MEALS. MAXIMUM OF 30 UNITS DAILY. 30 mL 1     insulin glargine (LANTUS SOLOSTAR) 100 UNIT/ML pen INJECT 38 UNITS SUBCUTANEOUS EVERY MORNING (BEFORE BREAKFAST) 45 mL 1     insulin pen needle (BD NATALIYA U/F) 32G X 4 MM miscellaneous USE 4 TIMES DAILY 200 each 0     losartan (COZAAR) 50 MG tablet Take 1 tablet (50 mg) by mouth daily 90 tablet 3     ondansetron (ZOFRAN ODT) 4 MG ODT tab Take 1 tablet (4 mg) by mouth every 8 hours as needed for nausea 16 tablet 0     pantoprazole (PROTONIX) 40 MG EC tablet Take 1 tablet (40 mg) by mouth daily 90 tablet 3     pravastatin (PRAVACHOL) 20 MG tablet TAKE ONE TABLET BY MOUTH ONCE DAILY. DUE FOR LABS 90 tablet 3     sertraline (ZOLOFT) 50 MG tablet Take 2 tablets (100 mg) by mouth daily 180 tablet 1     traMADol (ULTRAM) 50 MG tablet Take 1 tablet (50 mg) by mouth at bedtime 30 tablet 0     ACCU-CHEK GUIDE test strip Use to test blood sugar 4 times daily or as directed. (Patient not taking: Reported on 7/1/2024) 200 strip 11     No current facility-administered medications for this visit.     Past Medical  History:   Diagnosis Date     Chest pain 02/23/2021     Diabetes mellitus (H)      Dyslipidemia      Hereditary hemochromatosis (H24)      Hyperglycemia 02/23/2021     Hypertension      Hypertensive urgency 02/23/2021     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) (H)      Nephrolithiasis 09/19/2016    First episode September 2016     PONV (postoperative nausea and vomiting)      Poorly controlled type 2 diabetes mellitus with renal complication (H)      Type 2 diabetes mellitus with diabetic neuropathy, with long-term current use of insulin (H)      Past Surgical History:   Procedure Laterality Date     COLONOSCOPY       COLONOSCOPY N/A 2/29/2024    Procedure: Colonoscopy;  Surgeon: Royce Mari MD;  Location:  GI     COLONOSCOPY N/A 2/29/2024    Procedure: COLONOSCOPY, WITH POLYPECTOMY AND BIOPSY;  Surgeon: Royce Mari MD;  Location:  GI     ESOPHAGOSCOPY, GASTROSCOPY, DUODENOSCOPY (EGD), COMBINED N/A 11/10/2021    Procedure: ESOPHAGOGASTRODUODENOSCOPY, WITH BIOPSY;  Surgeon: Leventhal, Thomas Michael, MD;  Location: [facility] OR     ESOPHAGOSCOPY, GASTROSCOPY, DUODENOSCOPY (EGD), COMBINED N/A 2/29/2024    Procedure: Esophagoscopy, gastroscopy, duodenoscopy (EGD), combined;  Surgeon: Royce Mari MD;  Location:  GI     HYSTERECTOMY TOTAL ABDOMINAL  01/01/1995    due to fibroids     LAPAROSCOPIC CHOLECYSTECTOMY N/A 02/13/2023    Procedure: Laparoscopic cholecystectomy;  Surgeon: Eber Gill MD;  Location:  OR     LAPAROSCOPIC EVACUATION ECTOPIC PREGNANCY       TUBAL LIGATION       Socioeconomic History     Marital status: Single     Social Determinants of Health     Interpersonal Safety: Low Risk  (1/24/2024)    Interpersonal Safety      Do you feel physically and emotionally safe where you currently live?: Yes      Within the past 12 months, have you been hit, slapped, kicked or otherwise physically hurt by someone?: No      Within  "the past 12 months, have you been humiliated or emotionally abused in other ways by your partner or ex-partner?: No     Review of Systems   Constitutional:  Positive for fatigue.   Gastrointestinal:  Positive for abdominal distention, abdominal pain, diarrhea and nausea.   Endocrine:        Diabetes mellitus   Musculoskeletal:  Positive for arthralgias.   Hematological: Negative.    Psychiatric/Behavioral: Negative.  Negative for sleep disturbance.    All other systems reviewed and are negative.      BP (!) 157/90   Pulse 75   Temp 98.2  F (36.8  C) (Temporal)   Resp 20   Ht 1.499 m (4' 11\")   Wt 64.1 kg (141 lb 6.4 oz)   LMP  (LMP Unknown)   SpO2 97%   BMI 28.56 kg/m      Physical Exam  Vitals and nursing note reviewed.   Constitutional:       Appearance: She is well-developed and normal weight. She is not ill-appearing.      Comments: Pleasant, calm middle aged white woman   HENT:      Head: Normocephalic and atraumatic.      Mouth/Throat:      Mouth: Mucous membranes are moist.      Dentition: Normal dentition. No dental caries.   Eyes:      Extraocular Movements: Extraocular movements intact.      Conjunctiva/sclera: Conjunctivae normal.      Pupils: Pupils are equal, round, and reactive to light.   Cardiovascular:      Rate and Rhythm: Normal rate and regular rhythm.      Pulses: Normal pulses.      Heart sounds: Normal heart sounds.   Pulmonary:      Effort: Pulmonary effort is normal.      Breath sounds: Normal breath sounds.   Abdominal:      General: There is no distension.      Palpations: Abdomen is soft. There is no mass.      Tenderness: There is no abdominal tenderness. There is no guarding.   Musculoskeletal:         General: No swelling or deformity.      Cervical back: Normal range of motion and neck supple.   Lymphadenopathy:      Cervical: No cervical adenopathy.      Upper Body:      Right upper body: No axillary or epitrochlear adenopathy.      Left upper body: No axillary or " epitrochlear adenopathy.   Skin:     General: Skin is warm and dry.   Neurological:      General: No focal deficit present.      Mental Status: She is alert and oriented to person, place, and time.      Cranial Nerves: No cranial nerve deficit.      Motor: No weakness.   Psychiatric:         Mood and Affect: Mood normal.         Behavior: Behavior normal. Behavior is cooperative.         Thought Content: Thought content normal.         Judgment: Judgment normal.       Recent Results (from the past 720 hour(s))   HEMOGLOBIN A1C    Collection Time: 06/26/24 11:17 AM   Result Value Ref Range    Hemoglobin A1C 6.2 (H) 0.0 - 5.6 %   Ferritin    Collection Time: 06/26/24 11:17 AM   Result Value Ref Range    Ferritin 64 11 - 328 ng/mL     No results found for this or any previous visit (from the past 744 hour(s)).        Again, thank you for allowing me to participate in the care of your patient.        Sincerely,        Francine Elizalde MD

## 2024-07-01 NOTE — NURSING NOTE
"Oncology Rooming Note    July 1, 2024 8:49 AM   Bradford Prado is a 63 year old female who presents for:    Chief Complaint   Patient presents with    Oncology Clinic Visit     Initial Vitals: BP (!) 157/90   Pulse 75   Temp 98.2  F (36.8  C) (Temporal)   Resp 20   Ht 1.499 m (4' 11\")   Wt 64.1 kg (141 lb 6.4 oz)   LMP  (LMP Unknown)   SpO2 97%   BMI 28.56 kg/m   Estimated body mass index is 28.56 kg/m  as calculated from the following:    Height as of this encounter: 1.499 m (4' 11\").    Weight as of this encounter: 64.1 kg (141 lb 6.4 oz). Body surface area is 1.63 meters squared.  Severe Pain (6) Comment: Data Unavailable   No LMP recorded (lmp unknown). Patient has had a hysterectomy.  Allergies reviewed: Yes  Medications reviewed: Yes    Medications: Medication refills not needed today.  Pharmacy name entered into [employer]:    [facility] [place] [place] - [address] [place] [address] PHARMACY [place], [place] - [address] [place] [address] [phone]    Frailty Screening:   Is the patient here for a new oncology consult visit in cancer care? 2. No      Clinical concerns: Follow up       Yuko Patel MA              "

## 2024-07-02 ENCOUNTER — ANCILLARY PROCEDURE (OUTPATIENT)
Dept: GENERAL RADIOLOGY | Facility: CLINIC | Age: 64
End: 2024-07-02
Attending: FAMILY MEDICINE
Payer: COMMERCIAL

## 2024-07-02 ENCOUNTER — OFFICE VISIT (OUTPATIENT)
Dept: URGENT CARE | Facility: URGENT CARE | Age: 64
End: 2024-07-02
Payer: COMMERCIAL

## 2024-07-02 VITALS
WEIGHT: 141 LBS | OXYGEN SATURATION: 97 % | TEMPERATURE: 97.9 F | BODY MASS INDEX: 28.48 KG/M2 | SYSTOLIC BLOOD PRESSURE: 134 MMHG | DIASTOLIC BLOOD PRESSURE: 79 MMHG | RESPIRATION RATE: 20 BRPM | HEART RATE: 66 BPM

## 2024-07-02 DIAGNOSIS — E11.65 POORLY CONTROLLED TYPE 2 DIABETES MELLITUS WITH RENAL COMPLICATION (H): Chronic | ICD-10-CM

## 2024-07-02 DIAGNOSIS — E11.29 POORLY CONTROLLED TYPE 2 DIABETES MELLITUS WITH RENAL COMPLICATION (H): Chronic | ICD-10-CM

## 2024-07-02 DIAGNOSIS — S99.922A FOOT INJURY, LEFT, INITIAL ENCOUNTER: Primary | ICD-10-CM

## 2024-07-02 PROCEDURE — 99214 OFFICE O/P EST MOD 30 MIN: CPT | Performed by: FAMILY MEDICINE

## 2024-07-02 PROCEDURE — 73630 X-RAY EXAM OF FOOT: CPT | Mod: TC | Performed by: RADIOLOGY

## 2024-07-02 RX ORDER — PEN NEEDLE, DIABETIC 32GX 5/32"
NEEDLE, DISPOSABLE MISCELLANEOUS
Qty: 400 EACH | Refills: 0 | Status: SHIPPED | OUTPATIENT
Start: 2024-07-02

## 2024-07-02 NOTE — PROGRESS NOTES
SUBJECTIVE:  Chief Complaint   Patient presents with    Urgent Care     Fell off the porch yesterday and injured her left foot    .ident presents with a chief complaint of left foot.  The injury occurred 1 day ago.   The injury happened while while walking.   How: trauma: immediate pain    Past Medical History:   Diagnosis Date    Chest pain 2021    Diabetes mellitus (H)     Dyslipidemia     Hereditary hemochromatosis (H24)     Hyperglycemia 2021    Hypertension     Hypertensive urgency 2021    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) (H)     Nephrolithiasis 2016    First episode 2016    PONV (postoperative nausea and vomiting)     Poorly controlled type 2 diabetes mellitus with renal complication (H)     Type 2 diabetes mellitus with diabetic neuropathy, with long-term current use of insulin (H)      No Known Allergies  Social History     Tobacco Use    Smoking status: Former     Current packs/day: 0.00     Average packs/day: 0.5 packs/day for 20.0 years (10.0 ttl pk-yrs)     Types: Cigarettes     Start date: 3/9/1990     Quit date: 3/9/2010     Years since quittin.3    Smokeless tobacco: Never   Substance Use Topics    Alcohol use: No     Alcohol/week: 0.0 standard drinks of alcohol       ROSINTEGUMENTARY/SKIN: NEGATIVE for open wound/bleeding and NEGATIVE for bruising    EXAM: /79   Pulse 66   Temp 97.9  F (36.6  C)   Resp 20   Wt 64 kg (141 lb)   LMP  (LMP Unknown)   SpO2 97%   BMI 28.48 kg/m  Gen: healthy,alert,no distress  Extremity: foot has pain with palpation.   There is not compromise to the distal circulation.  Pulses are +2 and CRT is brisk.  EXTREMITIES: peripheral pulses normal  SKIN: bruise  NEURO: Normal strength and tone, sensory exam grossly normal, mentation intact and speech normal    Xray without acute findings, no fx read by Gilberto hCen D.O.      ICD-10-CM    1. Foot injury, left, initial encounter  S99.922A XR Foot  Left G/E 3 Views     Ankle/Foot Bracing Supplies Order Walking Boot; Left; Non-pneumatic; Short     Orthopedic  Referral        RICE

## 2024-07-08 ENCOUNTER — TELEPHONE (OUTPATIENT)
Dept: GASTROENTEROLOGY | Facility: CLINIC | Age: 64
End: 2024-07-08
Payer: COMMERCIAL

## 2024-07-08 DIAGNOSIS — Z12.11 SPECIAL SCREENING FOR MALIGNANT NEOPLASMS, COLON: Primary | ICD-10-CM

## 2024-07-08 NOTE — TELEPHONE ENCOUNTER
"Endoscopy Scheduling Screen    Have you had a positive Covid test in the last 14 days?  No    What is your communication preference for Instructions and/or Bowel Prep?   MyChart    What insurance is in the chart?  Other:  [facility]    Ordering/Referring Provider:   NIALL WILSON        (If ordering provider performs procedure, schedule with ordering provider unless otherwise instructed. )    BMI: Estimated body mass index is 28.48 kg/m  as calculated from the following:    Height as of 7/1/24: 1.499 m (4' 11\").    Weight as of 7/2/24: 64 kg (141 lb).     Sedation Ordered  MAC  If patient BMI > 50 do not schedule in [facility].    If patient BMI > 45 do not schedule at [facility].    Are you taking methadone or Suboxone?  No    Have you had difficulties, pain, or discomfort during past endoscopy procedures?  No    Are you taking any prescription medications for pain 3 or more times per week?   NO, No RN review required.    Do you have a history of malignant hyperthermia?  No    (Females) Are you currently pregnant?   No     Have you been diagnosed or told you have pulmonary hypertension?   No    Do you have an LVAD?  No    Have you been told you have moderate to severe sleep apnea?  No    Have you been told you have COPD, asthma, or any other lung disease?  No    Do you have any heart conditions?  No     Have you ever had or are you waiting for an organ transplant?  No. Continue scheduling, no site restrictions.    Have you had a stroke or transient ischemic attack (TIA aka \"mini stroke\" in the last 6 months?   No    Have you been diagnosed with or been told you have cirrhosis of the liver?   Yes (RN Review required for scheduling unless scheduling in Hospital.)    Are you currently on dialysis?   No    Do you need assistance transferring?   No    BMI: Estimated body mass index is 28.48 kg/m  as calculated from the following:    Height as of 7/1/24: 1.499 m (4' 11\").    Weight as of 7/2/24: 64 kg (141 lb).     Is patients BMI " > 40 and scheduling location [facility]?  No    Do you take an injectable medication for weight loss or diabetes (excluding insulin)?  No    Do you take the medication Naltrexone?  No    Do you take blood thinners?  No       Prep   Are you currently on dialysis or do you have chronic kidney disease?  Yes ([facility]ol[facility] Prep)    Do you have a diagnosis of diabetes?  Yes (Golytely Prep)    Do you have a diagnosis of cystic fibrosis (CF)?  No    On a regular basis do you go 3 -5 days between bowel movements?  No    BMI > 40?  No    Preferred Pharmacy:        [address] [address]  Phone: [phone] Fax: [phone] Alternate Fax: [phone], [phone]        Final Scheduling Details     Procedure scheduled  Colonoscopy    Surgeon:  10/9     Date of procedure:  Sandoval     Pre-OP / PAC:   Yes - Patient informed of pre-op requirement.    Location  [facility] - Per exclusion criteria.    Sedation   MAC/Deep Sedation - Per order.      Patient Reminders:   You will receive a call from a Nurse to review instructions and health history.  This assessment must be completed prior to your procedure.  Failure to complete the Nurse assessment may result in the procedure being cancelled.      On the day of your procedure, please designate an adult(s) who can drive you home stay with you for the next 24 hours. The medicines used in the exam will make you sleepy. You will not be able to drive.      You cannot take public transportation, ride share services, or non-medical taxi service without a responsible caregiver.  Medical transport services are allowed with the requirement that a responsible caregiver will receive you at your destination.  We require that drivers and caregivers are confirmed prior to your procedure.

## 2024-07-09 ENCOUNTER — OFFICE VISIT (OUTPATIENT)
Dept: PODIATRY | Facility: CLINIC | Age: 64
End: 2024-07-09
Payer: COMMERCIAL

## 2024-07-09 VITALS
WEIGHT: 141 LBS | DIASTOLIC BLOOD PRESSURE: 64 MMHG | SYSTOLIC BLOOD PRESSURE: 118 MMHG | BODY MASS INDEX: 28.43 KG/M2 | HEIGHT: 59 IN

## 2024-07-09 DIAGNOSIS — S93.602A SPRAIN OF LEFT FOOT, INITIAL ENCOUNTER: ICD-10-CM

## 2024-07-09 DIAGNOSIS — S90.32XA CONTUSION OF LEFT FOOT, INITIAL ENCOUNTER: ICD-10-CM

## 2024-07-09 DIAGNOSIS — S99.922A FOOT INJURY, LEFT, INITIAL ENCOUNTER: Primary | ICD-10-CM

## 2024-07-09 PROCEDURE — 99203 OFFICE O/P NEW LOW 30 MIN: CPT | Performed by: PODIATRIST

## 2024-07-09 NOTE — PATIENT INSTRUCTIONS
Thank you for choosing [facility] Podiatry / Foot & Ankle Surgery!    DR. SALEH'S CLINIC LOCATIONS:     [facility] TRIAGE LINE: [phone]   [address] [place] [address] APPOINTMENTS: [phone]   [place] [address] RADIOLOGY: [phone]   (Every other Tues - Wed - Fri PM) SET UP SURGERY: [phone]    PHYSICAL THERAPY: [phone]   [place] SPECIALTY BILLING QUESTIONS: [phone] [address] FAX: [phone]   [place] [address]    (Thurs & Fri AM)         PRICE THERAPY  Many aches and pains throughout the foot and ankle can be helped with many simple treatments. This is usually described as PRICE Therapy.      P - Protection - often times, inflammation/pain in the lower extremity is not able to improve simply because the areas involved are never allowed to rest. Every step we take can bother the problematic area. Protecting those areas is an important step in the healing process. This may involve a walking cast boot, a special insert/orthotic device, an ankle brace, or simply avoiding barefoot walking.    R - Rest - in addition to protecting the foot/ankle, resting is an important, but often times difficult, treatment option. Getting off your feet when they bother you, and specifically avoiding activities that cause pain/discomfort, are very beneficial to prevent, and treat, foot/ankle pain.      I - Ice - icing regularly can help to decrease inflammation and swelling in the foot, thus decreasing pain. Using an ice pack or a bag of frozen veggies works very well. Ice for 20 minutes multiple times per day as needed.  Do not place the ice directly on the skin as this can cause tissue damage.    C - Compression - using a compression wrap or an ACE wrap can help to decrease swelling, which can help to decrease pain. Wearing the wraps is generally not needed at night, but they should be worn on a regular basis when you are going to be on your feet for prolonged periods as gravity  "tends to pull fluids down to your feet/ankles.    E - Elevation - elevating your lower extremities multiple times daily for 15-20 minutes can help to decrease swelling, which works well in decreasing pain levels.    NSAID/Tylenol - Anti-inflammatories like Aleve or ibuprofen, and/or a pain medication, such as Tylenol, can help to improve pain levels and get the issue resolved sooner rather than later. Anyone with liver issues should be careful with Tylenol, and anyone with high blood pressure or heart, stomach or kidney issues should be careful with anti-inflammatories. Please ask if you have questions about these medications, including dosage.    [employer]IR[employer] / CAM WALKING BOOT INSTRUCTIONS  - Do NOT drive with CAM walker on. This is due to safety and legal issues.   - Do NOT wear the CAM walker on long car/train rides or on an airplane.  - Remove the CAM walker several times a day and do ankle range of motion (ROM) exercises/wiggle toes.  - It is recommended that a thick-soled shoe be worn on the other foot to offset any created leg length issue.    - You can purchase an \"even up\" on [employer] to place under the other shoe to help too.  - The boot does not have to be worn at night.   - There is an increased risk of developing a blood clot with lower extremity immobilization. ROM exercises and knee-high compression (tenso /ACE wrap) is recommended to lower that risk.   - You should seek medical attention if you experience calf swelling and/or pain, chest pain, or shortness of breath.   If you need to return or exchange the boot, please contact [facility] @ [phone]    "

## 2024-07-09 NOTE — PROGRESS NOTES
ASSESSMENT:  Encounter Diagnoses   Name Primary?    Foot injury, left, initial encounter Yes    Sprain of left foot, initial encounter     Contusion of left foot, initial encounter      MEDICAL DECISION MAKING:  I personally reviewed the left foot x-ray images.  No obvious fracture dislocations.  I suspect her pain is secondary to ligamentous injury and possibly contusion of bone.  Subacute fractures are possible.    Recommendations:  Aircast immobilization for 1 month.  PRICE therapy was discussed.    Follow-up in 1 month.  We will do more of a bilateral diabetic foot examination at her next visit.  I discussed the option of custom orthoses and diabetic shoes.    Disclaimer: This note consists of symbols derived from keyboarding, dictation and/or voice recognition software. As a result, there may be errors in the script that have gone undetected. Please consider this when interpreting information found in this chart.    Delvin Santa DPM, FACFAS, [facility] Department of Podiatry/Foot & Ankle Surgery      ____________________________________________________________________    HPI:       Follow-up from an urgent care visit on 2024  Chief Complaint: Left foot injury.  Fell off a porch  Onset of problem: 4 days ago  Pain/ discomfort is described as: Aching and throbbing  Pain Ratin out of 10 at worst  Frequency: Comes and goes  The pain is exacerbated by weightbearing  Previous treatment: Urgent care 2024.  X-ray.  Aircast.  Icing.  No fracture revealed on x-ray.    Type 2 diabetes.  Last hemoglobin A1c was 6.2% 13 days ago  She reports numbness in her feet  The diabetes is secondary to her hereditary hemochromatosis.    Exercise involves walking her dog.    *  Past Medical History:   Diagnosis Date    Chest pain 2021    Diabetes mellitus (H)     Dyslipidemia     Hereditary hemochromatosis (H24)     Hyperglycemia 2021    Hypertension     Hypertensive urgency 2021    Liver cirrhosis  secondary to nonalcoholic steatohepatitis (CERDA) (H)     Nephrolithiasis 09/19/2016    First episode September 2016    PONV (postoperative nausea and vomiting)     Poorly controlled type 2 diabetes mellitus with renal complication (H)     Type 2 diabetes mellitus with diabetic neuropathy, with long-term current use of insulin (H)    *  *  Past Surgical History:   Procedure Laterality Date    COLONOSCOPY      COLONOSCOPY N/A 2/29/2024    Procedure: Colonoscopy;  Surgeon: Royce Mari MD;  Location:  GI    COLONOSCOPY N/A 2/29/2024    Procedure: COLONOSCOPY, WITH POLYPECTOMY AND BIOPSY;  Surgeon: Royce Mari MD;  Location:  GI    ESOPHAGOSCOPY, GASTROSCOPY, DUODENOSCOPY (EGD), COMBINED N/A 11/10/2021    Procedure: ESOPHAGOGASTRODUODENOSCOPY, WITH BIOPSY;  Surgeon: Leventhal, Thomas Michael, MD;  Location: [facility] [place] [facility] OR    ESOPHAGOSCOPY, GASTROSCOPY, DUODENOSCOPY (EGD), COMBINED N/A 2/29/2024    Procedure: Esophagoscopy, gastroscopy, duodenoscopy (EGD), combined;  Surgeon: Royce Mari MD;  Location:  GI    HYSTERECTOMY TOTAL ABDOMINAL  01/01/1995    due to fibroids    LAPAROSCOPIC CHOLECYSTECTOMY N/A 02/13/2023    Procedure: Laparoscopic cholecystectomy;  Surgeon: Eber Gill MD;  Location:  OR    LAPAROSCOPIC EVACUATION ECTOPIC PREGNANCY      TUBAL LIGATION     *  *  Current Outpatient Medications   Medication Sig Dispense Refill    ACCU-CHEK GUIDE test strip Use to test blood sugar 4 times daily or as directed. 200 strip 11    amLODIPine (NORVASC) 5 MG tablet Take 1 tablet (5 mg) by mouth 2 times daily 180 tablet 3    blood glucose (NO BRAND SPECIFIED) lancets standard Use to test blood sugar twice times daily or as directed. 100 each 1    blood glucose (NO BRAND SPECIFIED) test strip Use to test blood sugar twice times daily or as directed. 100 each 1    blood glucose (NO BRAND SPECIFIED) test strip Use to test blood sugars 2 times daily or as directed 100  strip 11    blood glucose monitoring (SOFTCLIX) lancets Use to test blood sugar 6 times daily. 200 each 11    Blood Glucose Monitoring Suppl (ACCU-CHEK GUIDE ME) w/Device KIT 1 each 6 times daily 1 kit 0    carvedilol (COREG) 12.5 MG tablet Take 1 tablet (12.5 mg) by mouth 2 times daily (with meals) 180 tablet 3    cholestyramine light (PREVALITE) 4 GM powder Take 1 packet (4 g) by mouth 3 times daily (with meals) 60 packet 1    Continuous Blood Gluc Sensor (FREESTYLE MEENA 2 SENSOR) MISC CHANGE EVERY 14 DAYS. USE TO CHECK BLOOD SUGARS PER  GUIDELINES 1 each 2    Continuous Blood Gluc Sensor (FREESTYLE MEENA 3 SENSOR) MISC 1 each every 14 days Use 1 sensor every 14 days. Use to read blood sugars per 's instructions. 6 each 5    gabapentin (NEURONTIN) 100 MG capsule Take 3 capsules (300 mg) by mouth 3 times daily 270 capsule 2    Glucagon (GVOKE HYPOPEN) 1 MG/0.2ML pen Inject the contents of one device under the skin into lower abdomen, outer thigh, or outer upper arm as needed for severe hypoglycemia. If no response after 15 minutes, additional 1 mg dose from a new device may be injected while waiting for emergency assistance 0.4 mL 3    insulin aspart (NOVOLOG FLEXPEN) 100 UNIT/ML pen INJECT 10 UNITS UNDER THE SKIN THREE TIMES DAILY WITH MEALS. MAXIMUM OF 30 UNITS DAILY. 30 mL 1    insulin glargine (LANTUS SOLOSTAR) 100 UNIT/ML pen INJECT 38 UNITS SUBCUTANEOUS EVERY MORNING (BEFORE BREAKFAST) 45 mL 1    insulin pen needle (BD NATALIYA U/F) 32G X 4 MM miscellaneous USE FOR INJECTIONS 4 TIMES DAILY AS DIRECTED (DUE FOR APPOINTMENT AND LABS) 400 each 0    losartan (COZAAR) 50 MG tablet Take 1 tablet (50 mg) by mouth daily 90 tablet 3    ondansetron (ZOFRAN ODT) 4 MG ODT tab Take 1 tablet (4 mg) by mouth every 8 hours as needed for nausea 16 tablet 0    pantoprazole (PROTONIX) 40 MG EC tablet Take 1 tablet (40 mg) by mouth daily 90 tablet 3    pravastatin (PRAVACHOL) 20 MG tablet TAKE ONE TABLET BY  "MOUTH ONCE DAILY. DUE FOR LABS 90 tablet 3    sertraline (ZOLOFT) 50 MG tablet Take 2 tablets (100 mg) by mouth daily 180 tablet 1    traMADol (ULTRAM) 50 MG tablet Take 1 tablet (50 mg) by mouth at bedtime 30 tablet 0         EXAM:    Vitals: /64   Ht 1.499 m (4' 11\")   Wt 64 kg (141 lb)   LMP  (LMP Unknown)   BMI 28.48 kg/m    BMI: Body mass index is 28.48 kg/m .    Constitutional:  Bradford Prado is in no apparent distress, appears well-nourished.  Cooperative with history and physical exam.    Vascular:  Pedal pulses are palpable for both the DP and PT arteries.  CFT < 3 sec.  No edema.      Neuro: Light touch sensation is intact to the L4, L5, S1 distributions  No evidence of weakness, spasticity, or contracture in the lower extremities.   Protective sensation is diminished in the left foot, per [place] Trev monofilament testing.    Derm: No open lesions.  There is some ecchymosis dorsally over the fourth metatarsal phalangeal joint region.    Musculoskeletal:    Lower extremity muscle strength is normal. No gross deformities.  Pain on palpation over the calcaneal cuboid joint, left fifth metatarsal base and dorsally over the third and fourth metatarsal heads.    FOOT LEFT THREE OR MORE VIEWS July 2, 2024 11:05 AM      HISTORY: Foot injury, left, initial encounter. Pain.     COMPARISON: None.                                                                      IMPRESSION: Mild multifocal osteoarthrosis. There is no evidence of  fracture. Moderate-sized Achilles enthesophyte. Small plantar  calcaneal enthesophyte. Otherwise negative.     BRANDI CLEMENT MD         "

## 2024-07-09 NOTE — LETTER
2024      Bradford Prado  [address]  [facility] [address]      Dear Colleague,    Thank you for referring your patient, Bradford Prado, to the [facility]. Please see a copy of my visit note below.    ASSESSMENT:  Encounter Diagnoses   Name Primary?     Foot injury, left, initial encounter Yes     Sprain of left foot, initial encounter      Contusion of left foot, initial encounter      MEDICAL DECISION MAKING:  I personally reviewed the left foot x-ray images.  No obvious fracture dislocations.  I suspect her pain is secondary to ligamentous injury and possibly contusion of bone.  Subacute fractures are possible.    Recommendations:  Aircast immobilization for 1 month.  PRICE therapy was discussed.    Follow-up in 1 month.  We will do more of a bilateral diabetic foot examination at her next visit.  I discussed the option of custom orthoses and diabetic shoes.    Disclaimer: This note consists of symbols derived from keyboarding, dictation and/or voice recognition software. As a result, there may be errors in the script that have gone undetected. Please consider this when interpreting information found in this chart.    Delvin Santa DPM, FACFAS, [facility] Department of Podiatry/Foot & Ankle Surgery      ____________________________________________________________________    HPI:       Follow-up from an urgent care visit on 2024  Chief Complaint: Left foot injury.  Fell off a porch  Onset of problem: 4 days ago  Pain/ discomfort is described as: Aching and throbbing  Pain Ratin out of 10 at worst  Frequency: Comes and goes  The pain is exacerbated by weightbearing  Previous treatment: Urgent care 2024.  X-ray.  Aircast.  Icing.  No fracture revealed on x-ray.    Type 2 diabetes.  Last hemoglobin A1c was 6.2% 13 days ago  She reports numbness in her feet  The diabetes is secondary to her hereditary hemochromatosis.    Exercise involves walking her  dog.    *  Past Medical History:   Diagnosis Date     Chest pain 02/23/2021     Diabetes mellitus (H)      Dyslipidemia      Hereditary hemochromatosis (H24)      Hyperglycemia 02/23/2021     Hypertension      Hypertensive urgency 02/23/2021     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) (H)      Nephrolithiasis 09/19/2016    First episode September 2016     PONV (postoperative nausea and vomiting)      Poorly controlled type 2 diabetes mellitus with renal complication (H)      Type 2 diabetes mellitus with diabetic neuropathy, with long-term current use of insulin (H)    *  *  Past Surgical History:   Procedure Laterality Date     COLONOSCOPY       COLONOSCOPY N/A 2/29/2024    Procedure: Colonoscopy;  Surgeon: Royce Mari MD;  Location:  GI     COLONOSCOPY N/A 2/29/2024    Procedure: COLONOSCOPY, WITH POLYPECTOMY AND BIOPSY;  Surgeon: Royce Mari MD;  Location:  GI     ESOPHAGOSCOPY, GASTROSCOPY, DUODENOSCOPY (EGD), COMBINED N/A 11/10/2021    Procedure: ESOPHAGOGASTRODUODENOSCOPY, WITH BIOPSY;  Surgeon: Leventhal, Thomas Michael, MD;  Location: [facility] [place]     ESOPHAGOSCOPY, GASTROSCOPY, DUODENOSCOPY (EGD), COMBINED N/A 2/29/2024    Procedure: Esophagoscopy, gastroscopy, duodenoscopy (EGD), combined;  Surgeon: Royce Mari MD;  Location:  GI     HYSTERECTOMY TOTAL ABDOMINAL  01/01/1995    due to fibroids     LAPAROSCOPIC CHOLECYSTECTOMY N/A 02/13/2023    Procedure: Laparoscopic cholecystectomy;  Surgeon: Eber Gill MD;  Location:  OR     LAPAROSCOPIC EVACUATION ECTOPIC PREGNANCY       TUBAL LIGATION     *  *  Current Outpatient Medications   Medication Sig Dispense Refill     ACCU-CHEK GUIDE test strip Use to test blood sugar 4 times daily or as directed. 200 strip 11     amLODIPine (NORVASC) 5 MG tablet Take 1 tablet (5 mg) by mouth 2 times daily 180 tablet 3     blood glucose (NO BRAND SPECIFIED) lancets standard Use to test  blood sugar twice times daily or as directed. 100 each 1     blood glucose (NO BRAND SPECIFIED) test strip Use to test blood sugar twice times daily or as directed. 100 each 1     blood glucose (NO BRAND SPECIFIED) test strip Use to test blood sugars 2 times daily or as directed 100 strip 11     blood glucose monitoring (SOFTCLIX) lancets Use to test blood sugar 6 times daily. 200 each 11     Blood Glucose Monitoring Suppl (ACCU-CHEK GUIDE ME) w/Device KIT 1 each 6 times daily 1 kit 0     carvedilol (COREG) 12.5 MG tablet Take 1 tablet (12.5 mg) by mouth 2 times daily (with meals) 180 tablet 3     cholestyramine light (PREVALITE) 4 GM powder Take 1 packet (4 g) by mouth 3 times daily (with meals) 60 packet 1     Continuous Blood Gluc Sensor (FREESTYLE MEENA 2 SENSOR) MISC CHANGE EVERY 14 DAYS. USE TO CHECK BLOOD SUGARS PER  GUIDELINES 1 each 2     Continuous Blood Gluc Sensor (FREESTYLE MEENA 3 SENSOR) MISC 1 each every 14 days Use 1 sensor every 14 days. Use to read blood sugars per 's instructions. 6 each 5     gabapentin (NEURONTIN) 100 MG capsule Take 3 capsules (300 mg) by mouth 3 times daily 270 capsule 2     Glucagon (GVOKE HYPOPEN) 1 MG/0.2ML pen Inject the contents of one device under the skin into lower abdomen, outer thigh, or outer upper arm as needed for severe hypoglycemia. If no response after 15 minutes, additional 1 mg dose from a new device may be injected while waiting for emergency assistance 0.4 mL 3     insulin aspart (NOVOLOG FLEXPEN) 100 UNIT/ML pen INJECT 10 UNITS UNDER THE SKIN THREE TIMES DAILY WITH MEALS. MAXIMUM OF 30 UNITS DAILY. 30 mL 1     insulin glargine (LANTUS SOLOSTAR) 100 UNIT/ML pen INJECT 38 UNITS SUBCUTANEOUS EVERY MORNING (BEFORE BREAKFAST) 45 mL 1     insulin pen needle (BD NATALIYA U/F) 32G X 4 MM miscellaneous USE FOR INJECTIONS 4 TIMES DAILY AS DIRECTED (DUE FOR APPOINTMENT AND LABS) 400 each 0     losartan (COZAAR) 50 MG tablet Take 1 tablet (50 mg)  "by mouth daily 90 tablet 3     ondansetron (ZOFRAN ODT) 4 MG ODT tab Take 1 tablet (4 mg) by mouth every 8 hours as needed for nausea 16 tablet 0     pantoprazole (PROTONIX) 40 MG EC tablet Take 1 tablet (40 mg) by mouth daily 90 tablet 3     pravastatin (PRAVACHOL) 20 MG tablet TAKE ONE TABLET BY MOUTH ONCE DAILY. DUE FOR LABS 90 tablet 3     sertraline (ZOLOFT) 50 MG tablet Take 2 tablets (100 mg) by mouth daily 180 tablet 1     traMADol (ULTRAM) 50 MG tablet Take 1 tablet (50 mg) by mouth at bedtime 30 tablet 0         EXAM:    Vitals: /64   Ht 1.499 m (4' 11\")   Wt 64 kg (141 lb)   LMP  (LMP Unknown)   BMI 28.48 kg/m    BMI: Body mass index is 28.48 kg/m .    Constitutional:  Bradford Prado is in no apparent distress, appears well-nourished.  Cooperative with history and physical exam.    Vascular:  Pedal pulses are palpable for both the DP and PT arteries.  CFT < 3 sec.  No edema.      Neuro: Light touch sensation is intact to the L4, L5, S1 distributions  No evidence of weakness, spasticity, or contracture in the lower extremities.   Protective sensation is diminished in the left foot, per [place] Trev monofilament testing.    Derm: No open lesions.  There is some ecchymosis dorsally over the fourth metatarsal phalangeal joint region.    Musculoskeletal:    Lower extremity muscle strength is normal. No gross deformities.  Pain on palpation over the calcaneal cuboid joint, left fifth metatarsal base and dorsally over the third and fourth metatarsal heads.    FOOT LEFT THREE OR MORE VIEWS July 2, 2024 11:05 AM      HISTORY: Foot injury, left, initial encounter. Pain.     COMPARISON: None.                                                                      IMPRESSION: Mild multifocal osteoarthrosis. There is no evidence of  fracture. Moderate-sized Achilles enthesophyte. Small plantar  calcaneal enthesophyte. Otherwise negative.     BRANDI CLEMENT MD           Again, thank you for allowing me to " participate in the care of your patient.        Sincerely,        Delvin Santa DPM

## 2024-07-24 DIAGNOSIS — F43.23 SITUATIONAL MIXED ANXIETY AND DEPRESSIVE DISORDER: ICD-10-CM

## 2024-08-07 NOTE — PROGRESS NOTES
Date:08/08/2024      COMPREHENSIVE PAIN CLINIC FOLLOW UP EVALUATION    I had the pleasure of meeting Ms. Bradford Prado on 3/27/2024 in the [facility] in consult for Dr. Travis with regards to her pain.  The patient is a 63 year old female with past medical history of DM type II, HLD, HTN, anxiety, depression, hereditary hemochromatosis associated with phlebotomies, cirrohsis, diabetic neuropathy, chronic pain in b/l arms, wrists, fingers, who presents in follow up for chronic pain.        Updates since last appointment on 05/02/2024.  She is following a low iron diet.  Last appointment with Dr. Francine Elizalde, Hem Onc - no phlebotomy required if 100 or below. She is wearing brace on R) wrist.  L) hand cortisone injection July    She fell in July but no fx.     She is retiring in 3 months.      Interventions/Injections: Yes through [facility] for trigger finger by Dr. Neal.      Progress Notes Reviewed:  07/09/2024 Dr. Delvin Santa, Podiatry - diabetic foot, L) foot injury due to falling off a porch 07/09/2024 07/02/2024 Urgent Care - L) foot injury  07/01/2024 Dr. Francine Elizalde, Hem Onc - hemochromatosis associated with phlebotomies    Dr. Neal, [facility] Hand specialist interventional cortisone injections for trigger finger which is very helpful    Any hospitalizations/ER/UC visits since last appointment:  yes  Any falls/accidents since last appointment:  yes      Primary Pain :  Patient endorses chronic pain in b/l wrists R>L and she braces her wrists periodically.  Wrist pain started 2022 after she started phlebotomies of hemodcromatosis with Dr. Girard, [facility] in [place].  She has modified her iron intake.  She also has b/l trigger finger which started over ten years ago.  She had b/l 1st [facility] joint surgeries by Dr. Tan, [facility].  She gets periodic trigger finger steroid injections by Dr. Tan. She braces her fingers for trigger finger. She completed hand therapy at [facility] after last wrist surgery 2023.    She has had 3 surgeries for trigger finger.      Characteristics:  Any changes in pain characteristics since last appointment?  no    The patient describes the pain as constant, dull and achey.  Patient denies numbness and tingling in b/l upper extremities.   She has weakness in b/ arms, hands and fingers.  She drops items at times.      What makes the pain better:   Her pain is improved with steroid injections, parafin wax treatments, heat, bracing.    What makes the pain worse:  She reports that the pain is made worse by phlebotomies, using her hands.    08/08/2024 current pain on 0/10 VAS:  6    Worst pain:    8     Best pain    6  05/02/2024 current pain on 0/10 VAS:  3     Worst pain:   8     Best pain:    6    03/27/2024 current pain score at 6/10, but it can be as low as 6/10 or as severe as 8.5/10.       Current Pain Related Medications:  Any medications changes since last appointment: no    Tramadol 50mg 1 tabs prn on bad days max BID - last dose 2 days ago.  Sertraline 50mg 2 tabs daily  [id number] 2 tabs in am and 2 tabs q hs 2 tab at noon.  She denies negative side effects from  Heat is helpful  Parafin wax treatments  Bracing and splinting wrist and fingers  Voltaren gel is helpful  Trigger finger injections are helpful      Therapies discuss on initial consult:   Physical therapy, Pain Psychology, TENs unit, Grounding Mat, Frequency Specific Micro Current, Anti-inflammatory Lifestyle    Social:  She is single and lives in a house in [place], [place] with her daughter and 2 dogs and 3 cats. She enjoys walking her dogs.  She enjoys the internet.    Employment:  She applied for SSD.    Exercise:  Last hand PT was 07/2023 after L) wrist surgery at [facility].  She continues to do the exercises learned at PT.  Patient exercises by walking her dogs for 1 hour at the dog park.    Hobbies:  She enjoys taking her dogs to the dog park.    Mental Health:    Patient endorses anxiety and depression.  Patient does not  follow with a mental health care provider.              Plan on 05/02/2024:  Continue tramadol 50mg max BID and gabapentin 200mg am and hs 100mg at noon.    Follow up in clinic in [place] in 3 months...sooner if needed.        Updates since last appointment on 03/27/2024 initial consult.  tramadol 50mg 1 tab daily PRN severe pain 30 tabs.  Increase gabapentin 100mg in am 2 tabs q hs.  Increase by 1 tab every 3 days until taking 3 tabs three times a day.  She is taking 100mg 2 tabs in am and 1 tab at noon and 2 tabs in evening.    She reports significant reduction in pain and she is able to be more active during the day.  She denies any negative side effects from the medication.  She follow with [facility]      Interventions/Injections: 04/17/2024 Dr. Erin Tan, [facility] - trigger finger injection L) hand x 2      Progress Notes Reviewed:  04/17/2024 Dr. Erin Tan, [facility] - trigger finger injection L) hand x 2      Any hospitalizations/ER/UC visits since last appointment:  no  Any falls/accidents since last appointment:  no      Primary Pain   Patient endorses chronic pain in b/l wrists R>L and she braces her wrists periodically.  Wrist pain started 2022 after she started phlebotomies of hemodcromatosis with Dr. Girard, [facility] in [place]. She does lab work every 3 months.  She has modified her iron intake.  She also has b/l trigger finger which started over ten years ago.  She had b/l 1st [facility] joint surgeries by Dr. Tan, [facility].  She gets periodic trigger finger steroid injections by Dr. Tan. She braces her fingers for trigger finger. She completed hand therapy at [facility] after last wrist surgery 2023.   She has had 3 surgeries for trigger finger.        Characteristics:  No changes in pain characteristics since last appointment.    The patient describes the pain as constant, dull and achey.  Patient denies numbness and tingling in b/l upper extremities.   She has weakness in b/ arms, hands and fingers.  She drops items  at times.      What makes the pain better:   Her pain is improved with steroid injections, parafin wax treatments, heat, bracing.    What makes the pain worse:  She reports that the pain is made worse by phlebotomies, using her hands.  05/02/2024 current pain on 0/10 VAS:  3     Worst pain:   5     Best pain:    6    03/27/2024 current pain score at 6/10, but it can be as low as 6/10 or as severe as 8.5/10.     Current Pain Related Medications  Any medications changes since last appointment:    Tramadol 50mg 1 tabs prn on bad days max BID  Sertraline 50mg 2 tabs daily  [id number] 2 tabs in am and 2 tabs q hs 1 tab at noon.  She denies negative side effects from  Heat  Parafin wax treatments  Bracing and splinting wrist and fingers  Voltaren gel is helpful  Trigger finger injections are helpful      Therapies discuss on initial consult:   Physical therapy, Pain Psychology, TENs unit, Grounding Mat, Frequency Specific Micro Current, Anti-inflammatory Lifestyle    Social:  She is single and lives in a house in [place], [place] with her daughter and 2 dogs and 3 cats. She enjoys walking her dogs.  She enjoys the internet.      Employment:  She applied for SSD.    Exercise:  Last hand PT was 07/2023 after L) wrist surgery at [facility].  She continues to do the exercises learned at PT.  Patient exercises by walking her dogs for 1 hour at the dog park.    Hobbies:  She enjoys taking her dogs to the dog park.    Mental Health:    Patient endorses anxiety and depression.  Patient does not follow with a mental health care provider.        Plan on initial consult 03/27/2024:  A multimodal plan was developed today to treat your pain.  Multimodal analgesia is a strategy that reduces reliance on opioids through the use of non-opioid analgesics and therapies that have different mechanisms of action.      Diagnostics: none        Medications:  Start tramadol 50mg 1 tab daily PRN severe pain 30 tabs.  Increase gabapentin 100mg in am 2  tabs q hs.  Increase by 1 tab every 3 days until taking 3 tabs three times a day.      Discussed medical cannabis.    The following OTC pain medications may be helpful, use as directed: Voltaren Gel 1%, CBD products, Arnica products, Capsaicin products, Australian Dream Cream, Epson It, Lidocaine Patch, Solanpas, Biofreeze, Aspercream, Tiger Balm and Jarett Emu cream.      Therapies:  Discussed anti-inflammatory lifestyle.    Discussed Pain Psychology - consider in the future.  Psychological treatments are also important part of pain management.  Understanding and managing the thoughts, emotions and behaviors that accompany the discomfort can help you cope more effectively with your pain and can actually reduce the intensity of your pain.      PHYSICAL THERAPY -  Encourage patient to continue to do exercises learned at physical therapy.  Discussed the importance of core strengthening, ROM, stretching exercises with the patient and how each of these entities is important in decreasing pain.  Explained to the patient that the purpose of physical therapy is to teach the patient a home exercise program.  These exercises need to be performed every day in order to decrease pain and prevent future occurrences of pain.        Discussed Grounding Mat - handout provided  https://www.Newtricious.com/watch?v=[email or web address]    Discussed Frequency Specific Microcurrent - handout provided  Treatment for Neuropathic Pain.   [facility] [phone]  [facility] [phone]  [facility] [phone]  [facility] [phone]  May be able to be billed as a chiropractic service depending on your insurance coverage.     Apply heat PRN. Use parafin      Interventions:  Steroid injections and surgery for trigger finger through [facility]        Follow up:     1 month in [facility]          ----------------------------------------------------------------------------------------------------------------------------------------  History of pain on initial consult 03/27/2024          Subjective:  Patient endorses chronic pain in b/l wrists R>L and she braces her wrists periodically.  Wrist pain started 2022 after she started phlebotomies of hemodcromatosis with Dr. Girard, [facility] in [place]. She does lab work every 3 months.  She has modified her iron intake.  She also has b/l trigger finger which started over ten years ago.  She had b/l 1st [facility] joint surgeries by Dr. Tan, [facility], [place].  She gets periodic trigger finger steroid injections by Dr. Tan. She braces her fingers for trigger finger. She comopleted hand therapy at [facility], [place] after last wrist surgery 2023.  Patient denies numbness and tingling in b/l upper extremities.  She has weakness in b/ arms, hands and fingers.  She drops items at times.  She has had 3 surgeries for trigger finger.  She is due for another cortisone shot for trigger finger.  The patient describes the pain as constant, dull and achey.  She reports that the pain is made worse by phlebotomies, using her hands.  Her pain is improved with steroid injections, parafin wax treatments, heat, bracing.  She rates her currenty pain score at 6/10, but it can be as low as 6/10 or as severe as 8.5/10.     Patient endorses anxiety and depression.  Patient does not follow with a mental health care provider.  Last hand PT was 07/2023 after L) wrist surgery at [facility] [place].  She continues to do the exercises learned at PT.  Patient exercises by walking her dogs for 1 hour at the dog park.    Progress Notes Reviewed:  03/04/2024 Dr. Pamela Chaudhry, Opthamology  02/26/2024 MILES Doyle - GI symptoms      She denies any new problems with falls or balance, any new numbness or weakness of the arms or legs, any new bowel or bladder incontinence, any night sweats or unexplained fevers, or any  sudden or unexpected weight loss.      Bradford Prado has not been seen at a pain clinic in the past.        Current Treatments:  Sertraline 50mg 2 tabs daily  [id number] in am and 200mg q hs for last month.  She denies negative side effects from gabapentin.  Heat  Parafin wax treatments  Bracing and splinting wrist and fingers  Voltaren gel is helpful    Previous Medication Treatments Included:  Anti-convulsants: pregabalin was helpful after L) wrist surgery per [facility] Dr. Tan  Muscle relaxors: no  Anti-depressants: none  Benzodiazapine's: none  Acetaminophen/NSAIDs: no acetaminophen.  Aleve periodically only due to cirrohosis  Topicals: only voltaren gel  Opioids: Tramadol was helpful and did not exterience any negative side effects s/p surgery.      Other Treatments Have Included:  Physical therapy: 07/2023 after wrist surgery  Pain Psychology: no  Chiropractic: no  Acupuncture: yes to quit smoking  TENs Unit: no  Injections: multiple steroid injections for trigger finger through [facility]  Surgeries: b/l 1st [facility] joint surgeries, 3 trigger finger surgeries  Dry Needling: no  Massage: no      Past Medical History:  Medical history reviewed.  Past Medical History:   Diagnosis Date    Chest pain 02/23/2021    Diabetes mellitus (H)     Dyslipidemia     Hereditary hemochromatosis (H24)     Hyperglycemia 02/23/2021    Hypertension     Hypertensive urgency 02/23/2021    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) (H)     Nephrolithiasis 09/19/2016    First episode September 2016    PONV (postoperative nausea and vomiting)     Poorly controlled type 2 diabetes mellitus with renal complication (H)     Type 2 diabetes mellitus with diabetic neuropathy, with long-term current use of insulin (H)       Patient Active Problem List   Diagnosis    Hand arthritis    Stenosing tenosynovitis    Hyperlipidemia LDL goal <70    Essential hypertension with goal blood pressure less than 140/90    Type 2 diabetes mellitus  with kidney complication, with long-term current use of insulin (H)    Dyslipidemia    Hereditary hemochromatosis (H24)    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) (H)    Situational mixed anxiety and depressive disorder         Past Surgical History:  Pertinent surgical history reviewed.  Past Surgical History:   Procedure Laterality Date    COLONOSCOPY      COLONOSCOPY N/A 2/29/2024    Procedure: Colonoscopy;  Surgeon: Royce Mari MD;  Location:  GI    COLONOSCOPY N/A 2/29/2024    Procedure: COLONOSCOPY, WITH POLYPECTOMY AND BIOPSY;  Surgeon: Royce Mari MD;  Location:  GI    ESOPHAGOSCOPY, GASTROSCOPY, DUODENOSCOPY (EGD), COMBINED N/A 11/10/2021    Procedure: ESOPHAGOGASTRODUODENOSCOPY, WITH BIOPSY;  Surgeon: Leventhal, Thomas Michael, MD;  [place] [facility]    ESOPHAGOSCOPY, GASTROSCOPY, DUODENOSCOPY (EGD), COMBINED N/A 2/29/2024    Procedure: Esophagoscopy, gastroscopy, duodenoscopy (EGD), combined;  Surgeon: Royce Mari MD;  Location:  GI    HYSTERECTOMY TOTAL ABDOMINAL  01/01/1995    due to fibroids    LAPAROSCOPIC CHOLECYSTECTOMY N/A 02/13/2023    Procedure: Laparoscopic cholecystectomy;  Surgeon: Eber Gill MD;  Location:  OR    LAPAROSCOPIC EVACUATION ECTOPIC PREGNANCY      TUBAL LIGATION            Medications: Pertinent medications reviewed.  Current Outpatient Medications   Medication Sig Dispense Refill    ACCU-CHEK GUIDE test strip Use to test blood sugar 4 times daily or as directed. 200 strip 11    amLODIPine (NORVASC) 5 MG tablet Take 1 tablet (5 mg) by mouth 2 times daily 180 tablet 3    blood glucose (NO BRAND SPECIFIED) lancets standard Use to test blood sugar twice times daily or as directed. 100 each 1    blood glucose (NO BRAND SPECIFIED) test strip Use to test blood sugar twice times daily or as directed. 100 each 1    blood glucose (NO BRAND SPECIFIED) test strip Use to test blood sugars 2 times  daily or as directed 100 strip 11    blood glucose monitoring (SOFTCLIX) lancets Use to test blood sugar 6 times daily. 200 each 11    Blood Glucose Monitoring Suppl (ACCU-CHEK GUIDE ME) w/Device KIT 1 each 6 times daily 1 kit 0    carvedilol (COREG) 12.5 MG tablet Take 1 tablet (12.5 mg) by mouth 2 times daily (with meals) 180 tablet 3    cholestyramine light (PREVALITE) 4 GM powder Take 1 packet (4 g) by mouth 3 times daily (with meals) 60 packet 1    Continuous Blood Gluc Sensor (FREESTYLE MEENA 2 SENSOR) MISC CHANGE EVERY 14 DAYS. USE TO CHECK BLOOD SUGARS PER  GUIDELINES 1 each 2    Continuous Blood Gluc Sensor (FREESTYLE MEENA 3 SENSOR) MISC 1 each every 14 days Use 1 sensor every 14 days. Use to read blood sugars per 's instructions. 6 each 5    gabapentin (NEURONTIN) 100 MG capsule Take 3 capsules (300 mg) by mouth 3 times daily 270 capsule 2    Glucagon (GVOKE HYPOPEN) 1 MG/0.2ML pen Inject the contents of one device under the skin into lower abdomen, outer thigh, or outer upper arm as needed for severe hypoglycemia. If no response after 15 minutes, additional 1 mg dose from a new device may be injected while waiting for emergency assistance 0.4 mL 3    insulin aspart (NOVOLOG FLEXPEN) 100 UNIT/ML pen INJECT 10 UNITS UNDER THE SKIN THREE TIMES DAILY WITH MEALS. MAXIMUM OF 30 UNITS DAILY. 30 mL 1    insulin glargine (LANTUS SOLOSTAR) 100 UNIT/ML pen INJECT 38 UNITS SUBCUTANEOUS EVERY MORNING (BEFORE BREAKFAST) 45 mL 1    insulin pen needle (BD NATALIYA U/F) 32G X 4 MM miscellaneous USE FOR INJECTIONS 4 TIMES DAILY AS DIRECTED (DUE FOR APPOINTMENT AND LABS) 400 each 0    losartan (COZAAR) 50 MG tablet Take 1 tablet (50 mg) by mouth daily 90 tablet 3    ondansetron (ZOFRAN ODT) 4 MG ODT tab Take 1 tablet (4 mg) by mouth every 8 hours as needed for nausea 16 tablet 0    pantoprazole (PROTONIX) 40 MG EC tablet Take 1 tablet (40 mg) by mouth daily 90 tablet 3    pravastatin (PRAVACHOL) 20 MG  tablet TAKE ONE TABLET BY MOUTH ONCE DAILY. DUE FOR LABS 90 tablet 3    sertraline (ZOLOFT) 50 MG tablet Take 2 tablets (100 mg) by mouth daily 180 tablet 0    traMADol (ULTRAM) 50 MG tablet Take 1 tablet (50 mg) by mouth at bedtime 30 tablet 0       [place] Prescription Monitoring Program reviewed 08/08/2024.  No concern for abuse or misuse of controlled medications based on this report.  07/02/2024 Tramadol 50mg 8 tabs for 8 days  06/13/2024 Gabapentin 100mg 270 tabs for 30 days  06/13/2024 Tramadol 50 mg 8 tabs for 8 days  05/02/2024 Tramadol 50mg 7 tabs for 7 days  04/18/2024 Gabapentin 100mg 90 tabs for 30 days  03/27/2024 Tramadol 50mg 7 tabs for 7 days  03/15/2024 Gabapentin 100mg 90 tabs for 30 days  02/16/2024 Gabapentin 100mg 90 tabs for 30 days  01/24/2024 Gabapentin 100mg 60 tabs for 30 days  06/28/2023 Pregabalin 75mg 30 tabs     Allergies: Pertinent allergies reviewed.   No Known Allergies    Family History:   family history includes ALS in her brother; Cirrhosis in her mother; Diabetes in her mother; Emphysema in her father; Heart Defect in her niece; Hemochromatosis in her brother, sister, sister, and sister; Hypertension in her sister.    Social History:   She is single.  She is going to retire in 3 months.  She lives in a house with her daughter and 2 dogs and 3 cats. She enjoys walking her dogs.  She enjoys the internet.  She  reports that she quit smoking about 14 years ago. Her smoking use included cigarettes. She started smoking about 34 years ago. She has a 10 pack-year smoking history. She has never used smokeless tobacco. She reports that she does not drink alcohol and does not use drugs.  Social History     Social History Narrative    , 2 children, 2 grandchildren         Review of Systems:      (Positive responses bolded)  GENERAL: fever/chills, fatigue, general unwell feeling, weight gain/loss  HEAD/EYES:  headache, dizziness, or vision changes  EARS/NOSE/THROAT: nosebleeds, hearing  loss, sinus infection, earache, tinnitus  IMMUNE:  allergies, cancer, immune deficiency, or infections  SKIN:  itching, rash, hives  HEME/Lymphatic: anemia, easy bruising, easy bleeding  RESPIRATORY: cough, wheezing, or shortness of breath  CARDIOVASCULAR/Circulation: extremity edema, syncope, hypertension, tachycardia, or angina  GASTROINTESTINAL: abdominal pain, nausea/emesis, diarrhea, constipation, hematochezia, or melena  ENDOCRINE:  diabetes, steroid use, thyroid disease or osteoporosis  MUSCULOSKELETAL: myalgias, joint pain, stiffness, neck pain, back pain, arthritis, or gout  GENITOURINARY: frequency, urgency, dysuria, difficulty voiding, hematuria or incontinence  NEUROLOGIC: weakness, numbness, paresthesias, seizure, tremor, stroke or memory loss  PSYCHIATRIC: depression, anxiety, stress, suicidal thoughts/attempts or mood swings      Physical Exam:    Constitutional: She is oriented to person, place, and time.  She appears well-developed and well-nourished. She is not in acute distress.   HENT:     Head: Normocephalic and atraumatic.     Eyes: Pupils are equal, round, and reactive to light. EOM are normal. No scleral icterus.   Pulmonary/Chest:  NWOB. No respiratory distress.   Neurological: She is alert and oriented to person, place, and time. Coordination grossly normal.  Romberg test negative. Skin: Skin is warm and dry. She is not diaphoretic.   Psychiatric: She has a normal mood and affect. Her behavior is normal. Judgment and thought content normal.  Patient answers questions appropriately.  MSK: Gait is normal.     Cervical spine:  ROM: full  Rotation/ext to right: pain free  Rotation/ext to left: pain free  Myofascial tenderness: negative  Normal 5/5 UE strength bilaterally but causes pain  Normal sensation to light touch in the C4-T1 distributions bilaterally   No allodynia, dysesthesia, or hyperalgesia in the upper extremities bilaterally   Reflexes: Normal 2/2 of biceps and  bracioradialis          DIRE Score for ongoing opioid management is calculated as follows:    Diagnosis = 2    Intractability = 3    Risk: Psych = 3  Chem Hlth = 3  Reliability = 3  Social = 2    Efficacy = 3    Total DIRE Score = 19 (14 or higher predicts good candidate for ongoing opioid management; 13 or lower predicts poor candidate for opioid management)         Imaging:  No imaging to review.    EMG:  na      Diagnosis:  (M79.601,  M79.602) Bilateral arm pain  (primary encounter diagnosis)  Comment:  Plan: traMADol (ULTRAM) 50 MG tablet            (E83.110) Hereditary hemochromatosis (H24)  Comment:   Plan: Needs to get labs done to see if phlebotomy is required.    (M19.049) Hand arthritis  Comment:   Plan: Continue voltaren gel, heat, parafin treatments.    (M65.9) Stenosing tenosynovitis  Comment:   Plan: Continue to follow up with [facility] for b/l trigger finger injections.    (G89.29) Chronic intractable pain  Comment:   Plan: Continue exercises learned at hand therapy        Plan on 08/08/2024:  Refill Tramadol 50mg 1 tabs 30 tabs for 30 days prn severe pain.    Refill Gabapentin 100ng 2 tabs three times daily    Follow-up in clinic in 3 months.      OPAL Yap, RN, CNP, FNP  [facility]        BILLING TIME DOCUMENTATION:   The total TIME spent on this patient on the date of the encounter/appointment was 35 minutes.

## 2024-08-08 ENCOUNTER — OFFICE VISIT (OUTPATIENT)
Dept: PALLIATIVE MEDICINE | Facility: CLINIC | Age: 64
End: 2024-08-08
Attending: NURSE PRACTITIONER
Payer: COMMERCIAL

## 2024-08-08 VITALS
HEART RATE: 68 BPM | DIASTOLIC BLOOD PRESSURE: 84 MMHG | WEIGHT: 142.1 LBS | BODY MASS INDEX: 28.7 KG/M2 | SYSTOLIC BLOOD PRESSURE: 148 MMHG | OXYGEN SATURATION: 98 %

## 2024-08-08 DIAGNOSIS — G89.29 OTHER CHRONIC PAIN: ICD-10-CM

## 2024-08-08 DIAGNOSIS — M79.601 BILATERAL ARM PAIN: ICD-10-CM

## 2024-08-08 DIAGNOSIS — M79.602 BILATERAL ARM PAIN: ICD-10-CM

## 2024-08-08 PROCEDURE — 99214 OFFICE O/P EST MOD 30 MIN: CPT | Performed by: NURSE PRACTITIONER

## 2024-08-08 PROCEDURE — G2211 COMPLEX E/M VISIT ADD ON: HCPCS | Performed by: NURSE PRACTITIONER

## 2024-08-08 RX ORDER — TRAMADOL HYDROCHLORIDE 50 MG/1
50 TABLET ORAL AT BEDTIME
Qty: 30 TABLET | Refills: 0 | Status: SHIPPED | OUTPATIENT
Start: 2024-08-08 | End: 2024-09-07

## 2024-08-08 RX ORDER — GABAPENTIN 100 MG/1
CAPSULE ORAL
Qty: 180 CAPSULE | Refills: 2 | Status: SHIPPED | OUTPATIENT
Start: 2024-08-08

## 2024-08-08 ASSESSMENT — PAIN SCALES - GENERAL: PAINLEVEL: SEVERE PAIN (6)

## 2024-08-08 ASSESSMENT — PAIN SCALES - PAIN ENJOYMENT GENERAL ACTIVITY SCALE (PEG)
PEG_TOTALSCORE: 7
AVG_PAIN_PASTWEEK: 6
INTERFERED_GENERAL_ACTIVITY: 7
INTERFERED_ENJOYMENT_LIFE: 8

## 2024-08-08 NOTE — PATIENT INSTRUCTIONS
Plan on 08/08/2024:  Refill Tramadol 50mg 1 tabs 30 tabs for 30 days prn severe pain.    Refill Gabapentin 100ng 2 tabs three times daily    Follow-up in clinic in 3 months.      OPAL Yap, RN, CNP, FNP  [facility]          Clinic Number:  [phone]   Call with any questions about your care and for scheduling assistance.   Calls are returned Monday through Friday between 8 AM and 4:30 PM. We usually get back to you within 2 business days depending on the issue/request.    If we are prescribing your medications:  For opioid medication refills, call the clinic or send a [employer] message 7 days in advance.  Please include:  Name of requested medication  Name of the pharmacy.  For non-opioid medications, call your pharmacy directly to request a refill. Please allow 3-4 days to be processed.   Per [place] State Law:  All controlled substance prescriptions must be filled within 30 days of being written.    For those controlled substances allowing refills, pickup must occur within 30 days of last fill.      We believe regular attendance is key to your success in our program!    Any time you are unable to keep your appointment we ask that you call us at least 24 hours in advance to cancel.This will allow us to offer the appointment time to another patient.   Multiple missed appointments may lead to dismissal from the clinic.

## 2024-08-11 DIAGNOSIS — Z79.4 TYPE 2 DIABETES MELLITUS WITH DIABETIC NEUROPATHY, WITH LONG-TERM CURRENT USE OF INSULIN (H): ICD-10-CM

## 2024-08-11 DIAGNOSIS — E11.40 TYPE 2 DIABETES MELLITUS WITH DIABETIC NEUROPATHY, WITH LONG-TERM CURRENT USE OF INSULIN (H): ICD-10-CM

## 2024-08-12 RX ORDER — KETOROLAC TROMETHAMINE 30 MG/ML
INJECTION, SOLUTION INTRAMUSCULAR; INTRAVENOUS
Qty: 1 EACH | Refills: 0 | Status: SHIPPED | OUTPATIENT
Start: 2024-08-12

## 2024-09-10 ENCOUNTER — OFFICE VISIT (OUTPATIENT)
Dept: FAMILY MEDICINE | Facility: CLINIC | Age: 64
End: 2024-09-10
Payer: COMMERCIAL

## 2024-09-10 VITALS
WEIGHT: 142 LBS | HEIGHT: 59 IN | TEMPERATURE: 98 F | HEART RATE: 70 BPM | BODY MASS INDEX: 28.63 KG/M2 | RESPIRATION RATE: 16 BRPM | SYSTOLIC BLOOD PRESSURE: 124 MMHG | OXYGEN SATURATION: 97 % | DIASTOLIC BLOOD PRESSURE: 82 MMHG

## 2024-09-10 DIAGNOSIS — Z79.4 TYPE 2 DIABETES MELLITUS WITH STAGE 2 CHRONIC KIDNEY DISEASE, WITH LONG-TERM CURRENT USE OF INSULIN (H): ICD-10-CM

## 2024-09-10 DIAGNOSIS — N18.2 TYPE 2 DIABETES MELLITUS WITH STAGE 2 CHRONIC KIDNEY DISEASE, WITH LONG-TERM CURRENT USE OF INSULIN (H): ICD-10-CM

## 2024-09-10 DIAGNOSIS — Z01.818 PREOP GENERAL PHYSICAL EXAM: Primary | ICD-10-CM

## 2024-09-10 DIAGNOSIS — K63.5 POLYP OF COLON, UNSPECIFIED PART OF COLON, UNSPECIFIED TYPE: ICD-10-CM

## 2024-09-10 DIAGNOSIS — E11.22 TYPE 2 DIABETES MELLITUS WITH STAGE 2 CHRONIC KIDNEY DISEASE, WITH LONG-TERM CURRENT USE OF INSULIN (H): ICD-10-CM

## 2024-09-10 DIAGNOSIS — I10 ESSENTIAL HYPERTENSION WITH GOAL BLOOD PRESSURE LESS THAN 140/90: Chronic | ICD-10-CM

## 2024-09-10 DIAGNOSIS — K75.81 LIVER CIRRHOSIS SECONDARY TO NONALCOHOLIC STEATOHEPATITIS (NASH) (H): ICD-10-CM

## 2024-09-10 DIAGNOSIS — K74.60 LIVER CIRRHOSIS SECONDARY TO NONALCOHOLIC STEATOHEPATITIS (NASH) (H): ICD-10-CM

## 2024-09-10 PROCEDURE — 99214 OFFICE O/P EST MOD 30 MIN: CPT | Performed by: NURSE PRACTITIONER

## 2024-09-10 ASSESSMENT — PAIN SCALES - GENERAL: PAINLEVEL: SEVERE PAIN (6)

## 2024-09-10 NOTE — PATIENT INSTRUCTIONS

## 2024-09-10 NOTE — PROGRESS NOTES
Preoperative Evaluation  [facility]  [address] [address]  Phone: [phone]  Primary Provider: Paulette Travis DNP  Pre-op Performing Provider: Paulette Travis DNP  Sep 10, 2024           9/10/2024   Surgical Information   What procedure is being done? colonoscopy   Facility or Hospital where procedure/surgery will be performed: [facility]   Who is doing the procedure / surgery? dr greco   Date of surgery / procedure: oct 9th    Time of surgery / procedure: 9   Where do you plan to recover after surgery? at home with family        Fax number for surgical facility: Note does not need to be faxed, will be available electronically in Epic.    Assessment & Plan     The proposed surgical procedure is considered LOW risk.    Preop general physical exam     (Z01.818) Preop general physical exam  (primary encounter diagnosis)  Comment:    Plan:      (I10) Essential hypertension with goal blood pressure less than 140/90  Comment:    Plan: Controlled    (E11.22,  N18.2,  Z79.4) Type 2 diabetes mellitus with stage 2 chronic kidney disease, with long-term current use of insulin (H)  Comment:    Plan: Controlled - A1C at goal    (K75.81,  K74.60) 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) (H)  Comment:    Plan: Stable - following with GI/Hepatology    (K63.5) Polyp of colon, unspecified part of colon, unspecified type  Comment:    Plan:            Possible Sleep Apnea:          9/10/2024    10:44 AM   STOP-Bang Total Score   Total Score 4   Risk Stratification 3 - 4: Moderate Risk for ROB          Antiplatelet or Anticoagulation Medication Instructions   - Patient is on no antiplatelet or anticoagulation medications.    Additional Medication Instructions  Take all scheduled medications on the day of surgery EXCEPT for modifications listed below:   - Long acting insulin (e.g. glargine, detemir): Take 50% of Lantus morning of surgery.    - Intermediate acting insulin (NPH): Hold morning of surgery and resume when eating after surgery.   - Herbal medications and vitamins: DO NOT TAKE 14 days prior to surgery.    Recommendation  Approval given to proceed with proposed procedure, without further diagnostic evaluation.    Randy Felder is a 63 year old, presenting for the following:  Pre-Op Exam          9/10/2024    10:28 AM   Additional Questions   Roomed by Chandu ANTONIO CMA   Accompanied by self     HPI related to upcoming procedure: History of polyps. Requiring further removal of polyps.  Having chronic diarrhea.        9/10/2024   Pre-Op Questionnaire   Have you ever had a heart attack or stroke? No   Have you ever had surgery on your heart or blood vessels, such as a stent placement, a coronary artery bypass, or surgery on an artery in your head, neck, heart, or legs? No   Do you have chest pain with activity? No   Do you have a history of heart failure? No   Do you currently have a cold, bronchitis or symptoms of other infection? No   Do you have a cough, shortness of breath, or wheezing? No   Do you or anyone in your family have previous history of blood clots? No   Do you or does anyone in your family have a serious bleeding problem such as prolonged bleeding following surgeries or cuts? No   Have you ever had problems with anemia or been told to take iron pills? No   Have you had any abnormal blood loss such as black, tarry or bloody stools, or abnormal vaginal bleeding? No   Have you ever had a blood transfusion? No   Are you willing to have a blood transfusion if it is medically needed before, during, or after your surgery? Yes   Have you or any of your relatives ever had problems with anesthesia? No   Do you have sleep apnea, excessive snoring or daytime drowsiness? (!) YES   Do you have a CPAP machine? (!) NO  - ? Sleep apnea.   Do you have any artifical heart valves or other implanted medical devices like a pacemaker, defibrillator, or  continuous glucose monitor? No   Do you have artificial joints? No   Are you allergic to latex? No        Health Care Directive  Patient does not have a Health Care Directive or Living Will: Discussed advance care planning with patient; however, patient declined at this time.    Preoperative Review of    reviewed - no record of controlled substances prescribed.       Status of Chronic Conditions:  See problem list for active medical problems.  Problems all longstanding and stable, except as noted/documented.  See ROS for pertinent symptoms related to these conditions.    Patient Active Problem List    Diagnosis Date Noted    Polyp of colon, unspecified part of colon, unspecified type 09/10/2024     Priority: Medium    Situational mixed anxiety and depressive disorder 01/24/2024     Priority: Medium    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) (H)      Priority: Medium    Hereditary hemochromatosis (H24) 06/10/2021     Priority: Medium    Dyslipidemia      Priority: Medium    Type 2 diabetes mellitus with kidney complication, with long-term current use of insulin (H) 02/01/2017     Priority: Medium    Hyperlipidemia LDL goal <70 08/09/2016     Priority: Medium    Essential hypertension with goal blood pressure less than 140/90 08/09/2016     Priority: Medium    Stenosing tenosynovitis 09/06/2012     Priority: Medium    Hand arthritis 08/23/2012     Priority: Medium      Past Medical History:   Diagnosis Date    Chest pain 02/23/2021    Diabetes mellitus (H)     Dyslipidemia     Hereditary hemochromatosis (H24)     Hyperglycemia 02/23/2021    Hypertension     Hypertensive urgency 02/23/2021    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) (H)     Nephrolithiasis 09/19/2016    First episode September 2016    PONV (postoperative nausea and vomiting)     Poorly controlled type 2 diabetes mellitus with renal complication (H)     Type 2 diabetes mellitus with diabetic  neuropathy, with long-term current use of insulin (H)      Past Surgical History:   Procedure Laterality Date    COLONOSCOPY      COLONOSCOPY N/A 2/29/2024    Procedure: Colonoscopy;  Surgeon: Royce Mari MD;  Location:  GI    COLONOSCOPY N/A 2/29/2024    Procedure: COLONOSCOPY, WITH POLYPECTOMY AND BIOPSY;  Surgeon: oRyce Mari MD;  Location:  GI    ESOPHAGOSCOPY, GASTROSCOPY, DUODENOSCOPY (EGD), COMBINED N/A 11/10/2021    Procedure: ESOPHAGOGASTRODUODENOSCOPY, WITH BIOPSY;  Surgeon: Leventhal, Thomas Michael, MD;  [place] [facility] [place]    ESOPHAGOSCOPY, GASTROSCOPY, DUODENOSCOPY (EGD), COMBINED N/A 2/29/2024    Procedure: Esophagoscopy, gastroscopy, duodenoscopy (EGD), combined;  Surgeon: Royce Mari MD;  Location:  GI    HYSTERECTOMY TOTAL ABDOMINAL  01/01/1995    due to fibroids    LAPAROSCOPIC CHOLECYSTECTOMY N/A 02/13/2023    Procedure: Laparoscopic cholecystectomy;  Surgeon: Eber Gill MD;  Location:  OR    LAPAROSCOPIC EVACUATION ECTOPIC PREGNANCY      TUBAL LIGATION       Current Outpatient Medications   Medication Sig Dispense Refill    ACCU-CHEK GUIDE test strip Use to test blood sugar 4 times daily or as directed. 200 strip 11    amLODIPine (NORVASC) 5 MG tablet Take 1 tablet (5 mg) by mouth 2 times daily 180 tablet 3    blood glucose (NO BRAND SPECIFIED) lancets standard Use to test blood sugar twice times daily or as directed. 100 each 1    blood glucose (NO BRAND SPECIFIED) test strip Use to test blood sugar twice times daily or as directed. 100 each 1    blood glucose (NO BRAND SPECIFIED) test strip Use to test blood sugars 2 times daily or as directed 100 strip 11    blood glucose monitoring (SOFTCLIX) lancets Use to test blood sugar 6 times daily. 200 each 11    Blood Glucose Monitoring Suppl (ACCU-CHEK GUIDE ME) w/Device KIT 1 each 6 times daily 1 kit 0    carvedilol (COREG) 12.5 MG tablet Take 1 tablet (12.5 mg) by mouth 2 times  daily (with meals) 180 tablet 3    cholestyramine light (PREVALITE) 4 GM powder Take 1 packet (4 g) by mouth 3 times daily (with meals) 60 packet 1    Continuous Blood Gluc Sensor (FREESTYLE MEENA 2 SENSOR) MISC CHANGE EVERY 14 DAYS. USE TO CHECK BLOOD SUGARS PER  GUIDELINES 1 each 2    Continuous Blood Gluc Sensor (FREESTYLE MEENA 3 SENSOR) MISC 1 each every 14 days Use 1 sensor every 14 days. Use to read blood sugars per 's instructions. 6 each 5    Continuous Glucose  (FREESTYLE MEENA 3 READER) SHERRELL USE TO READ BLOOD SUGARS PER 'S INSTRUCTIONS. 1 each 0    gabapentin (NEURONTIN) 100 MG capsule 2 tabs po TID. 180 capsule 2    Glucagon (GVOKE HYPOPEN) 1 MG/0.2ML pen Inject the contents of one device under the skin into lower abdomen, outer thigh, or outer upper arm as needed for severe hypoglycemia. If no response after 15 minutes, additional 1 mg dose from a new device may be injected while waiting for emergency assistance 0.4 mL 3    insulin aspart (NOVOLOG FLEXPEN) 100 UNIT/ML pen INJECT 10 UNITS UNDER THE SKIN THREE TIMES DAILY WITH MEALS. MAXIMUM OF 30 UNITS DAILY. 30 mL 1    insulin glargine (LANTUS SOLOSTAR) 100 UNIT/ML pen INJECT 38 UNITS SUBCUTANEOUS EVERY MORNING (BEFORE BREAKFAST) 45 mL 1    insulin pen needle (BD NATALIYA U/F) 32G X 4 MM miscellaneous USE FOR INJECTIONS 4 TIMES DAILY AS DIRECTED (DUE FOR APPOINTMENT AND LABS) 400 each 0    losartan (COZAAR) 50 MG tablet Take 1 tablet (50 mg) by mouth daily 90 tablet 3    ondansetron (ZOFRAN ODT) 4 MG ODT tab Take 1 tablet (4 mg) by mouth every 8 hours as needed for nausea 16 tablet 0    pantoprazole (PROTONIX) 40 MG EC tablet Take 1 tablet (40 mg) by mouth daily 90 tablet 3    pravastatin (PRAVACHOL) 20 MG tablet TAKE ONE TABLET BY MOUTH ONCE DAILY. DUE FOR LABS 90 tablet 3    sertraline (ZOLOFT) 50 MG tablet Take 2 tablets (100 mg) by mouth daily 180 tablet 0       No Known Allergies     Social History  "    Tobacco Use    Smoking status: Former     Current packs/day: 0.00     Average packs/day: 0.5 packs/day for 20.0 years (10.0 ttl pk-yrs)     Types: Cigarettes     Start date: 3/9/1990     Quit date: 3/9/2010     Years since quittin.5    Smokeless tobacco: Never   Substance Use Topics    Alcohol use: No     Alcohol/week: 0.0 standard drinks of alcohol     Family History   Problem Relation Age of Onset    Cirrhosis Mother         w/o alcohol history    Diabetes Mother     Emphysema Father     Hemochromatosis Brother     ALS Brother     Hemochromatosis Sister     Hemochromatosis Sister     Hypertension Sister     Hemochromatosis Sister     Heart Defect Niece         Tetrology of Fallot    Breast Cancer No family hx of     Cancer - colorectal No family hx of     Glaucoma No family hx of     Macular Degeneration No family hx of      History   Drug Use No             Review of Systems  Constitutional, HEENT, cardiovascular, pulmonary, GI, , musculoskeletal, neuro, skin, endocrine and psych systems are negative, except as otherwise noted.    Objective    /82 (BP Location: Right arm, Patient Position: Sitting, Cuff Size: Adult Regular)   Pulse 70   Temp 98  F (36.7  C) (Tympanic)   Resp 16   Ht 1.499 m (4' 11\")   Wt 64.4 kg (142 lb)   LMP  (LMP Unknown)   SpO2 97%   BMI 28.68 kg/m     Estimated body mass index is 28.68 kg/m  as calculated from the following:    Height as of this encounter: 1.499 m (4' 11\").    Weight as of this encounter: 64.4 kg (142 lb).  Physical Exam  GENERAL: alert and no distress  NECK: no adenopathy, no asymmetry, masses, or scars  RESP: lungs clear to auscultation - no rales, rhonchi or wheezes  CV: regular rate and rhythm, normal S1 S2, no S3 or S4, no murmur, click or rub, no peripheral edema  ABDOMEN: soft, nontender, no hepatosplenomegaly, no masses and bowel sounds normal  MS: no gross musculoskeletal defects noted, no edema  NEURO: Normal strength and tone, mentation " intact and speech normal  PSYCH: mentation appears normal, affect normal/bright    Recent Labs   Lab Test 06/26/24  1117 12/26/23  1011   A1C 6.2* 6.0*        Diagnostics  Labs pending at this time.  Results will be reviewed when available.   No EKG required for low risk surgery (cataract, skin procedure, breast biopsy, etc).    Revised Cardiac Risk Index (RCRI)  The patient has the following serious cardiovascular risks for perioperative complications:   - No serious cardiac risks = 0 points     RCRI Interpretation: 0 points: Class I (very low risk - 0.4% complication rate)         Signed Electronically by: Paulette Travis DNP  A copy of this evaluation report is provided to the requesting physician.

## 2024-09-14 ENCOUNTER — HEALTH MAINTENANCE LETTER (OUTPATIENT)
Age: 64
End: 2024-09-14

## 2024-09-14 DIAGNOSIS — E11.40 TYPE 2 DIABETES MELLITUS WITH DIABETIC NEUROPATHY, WITH LONG-TERM CURRENT USE OF INSULIN (H): ICD-10-CM

## 2024-09-14 DIAGNOSIS — Z79.4 TYPE 2 DIABETES MELLITUS WITH DIABETIC NEUROPATHY, WITH LONG-TERM CURRENT USE OF INSULIN (H): ICD-10-CM

## 2024-09-16 RX ORDER — INSULIN GLARGINE 100 [IU]/ML
INJECTION, SOLUTION SUBCUTANEOUS
Qty: 45 ML | Refills: 1 | Status: SHIPPED | OUTPATIENT
Start: 2024-09-16

## 2024-09-18 RX ORDER — BISACODYL 5 MG/1
TABLET, DELAYED RELEASE ORAL
Qty: 4 TABLET | Refills: 0 | Status: SHIPPED | OUTPATIENT
Start: 2024-09-18

## 2024-09-18 NOTE — ADDENDUM NOTE
Addended by: MATT GATES on: 9/18/2024 11:25 AM     Modules accepted: Orders     From: Nathan Shepherd  To: Poonam Burr MD  Sent: 7/21/2017 6:46 AM CDT  Subject: Ntahan Shepherd daily home stats    I keep forgetting but I will get my labs done tomorrow 7-22.      Date: Monday, July 17, 2017  Time: 7a.m.  Ate last: Woke up and showered, no food  Weight: 285  BP: 158/77  Pulse: 49  Glucose: 139  Other: I overexerted myself what I thought was a little on Saturday and could not catch my breath. I could tell my heart rate would not exceed about 60 throughout the whole episode. Ashley' scary. I also forgot to take any of my evening meds last night (Sunday).     Date: Friday, July 21, 2017  Time: 6:35a.m.  Ate last: Woke up and showered, no food  Weight: 287  BP: 159/74  Pulse: 53  Glucose: 215  Other:

## 2024-09-18 NOTE — TELEPHONE ENCOUNTER
Extended Golytely Bowel Prep  recommended due to poor prep/inadequate bowel prep in the past.  Instructions were sent via [employer]. Bowel prep was sent 9/18/2024 to [place], [place] - [address] [place] [address]

## 2024-09-19 ENCOUNTER — TELEPHONE (OUTPATIENT)
Dept: FAMILY MEDICINE | Facility: CLINIC | Age: 64
End: 2024-09-19
Payer: COMMERCIAL

## 2024-09-19 DIAGNOSIS — Z79.4 TYPE 2 DIABETES MELLITUS WITH CHRONIC KIDNEY DISEASE, WITH LONG-TERM CURRENT USE OF INSULIN, UNSPECIFIED CKD STAGE (H): Primary | ICD-10-CM

## 2024-09-19 DIAGNOSIS — E11.22 TYPE 2 DIABETES MELLITUS WITH CHRONIC KIDNEY DISEASE, WITH LONG-TERM CURRENT USE OF INSULIN, UNSPECIFIED CKD STAGE (H): Primary | ICD-10-CM

## 2024-09-19 NOTE — TELEPHONE ENCOUNTER
Freestyle Jammie 3 sensors have been on backorder for a couple weeks because they are phasing over to the new Jammie 3 Plus sensors which last 15 days. Jammie 3 Plus just became available to us, but it requires a new prescription. Would you be willing to send a new Rx for the Jammie 3 Plus?    Thank you,  Moise Bermudez, PharmD  [facility]  Ph: [phone]

## 2024-09-20 RX ORDER — BLOOD-GLUCOSE SENSOR
EACH MISCELLANEOUS
Qty: 6 EACH | Refills: 1 | Status: SHIPPED | OUTPATIENT
Start: 2024-09-20

## 2024-09-30 ENCOUNTER — TELEPHONE (OUTPATIENT)
Dept: GASTROENTEROLOGY | Facility: CLINIC | Age: 64
End: 2024-09-30
Payer: COMMERCIAL

## 2024-09-30 NOTE — TELEPHONE ENCOUNTER
Pre visit planning completed.      Procedure details:    Patient scheduled for Colonoscopy on 10/9/24.     Arrival time: 1040. Procedure time 1140    Facility location: [facility]; [address] [place] [address] [place] [address]. Check in location: 1st Floor [facility].     Sedation type: MAC    Pre op exam needed? Yes. Pre op exam completed on 9/10/24 with Paulette Travis DNP.    Indication for procedure: Special screening for malignant neoplasms, colon [Z12.11]  - Primary      Chart review:     Electronic implanted devices? No    Recent diagnosis of diverticulitis within the last 6 weeks? No      Medication review:    Diabetic? Yes. Diabetic medication HOLDING recommendations: Insulin: Consult with managing provider.     Anticoagulants? No    Weight loss medication/injectable? No.    Other medication HOLDING recommendations:  N/A      Prep for procedure:     Bowel prep recommendation: Extended Golytely. Bowel prep prescription sent to      [place], [place] - [address] [place] [address]    Due to: diabetes.  and poor prep/inadequate bowel prep in the past.     Prep instructions sent via [employer]         Tiffani Sánchez RN  Endoscopy Procedure Pre Assessment RN  [phone] option 2

## 2024-09-30 NOTE — TELEPHONE ENCOUNTER
Attempted to contact patient in order to complete pre assessment questions.     No answer. Left message to return call to [phone] option 2    Callback required communication sent via [employer].      Genia Koo RN  Endoscopy Procedure Pre Assessment

## 2024-10-07 NOTE — TELEPHONE ENCOUNTER
Pre assessment completed for upcoming procedure.   (Please see previous telephone encounter notes for complete details)    Procedure details:    Arrival time and facility location reviewed.    Pre op exam needed? No.    Designated  policy reviewed. Instructed to have someone stay 24 hours post procedure.     COVID policy reviewed.      Medication review:    Medications reviewed. Please see supporting documentation below. Holding recommendations discussed (if applicable).       Prep for procedure:     Procedure prep instructions reviewed.        Additional information needed?  N/A      Patient  verbalized understanding and had no questions or concerns at this time.      Corinne Kliber, RN  Endoscopy Procedure Pre Assessment   [phone] option 4

## 2024-10-09 ENCOUNTER — HOSPITAL ENCOUNTER (OUTPATIENT)
Facility: CLINIC | Age: 64
Discharge: HOME | End: 2024-10-09
Attending: COLON & RECTAL SURGERY | Admitting: COLON & RECTAL SURGERY
Payer: COMMERCIAL

## 2024-10-09 ENCOUNTER — ANESTHESIA (OUTPATIENT)
Dept: GASTROENTEROLOGY | Facility: CLINIC | Age: 64
End: 2024-10-09
Payer: COMMERCIAL

## 2024-10-09 ENCOUNTER — ANESTHESIA EVENT (OUTPATIENT)
Dept: GASTROENTEROLOGY | Facility: CLINIC | Age: 64
End: 2024-10-09
Payer: COMMERCIAL

## 2024-10-09 VITALS
HEART RATE: 67 BPM | RESPIRATION RATE: 27 BRPM | SYSTOLIC BLOOD PRESSURE: 109 MMHG | OXYGEN SATURATION: 97 % | DIASTOLIC BLOOD PRESSURE: 72 MMHG

## 2024-10-09 LAB — COLONOSCOPY: NORMAL

## 2024-10-09 PROCEDURE — 45385 COLONOSCOPY W/LESION REMOVAL: CPT | Performed by: COLON & RECTAL SURGERY

## 2024-10-09 PROCEDURE — 88305 TISSUE EXAM BY PATHOLOGIST: CPT | Mod: 26 | Performed by: PATHOLOGY

## 2024-10-09 PROCEDURE — 250N000011 HC RX IP 250 OP 636: Performed by: NURSE ANESTHETIST, CERTIFIED REGISTERED

## 2024-10-09 PROCEDURE — 88305 TISSUE EXAM BY PATHOLOGIST: CPT | Mod: TC | Performed by: COLON & RECTAL SURGERY

## 2024-10-09 PROCEDURE — 258N000003 HC RX IP 258 OP 636: Performed by: NURSE ANESTHETIST, CERTIFIED REGISTERED

## 2024-10-09 PROCEDURE — 45385 COLONOSCOPY W/LESION REMOVAL: CPT | Performed by: ANESTHESIOLOGY

## 2024-10-09 PROCEDURE — 250N000009 HC RX 250: Performed by: NURSE ANESTHETIST, CERTIFIED REGISTERED

## 2024-10-09 PROCEDURE — 45385 COLONOSCOPY W/LESION REMOVAL: CPT | Performed by: NURSE ANESTHETIST, CERTIFIED REGISTERED

## 2024-10-09 PROCEDURE — 999N000010 HC STATISTIC ANES STAT CODE-CRNA PER MINUTE: Performed by: COLON & RECTAL SURGERY

## 2024-10-09 PROCEDURE — 370N000017 HC ANESTHESIA TECHNICAL FEE, PER MIN: Performed by: COLON & RECTAL SURGERY

## 2024-10-09 RX ORDER — ONDANSETRON 2 MG/ML
4 INJECTION INTRAMUSCULAR; INTRAVENOUS
Status: CANCELLED | OUTPATIENT
Start: 2024-10-09

## 2024-10-09 RX ORDER — PROPOFOL 10 MG/ML
INJECTION, EMULSION INTRAVENOUS CONTINUOUS PRN
Status: DISCONTINUED | OUTPATIENT
Start: 2024-10-09 | End: 2024-10-09

## 2024-10-09 RX ORDER — LIDOCAINE HYDROCHLORIDE 20 MG/ML
INJECTION, SOLUTION INFILTRATION; PERINEURAL PRN
Status: DISCONTINUED | OUTPATIENT
Start: 2024-10-09 | End: 2024-10-09

## 2024-10-09 RX ORDER — LIDOCAINE 40 MG/G
CREAM TOPICAL
Status: CANCELLED | OUTPATIENT
Start: 2024-10-09

## 2024-10-09 RX ORDER — SODIUM CHLORIDE, SODIUM LACTATE, POTASSIUM CHLORIDE, CALCIUM CHLORIDE 600; 310; 30; 20 MG/100ML; MG/100ML; MG/100ML; MG/100ML
INJECTION, SOLUTION INTRAVENOUS CONTINUOUS PRN
Status: DISCONTINUED | OUTPATIENT
Start: 2024-10-09 | End: 2024-10-09

## 2024-10-09 RX ORDER — PROPOFOL 10 MG/ML
INJECTION, EMULSION INTRAVENOUS PRN
Status: DISCONTINUED | OUTPATIENT
Start: 2024-10-09 | End: 2024-10-09

## 2024-10-09 RX ADMIN — PROPOFOL 250 MCG/KG/MIN: 10 INJECTION, EMULSION INTRAVENOUS at 11:26

## 2024-10-09 RX ADMIN — PROPOFOL 50 MG: 10 INJECTION, EMULSION INTRAVENOUS at 11:29

## 2024-10-09 RX ADMIN — LIDOCAINE HYDROCHLORIDE 50 MG: 20 INJECTION, SOLUTION INFILTRATION; PERINEURAL at 11:26

## 2024-10-09 RX ADMIN — SODIUM CHLORIDE, POTASSIUM CHLORIDE, SODIUM LACTATE AND CALCIUM CHLORIDE: 600; 310; 30; 20 INJECTION, SOLUTION INTRAVENOUS at 11:26

## 2024-10-09 ASSESSMENT — ACTIVITIES OF DAILY LIVING (ADL)
ADLS_ACUITY_SCORE: 37

## 2024-10-09 ASSESSMENT — LIFESTYLE VARIABLES: TOBACCO_USE: 1

## 2024-10-09 NOTE — ANESTHESIA CARE TRANSFER NOTE
Patient: Bradford Prado    Procedure: Procedure(s):  Colonoscopy  COLONOSCOPY, WITH POLYPECTOMY AND BIOPSY       Diagnosis: Special screening for malignant neoplasms, colon [Z12.11]  Diagnosis Additional Information: No value filed.    Anesthesia Type:   MAC     Note:    Oropharynx: oropharynx clear of all foreign objects  Level of Consciousness: awake  Oxygen Supplementation: face mask  Level of Supplemental Oxygen (L/min / FiO2): 6  Independent Airway: airway patency satisfactory and stable  Dentition: dentition unchanged  Vital Signs Stable: post-procedure vital signs reviewed and stable  Report to RN Given: handoff report given  Patient transferred to: Phase II    Handoff Report: Identifed the Patient, Identified the Reponsible Provider, Reviewed the pertinent medical history, Discussed the surgical course, Reviewed Intra-OP anesthesia mangement and issues during anesthesia, Set expectations for post-procedure period and Allowed opportunity for questions and acknowledgement of understanding  Vitals:  Vitals Value Taken Time   BP     Temp     Pulse     Resp     SpO2         Electronically Signed By: OPAL Still CRNA  October 9, 2024  12:16 PM

## 2024-10-09 NOTE — ANESTHESIA PREPROCEDURE EVALUATION
Anesthesia Pre-Procedure Evaluation    Patient: Bradford Prado   MRN: [id number] : 1960        Procedure : Procedure(s):  Colonoscopy          Past Medical History:   Diagnosis Date    Chest pain 2021    Diabetes mellitus (H)     Dyslipidemia     Hereditary hemochromatosis (H)     Hyperglycemia 2021    Hypertension     Hypertensive urgency 2021    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) (H)     Nephrolithiasis 2016    First episode 2016    PONV (postoperative nausea and vomiting)     Poorly controlled type 2 diabetes mellitus with renal complication (H)     Type 2 diabetes mellitus with diabetic neuropathy, with long-term current use of insulin (H)       Past Surgical History:   Procedure Laterality Date    COLONOSCOPY      COLONOSCOPY N/A 2024    Procedure: Colonoscopy;  Surgeon: Royce Mari MD;  Location:  GI    COLONOSCOPY N/A 2024    Procedure: COLONOSCOPY, WITH POLYPECTOMY AND BIOPSY;  Surgeon: Royce Mari MD;  Location:  GI    ESOPHAGOSCOPY, GASTROSCOPY, DUODENOSCOPY (EGD), COMBINED N/A 11/10/2021    Procedure: ESOPHAGOGASTRODUODENOSCOPY, WITH BIOPSY;  Surgeon: Leventhal, Thomas Michael, MD;  [place] [facility] [place]    ESOPHAGOSCOPY, GASTROSCOPY, DUODENOSCOPY (EGD), COMBINED N/A 2024    Procedure: Esophagoscopy, gastroscopy, duodenoscopy (EGD), combined;  Surgeon: Royce Mari MD;  Location:  GI    HYSTERECTOMY TOTAL ABDOMINAL  1995    due to fibroids    LAPAROSCOPIC CHOLECYSTECTOMY N/A 2023    Procedure: Laparoscopic cholecystectomy;  Surgeon: Eber Gill MD;  Location:  OR    LAPAROSCOPIC EVACUATION ECTOPIC PREGNANCY      TUBAL LIGATION        No Known Allergies   Social History     Tobacco Use    Smoking status: Former     Current packs/day: 0.00     Average packs/day: 0.5 packs/day for 20.0 years (10.0 ttl pk-yrs)     Types: Cigarettes     Start date:  3/9/1990     Quit date: 3/9/2010     Years since quittin.5    Smokeless tobacco: Never   Substance Use Topics    Alcohol use: No     Alcohol/week: 0.0 standard drinks of alcohol      Wt Readings from Last 1 Encounters:   09/10/24 64.4 kg (142 lb)        Anesthesia Evaluation   Pt has had prior anesthetic.     History of anesthetic complications  - PONV.      ROS/MED HX  ENT/Pulmonary:     (+)                tobacco use, Past use,                       Neurologic:       Cardiovascular:     (+) Dyslipidemia hypertension- -   -  - -                                      METS/Exercise Tolerance:     Hematologic: Comments: Hereditary Hemachromatosis      Musculoskeletal:       GI/Hepatic:     (+)             liver disease,    (-) GERD   Renal/Genitourinary:     (+)       Nephrolithiasis ,       Endo:     (+)  type II DM,                    Psychiatric/Substance Use:       Infectious Disease:       Malignancy:       Other:            Physical Exam    Airway        Mallampati: III   TM distance: > 3 FB   Neck ROM: full   Mouth opening: > 3 cm    Respiratory Devices and Support         Dental       (+) Minor Abnormalities - some fillings, tiny chips      Cardiovascular   cardiovascular exam normal          Pulmonary   pulmonary exam normal                OUTSIDE LABS:  CBC:   Lab Results   Component Value Date    WBC 10.4 2023    WBC 9.3 02/10/2023    HGB 15.0 2023    HGB 15.5 02/10/2023    HCT 43.2 2023    HCT 45.9 02/10/2023     (L) 2023     02/10/2023     BMP:   Lab Results   Component Value Date     2023     02/10/2023    POTASSIUM 4.1 2023    POTASSIUM 4.2 02/10/2023    CHLORIDE 104 2023    CHLORIDE 105 02/10/2023    CO2 28 2023    CO2 24 02/10/2023    BUN 11 2023    BUN 13.6 02/10/2023    CR 0.70 2023    CR 0.69 02/10/2023     (H) 2023     (H) 02/10/2023     COAGS:   Lab Results   Component Value Date    INR  "1.16 (H) 01/06/2022     POC:   Lab Results   Component Value Date     (H) 06/21/2021    HCG (A) 09/22/2016     Canceled, Test credited   Specimen improperly labeled  CALLED ER ASKED FOR RECOLLECT       HEPATIC:   Lab Results   Component Value Date    ALBUMIN 4.3 09/08/2023    PROTTOTAL 7.6 09/08/2023    ALT 33 09/08/2023    AST 38 09/08/2023    ALKPHOS 75 09/08/2023    BILITOTAL 1.3 09/08/2023     OTHER:   Lab Results   Component Value Date    LACT 1.9 09/19/2016    A1C 6.2 (H) 06/26/2024    ESTELLE 9.4 09/08/2023    LIPASE 47 09/08/2023    AMYLASE 66 09/08/2023    TSH 4.51 (H) 01/18/2019    T4 1.26 01/18/2019    SED 7 08/23/2012       Anesthesia Plan    ASA Status:  2    NPO Status:  NPO Appropriate    Anesthesia Type: MAC.              Consents    Anesthesia Plan(s) and associated risks, benefits, and realistic alternatives discussed. Questions answered and patient/representative(s) expressed understanding.     - Discussed:     - Discussed with:  Patient            Postoperative Care            Comments:               Favian Estrella MD    I have reviewed the pertinent notes and labs in the chart from the past 30 days and (re)examined the patient.  Any updates or changes from those notes are reflected in this note.              # Hypertension: Noted on problem list         # Overweight: Estimated body mass index is 28.68 kg/m  as calculated from the following:    Height as of 9/10/24: 1.499 m (4' 11\").    Weight as of 9/10/24: 64.4 kg (142 lb).             "

## 2024-10-09 NOTE — ANESTHESIA POSTPROCEDURE EVALUATION
Patient: Bradford Prado    Procedure: Procedure(s):  Colonoscopy       Anesthesia Type:  MAC    Note:  Disposition: Outpatient   Postop Pain Control: Uneventful            Sign Out: Well controlled pain   PONV: No   Neuro/Psych: Uneventful            Sign Out: Acceptable/Baseline neuro status   Airway/Respiratory: Uneventful            Sign Out: Acceptable/Baseline resp. status   CV/Hemodynamics: Uneventful            Sign Out: Acceptable CV status   Other NRE: NONE   DID A NON-ROUTINE EVENT OCCUR? No           Last vitals:  Vitals Value Taken Time   /72 10/09/24 1230   Temp     Pulse 71 10/09/24 1232   Resp 27 10/09/24 1232   SpO2 99 % 10/09/24 1232   Vitals shown include unfiled device data.    Electronically Signed By: Favian Estrella MD  October 9, 2024  2:32 PM

## 2024-10-14 ENCOUNTER — TRANSFERRED RECORDS (OUTPATIENT)
Dept: HEALTH INFORMATION MANAGEMENT | Facility: CLINIC | Age: 64
End: 2024-10-14
Payer: COMMERCIAL

## 2024-10-21 DIAGNOSIS — Z79.4 TYPE 2 DIABETES MELLITUS WITH DIABETIC NEUROPATHY, WITH LONG-TERM CURRENT USE OF INSULIN (H): ICD-10-CM

## 2024-10-21 DIAGNOSIS — F43.23 SITUATIONAL MIXED ANXIETY AND DEPRESSIVE DISORDER: ICD-10-CM

## 2024-10-21 DIAGNOSIS — E11.40 TYPE 2 DIABETES MELLITUS WITH DIABETIC NEUROPATHY, WITH LONG-TERM CURRENT USE OF INSULIN (H): ICD-10-CM

## 2024-10-21 RX ORDER — INSULIN ASPART 100 [IU]/ML
INJECTION, SOLUTION INTRAVENOUS; SUBCUTANEOUS
Qty: 30 ML | Refills: 0 | Status: SHIPPED | OUTPATIENT
Start: 2024-10-21

## 2024-11-01 ENCOUNTER — LAB (OUTPATIENT)
Dept: LAB | Facility: CLINIC | Age: 64
End: 2024-11-01
Payer: COMMERCIAL

## 2024-11-01 DIAGNOSIS — E83.110 HEREDITARY HEMOCHROMATOSIS (H): ICD-10-CM

## 2024-11-01 LAB
ALBUMIN SERPL BCG-MCNC: 4.4 G/DL (ref 3.5–5.2)
ALP SERPL-CCNC: 78 U/L (ref 40–150)
ALT SERPL W P-5'-P-CCNC: 26 U/L (ref 0–50)
ANION GAP SERPL CALCULATED.3IONS-SCNC: 11 MMOL/L (ref 7–15)
AST SERPL W P-5'-P-CCNC: 33 U/L (ref 0–45)
BASOPHILS # BLD AUTO: 0 10E3/UL (ref 0–0.2)
BASOPHILS NFR BLD AUTO: 1 %
BILIRUB SERPL-MCNC: 0.5 MG/DL
BUN SERPL-MCNC: 9.4 MG/DL (ref 8–23)
CALCIUM SERPL-MCNC: 9.4 MG/DL (ref 8.8–10.4)
CHLORIDE SERPL-SCNC: 106 MMOL/L (ref 98–107)
CREAT SERPL-MCNC: 0.62 MG/DL (ref 0.51–0.95)
EGFRCR SERPLBLD CKD-EPI 2021: >90 ML/MIN/1.73M2
EOSINOPHIL # BLD AUTO: 0.2 10E3/UL (ref 0–0.7)
EOSINOPHIL NFR BLD AUTO: 2 %
ERYTHROCYTE [DISTWIDTH] IN BLOOD BY AUTOMATED COUNT: 12.9 % (ref 10–15)
FERRITIN SERPL-MCNC: 62 NG/ML (ref 11–328)
GLUCOSE SERPL-MCNC: 159 MG/DL (ref 70–99)
HCO3 SERPL-SCNC: 22 MMOL/L (ref 22–29)
HCT VFR BLD AUTO: 47.2 % (ref 35–47)
HGB BLD-MCNC: 15.8 G/DL (ref 11.7–15.7)
IMM GRANULOCYTES # BLD: 0 10E3/UL
IMM GRANULOCYTES NFR BLD: 0 %
LYMPHOCYTES # BLD AUTO: 1.8 10E3/UL (ref 0.8–5.3)
LYMPHOCYTES NFR BLD AUTO: 23 %
MCH RBC QN AUTO: 31.2 PG (ref 26.5–33)
MCHC RBC AUTO-ENTMCNC: 33.5 G/DL (ref 31.5–36.5)
MCV RBC AUTO: 93 FL (ref 78–100)
MONOCYTES # BLD AUTO: 0.6 10E3/UL (ref 0–1.3)
MONOCYTES NFR BLD AUTO: 8 %
NEUTROPHILS # BLD AUTO: 5.2 10E3/UL (ref 1.6–8.3)
NEUTROPHILS NFR BLD AUTO: 66 %
PLATELET # BLD AUTO: 144 10E3/UL (ref 150–450)
POTASSIUM SERPL-SCNC: 4.3 MMOL/L (ref 3.4–5.3)
PROT SERPL-MCNC: 7.2 G/DL (ref 6.4–8.3)
RBC # BLD AUTO: 5.06 10E6/UL (ref 3.8–5.2)
SODIUM SERPL-SCNC: 139 MMOL/L (ref 135–145)
T4 FREE SERPL-MCNC: 1.01 NG/DL (ref 0.9–1.7)
TSH SERPL DL<=0.005 MIU/L-ACNC: 4.91 UIU/ML (ref 0.3–4.2)
WBC # BLD AUTO: 7.9 10E3/UL (ref 4–11)

## 2024-11-01 PROCEDURE — 84439 ASSAY OF FREE THYROXINE: CPT

## 2024-11-01 PROCEDURE — 36415 COLL VENOUS BLD VENIPUNCTURE: CPT

## 2024-11-01 PROCEDURE — 84443 ASSAY THYROID STIM HORMONE: CPT

## 2024-11-01 PROCEDURE — 82728 ASSAY OF FERRITIN: CPT

## 2024-11-01 PROCEDURE — 85025 COMPLETE CBC W/AUTO DIFF WBC: CPT

## 2024-11-01 PROCEDURE — 80053 COMPREHEN METABOLIC PANEL: CPT

## 2024-11-03 NOTE — PROGRESS NOTES
Virtual Visit Details    Type of service:  Video Visit   Video Start Time:  1:00 PM  Video End Time: 1:30 PM    Originating Location (pt. 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  She also reports that she's been very careful about diet, doesn't drink and otherwise is taking good care of her health.    She has had some polyps on colonoscopy.  Her most active problem right now is arthritis in her fingers and wrists.  She has also felt generally poorly since being given gabapentin and would like to come off that.    Oncologic History  PATHOLOGY:  5/10/21: TEST(S) REQUESTED:   Hemochromatosis Mutation Analysis by PCR   SPECIMEN DESCRIPTION:   Blood   HEMOCHROMATOSIS RESULTS   HFE Gene C282Y (G845A) RESULTS: C282Y Mutation Interpretation: HOMOZYGOTE   HFE Gene H63D (C187G) RESULTS: H63D Mutation Interpretation: NORMAL   HFE Gene S65C (A193T) RESULTS: S65C Mutation Interpretation: NORMAL      Indication for testing: Carrier screening or diagnostic   testing for alpha-1-antitrypsin (AAT) deficiency.   Negative: This sample has a serum AAT protein concentration   in the normal range and is negative for the S and Z   deficiency alleles by genotyping. 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                          - Small (< 5 mm) esophageal varices.                          - Gastritis. Biopsied.                          - Normal examined duodenum.      Final Diagnosis   A. STOMACH, BIOPSY:  - Gastric mucosa with features of reactive gastropathy   - No H. pylori organisms identified on routine staining  - Negative for intestinal metaplasia and dysplasia         Case Report   Surgical Pathology Report                         Case: [id number]                                   Authorizing Provider:  Eber Gill MD Collected:           02/13/2023 11:26 AM           Ordering Location:     [facility]          Received:            02/13/2023 12:35 PM                                  [facility] OR                                                             Pathologist:           Marnie De La Rosa MD                                                                            Specimen:    Gallbladder                                                                                 Final Diagnosis   Gallbladder, cholecystectomy:  -Acute on chronic cholecystitis with cholelithiasis               IMAGING:  ULTRASOUND ABDOMEN LIMITED November 24, 2021   IMPRESSION:  1.  Coarse increased echogenicity of the liver compatible with  steatosis and/or cirrhosis.  2.  1 cm gallbladder polyp.  3.  No hepatoma demonstrated.     US ABDOMEN LIMITED 5/5/2022  FINDINGS:     GALLBLADDER: Gallbladder polyp measuring 1 cm again noted and  unchanged. No gallstones, wall thickening, or pericholecystic fluid.  Negative sonographic Clark's sign.     BILE DUCTS: There is no  biliary dilatation. The common duct measures 4  mm.     LIVER: Unremarkable where seen.                                                                      IMPRESSION:  1.  Stable gallbladder polyp.     ABD US 12/27/23:  IMPRESSION:  1.  Coarsened hepatic echotexture suggestive of underlying intrinsic liver disease. No suspicious focal liver lesion.  2.  Mild splenomegaly.  3.  Interval cholecystectomy.    ECOG Peformance Status  0 - Fully active, able to carry on all pre-disease performance without restriction      Patient Active Problem List   Diagnosis    Hand arthritis    Stenosing tenosynovitis    Hyperlipidemia LDL goal <70    Essential hypertension with goal blood pressure less than 140/90    Type 2 diabetes mellitus with kidney complication, with long-term current use of insulin (H)    Dyslipidemia    Hereditary hemochromatosis (H)    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) (H)    Situational mixed anxiety and depressive disorder    Polyp of colon, unspecified part of colon, unspecified type     Current Outpatient Medications   Medication Sig Dispense Refill    ACCU-CHEK GUIDE test strip Use to test blood sugar 4 times daily or as directed. 200 strip 11    amLODIPine (NORVASC) 5 MG tablet Take 1 tablet (5 mg) by mouth 2 times daily 180 tablet 3    blood glucose (NO BRAND SPECIFIED) lancets standard Use to test blood sugar twice times daily or as directed. 100 each 1    blood glucose (NO BRAND SPECIFIED) test strip Use to test blood sugar twice times daily or as directed. 100 each 1    blood glucose (NO BRAND SPECIFIED) test strip Use to test blood sugars 2 times daily or as directed 100 strip 11    blood glucose monitoring (SOFTCLIX) lancets Use to test blood sugar 6 times daily. 200 each 11    Blood Glucose Monitoring Suppl (ACCU-CHEK GUIDE ME) w/Device KIT 1 each 6 times daily 1 kit 0    carvedilol (COREG) 12.5 MG tablet Take 1 tablet (12.5 mg) by mouth 2 times daily (with meals)  180 tablet 3    cholestyramine light (PREVALITE) 4 GM powder Take 1 packet (4 g) by mouth 3 times daily (with meals) 60 packet 1    Continuous Blood Gluc Sensor (FREESTYLE MEENA 2 SENSOR) MISC CHANGE EVERY 14 DAYS. USE TO CHECK BLOOD SUGARS PER  GUIDELINES 1 each 2    Continuous Blood Gluc Sensor (FREESTYLE MEENA 3 SENSOR) MISC 1 each every 14 days Use 1 sensor every 14 days. Use to read blood sugars per 's instructions. 6 each 5    Continuous Glucose  (FREESTYLE MEENA 3 READER) SHERRELL USE TO READ BLOOD SUGARS PER 'S INSTRUCTIONS. 1 each 0    Continuous Glucose Sensor (FREESTYLE MEENA 3 PLUS SENSOR) MISC Change every 15 days. 6 each 1    gabapentin (NEURONTIN) 100 MG capsule 2 tabs po TID. 180 capsule 2    Glucagon (GVOKE HYPOPEN) 1 MG/0.2ML pen Inject the contents of one device under the skin into lower abdomen, outer thigh, or outer upper arm as needed for severe hypoglycemia. If no response after 15 minutes, additional 1 mg dose from a new device may be injected while waiting for emergency assistance 0.4 mL 3    insulin pen needle (BD NATALIYA U/F) 32G X 4 MM miscellaneous USE FOR INJECTIONS 4 TIMES DAILY AS DIRECTED (DUE FOR APPOINTMENT AND LABS) 400 each 0    LANTUS SOLOSTAR 100 UNIT/ML soln INJECT 38 UNITS SUBCUTANEOUS EVERY MORNING (BEFORE BREAKFAST) 45 mL 1    losartan (COZAAR) 50 MG tablet Take 1 tablet (50 mg) by mouth daily 90 tablet 3    NOVOLOG FLEXPEN 100 UNIT/ML soln INJECT 10 UNITS UNDER THE SKIN THREE TIMES DAILY WITH MEALS. MAXIMUM OF 30 UNITS DAILY. 30 mL 0    ondansetron (ZOFRAN ODT) 4 MG ODT tab Take 1 tablet (4 mg) by mouth every 8 hours as needed for nausea 16 tablet 0    pantoprazole (PROTONIX) 40 MG EC tablet Take 1 tablet (40 mg) by mouth daily 90 tablet 3    pravastatin (PRAVACHOL) 20 MG tablet TAKE ONE TABLET BY MOUTH ONCE DAILY. DUE FOR LABS 90 tablet 3    sertraline (ZOLOFT) 50 MG tablet TAKE 2 TABLETS (100 MG) BY MOUTH DAILY 180 tablet 0     No  current facility-administered medications for this visit.     Past Medical History:   Diagnosis Date    Arthritis     Chest pain 02/23/2021    Diabetes mellitus (H)     Dyslipidemia     Hereditary hemochromatosis (H)     Hyperglycemia 02/23/2021    Hypertension     Hypertensive urgency 02/23/2021    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) (H)     Nephrolithiasis 09/19/2016    First episode September 2016    PONV (postoperative nausea and vomiting)     Poorly controlled type 2 diabetes mellitus with renal complication (H)     Type 2 diabetes mellitus with diabetic neuropathy, with long-term current use of insulin (H)      Past Surgical History:   Procedure Laterality Date    COLONOSCOPY      COLONOSCOPY N/A 2/29/2024    Procedure: Colonoscopy;  Surgeon: Royce Mari MD;  Location:  GI    COLONOSCOPY N/A 2/29/2024    Procedure: COLONOSCOPY, WITH POLYPECTOMY AND BIOPSY;  Surgeon: Royce Mari MD;  Location:  GI    COLONOSCOPY N/A 10/9/2024    Procedure: Colonoscopy;  Surgeon: Paula Nick MD;  Location:  GI    ESOPHAGOSCOPY, GASTROSCOPY, DUODENOSCOPY (EGD), COMBINED N/A 11/10/2021    Procedure: ESOPHAGOGASTRODUODENOSCOPY, WITH BIOPSY;  Surgeon: Leventhal, Thomas Michael, MD;  [place] [facility] [place]    ESOPHAGOSCOPY, GASTROSCOPY, DUODENOSCOPY (EGD), COMBINED N/A 2/29/2024    Procedure: Esophagoscopy, gastroscopy, duodenoscopy (EGD), combined;  Surgeon: Royce Mari MD;  Location:  GI    HYSTERECTOMY TOTAL ABDOMINAL  01/01/1995    due to fibroids    LAPAROSCOPIC CHOLECYSTECTOMY N/A 02/13/2023    Procedure: Laparoscopic cholecystectomy;  Surgeon: Eber Gill MD;  Location:  OR    LAPAROSCOPIC EVACUATION ECTOPIC PREGNANCY      TUBAL LIGATION       Socioeconomic History    Marital status: Single     Social Drivers of Health     Interpersonal Safety: Low Risk  (9/10/2024)    Interpersonal Safety     Do you feel physically and  "emotionally safe where you currently live?: Yes     Within the past 12 months, have you been hit, slapped, kicked or otherwise physically hurt by someone?: No     Within the past 12 months, have you been humiliated or emotionally abused in other ways by your partner or ex-partner?: No     Review of Systems   Constitutional:  Positive for fatigue.   Eyes: Negative.    Cardiovascular:  Positive for chest pain.   Gastrointestinal: Negative.    Endocrine:        Prediabetic   Genitourinary: Negative.     Musculoskeletal:  Positive for arthralgias.   Skin: Negative.    Neurological: Negative.         Has had a couple of falls   Hematological: Negative.    Psychiatric/Behavioral:  Positive for decreased concentration (since being on gabapentin).    All other systems reviewed and are negative.      Ht 1.499 m (4' 11\")   Wt 63.5 kg (140 lb)   LMP  (LMP Unknown)   BMI 28.28 kg/m      GENERAL: alert and no distress  EYES: Eyes grossly normal to inspection.  No discharge or erythema, or obvious scleral/conjunctival abnormalities.  RESP: No audible wheeze, cough, or visible cyanosis.    SKIN: Visible skin clear. No significant rash, abnormal pigmentation or lesions.  NEURO: Cranial nerves grossly intact.  Mentation and speech appropriate for age.  PSYCH: Appropriate affect, tone, and pace of words    Recent Results (from the past 720 hours)   COLONOSCOPY    Collection Time: 10/09/24 11:19 AM   Result Value Ref Range    COLONOSCOPY       [place] [address] [address]  [id number]  _______________________________________________________________________________  Patient Name: Bradford Prado              Procedure Date: 10/9/2024 11:19 AM  MRN: [id number]                       Account Number: [id number]  YOB: 1960              Admit Type: Outpatient  Age: 64                               Room: [place] [address]  Note Status: Finalized                Attending MD: JOSE ROBERTO ALCAZAR MD,   Instrument " Name: [facility]-[id number] Peds Colon   _______________________________________________________________________________     Procedure:                Colonoscopy  Indications:              Surveillance: Personal history of piecemeal removal                             of large sessile adenoma on last colonoscopy (less                             than 1 year ago)  Providers:                JOSE ROBERTO ALCAZAR MD, Elena Vidales RN  Referring MD:             NIALL WILSON NP  Medicines:                 Monitored Anesthesia Care  Complications:            No immediate complications. Estimated blood loss:                             Minimal.  _______________________________________________________________________________  Procedure:                Pre-Anesthesia Assessment:                            - Prior to the procedure, a History and Physical                             was performed, and patient medications and                             allergies were reviewed. The patient is competent.                             The risks and benefits of the procedure and the                             sedation options and risks were discussed with the                             patient. All questions were answered and informed                             consent was obtained. Patient identification and                             proposed procedure were verified by the physician                             in the procedure room. Mental Status Examination:                             juna rt and oriented. Airway Examination: normal                             oropharyngeal airway and neck mobility. Respiratory                             Examination: clear to auscultation. CV Examination:                             normal. ASA Grade Assessment: II - A patient with                             mild systemic disease. After reviewing the risks                             and benefits, the patient was deemed in                              satisfactory condition to undergo the procedure.                             The anesthesia plan was to use monitored anesthesia                             care (MAC). Immediately prior to administration of                             medications, the patient was re-assessed for                             adequacy to receive sedatives. The heart rate,                             respiratory rate, oxygen saturations, blood                             pressure, adequacy of pulmonary ventilation, and                             response to  care were monitored throughout the                             procedure. The physical status of the patient was                             re-assessed after the procedure.                            After obtaining informed consent, the colonoscope                             was passed under direct vision. Throughout the                             procedure, the patient's blood pressure, pulse, and                             oxygen saturations were monitored continuously. The                             peds colonoscope 514 was introduced through the                             anus and advanced to the cecum, identified by                             appendiceal orifice and ileocecal valve. The                             ileocecal valve and the appendiceal orifice were                             photographed. The entire colon was well visualized.                             The colonoscopy was performed with ease. The                             patient tolerated the procedure w ell. The quality                             of the bowel preparation was excellent.                                                                                   Findings:       The perianal and digital rectal examinations were normal.       A 6 mm polyp (vs scar) was found in the cecum, surrounding a clip at        prior polypectomy site. The polyp was carpet-like. The polyp was  removed        with a piecemeal technique using a cold snare. Resection and retrieval        were complete.       Two semi-sessile polyps were found in the ascending colon. The polyps        were 2 to 3 mm in size. These polyps were removed with a cold snare.        Resection and retrieval were complete.       A 4 mm polyp (vs scar) was found in the ascending colon, surrounding a        clip at prior polypectomy site. The polyp was removed with a piecemeal        technique using a cold snare. Resection and retrieval were complete.       Two semi-pedunculated polyps were found in the transverse colon. The         polyps were 6 to 7 mm in size. These polyps were removed with a cold        snare. Resection and retrieval were complete.       Two pedunculated polyps were found in the sigmoid colon. The polyps were        10 to 14 mm in size. These polyps were removed with a hot snare.        Resection and retrieval were complete.                                                                                   Impression:               - One 6 mm polyp in the cecum, removed piecemeal                             using a cold snare. Resected and retrieved.                            - Two 2 to 3 mm polyps in the ascending colon,                             removed with a cold snare. Resected and retrieved.                            - One 4 mm polyp in the ascending colon, removed                             piecemeal using a cold snare. Resected and                             retrieved.                            - Two 6 to 7 mm polyps in the transverse colon,                             rem kenneth with a cold snare. Resected and retrieved.                            - Two 10 to 14 mm polyps in the sigmoid colon,                             removed with a hot snare. Resected and retrieved.  Recommendation:           - Discharge patient to home (ambulatory).                            - Await pathology results.                             - Repeat colonoscopy in 1 year for surveillance.                                                                                   Procedure Code(s):       --- Professional ---       44418, Colonoscopy, flexible; with removal of tumor(s), polyp(s), or        other lesion(s) by snare technique  Diagnosis Code(s):       --- Professional ---       D12.0, Benign neoplasm of cecum       D12.2, Benign neoplasm of ascending colon       D12.3, Benign neoplasm of transverse colon (hepatic flexure or splenic        flexure)       D12.5, Benign neoplasm of sigmoid colon       Z09, Encounter for follow-up examination after completed treatment for         conditions other than malignant neoplasm       Z86.010, Personal history of colonic polyps    CPT copyright 2022 [employer]. All rights reserved.    The codes documented in this report are preliminary and upon  review may   be revised to meet current compliance requirements.    _______________________  PAULA ALCAZAR MD  10/9/2024 12:22:26 PM  I was physically present for the entire viewing portion of the exam.  PAULA ALCAZAR MD  Number of Addenda: 0    Note Initiated On: 10/9/2024 11:19 AM  Scope Withdrawal Time: 0 hours 34 minutes 8 seconds   Total Procedure Duration: 0 hours 39 minutes 10 seconds   Scope In: 11:32:22 AM  Scope Out: 12:11:32 PM     Surgical Pathology Exam    Collection Time: 10/09/24 11:38 AM   Result Value Ref Range    Case Report       Surgical Pathology Report                         Case: [id number]                                  Authorizing Provider:  Paula Alcazar MD   Collected:           10/09/2024 11:38 AM          Ordering Location:     [facility]          Received:            10/09/2024 12:43 PM                                 [facility]                                                          Pathologist:           Lam Bellamy MD                                                 "            Specimens:   A) - Large Intestine, Colon, Cecum                                                                  B) - Large Intestine, Colon, Ascending, polyps x2                                                   C) - Large Intestine, Colon, Ascending, third ascending polyp                                       D) - Large Intestine, Colon, Transverse, polyps x2                                                  E) - Large Intestine, Colon, Sigmoid, polyps x2                                            Final Diagnosis       A. Colon, cecum, polypectomy:  -- Hyperplastic polyp.    B. Colon, ascending, polypectomies:  -- Hyperplastic polyps.    C. Colon, ascending, polypectomy:  -- Hyperplastic polyp.    D.  Colon, transverse, polypectomies:  -- Tubular adenoma and inflammatory polyp.    E.  Colon, sigmoid, polypectomies:  -- Tubular and tubulovillous adenoma.        Clinical Information       Procedure:  Colonoscopy  COLONOSCOPY, WITH POLYPECTOMY AND BIOPSY  Pre-op Diagnosis: Special screening for malignant neoplasms, colon [Z12.11]  Post-op Diagnosis: Z12.11 - Special screening for malignant neoplasms, colon [ICD-10-CM]      Gross Description       A(1). Large Intestine, Colon, Cecum, :  The specimen is received in formalin, labeled with the patient's name, medical record number and other identifying information and designated  cecum polyp . It consists of 4 tan soft tissue fragments ranging from 0.2-0.5 cm. Entirely submitted in one cassette.    B(2). Large Intestine, Colon, Ascending, polyps x2:  The specimen is received in formalin, labeled with the patient's name, medical record number and other identifying information designated \"ascending colon polyp x 2\". It consists of 3 polypoid tissue fragments, 0.1-0.8 cm.  The larger fragment is inked black and bisected.  Entirely submitted in 1 cassette.    C(3). Large Intestine, Colon, Ascending, third ascending polyp:  The specimen is received in formalin, " "labeled with the patient's name, medical record number and other identifying information and designated  third ascending polyp . It consists of 5 tan soft tissue fragments ranging from 0.2-0.5 cm. Entirely submitted in one cassette.    D(4). Large Intestine, Colon, Transverse, polyps x2:  The specimen is received in formalin, labeled with the patient's name, medical record number and other identifying information designated \"transverse polyps x 2\". It consists of 2 polypoid tissue fragments, 1.2 and 1.6 cm.  Inked blue and black and sectioned.  Entirely submitted in 1 cassette.    E(5). Large Intestine, Colon, Sigmoid, polyps x2:  The specimen is received in formalin, labeled with the patient's name, medical record number and other identifying information designated \"sigmoid polyps x 2\". It consists of 2 polypoid tissue fragments, 0.9 and 1.2 cm.  Inked blue and black.  Entirely submitted as follows:    E1-smaller black inked fragment, bisected  E2-larger blue inked fragment, trisected   (MILES Pascal) 10/9/2024 12:50 PM       Microscopic Description       Microscopic examination was performed.        Performing Labs       The technical component of this testing was completed at [facility].    Stain controls for all stains resulted within this report have been reviewed and show appropriate reactivity.       Case Images     TSH with free T4 reflex    Collection Time: 11/01/24  9:57 AM   Result Value Ref Range    TSH 4.91 (H) 0.30 - 4.20 uIU/mL   Ferritin    Collection Time: 11/01/24  9:57 AM   Result Value Ref Range    Ferritin 62 11 - 328 ng/mL   Comprehensive metabolic panel (BMP + Alb, Alk Phos, ALT, AST, Total. Bili, TP)    Collection Time: 11/01/24  9:57 AM   Result Value Ref Range    Sodium 139 135 - 145 mmol/L    Potassium 4.3 3.4 - 5.3 mmol/L    Carbon Dioxide (CO2) 22 22 - 29 mmol/L    Anion Gap 11 7 - 15 mmol/L    Urea Nitrogen 9.4 8.0 - " 23.0 mg/dL    Creatinine 0.62 0.51 - 0.95 mg/dL    GFR Estimate >90 >60 mL/min/1.73m2    Calcium 9.4 8.8 - 10.4 mg/dL    Chloride 106 98 - 107 mmol/L    Glucose 159 (H) 70 - 99 mg/dL    Alkaline Phosphatase 78 40 - 150 U/L    AST 33 0 - 45 U/L    ALT 26 0 - 50 U/L    Protein Total 7.2 6.4 - 8.3 g/dL    Albumin 4.4 3.5 - 5.2 g/dL    Bilirubin Total 0.5 <=1.2 mg/dL   CBC with platelets and differential    Collection Time: 11/01/24  9:57 AM   Result Value Ref Range    WBC Count 7.9 4.0 - 11.0 10e3/uL    RBC Count 5.06 3.80 - 5.20 10e6/uL    Hemoglobin 15.8 (H) 11.7 - 15.7 g/dL    Hematocrit 47.2 (H) 35.0 - 47.0 %    MCV 93 78 - 100 fL    MCH 31.2 26.5 - 33.0 pg    MCHC 33.5 31.5 - 36.5 g/dL    RDW 12.9 10.0 - 15.0 %    Platelet Count 144 (L) 150 - 450 10e3/uL    % Neutrophils 66 %    % Lymphocytes 23 %    % Monocytes 8 %    % Eosinophils 2 %    % Basophils 1 %    % Immature Granulocytes 0 %    Absolute Neutrophils 5.2 1.6 - 8.3 10e3/uL    Absolute Lymphocytes 1.8 0.8 - 5.3 10e3/uL    Absolute Monocytes 0.6 0.0 - 1.3 10e3/uL    Absolute Eosinophils 0.2 0.0 - 0.7 10e3/uL    Absolute Basophils 0.0 0.0 - 0.2 10e3/uL    Absolute Immature Granulocytes 0.0 <=0.4 10e3/uL   T4 free    Collection Time: 11/01/24  9:57 AM   Result Value Ref Range    Free T4 1.01 0.90 - 1.70 ng/dL     No results found for this or any previous visit (from the past 744 hours).

## 2024-11-04 ENCOUNTER — VIRTUAL VISIT (OUTPATIENT)
Dept: ONCOLOGY | Facility: CLINIC | Age: 64
End: 2024-11-04
Attending: INTERNAL MEDICINE
Payer: COMMERCIAL

## 2024-11-04 VITALS — BODY MASS INDEX: 28.22 KG/M2 | HEIGHT: 59 IN | WEIGHT: 140 LBS

## 2024-11-04 DIAGNOSIS — E83.110 HEREDITARY HEMOCHROMATOSIS (H): Primary | ICD-10-CM

## 2024-11-04 PROCEDURE — G2211 COMPLEX E/M VISIT ADD ON: HCPCS | Mod: 95 | Performed by: INTERNAL MEDICINE

## 2024-11-04 PROCEDURE — 99214 OFFICE O/P EST MOD 30 MIN: CPT | Mod: 95 | Performed by: INTERNAL MEDICINE

## 2024-11-04 ASSESSMENT — ENCOUNTER SYMPTOMS
HEMATOLOGIC/LYMPHATIC NEGATIVE: 1
EYES NEGATIVE: 1
ARTHRALGIAS: 1
FATIGUE: 1
DECREASED CONCENTRATION: 1
NEUROLOGICAL NEGATIVE: 1
ENDOCRINE COMMENTS: PREDIABETIC
GASTROINTESTINAL NEGATIVE: 1

## 2024-11-04 ASSESSMENT — PAIN SCALES - GENERAL: PAINLEVEL_OUTOF10: SEVERE PAIN (7)

## 2024-11-04 NOTE — LETTER
11/4/2024      Bradford Prado  [address]  [facility] [address]      Dear Colleague,    Thank you for referring your patient, Bradford Prado, to the [facility]. Please see a copy of my visit note below.    Virtual Visit Details    Type of service:  Video Visit   Video Start Time:  1:00 PM  Video End Time: 1:30 PM    Originating Location (pt. 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  She also reports that she's been very careful about diet, doesn't drink and otherwise is taking good care of her health.    She has had some polyps on colonoscopy.  Her most active problem right now is arthritis in her fingers and wrists.  She has also felt generally poorly since being given gabapentin and would like to come off that.    Oncologic History  PATHOLOGY:  5/10/21: TEST(S) REQUESTED:   Hemochromatosis Mutation Analysis by PCR   SPECIMEN DESCRIPTION:   Blood   HEMOCHROMATOSIS RESULTS   HFE Gene C282Y (G845A) RESULTS: C282Y Mutation Interpretation: HOMOZYGOTE   HFE Gene H63D (C187G) RESULTS: H63D Mutation Interpretation: NORMAL   HFE Gene S65C (A193T) RESULTS: S65C Mutation Interpretation: NORMAL      Indication for testing: Carrier screening or diagnostic   testing for alpha-1-antitrypsin (AAT) deficiency.   Negative: This sample has a serum AAT protein concentration   in the normal range and is negative for the S and Z   deficiency alleles by genotyping. 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                          - Small (< 5 mm) esophageal varices.                          - Gastritis. Biopsied.                          - Normal examined duodenum.      Final Diagnosis   A. STOMACH, BIOPSY:  - Gastric mucosa with features of reactive gastropathy   - No H. pylori organisms identified on routine staining  - Negative for intestinal metaplasia and dysplasia         Case Report   Surgical Pathology Report                         Case: [id number]                                   Authorizing Provider:  Eber Gill MD Collected:           02/13/2023 11:26 AM           Ordering Location:     [facility]          Received:            02/13/2023 12:35 PM                                  [facility] Main OR                                                             Pathologist:           Marnie De La Rosa MD                                                                            Specimen:    Gallbladder                                                                                 Final Diagnosis   Gallbladder, cholecystectomy:  -Acute on chronic cholecystitis with cholelithiasis               IMAGING:  ULTRASOUND ABDOMEN LIMITED November 24, 2021   IMPRESSION:  1.  Coarse increased echogenicity of the liver compatible with  steatosis and/or cirrhosis.  2.  1 cm gallbladder polyp.  3.  No hepatoma demonstrated.     US  ABDOMEN LIMITED 5/5/2022  FINDINGS:     GALLBLADDER: Gallbladder polyp measuring 1 cm again noted and  unchanged. No gallstones, wall thickening, or pericholecystic fluid.  Negative sonographic Clark's sign.     BILE DUCTS: There is no biliary dilatation. The common duct measures 4  mm.     LIVER: Unremarkable where seen.                                                                      IMPRESSION:  1.  Stable gallbladder polyp.     ABD US 12/27/23:  IMPRESSION:  1.  Coarsened hepatic echotexture suggestive of underlying intrinsic liver disease. No suspicious focal liver lesion.  2.  Mild splenomegaly.  3.  Interval cholecystectomy.    ECOG Peformance Status  0 - Fully active, able to carry on all pre-disease performance without restriction      Patient Active Problem List   Diagnosis     Hand arthritis     Stenosing tenosynovitis     Hyperlipidemia LDL goal <70     Essential hypertension with goal blood pressure less than 140/90     Type 2 diabetes mellitus with kidney complication, with long-term current use of insulin (H)     Dyslipidemia     Hereditary hemochromatosis (H)     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) (H)     Situational mixed anxiety and depressive disorder     Polyp of colon, unspecified part of colon, unspecified type     Current Outpatient Medications   Medication Sig Dispense Refill     ACCU-CHEK GUIDE test strip Use to test blood sugar 4 times daily or as directed. 200 strip 11     amLODIPine (NORVASC) 5 MG tablet Take 1 tablet (5 mg) by mouth 2 times daily 180 tablet 3     blood glucose (NO BRAND SPECIFIED) lancets standard Use to test blood sugar twice times daily or as directed. 100 each 1     blood glucose (NO BRAND SPECIFIED) test strip Use to test blood sugar twice times daily or as directed. 100 each 1     blood glucose (NO BRAND SPECIFIED) test strip Use to test blood sugars 2 times daily or as directed 100 strip 11     blood glucose monitoring (SOFTCLIX)  lancets Use to test blood sugar 6 times daily. 200 each 11     Blood Glucose Monitoring Suppl (ACCU-CHEK GUIDE ME) w/Device KIT 1 each 6 times daily 1 kit 0     carvedilol (COREG) 12.5 MG tablet Take 1 tablet (12.5 mg) by mouth 2 times daily (with meals) 180 tablet 3     cholestyramine light (PREVALITE) 4 GM powder Take 1 packet (4 g) by mouth 3 times daily (with meals) 60 packet 1     Continuous Blood Gluc Sensor (FREESTYLE MEENA 2 SENSOR) MISC CHANGE EVERY 14 DAYS. USE TO CHECK BLOOD SUGARS PER  GUIDELINES 1 each 2     Continuous Blood Gluc Sensor (FREESTYLE MEENA 3 SENSOR) MISC 1 each every 14 days Use 1 sensor every 14 days. Use to read blood sugars per 's instructions. 6 each 5     Continuous Glucose  (FREESTYLE MEENA 3 READER) SHERRELL USE TO READ BLOOD SUGARS PER 'S INSTRUCTIONS. 1 each 0     Continuous Glucose Sensor (FREESTYLE MEENA 3 PLUS SENSOR) MISC Change every 15 days. 6 each 1     gabapentin (NEURONTIN) 100 MG capsule 2 tabs po TID. 180 capsule 2     Glucagon (GVOKE HYPOPEN) 1 MG/0.2ML pen Inject the contents of one device under the skin into lower abdomen, outer thigh, or outer upper arm as needed for severe hypoglycemia. If no response after 15 minutes, additional 1 mg dose from a new device may be injected while waiting for emergency assistance 0.4 mL 3     insulin pen needle (BD NATALIYA U/F) 32G X 4 MM miscellaneous USE FOR INJECTIONS 4 TIMES DAILY AS DIRECTED (DUE FOR APPOINTMENT AND LABS) 400 each 0     LANTUS SOLOSTAR 100 UNIT/ML soln INJECT 38 UNITS SUBCUTANEOUS EVERY MORNING (BEFORE BREAKFAST) 45 mL 1     losartan (COZAAR) 50 MG tablet Take 1 tablet (50 mg) by mouth daily 90 tablet 3     NOVOLOG FLEXPEN 100 UNIT/ML soln INJECT 10 UNITS UNDER THE SKIN THREE TIMES DAILY WITH MEALS. MAXIMUM OF 30 UNITS DAILY. 30 mL 0     ondansetron (ZOFRAN ODT) 4 MG ODT tab Take 1 tablet (4 mg) by mouth every 8 hours as needed for nausea 16 tablet 0     pantoprazole  (PROTONIX) 40 MG EC tablet Take 1 tablet (40 mg) by mouth daily 90 tablet 3     pravastatin (PRAVACHOL) 20 MG tablet TAKE ONE TABLET BY MOUTH ONCE DAILY. DUE FOR LABS 90 tablet 3     sertraline (ZOLOFT) 50 MG tablet TAKE 2 TABLETS (100 MG) BY MOUTH DAILY 180 tablet 0     No current facility-administered medications for this visit.     Past Medical History:   Diagnosis Date     Arthritis      Chest pain 02/23/2021     Diabetes mellitus (H)      Dyslipidemia      Hereditary hemochromatosis (H)      Hyperglycemia 02/23/2021     Hypertension      Hypertensive urgency 02/23/2021     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) (H)      Nephrolithiasis 09/19/2016    First episode September 2016     PONV (postoperative nausea and vomiting)      Poorly controlled type 2 diabetes mellitus with renal complication (H)      Type 2 diabetes mellitus with diabetic neuropathy, with long-term current use of insulin (H)      Past Surgical History:   Procedure Laterality Date     COLONOSCOPY       COLONOSCOPY N/A 2/29/2024    Procedure: Colonoscopy;  Surgeon: Royce Mari MD;  Location:  GI     COLONOSCOPY N/A 2/29/2024    Procedure: COLONOSCOPY, WITH POLYPECTOMY AND BIOPSY;  Surgeon: Royce Mair MD;  Location:  GI     COLONOSCOPY N/A 10/9/2024    Procedure: Colonoscopy;  Surgeon: Paula Nick MD;  Location:  GI     ESOPHAGOSCOPY, GASTROSCOPY, DUODENOSCOPY (EGD), COMBINED N/A 11/10/2021    Procedure: ESOPHAGOGASTRODUODENOSCOPY, WITH BIOPSY;  Surgeon: Leventhal, Thomas Michael, MD;  [place] [facility] [place]     ESOPHAGOSCOPY, GASTROSCOPY, DUODENOSCOPY (EGD), COMBINED N/A 2/29/2024    Procedure: Esophagoscopy, gastroscopy, duodenoscopy (EGD), combined;  Surgeon: Royce Mari MD;  Location:  GI     HYSTERECTOMY TOTAL ABDOMINAL  01/01/1995    due to fibroids     LAPAROSCOPIC CHOLECYSTECTOMY N/A 02/13/2023    Procedure: Laparoscopic cholecystectomy;  Surgeon:  "Eber Gill MD;  Location: [facility] OR     LAPAROSCOPIC EVACUATION ECTOPIC PREGNANCY       TUBAL LIGATION       Socioeconomic History     Marital status: Single     Social Drivers of Health     Interpersonal Safety: Low Risk  (9/10/2024)    Interpersonal Safety      Do you feel physically and emotionally safe where you currently live?: Yes      Within the past 12 months, have you been hit, slapped, kicked or otherwise physically hurt by someone?: No      Within the past 12 months, have you been humiliated or emotionally abused in other ways by your partner or ex-partner?: No     Review of Systems   Constitutional:  Positive for fatigue.   Eyes: Negative.    Cardiovascular:  Positive for chest pain.   Gastrointestinal: Negative.    Endocrine:        Prediabetic   Genitourinary: Negative.     Musculoskeletal:  Positive for arthralgias.   Skin: Negative.    Neurological: Negative.         Has had a couple of falls   Hematological: Negative.    Psychiatric/Behavioral:  Positive for decreased concentration (since being on gabapentin).    All other systems reviewed and are negative.      Ht 1.499 m (4' 11\")   Wt 63.5 kg (140 lb)   LMP  (LMP Unknown)   BMI 28.28 kg/m      GENERAL: alert and no distress  EYES: Eyes grossly normal to inspection.  No discharge or erythema, or obvious scleral/conjunctival abnormalities.  RESP: No audible wheeze, cough, or visible cyanosis.    SKIN: Visible skin clear. No significant rash, abnormal pigmentation or lesions.  NEURO: Cranial nerves grossly intact.  Mentation and speech appropriate for age.  PSYCH: Appropriate affect, tone, and pace of words    Recent Results (from the past 720 hours)   COLONOSCOPY    Collection Time: 10/09/24 11:19 AM   Result Value Ref Range    COLONOSCOPY       [place] [address]  [address]  _______________________________________________________________________________  Patient Name: Bradford Prado              Procedure " Date: 10/9/2024 11:19 AM  MRN: [id number]                       Account Number: [id number]  YOB: 1960              Admit Type: Outpatient  Age: 64                               Room: [place] [address]  Note Status: Finalized                Attending MD: JOSE ROBERTO ALCAZAR MD,   Instrument Name: 514 [id number]-YH028Y Peds Colon   _______________________________________________________________________________     Procedure:                Colonoscopy  Indications:              Surveillance: Personal history of piecemeal removal                             of large sessile adenoma on last colonoscopy (less                             than 1 year ago)  Providers:                JOSE ROBERTO ALCAZAR MD, Elena Vidales RN  Referring MD:             NIALL WILSON NP  Medicines:                 Monitored Anesthesia Care  Complications:            No immediate complications. Estimated blood loss:                             Minimal.  _______________________________________________________________________________  Procedure:                Pre-Anesthesia Assessment:                            - Prior to the procedure, a History and Physical                             was performed, and patient medications and                             allergies were reviewed. The patient is competent.                             The risks and benefits of the procedure and the                             sedation options and risks were discussed with the                             patient. All questions were answered and informed                             consent was obtained. Patient identification and                             proposed procedure were verified by the physician                             in the procedure room. Mental Status Examination:                             juan rt and oriented. Airway Examination: normal                             oropharyngeal airway and neck mobility. Respiratory                              Examination: clear to auscultation. CV Examination:                             normal. ASA Grade Assessment: II - A patient with                             mild systemic disease. After reviewing the risks                             and benefits, the patient was deemed in                             satisfactory condition to undergo the procedure.                             The anesthesia plan was to use monitored anesthesia                             care (MAC). Immediately prior to administration of                             medications, the patient was re-assessed for                             adequacy to receive sedatives. The heart rate,                             respiratory rate, oxygen saturations, blood                             pressure, adequacy of pulmonary ventilation, and                             response to  care were monitored throughout the                             procedure. The physical status of the patient was                             re-assessed after the procedure.                            After obtaining informed consent, the colonoscope                             was passed under direct vision. Throughout the                             procedure, the patient's blood pressure, pulse, and                             oxygen saturations were monitored continuously. The                             peds colonoscope 514 was introduced through the                             anus and advanced to the cecum, identified by                             appendiceal orifice and ileocecal valve. The                             ileocecal valve and the appendiceal orifice were                             photographed. The entire colon was well visualized.                             The colonoscopy was performed with ease. The                             patient tolerated the procedure w ell. The quality                             of the bowel preparation was excellent.                                                                                    Findings:       The perianal and digital rectal examinations were normal.       A 6 mm polyp (vs scar) was found in the cecum, surrounding a clip at        prior polypectomy site. The polyp was carpet-like. The polyp was removed        with a piecemeal technique using a cold snare. Resection and retrieval        were complete.       Two semi-sessile polyps were found in the ascending colon. The polyps        were 2 to 3 mm in size. These polyps were removed with a cold snare.        Resection and retrieval were complete.       A 4 mm polyp (vs scar) was found in the ascending colon, surrounding a        clip at prior polypectomy site. The polyp was removed with a piecemeal        technique using a cold snare. Resection and retrieval were complete.       Two semi-pedunculated polyps were found in the transverse colon. The         polyps were 6 to 7 mm in size. These polyps were removed with a cold        snare. Resection and retrieval were complete.       Two pedunculated polyps were found in the sigmoid colon. The polyps were        10 to 14 mm in size. These polyps were removed with a hot snare.        Resection and retrieval were complete.                                                                                   Impression:               - One 6 mm polyp in the cecum, removed piecemeal                             using a cold snare. Resected and retrieved.                            - Two 2 to 3 mm polyps in the ascending colon,                             removed with a cold snare. Resected and retrieved.                            - One 4 mm polyp in the ascending colon, removed                             piecemeal using a cold snare. Resected and                             retrieved.                            - Two 6 to 7 mm polyps in the transverse colon,                             rem kenneth with a cold snare. Resected and  retrieved.                            - Two 10 to 14 mm polyps in the sigmoid colon,                             removed with a hot snare. Resected and retrieved.  Recommendation:           - Discharge patient to home (ambulatory).                            - Await pathology results.                            - Repeat colonoscopy in 1 year for surveillance.                                                                                   Procedure Code(s):       --- Professional ---       33417, Colonoscopy, flexible; with removal of tumor(s), polyp(s), or        other lesion(s) by snare technique  Diagnosis Code(s):       --- Professional ---       D12.0, Benign neoplasm of cecum       D12.2, Benign neoplasm of ascending colon       D12.3, Benign neoplasm of transverse colon (hepatic flexure or splenic        flexure)       D12.5, Benign neoplasm of sigmoid colon       Z09, Encounter for follow-up examination after completed treatment for         conditions other than malignant neoplasm       Z86.010, Personal history of colonic polyps    CPT copyright 2022 [employer] Medical [employer]. All rights reserved.    The codes documented in this report are preliminary and upon  review may   be revised to meet current compliance requirements.    _______________________  PAULA ALCAZAR MD  10/9/2024 12:22:26 PM  I was physically present for the entire viewing portion of the exam.  PAULA ALCAZAR MD  Number of Addenda: 0    Note Initiated On: 10/9/2024 11:19 AM  Scope Withdrawal Time: 0 hours 34 minutes 8 seconds   Total Procedure Duration: 0 hours 39 minutes 10 seconds   Scope In: 11:32:22 AM  Scope Out: 12:11:32 PM     Surgical Pathology Exam    Collection Time: 10/09/24 11:38 AM   Result Value Ref Range    Case Report       Surgical Pathology Report                         Case: [id number]                                  Authorizing Provider:  Paula Alcazar MD   Collected:           10/09/2024 11:38  AM          Ordering Location:     [facility]          Received:            10/09/2024 12:43 PM                                 [facility]                                                          Pathologist:           Lam Bellamy MD                                                            Specimens:   A) - Large Intestine, Colon, Cecum                                                                  B) - Large Intestine, Colon, Ascending, polyps x2                                                   C) - Large Intestine, Colon, Ascending, third ascending polyp                                       D) - Large Intestine, Colon, Transverse, polyps x2                                                  E) - Large Intestine, Colon, Sigmoid, polyps x2                                            Final Diagnosis       A. Colon, cecum, polypectomy:  -- Hyperplastic polyp.    B. Colon, ascending, polypectomies:  -- Hyperplastic polyps.    C. Colon, ascending, polypectomy:  -- Hyperplastic polyp.    D.  Colon, transverse, polypectomies:  -- Tubular adenoma and inflammatory polyp.    E.  Colon, sigmoid, polypectomies:  -- Tubular and tubulovillous adenoma.        Clinical Information       Procedure:  Colonoscopy  COLONOSCOPY, WITH POLYPECTOMY AND BIOPSY  Pre-op Diagnosis: Special screening for malignant neoplasms, colon [Z12.11]  Post-op Diagnosis: Z12.11 - Special screening for malignant neoplasms, colon [ICD-10-CM]      Gross Description       A(1). Large Intestine, Colon, Cecum, :  The specimen is received in formalin, labeled with the patient's name, medical record number and other identifying information and designated  cecum polyp . It consists of 4 tan soft tissue fragments ranging from 0.2-0.5 cm. Entirely submitted in one cassette.    B(2). Large Intestine, Colon, Ascending, polyps x2:  The specimen is received in formalin, labeled with the patient's name, medical record number and other identifying  "information designated \"ascending colon polyp x 2\". It consists of 3 polypoid tissue fragments, 0.1-0.8 cm.  The larger fragment is inked black and bisected.  Entirely submitted in 1 cassette.    C(3). Large Intestine, Colon, Ascending, third ascending polyp:  The specimen is received in formalin, labeled with the patient's name, medical record number and other identifying information and designated  third ascending polyp . It consists of 5 tan soft tissue fragments ranging from 0.2-0.5 cm. Entirely submitted in one cassette.    D(4). Large Intestine, Colon, Transverse, polyps x2:  The specimen is received in formalin, labeled with the patient's name, medical record number and other identifying information designated \"transverse polyps x 2\". It consists of 2 polypoid tissue fragments, 1.2 and 1.6 cm.  Inked blue and black and sectioned.  Entirely submitted in 1 cassette.    E(5). Large Intestine, Colon, Sigmoid, polyps x2:  The specimen is received in formalin, labeled with the patient's name, medical record number and other identifying information designated \"sigmoid polyps x 2\". It consists of 2 polypoid tissue fragments, 0.9 and 1.2 cm.  Inked blue and black.  Entirely submitted as follows:    E1-smaller black inked fragment, bisected  E2-larger blue inked fragment, trisected   (MILES Pascal) 10/9/2024 12:50 PM       Microscopic Description       Microscopic examination was performed.        Performing Labs       The technical component of this testing was completed at [facility].    Stain controls for all stains resulted within this report have been reviewed and show appropriate reactivity.       Case Images     TSH with free T4 reflex    Collection Time: 11/01/24  9:57 AM   Result Value Ref Range    TSH 4.91 (H) 0.30 - 4.20 uIU/mL   Ferritin    Collection Time: 11/01/24  9:57 AM   Result Value Ref Range    Ferritin 62 11 - 328 ng/mL "   Comprehensive metabolic panel (BMP + Alb, Alk Phos, ALT, AST, Total. Bili, TP)    Collection Time: 11/01/24  9:57 AM   Result Value Ref Range    Sodium 139 135 - 145 mmol/L    Potassium 4.3 3.4 - 5.3 mmol/L    Carbon Dioxide (CO2) 22 22 - 29 mmol/L    Anion Gap 11 7 - 15 mmol/L    Urea Nitrogen 9.4 8.0 - 23.0 mg/dL    Creatinine 0.62 0.51 - 0.95 mg/dL    GFR Estimate >90 >60 mL/min/1.73m2    Calcium 9.4 8.8 - 10.4 mg/dL    Chloride 106 98 - 107 mmol/L    Glucose 159 (H) 70 - 99 mg/dL    Alkaline Phosphatase 78 40 - 150 U/L    AST 33 0 - 45 U/L    ALT 26 0 - 50 U/L    Protein Total 7.2 6.4 - 8.3 g/dL    Albumin 4.4 3.5 - 5.2 g/dL    Bilirubin Total 0.5 <=1.2 mg/dL   CBC with platelets and differential    Collection Time: 11/01/24  9:57 AM   Result Value Ref Range    WBC Count 7.9 4.0 - 11.0 10e3/uL    RBC Count 5.06 3.80 - 5.20 10e6/uL    Hemoglobin 15.8 (H) 11.7 - 15.7 g/dL    Hematocrit 47.2 (H) 35.0 - 47.0 %    MCV 93 78 - 100 fL    MCH 31.2 26.5 - 33.0 pg    MCHC 33.5 31.5 - 36.5 g/dL    RDW 12.9 10.0 - 15.0 %    Platelet Count 144 (L) 150 - 450 10e3/uL    % Neutrophils 66 %    % Lymphocytes 23 %    % Monocytes 8 %    % Eosinophils 2 %    % Basophils 1 %    % Immature Granulocytes 0 %    Absolute Neutrophils 5.2 1.6 - 8.3 10e3/uL    Absolute Lymphocytes 1.8 0.8 - 5.3 10e3/uL    Absolute Monocytes 0.6 0.0 - 1.3 10e3/uL    Absolute Eosinophils 0.2 0.0 - 0.7 10e3/uL    Absolute Basophils 0.0 0.0 - 0.2 10e3/uL    Absolute Immature Granulocytes 0.0 <=0.4 10e3/uL   T4 free    Collection Time: 11/01/24  9:57 AM   Result Value Ref Range    Free T4 1.01 0.90 - 1.70 ng/dL     No results found for this or any previous visit (from the past 744 hours).      Again, thank you for allowing me to participate in the care of your patient.        Sincerely,        Francine Elizalde MD

## 2024-11-04 NOTE — LETTER
11/4/2024      Bradford Prado  [address]  [facility] [address]      Dear Colleague,    Thank you for referring your patient, Bradford Prado, to the [facility]. Please see a copy of my visit note below.    Virtual Visit Details    Type of service:  Video Visit   Video Start Time:  1:00 PM  Video End Time: 1:30 PM    Originating Location (pt. 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  She also reports that she's been very careful about diet, doesn't drink and otherwise is taking good care of her health.    She has had some polyps on colonoscopy.  Her most active problem right now is arthritis in her fingers and wrists.  She has also felt generally poorly since being given gabapentin and would like to come off that.    Oncologic History  PATHOLOGY:  5/10/21: TEST(S) REQUESTED:   Hemochromatosis Mutation Analysis by PCR   SPECIMEN DESCRIPTION:   Blood   HEMOCHROMATOSIS RESULTS   HFE Gene C282Y (G845A) RESULTS: C282Y Mutation Interpretation: HOMOZYGOTE   HFE Gene H63D (C187G) RESULTS: H63D Mutation Interpretation: NORMAL   HFE Gene S65C (A193T) RESULTS: S65C Mutation Interpretation: NORMAL      Indication for testing: Carrier screening or diagnostic   testing for alpha-1-antitrypsin (AAT) deficiency.   Negative: This sample has a serum AAT protein concentration   in the normal range and is negative for the S and Z   deficiency alleles by genotyping. 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                          - Small (< 5 mm) esophageal varices.                          - Gastritis. Biopsied.                          - Normal examined duodenum.      Final Diagnosis   A. STOMACH, BIOPSY:  - Gastric mucosa with features of reactive gastropathy   - No H. pylori organisms identified on routine staining  - Negative for intestinal metaplasia and dysplasia         Case Report   Surgical Pathology Report                         Case: [id number]                                   Authorizing Provider:  Eber Gill MD Collected:           02/13/2023 11:26 AM           Ordering Location:     [facility]          Received:            02/13/2023 12:35 PM                                  [facility] Main OR                                                             Pathologist:           Marnie De La Rosa MD                                                                            Specimen:    Gallbladder                                                                                 Final Diagnosis   Gallbladder, cholecystectomy:  -Acute on chronic cholecystitis with cholelithiasis               IMAGING:  ULTRASOUND ABDOMEN LIMITED November 24, 2021   IMPRESSION:  1.  Coarse increased echogenicity of the liver compatible with  steatosis and/or cirrhosis.  2.  1 cm gallbladder polyp.  3.  No hepatoma demonstrated.     US  ABDOMEN LIMITED 5/5/2022  FINDINGS:     GALLBLADDER: Gallbladder polyp measuring 1 cm again noted and  unchanged. No gallstones, wall thickening, or pericholecystic fluid.  Negative sonographic Clark's sign.     BILE DUCTS: There is no biliary dilatation. The common duct measures 4  mm.     LIVER: Unremarkable where seen.                                                                      IMPRESSION:  1.  Stable gallbladder polyp.     ABD US 12/27/23:  IMPRESSION:  1.  Coarsened hepatic echotexture suggestive of underlying intrinsic liver disease. No suspicious focal liver lesion.  2.  Mild splenomegaly.  3.  Interval cholecystectomy.    ECOG Peformance Status  0 - Fully active, able to carry on all pre-disease performance without restriction      Patient Active Problem List   Diagnosis     Hand arthritis     Stenosing tenosynovitis     Hyperlipidemia LDL goal <70     Essential hypertension with goal blood pressure less than 140/90     Type 2 diabetes mellitus with kidney complication, with long-term current use of insulin (H)     Dyslipidemia     Hereditary hemochromatosis (H)     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) (H)     Situational mixed anxiety and depressive disorder     Polyp of colon, unspecified part of colon, unspecified type     Current Outpatient Medications   Medication Sig Dispense Refill     ACCU-CHEK GUIDE test strip Use to test blood sugar 4 times daily or as directed. 200 strip 11     amLODIPine (NORVASC) 5 MG tablet Take 1 tablet (5 mg) by mouth 2 times daily 180 tablet 3     blood glucose (NO BRAND SPECIFIED) lancets standard Use to test blood sugar twice times daily or as directed. 100 each 1     blood glucose (NO BRAND SPECIFIED) test strip Use to test blood sugar twice times daily or as directed. 100 each 1     blood glucose (NO BRAND SPECIFIED) test strip Use to test blood sugars 2 times daily or as directed 100 strip 11     blood glucose monitoring (SOFTCLIX)  lancets Use to test blood sugar 6 times daily. 200 each 11     Blood Glucose Monitoring Suppl (ACCU-CHEK GUIDE ME) w/Device KIT 1 each 6 times daily 1 kit 0     carvedilol (COREG) 12.5 MG tablet Take 1 tablet (12.5 mg) by mouth 2 times daily (with meals) 180 tablet 3     cholestyramine light (PREVALITE) 4 GM powder Take 1 packet (4 g) by mouth 3 times daily (with meals) 60 packet 1     Continuous Blood Gluc Sensor (FREESTYLE MEENA 2 SENSOR) MISC CHANGE EVERY 14 DAYS. USE TO CHECK BLOOD SUGARS PER  GUIDELINES 1 each 2     Continuous Blood Gluc Sensor (FREESTYLE MEENA 3 SENSOR) MISC 1 each every 14 days Use 1 sensor every 14 days. Use to read blood sugars per 's instructions. 6 each 5     Continuous Glucose  (FREESTYLE MEENA 3 READER) SHERRELL USE TO READ BLOOD SUGARS PER 'S INSTRUCTIONS. 1 each 0     Continuous Glucose Sensor (FREESTYLE MEENA 3 PLUS SENSOR) MISC Change every 15 days. 6 each 1     gabapentin (NEURONTIN) 100 MG capsule 2 tabs po TID. 180 capsule 2     Glucagon (GVOKE HYPOPEN) 1 MG/0.2ML pen Inject the contents of one device under the skin into lower abdomen, outer thigh, or outer upper arm as needed for severe hypoglycemia. If no response after 15 minutes, additional 1 mg dose from a new device may be injected while waiting for emergency assistance 0.4 mL 3     insulin pen needle (BD NATALIYA U/F) 32G X 4 MM miscellaneous USE FOR INJECTIONS 4 TIMES DAILY AS DIRECTED (DUE FOR APPOINTMENT AND LABS) 400 each 0     LANTUS SOLOSTAR 100 UNIT/ML soln INJECT 38 UNITS SUBCUTANEOUS EVERY MORNING (BEFORE BREAKFAST) 45 mL 1     losartan (COZAAR) 50 MG tablet Take 1 tablet (50 mg) by mouth daily 90 tablet 3     NOVOLOG FLEXPEN 100 UNIT/ML soln INJECT 10 UNITS UNDER THE SKIN THREE TIMES DAILY WITH MEALS. MAXIMUM OF 30 UNITS DAILY. 30 mL 0     ondansetron (ZOFRAN ODT) 4 MG ODT tab Take 1 tablet (4 mg) by mouth every 8 hours as needed for nausea 16 tablet 0     pantoprazole  (PROTONIX) 40 MG EC tablet Take 1 tablet (40 mg) by mouth daily 90 tablet 3     pravastatin (PRAVACHOL) 20 MG tablet TAKE ONE TABLET BY MOUTH ONCE DAILY. DUE FOR LABS 90 tablet 3     sertraline (ZOLOFT) 50 MG tablet TAKE 2 TABLETS (100 MG) BY MOUTH DAILY 180 tablet 0     No current facility-administered medications for this visit.     Past Medical History:   Diagnosis Date     Arthritis      Chest pain 02/23/2021     Diabetes mellitus (H)      Dyslipidemia      Hereditary hemochromatosis (H)      Hyperglycemia 02/23/2021     Hypertension      Hypertensive urgency 02/23/2021     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) (H)      Nephrolithiasis 09/19/2016    First episode September 2016     PONV (postoperative nausea and vomiting)      Poorly controlled type 2 diabetes mellitus with renal complication (H)      Type 2 diabetes mellitus with diabetic neuropathy, with long-term current use of insulin (H)      Past Surgical History:   Procedure Laterality Date     COLONOSCOPY       COLONOSCOPY N/A 2/29/2024    Procedure: Colonoscopy;  Surgeon: Royce Mari MD;  Location:  GI     COLONOSCOPY N/A 2/29/2024    Procedure: COLONOSCOPY, WITH POLYPECTOMY AND BIOPSY;  Surgeon: Royce Mari MD;  Location:  GI     COLONOSCOPY N/A 10/9/2024    Procedure: Colonoscopy;  Surgeon: Paula Nick MD;  Location:  GI     ESOPHAGOSCOPY, GASTROSCOPY, DUODENOSCOPY (EGD), COMBINED N/A 11/10/2021    Procedure: ESOPHAGOGASTRODUODENOSCOPY, WITH BIOPSY;  Surgeon: Leventhal, Thomas Michael, MD;  [place] [facility] [place]     ESOPHAGOSCOPY, GASTROSCOPY, DUODENOSCOPY (EGD), COMBINED N/A 2/29/2024    Procedure: Esophagoscopy, gastroscopy, duodenoscopy (EGD), combined;  Surgeon: Royce Mari MD;  Location:  GI     HYSTERECTOMY TOTAL ABDOMINAL  01/01/1995    due to fibroids     LAPAROSCOPIC CHOLECYSTECTOMY N/A 02/13/2023    Procedure: Laparoscopic cholecystectomy;  Surgeon:  "Eber Gill MD;  Location: [facility] OR     LAPAROSCOPIC EVACUATION ECTOPIC PREGNANCY       TUBAL LIGATION       Socioeconomic History     Marital status: Single     Social Drivers of Health     Interpersonal Safety: Low Risk  (9/10/2024)    Interpersonal Safety      Do you feel physically and emotionally safe where you currently live?: Yes      Within the past 12 months, have you been hit, slapped, kicked or otherwise physically hurt by someone?: No      Within the past 12 months, have you been humiliated or emotionally abused in other ways by your partner or ex-partner?: No     Review of Systems   Constitutional:  Positive for fatigue.   Eyes: Negative.    Cardiovascular:  Positive for chest pain.   Gastrointestinal: Negative.    Endocrine:        Prediabetic   Genitourinary: Negative.     Musculoskeletal:  Positive for arthralgias.   Skin: Negative.    Neurological: Negative.         Has had a couple of falls   Hematological: Negative.    Psychiatric/Behavioral:  Positive for decreased concentration (since being on gabapentin).    All other systems reviewed and are negative.      Ht 1.499 m (4' 11\")   Wt 63.5 kg (140 lb)   LMP  (LMP Unknown)   BMI 28.28 kg/m      GENERAL: alert and no distress  EYES: Eyes grossly normal to inspection.  No discharge or erythema, or obvious scleral/conjunctival abnormalities.  RESP: No audible wheeze, cough, or visible cyanosis.    SKIN: Visible skin clear. No significant rash, abnormal pigmentation or lesions.  NEURO: Cranial nerves grossly intact.  Mentation and speech appropriate for age.  PSYCH: Appropriate affect, tone, and pace of words    Recent Results (from the past 720 hours)   COLONOSCOPY    Collection Time: 10/09/24 11:19 AM   Result Value Ref Range    COLONOSCOPY       [place] [address]  [id number]  _______________________________________________________________________________  Patient Name: Bradford Prado              Procedure " Date: 10/9/2024 11:19 AM  MRN: [id number]                       Account Number: [id number]  YOB: 1960              Admit Type: Outpatient  Age: 64                               Room: [place] [address]  Note Status: Finalized                Attending MD: JOSE ROBERTO ALCAZAR MD,   Instrument Name: 514 [id number] Peds Colon   _______________________________________________________________________________     Procedure:                Colonoscopy  Indications:              Surveillance: Personal history of piecemeal removal                             of large sessile adenoma on last colonoscopy (less                             than 1 year ago)  Providers:                JOSE ROBERTO ALCAZAR MD, Elena Vidales RN  Referring MD:             NIALL WILSON NP  Medicines:                 Monitored Anesthesia Care  Complications:            No immediate complications. Estimated blood loss:                             Minimal.  _______________________________________________________________________________  Procedure:                Pre-Anesthesia Assessment:                            - Prior to the procedure, a History and Physical                             was performed, and patient medications and                             allergies were reviewed. The patient is competent.                             The risks and benefits of the procedure and the                             sedation options and risks were discussed with the                             patient. All questions were answered and informed                             consent was obtained. Patient identification and                             proposed procedure were verified by the physician                             in the procedure room. Mental Status Examination:                             juan rt and oriented. Airway Examination: normal                             oropharyngeal airway and neck mobility. Respiratory                              Examination: clear to auscultation. CV Examination:                             normal. ASA Grade Assessment: II - A patient with                             mild systemic disease. After reviewing the risks                             and benefits, the patient was deemed in                             satisfactory condition to undergo the procedure.                             The anesthesia plan was to use monitored anesthesia                             care (MAC). Immediately prior to administration of                             medications, the patient was re-assessed for                             adequacy to receive sedatives. The heart rate,                             respiratory rate, oxygen saturations, blood                             pressure, adequacy of pulmonary ventilation, and                             response to  care were monitored throughout the                             procedure. The physical status of the patient was                             re-assessed after the procedure.                            After obtaining informed consent, the colonoscope                             was passed under direct vision. Throughout the                             procedure, the patient's blood pressure, pulse, and                             oxygen saturations were monitored continuously. The                             peds colonoscope 514 was introduced through the                             anus and advanced to the cecum, identified by                             appendiceal orifice and ileocecal valve. The                             ileocecal valve and the appendiceal orifice were                             photographed. The entire colon was well visualized.                             The colonoscopy was performed with ease. The                             patient tolerated the procedure w ell. The quality                             of the bowel preparation was excellent.                                                                                    Findings:       The perianal and digital rectal examinations were normal.       A 6 mm polyp (vs scar) was found in the cecum, surrounding a clip at        prior polypectomy site. The polyp was carpet-like. The polyp was removed        with a piecemeal technique using a cold snare. Resection and retrieval        were complete.       Two semi-sessile polyps were found in the ascending colon. The polyps        were 2 to 3 mm in size. These polyps were removed with a cold snare.        Resection and retrieval were complete.       A 4 mm polyp (vs scar) was found in the ascending colon, surrounding a        clip at prior polypectomy site. The polyp was removed with a piecemeal        technique using a cold snare. Resection and retrieval were complete.       Two semi-pedunculated polyps were found in the transverse colon. The         polyps were 6 to 7 mm in size. These polyps were removed with a cold        snare. Resection and retrieval were complete.       Two pedunculated polyps were found in the sigmoid colon. The polyps were        10 to 14 mm in size. These polyps were removed with a hot snare.        Resection and retrieval were complete.                                                                                   Impression:               - One 6 mm polyp in the cecum, removed piecemeal                             using a cold snare. Resected and retrieved.                            - Two 2 to 3 mm polyps in the ascending colon,                             removed with a cold snare. Resected and retrieved.                            - One 4 mm polyp in the ascending colon, removed                             piecemeal using a cold snare. Resected and                             retrieved.                            - Two 6 to 7 mm polyps in the transverse colon,                             rem kenneth with a cold snare. Resected and  retrieved.                            - Two 10 to 14 mm polyps in the sigmoid colon,                             removed with a hot snare. Resected and retrieved.  Recommendation:           - Discharge patient to home (ambulatory).                            - Await pathology results.                            - Repeat colonoscopy in 1 year for surveillance.                                                                                   Procedure Code(s):       --- Professional ---       34845, Colonoscopy, flexible; with removal of tumor(s), polyp(s), or        other lesion(s) by snare technique  Diagnosis Code(s):       --- Professional ---       D12.0, Benign neoplasm of cecum       D12.2, Benign neoplasm of ascending colon       D12.3, Benign neoplasm of transverse colon (hepatic flexure or splenic        flexure)       D12.5, Benign neoplasm of sigmoid colon       Z09, Encounter for follow-up examination after completed treatment for         conditions other than malignant neoplasm       Z86.010, Personal history of colonic polyps    CPT copyright 2022 [employer]. All rights reserved.    The codes documented in this report are preliminary and upon  review may   be revised to meet current compliance requirements.    _______________________  PAULA ALCAZAR MD  10/9/2024 12:22:26 PM  I was physically present for the entire viewing portion of the exam.  PAULA ALCAZAR MD  Number of Addenda: 0    Note Initiated On: 10/9/2024 11:19 AM  Scope Withdrawal Time: 0 hours 34 minutes 8 seconds   Total Procedure Duration: 0 hours 39 minutes 10 seconds   Scope In: 11:32:22 AM  Scope Out: 12:11:32 PM     Surgical Pathology Exam    Collection Time: 10/09/24 11:38 AM   Result Value Ref Range    Case Report       Surgical Pathology Report                         Case: [id number]                                  Authorizing Provider:  Paula Alcazar MD   Collected:           10/09/2024 11:38  AM          Ordering Location:     [facility]          Received:            10/09/2024 12:43 PM                                 [facility]                                                          Pathologist:           Lam Bellamy MD                                                            Specimens:   A) - Large Intestine, Colon, Cecum                                                                  B) - Large Intestine, Colon, Ascending, polyps x2                                                   C) - Large Intestine, Colon, Ascending, third ascending polyp                                       D) - Large Intestine, Colon, Transverse, polyps x2                                                  E) - Large Intestine, Colon, Sigmoid, polyps x2                                            Final Diagnosis       A. Colon, cecum, polypectomy:  -- Hyperplastic polyp.    B. Colon, ascending, polypectomies:  -- Hyperplastic polyps.    C. Colon, ascending, polypectomy:  -- Hyperplastic polyp.    D.  Colon, transverse, polypectomies:  -- Tubular adenoma and inflammatory polyp.    E.  Colon, sigmoid, polypectomies:  -- Tubular and tubulovillous adenoma.        Clinical Information       Procedure:  Colonoscopy  COLONOSCOPY, WITH POLYPECTOMY AND BIOPSY  Pre-op Diagnosis: Special screening for malignant neoplasms, colon [Z12.11]  Post-op Diagnosis: Z12.11 - Special screening for malignant neoplasms, colon [ICD-10-CM]      Gross Description       A(1). Large Intestine, Colon, Cecum, :  The specimen is received in formalin, labeled with the patient's name, medical record number and other identifying information and designated  cecum polyp . It consists of 4 tan soft tissue fragments ranging from 0.2-0.5 cm. Entirely submitted in one cassette.    B(2). Large Intestine, Colon, Ascending, polyps x2:  The specimen is received in formalin, labeled with the patient's name, medical record number and other identifying  "information designated \"ascending colon polyp x 2\". It consists of 3 polypoid tissue fragments, 0.1-0.8 cm.  The larger fragment is inked black and bisected.  Entirely submitted in 1 cassette.    C(3). Large Intestine, Colon, Ascending, third ascending polyp:  The specimen is received in formalin, labeled with the patient's name, medical record number and other identifying information and designated  third ascending polyp . It consists of 5 tan soft tissue fragments ranging from 0.2-0.5 cm. Entirely submitted in one cassette.    D(4). Large Intestine, Colon, Transverse, polyps x2:  The specimen is received in formalin, labeled with the patient's name, medical record number and other identifying information designated \"transverse polyps x 2\". It consists of 2 polypoid tissue fragments, 1.2 and 1.6 cm.  Inked blue and black and sectioned.  Entirely submitted in 1 cassette.    E(5). Large Intestine, Colon, Sigmoid, polyps x2:  The specimen is received in formalin, labeled with the patient's name, medical record number and other identifying information designated \"sigmoid polyps x 2\". It consists of 2 polypoid tissue fragments, 0.9 and 1.2 cm.  Inked blue and black.  Entirely submitted as follows:    E1-smaller black inked fragment, bisected  E2-larger blue inked fragment, trisected   (MILES Pascal) 10/9/2024 12:50 PM       Microscopic Description       Microscopic examination was performed.        Performing Labs       The technical component of this testing was completed at [facility].    Stain controls for all stains resulted within this report have been reviewed and show appropriate reactivity.       Case Images     TSH with free T4 reflex    Collection Time: 11/01/24  9:57 AM   Result Value Ref Range    TSH 4.91 (H) 0.30 - 4.20 uIU/mL   Ferritin    Collection Time: 11/01/24  9:57 AM   Result Value Ref Range    Ferritin 62 11 - 328 ng/mL "   Comprehensive metabolic panel (BMP + Alb, Alk Phos, ALT, AST, Total. Bili, TP)    Collection Time: 11/01/24  9:57 AM   Result Value Ref Range    Sodium 139 135 - 145 mmol/L    Potassium 4.3 3.4 - 5.3 mmol/L    Carbon Dioxide (CO2) 22 22 - 29 mmol/L    Anion Gap 11 7 - 15 mmol/L    Urea Nitrogen 9.4 8.0 - 23.0 mg/dL    Creatinine 0.62 0.51 - 0.95 mg/dL    GFR Estimate >90 >60 mL/min/1.73m2    Calcium 9.4 8.8 - 10.4 mg/dL    Chloride 106 98 - 107 mmol/L    Glucose 159 (H) 70 - 99 mg/dL    Alkaline Phosphatase 78 40 - 150 U/L    AST 33 0 - 45 U/L    ALT 26 0 - 50 U/L    Protein Total 7.2 6.4 - 8.3 g/dL    Albumin 4.4 3.5 - 5.2 g/dL    Bilirubin Total 0.5 <=1.2 mg/dL   CBC with platelets and differential    Collection Time: 11/01/24  9:57 AM   Result Value Ref Range    WBC Count 7.9 4.0 - 11.0 10e3/uL    RBC Count 5.06 3.80 - 5.20 10e6/uL    Hemoglobin 15.8 (H) 11.7 - 15.7 g/dL    Hematocrit 47.2 (H) 35.0 - 47.0 %    MCV 93 78 - 100 fL    MCH 31.2 26.5 - 33.0 pg    MCHC 33.5 31.5 - 36.5 g/dL    RDW 12.9 10.0 - 15.0 %    Platelet Count 144 (L) 150 - 450 10e3/uL    % Neutrophils 66 %    % Lymphocytes 23 %    % Monocytes 8 %    % Eosinophils 2 %    % Basophils 1 %    % Immature Granulocytes 0 %    Absolute Neutrophils 5.2 1.6 - 8.3 10e3/uL    Absolute Lymphocytes 1.8 0.8 - 5.3 10e3/uL    Absolute Monocytes 0.6 0.0 - 1.3 10e3/uL    Absolute Eosinophils 0.2 0.0 - 0.7 10e3/uL    Absolute Basophils 0.0 0.0 - 0.2 10e3/uL    Absolute Immature Granulocytes 0.0 <=0.4 10e3/uL   T4 free    Collection Time: 11/01/24  9:57 AM   Result Value Ref Range    Free T4 1.01 0.90 - 1.70 ng/dL     No results found for this or any previous visit (from the past 744 hours).      Again, thank you for allowing me to participate in the care of your patient.        Sincerely,        Francine Elizalde MD

## 2024-11-05 ENCOUNTER — PATIENT OUTREACH (OUTPATIENT)
Dept: CARE COORDINATION | Facility: CLINIC | Age: 64
End: 2024-11-05
Payer: COMMERCIAL

## 2024-11-05 NOTE — PROGRESS NOTES
This patient scored positive on Cancer Distress Screen at the time of their most recent clinic visit. Chart reviewed, and patient does not have a cancer diagnosis. Therefore, oncology social work team will not outreach to patient. Oncology social work will engage if referral received from provider.     YANELY Craft, LICSW, OSW-C  Clinical - Adult Oncology  Phone: [phone]  She/Her/Hers  [facility]: Lynsey GRANDE  8am-4:30pm  [facility]: STEPHANE Hernandez F 8am-4:30pm   Support Groups at [facility]: Social Work Services for Cancer Patients ([email or web address])

## 2024-12-02 ENCOUNTER — MYC MEDICAL ADVICE (OUTPATIENT)
Dept: EDUCATION SERVICES | Facility: CLINIC | Age: 64
End: 2024-12-02
Payer: COMMERCIAL

## 2024-12-02 DIAGNOSIS — E11.65 TYPE 2 DIABETES MELLITUS WITH HYPERGLYCEMIA, WITHOUT LONG-TERM CURRENT USE OF INSULIN (H): ICD-10-CM

## 2024-12-10 DIAGNOSIS — G89.29 OTHER CHRONIC PAIN: ICD-10-CM

## 2024-12-16 RX ORDER — GABAPENTIN 100 MG/1
CAPSULE ORAL
Qty: 180 CAPSULE | Refills: 2 | Status: SHIPPED | OUTPATIENT
Start: 2024-12-16

## 2025-01-11 ENCOUNTER — HEALTH MAINTENANCE LETTER (OUTPATIENT)
Age: 65
End: 2025-01-11

## 2025-01-30 ENCOUNTER — LAB (OUTPATIENT)
Dept: LAB | Facility: CLINIC | Age: 65
End: 2025-01-30
Payer: COMMERCIAL

## 2025-01-30 DIAGNOSIS — E83.110 HEREDITARY HEMOCHROMATOSIS: ICD-10-CM

## 2025-01-30 DIAGNOSIS — Z79.4 TYPE 2 DIABETES MELLITUS WITH KIDNEY COMPLICATION, WITH LONG-TERM CURRENT USE OF INSULIN (H): Primary | ICD-10-CM

## 2025-01-30 DIAGNOSIS — F43.23 SITUATIONAL MIXED ANXIETY AND DEPRESSIVE DISORDER: ICD-10-CM

## 2025-01-30 DIAGNOSIS — E11.29 TYPE 2 DIABETES MELLITUS WITH KIDNEY COMPLICATION, WITH LONG-TERM CURRENT USE OF INSULIN (H): Primary | ICD-10-CM

## 2025-01-30 LAB
ALBUMIN SERPL BCG-MCNC: 4.3 G/DL (ref 3.5–5.2)
ALP SERPL-CCNC: 107 U/L (ref 40–150)
ALT SERPL W P-5'-P-CCNC: 20 U/L (ref 0–50)
ANION GAP SERPL CALCULATED.3IONS-SCNC: 9 MMOL/L (ref 7–15)
AST SERPL W P-5'-P-CCNC: 30 U/L (ref 0–45)
BASOPHILS # BLD AUTO: 0 10E3/UL (ref 0–0.2)
BASOPHILS NFR BLD AUTO: 0 %
BILIRUB SERPL-MCNC: 0.5 MG/DL
BUN SERPL-MCNC: 11.2 MG/DL (ref 8–23)
CALCIUM SERPL-MCNC: 9.2 MG/DL (ref 8.8–10.4)
CHLORIDE SERPL-SCNC: 105 MMOL/L (ref 98–107)
CHOLEST SERPL-MCNC: 163 MG/DL
CREAT SERPL-MCNC: 0.67 MG/DL (ref 0.51–0.95)
CREAT UR-MCNC: 54.2 MG/DL
EGFRCR SERPLBLD CKD-EPI 2021: >90 ML/MIN/1.73M2
EOSINOPHIL # BLD AUTO: 0.2 10E3/UL (ref 0–0.7)
EOSINOPHIL NFR BLD AUTO: 3 %
ERYTHROCYTE [DISTWIDTH] IN BLOOD BY AUTOMATED COUNT: 12.5 % (ref 10–15)
EST. AVERAGE GLUCOSE BLD GHB EST-MCNC: 128 MG/DL
FASTING STATUS PATIENT QL REPORTED: YES
FASTING STATUS PATIENT QL REPORTED: YES
FERRITIN SERPL-MCNC: 62 NG/ML (ref 11–328)
GLUCOSE SERPL-MCNC: 146 MG/DL (ref 70–99)
HBA1C MFR BLD: 6.1 % (ref 0–5.6)
HCO3 SERPL-SCNC: 26 MMOL/L (ref 22–29)
HCT VFR BLD AUTO: 43.6 % (ref 35–47)
HDLC SERPL-MCNC: 32 MG/DL
HGB BLD-MCNC: 15 G/DL (ref 11.7–15.7)
IMM GRANULOCYTES # BLD: 0 10E3/UL
IMM GRANULOCYTES NFR BLD: 0 %
LDLC SERPL CALC-MCNC: 100 MG/DL
LYMPHOCYTES # BLD AUTO: 1.8 10E3/UL (ref 0.8–5.3)
LYMPHOCYTES NFR BLD AUTO: 24 %
MCH RBC QN AUTO: 31.1 PG (ref 26.5–33)
MCHC RBC AUTO-ENTMCNC: 34.4 G/DL (ref 31.5–36.5)
MCV RBC AUTO: 91 FL (ref 78–100)
MICROALBUMIN UR-MCNC: <12 MG/L
MICROALBUMIN/CREAT UR: NORMAL MG/G{CREAT}
MONOCYTES # BLD AUTO: 0.6 10E3/UL (ref 0–1.3)
MONOCYTES NFR BLD AUTO: 8 %
NEUTROPHILS # BLD AUTO: 4.8 10E3/UL (ref 1.6–8.3)
NEUTROPHILS NFR BLD AUTO: 65 %
NONHDLC SERPL-MCNC: 131 MG/DL
PLATELET # BLD AUTO: 153 10E3/UL (ref 150–450)
POTASSIUM SERPL-SCNC: 4.4 MMOL/L (ref 3.4–5.3)
PROT SERPL-MCNC: 7.4 G/DL (ref 6.4–8.3)
RBC # BLD AUTO: 4.82 10E6/UL (ref 3.8–5.2)
SODIUM SERPL-SCNC: 140 MMOL/L (ref 135–145)
TRIGL SERPL-MCNC: 153 MG/DL
TSH SERPL DL<=0.005 MIU/L-ACNC: 2.03 UIU/ML (ref 0.3–4.2)
WBC # BLD AUTO: 7.3 10E3/UL (ref 4–11)

## 2025-01-30 NOTE — TELEPHONE ENCOUNTER
Left message for patient to call back to update phq/david 7. Chandu DIEGO    
Recheck PHQ-9 and DOUG  
Requested Prescriptions   Pending Prescriptions Disp Refills    sertraline (ZOLOFT) 50 MG tablet [Pharmacy Med Name: SERTRALINE HCL 50MG TABS] 180 tablet 0     Sig: TAKE 2 TABLETS (100 MG) BY MOUTH DAILY       SSRIs Protocol Failed - 1/30/2025 12:30 PM        Failed - PHQ-9 score less than 5 in past 6 months     Please review last PHQ-9 score.           Failed - DOUG-7 score of less than 5 in past 6 months.     Please review last DOUG-7 score.           Passed - Medication is active on med list        Passed - Recent (12 mo) or future (90 days) visit within the authorizing provider's specialty     The patient must have completed an in-person or virtual visit within the past 12 months or has a future visit scheduled within the next 90 days with the authorizing provider s specialty.  Urgent care and e-visits do not qualify as an office visit for this protocol.          Passed - Medication indicated for associated diagnosis     Medication is associated with one or more of the following diagnoses:              Anxiety             Bipolar  Depression  Obsessive-compulsive disorder             Panic disorder  Postmenopausal flushing             Premenstrual dysphoric disorder             Social phobia   Adjustment disorder with depressed mood   Mood disorder   Adjustment disorder with anxious mood          Passed - Patient is age 18 or older        Passed - No active pregnancy on record        Passed - No positive pregnancy test in last 12 months             
No

## 2025-02-03 NOTE — PROGRESS NOTES
Oncology/Hematology Visit Note  Feb 4, 2025    Reason for Visit: Follow up of hereditary hemochromatosis    History of Present Illness: Bradford Prado is a 63 year old female with past medical history of cirrhosis, CERDA, HTN, HLD, and DM2 who is referred to clinic today for C282Y/C282Y hereditary hemochromatosis. She was previously followed by Dr. Long in [place] and Dr. Girard here.     Patient has family history of severe liver disease and recalls mother entering acute liver failure with need for repeat thoracenteses. Patient had then developed increasing LFTs and cirrhosis and workup for this included liver biopsy, which revealed cirrhosis and negative alpha 1 antitrypsin mutation testing. Record review shows patient has had evidence of elevated ferritin levels as high as >1500 in 2016. She underwent testing for hereditary hemochromatosis in May 2021 and returned positive as homozygous C282Y/C282Y.      Patient has been managed with phlebotomy. Goal ferritin <100.    Interval History:  Bradford is here unaccompanied. Overall she is feeling well. She continues to have severe wrist pain and has had procedures for this and is seeing pain management. She otherwise is doing well. No recent infections. Trying to eat healthy including avoiding iron rich foods, denies any GI concerns. She does not tolerate phlebotomy well and is trying to avoid this as much as possible.       Current Outpatient Medications   Medication Sig Dispense Refill    ACCU-CHEK GUIDE test strip Use to test blood sugar 4 times daily or as directed. 200 strip 11    amLODIPine (NORVASC) 5 MG tablet Take 1 tablet (5 mg) by mouth 2 times daily 180 tablet 3    blood glucose (NO BRAND SPECIFIED) lancets standard Use to test blood sugar twice times daily or as directed. 100 each 1    blood glucose (NO BRAND SPECIFIED) test strip Use to test blood sugar twice times daily or as directed. 100 each 1    blood glucose (NO BRAND SPECIFIED) test strip Use to test blood  sugars 2 times daily or as directed 100 strip 11    blood glucose monitoring (SOFTCLIX) lancets Use to test blood sugar 6 times daily. 200 each 11    Blood Glucose Monitoring Suppl (ACCU-CHEK GUIDE ME) w/Device KIT 1 each 6 times daily 1 kit 0    carvedilol (COREG) 12.5 MG tablet Take 1 tablet (12.5 mg) by mouth 2 times daily (with meals) 180 tablet 3    cholestyramine light (PREVALITE) 4 GM powder Take 1 packet (4 g) by mouth 3 times daily (with meals) 60 packet 1    Continuous Glucose Sensor (FREESTYLE MEENA 2 SENSOR) MISC Change every 14 days. 2 each 11    gabapentin (NEURONTIN) 100 MG capsule TAKE 2 CAPSULES  BY MOUTH 3 TIMES A  capsule 2    Glucagon (GVOKE HYPOPEN) 1 MG/0.2ML pen Inject the contents of one device under the skin into lower abdomen, outer thigh, or outer upper arm as needed for severe hypoglycemia. If no response after 15 minutes, additional 1 mg dose from a new device may be injected while waiting for emergency assistance 0.4 mL 3    insulin pen needle (BD NATALIYA U/F) 32G X 4 MM miscellaneous USE FOR INJECTIONS 4 TIMES DAILY AS DIRECTED (DUE FOR APPOINTMENT AND LABS) 400 each 0    LANTUS SOLOSTAR 100 UNIT/ML soln INJECT 38 UNITS SUBCUTANEOUS EVERY MORNING (BEFORE BREAKFAST) 45 mL 1    losartan (COZAAR) 50 MG tablet Take 1 tablet (50 mg) by mouth daily 90 tablet 3    NOVOLOG FLEXPEN 100 UNIT/ML soln INJECT 10 UNITS UNDER THE SKIN THREE TIMES DAILY WITH MEALS. MAXIMUM OF 30 UNITS DAILY. 30 mL 0    ondansetron (ZOFRAN ODT) 4 MG ODT tab Take 1 tablet (4 mg) by mouth every 8 hours as needed for nausea 16 tablet 0    pantoprazole (PROTONIX) 40 MG EC tablet Take 1 tablet (40 mg) by mouth daily 90 tablet 3    pravastatin (PRAVACHOL) 20 MG tablet TAKE ONE TABLET BY MOUTH ONCE DAILY. DUE FOR LABS 90 tablet 3    sertraline (ZOLOFT) 50 MG tablet TAKE 2 TABLETS (100 MG) BY MOUTH DAILY 180 tablet 0       Past Medical History  Past Medical History:   Diagnosis Date    Arthritis     Chest pain 02/23/2021     Diabetes mellitus (H)     Dyslipidemia     Hereditary hemochromatosis     Hyperglycemia 2021    Hypertension     Hypertensive urgency 2021    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) (H)     Nephrolithiasis 2016    First episode 2016    PONV (postoperative nausea and vomiting)     Poorly controlled type 2 diabetes mellitus with renal complication (H)     Type 2 diabetes mellitus with diabetic neuropathy, with long-term current use of insulin (H)      Past Surgical History:   Procedure Laterality Date    COLONOSCOPY      COLONOSCOPY N/A 2024    Procedure: Colonoscopy;  Surgeon: Royce Mari MD;  Location:  GI    COLONOSCOPY N/A 2024    Procedure: COLONOSCOPY, WITH POLYPECTOMY AND BIOPSY;  Surgeon: Royce Mari MD;  Location:  GI    COLONOSCOPY N/A 10/9/2024    Procedure: Colonoscopy;  Surgeon: Paula Nick MD;  Location:  GI    ESOPHAGOSCOPY, GASTROSCOPY, DUODENOSCOPY (EGD), COMBINED N/A 11/10/2021    Procedure: ESOPHAGOGASTRODUODENOSCOPY, WITH BIOPSY;  Surgeon: Leventhal, Thomas Michael, MD;  [place] [facility] [place]    ESOPHAGOSCOPY, GASTROSCOPY, DUODENOSCOPY (EGD), COMBINED N/A 2024    Procedure: Esophagoscopy, gastroscopy, duodenoscopy (EGD), combined;  Surgeon: Royce Mari MD;  Location:  GI    HYSTERECTOMY TOTAL ABDOMINAL  1995    due to fibroids    LAPAROSCOPIC CHOLECYSTECTOMY N/A 2023    Procedure: Laparoscopic cholecystectomy;  Surgeon: Eber Gill MD;  Location:  OR    LAPAROSCOPIC EVACUATION ECTOPIC PREGNANCY      TUBAL LIGATION       No Known Allergies  Social History   Social History     Tobacco Use    Smoking status: Former     Current packs/day: 0.00     Average packs/day: 0.5 packs/day for 20.0 years (10.0 ttl pk-yrs)     Types: Cigarettes     Start date: 3/9/1990     Quit date: 3/9/2010     Years since quittin.9    Smokeless tobacco: Never  "  Vaping Use    Vaping status: Never Used   Substance Use Topics    Alcohol use: No     Alcohol/week: 0.0 standard drinks of alcohol    Drug use: No      Past medical history and social history were reviewed.    Physical Examination:  /77   Pulse 77   Temp 99.1  F (37.3  C) (Oral)   Resp 20   Ht 1.499 m (4' 11.02\")   Wt 65.5 kg (144 lb 8 oz)   LMP  (LMP Unknown)   SpO2 96%   BMI 29.17 kg/m    Wt Readings from Last 10 Encounters:   11/04/24 63.5 kg (140 lb)   09/10/24 64.4 kg (142 lb)   08/08/24 64.5 kg (142 lb 1.6 oz)   07/09/24 64 kg (141 lb)   07/02/24 64 kg (141 lb)   07/01/24 64.1 kg (141 lb 6.4 oz)   02/26/24 60.8 kg (134 lb)   01/30/24 63 kg (139 lb)   01/24/24 60.9 kg (134 lb 3.2 oz)   12/28/23 62.8 kg (138 lb 8 oz)     Constitutional: Well-appearing female in no acute distress.  Eyes: No scleral icterus.  Cardiovascular: Extremities well perfused   Respiratory: Non-labored breathing   Neurologic: Cranial nerves II through XII are grossly intact.  Skin: No rashes, petechiae, or bruising noted on exposed skin.    Laboratory Data:     Latest Reference Range & Units 01/30/25 09:58   Sodium 135 - 145 mmol/L 140   Potassium 3.4 - 5.3 mmol/L 4.4   Chloride 98 - 107 mmol/L 105   Carbon Dioxide (CO2) 22 - 29 mmol/L 26   Urea Nitrogen 8.0 - 23.0 mg/dL 11.2   Creatinine 0.51 - 0.95 mg/dL 0.67   GFR Estimate >60 mL/min/1.73m2 >90   Calcium 8.8 - 10.4 mg/dL 9.2   Anion Gap 7 - 15 mmol/L 9   Albumin 3.5 - 5.2 g/dL 4.3   Protein Total 6.4 - 8.3 g/dL 7.4   Alkaline Phosphatase 40 - 150 U/L 107   ALT 0 - 50 U/L 20   AST 0 - 45 U/L 30   Bilirubin Total <=1.2 mg/dL 0.5   Cholesterol <200 mg/dL 163      Latest Reference Range & Units 01/30/25 09:58   Ferritin 11 - 328 ng/mL 62      Latest Reference Range & Units 01/30/25 09:58   WBC 4.0 - 11.0 10e3/uL 7.3   Hemoglobin 11.7 - 15.7 g/dL 15.0   Hematocrit 35.0 - 47.0 % 43.6   Platelet Count 150 - 450 10e3/uL 153   RBC Count 3.80 - 5.20 10e6/uL 4.82   MCV 78 - 100 fL " 91   MCH 26.5 - 33.0 pg 31.1   MCHC 31.5 - 36.5 g/dL 34.4   RDW 10.0 - 15.0 % 12.5   % Neutrophils % 65   % Lymphocytes % 24   % Monocytes % 8   % Eosinophils % 3   % Basophils % 0   Absolute Basophils 0.0 - 0.2 10e3/uL 0.0   Absolute Eosinophils 0.0 - 0.7 10e3/uL 0.2   Absolute Immature Granulocytes <=0.4 10e3/uL 0.0   Absolute Lymphocytes 0.8 - 5.3 10e3/uL 1.8   Absolute Monocytes 0.0 - 1.3 10e3/uL 0.6   % Immature Granulocytes % 0   Absolute Neutrophils 1.6 - 8.3 10e3/uL 4.8     5/10/21: TEST(S) REQUESTED:   Hemochromatosis Mutation Analysis by PCR   SPECIMEN DESCRIPTION:   Blood   HEMOCHROMATOSIS RESULTS   HFE Gene C282Y ([id number]) RESULTS: C282Y Mutation Interpretation: HOMOZYGOTE   HFE Gene H63D ([id number]) RESULTS: H63D Mutation Interpretation: NORMAL   HFE Gene S65C ([id number]) RESULTS: S65C Mutation Interpretation: NORMAL     Assessment and Plan:  # Hereditary Hemochromatosis  C282Y mutation homozygous complicated by cirrhosis and diabetes. No cardiac involvement. Required phlebotomies in the past.  - Clinically doing well. Ferritin at range <100  - Hold off on phlebotomy for now. Continue monitoring every 3 months   - HCC screening with annual US abdomen and AFP, will do sometime in the next few months  - She should follow with hepatology for cirrhosis. Will need EGD Feb 2026 for screening along with repeat colonoscopy this OCt 2025 due to multiple polpys on last colonoscopy. LFTs WNL   - She has significant arthralgias in hand/wrist, suspect this is related to HH given known association with HH and arthropathy, especially in the hands. Working with pain management and ortho  - She would like to meet with hematologist at [facility]. Referral placed     25 minutes spent on the date of the encounter doing chart review, review of test results, interpretation of tests, patient visit, and documentation     Srinivasan Lara PA-C  Department of Hematology and Oncology  [facility]

## 2025-02-04 ENCOUNTER — ONCOLOGY VISIT (OUTPATIENT)
Dept: ONCOLOGY | Facility: CLINIC | Age: 65
End: 2025-02-04
Attending: INTERNAL MEDICINE
Payer: COMMERCIAL

## 2025-02-04 VITALS
SYSTOLIC BLOOD PRESSURE: 138 MMHG | RESPIRATION RATE: 20 BRPM | TEMPERATURE: 99.1 F | OXYGEN SATURATION: 96 % | WEIGHT: 144.5 LBS | HEART RATE: 77 BPM | DIASTOLIC BLOOD PRESSURE: 77 MMHG | HEIGHT: 59 IN | BODY MASS INDEX: 29.13 KG/M2

## 2025-02-04 DIAGNOSIS — E83.110 HEREDITARY HEMOCHROMATOSIS: Primary | ICD-10-CM

## 2025-02-04 PROCEDURE — 99213 OFFICE O/P EST LOW 20 MIN: CPT | Performed by: PHYSICIAN ASSISTANT

## 2025-02-04 PROCEDURE — G0463 HOSPITAL OUTPT CLINIC VISIT: HCPCS | Performed by: PHYSICIAN ASSISTANT

## 2025-02-04 ASSESSMENT — PAIN SCALES - GENERAL: PAINLEVEL_OUTOF10: MODERATE PAIN (6)

## 2025-02-04 NOTE — NURSING NOTE
"Oncology Rooming Note    February 4, 2025 9:52 AM   Bradford Prado is a 64 year old female who presents for:    Chief Complaint   Patient presents with    Oncology Clinic Visit     Initial Vitals: /77   Pulse 77   Temp 99.1  F (37.3  C) (Oral)   Resp 20   Ht 1.499 m (4' 11.02\")   Wt 65.5 kg (144 lb 8 oz)   LMP  (LMP Unknown)   SpO2 96%   BMI 29.17 kg/m   Estimated body mass index is 29.17 kg/m  as calculated from the following:    Height as of this encounter: 1.499 m (4' 11.02\").    Weight as of this encounter: 65.5 kg (144 lb 8 oz). Body surface area is 1.65 meters squared.  Moderate Pain (6) Comment: Data Unavailable   No LMP recorded (lmp unknown). Patient has had a hysterectomy.  Allergies reviewed: Yes  Medications reviewed: Yes    Medications: None needed   Pharmacy name entered into [facility]:    [facility] [place], [place] - [address] [place] [address] PHARMACY [place], [place] - [address] [place] [address] [phone]    Frailty Screening:   Is the patient here for a new oncology consult visit in cancer care? 2. No      Clinical concerns: Follow up       Yuko Patel MA              "

## 2025-02-04 NOTE — LETTER
2/4/2025      Bradford Prado  [address]  [facility] [address]      Dear Colleague,    Thank you for referring your patient, Bradford Prado, to the [facility]. Please see a copy of my visit note below.    Oncology/Hematology Visit Note  Feb 4, 2025    Reason for Visit: Follow up of hereditary hemochromatosis    History of Present Illness: Bradford Prado is a 63 year old female with past medical history of cirrhosis, CERDA, HTN, HLD, and DM2 who is referred to clinic today for C282Y/C282Y hereditary hemochromatosis. She was previously followed by Dr. Long in [place] and Dr. Girard here.     Patient has family history of severe liver disease and recalls mother entering acute liver failure with need for repeat thoracenteses. Patient had then developed increasing LFTs and cirrhosis and workup for this included liver biopsy, which revealed cirrhosis and negative alpha 1 antitrypsin mutation testing. Record review shows patient has had evidence of elevated ferritin levels as high as >1500 in 2016. She underwent testing for hereditary hemochromatosis in May 2021 and returned positive as homozygous C282Y/C282Y.      Patient has been managed with phlebotomy. Goal ferritin <100.    Interval History:  Bradford is here unaccompanied. Overall she is feeling well. She continues to have severe wrist pain and has had procedures for this and is seeing pain management. She otherwise is doing well. No recent infections. Trying to eat healthy including avoiding iron rich foods, denies any GI concerns. She does not tolerate phlebotomy well and is trying to avoid this as much as possible.       Current Outpatient Medications   Medication Sig Dispense Refill     ACCU-CHEK GUIDE test strip Use to test blood sugar 4 times daily or as directed. 200 strip 11     amLODIPine (NORVASC) 5 MG tablet Take 1 tablet (5 mg) by mouth 2 times daily 180 tablet 3     blood glucose (NO BRAND SPECIFIED) lancets standard Use to test  blood sugar twice times daily or as directed. 100 each 1     blood glucose (NO BRAND SPECIFIED) test strip Use to test blood sugar twice times daily or as directed. 100 each 1     blood glucose (NO BRAND SPECIFIED) test strip Use to test blood sugars 2 times daily or as directed 100 strip 11     blood glucose monitoring (SOFTCLIX) lancets Use to test blood sugar 6 times daily. 200 each 11     Blood Glucose Monitoring Suppl (ACCU-CHEK GUIDE ME) w/Device KIT 1 each 6 times daily 1 kit 0     carvedilol (COREG) 12.5 MG tablet Take 1 tablet (12.5 mg) by mouth 2 times daily (with meals) 180 tablet 3     cholestyramine light (PREVALITE) 4 GM powder Take 1 packet (4 g) by mouth 3 times daily (with meals) 60 packet 1     Continuous Glucose Sensor (FREESTYLE MEENA 2 SENSOR) MISC Change every 14 days. 2 each 11     gabapentin (NEURONTIN) 100 MG capsule TAKE 2 CAPSULES  BY MOUTH 3 TIMES A  capsule 2     Glucagon (GVOKE HYPOPEN) 1 MG/0.2ML pen Inject the contents of one device under the skin into lower abdomen, outer thigh, or outer upper arm as needed for severe hypoglycemia. If no response after 15 minutes, additional 1 mg dose from a new device may be injected while waiting for emergency assistance 0.4 mL 3     insulin pen needle (BD NATALIYA U/F) 32G X 4 MM miscellaneous USE FOR INJECTIONS 4 TIMES DAILY AS DIRECTED (DUE FOR APPOINTMENT AND LABS) 400 each 0     LANTUS SOLOSTAR 100 UNIT/ML soln INJECT 38 UNITS SUBCUTANEOUS EVERY MORNING (BEFORE BREAKFAST) 45 mL 1     losartan (COZAAR) 50 MG tablet Take 1 tablet (50 mg) by mouth daily 90 tablet 3     NOVOLOG FLEXPEN 100 UNIT/ML soln INJECT 10 UNITS UNDER THE SKIN THREE TIMES DAILY WITH MEALS. MAXIMUM OF 30 UNITS DAILY. 30 mL 0     ondansetron (ZOFRAN ODT) 4 MG ODT tab Take 1 tablet (4 mg) by mouth every 8 hours as needed for nausea 16 tablet 0     pantoprazole (PROTONIX) 40 MG EC tablet Take 1 tablet (40 mg) by mouth daily 90 tablet 3     pravastatin (PRAVACHOL) 20 MG tablet  TAKE ONE TABLET BY MOUTH ONCE DAILY. DUE FOR LABS 90 tablet 3     sertraline (ZOLOFT) 50 MG tablet TAKE 2 TABLETS (100 MG) BY MOUTH DAILY 180 tablet 0       Past Medical History  Past Medical History:   Diagnosis Date     Arthritis      Chest pain 02/23/2021     Diabetes mellitus (H)      Dyslipidemia      Hereditary hemochromatosis      Hyperglycemia 02/23/2021     Hypertension      Hypertensive urgency 02/23/2021     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) (H)      Nephrolithiasis 09/19/2016    First episode September 2016     PONV (postoperative nausea and vomiting)      Poorly controlled type 2 diabetes mellitus with renal complication (H)      Type 2 diabetes mellitus with diabetic neuropathy, with long-term current use of insulin (H)      Past Surgical History:   Procedure Laterality Date     COLONOSCOPY       COLONOSCOPY N/A 2/29/2024    Procedure: Colonoscopy;  Surgeon: Royce Mari MD;  Location:  GI     COLONOSCOPY N/A 2/29/2024    Procedure: COLONOSCOPY, WITH POLYPECTOMY AND BIOPSY;  Surgeon: Royce Mari MD;  Location:  GI     COLONOSCOPY N/A 10/9/2024    Procedure: Colonoscopy;  Surgeon: Paula Nick MD;  Location:  GI     ESOPHAGOSCOPY, GASTROSCOPY, DUODENOSCOPY (EGD), COMBINED N/A 11/10/2021    Procedure: ESOPHAGOGASTRODUODENOSCOPY, WITH BIOPSY;  Surgeon: Leventhal, Thomas Michael, MD;  [place] [facility] [place]     ESOPHAGOSCOPY, GASTROSCOPY, DUODENOSCOPY (EGD), COMBINED N/A 2/29/2024    Procedure: Esophagoscopy, gastroscopy, duodenoscopy (EGD), combined;  Surgeon: Royce Mari MD;  Location:  GI     HYSTERECTOMY TOTAL ABDOMINAL  01/01/1995    due to fibroids     LAPAROSCOPIC CHOLECYSTECTOMY N/A 02/13/2023    Procedure: Laparoscopic cholecystectomy;  Surgeon: Eber Gill MD;  Location:  OR     LAPAROSCOPIC EVACUATION ECTOPIC PREGNANCY       TUBAL LIGATION       No Known Allergies  Social History   Social  "History     Tobacco Use     Smoking status: Former     Current packs/day: 0.00     Average packs/day: 0.5 packs/day for 20.0 years (10.0 ttl pk-yrs)     Types: Cigarettes     Start date: 3/9/1990     Quit date: 3/9/2010     Years since quittin.9     Smokeless tobacco: Never   Vaping Use     Vaping status: Never Used   Substance Use Topics     Alcohol use: No     Alcohol/week: 0.0 standard drinks of alcohol     Drug use: No      Past medical history and social history were reviewed.    Physical Examination:  /77   Pulse 77   Temp 99.1  F (37.3  C) (Oral)   Resp 20   Ht 1.499 m (4' 11.02\")   Wt 65.5 kg (144 lb 8 oz)   LMP  (LMP Unknown)   SpO2 96%   BMI 29.17 kg/m    Wt Readings from Last 10 Encounters:   24 63.5 kg (140 lb)   09/10/24 64.4 kg (142 lb)   24 64.5 kg (142 lb 1.6 oz)   24 64 kg (141 lb)   24 64 kg (141 lb)   24 64.1 kg (141 lb 6.4 oz)   24 60.8 kg (134 lb)   24 63 kg (139 lb)   24 60.9 kg (134 lb 3.2 oz)   23 62.8 kg (138 lb 8 oz)     Constitutional: Well-appearing female in no acute distress.  Eyes: No scleral icterus.  Cardiovascular: Extremities well perfused   Respiratory: Non-labored breathing   Neurologic: Cranial nerves II through XII are grossly intact.  Skin: No rashes, petechiae, or bruising noted on exposed skin.    Laboratory Data:     Latest Reference Range & Units 25 09:58   Sodium 135 - 145 mmol/L 140   Potassium 3.4 - 5.3 mmol/L 4.4   Chloride 98 - 107 mmol/L 105   Carbon Dioxide (CO2) 22 - 29 mmol/L 26   Urea Nitrogen 8.0 - 23.0 mg/dL 11.2   Creatinine 0.51 - 0.95 mg/dL 0.67   GFR Estimate >60 mL/min/1.73m2 >90   Calcium 8.8 - 10.4 mg/dL 9.2   Anion Gap 7 - 15 mmol/L 9   Albumin 3.5 - 5.2 g/dL 4.3   Protein Total 6.4 - 8.3 g/dL 7.4   Alkaline Phosphatase 40 - 150 U/L 107   ALT 0 - 50 U/L 20   AST 0 - 45 U/L 30   Bilirubin Total <=1.2 mg/dL 0.5   Cholesterol <200 mg/dL 163      [facility] Reference Range & Units " 01/30/25 09:58   Ferritin 11 - 328 ng/mL 62      Latest Reference Range & Units 01/30/25 09:58   WBC 4.0 - 11.0 10e3/uL 7.3   Hemoglobin 11.7 - 15.7 g/dL 15.0   Hematocrit 35.0 - 47.0 % 43.6   Platelet Count 150 - 450 10e3/uL 153   RBC Count 3.80 - 5.20 10e6/uL 4.82   MCV 78 - 100 fL 91   MCH 26.5 - 33.0 pg 31.1   MCHC 31.5 - 36.5 g/dL 34.4   RDW 10.0 - 15.0 % 12.5   % Neutrophils % 65   % Lymphocytes % 24   % Monocytes % 8   % Eosinophils % 3   % Basophils % 0   Absolute Basophils 0.0 - 0.2 10e3/uL 0.0   Absolute Eosinophils 0.0 - 0.7 10e3/uL 0.2   Absolute Immature Granulocytes <=0.4 10e3/uL 0.0   Absolute Lymphocytes 0.8 - 5.3 10e3/uL 1.8   Absolute Monocytes 0.0 - 1.3 10e3/uL 0.6   % Immature Granulocytes % 0   Absolute Neutrophils 1.6 - 8.3 10e3/uL 4.8     5/10/21: TEST(S) REQUESTED:   Hemochromatosis Mutation Analysis by PCR   SPECIMEN DESCRIPTION:   Blood   HEMOCHROMATOSIS RESULTS   HFE Gene C282Y ([id number]) RESULTS: C282Y Mutation Interpretation: HOMOZYGOTE   HFE Gene H63D (C187G) RESULTS: H63D Mutation Interpretation: NORMAL   HFE Gene S65C ([id number]) RESULTS: S65C Mutation Interpretation: NORMAL     Assessment and Plan:  # Hereditary Hemochromatosis  C282Y mutation homozygous complicated by cirrhosis and diabetes. No cardiac involvement. Required phlebotomies in the past.  - Clinically doing well. Ferritin at range <100  - Hold off on phlebotomy for now. Continue monitoring every 3 months   - HCC screening with annual US abdomen and AFP, will do sometime in the next few months  - She should follow with hepatology for cirrhosis. Will need EGD Feb 2026 for screening along with repeat colonoscopy this OCt 2025 due to multiple polpys on last colonoscopy. LFTs WNL   - She has significant arthralgias in hand/wrist, suspect this is related to HH given known association with HH and arthropathy, especially in the hands. Working with pain management and ortho  - She would like to meet with hematologist at [facility]. Referral  placed     25 minutes spent on the date of the encounter doing chart review, review of test results, interpretation of tests, patient visit, and documentation     Srinivasan Lara PA-C  Department of Hematology and Oncology  [facility]       Again, thank you for allowing me to participate in the care of your patient.        Sincerely,        MILES Austin    Electronically signed

## 2025-03-06 ENCOUNTER — ONCOLOGY VISIT (OUTPATIENT)
Dept: ONCOLOGY | Facility: CLINIC | Age: 65
End: 2025-03-06
Attending: INTERNAL MEDICINE
Payer: COMMERCIAL

## 2025-03-06 VITALS
OXYGEN SATURATION: 96 % | DIASTOLIC BLOOD PRESSURE: 88 MMHG | HEART RATE: 70 BPM | WEIGHT: 147.9 LBS | RESPIRATION RATE: 16 BRPM | SYSTOLIC BLOOD PRESSURE: 150 MMHG | TEMPERATURE: 98.6 F | BODY MASS INDEX: 29.86 KG/M2

## 2025-03-06 DIAGNOSIS — E83.110 HEREDITARY HEMOCHROMATOSIS: ICD-10-CM

## 2025-03-06 PROCEDURE — G0463 HOSPITAL OUTPT CLINIC VISIT: HCPCS | Performed by: INTERNAL MEDICINE

## 2025-03-06 ASSESSMENT — PAIN SCALES - GENERAL: PAINLEVEL_OUTOF10: MODERATE PAIN (6)

## 2025-03-06 NOTE — PROGRESS NOTES
[facility] Hematology Consultation  [address] [place] [address]  Phone: [phone]    Outpatient Visit Note:    Patient: Bradford Prado  MRN: [id number]  : 1960  MACO: Mar 6, 2025     Reason for Consultation:  Bradford Prado is a referred by MILES Austin for evaluation and treatment of hereditary hemochromatosis.    History: Bradford Prado is a 64 year old woman with a history of T2DM, HTN, HLD, cirrhosis, and hereditary hemochromatosis who presents from [facility] for an additional opinion about her hereditary hemochromatosis. She had previously seen Dr. Girard at [facility]. Pt was being worked up for rising LFTs in  and had a liver biopsy (2021) which showed severe hemosiderosis, mild steatohepatitis and advanced cirrhosis. 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    Of note, she had ferritin levels up to 1569 ng/mL in . She does not recall discussing ferritin then, though around this time she was being evaluated for right flank pain and ureterolithiasis. Her mother had acute liver failure and she has two sisters with hemochromatosis. Since , Bradford had been managed with phlebotomy. She initially had a ferritin goal <50, however she did not tolerate this well and was concerned that her wrist/hand pain was worse after phlebotomy. She has had b/l wrist and hand pain for over a decade and was treated with bone spur removal and steroid injections. Her ferritin goal was adjusted to <100. She typically had labs checked with possible phlebotomy every 3 months, though her last phlebotomy was in 2024. She has also been following a low iron diet. She has cirrhosis and diabetes, but no known cardiac complications.     Family history is notable for mother with cirrhosis and diabetes, 1 brother and 3 sisters with hemochromatosis.  Social: Lives at home with her daughter. 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   Habits: Denies smoking, alcohol, or recreational drugs     Physical Exam:  BP (!) 150/88 (BP Location: Right arm, Patient Position: Sitting, Cuff Size: Adult Regular)   Pulse 70   Temp 98.6  F (37  C) (Oral)   Resp 16   Wt 67.1 kg (147 lb 14.4 oz)   LMP  (LMP Unknown)   SpO2 96%   BMI 29.86 kg/m    General: alert and cooperative, NAD  HEENT: NC/AT, sclera anicteric, MMM  CV: RRR  Resp: CTAB, normal work of breathing on room air   GI: soft, non-tender, non-distended, normal bowel sounds   MSK: warm and well-perfused.  Skin: no rashes on limited exam, no jaundice  Neuro: Alert and interactive, moves all extremities, ambulates independently  Psych: Mood and affect normal    Labs:  CBC RESULTS:   Recent Labs   Lab Test 01/30/25  0958   WBC 7.3   RBC 4.82   HGB 15.0   HCT 43.6   MCV 91   MCH 31.1   MCHC 34.4   RDW 12.5        Ferritin (1/30/25): 62 ng/mL    Liver biopsy 6/21/21:   FINAL DIAGNOSIS:   Liver, Needle Biopsy Directed at Suspected Mass Lesion:   Severe hemosiderosis with 4+ iron on a scale of 0-4:    -With mild steatohepatitis and advanced cirrhosis (Laennec fibrosis stage    4C)    -Consistent with genetic hemochromatosis overlapping with steatohepatitis    -No neoplastic or other mass lesions identified     Imaging:  Abdominal US 12/27/23  IMPRESSION:  1.  Coarsened hepatic echotexture suggestive of underlying intrinsic liver disease. No suspicious focal liver lesion.  2.  Mild splenomegaly.  3.  Interval cholecystectomy.    Assessment: Bradford Prado is a 64 year old woman with T2DM, HTN, HLD, cirrhosis, and hereditary hemochromatosis (C282Y homozygous, dx 2021). She had been managed at [facility] with q3 month lab checks and phlebotomy. She had not tolerated a ferritin goal <50 well due to reported increased wrist/hand pain and concern for frequent labwork, so her goal was adjusted to a ferritin <100 ng/ml. She has been doing well with this goal and her  ferritin over the past 2 years have been <100. She most recently had phlebotomy 7/2024, but she dislikes needles and would like to avoid routine phlebotomy as much as possible.     We reviewed with Ms. Prado her history of hereditary hemochromatosis, including prior work-up, diagnosis, and management. We can space out her lab checks to q6 months and keep her ferritin goal <100. Although hemochromatosis can cause arthritic pain, her ferritin have been so well controlled over the past few years, so we have a lower suspicion that it is actively adding to her wrist/hand pain currently. We advised pt to avoid alcohol and tylenol given her cirrhosis, but could take intermittent NSAIDs and continue supportive measures like warm water soaks, parafin wax, bracing, and topical creams. She can continue to follow with palliative and orthopedics.     #Cirrhosis  She had been getting annual AFP and US abdomen screenings for HCC. This was ordered through [facility] and pt was planning to have done in the next few months. Continue to follow with hepatology    Plan  Follow-up in 6 months with CBC, ferritin   Continue PRN phlebotomy for ferritin goal <100 ng/mL    Patient was seen in conjunction with Dr. Torres Mcqueen MD   Hematology/Oncology Fellow PGY6    Attending Note:  I have reviewed the patient chart, and interviewed and examined the patient.  I agree with the assessment and plan.  The ferritin has been <100 ng/mL since 2022.  She does have arthritis changes in her hands.  I believe that her arthritis, in particular in her hands, are somewhat related to the hereditary hemochromatosis.  Unfortunately, phlebotomy does not seem to help the arthritis symptoms in most patients.  It is okay for her to take naproxen for joint pain, but because of GI and renal toxicity, avoid taking it every day.  If she develops severe pain, a pulse of steroids can be given, but I would like to avoid that in her due to the side effects of  hyperglycemia and weight gain.  I discussed with her that going forward she probably is not going to need very frequent therapeutic phlebotomies.  In spite of cirrhosis, her liver function has been very stable and I discussed with her that hopeful that she will not have progression of her liver disease now that the iron is controlled she should have her hemoglobin and ferritin checked twice a year and therapeutic phlebotomy as needed for ferritin >/= 100 ng/mL.    Time: I spent a total of 30 minutes on the day of the visit. Please see the note for further information on patient assessment and treatment.   Leatha Macdonald MD  Hematology

## 2025-03-06 NOTE — NURSING NOTE
"Oncology Rooming Note    March 6, 2025 9:18 AM   Bradford Prado is a 64 year old female who presents for:    Chief Complaint   Patient presents with    Oncology Clinic Visit     Hereditary hemochromatosis      Initial Vitals: BP (!) 150/88 (BP Location: Right arm, Patient Position: Sitting, Cuff Size: Adult Regular)   Pulse 70   Temp 98.6  F (37  C) (Oral)   Resp 16   Wt 67.1 kg (147 lb 14.4 oz)   LMP  (LMP Unknown)   SpO2 96%   BMI 29.86 kg/m   Estimated body mass index is 29.86 kg/m  as calculated from the following:    Height as of 2/4/25: 1.499 m (4' 11.02\").    Weight as of this encounter: 67.1 kg (147 lb 14.4 oz). Body surface area is 1.67 meters squared.  Moderate Pain (6) Comment: Data Unavailable   No LMP recorded (lmp unknown). Patient has had a hysterectomy.  Allergies reviewed: Yes  Medications reviewed: Yes    Medications: Medication refills not needed today.  Pharmacy name entered into [facility]:    [facility] [place], [place] - [address] [place] [address] [facility]ARM[facility]Y [place], [place] - [address] [place] [address] [phone]    Frailty Screening:   Is the patient here for a new oncology consult visit in cancer care? 2. No    PHQ9:  Did this patient require a PHQ9?: No      Clinical concerns:  none      Kaye Pavon              "

## 2025-03-06 NOTE — LETTER
3/6/2025      Bradford Prado  [address]  [facility] [address]      Dear Colleague,    Thank you for referring your patient, Bradford Prado, to [facility]. Please see a copy of my visit note below.        [facility]  [address] [place] [address]  Phone: [phone]    Outpatient Visit Note:    Patient: Bradford Prado  MRN: [id number]  : 1960  MACO: Mar 6, 2025     Reason for Consultation:  Bradford Prado is a referred by MILES Austin for evaluation and treatment of hereditary hemochromatosis.    History: Bradford Prado is a 64 year old woman with a history of T2DM, HTN, HLD, cirrhosis, and hereditary hemochromatosis who presents from [facility] for an additional opinion about her hereditary hemochromatosis. She had previously seen Dr. Girard at [facility]. Pt was being worked up for rising LFTs in  and had a liver biopsy (2021) which showed severe hemosiderosis, mild steatohepatitis and advanced cirrhosis. 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    Of note, she had ferritin levels up to 1569 ng/mL in . She does not recall discussing ferritin then, though around this time she was being evaluated for right flank pain and ureterolithiasis. Her mother had acute liver failure and she has two sisters with hemochromatosis. Since , Bradford had been managed with phlebotomy. She initially had a ferritin goal <50, however she did not tolerate this well and was concerned that her wrist/hand pain was worse after phlebotomy. She has had b/l wrist and hand pain for over a decade and was treated with bone spur removal and steroid injections. Her ferritin goal was adjusted to <100. She typically had labs checked with possible phlebotomy every 3 months, though her last phlebotomy was in 2024. She has also been following a low iron diet. She has cirrhosis and diabetes, but no known cardiac  complications.     Family history is notable for mother with cirrhosis and diabetes, 1 brother and 3 sisters with hemochromatosis.  Social: Lives at home with her daughter. 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   Habits: Denies smoking, alcohol, or recreational drugs     Physical Exam:  BP (!) 150/88 (BP Location: Right arm, Patient Position: Sitting, Cuff Size: Adult Regular)   Pulse 70   Temp 98.6  F (37  C) (Oral)   Resp 16   Wt 67.1 kg (147 lb 14.4 oz)   LMP  (LMP Unknown)   SpO2 96%   BMI 29.86 kg/m    General: alert and cooperative, NAD  HEENT: NC/AT, sclera anicteric, MMM  CV: RRR  Resp: CTAB, normal work of breathing on room air   GI: soft, non-tender, non-distended, normal bowel sounds   MSK: warm and well-perfused.  Skin: no rashes on limited exam, no jaundice  Neuro: Alert and interactive, moves all extremities, ambulates independently  Psych: Mood and affect normal    Labs:  CBC RESULTS:   Recent Labs   Lab Test 01/30/25  0958   WBC 7.3   RBC 4.82   HGB 15.0   HCT 43.6   MCV 91   MCH 31.1   MCHC 34.4   RDW 12.5        Ferritin (1/30/25): 62 ng/mL    Liver biopsy 6/21/21:   FINAL DIAGNOSIS:   Liver, Needle Biopsy Directed at Suspected Mass Lesion:   Severe hemosiderosis with 4+ iron on a scale of 0-4:    -With mild steatohepatitis and advanced cirrhosis (Laennec fibrosis stage    4C)    -Consistent with genetic hemochromatosis overlapping with steatohepatitis    -No neoplastic or other mass lesions identified     Imaging:  Abdominal US 12/27/23  IMPRESSION:  1.  Coarsened hepatic echotexture suggestive of underlying intrinsic liver disease. No suspicious focal liver lesion.  2.  Mild splenomegaly.  3.  Interval cholecystectomy.    Assessment: Bradford Prado is a 64 year old woman with T2DM, HTN, HLD, cirrhosis, and hereditary hemochromatosis (C282Y homozygous, dx 2021). She had been managed at [facility] with q3 month lab checks  and phlebotomy. She had not tolerated a ferritin goal <50 well due to reported increased wrist/hand pain and concern for frequent labwork, so her goal was adjusted to a ferritin <100 ng/ml. She has been doing well with this goal and her ferritin over the past 2 years have been <100. She most recently had phlebotomy 7/2024, but she dislikes needles and would like to avoid routine phlebotomy as much as possible.     We reviewed with Ms. Prado her history of hereditary hemochromatosis, including prior work-up, diagnosis, and management. We can space out her lab checks to q6 months and keep her ferritin goal <100. Although hemochromatosis can cause arthritic pain, her ferritin have been so well controlled over the past few years, so we have a lower suspicion that it is actively adding to her wrist/hand pain currently. We advised pt to avoid alcohol and tylenol given her cirrhosis, but could take intermittent NSAIDs and continue supportive measures like warm water soaks, parafin wax, bracing, and topical creams. She can continue to follow with palliative and orthopedics.     #Cirrhosis  She had been getting annual AFP and US abdomen screenings for HCC. This was ordered through [facility] and pt was planning to have done in the next few months. Continue to follow with hepatology    Plan  Follow-up in 6 months with CBC, ferritin   Continue PRN phlebotomy for ferritin goal <100 ng/mL    Patient was seen in conjunction with Dr. Torres Mcqueen MD   Hematology/Oncology Fellow PGY6    Attending Note:  I have reviewed the patient chart, and interviewed and examined the patient.  I agree with the assessment and plan.  The ferritin has been <100 ng/mL since 2022.  She does have arthritis changes in her hands.  I believe that her arthritis, in particular in her hands, are somewhat related to the hereditary hemochromatosis.  Unfortunately, phlebotomy does not seem to help the arthritis symptoms in most patients.  It is okay  for her to take naproxen for joint pain, but because of GI and renal toxicity, avoid taking it every day.  If she develops severe pain, a pulse of steroids can be given, but I would like to avoid that in her due to the side effects of hyperglycemia and weight gain.  I discussed with her that going forward she probably is not going to need very frequent therapeutic phlebotomies.  In spite of cirrhosis, her liver function has been very stable and I discussed with her that hopeful that she will not have progression of her liver disease now that the iron is controlled she should have her hemoglobin and ferritin checked twice a year and therapeutic phlebotomy as needed for ferritin >/= 100 ng/mL.    Time: I spent a total of 30 minutes on the day of the visit. Please see the note for further information on patient assessment and treatment.   Leatha Macdonald MD  Hematology        Again, thank you for allowing me to participate in the care of your patient.        Sincerely,        Leatha Macdonald MD    Electronically signed

## 2025-03-08 ENCOUNTER — HEALTH MAINTENANCE LETTER (OUTPATIENT)
Age: 65
End: 2025-03-08

## 2025-03-22 DIAGNOSIS — E11.40 TYPE 2 DIABETES MELLITUS WITH DIABETIC NEUROPATHY, WITH LONG-TERM CURRENT USE OF INSULIN (H): ICD-10-CM

## 2025-03-22 DIAGNOSIS — Z79.4 TYPE 2 DIABETES MELLITUS WITH DIABETIC NEUROPATHY, WITH LONG-TERM CURRENT USE OF INSULIN (H): ICD-10-CM

## 2025-03-24 RX ORDER — INSULIN ASPART 100 [IU]/ML
INJECTION, SOLUTION INTRAVENOUS; SUBCUTANEOUS
Qty: 30 ML | Refills: 0 | Status: SHIPPED | OUTPATIENT
Start: 2025-03-24

## 2025-04-02 DIAGNOSIS — I10 ESSENTIAL HYPERTENSION WITH GOAL BLOOD PRESSURE LESS THAN 140/90: Chronic | ICD-10-CM

## 2025-04-02 RX ORDER — CARVEDILOL 12.5 MG/1
12.5 TABLET ORAL 2 TIMES DAILY WITH MEALS
Qty: 180 TABLET | Refills: 0 | Status: SHIPPED | OUTPATIENT
Start: 2025-04-02

## 2025-04-02 RX ORDER — AMLODIPINE BESYLATE 5 MG/1
5 TABLET ORAL 2 TIMES DAILY
Qty: 180 TABLET | Refills: 0 | Status: SHIPPED | OUTPATIENT
Start: 2025-04-02

## 2025-04-02 NOTE — TELEPHONE ENCOUNTER
Requested Prescriptions   Pending Prescriptions Disp Refills    carvedilol (COREG) 12.5 MG tablet [Pharmacy Med Name: CARVEDILOL 12.5MG TABS] 180 tablet 3     Sig: TAKE ONE TABLET BY MOUTH TWICE A DAY WITH MEALS       Beta-Blockers Protocol Failed - 4/2/2025 12:48 PM        Failed - Most recent blood pressure under 140/90 in past 12 months     BP Readings from Last 3 Encounters:   03/06/25 (!) 150/88   02/04/25 138/77   10/09/24 109/72       No data recorded            Passed - Patient is age 6 or older        Passed - Medication is active on med list and the sig matches. RN to manually verify dose and sig if red X/fail.     If the protocol passes (green check), you do not need to verify med dose and sig.    A prescription matches if they are the same clinical intention.    For Example: once daily and every morning are the same.    The protocol can not identify upper and lower case letters as matching and will fail.     For Example: Take 1 tablet (50 mg) by mouth daily     TAKE 1 TABLET (50 MG) BY MOUTH DAILY    For all fails (red x), verify dose and sig.    If the refill does match what is on file, the RN can still proceed to approve the refill request.       If they do not match, route to the appropriate provider.             Passed - Medication indicated for associated diagnosis     Medication is associated with one or more of the following diagnoses:     Hypertension (HTN)   Atrial fibrillation/flutter   Angina   ASCVD   Migraine   Heart Failure   Tremor   Anxiety   Ocular hypertension   Glaucoma   PTSD   Obstructive hypertrophic cardiomyopathy   History of myocarditis   Palpitations   POTS (postural orthostatic tachycardia syndrome)   SVT (supraventricular tachycardia)   Hyperthyroidism   Tachycardia   Acute non-st segment elevation myocardial infarction   Subsequent non-st segment elevation myocardial infarction   Ischemic myocardial dysfunction          Passed - Recent (12 mo) or future (90 days) visit within  the authorizing provider's specialty     The patient must have completed an in-person or virtual visit within the past 12 months or has a future visit scheduled within the next 90 days with the authorizing provider s specialty.  Urgent care and e-visits do not qualify as an office visit for this protocol.            amLODIPine (NORVASC) 5 MG tablet [Pharmacy Med Name: AMLODIPINE BESYLATE 5MG TABS] 180 tablet 3     Sig: TAKE 1 TABLET (5 MG) BY MOUTH 2 TIMES DAILY       Calcium Channel Blockers Protocol  Failed - 4/2/2025 12:48 PM        Failed - Most recent blood pressure under 140/90 in past 12 months     BP Readings from Last 3 Encounters:   03/06/25 (!) 150/88   02/04/25 138/77   10/09/24 109/72       No data recorded            Passed - Medication is active on med list and the sig matches. RN to manually verify dose and sig if red X/fail.     If the protocol passes (green check), you do not need to verify med dose and sig.    A prescription matches if they are the same clinical intention.    For Example: once daily and every morning are the same.    The protocol can not identify upper and lower case letters as matching and will fail.     For Example: Take 1 tablet (50 mg) by mouth daily     TAKE 1 TABLET (50 MG) BY MOUTH DAILY    For all fails (red x), verify dose and sig.    If the refill does match what is on file, the RN can still proceed to approve the refill request.       If they do not match, route to the appropriate provider.             Passed - Medication is indicated for associated diagnosis        Passed - GFR is on file in the past 12 months and most recent GFR is normal        Passed - Recent (12 mo) or future (90 days) visit within the authorizing provider's specialty     The patient must have completed an in-person or virtual visit within the past 12 months or has a future visit scheduled within the next 90 days with the authorizing provider s specialty.  Urgent care and e-visits do not qualify as  an office visit for this protocol.          Passed - Patient is age 18 or older        Passed - No active pregnancy on record        Passed - No positive pregnancy test in past 12 months            Julie Behrendt RN

## 2025-04-23 DIAGNOSIS — Z79.4 TYPE 2 DIABETES MELLITUS WITH DIABETIC NEUROPATHY, WITH LONG-TERM CURRENT USE OF INSULIN (H): ICD-10-CM

## 2025-04-23 DIAGNOSIS — E11.40 TYPE 2 DIABETES MELLITUS WITH DIABETIC NEUROPATHY, WITH LONG-TERM CURRENT USE OF INSULIN (H): ICD-10-CM

## 2025-04-24 RX ORDER — INSULIN GLARGINE 100 [IU]/ML
INJECTION, SOLUTION SUBCUTANEOUS
Qty: 45 ML | Refills: 0 | Status: SHIPPED | OUTPATIENT
Start: 2025-04-24

## 2025-04-24 NOTE — TELEPHONE ENCOUNTER
Contacts       Contact Date/Time Type Contact Phone/Fax    04/23/2025 09:46 AM CDT Interface (Incoming) [place], [place] - [address] [place] [address] (Pharmacy) [phone]          Attempted to reach patient to: Schedule an appointment    When patient returns call, please take this action: Assist with scheduling    Reason for the visit: Chronic Disease Management/Medication/Follow-up    When to schedule: Next available    Additional comments/info: Diabetes follow up and labs    If unable to schedule: Add a note to the telephone encounter noting the barrier and route back to primary care team pool

## 2025-05-29 ENCOUNTER — PATIENT OUTREACH (OUTPATIENT)
Dept: GASTROENTEROLOGY | Facility: CLINIC | Age: 65
End: 2025-05-29
Payer: COMMERCIAL

## 2025-06-15 DIAGNOSIS — E78.5 HYPERLIPIDEMIA LDL GOAL <70: Chronic | ICD-10-CM

## 2025-06-16 ENCOUNTER — OFFICE VISIT (OUTPATIENT)
Dept: URGENT CARE | Facility: URGENT CARE | Age: 65
End: 2025-06-16
Payer: COMMERCIAL

## 2025-06-16 ENCOUNTER — MYC MEDICAL ADVICE (OUTPATIENT)
Dept: ONCOLOGY | Facility: CLINIC | Age: 65
End: 2025-06-16

## 2025-06-16 VITALS
HEIGHT: 59 IN | WEIGHT: 139.5 LBS | HEART RATE: 62 BPM | DIASTOLIC BLOOD PRESSURE: 77 MMHG | RESPIRATION RATE: 19 BRPM | SYSTOLIC BLOOD PRESSURE: 158 MMHG | OXYGEN SATURATION: 98 % | TEMPERATURE: 97.8 F | BODY MASS INDEX: 28.12 KG/M2
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
## 2025-06-16 DIAGNOSIS — Z79.4 TYPE 2 DIABETES MELLITUS WITH CHRONIC KIDNEY DISEASE, WITH LONG-TERM CURRENT USE OF INSULIN, UNSPECIFIED CKD STAGE (H): ICD-10-CM

## 2025-06-16 DIAGNOSIS — L08.9 NONVENOMOUS INSECT BITE OF FACE WITH INFECTION, INITIAL ENCOUNTER: Primary | ICD-10-CM

## 2025-06-16 DIAGNOSIS — I10 ESSENTIAL HYPERTENSION WITH GOAL BLOOD PRESSURE LESS THAN 140/90: Chronic | ICD-10-CM

## 2025-06-16 DIAGNOSIS — S00.86XA NONVENOMOUS INSECT BITE OF FACE WITH INFECTION, INITIAL ENCOUNTER: Primary | ICD-10-CM

## 2025-06-16 DIAGNOSIS — E11.22 TYPE 2 DIABETES MELLITUS WITH CHRONIC KIDNEY DISEASE, WITH LONG-TERM CURRENT USE OF INSULIN, UNSPECIFIED CKD STAGE (H): ICD-10-CM

## 2025-06-16 DIAGNOSIS — Z79.4 TYPE 2 DIABETES MELLITUS WITH DIABETIC NEUROPATHY, WITH LONG-TERM CURRENT USE OF INSULIN (H): ICD-10-CM

## 2025-06-16 DIAGNOSIS — M79.602 BILATERAL ARM PAIN: ICD-10-CM

## 2025-06-16 DIAGNOSIS — E11.40 TYPE 2 DIABETES MELLITUS WITH DIABETIC NEUROPATHY, WITH LONG-TERM CURRENT USE OF INSULIN (H): ICD-10-CM

## 2025-06-16 DIAGNOSIS — M79.601 BILATERAL ARM PAIN: ICD-10-CM

## 2025-06-16 DIAGNOSIS — E83.110 HEREDITARY HEMOCHROMATOSIS: Primary | ICD-10-CM

## 2025-06-16 PROCEDURE — 3078F DIAST BP <80 MM HG: CPT | Performed by: FAMILY MEDICINE

## 2025-06-16 PROCEDURE — 3077F SYST BP >= 140 MM HG: CPT | Performed by: FAMILY MEDICINE

## 2025-06-16 PROCEDURE — 99213 OFFICE O/P EST LOW 20 MIN: CPT | Performed by: FAMILY MEDICINE

## 2025-06-16 RX ORDER — TRAMADOL HYDROCHLORIDE 50 MG/1
50 TABLET ORAL DAILY PRN
Qty: 30 TABLET | Refills: 0 | Status: SHIPPED | OUTPATIENT
Start: 2025-06-16 | End: 2025-07-16

## 2025-06-16 RX ORDER — CEPHALEXIN 500 MG/1
500 CAPSULE ORAL 4 TIMES DAILY
Qty: 40 CAPSULE | Refills: 0 | Status: SHIPPED | OUTPATIENT
Start: 2025-06-16 | End: 2025-06-26

## 2025-06-16 RX ORDER — INSULIN ASPART 100 [IU]/ML
INJECTION, SOLUTION INTRAVENOUS; SUBCUTANEOUS
Qty: 15 ML | Refills: 1 | Status: SHIPPED | OUTPATIENT
Start: 2025-06-16

## 2025-06-16 RX ORDER — CARVEDILOL 12.5 MG/1
12.5 TABLET ORAL 2 TIMES DAILY WITH MEALS
Qty: 180 TABLET | Refills: 0 | Status: SHIPPED | OUTPATIENT
Start: 2025-06-16

## 2025-06-16 RX ORDER — PRAVASTATIN SODIUM 20 MG
20 TABLET ORAL DAILY
Qty: 90 TABLET | Refills: 0 | Status: SHIPPED | OUTPATIENT
Start: 2025-06-16

## 2025-06-16 RX ORDER — AMLODIPINE BESYLATE 5 MG/1
5 TABLET ORAL 2 TIMES DAILY
Qty: 180 TABLET | Refills: 0 | Status: SHIPPED | OUTPATIENT
Start: 2025-06-16

## 2025-06-16 NOTE — CONFIDENTIAL NOTE
Per Srinivasan Lara PA-C on 6/16/2025:  Looks like Dr. Macdonald recommended labs every 6 months, meaning she would need labs in Sept. She no longer needs to see Dr. Elizalde since she is now a Dr. Macdonald patient.     She could do the US abdomen and AFP lab done in September as well.      I could see her in Sept after there labs and US if she wants.    Writer sent My Chart message to patient to confirm if would like our clinic to assist with scheduling US Abdomen and follow up appointment with GRISELDA Mattson.    Kavita Clemens RN, BSN, OCN on 6/16/2025 at 2:20 PM

## 2025-06-16 NOTE — TELEPHONE ENCOUNTER
Requested Renewals     Name from pharmacy: TRAMADOL HCL 50MG TABS         Will file in chart as: traMADol (ULTRAM) 50 MG tablet    Sig: TAKE ONE TABLET BY MOUTH ONCE DAILY AS NEEDED FOR SEVERE PAIN     important  Maximum MME cannot be calculated for this prescription. Enter discrete sig details to calculate maximum MME.       Disp: 30 tablet    Refills: 0    Start: 6/16/2025    Class: E-Prescribe    Non-formulary For: Bilateral arm pain    Last ordered: 6 months ago (12/3/2024) by OPAL Ruby CNP    Last refill: 2/14/2025    Rx #: [id number]    Rx Protocol Controlled Substance [id number]/16/2025 09:53 AM   Protocol Details Visit with relevant provider in past 3 months or upcoming 3 months (provided they have been seen in the last 6 months)    Medication is active on med list and the sig matches    Urine drug screeen results on file in past 12 months    Controlled Substance Agreement on file in last 12 months    Auto Fail - Please forward to Provider    Naloxone on active med list    DOUG-7 done in past year    Medication not refilled in past 28 days    No Benzodiazepines on acive med list    PHQ9 done in past year    No active pregnancy on record    No pregnancy test in past 12 months or most recent test was negative      To be filled at: [place], [place] - [address] [place] [address]

## 2025-06-16 NOTE — PROGRESS NOTES
"SUBJECTIVE: Bradford Prado is a 64 year old female presenting with a chief complaint of bug bite left neck with lump left side.  Onset of symptoms was 2 week(s) ago.  Course of illness is worsening.      Past Medical History:   Diagnosis Date    Arthritis     Chest pain 02/23/2021    Diabetes mellitus (H)     Dyslipidemia     Hereditary hemochromatosis     Hyperglycemia 02/23/2021    Hypertension     Hypertensive urgency 02/23/2021    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) (H)     Nephrolithiasis 09/19/2016    First episode September 2016    PONV (postoperative nausea and vomiting)     Poorly controlled type 2 diabetes mellitus with renal complication (H) 9/22/2015    Type 2 diabetes mellitus with diabetic neuropathy, with long-term current use of insulin (H)      No Known Allergies  Social History     Tobacco Use    Smoking status: Former     Current packs/day: 0.00     Average packs/day: 0.5 packs/day for 20.0 years (10.0 ttl pk-yrs)     Types: Cigarettes     Start date: 3/9/1990     Quit date: 3/9/2010     Years since quitting: 15.2    Smokeless tobacco: Never   Substance Use Topics    Alcohol use: No     Alcohol/week: 0.0 standard drinks of alcohol       ROS:  SKIN: no rash  GI: no vomiting    OBJECTIVE:  BP (!) 158/77 (BP Location: Right arm, Patient Position: Sitting, Cuff Size: Adult Regular)   Pulse 62   Temp 97.8  F (36.6  C) (Oral)   Resp 19   Ht 1.501 m (4' 11.1\")   Wt 63.3 kg (139 lb 8 oz)   LMP  (LMP Unknown)   SpO2 98%   BMI 28.08 kg/m  GENERAL APPEARANCE: healthy, alert and no distress  EYES: EOMI,  PERRL, conjunctiva clear  HENT: ear canals and TM's normal.  Nose and mouth without ulcers, erythema or lesions  NECK: no adenopathy and left anterior  SKIN: small papule lt lateral neck      ICD-10-CM    1. Nonvenomous insect bite of face with infection, initial encounter  S00.86XA cephALEXin (KEFLEX) 500 MG capsule    L08.9     W57.XXXA       2. Type 2 diabetes mellitus " with chronic kidney disease, with long-term current use of insulin, unspecified CKD stage (H)  E11.22     Z79.4           Fluids/Rest, f/u if worse/not any better

## 2025-06-16 NOTE — PROGRESS NOTES
Urgent Care Clinic Visit    Chief Complaint   Patient presents with    Insect Bites     Painful and swelling bug bite on the neck for the last two weeks.               6/16/2025    10:30 AM   Additional Questions   Roomed by Stanislav Holguin MA   Accompanied by Self

## 2025-06-16 NOTE — TELEPHONE ENCOUNTER
LDL Cholesterol Calculated   Date Value Ref Range Status   01/30/2025 100 (H) <100 mg/dL Final   06/19/2021 89 <100 mg/dL Final     Comment:     Desirable:       <100 mg/dl

## 2025-07-22 ENCOUNTER — OFFICE VISIT (OUTPATIENT)
Dept: FAMILY MEDICINE | Facility: CLINIC | Age: 65
End: 2025-07-22
Payer: COMMERCIAL

## 2025-07-22 VITALS
HEART RATE: 71 BPM | OXYGEN SATURATION: 97 % | WEIGHT: 141.7 LBS | DIASTOLIC BLOOD PRESSURE: 78 MMHG | TEMPERATURE: 98.5 F | SYSTOLIC BLOOD PRESSURE: 130 MMHG | RESPIRATION RATE: 24 BRPM | BODY MASS INDEX: 28.56 KG/M2 | HEIGHT: 59 IN

## 2025-07-22 DIAGNOSIS — Z79.4 TYPE 2 DIABETES MELLITUS WITH DIABETIC NEUROPATHY, WITH LONG-TERM CURRENT USE OF INSULIN (H): ICD-10-CM

## 2025-07-22 DIAGNOSIS — E78.5 HYPERLIPIDEMIA LDL GOAL <70: Chronic | ICD-10-CM

## 2025-07-22 DIAGNOSIS — Z12.31 VISIT FOR SCREENING MAMMOGRAM: ICD-10-CM

## 2025-07-22 DIAGNOSIS — E11.29 TYPE 2 DIABETES MELLITUS WITH OTHER DIABETIC KIDNEY COMPLICATION, WITH LONG-TERM CURRENT USE OF INSULIN (H): Primary | ICD-10-CM

## 2025-07-22 DIAGNOSIS — F43.23 SITUATIONAL MIXED ANXIETY AND DEPRESSIVE DISORDER: ICD-10-CM

## 2025-07-22 DIAGNOSIS — Z79.4 TYPE 2 DIABETES MELLITUS WITH OTHER DIABETIC KIDNEY COMPLICATION, WITH LONG-TERM CURRENT USE OF INSULIN (H): Primary | ICD-10-CM

## 2025-07-22 DIAGNOSIS — E11.40 TYPE 2 DIABETES MELLITUS WITH DIABETIC NEUROPATHY, WITH LONG-TERM CURRENT USE OF INSULIN (H): ICD-10-CM

## 2025-07-22 DIAGNOSIS — E78.5 DYSLIPIDEMIA: ICD-10-CM

## 2025-07-22 DIAGNOSIS — I10 ESSENTIAL HYPERTENSION WITH GOAL BLOOD PRESSURE LESS THAN 140/90: Chronic | ICD-10-CM

## 2025-07-22 LAB
CHOLEST SERPL-MCNC: 164 MG/DL
EST. AVERAGE GLUCOSE BLD GHB EST-MCNC: 126 MG/DL
FASTING STATUS PATIENT QL REPORTED: NO
HBA1C MFR BLD: 6 % (ref 0–5.6)
HDLC SERPL-MCNC: 30 MG/DL
LDLC SERPL CALC-MCNC: 96 MG/DL
NONHDLC SERPL-MCNC: 134 MG/DL
TRIGL SERPL-MCNC: 188 MG/DL

## 2025-07-22 PROCEDURE — 3044F HG A1C LEVEL LT 7.0%: CPT | Performed by: NURSE PRACTITIONER

## 2025-07-22 PROCEDURE — 80061 LIPID PANEL: CPT | Performed by: NURSE PRACTITIONER

## 2025-07-22 PROCEDURE — G2211 COMPLEX E/M VISIT ADD ON: HCPCS | Performed by: NURSE PRACTITIONER

## 2025-07-22 PROCEDURE — 83036 HEMOGLOBIN GLYCOSYLATED A1C: CPT | Performed by: NURSE PRACTITIONER

## 2025-07-22 PROCEDURE — 1125F AMNT PAIN NOTED PAIN PRSNT: CPT | Performed by: NURSE PRACTITIONER

## 2025-07-22 PROCEDURE — 36415 COLL VENOUS BLD VENIPUNCTURE: CPT | Performed by: NURSE PRACTITIONER

## 2025-07-22 PROCEDURE — 3075F SYST BP GE 130 - 139MM HG: CPT | Performed by: NURSE PRACTITIONER

## 2025-07-22 PROCEDURE — 99214 OFFICE O/P EST MOD 30 MIN: CPT | Performed by: NURSE PRACTITIONER

## 2025-07-22 PROCEDURE — 3078F DIAST BP <80 MM HG: CPT | Performed by: NURSE PRACTITIONER

## 2025-07-22 RX ORDER — INSULIN GLARGINE 100 [IU]/ML
INJECTION, SOLUTION SUBCUTANEOUS
Qty: 45 ML | Refills: 0 | Status: SHIPPED | OUTPATIENT
Start: 2025-07-22

## 2025-07-22 RX ORDER — INSULIN ASPART 100 [IU]/ML
INJECTION, SOLUTION INTRAVENOUS; SUBCUTANEOUS
Qty: 15 ML | Refills: 1 | Status: SHIPPED | OUTPATIENT
Start: 2025-07-22

## 2025-07-22 RX ORDER — LOSARTAN POTASSIUM 50 MG/1
50 TABLET ORAL DAILY
Qty: 90 TABLET | Refills: 3 | Status: SHIPPED | OUTPATIENT
Start: 2025-07-22

## 2025-07-22 RX ORDER — AMLODIPINE BESYLATE 5 MG/1
5 TABLET ORAL 2 TIMES DAILY
Qty: 180 TABLET | Refills: 3 | Status: SHIPPED | OUTPATIENT
Start: 2025-07-22

## 2025-07-22 RX ORDER — PRAVASTATIN SODIUM 20 MG
20 TABLET ORAL DAILY
Qty: 90 TABLET | Refills: 3 | Status: SHIPPED | OUTPATIENT
Start: 2025-07-22

## 2025-07-22 RX ORDER — CARVEDILOL 12.5 MG/1
12.5 TABLET ORAL 2 TIMES DAILY WITH MEALS
Qty: 180 TABLET | Refills: 3 | Status: SHIPPED | OUTPATIENT
Start: 2025-07-22

## 2025-07-22 ASSESSMENT — ANXIETY QUESTIONNAIRES
7. FEELING AFRAID AS IF SOMETHING AWFUL MIGHT HAPPEN: NEARLY EVERY DAY
5. BEING SO RESTLESS THAT IT IS HARD TO SIT STILL: NEARLY EVERY DAY
2. NOT BEING ABLE TO STOP OR CONTROL WORRYING: NEARLY EVERY DAY
6. BECOMING EASILY ANNOYED OR IRRITABLE: NEARLY EVERY DAY
GAD7 TOTAL SCORE: 21
IF YOU CHECKED OFF ANY PROBLEMS ON THIS QUESTIONNAIRE, HOW DIFFICULT HAVE THESE PROBLEMS MADE IT FOR YOU TO DO YOUR WORK, TAKE CARE OF THINGS AT HOME, OR GET ALONG WITH OTHER PEOPLE: VERY DIFFICULT
GAD7 TOTAL SCORE: 21
1. FEELING NERVOUS, ANXIOUS, OR ON EDGE: NEARLY EVERY DAY
3. WORRYING TOO MUCH ABOUT DIFFERENT THINGS: NEARLY EVERY DAY

## 2025-07-22 ASSESSMENT — PATIENT HEALTH QUESTIONNAIRE - PHQ9
5. POOR APPETITE OR OVEREATING: NEARLY EVERY DAY
SUM OF ALL RESPONSES TO PHQ QUESTIONS 1-9: 21

## 2025-07-22 ASSESSMENT — ENCOUNTER SYMPTOMS: NERVOUS/ANXIOUS: 1

## 2025-07-22 ASSESSMENT — PAIN SCALES - GENERAL: PAINLEVEL_OUTOF10: MODERATE PAIN (5)

## 2025-07-22 NOTE — PATIENT INSTRUCTIONS
Possible side effects of antidepressants/anxiety meds, including but not limited to GI upset, disrupted sleep, loss of libido, worsening of mood or even possible risk of increased suicidal thoughts.   Often some of these things if not severe will improve after 1-2 weeks on medications but some may not see effects for 3-4 weeks,  if tolerable patients should continue meds and see if there is improvement.  If symptoms are intolerable or for any suicidal thoughts the medication should be stopped immediately and contact the clinic.      These medications should be used for 6-9 months before stopping, to avoid rebound symptoms.   Contact the clinic if having any problems tolerating these medications.  Take the medication daily and do not stop the medication abruptly.    Follow up in one month to discuss improvement and whether or not we need to change meds or increase dose.  Follow up sooner if problems.      Please plan to contact the clinic or 24 hour nurse line at any time if you are having any thoughts of self harm.

## 2025-07-22 NOTE — PROGRESS NOTES
"  Assessment & Plan     Type 2 diabetes mellitus with other diabetic kidney complication, with long-term current use of insulin (H)  Controlled - reviewed CGM - averaging 130's    - HEMOGLOBIN A1C    Type 2 diabetes mellitus with diabetic neuropathy, with long-term current use of insulin (H)     - insulin aspart (NOVOLOG FLEXPEN) 100 UNIT/ML pen  Dispense: 15 mL; Refill: 1  - insulin glargine (LANTUS SOLOSTAR) 100 UNIT/ML pen  Dispense: 45 mL; Refill: 0    Situational mixed anxiety and depressive disorder  Worsening since stopping Sertraline.  Reviewed PHQ-9 and DOUG scores with patient. Patient agreeable to restarting Sertraline and follow up in 1 month.  Referred to mental health - counseling.  Denies SI reports.  Has good support lives with daughter and family.  The risks, benefits and treatment options of prescribed medications or other treatments have been discussed with the patient. The patient verbalized their understanding and should call or follow up if no improvement or if they develop further problems.    - sertraline (ZOLOFT) 50 MG tablet  Dispense: 90 tablet; Refill: 3  - Adult Mental Health  Referral    Essential hypertension with goal blood pressure less than 140/90  Controlled.    - amLODIPine (NORVASC) 5 MG tablet  Dispense: 180 tablet; Refill: 3  - carvedilol (COREG) 12.5 MG tablet  Dispense: 180 tablet; Refill: 3  - losartan (COZAAR) 50 MG tablet  Dispense: 90 tablet; Refill: 3    Hyperlipidemia LDL goal <70     - pravastatin (PRAVACHOL) 20 MG tablet  Dispense: 90 tablet; Refill: 3  - Lipid panel reflex to direct LDL Non-fasting    Visit for screening mammogram     - MA Screening Bilateral w/ Krishna    Dyslipidemia     - Lipid panel reflex to direct LDL Non-fasting      BMI  Estimated body mass index is 28.38 kg/m  as calculated from the following:    Height as of this encounter: 1.505 m (4' 11.25\").    Weight as of this encounter: 64.3 kg (141 lb 11.2 oz).       Depression Screening Follow " Up        7/22/2025     1:19 PM   PHQ   PHQ-9 Total Score 21   Q9: Thoughts of better off dead/self-harm past 2 weeks Not at all         7/22/2025     1:19 PM   Last PHQ-9   1.  Little interest or pleasure in doing things 3   2.  Feeling down, depressed, or hopeless 3   3.  Trouble falling or staying asleep, or sleeping too much 3   4.  Feeling tired or having little energy 3   5.  Poor appetite or overeating 3   6.  Feeling bad about yourself 3   7.  Trouble concentrating 3   8.  Moving slowly or restless 0   Q9: Thoughts of better off dead/self-harm past 2 weeks 0   PHQ-9 Total Score 21   Difficulty at work, home, or with people Very difficult         Follow Up Actions Taken  Mental Health Referral placed     Follow-up   No follow-ups on file.        Randy Felder is a 64 year old, presenting for the following health issues:  Diabetes, Lipids, Hypertension, Depression (Would like to discuss anti depressant , stopped taking zoloft 4 months ago ( wheened off) ), Anxiety, and Health Maintenance (Due for Mammo, declined vaccines )        7/22/2025     1:23 PM   Additional Questions   Roomed by Chandu ANTONIO CMA   Accompanied by self         7/22/2025     1:23 PM   Patient Reported Additional Medications   Patient reports taking the following new medications Tramadol as needed( unknown dose)     Via the Health Maintenance questionnaire, the patient has reported the following services have been completed -Eye Exam: [facility], this information has been sent to the abstraction team.  Anxiety    History of Present Illness       Diabetes:   She presents for follow up of diabetes.   She is checking home blood glucose with a continuous glucose monitor.   She checks blood glucose before meals, after meals, before and after meals and at bedtime.  Blood glucose is sometimes over 200 and sometimes under 70. She is aware of hypoglycemia symptoms including shakiness, dizziness, weakness and confusion.    She has no concerns  regarding her diabetes at this time.   She is not experiencing numbness or burning in feet, excessive thirst, blurry vision, weight changes or redness, sores or blisters on feet. The patient has had a diabetic eye exam in the last 12 months. Eye exam performed on 06/2024. Location of last eye exam [facility]        She eats 2-3 servings of fruits and vegetables daily.She consumes 0 sweetened beverage(s) daily.She exercises with enough effort to increase her heart rate 30 to 60 minutes per day.  She exercises with enough effort to increase her heart rate 4 days per week. She is missing 1 dose(s) of medications per week.  She is not taking prescribed medications regularly due to remembering to take.        Has CGM - will alert her if BS < 70 - also has symptoms of hypoglycemia that she can recognize.  Taking Novolog three times daily with meals in addition to Lantus daily    Average readings 130-140's - no BS seen > 200 in the past 3-4 weeks.    Depression and Anxiety   How are you doing with your depression since your last visit? Daughter noticed patient worsened / crying a lot more , staying in room   How are you doing with your anxiety since your last visit?  No change  Are you having other symptoms that might be associated with depression or anxiety? Yes:  crying more , isolating herself , no interest   Have you had a significant life event? No   Do you have any concerns with your use of alcohol or other drugs? No    Social History     Tobacco Use    Smoking status: Former     Current packs/day: 0.00     Average packs/day: 0.5 packs/day for 20.0 years (10.0 ttl pk-yrs)     Types: Cigarettes     Start date: 3/9/1990     Quit date: 3/9/2010     Years since quitting: 15.3    Smokeless tobacco: Never   Vaping Use    Vaping status: Never Used   Substance Use Topics    Alcohol use: No     Alcohol/week: 0.0 standard drinks of alcohol    Drug use: No         1/24/2024    10:01 AM 10/22/2024     8:45 AM 7/22/2025     1:19 PM    PHQ   PHQ-9 Total Score 18 9 21   Q9: Thoughts of better off dead/self-harm past 2 weeks Not at all  Not at all Not at all       Proxy-reported         3/25/2024    11:34 AM 10/22/2024     8:43 AM 7/22/2025     1:19 PM   DOUG-7 SCORE   Total Score 13 (moderate anxiety) 8 (mild anxiety)    Total Score 13 8 21         7/22/2025     1:19 PM   Last PHQ-9   1.  Little interest or pleasure in doing things 3   2.  Feeling down, depressed, or hopeless 3   3.  Trouble falling or staying asleep, or sleeping too much 3   4.  Feeling tired or having little energy 3   5.  Poor appetite or overeating 3   6.  Feeling bad about yourself 3   7.  Trouble concentrating 3   8.  Moving slowly or restless 0   Q9: Thoughts of better off dead/self-harm past 2 weeks 0   PHQ-9 Total Score 21   Difficulty at work, home, or with people Very difficult         7/22/2025     1:19 PM   DOUG-7    1. Feeling nervous, anxious, or on edge 3   2. Not being able to stop or control worrying 3   3. Worrying too much about different things 3   4. Trouble relaxing 3   5. Being so restless that it is hard to sit still 3   6. Becoming easily annoyed or irritable 3   7. Feeling afraid, as if something awful might happen 3   DOUG-7 Total Score 21   If you checked any problems, how difficult have they made it for you to do your work, take care of things at home, or get along with other people? Very difficult      Hyperlipidemia Follow-Up    Are you regularly taking any medication or supplement to lower your cholesterol?   Yes- pravastatin   Are you having muscle aches or other side effects that you think could be caused by your cholesterol lowering medication?  No    Hypertension Follow-up    Do you check your blood pressure regularly outside of the clinic? No   Are you following a low salt diet? No  Are your blood pressures ever more than 140 on the top number (systolic) OR more   than 90 on the bottom number (diastolic), for example 140/90? Yes    BP Readings  "from Last 2 Encounters:   07/22/25 130/78   06/16/25 (!) 158/77     History of hemachromatosis - seeing Oncology for this and monitoring.  AFB tumor marker negative.  Plan for Abdomen US  Has hand pain due to hemachromatosis - using Tramadol rare - given #7 per month through pain clinic.        Review of Systems  Constitutional, HEENT, cardiovascular, pulmonary, GI, , musculoskeletal, neuro, skin, endocrine and psych systems are negative, except as otherwise noted.      Objective    /78 (BP Location: Left arm, Patient Position: Sitting, Cuff Size: Adult Regular)   Pulse 71   Temp 98.5  F (36.9  C) (Tympanic)   Resp 24   Ht 1.505 m (4' 11.25\")   Wt 64.3 kg (141 lb 11.2 oz)   LMP  (LMP Unknown)   SpO2 97%   BMI 28.38 kg/m    Body mass index is 28.38 kg/m .  Physical Exam   GENERAL: alert and no distress  HENT: ear canals and TM's normal, nose and mouth without ulcers or lesions  NECK: no adenopathy, no asymmetry, masses, or scars  RESP: lungs clear to auscultation - no rales, rhonchi or wheezes  CV: regular rate and rhythm, normal S1 S2, no S3 or S4, no murmur, click or rub, no peripheral edema  ABDOMEN: soft, nontender, no hepatosplenomegaly, no masses and bowel sounds normal  MS: no gross musculoskeletal defects noted, no edema  SKIN: no suspicious lesions or rashes  NEURO: Normal strength and tone, mentation intact and speech normal  PSYCH: mentation appears normal, tearful, anxious, judgement and insight intact, and appearance well groomed  Diabetic foot exam: normal DP and PT pulses, no trophic changes or ulcerative lesions, and normal sensory exam             Signed Electronically by: Paulette Travis DNP    "

## 2025-07-23 ENCOUNTER — PATIENT OUTREACH (OUTPATIENT)
Dept: CARE COORDINATION | Facility: CLINIC | Age: 65
End: 2025-07-23
Payer: COMMERCIAL

## 2025-08-14 ENCOUNTER — ANCILLARY PROCEDURE (OUTPATIENT)
Dept: MAMMOGRAPHY | Facility: CLINIC | Age: 65
End: 2025-08-14
Attending: NURSE PRACTITIONER
Payer: COMMERCIAL

## 2025-08-14 DIAGNOSIS — Z12.31 VISIT FOR SCREENING MAMMOGRAM: ICD-10-CM

## 2025-08-26 ENCOUNTER — VIRTUAL VISIT (OUTPATIENT)
Dept: FAMILY MEDICINE | Facility: CLINIC | Age: 65
End: 2025-08-26
Payer: COMMERCIAL

## 2025-08-26 DIAGNOSIS — E11.40 TYPE 2 DIABETES MELLITUS WITH DIABETIC NEUROPATHY, WITH LONG-TERM CURRENT USE OF INSULIN (H): ICD-10-CM

## 2025-08-26 DIAGNOSIS — Z79.4 TYPE 2 DIABETES MELLITUS WITH DIABETIC NEUROPATHY, WITH LONG-TERM CURRENT USE OF INSULIN (H): ICD-10-CM

## 2025-08-26 DIAGNOSIS — F33.1 MODERATE RECURRENT MAJOR DEPRESSION (H): ICD-10-CM

## 2025-08-26 DIAGNOSIS — F43.23 SITUATIONAL MIXED ANXIETY AND DEPRESSIVE DISORDER: Primary | ICD-10-CM

## 2025-08-26 PROCEDURE — 98006 SYNCH AUDIO-VIDEO EST MOD 30: CPT | Performed by: NURSE PRACTITIONER

## 2025-08-26 PROCEDURE — 1125F AMNT PAIN NOTED PAIN PRSNT: CPT | Mod: 95 | Performed by: NURSE PRACTITIONER

## 2025-08-26 ASSESSMENT — ANXIETY QUESTIONNAIRES
6. BECOMING EASILY ANNOYED OR IRRITABLE: NOT AT ALL
8. IF YOU CHECKED OFF ANY PROBLEMS, HOW DIFFICULT HAVE THESE MADE IT FOR YOU TO DO YOUR WORK, TAKE CARE OF THINGS AT HOME, OR GET ALONG WITH OTHER PEOPLE?: NOT DIFFICULT AT ALL
5. BEING SO RESTLESS THAT IT IS HARD TO SIT STILL: SEVERAL DAYS
GAD7 TOTAL SCORE: 1
2. NOT BEING ABLE TO STOP OR CONTROL WORRYING: NOT AT ALL
6. BECOMING EASILY ANNOYED OR IRRITABLE: NOT AT ALL
4. TROUBLE RELAXING: NOT AT ALL
3. WORRYING TOO MUCH ABOUT DIFFERENT THINGS: NOT AT ALL
IF YOU CHECKED OFF ANY PROBLEMS ON THIS QUESTIONNAIRE, HOW DIFFICULT HAVE THESE PROBLEMS MADE IT FOR YOU TO DO YOUR WORK, TAKE CARE OF THINGS AT HOME, OR GET ALONG WITH OTHER PEOPLE: NOT DIFFICULT AT ALL
GAD7 TOTAL SCORE: 1
2. NOT BEING ABLE TO STOP OR CONTROL WORRYING: NOT AT ALL
IF YOU CHECKED OFF ANY PROBLEMS ON THIS QUESTIONNAIRE, HOW DIFFICULT HAVE THESE PROBLEMS MADE IT FOR YOU TO DO YOUR WORK, TAKE CARE OF THINGS AT HOME, OR GET ALONG WITH OTHER PEOPLE: NOT DIFFICULT AT ALL
GAD7 TOTAL SCORE: 1
1. FEELING NERVOUS, ANXIOUS, OR ON EDGE: NOT AT ALL
7. FEELING AFRAID AS IF SOMETHING AWFUL MIGHT HAPPEN: NOT AT ALL
GAD7 TOTAL SCORE: 1
1. FEELING NERVOUS, ANXIOUS, OR ON EDGE: NOT AT ALL
5. BEING SO RESTLESS THAT IT IS HARD TO SIT STILL: SEVERAL DAYS
7. FEELING AFRAID AS IF SOMETHING AWFUL MIGHT HAPPEN: NOT AT ALL
3. WORRYING TOO MUCH ABOUT DIFFERENT THINGS: NOT AT ALL
7. FEELING AFRAID AS IF SOMETHING AWFUL MIGHT HAPPEN: NOT AT ALL

## 2025-08-26 ASSESSMENT — PATIENT HEALTH QUESTIONNAIRE - PHQ9
5. POOR APPETITE OR OVEREATING: NOT AT ALL
SUM OF ALL RESPONSES TO PHQ QUESTIONS 1-9: 2
SUM OF ALL RESPONSES TO PHQ QUESTIONS 1-9: 2
10. IF YOU CHECKED OFF ANY PROBLEMS, HOW DIFFICULT HAVE THESE PROBLEMS MADE IT FOR YOU TO DO YOUR WORK, TAKE CARE OF THINGS AT HOME, OR GET ALONG WITH OTHER PEOPLE: NOT DIFFICULT AT ALL

## 2025-08-26 ASSESSMENT — ENCOUNTER SYMPTOMS: NERVOUS/ANXIOUS: 1

## (undated) DEVICE — SOL WATER IRRIG 1000ML BOTTLE 2F7114

## (undated) DEVICE — KIT ENDO TURNOVER/PROCEDURE CARRY-ON 101822

## (undated) DEVICE — ANTIFOG SOLUTION W/FOAM PAD 31142527

## (undated) DEVICE — ESU GROUND PAD UNIVERSAL W/O CORD

## (undated) DEVICE — SYR 30ML SLIP TIP W/O NDL 302833

## (undated) DEVICE — APPLIER ENDO CLIP APPLIED MED UNIVERSAL 5MMX34CM LF CA500

## (undated) DEVICE — SUCTION IRR STRYKERFLOW II W/TIP 250-070-520

## (undated) DEVICE — SU VICRYL 4-0 PS-2 18" UND J496H

## (undated) DEVICE — ENDO TROCAR SLEEVE KII Z-THREADED 05X100MM CTS02

## (undated) DEVICE — DECANTER VIAL 2006S

## (undated) DEVICE — ENDO FORCEP ENDOJAW BIOPSY 2.8MMX230CM FB-220U

## (undated) DEVICE — ENDO TROCAR FIRST ENTRY KII FIOS Z-THRD 11X100MM CTF33

## (undated) DEVICE — SU VICRYL 0 UR-6 27" J603H

## (undated) DEVICE — SOL NACL 0.9% INJ 1000ML BAG 2B1324X

## (undated) DEVICE — PACK LAP CHOLE SLC15LCFSD

## (undated) DEVICE — SUCTION MANIFOLD NEPTUNE 2 SYS 1 PORT 702-025-000

## (undated) DEVICE — EVAC SYSTEM CLEAR FLOW SC082500

## (undated) DEVICE — GOWN IMPERVIOUS SPECIALTY XLG/XLONG 32474

## (undated) DEVICE — NDL INSUFFLATION 13GA 120MM C2201

## (undated) DEVICE — PREP CHLORAPREP 26ML TINTED HI-LITE ORANGE 930815

## (undated) DEVICE — POUCH TISSUE RETRIEVAL LAP 10MM 2.21" INTRO TRS100SB2

## (undated) DEVICE — ENDO TROCAR FIRST ENTRY KII FIOS Z-THRD 05X100MM CTF03

## (undated) DEVICE — ENDO BITE BLOCK ADULT OMNI-BLOC

## (undated) DEVICE — KIT ENDO FIRST STEP DISINFECTANT 200ML W/POUCH EP-4

## (undated) DEVICE — SPECIMEN CONTAINER 3OZ W/FORMALIN 59901

## (undated) DEVICE — MANIFOLD NEPTUNE 4 PORT 700-20

## (undated) DEVICE — LINEN TOWEL PACK X5 5464

## (undated) DEVICE — ESU HOLDER LAP INST DISP PURPLE LONG 330MM H-PRO-330

## (undated) DEVICE — TUBING SUCTION 12"X1/4" N612

## (undated) DEVICE — SOL WATER IRRIG 500ML BOTTLE 2F7113

## (undated) DEVICE — SUCTION CATH AIRLIFE TRI-FLO W/CONTROL PORT 14FR  T60C

## (undated) DEVICE — GLOVE BIOGEL PI SZ 8.0 40880

## (undated) DEVICE — ENDO SCOPE WARMER LF TM500

## (undated) RX ORDER — FENTANYL CITRATE 50 UG/ML
INJECTION, SOLUTION INTRAMUSCULAR; INTRAVENOUS
Status: DISPENSED
Start: 2021-11-10

## (undated) RX ORDER — OXYCODONE HYDROCHLORIDE 5 MG/1
TABLET ORAL
Status: DISPENSED
Start: 2021-06-21

## (undated) RX ORDER — OXYCODONE HYDROCHLORIDE 5 MG/1
TABLET ORAL
Status: DISPENSED
Start: 2023-02-13

## (undated) RX ORDER — DEXAMETHASONE SODIUM PHOSPHATE 4 MG/ML
INJECTION, SOLUTION INTRA-ARTICULAR; INTRALESIONAL; INTRAMUSCULAR; INTRAVENOUS; SOFT TISSUE
Status: DISPENSED
Start: 2023-02-13

## (undated) RX ORDER — ONDANSETRON 2 MG/ML
INJECTION INTRAMUSCULAR; INTRAVENOUS
Status: DISPENSED
Start: 2021-06-21

## (undated) RX ORDER — FENTANYL CITRATE 50 UG/ML
INJECTION, SOLUTION INTRAMUSCULAR; INTRAVENOUS
Status: DISPENSED
Start: 2021-06-21

## (undated) RX ORDER — ONDANSETRON 2 MG/ML
INJECTION INTRAMUSCULAR; INTRAVENOUS
Status: DISPENSED
Start: 2023-02-13

## (undated) RX ORDER — METOPROLOL TARTRATE 1 MG/ML
INJECTION, SOLUTION INTRAVENOUS
Status: DISPENSED
Start: 2021-02-24

## (undated) RX ORDER — LIDOCAINE HYDROCHLORIDE 10 MG/ML
INJECTION, SOLUTION EPIDURAL; INFILTRATION; INTRACAUDAL; PERINEURAL
Status: DISPENSED
Start: 2021-06-21

## (undated) RX ORDER — HYDROMORPHONE HYDROCHLORIDE 1 MG/ML
INJECTION, SOLUTION INTRAMUSCULAR; INTRAVENOUS; SUBCUTANEOUS
Status: DISPENSED
Start: 2021-06-21

## (undated) RX ORDER — HYDROMORPHONE HYDROCHLORIDE 1 MG/ML
INJECTION, SOLUTION INTRAMUSCULAR; INTRAVENOUS; SUBCUTANEOUS
Status: DISPENSED
Start: 2023-02-13

## (undated) RX ORDER — GLYCOPYRROLATE 0.2 MG/ML
INJECTION, SOLUTION INTRAMUSCULAR; INTRAVENOUS
Status: DISPENSED
Start: 2023-02-13

## (undated) RX ORDER — FENTANYL CITRATE 0.05 MG/ML
INJECTION, SOLUTION INTRAMUSCULAR; INTRAVENOUS
Status: DISPENSED
Start: 2023-02-13

## (undated) RX ORDER — FENTANYL CITRATE 50 UG/ML
INJECTION, SOLUTION INTRAMUSCULAR; INTRAVENOUS
Status: DISPENSED
Start: 2023-02-13

## (undated) RX ORDER — PROPOFOL 10 MG/ML
INJECTION, EMULSION INTRAVENOUS
Status: DISPENSED
Start: 2023-02-13

## (undated) RX ORDER — SODIUM CHLORIDE 9 MG/ML
INJECTION, SOLUTION INTRAVENOUS
Status: DISPENSED
Start: 2021-06-21

## (undated) RX ORDER — EPHEDRINE SULFATE 50 MG/ML
INJECTION, SOLUTION INTRAMUSCULAR; INTRAVENOUS; SUBCUTANEOUS
Status: DISPENSED
Start: 2023-02-13

## (undated) RX ORDER — NITROGLYCERIN 0.4 MG/1
TABLET SUBLINGUAL
Status: DISPENSED
Start: 2021-02-24

## (undated) RX ORDER — NEOSTIGMINE METHYLSULFATE 1 MG/ML
VIAL (ML) INJECTION
Status: DISPENSED
Start: 2023-02-13

## (undated) RX ORDER — CEFAZOLIN SODIUM/WATER 2 G/20 ML
SYRINGE (ML) INTRAVENOUS
Status: DISPENSED
Start: 2023-02-13

## (undated) RX ORDER — APREPITANT 40 MG/1
CAPSULE ORAL
Status: DISPENSED
Start: 2023-02-13

## (undated) RX ORDER — SCOLOPAMINE TRANSDERMAL SYSTEM 1 MG/1
PATCH, EXTENDED RELEASE TRANSDERMAL
Status: DISPENSED
Start: 2023-02-13